# Patient Record
Sex: MALE | Race: BLACK OR AFRICAN AMERICAN | NOT HISPANIC OR LATINO | Employment: FULL TIME | URBAN - METROPOLITAN AREA
[De-identification: names, ages, dates, MRNs, and addresses within clinical notes are randomized per-mention and may not be internally consistent; named-entity substitution may affect disease eponyms.]

---

## 2017-05-31 ENCOUNTER — OFFICE VISIT (OUTPATIENT)
Dept: FAMILY MEDICINE | Facility: CLINIC | Age: 47
End: 2017-05-31
Payer: COMMERCIAL

## 2017-05-31 VITALS
BODY MASS INDEX: 32.57 KG/M2 | TEMPERATURE: 97 F | DIASTOLIC BLOOD PRESSURE: 100 MMHG | HEIGHT: 75 IN | OXYGEN SATURATION: 97 % | WEIGHT: 261.94 LBS | HEART RATE: 90 BPM | SYSTOLIC BLOOD PRESSURE: 140 MMHG | RESPIRATION RATE: 16 BRPM

## 2017-05-31 DIAGNOSIS — I10 ESSENTIAL HYPERTENSION: Primary | ICD-10-CM

## 2017-05-31 PROCEDURE — 99214 OFFICE O/P EST MOD 30 MIN: CPT | Mod: S$GLB,,, | Performed by: FAMILY MEDICINE

## 2017-05-31 PROCEDURE — 99999 PR PBB SHADOW E&M-EST. PATIENT-LVL III: CPT | Mod: PBBFAC,,, | Performed by: FAMILY MEDICINE

## 2017-05-31 RX ORDER — AMLODIPINE BESYLATE 5 MG/1
5 TABLET ORAL DAILY
Qty: 30 TABLET | Refills: 5 | Status: SHIPPED | OUTPATIENT
Start: 2017-05-31 | End: 2017-07-31

## 2017-05-31 RX ORDER — NAPROXEN 500 MG/1
500 TABLET ORAL
COMMUNITY
Start: 2016-09-27 | End: 2017-05-31

## 2017-05-31 RX ORDER — AMLODIPINE BESYLATE 5 MG/1
5 TABLET ORAL
COMMUNITY
Start: 2017-03-02 | End: 2017-05-31 | Stop reason: ALTCHOICE

## 2017-05-31 NOTE — PROGRESS NOTES
Subjective:       Patient ID: Bria Saldana is a 46 y.o. male.    Chief Complaint: Follow-up      HPI   Mr. Saldana presents to clinic today for follow up on his blood pressure.   He denies any recent chest pain, cough, shortness of breath, abdominal pain, diarrhea , nausea and vomiting.   He states he does not smoke. He drinks beer on occasion.   He gets headache once a in a blue moon.   He states he checks his blood pressure and it has been elevated to the 140-160 range.       Review of Systems   Constitutional: Negative for fever.   Respiratory: Negative for cough and shortness of breath.    Cardiovascular: Negative for chest pain.   Gastrointestinal: Negative for abdominal pain, diarrhea, nausea and vomiting.   Genitourinary: Negative for dysuria and hematuria.   Neurological: Positive for headaches.       Medication List with Changes/Refills   Current Medications    AMLODIPINE (NORVASC) 5 MG TABLET    Take 5 mg by mouth.    NAPROXEN (NAPROSYN) 500 MG TABLET    Take 500 mg by mouth.       There is no problem list on file for this patient.        Objective:     Physical Exam   Constitutional: He is oriented to person, place, and time. He appears well-developed and well-nourished. No distress.   HENT:   Head: Normocephalic and atraumatic.   Right Ear: External ear normal.   Left Ear: External ear normal.   Eyes: EOM are normal. Right eye exhibits no discharge. Left eye exhibits no discharge.   Cardiovascular: Normal rate and regular rhythm.    Pulmonary/Chest: Effort normal and breath sounds normal. No respiratory distress. He has no wheezes.   Musculoskeletal: He exhibits no edema.   Neurological: He is alert and oriented to person, place, and time.   Skin: Skin is warm and dry. He is not diaphoretic. No erythema.   Psychiatric: He has a normal mood and affect.   Vitals reviewed.    Vitals:    05/31/17 1017   BP: (!) 140/100   Pulse: 90   Resp: 16   Temp: 96.8 °F (36 °C)       Assessment/  PLAN      Essential hypertension  -     amlodipine (NORVASC) 5 MG tablet; Take 1 tablet (5 mg total) by mouth once daily.  Dispense: 30 tablet; Refill: 5  - counseled on diet and exercise       Plan as above   rtc in about 1 month for follow up on th       Frandy Pritchard MD  [facility]

## 2017-05-31 NOTE — PATIENT INSTRUCTIONS

## 2017-06-01 PROBLEM — I10 ESSENTIAL HYPERTENSION: Status: ACTIVE | Noted: 2017-06-01

## 2017-07-31 ENCOUNTER — OFFICE VISIT (OUTPATIENT)
Dept: FAMILY MEDICINE | Facility: CLINIC | Age: 47
End: 2017-07-31
Payer: COMMERCIAL

## 2017-07-31 ENCOUNTER — LAB VISIT (OUTPATIENT)
Dept: LAB | Facility: HOSPITAL | Age: 47
End: 2017-07-31
Payer: COMMERCIAL

## 2017-07-31 VITALS
HEART RATE: 104 BPM | TEMPERATURE: 98 F | WEIGHT: 263 LBS | OXYGEN SATURATION: 98 % | SYSTOLIC BLOOD PRESSURE: 150 MMHG | DIASTOLIC BLOOD PRESSURE: 96 MMHG | HEIGHT: 75 IN | RESPIRATION RATE: 18 BRPM | BODY MASS INDEX: 32.7 KG/M2

## 2017-07-31 DIAGNOSIS — I10 ESSENTIAL HYPERTENSION: Primary | ICD-10-CM

## 2017-07-31 DIAGNOSIS — R74.8 ELEVATED LIVER ENZYMES: ICD-10-CM

## 2017-07-31 LAB
ALBUMIN SERPL BCP-MCNC: 3.8 G/DL
ALP SERPL-CCNC: 93 U/L
ALT SERPL W/O P-5'-P-CCNC: 59 U/L
ANION GAP SERPL CALC-SCNC: 8 MMOL/L
AST SERPL-CCNC: 29 U/L
BILIRUB SERPL-MCNC: 1 MG/DL
BUN SERPL-MCNC: 15 MG/DL
CALCIUM SERPL-MCNC: 9.1 MG/DL
CHLORIDE SERPL-SCNC: 106 MMOL/L
CO2 SERPL-SCNC: 26 MMOL/L
CREAT SERPL-MCNC: 1.5 MG/DL
EST. GFR  (AFRICAN AMERICAN): >60 ML/MIN/1.73 M^2
EST. GFR  (NON AFRICAN AMERICAN): 55.4 ML/MIN/1.73 M^2
GLUCOSE SERPL-MCNC: 120 MG/DL
POTASSIUM SERPL-SCNC: 4.9 MMOL/L
PROT SERPL-MCNC: 7.5 G/DL
SODIUM SERPL-SCNC: 140 MMOL/L

## 2017-07-31 PROCEDURE — 99214 OFFICE O/P EST MOD 30 MIN: CPT | Mod: S$GLB,,, | Performed by: FAMILY MEDICINE

## 2017-07-31 PROCEDURE — 99999 PR PBB SHADOW E&M-EST. PATIENT-LVL III: CPT | Mod: PBBFAC,,, | Performed by: FAMILY MEDICINE

## 2017-07-31 PROCEDURE — 36415 COLL VENOUS BLD VENIPUNCTURE: CPT | Mod: PO

## 2017-07-31 PROCEDURE — 80053 COMPREHEN METABOLIC PANEL: CPT

## 2017-07-31 RX ORDER — LISINOPRIL AND HYDROCHLOROTHIAZIDE 12.5; 2 MG/1; MG/1
1 TABLET ORAL DAILY
Qty: 30 TABLET | Refills: 3 | Status: SHIPPED | OUTPATIENT
Start: 2017-07-31 | End: 2017-10-23 | Stop reason: ALTCHOICE

## 2017-07-31 NOTE — PROGRESS NOTES
Subjective:       Patient ID: Bria Saldana is a 46 y.o. male.    Chief Complaint: Follow-up      HPI   Mr. Saldana presents to clinic today for follow up.   He states he has not had his blood pressure medicine in 2 months.   He states he has been out of town.   He states he has been getting headache at the back of his head.   He states the headache are 4/10 in severity.   He does not take anything for it.   He states they are intermittent in nature.   He denies any changes to his vision.   He denies any nausea.   He states the headache are not related to food.   He states whenever his wife checks his blood pressure, it is high, even if he is on medicine.     Review of Systems   Constitutional: Negative for fever.   Eyes: Negative for visual disturbance.   Respiratory: Negative for cough and shortness of breath.    Cardiovascular: Negative for chest pain.   Gastrointestinal: Negative for abdominal pain, nausea and vomiting.   Genitourinary: Negative for dysuria and hematuria.   Neurological: Positive for headaches. Negative for dizziness.       Medication List with Changes/Refills   New Medications    LISINOPRIL-HYDROCHLOROTHIAZIDE (PRINZIDE,ZESTORETIC) 20-12.5 MG PER TABLET    Take 1 tablet by mouth once daily.   Discontinued Medications    AMLODIPINE (NORVASC) 5 MG TABLET    Take 1 tablet (5 mg total) by mouth once daily.       Patient Active Problem List   Diagnosis    Essential hypertension         Objective:     Physical Exam   Constitutional: He is oriented to person, place, and time. He appears well-developed and well-nourished. No distress.   HENT:   Head: Normocephalic and atraumatic.   Right Ear: External ear normal.   Left Ear: External ear normal.   Eyes: EOM are normal. Right eye exhibits no discharge. Left eye exhibits no discharge.   Cardiovascular: Normal rate and regular rhythm.    Pulmonary/Chest: Effort normal and breath sounds normal. No respiratory distress. He has no wheezes.    Musculoskeletal: He exhibits no edema.   Neurological: He is alert and oriented to person, place, and time.   Skin: Skin is warm and dry. He is not diaphoretic. No erythema.   Psychiatric: He has a normal mood and affect.   Vitals reviewed.    Vitals:    07/31/17 0844   BP: (!) 150/96   Pulse: 104   Resp: 18   Temp: 98 °F (36.7 °C)       Assessment/  PLAN     Essential hypertension  -     lisinopril-hydrochlorothiazide (PRINZIDE,ZESTORETIC) 20-12.5 mg per tablet; Take 1 tablet by mouth once daily.  Dispense: 30 tablet; Refill: 3  - d/c amlodipine   - counseled on diet and exercise     Elevated liver enzymes  -     Comprehensive metabolic panel; Future; Expected date: 07/31/2017    Plan as above   rtc 1 month for follow up     Frandy Pritchard MD  [facility]

## 2017-07-31 NOTE — PATIENT INSTRUCTIONS

## 2017-10-23 ENCOUNTER — OFFICE VISIT (OUTPATIENT)
Dept: FAMILY MEDICINE | Facility: CLINIC | Age: 47
End: 2017-10-23
Payer: COMMERCIAL

## 2017-10-23 DIAGNOSIS — I10 HYPERTENSION, UNSPECIFIED TYPE: Primary | ICD-10-CM

## 2017-10-23 DIAGNOSIS — K21.9 GASTROESOPHAGEAL REFLUX DISEASE, ESOPHAGITIS PRESENCE NOT SPECIFIED: ICD-10-CM

## 2017-10-23 DIAGNOSIS — G47.30 SLEEP APNEA, UNSPECIFIED TYPE: ICD-10-CM

## 2017-10-23 PROCEDURE — 99999 PR PBB SHADOW E&M-EST. PATIENT-LVL IV: CPT | Mod: PBBFAC,,, | Performed by: FAMILY MEDICINE

## 2017-10-23 PROCEDURE — 99214 OFFICE O/P EST MOD 30 MIN: CPT | Mod: S$GLB,,, | Performed by: FAMILY MEDICINE

## 2017-10-23 RX ORDER — OMEPRAZOLE 20 MG/1
20 CAPSULE, DELAYED RELEASE ORAL DAILY
Qty: 30 CAPSULE | Refills: 3 | Status: SHIPPED | OUTPATIENT
Start: 2017-10-23 | End: 2018-06-08

## 2017-10-23 RX ORDER — LISINOPRIL 40 MG/1
40 TABLET ORAL DAILY
Qty: 90 TABLET | Refills: 1 | Status: SHIPPED | OUTPATIENT
Start: 2017-10-23 | End: 2018-05-08 | Stop reason: SDUPTHER

## 2017-10-23 NOTE — PROGRESS NOTES
Subjective:       Patient ID: Bria Saldana is a 46 y.o. male.    Chief Complaint: Follow-up (BP)      HPI  Mr. Saldana presents to clinic today for follow up on his blood pressure.   He states he only has taken his blood pressure medicine 2 or 3 times.   He states he feels that it causes him to have erectile dysfunction and thus stopped taking it.   He denies any complaints other than some nausea on occasion.   He denies any headache, chest pain, fever, shortness of breath.   He has not really been monitoring his diet.   He states he gets a lot of exercise at work.     He also feels he coughs at night.   He states he usually eat and lay down.   He reports that he has dyspepsia in the morning.       His wife is concerned that he may have sleep apnea.   She states he snore and stops breathing at times.     Review of Systems   Constitutional: Negative for fever.   Respiratory: Negative for cough and shortness of breath.    Gastrointestinal: Positive for nausea. Negative for abdominal pain and vomiting.   Neurological: Negative for dizziness and headaches.       Medication List with Changes/Refills   New Medications    LISINOPRIL (PRINIVIL,ZESTRIL) 40 MG TABLET    Take 1 tablet (40 mg total) by mouth once daily.    OMEPRAZOLE (PRILOSEC) 20 MG CAPSULE    Take 1 capsule (20 mg total) by mouth once daily.   Discontinued Medications    LISINOPRIL-HYDROCHLOROTHIAZIDE (PRINZIDE,ZESTORETIC) 20-12.5 MG PER TABLET    Take 1 tablet by mouth once daily.       Patient Active Problem List   Diagnosis    Essential hypertension         Objective:     Physical Exam   Constitutional: He is oriented to person, place, and time. He appears well-developed and well-nourished. No distress.   HENT:   Head: Normocephalic and atraumatic.   Right Ear: External ear normal.   Left Ear: External ear normal.   Eyes: EOM are normal. Right eye exhibits no discharge. Left eye exhibits no discharge.   Cardiovascular: Normal rate and regular  rhythm.    Pulmonary/Chest: Effort normal and breath sounds normal. No respiratory distress. He has no wheezes.   Musculoskeletal: He exhibits no edema.   Neurological: He is alert and oriented to person, place, and time.   Skin: Skin is warm and dry. He is not diaphoretic. No erythema.   Psychiatric: He has a normal mood and affect.   Vitals reviewed.    Vitals:    10/23/17 0946   BP: (!) 160/100   Pulse: 86   Resp: 16   Temp: 97.2 °F (36.2 °C)       Assessment/  PLAN     Hypertension, unspecified type  -     lisinopril (PRINIVIL,ZESTRIL) 40 MG tablet; Take 1 tablet (40 mg total) by mouth once daily.  Dispense: 90 tablet; Refill: 1  - d/c lisinopril - hctz   - will do only lisinopril and increase dose to 40   -discussed diet and exercise     Gastroesophageal reflux disease, esophagitis presence not specified  -     omeprazole (PRILOSEC) 20 MG capsule; Take 1 capsule (20 mg total) by mouth once daily.  Dispense: 30 capsule; Refill: 3    Sleep apnea, unspecified type  -     Ambulatory consult to Sleep Disorders      Plan as above   rtc 1 month       Frandy Pritchard MD  [facility]

## 2017-10-23 NOTE — PATIENT INSTRUCTIONS

## 2017-10-29 VITALS
RESPIRATION RATE: 16 BRPM | BODY MASS INDEX: 32.75 KG/M2 | SYSTOLIC BLOOD PRESSURE: 160 MMHG | TEMPERATURE: 97 F | DIASTOLIC BLOOD PRESSURE: 100 MMHG | HEIGHT: 75 IN | OXYGEN SATURATION: 98 % | WEIGHT: 263.44 LBS | HEART RATE: 86 BPM

## 2017-11-24 ENCOUNTER — OFFICE VISIT (OUTPATIENT)
Dept: FAMILY MEDICINE | Facility: CLINIC | Age: 47
End: 2017-11-24
Payer: COMMERCIAL

## 2017-11-24 VITALS
HEIGHT: 75 IN | BODY MASS INDEX: 32.54 KG/M2 | SYSTOLIC BLOOD PRESSURE: 150 MMHG | OXYGEN SATURATION: 97 % | RESPIRATION RATE: 18 BRPM | DIASTOLIC BLOOD PRESSURE: 96 MMHG | HEART RATE: 96 BPM | TEMPERATURE: 98 F | WEIGHT: 261.69 LBS

## 2017-11-24 DIAGNOSIS — M75.52 BURSITIS OF LEFT SHOULDER: ICD-10-CM

## 2017-11-24 DIAGNOSIS — I10 ESSENTIAL HYPERTENSION: Primary | ICD-10-CM

## 2017-11-24 PROCEDURE — 99214 OFFICE O/P EST MOD 30 MIN: CPT | Mod: S$GLB,,, | Performed by: FAMILY MEDICINE

## 2017-11-24 PROCEDURE — 99999 PR PBB SHADOW E&M-EST. PATIENT-LVL III: CPT | Mod: PBBFAC,,, | Performed by: FAMILY MEDICINE

## 2017-11-24 RX ORDER — MELOXICAM 15 MG/1
15 TABLET ORAL DAILY
Qty: 30 TABLET | Refills: 0 | Status: SHIPPED | OUTPATIENT
Start: 2017-11-24 | End: 2018-06-08

## 2017-11-24 RX ORDER — AMLODIPINE BESYLATE 10 MG/1
10 TABLET ORAL DAILY
Qty: 30 TABLET | Refills: 3 | Status: SHIPPED | OUTPATIENT
Start: 2017-11-24 | End: 2018-05-08 | Stop reason: SDUPTHER

## 2017-11-24 NOTE — PATIENT INSTRUCTIONS
Bursitis  You have bursitis. This is an inflammation of the bursa. These are small, fluid-filled sacs that surround the larger joints of the body. The bursa help the muscles and tendons move smoothly over the joints.  Bursitis often happens in the shoulder. But it can also affect the elbows, hips, pelvis, knees, toes, and heels. Bursitis can be caused by injury, overuse of the joint, or infection of the bursa. Symptoms include pain and tenderness over a joint. Symptoms get worse with movement.  Bursitis is treated with an anti-inflammatory medicine and by resting the joint. More severe cases require injection of medicine directly into the bursa.    Home care  · Rest the painful joint and protect it from movement. This will allow the inflammation to heal faster.  · Apply an ice pack over the injured area for no more than 15 to 20 minutes. Do this every 3 to 6 hours for the first 24 to 48 hours. Keep using ice packs 3 to 4 times a day until the pain and swelling improves.   · To make an ice pack, put ice cubes in a sealed plastic zip-lock bag. Wrap the bag in a clean, thin towel or cloth. Never put ice or an ice pack directly on the skin. As the ice melts, be careful to avoid getting any wrap or splint wet.  · You may take over-the-counter pain medicine to treat pain and inflammation, unless another medicine was prescribed. Anti-inflammatory pain medicines may be more effective. Talk with your provider beforeusing these medicines if you have chronic liver or kidney disease, or ever had a stomach ulcer or GI (gastrointestinal) bleeding.  · As your symptoms improve, slowly begin to move the joint. Do not overuse the joint. This may cause the symptoms to flare up again.  When to seek medical advice  Call your healthcare provider right away if any of these occur:  · Redness over the painful area  · Increasing pain or swelling at the joint  · Fever of 100.4°F (38°C) or above lasting for 24 to 48 hours  Date Last  Reviewed: 11/21/2015  © 8728-7747 [employer]. [address] [place] [address], [place] [address]. All rights reserved. This information is not intended as a substitute for professional medical care. Always follow your healthcare professional's instructions.

## 2017-11-24 NOTE — PROGRESS NOTES
Subjective:       Patient ID: Bria Saldana is a 46 y.o. male.    Chief Complaint: Follow-up (bp)      HPI    Mr. Saldana presents to clinic today for follow up.   He states he has been compliant with his medication.   He denies any side effects.   He states he has been having some shoulder pain.   The pain is on the left side.   He states the pain is worse when he lays on that side.     Review of Systems   Constitutional: Negative for fever.   Respiratory: Negative for cough and shortness of breath.    Cardiovascular: Negative for chest pain.   Gastrointestinal: Negative for abdominal pain, blood in stool, diarrhea and vomiting.   Genitourinary: Negative for difficulty urinating, dysuria and hematuria.   Musculoskeletal: Positive for arthralgias.   Skin: Negative for rash.   Neurological: Negative for dizziness and headaches.       Medication List with Changes/Refills   Current Medications    LISINOPRIL (PRINIVIL,ZESTRIL) 40 MG TABLET    Take 1 tablet (40 mg total) by mouth once daily.    OMEPRAZOLE (PRILOSEC) 20 MG CAPSULE    Take 1 capsule (20 mg total) by mouth once daily.       Patient Active Problem List   Diagnosis    Essential hypertension         Objective:     Physical Exam   Constitutional: He is oriented to person, place, and time. He appears well-developed and well-nourished. No distress.   HENT:   Head: Normocephalic and atraumatic.   Right Ear: External ear normal.   Left Ear: External ear normal.   Eyes: EOM are normal. Right eye exhibits no discharge. Left eye exhibits no discharge.   Cardiovascular: Normal rate and regular rhythm.    Pulmonary/Chest: Effort normal and breath sounds normal. No respiratory distress. He has no wheezes.   Musculoskeletal: He exhibits no edema.   Shoulder rom wnl   Strength is wnl      Neurological: He is alert and oriented to person, place, and time.   Skin: Skin is warm and dry. He is not diaphoretic. No erythema.   Psychiatric: He has a normal mood and  affect.   Vitals reviewed.    Vitals:    11/24/17 0927   BP: (!) 150/96   Pulse: 96   Resp: 18   Temp: 97.5 °F (36.4 °C)       Assessment/  PLAN     Essential hypertension  -     amLODIPine (NORVASC) 10 MG tablet; Take 1 tablet (10 mg total) by mouth once daily.  Dispense: 30 tablet; Refill: 3  - continue lisinopril    Bursitis of left shoulder  -     meloxicam (MOBIC) 15 MG tablet; Take 1 tablet (15 mg total) by mouth once daily.  Dispense: 30 tablet; Refill: 0    Plan as above   rtc around 1 month       Frandy Pritchard MD  [facility]

## 2017-12-05 ENCOUNTER — TELEPHONE (OUTPATIENT)
Dept: PULMONOLOGY | Facility: CLINIC | Age: 47
End: 2017-12-05

## 2017-12-05 DIAGNOSIS — G47.30 SLEEP DISORDER BREATHING: Primary | ICD-10-CM

## 2017-12-19 ENCOUNTER — HOSPITAL ENCOUNTER (OUTPATIENT)
Dept: RADIOLOGY | Facility: HOSPITAL | Age: 47
Discharge: HOME | End: 2017-12-19
Attending: INTERNAL MEDICINE
Payer: COMMERCIAL

## 2017-12-19 DIAGNOSIS — G47.30 SLEEP DISORDER BREATHING: ICD-10-CM

## 2017-12-19 PROCEDURE — 71020 XR CHEST PA AND LATERAL: CPT | Mod: 26,,, | Performed by: RADIOLOGY

## 2017-12-19 PROCEDURE — 71020 XR CHEST PA AND LATERAL: CPT | Mod: TC,PO

## 2018-05-08 ENCOUNTER — OFFICE VISIT (OUTPATIENT)
Dept: FAMILY MEDICINE | Facility: CLINIC | Age: 48
End: 2018-05-08
Payer: COMMERCIAL

## 2018-05-08 VITALS
OXYGEN SATURATION: 98 % | SYSTOLIC BLOOD PRESSURE: 150 MMHG | RESPIRATION RATE: 18 BRPM | BODY MASS INDEX: 32.16 KG/M2 | DIASTOLIC BLOOD PRESSURE: 100 MMHG | HEART RATE: 94 BPM | HEIGHT: 75 IN | WEIGHT: 258.63 LBS | TEMPERATURE: 98 F

## 2018-05-08 DIAGNOSIS — R19.7 DIARRHEA, UNSPECIFIED TYPE: ICD-10-CM

## 2018-05-08 DIAGNOSIS — G47.30 SLEEP APNEA, UNSPECIFIED TYPE: ICD-10-CM

## 2018-05-08 DIAGNOSIS — I10 ESSENTIAL HYPERTENSION: Primary | ICD-10-CM

## 2018-05-08 PROCEDURE — 3008F BODY MASS INDEX DOCD: CPT | Mod: CPTII,S$GLB,, | Performed by: FAMILY MEDICINE

## 2018-05-08 PROCEDURE — 3077F SYST BP >= 140 MM HG: CPT | Mod: CPTII,S$GLB,, | Performed by: FAMILY MEDICINE

## 2018-05-08 PROCEDURE — 99214 OFFICE O/P EST MOD 30 MIN: CPT | Mod: S$GLB,,, | Performed by: FAMILY MEDICINE

## 2018-05-08 PROCEDURE — 3080F DIAST BP >= 90 MM HG: CPT | Mod: CPTII,S$GLB,, | Performed by: FAMILY MEDICINE

## 2018-05-08 PROCEDURE — 99999 PR PBB SHADOW E&M-EST. PATIENT-LVL IV: CPT | Mod: PBBFAC,,, | Performed by: FAMILY MEDICINE

## 2018-05-08 RX ORDER — LISINOPRIL 40 MG/1
40 TABLET ORAL DAILY
Qty: 90 TABLET | Refills: 0 | Status: SHIPPED | OUTPATIENT
Start: 2018-05-08 | End: 2019-07-08

## 2018-05-08 RX ORDER — AMLODIPINE BESYLATE 10 MG/1
10 TABLET ORAL DAILY
Qty: 90 TABLET | Refills: 0 | Status: ON HOLD | OUTPATIENT
Start: 2018-05-08 | End: 2019-09-25 | Stop reason: SDUPTHER

## 2018-05-08 NOTE — PROGRESS NOTES
Subjective:       Patient ID: Bria Saldana is a 47 y.o. male.    Chief Complaint: Follow-up      HPI  Mr. Saldana presents to clinic today for follow up on his blood pressure.   He states he is doing better with everything else.   He reports a mild cough and some diarrhea.   The diarrhea is intermittent and not daily.   He reports on some days he has to go multiple times and some days he does not.   He denies any blood in his stool.   He denies any fever.   He has not been on antibiotic recently.   He states it could be some eggs he was eating as he noticed it was recalled.     His wife is also concerned he may have sleep apnea.   He does admit to snoring.   He does wake up coughing from his sleep.   He does feel tired during the days.       Review of Systems   Constitutional: Negative for fever.   Respiratory: Positive for cough. Negative for shortness of breath.    Cardiovascular: Negative for chest pain.   Gastrointestinal: Positive for diarrhea. Negative for abdominal pain, blood in stool and vomiting.   Genitourinary: Negative for difficulty urinating and hematuria.   Skin: Negative for rash.   Neurological: Negative for dizziness and headaches.       Medication List with Changes/Refills   Current Medications    MELOXICAM (MOBIC) 15 MG TABLET    Take 1 tablet (15 mg total) by mouth once daily.    OMEPRAZOLE (PRILOSEC) 20 MG CAPSULE    Take 1 capsule (20 mg total) by mouth once daily.   Changed and/or Refilled Medications    Modified Medication Previous Medication    AMLODIPINE (NORVASC) 10 MG TABLET amLODIPine (NORVASC) 10 MG tablet       Take 1 tablet (10 mg total) by mouth once daily.    Take 1 tablet (10 mg total) by mouth once daily.    LISINOPRIL (PRINIVIL,ZESTRIL) 40 MG TABLET lisinopril (PRINIVIL,ZESTRIL) 40 MG tablet       Take 1 tablet (40 mg total) by mouth once daily.    Take 1 tablet (40 mg total) by mouth once daily.       Patient Active Problem List   Diagnosis    Essential  hypertension         Objective:     Physical Exam   Constitutional: He is oriented to person, place, and time. He appears well-developed and well-nourished. No distress.   HENT:   Head: Normocephalic and atraumatic.   Right Ear: External ear normal.   Left Ear: External ear normal.   Eyes: EOM are normal. Right eye exhibits no discharge. Left eye exhibits no discharge.   Cardiovascular: Normal rate and regular rhythm.    Pulmonary/Chest: Effort normal and breath sounds normal. No respiratory distress. He has no wheezes.   Musculoskeletal: He exhibits no edema.   Neurological: He is alert and oriented to person, place, and time.   Skin: Skin is warm and dry. He is not diaphoretic. No erythema.   Psychiatric: He has a normal mood and affect.   Vitals reviewed.    Vitals:    05/08/18 1007   BP: (!) 150/100   Pulse: 94   Resp: 18   Temp: 98 °F (36.7 °C)       Assessment/  PLAN     Essential hypertension  -     lisinopril (PRINIVIL,ZESTRIL) 40 MG tablet; Take 1 tablet (40 mg total) by mouth once daily.  Dispense: 90 tablet; Refill: 0  -     amLODIPine (NORVASC) 10 MG tablet; Take 1 tablet (10 mg total) by mouth once daily.  Dispense: 90 tablet; Refill: 0  -     Comprehensive metabolic panel; Future; Expected date: 08/06/2018  - patient was taking prilosec thinking it was his blood pressure medicine, advised pt of which ones are his bp meds   - will not change dosing at this time and have pt follow up     Sleep apnea, unspecified type  -     Ambulatory referral to Pulmonology    Diarrhea, unspecified type  - likely related to food   - advised pt to monitor what he is eating , eat less fast food and healthier food     Plan as above   rtc 1 month for follow up on above        Frandy Pritchard MD  [facility]

## 2018-06-08 ENCOUNTER — OFFICE VISIT (OUTPATIENT)
Dept: FAMILY MEDICINE | Facility: CLINIC | Age: 48
End: 2018-06-08
Payer: COMMERCIAL

## 2018-06-08 ENCOUNTER — LAB VISIT (OUTPATIENT)
Dept: LAB | Facility: HOSPITAL | Age: 48
End: 2018-06-08
Payer: COMMERCIAL

## 2018-06-08 VITALS
DIASTOLIC BLOOD PRESSURE: 86 MMHG | HEART RATE: 99 BPM | SYSTOLIC BLOOD PRESSURE: 126 MMHG | WEIGHT: 261.69 LBS | OXYGEN SATURATION: 97 % | HEIGHT: 75 IN | RESPIRATION RATE: 18 BRPM | TEMPERATURE: 98 F | BODY MASS INDEX: 32.54 KG/M2

## 2018-06-08 DIAGNOSIS — Z00.00 ANNUAL PHYSICAL EXAM: ICD-10-CM

## 2018-06-08 DIAGNOSIS — E66.9 OBESITY (BMI 30-39.9): ICD-10-CM

## 2018-06-08 DIAGNOSIS — I10 ESSENTIAL HYPERTENSION: Primary | ICD-10-CM

## 2018-06-08 LAB
25(OH)D3+25(OH)D2 SERPL-MCNC: 11 NG/ML
ALBUMIN SERPL BCP-MCNC: 3.8 G/DL
ALP SERPL-CCNC: 80 U/L
ALT SERPL W/O P-5'-P-CCNC: 31 U/L
ANION GAP SERPL CALC-SCNC: 9 MMOL/L
AST SERPL-CCNC: 22 U/L
BASOPHILS # BLD AUTO: 0.04 K/UL
BASOPHILS NFR BLD: 0.7 %
BILIRUB SERPL-MCNC: 1 MG/DL
BUN SERPL-MCNC: 17 MG/DL
CALCIUM SERPL-MCNC: 9.5 MG/DL
CHLORIDE SERPL-SCNC: 108 MMOL/L
CHOLEST SERPL-MCNC: 173 MG/DL
CHOLEST/HDLC SERPL: 5.4 {RATIO}
CO2 SERPL-SCNC: 24 MMOL/L
CREAT SERPL-MCNC: 1.5 MG/DL
DIFFERENTIAL METHOD: ABNORMAL
EOSINOPHIL # BLD AUTO: 0.2 K/UL
EOSINOPHIL NFR BLD: 3.9 %
ERYTHROCYTE [DISTWIDTH] IN BLOOD BY AUTOMATED COUNT: 12.8 %
EST. GFR  (AFRICAN AMERICAN): >60 ML/MIN/1.73 M^2
EST. GFR  (NON AFRICAN AMERICAN): 55 ML/MIN/1.73 M^2
GLUCOSE SERPL-MCNC: 97 MG/DL
HCT VFR BLD AUTO: 44.3 %
HDLC SERPL-MCNC: 32 MG/DL
HDLC SERPL: 18.5 %
HGB BLD-MCNC: 13.5 G/DL
IMM GRANULOCYTES # BLD AUTO: 0.03 K/UL
IMM GRANULOCYTES NFR BLD AUTO: 0.6 %
LDLC SERPL CALC-MCNC: 114 MG/DL
LYMPHOCYTES # BLD AUTO: 1.9 K/UL
LYMPHOCYTES NFR BLD: 34.9 %
MCH RBC QN AUTO: 27.7 PG
MCHC RBC AUTO-ENTMCNC: 30.5 G/DL
MCV RBC AUTO: 91 FL
MONOCYTES # BLD AUTO: 0.3 K/UL
MONOCYTES NFR BLD: 6.2 %
NEUTROPHILS # BLD AUTO: 2.9 K/UL
NEUTROPHILS NFR BLD: 53.7 %
NONHDLC SERPL-MCNC: 141 MG/DL
NRBC BLD-RTO: 0 /100 WBC
PLATELET # BLD AUTO: 205 K/UL
PMV BLD AUTO: 10.8 FL
POTASSIUM SERPL-SCNC: 4.8 MMOL/L
PROT SERPL-MCNC: 7.3 G/DL
RBC # BLD AUTO: 4.88 M/UL
SODIUM SERPL-SCNC: 141 MMOL/L
TRIGL SERPL-MCNC: 135 MG/DL
TSH SERPL DL<=0.005 MIU/L-ACNC: 1.05 UIU/ML
WBC # BLD AUTO: 5.45 K/UL

## 2018-06-08 PROCEDURE — 80053 COMPREHEN METABOLIC PANEL: CPT

## 2018-06-08 PROCEDURE — 36415 COLL VENOUS BLD VENIPUNCTURE: CPT | Mod: PO

## 2018-06-08 PROCEDURE — 84443 ASSAY THYROID STIM HORMONE: CPT

## 2018-06-08 PROCEDURE — 3074F SYST BP LT 130 MM HG: CPT | Mod: CPTII,S$GLB,, | Performed by: FAMILY MEDICINE

## 2018-06-08 PROCEDURE — 99213 OFFICE O/P EST LOW 20 MIN: CPT | Mod: S$GLB,,, | Performed by: FAMILY MEDICINE

## 2018-06-08 PROCEDURE — 85025 COMPLETE CBC W/AUTO DIFF WBC: CPT

## 2018-06-08 PROCEDURE — 82306 VITAMIN D 25 HYDROXY: CPT

## 2018-06-08 PROCEDURE — 3079F DIAST BP 80-89 MM HG: CPT | Mod: CPTII,S$GLB,, | Performed by: FAMILY MEDICINE

## 2018-06-08 PROCEDURE — 80061 LIPID PANEL: CPT

## 2018-06-08 PROCEDURE — 99999 PR PBB SHADOW E&M-EST. PATIENT-LVL IV: CPT | Mod: PBBFAC,,, | Performed by: FAMILY MEDICINE

## 2018-06-08 NOTE — PROGRESS NOTES
Subjective:       Patient ID: Bria Saldana is a 47 y.o. male.    Chief Complaint: Follow-up (1 mo)      HPI  Mr. Saldana presents to clinic today for follow up.  He states he does not smoke.  He drinks socially.  He does not exercise.  He states his diet is okay.  He states he is compliant with his medication.   He is . He occassionally has some diarrhea which he thinks is related to eating out.   He eats out often.  He denies any recent antibiotic use or travel history.     Review of Systems   Constitutional: Negative for fever.   Respiratory: Negative for cough and shortness of breath.    Cardiovascular: Negative for chest pain.   Gastrointestinal: Positive for diarrhea. Negative for abdominal pain, blood in stool and vomiting.   Genitourinary: Negative for dysuria and hematuria.   Neurological: Negative for dizziness and headaches.       Medication List with Changes/Refills   Current Medications    AMLODIPINE (NORVASC) 10 MG TABLET    Take 1 tablet (10 mg total) by mouth once daily.    LISINOPRIL (PRINIVIL,ZESTRIL) 40 MG TABLET    Take 1 tablet (40 mg total) by mouth once daily.   Discontinued Medications    MELOXICAM (MOBIC) 15 MG TABLET    Take 1 tablet (15 mg total) by mouth once daily.    OMEPRAZOLE (PRILOSEC) 20 MG CAPSULE    Take 1 capsule (20 mg total) by mouth once daily.       Patient Active Problem List   Diagnosis    Essential hypertension         Objective:     Physical Exam   Constitutional: He is oriented to person, place, and time. He appears well-developed and well-nourished. No distress.   HENT:   Head: Normocephalic and atraumatic.   Eyes: EOM are normal. Right eye exhibits no discharge. Left eye exhibits no discharge.   Cardiovascular: Normal rate and regular rhythm.    Pulmonary/Chest: Effort normal and breath sounds normal. No respiratory distress. He has no wheezes.   Musculoskeletal: He exhibits no edema.   Neurological: He is alert and oriented to person, place, and  time.   Skin: Skin is warm and dry. He is not diaphoretic. No erythema.   Psychiatric: He has a normal mood and affect.   Vitals reviewed.    Vitals:    06/08/18 1012   BP: 126/86   Pulse: 99   Resp: 18   Temp: 97.5 °F (36.4 °C)       Assessment/  PLAN     Essential hypertension  - continue current bp med  - bp is much better    Obesity (BMI 30-39.9)  - counseled on diet and exercise    Annual physical exam  -     Lipid panel; Future; Expected date: 06/22/2018  -     Comprehensive metabolic panel; Future; Expected date: 06/22/2018  -     TSH; Future; Expected date: 06/22/2018  -     Vitamin D; Future; Expected date: 06/22/2018  -     CBC auto differential; Future; Expected date: 06/22/2018        Frandy Pritchard MD  [facility]

## 2018-06-08 NOTE — PATIENT INSTRUCTIONS

## 2018-06-09 DIAGNOSIS — E55.9 VITAMIN D DEFICIENCY: Primary | ICD-10-CM

## 2018-06-09 RX ORDER — ERGOCALCIFEROL 1.25 MG/1
50000 CAPSULE ORAL
Qty: 8 CAPSULE | Refills: 3 | Status: SHIPPED | OUTPATIENT
Start: 2018-06-09 | End: 2019-07-08

## 2018-07-02 ENCOUNTER — TELEPHONE (OUTPATIENT)
Dept: PULMONOLOGY | Facility: CLINIC | Age: 48
End: 2018-07-02

## 2018-07-02 ENCOUNTER — OFFICE VISIT (OUTPATIENT)
Dept: PULMONOLOGY | Facility: CLINIC | Age: 48
End: 2018-07-02
Payer: COMMERCIAL

## 2018-07-02 VITALS
BODY MASS INDEX: 31.87 KG/M2 | DIASTOLIC BLOOD PRESSURE: 96 MMHG | OXYGEN SATURATION: 96 % | HEART RATE: 92 BPM | SYSTOLIC BLOOD PRESSURE: 150 MMHG | WEIGHT: 261.69 LBS | HEIGHT: 76 IN | RESPIRATION RATE: 20 BRPM

## 2018-07-02 DIAGNOSIS — G47.33 OSA (OBSTRUCTIVE SLEEP APNEA): Primary | ICD-10-CM

## 2018-07-02 DIAGNOSIS — J06.9 UPPER RESPIRATORY TRACT INFECTION, UNSPECIFIED TYPE: ICD-10-CM

## 2018-07-02 PROCEDURE — 99204 OFFICE O/P NEW MOD 45 MIN: CPT | Mod: S$GLB,,, | Performed by: NURSE PRACTITIONER

## 2018-07-02 PROCEDURE — 99999 PR PBB SHADOW E&M-EST. PATIENT-LVL III: CPT | Mod: PBBFAC,,, | Performed by: NURSE PRACTITIONER

## 2018-07-02 RX ORDER — METHYLPREDNISOLONE 4 MG/1
TABLET ORAL
Qty: 21 TABLET | Refills: 0 | Status: SHIPPED | OUTPATIENT
Start: 2018-07-02 | End: 2019-07-08

## 2018-07-02 NOTE — PROGRESS NOTES
"Subjective:      Patient ID: Bria Saldana is a 47 y.o. male.    Chief Complaint: Sleep Apnea    Patient presents to the office today for evaluation of sleep apnea.  Patient with snoring and witnessed apneas. Patient not having problems falling asleep, but wakes up frequently throughout the night.  Patient does not wake up feeling refreshed in the morning.  Patient with daytime hypersomnolence.  [place] Sleepiness Scale score 12. Comorbidities include HTN  Bedtime: 11:30PM  Wake time: 4:30 AM for work or 9:00 when off work    Complaints of cold like symptoms.  Postnasal drip, cough, chest congestion    STOP - BANG Questionnaire:     1. Snoring : Do you snore loudly ?    Yes    2. Tired : Do you often feel tired, fatigued, or sleepy during daytime? Yes    3. Observed: Has anyone observed you stop breathing during your sleep?   Yes     4. Blood pressure : Do you have or are you being treated for high blood pressure?   Yes    5. BMI :BMI more than 35 kg/m2?   No    6. Age : Age over 50 yr old?   No    7. Neck circumference: Neck circumference greater than 40 cm?   No    8. Gender: Gender male?   Yes    High risk of NATALYA: Yes 5 - 8  Intermediate risk of NATALYA: Yes 3 - 4  Low risk of NATALYA: Yes 0 - 2      References:   STOP Questionnaire   A Tool to Screen Patients for Obstructive Sleep Apnea: LLOYD Kumar.C.P.C., XENA Medeiros.B.B.S., Jaylin Hammond M.D.,Maranda Abernathy, Ph.D., Gayla Forman M.B.B.S.,_ XENA Mcgee.Sc.,_ Maddy Blackmon M.D., Checo Landeros F.R.C.P.C.; Anesthesiology 2008; 108:812-21 Copyright © 2008, the American Society of Anesthesiologists, Inc. [employer]            BP (!) 150/96 (BP Location: Right arm, Patient Position: Sitting)   Pulse 92   Resp 20   Ht 6' 3.6" (1.92 m)   Wt 118.7 kg (261 lb 11 oz)   SpO2 96%   BMI 32.19 kg/m²   Body mass index is 32.19 kg/m².    Review of Systems   HENT: Positive for postnasal drip and rhinorrhea. "    Respiratory: Positive for snoring, cough and wheezing.    Psychiatric/Behavioral: Positive for sleep disturbance.   All other systems reviewed and are negative.    Objective:      Physical Exam   Constitutional: He is oriented to person, place, and time. He appears well-developed and well-nourished.   HENT:   Head: Normocephalic and atraumatic.   Mouth/Throat: Oropharynx is clear and moist.   Eyes: EOM are normal. Pupils are equal, round, and reactive to light.   Neck: Normal range of motion. Neck supple.   Cardiovascular: Normal rate and regular rhythm.  Exam reveals no gallop and no friction rub.    No murmur heard.  Pulmonary/Chest: Effort normal. He has wheezes.   Abdominal: Soft. Bowel sounds are normal. He exhibits no distension. There is no tenderness.   Musculoskeletal: Normal range of motion.   Neurological: He is alert and oriented to person, place, and time.   Skin: Skin is warm and dry.   Psychiatric: He has a normal mood and affect.         Assessment:       1. NATALYA (obstructive sleep apnea)    2. Upper respiratory tract infection, unspecified type        Outpatient Encounter Prescriptions as of 7/2/2018   Medication Sig Dispense Refill    amLODIPine (NORVASC) 10 MG tablet Take 1 tablet (10 mg total) by mouth once daily. 90 tablet 0    ergocalciferol (ERGOCALCIFEROL) 50,000 unit Cap Take 1 capsule (50,000 Units total) by mouth every 7 days. 8 capsule 3    lisinopril (PRINIVIL,ZESTRIL) 40 MG tablet Take 1 tablet (40 mg total) by mouth once daily. 90 tablet 0    methylPREDNISolone (MEDROL DOSEPACK) 4 mg tablet use as directed 21 tablet 0     No facility-administered encounter medications on file as of 7/2/2018.      Orders Placed This Encounter   Procedures    Home Sleep Studies     Standing Status:   Future     Standing Expiration Date:   7/2/2019     Scheduling Instructions:      3 night protocol     Plan:      Home sleep test for evaluation of sleep apnea. Return to clinic when study is  available for review.  Medrol dose pack for URI and bronchospasms

## 2018-07-02 NOTE — PATIENT INSTRUCTIONS
What Are Snoring and Obstructive Sleep Apnea?  If youve ever had a stuffed-up nose, you know the feeling of trying to breathe through a very narrow passageway. This is what happens in your throat when you snore. While you sleep, structures in your throat partially block your air passage, making the passage narrow and hard to breathe through. If the entire passage becomes blocked and you cant breathe at all, you have sleep apnea.      Snoring Obstructive sleep apnea   Snoring  If your throat structures are too large or the muscles relax too much during sleep, the air passage may be partially blocked. As air from the nose or mouth passes around this blockage, the throat structures vibrate, causing the familiar sound of snoring. At times, this sound can be so loud that snorers wake up others, or even themselves, during the night. Snoring gets worse as more and more of the air passage is blocked.  Obstructive sleep apnea  If the structures completely block the throat, air cant flow to the lungs at all. This is called apnea (meaning no breathing). Since the lungs arent getting fresh air, the brain tells the body to wake up just enough to tighten the muscles and unblock the air passage. With a loud gasp, breathing begins again. This process may be repeated over and over again throughout the night, making your sleep fragmented with a lighter stage of sleep. Even though you do not remember waking up many times during the night to a lighter sleep, you feel tired the next day. The lack of sleep and fresh air can also strain your lungs, heart, and other organs, leading to problems such as high blood pressure, heart attack, or stroke.  Problems in the nose and jaw  Problems in the structure of the nose may obstruct breathing. A crooked (deviated) septum or swollen turbinates can make snoring worse or lead to apnea. Also, a receding jaw may make the tongue sit too far back, so its more likely to block the airway when  youre asleep.        Date Last Reviewed: 7/18/2015  © 7758-9217 [employer]. [address] [place] [address], [place] [address]. All rights reserved. This information is not intended as a substitute for professional medical care. Always follow your healthcare professional's instructions.

## 2018-07-23 ENCOUNTER — PROCEDURE VISIT (OUTPATIENT)
Dept: SLEEP MEDICINE | Facility: CLINIC | Age: 48
End: 2018-07-23
Payer: COMMERCIAL

## 2018-07-23 DIAGNOSIS — G47.33 OSA (OBSTRUCTIVE SLEEP APNEA): ICD-10-CM

## 2018-07-23 PROCEDURE — 95806 SLEEP STUDY UNATT&RESP EFFT: CPT | Mod: S$GLB,,, | Performed by: INTERNAL MEDICINE

## 2018-07-23 PROCEDURE — 99499 UNLISTED E&M SERVICE: CPT | Mod: S$GLB,,, | Performed by: INTERNAL MEDICINE

## 2018-07-23 NOTE — Clinical Note
Assessment and Recommendations Three night protocol was utilized. Data from the 1st night was used for the report. Oxygen saturation samanta was 83%. Heart rate variability was present. Snoring was recorded above 50 decibels 98% of the time. Apnea-hypopnea index: 10.4 events per hour. Total events 54. Mild obstructive sleep apnea-hypopnea syndrome present. Treatment recommendations: Based on the American academy [employer] practice parameter of CPAP would be the guideline recommendation of choice. Other therapies may include ENT procedures were appropriate, significant weight loss. Inspire hypoglossal nerve stimulator and nasal PROVENT or mandibular advancement device may also be considered. Close follow up to ensure resolution of symptoms.

## 2018-07-23 NOTE — PROCEDURES
Home Sleep Studies  Date/Time: 7/23/2018 9:05 AM  Performed by: HELLEN DOVE  Authorized by: LEJEUNE, ELIZABETH       Assessment and Recommendations  Three night protocol was utilized. Data from the 1st night was used for the report.  Oxygen saturation samanta was 83%. Heart rate variability was present. Snoring was recorded above 50  decibels 98% of the time.  Apnea-hypopnea index: 10.4 events per hour. Total events 54.  Mild obstructive sleep apnea-hypopnea syndrome present.  Treatment recommendations:  Based on the American academy Sleep Medicine practice parameter of CPAP would be the guideline  recommendation of choice.  Other therapies may include ENT procedures were appropriate, significant weight loss. Inspire hypoglossal nerve  stimulator and nasal PROVENT or mandibular advancement device may also be considered.  Close follow up to ensure resolution of symptoms.

## 2018-07-25 ENCOUNTER — TELEPHONE (OUTPATIENT)
Dept: PULMONOLOGY | Facility: CLINIC | Age: 48
End: 2018-07-25

## 2019-04-02 NOTE — TELEPHONE ENCOUNTER
----- Message from Tunde Delgado sent at 7/2/2018 11:00 AM CDT -----  Review Chart,HSAT   Pt is seen and examined  pt is  lying in bed   no bleeding  slightly more interactive today      PAST MEDICAL & SURGICAL HISTORY:  Chronic kidney disease (CKD), stage IV (severe)  Benign prostatic hypertrophy  S/P cataract surgery, left: 3 yrs ago      ROS:  Negative except for:    MEDICATIONS  (STANDING):  dextrose 5% 1000 milliLiter(s) (75 mL/Hr) IV Continuous <Continuous>  doxycycline IVPB      doxycycline IVPB 100 milliGRAM(s) IV Intermittent every 12 hours  ergocalciferol 18154 Unit(s) Oral every week  lactulose Syrup 10 Gram(s) Enteral Tube two times a day  lidocaine   Patch 1 Patch Transdermal daily  meropenem  IVPB 500 milliGRAM(s) IV Intermittent every 12 hours  metoprolol tartrate 12.5 milliGRAM(s) Oral two times a day  rifaximin 550 milliGRAM(s) Oral two times a day  ursodiol Capsule 300 milliGRAM(s) Oral two times a day    MEDICATIONS  (PRN):      Allergies    No Known Allergies    Intolerances        Vital Signs Last 24 Hrs  T(C): 36.3 (02 Apr 2019 06:31), Max: 36.5 (01 Apr 2019 21:48)  T(F): 97.4 (02 Apr 2019 06:31), Max: 97.7 (01 Apr 2019 21:48)  HR: 86 (02 Apr 2019 06:31) (61 - 86)  BP: 102/56 (02 Apr 2019 06:31) (89/53 - 102/56)  BP(mean): --  RR: 15 (02 Apr 2019 06:31) (10 - 15)  SpO2: 100% (02 Apr 2019 06:31) (97% - 100%)    PHYSICAL EXAM        LABS:                          7.6    10.10 )-----------( 87       ( 01 Apr 2019 05:30 )             21.4     Serial CBC's  04-01 @ 05:30  Hct-21.4 / Hgb-7.6 / Plat-87 / RBC-2.71 / WBC-10.10          Serial CBC's  03-31 @ 10:41  Hct-24.2 / Hgb-8.8 / Plat-74 / RBC-3.17 / WBC-11.60            04-01    141  |  105  |  54<H>  ----------------------------<  93  3.5   |  21<L>  |  3.33<H>    Ca    8.5      01 Apr 2019 05:30  Phos  3.5     03-31  Mg     2.2     04-01    TPro  4.8<L>  /  Alb  1.9<L>  /  TBili  19.2<H>  /  DBili  x   /  AST  157<H>  /  ALT  99<H>  /  AlkPhos  258<H>  04-01      PT/INR - ( 01 Apr 2019 05:30 )   PT: 25.5 SEC;   INR: 2.18          PTT - ( 01 Apr 2019 05:30 )  PTT:67.9 SEC    WBC Count: 10.10 K/uL (04-01 @ 05:30)  Hemoglobin: 7.6 g/dL (04-01 @ 05:30)            RADIOLOGY & ADDITIONAL STUDIES:

## 2019-07-08 ENCOUNTER — HOSPITAL ENCOUNTER (OUTPATIENT)
Facility: HOSPITAL | Age: 49
Discharge: HOME | End: 2019-07-09
Attending: EMERGENCY MEDICINE | Admitting: EMERGENCY MEDICINE
Payer: COMMERCIAL

## 2019-07-08 DIAGNOSIS — R06.02 SOB (SHORTNESS OF BREATH): ICD-10-CM

## 2019-07-08 DIAGNOSIS — N18.9 CHRONIC KIDNEY DISEASE, UNSPECIFIED CKD STAGE: ICD-10-CM

## 2019-07-08 DIAGNOSIS — I10 ESSENTIAL HYPERTENSION: ICD-10-CM

## 2019-07-08 DIAGNOSIS — R07.9 CHEST PAIN, UNSPECIFIED TYPE: ICD-10-CM

## 2019-07-08 DIAGNOSIS — R79.89 ELEVATED TROPONIN: ICD-10-CM

## 2019-07-08 DIAGNOSIS — I50.9 CHF (CONGESTIVE HEART FAILURE): Primary | ICD-10-CM

## 2019-07-08 DIAGNOSIS — I50.21 ACUTE SYSTOLIC CONGESTIVE HEART FAILURE: ICD-10-CM

## 2019-07-08 PROBLEM — N18.30 CKD (CHRONIC KIDNEY DISEASE) STAGE 3, GFR 30-59 ML/MIN: Status: ACTIVE | Noted: 2019-07-08

## 2019-07-08 LAB
ALBUMIN SERPL BCP-MCNC: 3.6 G/DL (ref 3.5–5.2)
ALP SERPL-CCNC: 73 U/L (ref 55–135)
ALT SERPL W/O P-5'-P-CCNC: 57 U/L (ref 10–44)
ANION GAP SERPL CALC-SCNC: 12 MMOL/L (ref 8–16)
AST SERPL-CCNC: 34 U/L (ref 10–40)
BASOPHILS # BLD AUTO: 0.03 K/UL (ref 0–0.2)
BASOPHILS NFR BLD: 0.4 % (ref 0–1.9)
BILIRUB SERPL-MCNC: 1.7 MG/DL (ref 0.1–1)
BILIRUB UR QL STRIP: NEGATIVE
BNP SERPL-MCNC: 1362 PG/ML (ref 0–99)
BUN SERPL-MCNC: 21 MG/DL (ref 6–20)
CALCIUM SERPL-MCNC: 8.8 MG/DL (ref 8.7–10.5)
CHLORIDE SERPL-SCNC: 110 MMOL/L (ref 95–110)
CHOLEST SERPL-MCNC: 155 MG/DL (ref 120–199)
CHOLEST/HDLC SERPL: 4.8 {RATIO} (ref 2–5)
CK SERPL-CCNC: 194 U/L (ref 20–200)
CLARITY UR: CLEAR
CO2 SERPL-SCNC: 20 MMOL/L (ref 23–29)
COLOR UR: YELLOW
CREAT SERPL-MCNC: 1.8 MG/DL (ref 0.5–1.4)
DIASTOLIC DYSFUNCTION: YES
DIFFERENTIAL METHOD: ABNORMAL
EOSINOPHIL # BLD AUTO: 0.2 K/UL (ref 0–0.5)
EOSINOPHIL NFR BLD: 2.1 % (ref 0–8)
ERYTHROCYTE [DISTWIDTH] IN BLOOD BY AUTOMATED COUNT: 13.8 % (ref 11.5–14.5)
EST. GFR  (AFRICAN AMERICAN): 51 ML/MIN/1.73 M^2
EST. GFR  (NON AFRICAN AMERICAN): 44 ML/MIN/1.73 M^2
ESTIMATED PA SYSTOLIC PRESSURE: 66.91
GLOBAL PERICARDIAL EFFUSION: ABNORMAL
GLUCOSE SERPL-MCNC: 89 MG/DL (ref 70–110)
GLUCOSE UR QL STRIP: NEGATIVE
HCT VFR BLD AUTO: 41.9 % (ref 40–54)
HDLC SERPL-MCNC: 32 MG/DL (ref 40–75)
HDLC SERPL: 20.6 % (ref 20–50)
HGB BLD-MCNC: 13.4 G/DL (ref 14–18)
HGB UR QL STRIP: NEGATIVE
KETONES UR QL STRIP: NEGATIVE
LDLC SERPL CALC-MCNC: 96 MG/DL (ref 63–159)
LEUKOCYTE ESTERASE UR QL STRIP: NEGATIVE
LYMPHOCYTES # BLD AUTO: 1.9 K/UL (ref 1–4.8)
LYMPHOCYTES NFR BLD: 24.5 % (ref 18–48)
MAGNESIUM SERPL-MCNC: 2.1 MG/DL (ref 1.6–2.6)
MCH RBC QN AUTO: 27.7 PG (ref 27–31)
MCHC RBC AUTO-ENTMCNC: 32 G/DL (ref 32–36)
MCV RBC AUTO: 87 FL (ref 82–98)
MITRAL VALVE REGURGITATION: ABNORMAL
MONOCYTES # BLD AUTO: 0.5 K/UL (ref 0.3–1)
MONOCYTES NFR BLD: 6.5 % (ref 4–15)
NEUTROPHILS # BLD AUTO: 5.2 K/UL (ref 1.8–7.7)
NEUTROPHILS NFR BLD: 66.5 % (ref 38–73)
NITRITE UR QL STRIP: NEGATIVE
NONHDLC SERPL-MCNC: 123 MG/DL
PH UR STRIP: 6 [PH] (ref 5–8)
PLATELET # BLD AUTO: 208 K/UL (ref 150–350)
PMV BLD AUTO: 11 FL (ref 9.2–12.9)
POTASSIUM SERPL-SCNC: 4.2 MMOL/L (ref 3.5–5.1)
PROT SERPL-MCNC: 6.5 G/DL (ref 6–8.4)
PROT UR QL STRIP: NEGATIVE
RBC # BLD AUTO: 4.83 M/UL (ref 4.6–6.2)
RETIRED EF AND QEF - SEE NOTES: 30 (ref 55–65)
SODIUM SERPL-SCNC: 142 MMOL/L (ref 136–145)
SP GR UR STRIP: <=1.005 (ref 1–1.03)
TRICUSPID VALVE REGURGITATION: ABNORMAL
TRIGL SERPL-MCNC: 135 MG/DL (ref 30–150)
TROPONIN I SERPL DL<=0.01 NG/ML-MCNC: 0.04 NG/ML (ref 0–0.03)
TROPONIN I SERPL DL<=0.01 NG/ML-MCNC: 0.04 NG/ML (ref 0–0.03)
TROPONIN I SERPL DL<=0.01 NG/ML-MCNC: 0.05 NG/ML (ref 0–0.03)
URN SPEC COLLECT METH UR: ABNORMAL
UROBILINOGEN UR STRIP-ACNC: NEGATIVE EU/DL
WBC # BLD AUTO: 7.75 K/UL (ref 3.9–12.7)

## 2019-07-08 PROCEDURE — 85025 COMPLETE CBC W/AUTO DIFF WBC: CPT

## 2019-07-08 PROCEDURE — 63600175 PHARM REV CODE 636 W HCPCS: Performed by: NURSE PRACTITIONER

## 2019-07-08 PROCEDURE — 93306 2D ECHO WITH COLOR FLOW DOPPLER: ICD-10-PCS | Mod: 26,,, | Performed by: INTERNAL MEDICINE

## 2019-07-08 PROCEDURE — 99285 EMERGENCY DEPT VISIT HI MDM: CPT | Mod: 25

## 2019-07-08 PROCEDURE — 93010 EKG 12-LEAD: ICD-10-PCS | Mod: ,,, | Performed by: INTERNAL MEDICINE

## 2019-07-08 PROCEDURE — 84484 ASSAY OF TROPONIN QUANT: CPT | Mod: 91

## 2019-07-08 PROCEDURE — 96372 THER/PROPH/DIAG INJ SC/IM: CPT | Mod: 59

## 2019-07-08 PROCEDURE — 93010 ELECTROCARDIOGRAM REPORT: CPT | Mod: ,,, | Performed by: INTERNAL MEDICINE

## 2019-07-08 PROCEDURE — 80061 LIPID PANEL: CPT

## 2019-07-08 PROCEDURE — 83880 ASSAY OF NATRIURETIC PEPTIDE: CPT

## 2019-07-08 PROCEDURE — 82550 ASSAY OF CK (CPK): CPT

## 2019-07-08 PROCEDURE — 84484 ASSAY OF TROPONIN QUANT: CPT

## 2019-07-08 PROCEDURE — G0378 HOSPITAL OBSERVATION PER HR: HCPCS

## 2019-07-08 PROCEDURE — 86703 HIV-1/HIV-2 1 RESULT ANTBDY: CPT

## 2019-07-08 PROCEDURE — 83735 ASSAY OF MAGNESIUM: CPT

## 2019-07-08 PROCEDURE — 96374 THER/PROPH/DIAG INJ IV PUSH: CPT

## 2019-07-08 PROCEDURE — 93005 ELECTROCARDIOGRAM TRACING: CPT

## 2019-07-08 PROCEDURE — 25000003 PHARM REV CODE 250: Performed by: EMERGENCY MEDICINE

## 2019-07-08 PROCEDURE — 81003 URINALYSIS AUTO W/O SCOPE: CPT

## 2019-07-08 PROCEDURE — 25000003 PHARM REV CODE 250: Performed by: NURSE PRACTITIONER

## 2019-07-08 PROCEDURE — 96376 TX/PRO/DX INJ SAME DRUG ADON: CPT | Mod: 59

## 2019-07-08 PROCEDURE — 93306 TTE W/DOPPLER COMPLETE: CPT | Mod: 26,,, | Performed by: INTERNAL MEDICINE

## 2019-07-08 PROCEDURE — 93306 TTE W/DOPPLER COMPLETE: CPT

## 2019-07-08 PROCEDURE — 99244 PR OFFICE CONSULTATION,LEVEL IV: ICD-10-PCS | Mod: ,,, | Performed by: INTERNAL MEDICINE

## 2019-07-08 PROCEDURE — 99244 OFF/OP CNSLTJ NEW/EST MOD 40: CPT | Mod: ,,, | Performed by: INTERNAL MEDICINE

## 2019-07-08 PROCEDURE — 80053 COMPREHEN METABOLIC PANEL: CPT

## 2019-07-08 PROCEDURE — 36415 COLL VENOUS BLD VENIPUNCTURE: CPT

## 2019-07-08 RX ORDER — AMLODIPINE BESYLATE 10 MG/1
10 TABLET ORAL DAILY
Status: DISCONTINUED | OUTPATIENT
Start: 2019-07-08 | End: 2019-07-10 | Stop reason: HOSPADM

## 2019-07-08 RX ORDER — ALBUTEROL SULFATE 90 UG/1
2 AEROSOL, METERED RESPIRATORY (INHALATION) EVERY 6 HOURS PRN
COMMUNITY
Start: 2019-06-03 | End: 2019-12-13

## 2019-07-08 RX ORDER — SODIUM CHLORIDE 0.9 % (FLUSH) 0.9 %
10 SYRINGE (ML) INJECTION
Status: DISCONTINUED | OUTPATIENT
Start: 2019-07-08 | End: 2019-07-10 | Stop reason: HOSPADM

## 2019-07-08 RX ORDER — ONDANSETRON 2 MG/ML
4 INJECTION INTRAMUSCULAR; INTRAVENOUS EVERY 8 HOURS PRN
Status: DISCONTINUED | OUTPATIENT
Start: 2019-07-08 | End: 2019-07-10 | Stop reason: HOSPADM

## 2019-07-08 RX ORDER — ONDANSETRON 4 MG/1
4 TABLET, ORALLY DISINTEGRATING ORAL EVERY 8 HOURS PRN
Status: DISCONTINUED | OUTPATIENT
Start: 2019-07-08 | End: 2019-07-10 | Stop reason: HOSPADM

## 2019-07-08 RX ORDER — ALBUTEROL SULFATE 90 UG/1
2 AEROSOL, METERED RESPIRATORY (INHALATION) EVERY 6 HOURS PRN
Refills: 0 | COMMUNITY
Start: 2019-06-07 | End: 2019-09-23

## 2019-07-08 RX ORDER — CARVEDILOL 12.5 MG/1
12.5 TABLET ORAL 2 TIMES DAILY
Refills: 5 | COMMUNITY
Start: 2019-06-26 | End: 2019-07-16 | Stop reason: SDUPTHER

## 2019-07-08 RX ORDER — FUROSEMIDE 10 MG/ML
40 INJECTION INTRAMUSCULAR; INTRAVENOUS 2 TIMES DAILY
Status: DISCONTINUED | OUTPATIENT
Start: 2019-07-08 | End: 2019-07-08

## 2019-07-08 RX ORDER — FUROSEMIDE 10 MG/ML
40 INJECTION INTRAMUSCULAR; INTRAVENOUS 2 TIMES DAILY
Status: DISCONTINUED | OUTPATIENT
Start: 2019-07-08 | End: 2019-07-10 | Stop reason: HOSPADM

## 2019-07-08 RX ORDER — ENOXAPARIN SODIUM 100 MG/ML
40 INJECTION SUBCUTANEOUS EVERY 24 HOURS
Status: DISCONTINUED | OUTPATIENT
Start: 2019-07-08 | End: 2019-07-10 | Stop reason: HOSPADM

## 2019-07-08 RX ORDER — CARVEDILOL 12.5 MG/1
12.5 TABLET ORAL 2 TIMES DAILY
Status: DISCONTINUED | OUTPATIENT
Start: 2019-07-08 | End: 2019-07-10 | Stop reason: HOSPADM

## 2019-07-08 RX ADMIN — CARVEDILOL 12.5 MG: 12.5 TABLET, FILM COATED ORAL at 11:07

## 2019-07-08 RX ADMIN — NITROGLYCERIN 1 INCH: 20 OINTMENT TOPICAL at 09:07

## 2019-07-08 RX ADMIN — FUROSEMIDE 40 MG: 10 INJECTION, SOLUTION INTRAMUSCULAR; INTRAVENOUS at 11:07

## 2019-07-08 RX ADMIN — AMLODIPINE BESYLATE 10 MG: 10 TABLET ORAL at 11:07

## 2019-07-08 RX ADMIN — FUROSEMIDE 40 MG: 10 INJECTION, SOLUTION INTRAMUSCULAR; INTRAVENOUS at 06:07

## 2019-07-08 RX ADMIN — CARVEDILOL 12.5 MG: 12.5 TABLET, FILM COATED ORAL at 08:07

## 2019-07-08 RX ADMIN — ENOXAPARIN SODIUM 40 MG: 100 INJECTION SUBCUTANEOUS at 04:07
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
## 2019-07-08 NOTE — ASSESSMENT & PLAN NOTE
CHFrEF acute EF 35%, BIV dilation  Elevated troponin. Demand ischemia from CHFrEF exacerbation and uncontrolled HTN >> NSTEMI  H/o alcohol dependent  Snore  H/o viral infection recently    -continue Lasix and coreg. Add ACEI/ARB once Cr stable  -continue amlodipine for BP control  -new CHFrEF, elevated troponin and WMA on echo, need r/o ischemic etiology. His Cr 1.9 now and will arraneg MPI first  -Daily weight  -Fluid restriction 1.5 liters a day. Na< 2 gm
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
## 2019-07-08 NOTE — ASSESSMENT & PLAN NOTE
- BP elevated  - Continue home Metoprolol and Norvasc  - Monitor and adjust meds based on BP trends

## 2019-07-08 NOTE — ASSESSMENT & PLAN NOTE
- Cr 1.8 (baseline 1.5-1.9)  - Recently stopped Lisinopril  - Caution with nephrotoxic meds  - Daily BMP  - Monitor

## 2019-07-08 NOTE — HPI
49 yo male, consult for CHF and elevated troponin  PMH HTN, obesity and alcohol dependent. Quit smoking long time a ago. In intermediate for 21 years and released ar at age of 43.  C/o BROUSSARD for a yr and worsening recently. Positive orthopnea and PND.   Denied chest pain, palpitation and dizziness..  In ER, /114 mmhg. BNP 1400, Troponin 0.037, EKG NSR and LVH. CXR showed   Echo showed BIV dilation, EF 30%, basilar HK. Moderate MR and TR  Cr1.9 chronic  On NTG paste

## 2019-07-08 NOTE — ASSESSMENT & PLAN NOTE
"- Place in OBS  - BNP elevated to 1362  - ECHO pending  - Lasix 40 mg IVP BID  - Cardiology consult pending  - Resume home BB  - Reports "I was taken off of Lisinopril and don't take it anymore"  - Cardiac, Low Na diet  - 1.5 L FR  - Daily weights  - Strict I and O  - Tele monitoring    "
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
## 2019-07-08 NOTE — HPI
"Bria Sandro Saldana is a 48 y.o. AAM with PMHx of HTN who presented to the Emergency Department today with c/o  SOB which onset gradually over the past "several weeks".  Symptoms are constant and moderate in severity.  No mitigating or exacerbating factors reported.  Associated sxs include cough, orthopnea, and fatigue.  Patient denies any fever, chills, CP, N/V/D, abd pain, back pain, neck pain, HA, dizziness, and all other sxs at this time.  No prior Tx reported.  No further complaints or concerns at this time.  ER workup showed:  Elevated BP otherwise stable VS.  CBC unremarkable.  CMP showed Cr 1.8 (baseline 1.5-1.9), otherwise unremarkable.  BNP elevated to 1362.  Initial troponin 0.037.  12 lead EKG showed NSR.  CXR negative.  ECHO pending.  Hospital Medicine called for admission.  Patient will be placed in observation.  Cardiology consult pending.  He is a Full Code.  His SDM is his wife Vielka who can be reacheda t [phone].     "

## 2019-07-09 VITALS
BODY MASS INDEX: 31.19 KG/M2 | RESPIRATION RATE: 18 BRPM | TEMPERATURE: 97 F | DIASTOLIC BLOOD PRESSURE: 70 MMHG | HEIGHT: 75 IN | HEART RATE: 81 BPM | SYSTOLIC BLOOD PRESSURE: 118 MMHG | OXYGEN SATURATION: 96 % | WEIGHT: 250.88 LBS

## 2019-07-09 PROBLEM — R79.89 ELEVATED TROPONIN: Status: RESOLVED | Noted: 2019-07-08 | Resolved: 2019-07-09

## 2019-07-09 PROBLEM — I50.9 CHF (CONGESTIVE HEART FAILURE): Status: ACTIVE | Noted: 2019-07-09

## 2019-07-09 PROBLEM — I50.21 ACUTE SYSTOLIC CONGESTIVE HEART FAILURE: Status: RESOLVED | Noted: 2019-07-08 | Resolved: 2019-07-09

## 2019-07-09 LAB
ANION GAP SERPL CALC-SCNC: 12 MMOL/L (ref 8–16)
BUN SERPL-MCNC: 25 MG/DL (ref 6–20)
CALCIUM SERPL-MCNC: 8.4 MG/DL (ref 8.7–10.5)
CHLORIDE SERPL-SCNC: 102 MMOL/L (ref 95–110)
CO2 SERPL-SCNC: 27 MMOL/L (ref 23–29)
CREAT SERPL-MCNC: 2 MG/DL (ref 0.5–1.4)
DIASTOLIC DYSFUNCTION: NO
EST. GFR  (AFRICAN AMERICAN): 45 ML/MIN/1.73 M^2
EST. GFR  (NON AFRICAN AMERICAN): 39 ML/MIN/1.73 M^2
GLUCOSE SERPL-MCNC: 98 MG/DL (ref 70–110)
HIV 1+2 AB+HIV1 P24 AG SERPL QL IA: NEGATIVE
POTASSIUM SERPL-SCNC: 3.9 MMOL/L (ref 3.5–5.1)
SODIUM SERPL-SCNC: 141 MMOL/L (ref 136–145)

## 2019-07-09 PROCEDURE — 93016 NM MULTI PHARM STRESS CARDIAC COMPONENT: ICD-10-PCS | Mod: ,,, | Performed by: INTERNAL MEDICINE

## 2019-07-09 PROCEDURE — 78452 HT MUSCLE IMAGE SPECT MULT: CPT | Mod: 26,,, | Performed by: INTERNAL MEDICINE

## 2019-07-09 PROCEDURE — 93016 CV STRESS TEST SUPVJ ONLY: CPT | Mod: ,,, | Performed by: INTERNAL MEDICINE

## 2019-07-09 PROCEDURE — 93017 CV STRESS TEST TRACING ONLY: CPT

## 2019-07-09 PROCEDURE — 99214 OFFICE O/P EST MOD 30 MIN: CPT | Mod: 25,,, | Performed by: INTERNAL MEDICINE

## 2019-07-09 PROCEDURE — 80048 BASIC METABOLIC PNL TOTAL CA: CPT

## 2019-07-09 PROCEDURE — 25000003 PHARM REV CODE 250: Performed by: NURSE PRACTITIONER

## 2019-07-09 PROCEDURE — 78452 NM MULTI PHARM STRESS CARDIAC COMPONENT: ICD-10-PCS | Mod: 26,,, | Performed by: INTERNAL MEDICINE

## 2019-07-09 PROCEDURE — 36415 COLL VENOUS BLD VENIPUNCTURE: CPT

## 2019-07-09 PROCEDURE — 63600175 PHARM REV CODE 636 W HCPCS: Performed by: NURSE PRACTITIONER

## 2019-07-09 PROCEDURE — 97802 MEDICAL NUTRITION INDIV IN: CPT

## 2019-07-09 PROCEDURE — 99214 PR OFFICE/OUTPT VISIT, EST, LEVL IV, 30-39 MIN: ICD-10-PCS | Mod: 25,,, | Performed by: INTERNAL MEDICINE

## 2019-07-09 PROCEDURE — 96376 TX/PRO/DX INJ SAME DRUG ADON: CPT

## 2019-07-09 PROCEDURE — G0378 HOSPITAL OBSERVATION PER HR: HCPCS

## 2019-07-09 PROCEDURE — 93018 CV STRESS TEST I&R ONLY: CPT | Mod: ,,, | Performed by: INTERNAL MEDICINE

## 2019-07-09 PROCEDURE — 93018 NM MULTI PHARM STRESS CARDIAC COMPONENT: ICD-10-PCS | Mod: ,,, | Performed by: INTERNAL MEDICINE

## 2019-07-09 RX ORDER — REGADENOSON 0.08 MG/ML
0.4 INJECTION, SOLUTION INTRAVENOUS ONCE
Status: COMPLETED | OUTPATIENT
Start: 2019-07-09 | End: 2019-07-09

## 2019-07-09 RX ORDER — ISOSORBIDE DINITRATE AND HYDRALAZINE HYDROCHLORIDE 37.5; 2 MG/1; MG/1
1 TABLET ORAL 2 TIMES DAILY
Qty: 60 TABLET | Refills: 0 | Status: SHIPPED | OUTPATIENT
Start: 2019-07-09 | End: 2019-07-11

## 2019-07-09 RX ORDER — ISOSORBIDE DINITRATE AND HYDRALAZINE HYDROCHLORIDE 37.5; 2 MG/1; MG/1
1 TABLET ORAL 2 TIMES DAILY
Status: DISCONTINUED | OUTPATIENT
Start: 2019-07-09 | End: 2019-07-10 | Stop reason: HOSPADM

## 2019-07-09 RX ADMIN — FUROSEMIDE 40 MG: 10 INJECTION, SOLUTION INTRAMUSCULAR; INTRAVENOUS at 06:07

## 2019-07-09 RX ADMIN — AMLODIPINE BESYLATE 10 MG: 10 TABLET ORAL at 09:07

## 2019-07-09 RX ADMIN — HYDRALAZINE HYDROCHLORIDE AND ISOSORBIDE DINITRATE 1 TABLET: 37.5; 2 TABLET, FILM COATED ORAL at 01:07

## 2019-07-09 RX ADMIN — CARVEDILOL 12.5 MG: 12.5 TABLET, FILM COATED ORAL at 09:07

## 2019-07-09 RX ADMIN — REGADENOSON 0.4 MG: 0.08 INJECTION, SOLUTION INTRAVENOUS at 01:07

## 2019-07-09 RX ADMIN — FUROSEMIDE 40 MG: 10 INJECTION, SOLUTION INTRAMUSCULAR; INTRAVENOUS at 09:07

## 2019-07-09 NOTE — CONSULTS
Food & Nutrition  Education    Diet Education: Heart Failure Nutrition Therapy  Time Spent: 30 minutes  Learners: Pt      Nutrition Education provided with handouts: Heart Failure Nutrition Therapy      Comments: RD Consulted for Heart failure education. Educated pt on above diet w/ handout from the nutrition care manual.   All questions and concerns answered. Dietitian's contact information provided. Pt w/ good appetite PTA and no recent wt loss. NFPE performed: pt well nourished      Please Re-consult as needed        Thanks!  Nilam MANCUSO

## 2019-07-09 NOTE — ASSESSMENT & PLAN NOTE
CHFrEF acute EF 35%, BIV dilation  Elevated troponin. Demand ischemia from CHFrEF exacerbation and uncontrolled HTN >> NSTEMI  H/o alcohol dependent  Snore  H/o viral infection recently    -continue Lasix and coreg. Add ACEI/ARB once Cr stable  -continue amlodipine for BP control  -new CHFrEF, elevated troponin and WMA on echo, need r/o ischemic etiology. His Cr 1.9 now and will arraneg MPI first  -Daily weight  -Fluid restriction 1.5 liters a day. Na< 2 gm    7/9  -Echo revealed EF 30-35%, Grade III DD, +WMA's, RVE with reduced RV systolic fn, PHTN  -Plans for MPI stress test today for further evaluation  -Continue Coreg, IV lasix, Norvasc  -Avoid ACEi/ARB for now given renal function  -Will benefit from ACEi/ARB once renal function stabilized  -Dash diet, 2 gm sodium restriction  -1.5L Fluid restriction  -Daily weights  -Strict intake and output  -Further recs to follow Stress test.

## 2019-07-09 NOTE — NURSING
Went over discharge instructions with patient.   Stressed importance of making and keeping all follow ups as well as making prescribed medication changes.   Patient verbalized understanding and has no questions in regards to discharge.  IV removed, catheter intact.  Telemetry box removed and returned to monitor tech.  Patient instructed to call nurse station once dressed and ready to be discharged via wheelchair to personal transportation, wife present at bedside.  Primary nurse notified of pt's discharge status.

## 2019-07-09 NOTE — SUBJECTIVE & OBJECTIVE
Interval History: no acute issues noted o/n. No chest pain or SOB today on exam. Feels much better today after IV diuresis. Plans for MPI stress test today.     Review of Systems   Constitution: Positive for malaise/fatigue.   HENT: Negative for hearing loss and hoarse voice.    Eyes: Negative for blurred vision and visual disturbance.   Cardiovascular: Positive for dyspnea on exertion and orthopnea. Negative for chest pain, claudication, irregular heartbeat, leg swelling, near-syncope, palpitations, paroxysmal nocturnal dyspnea and syncope.   Respiratory: Positive for shortness of breath. Negative for cough, hemoptysis, sleep disturbances due to breathing, snoring and wheezing.    Endocrine: Negative for cold intolerance and heat intolerance.   Hematologic/Lymphatic: Does not bruise/bleed easily.   Skin: Negative for color change, dry skin and nail changes.   Musculoskeletal: Positive for arthritis. Negative for back pain, joint pain and myalgias.   Gastrointestinal: Negative for bloating, abdominal pain, constipation, nausea and vomiting.   Genitourinary: Negative for dysuria, flank pain, hematuria and hesitancy.   Neurological: Negative for headaches, light-headedness, loss of balance, numbness, paresthesias and weakness.   Psychiatric/Behavioral: Negative for altered mental status.   Allergic/Immunologic: Negative for environmental allergies.     Objective:     Vital Signs (Most Recent):  Temp: 97.9 °F (36.6 °C) (07/09/19 0750)  Pulse: 72 (07/09/19 0923)  Resp: 20 (07/09/19 0750)  BP: (!) 163/107 (07/09/19 0750)  SpO2: 97 % (07/09/19 0750) Vital Signs (24h Range):  Temp:  [97.4 °F (36.3 °C)-98.3 °F (36.8 °C)] 97.9 °F (36.6 °C)  Pulse:  [68-84] 72  Resp:  [18-22] 20  SpO2:  [94 %-97 %] 97 %  BP: (115-171)/() 163/107     Weight: 113.8 kg (250 lb 14.1 oz)  Body mass index is 31.36 kg/m².     SpO2: 97 %  O2 Device (Oxygen Therapy): room air      Intake/Output Summary (Last 24 hours) at 7/9/2019 1120  Last data  filed at 7/9/2019 0500  Gross per 24 hour   Intake 200 ml   Output 5025 ml   Net -4825 ml       Lines/Drains/Airways     Peripheral Intravenous Line                 Peripheral IV - Single Lumen 07/08/19 0750 18 G Right Antecubital 1 day                Physical Exam   Constitutional: He is oriented to person, place, and time. He appears well-developed and well-nourished. No distress.   HENT:   Head: Normocephalic and atraumatic.   Neck: Normal range of motion and full passive range of motion without pain. Neck supple. No JVD present.   Cardiovascular: Normal rate, regular rhythm, S1 normal, S2 normal and intact distal pulses.  Occasional extrasystoles are present. PMI is not displaced. Exam reveals no distant heart sounds.   No murmur heard.  Pulses:       Radial pulses are 2+ on the right side, and 2+ on the left side.        Dorsalis pedis pulses are 2+ on the right side, and 2+ on the left side.   BP remains elevated today on exam  NO chest pain today on exam.    Pulmonary/Chest: Effort normal. No accessory muscle usage. No respiratory distress. He has decreased breath sounds in the right lower field and the left lower field. He has no wheezes. He has no rales.   Shortness of breath improved today.    Abdominal: Soft. Bowel sounds are normal. He exhibits no distension. There is no tenderness.   +obese abdomen   Musculoskeletal: Normal range of motion. He exhibits no edema.        Right ankle: He exhibits no swelling.        Left ankle: He exhibits no swelling.   Neurological: He is alert and oriented to person, place, and time.   Skin: Skin is warm and dry. He is not diaphoretic. No cyanosis. Nails show no clubbing.   Psychiatric: He has a normal mood and affect. His speech is normal and behavior is normal. Judgment and thought content normal. Cognition and memory are normal.   Nursing note and vitals reviewed.      Significant Labs:   BMP:   Recent Labs   Lab 07/08/19  0807 07/08/19  1109 07/09/19  0511   GLU 89   --  98     --  141   K 4.2  --  3.9     --  102   CO2 20*  --  27   BUN 21*  --  25*   CREATININE 1.8*  --  2.0*   CALCIUM 8.8  --  8.4*   MG  --  2.1  --    , CBC   Recent Labs   Lab 07/08/19  0807   WBC 7.75   HGB 13.4*   HCT 41.9      , Troponin   Recent Labs   Lab 07/08/19  0807 07/08/19  1406 07/08/19  1952   TROPONINI 0.037* 0.045* 0.051*    and All pertinent lab results from the last 24 hours have been reviewed.    Significant Imaging: Echocardiogram:   2D echo with color flow doppler:   Results for orders placed or performed during the hospital encounter of 07/08/19   2D echo with color flow doppler   Result Value Ref Range    QEF 30 (A) 55 - 65    Mitral Valve Regurgitation MODERATE (A)     Diastolic Dysfunction Yes (A)     Est. PA Systolic Pressure 66.91 (A)     Pericardial Effusion SMALL (A)     Tricuspid Valve Regurgitation MODERATE (A)     Narrative    Date of Procedure: 07/08/2019        TEST DESCRIPTION   Technical Quality: This is a technically adequate study.     Aorta: The aortic root is normal in size, measuring 2.9 cm at sinotubular junction and 2.9 cm at Sinuses of Valsalva. The proximal ascending aorta is normal in size, measuring 2.9 cm across.     Left Atrium: The left atrial volume index is moderately enlarged, measuring 45.53 cc/m2.     Left Ventricle: The left ventricle is moderately enlarged, with an end-diastolic diameter of 6.1 cm, and an end-systolic diameter of 5.2 cm. LV wall thickness is normal, with the septum measuring 2.2 cm and the posterior wall measuring 1.4 cm across.   Relative wall thickness was increased at 0.46, and the LV mass index was increased at 283.2 g/m2 consistent with concentric left ventricular hypertrophy. The following segments were hypokinetic: basal inferior wall.  The following segments were mildly hypokinetic: mid anteroseptum, basal anteroseptum, basal anterolateral wall, mid inferior wall.  The following segments were moderately  hypokinetic: mid inferolateral wall, basal inferolateral wall.  Left ventricular systolic function appears moderately depressed. Visually estimated ejection fraction is 30-35%. The LV Doppler derived stroke volume equals 53.0 ccs.     Diastolic indices: E wave velocity 1.3 m/s, E/A ratio 2.2,  msec., E/e' ratio(avg) 14. There is diastolic dysfunction secondary to restrictive abnormality.     Right Atrium: The right atrium is normal in size, measuring 5.2 cm in length and 4.0 cm in width in the apical view.     Right Ventricle: The right ventricle is mildly to moderately enlarged measuring 4.1 cm at the base in the apical right ventricle-focused view. Global right ventricular systolic function appears mildly depressed. Tricuspid annular plane systolic excursion   (TAPSE) is 1.7 cm. The estimated PA systolic pressure is greater than 67 mmHg.     Aortic Valve:  The aortic valve is normal in structure. The aortic valve is tri-leaflet in structure. The mean gradient obtained across the aortic valve is 4 mmHg.     Mitral Valve:  The mitral valve is mildly sclerotic. The pressure half time is 36 msec. The calculated mitral valve area is 6.11 cm2. There is moderate mitral regurgitation.     Tricuspid Valve:  The tricuspid valve is normal in structure. There is moderate tricuspid regurgitation.     Pulmonary Valve:  The pulmonic valve is not well seen. There is mild pulmonic regurgitation.     Pericardium: There is evidence of a small pericardial effusion.     IVC: The IVC is not visualized.     Intracavitary: There is no evidence of intracavity mass, thrombi, or vegetation.         CONCLUSIONS     1 - Moderate left atrial enlargement.     2 - Moderate left ventricular enlargement.     3 - Concentric hypertrophy.     4 - Wall motion abnormalities.     5 - Moderately depressed left ventricular systolic function (EF 30-35%).     6 - Restrictive LV filling pattern, indicating markedly elevated LAP (grade 3 diastolic  dysfunction).     7 - Right ventricular enlargement with mildly depressed systolic function.     8 - Pulmonary hypertension. The estimated PA systolic pressure is greater than 67 mmHg.     9 - Moderate mitral regurgitation.     10 - Moderate tricuspid regurgitation.     11 - Small pericardial effusion.             This document has been electronically    SIGNED BY: Kiel Phillips MD On: 07/08/2019 11:41    and X-Ray: CXR: X-Ray Chest 1 View (CXR): No results found for this visit on 07/08/19.

## 2019-07-09 NOTE — PROGRESS NOTES
[facility] -   Cardiology  Progress Note    Patient Name: Bria Saldana  MRN: [id number]  Admission Date: 7/8/2019  Hospital Length of Stay: 0 days  Code Status: Full Code   Attending Physician: Sivan Goldsmith MD   Primary Care Physician: Hortencia Blair MD  Expected Discharge Date: 7/9/2019  Principal Problem:Acute systolic congestive heart failure    Subjective:   HPI    49 yo male, consult for CHF and elevated troponin  PMH HTN, obesity and alcohol dependent. Quit smoking long time a ago. In senior living for 21 years and released ar at age of 43.  C/o BROUSSARD for a yr and worsening recently. Positive orthopnea and PND.   Denied chest pain, palpitation and dizziness..  In ER, /114 mmhg. BNP 1400, Troponin 0.037, EKG NSR and LVH. CXR showed   Echo showed BIV dilation, EF 30%, basilar HK. Moderate MR and TR  Cr1.9 chronic  On NTG paste        Hospital Course:   7/9/19--Patient seen and examined in room, lying in bed. Feels much better today after IV diuresis. Has diuresed very well, negative 4.8L for this hospital stay. No chest pain or SOB today. Labs reviewed, K+ 3.9, Cr 2.0. Plans for MPI stress test today.     Interval History: no acute issues noted o/n. No chest pain or SOB today on exam. Feels much better today after IV diuresis. Plans for MPI stress test today.     Review of Systems   Constitution: Positive for malaise/fatigue.   HENT: Negative for hearing loss and hoarse voice.    Eyes: Negative for blurred vision and visual disturbance.   Cardiovascular: Positive for dyspnea on exertion and orthopnea. Negative for chest pain, claudication, irregular heartbeat, leg swelling, near-syncope, palpitations, paroxysmal nocturnal dyspnea and syncope.   Respiratory: Positive for shortness of breath. Negative for cough, hemoptysis, sleep disturbances due to breathing, snoring and wheezing.    Endocrine: Negative for cold intolerance and heat intolerance.   Hematologic/Lymphatic: Does not  bruise/bleed easily.   Skin: Negative for color change, dry skin and nail changes.   Musculoskeletal: Positive for arthritis. Negative for back pain, joint pain and myalgias.   Gastrointestinal: Negative for bloating, abdominal pain, constipation, nausea and vomiting.   Genitourinary: Negative for dysuria, flank pain, hematuria and hesitancy.   Neurological: Negative for headaches, light-headedness, loss of balance, numbness, paresthesias and weakness.   Psychiatric/Behavioral: Negative for altered mental status.   Allergic/Immunologic: Negative for environmental allergies.     Objective:     Vital Signs (Most Recent):  Temp: 97.9 °F (36.6 °C) (07/09/19 0750)  Pulse: 72 (07/09/19 0923)  Resp: 20 (07/09/19 0750)  BP: (!) 163/107 (07/09/19 0750)  SpO2: 97 % (07/09/19 0750) Vital Signs (24h Range):  Temp:  [97.4 °F (36.3 °C)-98.3 °F (36.8 °C)] 97.9 °F (36.6 °C)  Pulse:  [68-84] 72  Resp:  [18-22] 20  SpO2:  [94 %-97 %] 97 %  BP: (115-171)/() 163/107     Weight: 113.8 kg (250 lb 14.1 oz)  Body mass index is 31.36 kg/m².     SpO2: 97 %  O2 Device (Oxygen Therapy): room air      Intake/Output Summary (Last 24 hours) at 7/9/2019 1120  Last data filed at 7/9/2019 0500  Gross per 24 hour   Intake 200 ml   Output 5025 ml   Net -4825 ml       Lines/Drains/Airways     Peripheral Intravenous Line                 Peripheral IV - Single Lumen 07/08/19 0750 18 G Right Antecubital 1 day                Physical Exam   Constitutional: He is oriented to person, place, and time. He appears well-developed and well-nourished. No distress.   HENT:   Head: Normocephalic and atraumatic.   Neck: Normal range of motion and full passive range of motion without pain. Neck supple. No JVD present.   Cardiovascular: Normal rate, regular rhythm, S1 normal, S2 normal and intact distal pulses.  Occasional extrasystoles are present. PMI is not displaced. Exam reveals no distant heart sounds.   No murmur heard.  Pulses:       Radial pulses are 2+  on the right side, and 2+ on the left side.        Dorsalis pedis pulses are 2+ on the right side, and 2+ on the left side.   BP remains elevated today on exam  NO chest pain today on exam.    Pulmonary/Chest: Effort normal. No accessory muscle usage. No respiratory distress. He has decreased breath sounds in the right lower field and the left lower field. He has no wheezes. He has no rales.   Shortness of breath improved today.    Abdominal: Soft. Bowel sounds are normal. He exhibits no distension. There is no tenderness.   +obese abdomen   Musculoskeletal: Normal range of motion. He exhibits no edema.        Right ankle: He exhibits no swelling.        Left ankle: He exhibits no swelling.   Neurological: He is alert and oriented to person, place, and time.   Skin: Skin is warm and dry. He is not diaphoretic. No cyanosis. Nails show no clubbing.   Psychiatric: He has a normal mood and affect. His speech is normal and behavior is normal. Judgment and thought content normal. Cognition and memory are normal.   Nursing note and vitals reviewed.      Significant Labs:   BMP:   Recent Labs   Lab 07/08/19  0807 07/08/19  1109 07/09/19  0511   GLU 89  --  98     --  141   K 4.2  --  3.9     --  102   CO2 20*  --  27   BUN 21*  --  25*   CREATININE 1.8*  --  2.0*   CALCIUM 8.8  --  8.4*   MG  --  2.1  --    , CBC   Recent Labs   Lab 07/08/19  0807   WBC 7.75   HGB 13.4*   HCT 41.9      , Troponin   Recent Labs   Lab 07/08/19  0807 07/08/19  1406 07/08/19  1952   TROPONINI 0.037* 0.045* 0.051*    and All pertinent lab results from the last 24 hours have been reviewed.    Significant Imaging: Echocardiogram:   2D echo with color flow doppler:   Results for orders placed or performed during the hospital encounter of 07/08/19   2D echo with color flow doppler   Result Value Ref Range    QEF 30 (A) 55 - 65    Mitral Valve Regurgitation MODERATE (A)     Diastolic Dysfunction Yes (A)     Est. PA Systolic  Pressure 66.91 (A)     Pericardial Effusion SMALL (A)     Tricuspid Valve Regurgitation MODERATE (A)     Narrative    Date of Procedure: 07/08/2019        TEST DESCRIPTION   Technical Quality: This is a technically adequate study.     Aorta: The aortic root is normal in size, measuring 2.9 cm at sinotubular junction and 2.9 cm at Sinuses of Valsalva. The proximal ascending aorta is normal in size, measuring 2.9 cm across.     Left Atrium: The left atrial volume index is moderately enlarged, measuring 45.53 cc/m2.     Left Ventricle: The left ventricle is moderately enlarged, with an end-diastolic diameter of 6.1 cm, and an end-systolic diameter of 5.2 cm. LV wall thickness is normal, with the septum measuring 2.2 cm and the posterior wall measuring 1.4 cm across.   Relative wall thickness was increased at 0.46, and the LV mass index was increased at 283.2 g/m2 consistent with concentric left ventricular hypertrophy. The following segments were hypokinetic: basal inferior wall.  The following segments were mildly hypokinetic: mid anteroseptum, basal anteroseptum, basal anterolateral wall, mid inferior wall.  The following segments were moderately hypokinetic: mid inferolateral wall, basal inferolateral wall.  Left ventricular systolic function appears moderately depressed. Visually estimated ejection fraction is 30-35%. The LV Doppler derived stroke volume equals 53.0 ccs.     Diastolic indices: E wave velocity 1.3 m/s, E/A ratio 2.2,  msec., E/e' ratio(avg) 14. There is diastolic dysfunction secondary to restrictive abnormality.     Right Atrium: The right atrium is normal in size, measuring 5.2 cm in length and 4.0 cm in width in the apical view.     Right Ventricle: The right ventricle is mildly to moderately enlarged measuring 4.1 cm at the base in the apical right ventricle-focused view. Global right ventricular systolic function appears mildly depressed. Tricuspid annular plane systolic excursion    (TAPSE) is 1.7 cm. The estimated PA systolic pressure is greater than 67 mmHg.     Aortic Valve:  The aortic valve is normal in structure. The aortic valve is tri-leaflet in structure. The mean gradient obtained across the aortic valve is 4 mmHg.     Mitral Valve:  The mitral valve is mildly sclerotic. The pressure half time is 36 msec. The calculated mitral valve area is 6.11 cm2. There is moderate mitral regurgitation.     Tricuspid Valve:  The tricuspid valve is normal in structure. There is moderate tricuspid regurgitation.     Pulmonary Valve:  The pulmonic valve is not well seen. There is mild pulmonic regurgitation.     Pericardium: There is evidence of a small pericardial effusion.     IVC: The IVC is not visualized.     Intracavitary: There is no evidence of intracavity mass, thrombi, or vegetation.         CONCLUSIONS     1 - Moderate left atrial enlargement.     2 - Moderate left ventricular enlargement.     3 - Concentric hypertrophy.     4 - Wall motion abnormalities.     5 - Moderately depressed left ventricular systolic function (EF 30-35%).     6 - Restrictive LV filling pattern, indicating markedly elevated LAP (grade 3 diastolic dysfunction).     7 - Right ventricular enlargement with mildly depressed systolic function.     8 - Pulmonary hypertension. The estimated PA systolic pressure is greater than 67 mmHg.     9 - Moderate mitral regurgitation.     10 - Moderate tricuspid regurgitation.     11 - Small pericardial effusion.             This document has been electronically    SIGNED BY: Kiel Phillips MD On: 07/08/2019 11:41    and X-Ray: CXR: X-Ray Chest 1 View (CXR): No results found for this visit on 07/08/19.    Assessment and Plan:       * Acute systolic congestive heart failure  CHFrEF acute EF 35%, BIV dilation  Elevated troponin. Demand ischemia from CHFrEF exacerbation and uncontrolled HTN >> NSTEMI  H/o alcohol dependent  Snore  H/o viral infection recently    -continue Lasix and coreg.  Add ACEI/ARB once Cr stable  -continue amlodipine for BP control  -new CHFrEF, elevated troponin and WMA on echo, need r/o ischemic etiology. His Cr 1.9 now and will arraneg MPI first  -Daily weight  -Fluid restriction 1.5 liters a day. Na< 2 gm    7/9  -Echo revealed EF 30-35%, Grade III DD, +WMA's, RVE with reduced RV systolic fn, PHTN  -Plans for MPI stress test today for further evaluation  -Continue Coreg, IV lasix, Norvasc  -Avoid ACEi/ARB for now given renal function  -Will benefit from ACEi/ARB once renal function stabilized  -Dash diet, 2 gm sodium restriction  -1.5L Fluid restriction  -Daily weights  -Strict intake and output  -Further recs to follow Stress test.       CKD (chronic kidney disease) stage 3, GFR 30-59 ml/min  Repeat Cr daily    Elevated troponin  MPI stress test today pending  Further recs to follow.       NATALYA (obstructive sleep apnea)  Recommend OP sleep study after discharge    Essential hypertension  On medical therapy  -Remains uncontrolled, will adjust meds today.   Continue BB and Norvasc  Avoid ACei/ARB due to renal function        VTE Risk Mitigation (From admission, onward)        Ordered     enoxaparin injection 40 mg  Daily      07/08/19 1115     Place sequential compression device  Until discontinued      07/08/19 1010     IP VTE HIGH RISK PATIENT  Once      07/08/19 1010          FRANCHESCA Dover  Cardiology  [facility] [place]

## 2019-07-09 NOTE — HOSPITAL COURSE
7/9/19--Patient seen and examined in room, lying in bed. Feels much better today after IV diuresis. Has diuresed very well, negative 4.8L for this hospital stay. No chest pain or SOB today. Labs reviewed, K+ 3.9, Cr 2.0. Plans for MPI stress test today.

## 2019-07-10 ENCOUNTER — TELEPHONE (OUTPATIENT)
Dept: HEPATOLOGY | Facility: HOSPITAL | Age: 49
End: 2019-07-10

## 2019-07-10 RX ORDER — FUROSEMIDE 20 MG/1
20 TABLET ORAL DAILY
Qty: 30 TABLET | Refills: 0 | Status: ON HOLD | OUTPATIENT
Start: 2019-07-10 | End: 2019-09-25 | Stop reason: SDUPTHER

## 2019-07-10 NOTE — HOSPITAL COURSE
Mr Saldana is a 48 year old male who presented to [facility] with complaints of increase shortness of breath over the pass several weeks. Associated with orthopnea and fatigue. Troponin mildly elevated and BNP 1362. Cardiology consulted. 2 D Echo moderately depressed left ventricular systolic function (EF 30-35%), restrictive LV filling pattern, indicating markedly elevated LAP (grade 3 diastolic dysfunction). He was started on Lasix IV and diuresis well. Avoid ACEI/ARB due to decrease renal function. Blood pressure continued to elevated on Coreg and amlodipine, started on Bidil by Cardiology. Pt underwent NM Stress Test to rule out ischemia. Results showed perfusion scan is free of evidence for myocardial ischemia or injury. Reviewed case with tanna Mosqueda to discharge for cardiology standpoint, discharge with Lasix 20 mg daily. He will have close follow up in Cardiology Clinic. Pt educated on the importance of maintaining low sodium diet and limiting fluid intake. Pt was seen, examined and deemed suitable for discharge.

## 2019-07-10 NOTE — DISCHARGE SUMMARY
"[facility]  Hospital Medicine  Discharge Summary      Patient Name: Bria Saldana  MRN: [id number]  Admission Date: 7/8/2019  Hospital Length of Stay: 0 days  Discharge Date and Time: 7/9/2019  7:30 PM  Attending Physician: Sivan Goldsmith MD  Discharging Provider: Lars Terrell NP  Primary Care Provider: Hortencia Blair MD      HPI:   Bria Saldana is a 48 y.o.  AAM with PMHx of HTN who presented to the Emergency Department today with c/o  SOB which onset gradually over the past "several weeks".  Symptoms are constant and moderate in severity.  No mitigating or exacerbating factors reported.  Associated sxs include cough, orthopnea, and fatigue.  Patient denies any fever, chills, CP, N/V/D, abd pain, back pain, neck pain, HA, dizziness, and all other sxs at this time.  No prior Tx reported.  No further complaints or concerns at this time.  ER workup showed:  Elevated BP otherwise stable VS.  CBC unremarkable.  CMP showed Cr 1.8 (baseline 1.5-1.9), otherwise unremarkable.  BNP elevated to 1362.  Initial troponin 0.037.  12 lead EKG showed NSR.  CXR negative.  ECHO pending.  Hospital Medicine called for admission.  Patient will be placed in observation.  Cardiology consult pending.  He is a Full Code.  His SDM is his wife Vielka who can be reacheda t [phone].       * No surgery found *      Hospital Course:   Mr Saldana is a 48 year old male who presented to [facility] Emergency Room with complaints of increase shortness of breath over the pass several weeks. Associated with orthopnea and fatigue. Troponin mildly elevated and BNP 1362. Cardiology consulted. 2 D Echo moderately depressed left ventricular systolic function (EF 30-35%), restrictive LV filling pattern, indicating markedly elevated LAP (grade 3 diastolic dysfunction). He was started on Lasix IV and diuresis well. Avoid ACEI/ARB due to decrease renal function. Blood pressure continued to elevated on Coreg " and amlodipine, started on Bidil by Cardiology. Pt underwent NM Stress Test to rule out ischemia. Results showed perfusion scan is free of evidence for myocardial ischemia or injury. Reviewed case with Dr Phillips ok to discharge for cardiology standpoint, discharge with Lasix 20 mg daily. Pt notified to Lasix sent to pharmacy. He will have close follow up in Cardiology Clinic. Pt educated on the importance of maintaining low sodium diet and limiting fluid intake. Pt was seen, examined and deemed suitable for discharge.      Consults:   Consults (From admission, onward)        Status Ordering Provider     Inpatient consult to Cardiology  Once     Provider:  Kiel Phillips MD    Completed NEDA ROBLES     Inpatient consult to Registered Dietitian/Nutritionist  Once     Provider:  (Not yet assigned)    Completed NEDA ROBLES          No new Assessment & Plan notes have been filed under this hospital service since the last note was generated.  Service: Hospital Medicine    Final Active Diagnoses:    Diagnosis Date Noted POA    CHF (congestive heart failure) [I50.9] 07/09/2019 Yes    CKD (chronic kidney disease) stage 3, GFR 30-59 ml/min [N18.3] 07/08/2019 Yes    NATALYA (obstructive sleep apnea) [G47.33] 07/23/2018 Yes    Essential hypertension [I10] 06/01/2017 Yes      Problems Resolved During this Admission:    Diagnosis Date Noted Date Resolved POA    PRINCIPAL PROBLEM:  Acute systolic congestive heart failure [I50.21] 07/08/2019 07/09/2019 Yes    Elevated troponin [R74.8] 07/08/2019 07/09/2019 Unknown       Discharged Condition: stable    Disposition: Home or Self Care    Follow Up:  Follow-up Information     Schedule an appointment as soon as possible for a visit with SELENE Allen.    Specialty:  Cardiology  Why:  hospital follow up   Contact information:  [address]  [place] [address]  [phone]                 Patient Instructions:      Ambulatory referral to Congestive  Heart Failure Clinic   Referral Priority: Urgent Referral Type: Consultation   Referral Reason: Specialty Services Required   Requested Specialty: Cardiology   Number of Visits Requested: 1       Significant Diagnostic Studies: Labs:   CMP   Recent Labs   Lab 07/09/19  0511      K 3.9      CO2 27   GLU 98   BUN 25*   CREATININE 2.0*   CALCIUM 8.4*   ANIONGAP 12   ESTGFRAFRICA 45*   EGFRNONAA 39*    and CBC No results for input(s): WBC, HGB, HCT, PLT in the last 48 hours.    Pending Diagnostic Studies:     None         Medications:  Reconciled Home Medications:      Medication List      START taking these medications    isosorbide-hydrALAZINE 20-37.5 mg 20-37.5 mg Tab  Commonly known as:  BIDIL  Take 1 tablet by mouth 2 (two) times daily.    furosemide 20 mg tab  Commonly known as: Lasix   Take 1 table by mouth once daily       CONTINUE taking these medications    amLODIPine 10 MG tablet  Commonly known as:  NORVASC  Take 1 tablet (10 mg total) by mouth once daily.     carvedilol 12.5 MG tablet  Commonly known as:  COREG  Take 12.5 mg by mouth 2 (two) times daily.     * PROVENTIL HFA 90 mcg/actuation inhaler  Generic drug:  albuterol  Inhale 2 puffs into the lungs every 6 (six) hours as needed.     * VENTOLIN HFA 90 mcg/actuation inhaler  Generic drug:  albuterol  Inhale 2 puffs into the lungs every 6 (six) hours as needed. INHALE 2 PUFFS INTO LUNGS EVERY 6 HOURS AS NEEDED FOR WHEEZING         * This list has 2 medication(s) that are the same as other medications prescribed for you. Read the directions carefully, and ask your doctor or other care provider to review them with you.            STOP taking these medications    ergocalciferol 50,000 unit Cap  Commonly known as:  ERGOCALCIFEROL     lisinopril 40 MG tablet  Commonly known as:  PRINIVIL,ZESTRIL     methylPREDNISolone 4 mg tablet  Commonly known as:  MEDROL DOSEPACK            Indwelling Lines/Drains at time of discharge:   Lines/Drains/Airways           None          Time spent on the discharge of patient: 40 minutes  Patient was seen and examined on the date of discharge and determined to be suitable for discharge.         Lars Terrell NP  Department of Hospital Medicine  [facility]

## 2019-07-11 ENCOUNTER — TELEPHONE (OUTPATIENT)
Dept: CARDIOLOGY | Facility: CLINIC | Age: 49
End: 2019-07-11

## 2019-07-11 RX ORDER — ISOSORBIDE DINITRATE 20 MG/1
20 TABLET ORAL 2 TIMES DAILY
Qty: 60 TABLET | Refills: 6 | Status: ON HOLD | OUTPATIENT
Start: 2019-07-11 | End: 2019-09-25 | Stop reason: HOSPADM

## 2019-07-11 RX ORDER — HYDRALAZINE HYDROCHLORIDE 25 MG/1
25 TABLET, FILM COATED ORAL EVERY 12 HOURS
Qty: 60 TABLET | Refills: 6 | Status: SHIPPED | OUTPATIENT
Start: 2019-07-11 | End: 2019-07-16

## 2019-07-11 NOTE — TELEPHONE ENCOUNTER
----- Message from SELENE Jacques sent at 7/11/2019  3:59 PM CDT -----  Call received from pharmacy that patient not able to afford Bidil . Please have him stop Bidil and switch to Hydralazine 25mg BID And Isordil 20mg BID. Script sent to [facility] pharmacy and pharmacy will check into cost. Should be much cheaper

## 2019-07-11 NOTE — PROGRESS NOTES
Call received from pharmacy that patient not able to afford Bidil . Please have him stop Bidil and switch to Hydralazine 25mg BID And Isordil 20mg BID. [facility] and pharmacy will check into cost. Should be much cheaper

## 2019-07-16 ENCOUNTER — OFFICE VISIT (OUTPATIENT)
Dept: CARDIOLOGY | Facility: CLINIC | Age: 49
End: 2019-07-16
Payer: COMMERCIAL

## 2019-07-16 ENCOUNTER — TELEPHONE (OUTPATIENT)
Dept: NEPHROLOGY | Facility: CLINIC | Age: 49
End: 2019-07-16

## 2019-07-16 VITALS
OXYGEN SATURATION: 96 % | BODY MASS INDEX: 32.54 KG/M2 | SYSTOLIC BLOOD PRESSURE: 160 MMHG | HEART RATE: 91 BPM | DIASTOLIC BLOOD PRESSURE: 110 MMHG | WEIGHT: 261.69 LBS | HEIGHT: 75 IN

## 2019-07-16 DIAGNOSIS — N18.30 CKD (CHRONIC KIDNEY DISEASE) STAGE 3, GFR 30-59 ML/MIN: ICD-10-CM

## 2019-07-16 DIAGNOSIS — G47.33 OSA (OBSTRUCTIVE SLEEP APNEA): ICD-10-CM

## 2019-07-16 DIAGNOSIS — I50.9 CONGESTIVE HEART FAILURE, UNSPECIFIED HF CHRONICITY, UNSPECIFIED HEART FAILURE TYPE: Primary | ICD-10-CM

## 2019-07-16 DIAGNOSIS — I10 ESSENTIAL HYPERTENSION: ICD-10-CM

## 2019-07-16 PROCEDURE — 99999 PR PBB SHADOW E&M-EST. PATIENT-LVL III: CPT | Mod: PBBFAC,,, | Performed by: NURSE PRACTITIONER

## 2019-07-16 PROCEDURE — 99214 PR OFFICE/OUTPT VISIT, EST, LEVL IV, 30-39 MIN: ICD-10-PCS | Mod: S$GLB,,, | Performed by: NURSE PRACTITIONER

## 2019-07-16 PROCEDURE — 3008F BODY MASS INDEX DOCD: CPT | Mod: CPTII,S$GLB,, | Performed by: NURSE PRACTITIONER

## 2019-07-16 PROCEDURE — 3080F DIAST BP >= 90 MM HG: CPT | Mod: CPTII,S$GLB,, | Performed by: NURSE PRACTITIONER

## 2019-07-16 PROCEDURE — 3008F PR BODY MASS INDEX (BMI) DOCUMENTED: ICD-10-PCS | Mod: CPTII,S$GLB,, | Performed by: NURSE PRACTITIONER

## 2019-07-16 PROCEDURE — 3077F PR MOST RECENT SYSTOLIC BLOOD PRESSURE >= 140 MM HG: ICD-10-PCS | Mod: CPTII,S$GLB,, | Performed by: NURSE PRACTITIONER

## 2019-07-16 PROCEDURE — 99214 OFFICE O/P EST MOD 30 MIN: CPT | Mod: S$GLB,,, | Performed by: NURSE PRACTITIONER

## 2019-07-16 PROCEDURE — 3077F SYST BP >= 140 MM HG: CPT | Mod: CPTII,S$GLB,, | Performed by: NURSE PRACTITIONER

## 2019-07-16 PROCEDURE — 3080F PR MOST RECENT DIASTOLIC BLOOD PRESSURE >= 90 MM HG: ICD-10-PCS | Mod: CPTII,S$GLB,, | Performed by: NURSE PRACTITIONER

## 2019-07-16 PROCEDURE — 99999 PR PBB SHADOW E&M-EST. PATIENT-LVL III: ICD-10-PCS | Mod: PBBFAC,,, | Performed by: NURSE PRACTITIONER

## 2019-07-16 RX ORDER — CARVEDILOL 12.5 MG/1
12.5 TABLET ORAL 2 TIMES DAILY
Qty: 60 TABLET | Refills: 6 | Status: SHIPPED | OUTPATIENT
Start: 2019-07-16 | End: 2019-08-02 | Stop reason: DRUGHIGH

## 2019-07-16 RX ORDER — HYDRALAZINE HYDROCHLORIDE 25 MG/1
25 TABLET, FILM COATED ORAL EVERY 12 HOURS
Qty: 60 TABLET | Refills: 6
Start: 2019-07-16 | End: 2019-08-02 | Stop reason: DRUGHIGH
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
## 2019-07-16 NOTE — LETTER
July 16, 2019      [place]  [address] [place] [address]           [facility] - Cardiology  [address] [place] [address]  Phone: [phone]  Fax: [phone]          Patient: Bria Saldana   MR Number: [id number]   YOB: 1970   Date of Visit: 7/16/2019       Dear Lars Terrell:    Thank you for referring Bria Saldana to me for evaluation. Attached you will find relevant portions of my assessment and plan of care.    If you have questions, please do not hesitate to call me. I look forward to following Bria Saldana along with you.    Sincerely,    Darya Bertrand, [facility]    Enclosure  CC:  No Recipients    If you would like to receive this communication electronically, please contact [email or web address] or [phone] to request more information on [employer] access.    For providers and/or their staff who would like to refer a patient to [facility], please contact us through our one-stop-shop provider referral line, Emily Haro, at [phone].    If you feel you have received this communication in error or would no longer like to receive these types of communications, please e-mail [email or web address]

## 2019-07-16 NOTE — TELEPHONE ENCOUNTER
----- Message from Magi Hightower RN sent at 7/16/2019 12:25 PM CDT -----  Hello,    Is there any way Mr. Saldana can be seen before 08/15/2019, if so please reach out to patient, due stage 3 CKD.

## 2019-07-18 NOTE — TELEPHONE ENCOUNTER
Called to inform patient we will place him on the waiting list and will call patient if any cancellations. Left voice message.

## 2019-07-23 ENCOUNTER — TELEPHONE (OUTPATIENT)
Dept: CARDIOLOGY | Facility: CLINIC | Age: 49
End: 2019-07-23

## 2019-07-23 NOTE — TELEPHONE ENCOUNTER
Called and left a v/m to rtc to reschedule his appt. Cm    ----- Message from Katherine Knowles sent at 7/23/2019  9:47 AM CDT -----  Contact: pt  States he needs to reschedule his nurse visit. He has to work on that day. Please call pt at [phone]. Thank you

## 2019-08-02 ENCOUNTER — CLINICAL SUPPORT (OUTPATIENT)
Dept: CARDIOLOGY | Facility: CLINIC | Age: 49
End: 2019-08-02
Payer: COMMERCIAL

## 2019-08-02 ENCOUNTER — OFFICE VISIT (OUTPATIENT)
Dept: PULMONOLOGY | Facility: CLINIC | Age: 49
End: 2019-08-02
Payer: COMMERCIAL

## 2019-08-02 VITALS
HEART RATE: 92 BPM | SYSTOLIC BLOOD PRESSURE: 140 MMHG | RESPIRATION RATE: 18 BRPM | DIASTOLIC BLOOD PRESSURE: 90 MMHG | OXYGEN SATURATION: 96 % | WEIGHT: 262.13 LBS | BODY MASS INDEX: 32.59 KG/M2 | HEIGHT: 75 IN

## 2019-08-02 VITALS
WEIGHT: 262.38 LBS | DIASTOLIC BLOOD PRESSURE: 110 MMHG | SYSTOLIC BLOOD PRESSURE: 150 MMHG | HEART RATE: 76 BPM | HEIGHT: 75 IN | BODY MASS INDEX: 32.62 KG/M2

## 2019-08-02 DIAGNOSIS — I10 ESSENTIAL HYPERTENSION: ICD-10-CM

## 2019-08-02 DIAGNOSIS — I50.9 CONGESTIVE HEART FAILURE, UNSPECIFIED HF CHRONICITY, UNSPECIFIED HEART FAILURE TYPE: ICD-10-CM

## 2019-08-02 DIAGNOSIS — G47.33 OSA (OBSTRUCTIVE SLEEP APNEA): Primary | ICD-10-CM

## 2019-08-02 PROCEDURE — 3080F DIAST BP >= 90 MM HG: CPT | Mod: CPTII,S$GLB,, | Performed by: NURSE PRACTITIONER

## 2019-08-02 PROCEDURE — 99999 PR PBB SHADOW E&M-EST. PATIENT-LVL III: ICD-10-PCS | Mod: PBBFAC,,,

## 2019-08-02 PROCEDURE — 3080F PR MOST RECENT DIASTOLIC BLOOD PRESSURE >= 90 MM HG: ICD-10-PCS | Mod: CPTII,S$GLB,, | Performed by: NURSE PRACTITIONER

## 2019-08-02 PROCEDURE — 3077F PR MOST RECENT SYSTOLIC BLOOD PRESSURE >= 140 MM HG: ICD-10-PCS | Mod: CPTII,S$GLB,, | Performed by: NURSE PRACTITIONER

## 2019-08-02 PROCEDURE — 3008F BODY MASS INDEX DOCD: CPT | Mod: CPTII,S$GLB,, | Performed by: NURSE PRACTITIONER

## 2019-08-02 PROCEDURE — 99999 PR PBB SHADOW E&M-EST. PATIENT-LVL III: CPT | Mod: PBBFAC,,,

## 2019-08-02 PROCEDURE — 99214 PR OFFICE/OUTPT VISIT, EST, LEVL IV, 30-39 MIN: ICD-10-PCS | Mod: S$GLB,,, | Performed by: NURSE PRACTITIONER

## 2019-08-02 PROCEDURE — 99999 PR PBB SHADOW E&M-EST. PATIENT-LVL IV: ICD-10-PCS | Mod: PBBFAC,,, | Performed by: NURSE PRACTITIONER

## 2019-08-02 PROCEDURE — 3008F PR BODY MASS INDEX (BMI) DOCUMENTED: ICD-10-PCS | Mod: CPTII,S$GLB,, | Performed by: NURSE PRACTITIONER

## 2019-08-02 PROCEDURE — 3077F SYST BP >= 140 MM HG: CPT | Mod: CPTII,S$GLB,, | Performed by: NURSE PRACTITIONER

## 2019-08-02 PROCEDURE — 99999 PR PBB SHADOW E&M-EST. PATIENT-LVL IV: CPT | Mod: PBBFAC,,, | Performed by: NURSE PRACTITIONER

## 2019-08-02 PROCEDURE — 99214 OFFICE O/P EST MOD 30 MIN: CPT | Mod: S$GLB,,, | Performed by: NURSE PRACTITIONER

## 2019-08-02 RX ORDER — HYDRALAZINE HYDROCHLORIDE 100 MG/1
100 TABLET, FILM COATED ORAL EVERY 8 HOURS
Status: ON HOLD | COMMUNITY
End: 2019-09-25 | Stop reason: SDUPTHER

## 2019-08-02 RX ORDER — CARVEDILOL 25 MG/1
25 TABLET ORAL 2 TIMES DAILY WITH MEALS
Status: ON HOLD | COMMUNITY
End: 2019-09-25 | Stop reason: SDUPTHER

## 2019-08-02 NOTE — PATIENT INSTRUCTIONS
Increase hydralazine to 100mg 3 times a day  Keep blood pressure log  Return for nurse visit in 2 weeks and bring bp log with you.

## 2019-08-02 NOTE — PATIENT INSTRUCTIONS
What Are Snoring and Obstructive Sleep Apnea?  If youve ever had a stuffed-up nose, you know the feeling of trying to breathe through a very narrow passageway. This is what happens in your throat when you snore. While you sleep, structures in your throat partially block your air passage, making the passage narrow and hard to breathe through. If the entire passage becomes blocked and you cant breathe at all, you have sleep apnea.      Snoring Obstructive sleep apnea   Snoring  If your throat structures are too large or the muscles relax too much during sleep, the air passage may be partially blocked. As air from the nose or mouth passes around this blockage, the throat structures vibrate, causing the familiar sound of snoring. At times, this sound can be so loud that snorers wake up others, or even themselves, during the night. Snoring gets worse as more and more of the air passage is blocked.  Obstructive sleep apnea  If the structures completely block the throat, air cant flow to the lungs at all. This is called apnea (meaning no breathing). Since the lungs arent getting fresh air, the brain tells the body to wake up just enough to tighten the muscles and unblock the air passage. With a loud gasp, breathing begins again. This process may be repeated over and over again throughout the night, making your sleep fragmented with a lighter stage of sleep. Even though you do not remember waking up many times during the night to a lighter sleep, you feel tired the next day. The lack of sleep and fresh air can also strain your lungs, heart, and other organs, leading to problems such as high blood pressure, heart attack, or stroke.  Problems in the nose and jaw  Problems in the structure of the nose may obstruct breathing. A crooked (deviated) septum or swollen turbinates can make snoring worse or lead to apnea. Also, a receding jaw may make the tongue sit too far back, so its more likely to block the airway when  youre asleep.        Date Last Reviewed: 7/18/2015  © 7337-3910 [employer]. [address] [place] [address]. All rights reserved. This information is not intended as a substitute for professional medical care. Always follow your healthcare professional's instructions.        Continuous Positive Air Pressure (CPAP)     A mask over the nose gently directs air into the throat to keep the airway open.   Continuous positive air pressure (CPAP) uses gentle air pressure to hold the airway open. CPAP is often the most effective treatment for sleep apnea and severe snoring. It works very well for many people. But keep in mind that it can take several adjustments before the setup is right for you.  How CPAP works  The CPAP machine  is a small portable pump beside the bed. The pump sends air through a hose, which is held over your nose and mouth by a mask. Mild air pressure is gently pushed through your airway. The air pressure nudges sagging tissues aside. This widens the airway so you can breathe better. CPAP may be combined with other kinds of therapy for sleep apnea.  Types of air pressure treatments  There are different types of CPAP. Your doctor or CPAP technician will help you decide which type is best for you:  · Basic CPAP keeps the pressure constant all night long.  · A bilevel device (BiPAP) provides more pressure when you breathe in and less when you breathe out. A BiPAP machine also may be set to provide automatic breaths to maintain breathing if you stop breathing while sleeping.  · An autoCPAP device automatically adjusts pressure throughout the night and in response to changes such as body position, sleep stage, and snoring.  Date Last Reviewed: 8/10/2015  © 5801-9781 [employer]. [address] [place], [place] [id number]. All rights reserved. This information is not intended as a substitute for professional medical care. Always follow your healthcare professional's  instructions.
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
## 2019-08-02 NOTE — PROGRESS NOTES
Bria Jo Jared Saldana is here today for nurse blood pressure check and possible heart failure medication titration.     He denies any worsening sob. No swelling. Wt is stable at 262lbs.   bp today is high, 150/110 P-76. Pt did not bring bp log with him, but says it has been around 177/116 on his home monitor  MAR reviewed with pt. He states he is taking meds as prescribed and has not missed any doses.      Orders  1. Increase hydralazine to 100mg 3 times daily  2. Monitor bp and keep log  3. Return back in 2 weeks for another nurse visit/bp check  4. Will plan to increase isordil to 20mg TID if bp tolerates   VORMICHAEL//Lázaro [facility]//NAN Mohr LPN    Med changes discussed in detail with pt. He states understanding well.   F/u nurse visit was scheduled.

## 2019-09-23 ENCOUNTER — HOSPITAL ENCOUNTER (INPATIENT)
Facility: HOSPITAL | Age: 49
LOS: 1 days | Discharge: HOME | DRG: 291 | End: 2019-09-25
Attending: FAMILY MEDICINE | Admitting: INTERNAL MEDICINE
Payer: COMMERCIAL

## 2019-09-23 DIAGNOSIS — I10 ESSENTIAL HYPERTENSION: ICD-10-CM

## 2019-09-23 DIAGNOSIS — I50.9 ACUTE ON CHRONIC CONGESTIVE HEART FAILURE, UNSPECIFIED HEART FAILURE TYPE: Primary | ICD-10-CM

## 2019-09-23 DIAGNOSIS — R06.02 SOB (SHORTNESS OF BREATH): ICD-10-CM

## 2019-09-23 DIAGNOSIS — R10.9 ABDOMINAL PAIN: ICD-10-CM

## 2019-09-23 PROBLEM — I50.1 ACUTE PULMONARY EDEMA WITH CONGESTIVE HEART FAILURE: Status: ACTIVE | Noted: 2019-09-23

## 2019-09-23 PROBLEM — I16.0 HYPERTENSIVE URGENCY: Status: ACTIVE | Noted: 2019-09-23

## 2019-09-23 PROBLEM — I50.43 ACUTE ON CHRONIC COMBINED SYSTOLIC AND DIASTOLIC CONGESTIVE HEART FAILURE: Status: ACTIVE | Noted: 2019-09-23

## 2019-09-23 LAB
ALBUMIN SERPL BCP-MCNC: 3.8 G/DL (ref 3.5–5.2)
ALP SERPL-CCNC: 66 U/L (ref 55–135)
ALT SERPL W/O P-5'-P-CCNC: 33 U/L (ref 10–44)
ANION GAP SERPL CALC-SCNC: 11 MMOL/L (ref 8–16)
APTT BLDCRRT: 27.5 SEC (ref 21–32)
AST SERPL-CCNC: 30 U/L (ref 10–40)
BASOPHILS # BLD AUTO: 0.04 K/UL (ref 0–0.2)
BASOPHILS NFR BLD: 0.6 % (ref 0–1.9)
BILIRUB SERPL-MCNC: 1.4 MG/DL (ref 0.1–1)
BILIRUB UR QL STRIP: NEGATIVE
BNP SERPL-MCNC: 635 PG/ML (ref 0–99)
BUN SERPL-MCNC: 26 MG/DL (ref 6–20)
CALCIUM SERPL-MCNC: 9.4 MG/DL (ref 8.7–10.5)
CHLORIDE SERPL-SCNC: 108 MMOL/L (ref 95–110)
CLARITY UR: CLEAR
CO2 SERPL-SCNC: 22 MMOL/L (ref 23–29)
COLOR UR: YELLOW
CREAT SERPL-MCNC: 1.9 MG/DL (ref 0.5–1.4)
DIFFERENTIAL METHOD: NORMAL
EOSINOPHIL # BLD AUTO: 0.2 K/UL (ref 0–0.5)
EOSINOPHIL NFR BLD: 2.8 % (ref 0–8)
ERYTHROCYTE [DISTWIDTH] IN BLOOD BY AUTOMATED COUNT: 13.9 % (ref 11.5–14.5)
EST. GFR  (AFRICAN AMERICAN): 47 ML/MIN/1.73 M^2
EST. GFR  (NON AFRICAN AMERICAN): 41 ML/MIN/1.73 M^2
GLUCOSE SERPL-MCNC: 76 MG/DL (ref 70–110)
GLUCOSE UR QL STRIP: NEGATIVE
HCT VFR BLD AUTO: 43.1 % (ref 40–54)
HGB BLD-MCNC: 14.1 G/DL (ref 14–18)
HGB UR QL STRIP: NEGATIVE
INR PPP: 1 (ref 0.8–1.2)
KETONES UR QL STRIP: NEGATIVE
LEUKOCYTE ESTERASE UR QL STRIP: NEGATIVE
LIPASE SERPL-CCNC: 46 U/L (ref 4–60)
LYMPHOCYTES # BLD AUTO: 2.1 K/UL (ref 1–4.8)
LYMPHOCYTES NFR BLD: 32.3 % (ref 18–48)
MCH RBC QN AUTO: 28.1 PG (ref 27–31)
MCHC RBC AUTO-ENTMCNC: 32.7 G/DL (ref 32–36)
MCV RBC AUTO: 86 FL (ref 82–98)
MONOCYTES # BLD AUTO: 0.4 K/UL (ref 0.3–1)
MONOCYTES NFR BLD: 5.5 % (ref 4–15)
NEUTROPHILS # BLD AUTO: 3.8 K/UL (ref 1.8–7.7)
NEUTROPHILS NFR BLD: 59 % (ref 38–73)
NITRITE UR QL STRIP: NEGATIVE
PH UR STRIP: 6 [PH] (ref 5–8)
PLATELET # BLD AUTO: 223 K/UL (ref 150–350)
PMV BLD AUTO: 10.5 FL (ref 9.2–12.9)
POTASSIUM SERPL-SCNC: 4.5 MMOL/L (ref 3.5–5.1)
PROT SERPL-MCNC: 6.8 G/DL (ref 6–8.4)
PROT UR QL STRIP: NEGATIVE
PROTHROMBIN TIME: 10.9 SEC (ref 9–12.5)
RBC # BLD AUTO: 5.02 M/UL (ref 4.6–6.2)
SODIUM SERPL-SCNC: 141 MMOL/L (ref 136–145)
SP GR UR STRIP: 1.01 (ref 1–1.03)
TROPONIN I SERPL DL<=0.01 NG/ML-MCNC: 0.06 NG/ML (ref 0–0.03)
URN SPEC COLLECT METH UR: NORMAL
UROBILINOGEN UR STRIP-ACNC: NEGATIVE EU/DL
WBC # BLD AUTO: 6.41 K/UL (ref 3.9–12.7)

## 2019-09-23 PROCEDURE — 25000003 PHARM REV CODE 250: Performed by: INTERNAL MEDICINE

## 2019-09-23 PROCEDURE — 93010 EKG 12-LEAD: ICD-10-PCS | Mod: ,,, | Performed by: INTERNAL MEDICINE

## 2019-09-23 PROCEDURE — 25000242 PHARM REV CODE 250 ALT 637 W/ HCPCS: Performed by: FAMILY MEDICINE

## 2019-09-23 PROCEDURE — 96374 THER/PROPH/DIAG INJ IV PUSH: CPT

## 2019-09-23 PROCEDURE — 63600175 PHARM REV CODE 636 W HCPCS: Performed by: FAMILY MEDICINE

## 2019-09-23 PROCEDURE — 81003 URINALYSIS AUTO W/O SCOPE: CPT

## 2019-09-23 PROCEDURE — 85025 COMPLETE CBC W/AUTO DIFF WBC: CPT

## 2019-09-23 PROCEDURE — 96375 TX/PRO/DX INJ NEW DRUG ADDON: CPT

## 2019-09-23 PROCEDURE — 80053 COMPREHEN METABOLIC PANEL: CPT

## 2019-09-23 PROCEDURE — 93005 ELECTROCARDIOGRAM TRACING: CPT

## 2019-09-23 PROCEDURE — 94640 AIRWAY INHALATION TREATMENT: CPT

## 2019-09-23 PROCEDURE — 83690 ASSAY OF LIPASE: CPT

## 2019-09-23 PROCEDURE — 85610 PROTHROMBIN TIME: CPT

## 2019-09-23 PROCEDURE — 84484 ASSAY OF TROPONIN QUANT: CPT

## 2019-09-23 PROCEDURE — 99291 CRITICAL CARE FIRST HOUR: CPT | Mod: 25

## 2019-09-23 PROCEDURE — G0378 HOSPITAL OBSERVATION PER HR: HCPCS

## 2019-09-23 PROCEDURE — 93010 ELECTROCARDIOGRAM REPORT: CPT | Mod: ,,, | Performed by: INTERNAL MEDICINE

## 2019-09-23 PROCEDURE — 85730 THROMBOPLASTIN TIME PARTIAL: CPT

## 2019-09-23 PROCEDURE — 83880 ASSAY OF NATRIURETIC PEPTIDE: CPT

## 2019-09-23 RX ORDER — IPRATROPIUM BROMIDE AND ALBUTEROL SULFATE 2.5; .5 MG/3ML; MG/3ML
3 SOLUTION RESPIRATORY (INHALATION)
Status: COMPLETED | OUTPATIENT
Start: 2019-09-23 | End: 2019-09-23

## 2019-09-23 RX ORDER — ONDANSETRON 2 MG/ML
4 INJECTION INTRAMUSCULAR; INTRAVENOUS EVERY 8 HOURS PRN
Status: DISCONTINUED | OUTPATIENT
Start: 2019-09-23 | End: 2019-09-25 | Stop reason: HOSPADM

## 2019-09-23 RX ORDER — FUROSEMIDE 10 MG/ML
80 INJECTION INTRAMUSCULAR; INTRAVENOUS
Status: COMPLETED | OUTPATIENT
Start: 2019-09-23 | End: 2019-09-23

## 2019-09-23 RX ORDER — MORPHINE SULFATE 4 MG/ML
4 INJECTION, SOLUTION INTRAMUSCULAR; INTRAVENOUS
Status: DISCONTINUED | OUTPATIENT
Start: 2019-09-23 | End: 2019-09-23

## 2019-09-23 RX ORDER — AMLODIPINE BESYLATE 10 MG/1
10 TABLET ORAL DAILY
Status: DISCONTINUED | OUTPATIENT
Start: 2019-09-24 | End: 2019-09-25 | Stop reason: HOSPADM

## 2019-09-23 RX ORDER — CARVEDILOL 12.5 MG/1
25 TABLET ORAL 2 TIMES DAILY WITH MEALS
Status: DISCONTINUED | OUTPATIENT
Start: 2019-09-24 | End: 2019-09-25 | Stop reason: HOSPADM

## 2019-09-23 RX ORDER — MAG HYDROX/ALUMINUM HYD/SIMETH 200-200-20
30 SUSPENSION, ORAL (FINAL DOSE FORM) ORAL EVERY 6 HOURS PRN
Status: DISCONTINUED | OUTPATIENT
Start: 2019-09-23 | End: 2019-09-25 | Stop reason: HOSPADM

## 2019-09-23 RX ORDER — FUROSEMIDE 10 MG/ML
40 INJECTION INTRAMUSCULAR; INTRAVENOUS
Status: DISCONTINUED | OUTPATIENT
Start: 2019-09-23 | End: 2019-09-23

## 2019-09-23 RX ORDER — CLONIDINE HYDROCHLORIDE 0.1 MG/1
0.1 TABLET ORAL EVERY 4 HOURS PRN
Status: DISCONTINUED | OUTPATIENT
Start: 2019-09-23 | End: 2019-09-25 | Stop reason: HOSPADM

## 2019-09-23 RX ORDER — HYDRALAZINE HYDROCHLORIDE 20 MG/ML
10 INJECTION INTRAMUSCULAR; INTRAVENOUS EVERY 6 HOURS PRN
Status: DISCONTINUED | OUTPATIENT
Start: 2019-09-23 | End: 2019-09-25 | Stop reason: HOSPADM

## 2019-09-23 RX ORDER — ONDANSETRON 2 MG/ML
4 INJECTION INTRAMUSCULAR; INTRAVENOUS
Status: DISCONTINUED | OUTPATIENT
Start: 2019-09-23 | End: 2019-09-23

## 2019-09-23 RX ORDER — HEPARIN SODIUM 5000 [USP'U]/ML
5000 INJECTION, SOLUTION INTRAVENOUS; SUBCUTANEOUS EVERY 8 HOURS
Status: DISCONTINUED | OUTPATIENT
Start: 2019-09-23 | End: 2019-09-25 | Stop reason: HOSPADM

## 2019-09-23 RX ORDER — GUAIFENESIN 100 MG/5ML
200 SOLUTION ORAL EVERY 4 HOURS PRN
Status: DISCONTINUED | OUTPATIENT
Start: 2019-09-23 | End: 2019-09-25 | Stop reason: HOSPADM

## 2019-09-23 RX ORDER — ISOSORBIDE DINITRATE 20 MG/1
20 TABLET ORAL 2 TIMES DAILY
Status: DISCONTINUED | OUTPATIENT
Start: 2019-09-23 | End: 2019-09-25

## 2019-09-23 RX ORDER — IPRATROPIUM BROMIDE AND ALBUTEROL SULFATE 2.5; .5 MG/3ML; MG/3ML
3 SOLUTION RESPIRATORY (INHALATION) EVERY 4 HOURS PRN
Status: DISCONTINUED | OUTPATIENT
Start: 2019-09-23 | End: 2019-09-25 | Stop reason: HOSPADM

## 2019-09-23 RX ORDER — FUROSEMIDE 10 MG/ML
40 INJECTION INTRAMUSCULAR; INTRAVENOUS 2 TIMES DAILY
Status: COMPLETED | OUTPATIENT
Start: 2019-09-24 | End: 2019-09-24

## 2019-09-23 RX ORDER — HYDRALAZINE HYDROCHLORIDE 50 MG/1
100 TABLET, FILM COATED ORAL EVERY 8 HOURS
Status: DISCONTINUED | OUTPATIENT
Start: 2019-09-23 | End: 2019-09-25 | Stop reason: HOSPADM

## 2019-09-23 RX ORDER — DIPHENHYDRAMINE HCL 25 MG
25 CAPSULE ORAL EVERY 6 HOURS PRN
Status: DISCONTINUED | OUTPATIENT
Start: 2019-09-23 | End: 2019-09-25 | Stop reason: HOSPADM

## 2019-09-23 RX ORDER — HYDRALAZINE HYDROCHLORIDE 20 MG/ML
10 INJECTION INTRAMUSCULAR; INTRAVENOUS
Status: DISCONTINUED | OUTPATIENT
Start: 2019-09-23 | End: 2019-09-23

## 2019-09-23 RX ORDER — ACETAMINOPHEN 325 MG/1
650 TABLET ORAL EVERY 6 HOURS PRN
Status: DISCONTINUED | OUTPATIENT
Start: 2019-09-23 | End: 2019-09-25 | Stop reason: HOSPADM

## 2019-09-23 RX ORDER — FUROSEMIDE 10 MG/ML
20 INJECTION INTRAMUSCULAR; INTRAVENOUS
Status: DISCONTINUED | OUTPATIENT
Start: 2019-09-23 | End: 2019-09-23

## 2019-09-23 RX ORDER — HYDRALAZINE HYDROCHLORIDE 20 MG/ML
10 INJECTION INTRAMUSCULAR; INTRAVENOUS
Status: COMPLETED | OUTPATIENT
Start: 2019-09-23 | End: 2019-09-23

## 2019-09-23 RX ADMIN — FUROSEMIDE 80 MG: 10 INJECTION, SOLUTION INTRAMUSCULAR; INTRAVENOUS at 06:09

## 2019-09-23 RX ADMIN — HYDRALAZINE HYDROCHLORIDE 10 MG: 20 INJECTION, SOLUTION INTRAMUSCULAR; INTRAVENOUS at 08:09

## 2019-09-23 RX ADMIN — ISOSORBIDE DINITRATE 20 MG: 20 TABLET ORAL at 09:09

## 2019-09-23 RX ADMIN — HYDRALAZINE HYDROCHLORIDE 100 MG: 50 TABLET ORAL at 10:09

## 2019-09-23 RX ADMIN — IPRATROPIUM BROMIDE AND ALBUTEROL SULFATE 3 ML: .5; 2.5 SOLUTION RESPIRATORY (INHALATION) at 06:09
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
## 2019-09-24 LAB
ANION GAP SERPL CALC-SCNC: 12 MMOL/L (ref 8–16)
BASOPHILS # BLD AUTO: 0.04 K/UL (ref 0–0.2)
BASOPHILS NFR BLD: 0.6 % (ref 0–1.9)
BUN SERPL-MCNC: 27 MG/DL (ref 6–20)
CALCIUM SERPL-MCNC: 9.6 MG/DL (ref 8.7–10.5)
CHLORIDE SERPL-SCNC: 106 MMOL/L (ref 95–110)
CO2 SERPL-SCNC: 25 MMOL/L (ref 23–29)
CREAT SERPL-MCNC: 2 MG/DL (ref 0.5–1.4)
DIFFERENTIAL METHOD: NORMAL
EOSINOPHIL # BLD AUTO: 0.2 K/UL (ref 0–0.5)
EOSINOPHIL NFR BLD: 2.5 % (ref 0–8)
ERYTHROCYTE [DISTWIDTH] IN BLOOD BY AUTOMATED COUNT: 13.8 % (ref 11.5–14.5)
EST. GFR  (AFRICAN AMERICAN): 45 ML/MIN/1.73 M^2
EST. GFR  (NON AFRICAN AMERICAN): 39 ML/MIN/1.73 M^2
GLUCOSE SERPL-MCNC: 93 MG/DL (ref 70–110)
HCT VFR BLD AUTO: 44.9 % (ref 40–54)
HGB BLD-MCNC: 14.9 G/DL (ref 14–18)
LYMPHOCYTES # BLD AUTO: 1.6 K/UL (ref 1–4.8)
LYMPHOCYTES NFR BLD: 24.4 % (ref 18–48)
MAGNESIUM SERPL-MCNC: 2.1 MG/DL (ref 1.6–2.6)
MCH RBC QN AUTO: 28.4 PG (ref 27–31)
MCHC RBC AUTO-ENTMCNC: 33.2 G/DL (ref 32–36)
MCV RBC AUTO: 86 FL (ref 82–98)
MONOCYTES # BLD AUTO: 0.4 K/UL (ref 0.3–1)
MONOCYTES NFR BLD: 6 % (ref 4–15)
NEUTROPHILS # BLD AUTO: 4.4 K/UL (ref 1.8–7.7)
NEUTROPHILS NFR BLD: 66.8 % (ref 38–73)
PHOSPHATE SERPL-MCNC: 3.9 MG/DL (ref 2.7–4.5)
PLATELET # BLD AUTO: 206 K/UL (ref 150–350)
PMV BLD AUTO: 10.1 FL (ref 9.2–12.9)
POTASSIUM SERPL-SCNC: 4.1 MMOL/L (ref 3.5–5.1)
RBC # BLD AUTO: 5.24 M/UL (ref 4.6–6.2)
SODIUM SERPL-SCNC: 143 MMOL/L (ref 136–145)
WBC # BLD AUTO: 6.68 K/UL (ref 3.9–12.7)

## 2019-09-24 PROCEDURE — 27000190 HC CPAP FULL FACE MASK W/VALVE

## 2019-09-24 PROCEDURE — 63600175 PHARM REV CODE 636 W HCPCS: Performed by: INTERNAL MEDICINE

## 2019-09-24 PROCEDURE — 99222 1ST HOSP IP/OBS MODERATE 55: CPT | Mod: ,,, | Performed by: INTERNAL MEDICINE

## 2019-09-24 PROCEDURE — 25000003 PHARM REV CODE 250: Performed by: INTERNAL MEDICINE

## 2019-09-24 PROCEDURE — 25000242 PHARM REV CODE 250 ALT 637 W/ HCPCS: Performed by: INTERNAL MEDICINE

## 2019-09-24 PROCEDURE — 99900035 HC TECH TIME PER 15 MIN (STAT)

## 2019-09-24 PROCEDURE — 99291 CRITICAL CARE FIRST HOUR: CPT | Mod: ,,, | Performed by: INTERNAL MEDICINE

## 2019-09-24 PROCEDURE — 80048 BASIC METABOLIC PNL TOTAL CA: CPT

## 2019-09-24 PROCEDURE — 99291 PR CRITICAL CARE, E/M 30-74 MINUTES: ICD-10-PCS | Mod: ,,, | Performed by: INTERNAL MEDICINE

## 2019-09-24 PROCEDURE — 20000000 HC ICU ROOM

## 2019-09-24 PROCEDURE — 96376 TX/PRO/DX INJ SAME DRUG ADON: CPT

## 2019-09-24 PROCEDURE — 94660 CPAP INITIATION&MGMT: CPT

## 2019-09-24 PROCEDURE — 99222 PR INITIAL HOSPITAL CARE,LEVL II: ICD-10-PCS | Mod: ,,, | Performed by: INTERNAL MEDICINE

## 2019-09-24 PROCEDURE — 96372 THER/PROPH/DIAG INJ SC/IM: CPT | Mod: 59

## 2019-09-24 PROCEDURE — 83735 ASSAY OF MAGNESIUM: CPT

## 2019-09-24 PROCEDURE — 84100 ASSAY OF PHOSPHORUS: CPT

## 2019-09-24 PROCEDURE — 94640 AIRWAY INHALATION TREATMENT: CPT

## 2019-09-24 PROCEDURE — 85025 COMPLETE CBC W/AUTO DIFF WBC: CPT

## 2019-09-24 RX ORDER — SODIUM CHLORIDE 0.9 % (FLUSH) 0.9 %
10 SYRINGE (ML) INJECTION
Status: DISCONTINUED | OUTPATIENT
Start: 2019-09-24 | End: 2019-09-25 | Stop reason: HOSPADM

## 2019-09-24 RX ORDER — IPRATROPIUM BROMIDE AND ALBUTEROL SULFATE 2.5; .5 MG/3ML; MG/3ML
3 SOLUTION RESPIRATORY (INHALATION)
Status: DISCONTINUED | OUTPATIENT
Start: 2019-09-24 | End: 2019-09-25 | Stop reason: HOSPADM

## 2019-09-24 RX ORDER — FAMOTIDINE 20 MG/1
20 TABLET, FILM COATED ORAL 2 TIMES DAILY
Status: DISCONTINUED | OUTPATIENT
Start: 2019-09-24 | End: 2019-09-25 | Stop reason: HOSPADM

## 2019-09-24 RX ADMIN — CARVEDILOL 25 MG: 12.5 TABLET, FILM COATED ORAL at 05:09

## 2019-09-24 RX ADMIN — ISOSORBIDE DINITRATE 20 MG: 20 TABLET ORAL at 10:09

## 2019-09-24 RX ADMIN — FUROSEMIDE 40 MG: 10 INJECTION, SOLUTION INTRAMUSCULAR; INTRAVENOUS at 10:09

## 2019-09-24 RX ADMIN — FUROSEMIDE 40 MG: 10 INJECTION, SOLUTION INTRAMUSCULAR; INTRAVENOUS at 06:09

## 2019-09-24 RX ADMIN — ISOSORBIDE DINITRATE 20 MG: 20 TABLET ORAL at 09:09

## 2019-09-24 RX ADMIN — IPRATROPIUM BROMIDE AND ALBUTEROL SULFATE 3 ML: .5; 3 SOLUTION RESPIRATORY (INHALATION) at 07:09

## 2019-09-24 RX ADMIN — HYDRALAZINE HYDROCHLORIDE 100 MG: 50 TABLET ORAL at 02:09

## 2019-09-24 RX ADMIN — FAMOTIDINE 20 MG: 20 TABLET, FILM COATED ORAL at 09:09

## 2019-09-24 RX ADMIN — AMLODIPINE BESYLATE 10 MG: 10 TABLET ORAL at 10:09

## 2019-09-24 RX ADMIN — HYDRALAZINE HYDROCHLORIDE 10 MG: 20 INJECTION INTRAMUSCULAR; INTRAVENOUS at 05:09

## 2019-09-24 RX ADMIN — CARVEDILOL 25 MG: 12.5 TABLET, FILM COATED ORAL at 08:09

## 2019-09-24 RX ADMIN — HEPARIN SODIUM 5000 UNITS: 5000 INJECTION, SOLUTION INTRAVENOUS; SUBCUTANEOUS at 06:09

## 2019-09-24 RX ADMIN — HEPARIN SODIUM 5000 UNITS: 5000 INJECTION, SOLUTION INTRAVENOUS; SUBCUTANEOUS at 09:09

## 2019-09-24 RX ADMIN — HEPARIN SODIUM 5000 UNITS: 5000 INJECTION, SOLUTION INTRAVENOUS; SUBCUTANEOUS at 03:09

## 2019-09-24 RX ADMIN — HYDRALAZINE HYDROCHLORIDE 100 MG: 50 TABLET ORAL at 06:09

## 2019-09-24 RX ADMIN — HYDRALAZINE HYDROCHLORIDE 100 MG: 50 TABLET ORAL at 09:09

## 2019-09-24 NOTE — ASSESSMENT & PLAN NOTE
Chest x-ray revealed mild vascular congestion.  Continue IV diuretics as mentioned above.  Continue supplemental oxygen to maintain saturations greater than 92%.

## 2019-09-24 NOTE — ED NOTES
Pt given blanket, pillow and bed repositioned for comfort. Lights dimmed per patient request. Pt given update on status and plan of care. No complaints or requests at this time.     Pt on cardiac monitor. Call light within reach, bed low and in locked position. Side rails up x 2
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
## 2019-09-24 NOTE — SUBJECTIVE & OBJECTIVE
Interval History:  Reports much improvement in his SOB and BROUSSARD. Other symptoms denied.     Review of Systems   Constitutional: Positive for fatigue. Negative for appetite change and fever.   HENT: Negative.  Negative for congestion, nosebleeds and sore throat.    Eyes: Negative.  Negative for visual disturbance.   Respiratory: Positive for shortness of breath. Negative for cough and wheezing.    Cardiovascular: Positive for leg swelling. Negative for chest pain and palpitations.   Gastrointestinal: Negative.  Negative for abdominal pain, constipation, diarrhea, nausea and vomiting.   Endocrine: Negative.  Negative for polyuria.   Genitourinary: Negative.  Negative for dysuria, flank pain, frequency and urgency.   Musculoskeletal: Negative.  Negative for arthralgias, back pain and joint swelling.   Skin: Negative.  Negative for color change, pallor and rash.   Allergic/Immunologic: Negative.  Negative for immunocompromised state.   Neurological: Negative.  Negative for dizziness, syncope, weakness, light-headedness, numbness and headaches.   Hematological: Negative.    Psychiatric/Behavioral: Negative.  Negative for confusion and hallucinations. The patient is not nervous/anxious.    All other systems reviewed and are negative.    Objective:     Vital Signs (Most Recent):  Temp: 98.7 °F (37.1 °C) (09/24/19 1615)  Pulse: 78 (09/24/19 1615)  Resp: (!) 26 (09/24/19 1615)  BP: 126/71 (09/24/19 1615)  SpO2: (!) 93 % (09/24/19 1615) Vital Signs (24h Range):  Temp:  [97.8 °F (36.6 °C)-99.1 °F (37.3 °C)] 98.7 °F (37.1 °C)  Pulse:  [69-95] 78  Resp:  [15-27] 26  SpO2:  [92 %-98 %] 93 %  BP: ()/() 126/71     Weight: 119.9 kg (264 lb 5.3 oz)  Body mass index is 33.04 kg/m².    Intake/Output Summary (Last 24 hours) at 9/24/2019 1832  Last data filed at 9/24/2019 1347  Gross per 24 hour   Intake --   Output 7200 ml   Net -7200 ml      Physical Exam   Constitutional: He is oriented to person, place, and time. He  appears well-developed and well-nourished. No distress.   HENT:   Head: Normocephalic and atraumatic.   Eyes: Conjunctivae are normal. No scleral icterus.   Neck: Normal range of motion. Neck supple. No thyromegaly present.   Cardiovascular: Normal rate, regular rhythm and normal heart sounds.   No murmur heard.  Pulmonary/Chest: Effort normal. No respiratory distress. He has decreased breath sounds in the right lower field and the left lower field. He has no wheezes. He has no rales. He exhibits no tenderness.   Abdominal: Soft. Bowel sounds are normal. There is no tenderness.   Musculoskeletal: Normal range of motion. He exhibits edema (1+ bilateral pretibial pitting edema). He exhibits no tenderness.   Lymphadenopathy:     He has no cervical adenopathy.   Neurological: He is alert and oriented to person, place, and time. No cranial nerve deficit. He exhibits normal muscle tone. Coordination normal.   Skin: Skin is warm and dry. He is not diaphoretic. No erythema.   Psychiatric: He has a normal mood and affect. His behavior is normal. Thought content normal.   Nursing note and vitals reviewed.      Significant Labs:   CBC:   Recent Labs   Lab 09/23/19  1755 09/24/19  0454   WBC 6.41 6.68   HGB 14.1 14.9   HCT 43.1 44.9    206     CMP:   Recent Labs   Lab 09/23/19  1755 09/24/19  0454    143   K 4.5 4.1    106   CO2 22* 25   GLU 76 93   BUN 26* 27*   CREATININE 1.9* 2.0*   CALCIUM 9.4 9.6   PROT 6.8  --    ALBUMIN 3.8  --    BILITOT 1.4*  --    ALKPHOS 66  --    AST 30  --    ALT 33  --    ANIONGAP 11 12   EGFRNONAA 41* 39*     All pertinent labs within the past 24 hours have been reviewed.    Significant Imaging: I have reviewed all pertinent imaging results/findings within the past 24 hours.

## 2019-09-24 NOTE — ASSESSMENT & PLAN NOTE
Blood pressure elevated at 179/131.  Reports compliance with medications at home including hydralazine, Imdur, amlodipine, Coreg, Lasix.  Not on ACEI/ARB due to CKD stage 3.  Received hydralazine 10 mg IV x1 in the ED, will continue the same.  Monitor BP closely and make adjustments as needed.

## 2019-09-24 NOTE — PROGRESS NOTES
Pharmacist Renal Dose Adjustment Note    Bria Saldana is a 48 y.o. male being treated with the medication famotidine (Pepcid) for stress ulcer prophylaxis.     Patient Data:    Vital Signs (Most Recent):  Temp: 97.8 °F (36.6 °C) (09/24/19 0400)  Pulse: 95 (09/24/19 0802)  Resp: 20 (09/24/19 0802)  BP: (!) 142/82 (09/24/19 0802)  SpO2: 96 % (09/24/19 0802)   Vital Signs (72h Range):  Temp:  [97.3 °F (36.3 °C)-97.8 °F (36.6 °C)]   Pulse:  [78-96]   Resp:  [15-27]   BP: (134-188)/()   SpO2:  [92 %-98 %]      Recent Labs   Lab 09/23/19  1755 09/24/19  0454   CREATININE 1.9* 2.0*     Serum creatinine: 2 mg/dL (H) 09/24/19 0454  Estimated creatinine clearance: 63.7 mL/min (A)    Per protocol for CrCl > 50 ml/min, dose will be increased from 20 mg PO once daily to 20 mg PO twice daily.     Pharmacist's Name: Katherine E Mcardle  Pharmacist's Extension: [phone]

## 2019-09-24 NOTE — ED NOTES
Pt laying in bed, sleeping. RR even and unlabored, cardiac monitor in place. Pt given water as requested, states he is very thirsty

## 2019-09-24 NOTE — ED NOTES
Patient rounds-     Pt laying in bed, sleeping. RR even and unlabored, cardiac monitor in place. No requests or complaints at this time.

## 2019-09-24 NOTE — PLAN OF CARE
Pt VSS.  Pt educated again on the importance of wearing his CPAP mask and taking his b/p meds as ordered.  Pt verbalizes understanding.  Pt pain free and no complaints this shift.  Pt has responded to the lasix very well as expected.

## 2019-09-24 NOTE — HPI
"Mr. Saldana's current conditions include combined systolic and diastolic HF with LVEF 30-35%, HTN, obesity. Quit smoking long time ago. 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 Echo showed LAE, EF 30-35%, RV enlargement with mild dysfunction, moderate MR, TR and small pericardial effusion. Underwent stress test that was negative for ischemia. Cause of cardiomyopathy likely form uncontrolled HTN and ETOH abuse.   Was unable to afford Bidil. Switched to Isordil and Hydralazine. Seen in Cardiology clinic by myself and BP elevated in July 2019. Coreg resumed and he was given instructions to follow up in 2 weeks. Appt canceled and never rescheduled. States that he stopped taking his HTN meds about 1 month ago because he felt like they weren't working. He also admits that he hasn't been wearing his CPAP mask because he has to " just get use to it".     He presented to the ED last night with complaints of worsening SOB, BLE edema and fatigue. Initial /100's.  BNP elevated at 635, troponin 0.062 (slightly above his baseline).  Chest x-ray reveals bilateral vascular congestion, received Lasix 80 mg IV x1 in the ED. Cardiology consulted decompensated HF.   "

## 2019-09-24 NOTE — ASSESSMENT & PLAN NOTE
Patient stopped HTN meds approximately 1 month ago   Home meds have been resumed including amlodipine, Hydralazine, Coreg and Isordil.  No ACE/ARB due to renal failure
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
## 2019-09-24 NOTE — ASSESSMENT & PLAN NOTE
Blood pressure elevated at 179/131.  Reports compliance with medications at home including hydralazine, Imdur, amlodipine, Coreg, Lasix.  Not on ACEI/ARB due to CKD stage 3.  Received hydralazine 10 mg IV x1 in the ED, will continue the same.  Monitor BP closely and make adjustments as needed - B/P improving
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
## 2019-09-24 NOTE — ED NOTES
Pt given update on status and plan of care. Pt given juice, crackers, and sandwich, reports being hungry and wife unable to bring patient food at the moment bc she is with the kids.     Pt on cardiac monitor.
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
## 2019-09-24 NOTE — ASSESSMENT & PLAN NOTE
LVEF 35% with DD on echo in July 2019   with vascular congestion on EKG  Patient likely decompensated due to uncontrolled HTN as a result of him not taking HTN meds for about a month.   He also has NATALYA and is noncompliant with CPAP use  Agree with IV Lasix  Continue Isordil, amlodipine, Hydralazine and Coreg  No ACE/ARB due to renal function   Patient clear to transfer to Tele for continued diuresis and BP control   Will need to follow up in clinic as scheduled next week with ONEIL Sesay

## 2019-09-24 NOTE — PROGRESS NOTES
[facility] Hospital Medicine  Progress Note    Patient Name: Bria Saldana  MRN: [id number]  Patient Class: IP- Inpatient   Admission Date: 9/23/2019  Length of Stay: 0 days  Attending Physician: Jacob Andrews MD  Primary Care Provider: Hortencia Blair MD        Subjective:     Principal Problem:Acute on chronic combined systolic and diastolic congestive heart failure        HPI:  Mr. Saldana is a 48-year-old  male with PMH significant for combined CHF (EF 35%, with grade 3 diastolic dysfunction on echo done in July 2019), CKD stage 3, HTN, compliant with his medications, including Lasix 20 mg p.o. daily, presented to the ED complaining of 24 hr of worsening shortness of breath, bilateral lower extremity swelling, worsening fatigue.  He was at work today, got progressively worse short of breath, and presented to the ED in the afternoon.  Denies fever, chills, cough.  Denies chest pain. In the ED BNP elevated at 635, troponin 0.062 (slightly above his baseline).  Chest x-ray reveals bilateral vascular congestion, received Lasix 80 mg IV x1 in the ED.  Patient not on oxygen, saturations mid 90s on room air.  Discussed going home on increased oral Lasix, but patient uncomfortable going home from the ED.    Admitting diagnosis acute on chronic combined heart failure.    Overview/Hospital Course:  Pt with hx of combined heart failure, CKD III and HTN presented with SOB and BROUSSARD. According to Cardiology, pt has been noncompliant over the last month with medication. He was placed on Observation to provide lasix diuresis for decompensated heart failure and hypertensive urgency. His medicines of amlodipine, hydralazine, Coreg and Imdur were resumed (ACE/ARB were held due to renal function). Over 1st 24 hours, pt diuresed at least 7 liters of fluid. Cardiology saw the patient and recommended further IV lasix diuresis and blood pressure control. Also, pt with sleep apnea with  CPAP at night.     Interval History:  Reports much improvement in his SOB and BROUSSARD. Other symptoms denied.     Review of Systems   Constitutional: Positive for fatigue. Negative for appetite change and fever.   HENT: Negative.  Negative for congestion, nosebleeds and sore throat.    Eyes: Negative.  Negative for visual disturbance.   Respiratory: Positive for shortness of breath. Negative for cough and wheezing.    Cardiovascular: Positive for leg swelling. Negative for chest pain and palpitations.   Gastrointestinal: Negative.  Negative for abdominal pain, constipation, diarrhea, nausea and vomiting.   Endocrine: Negative.  Negative for polyuria.   Genitourinary: Negative.  Negative for dysuria, flank pain, frequency and urgency.   Musculoskeletal: Negative.  Negative for arthralgias, back pain and joint swelling.   Skin: Negative.  Negative for color change, pallor and rash.   Allergic/Immunologic: Negative.  Negative for immunocompromised state.   Neurological: Negative.  Negative for dizziness, syncope, weakness, light-headedness, numbness and headaches.   Hematological: Negative.    Psychiatric/Behavioral: Negative.  Negative for confusion and hallucinations. The patient is not nervous/anxious.    All other systems reviewed and are negative.    Objective:     Vital Signs (Most Recent):  Temp: 98.7 °F (37.1 °C) (09/24/19 1615)  Pulse: 78 (09/24/19 1615)  Resp: (!) 26 (09/24/19 1615)  BP: 126/71 (09/24/19 1615)  SpO2: (!) 93 % (09/24/19 1615) Vital Signs (24h Range):  Temp:  [97.8 °F (36.6 °C)-99.1 °F (37.3 °C)] 98.7 °F (37.1 °C)  Pulse:  [69-95] 78  Resp:  [15-27] 26  SpO2:  [92 %-98 %] 93 %  BP: ()/() 126/71     Weight: 119.9 kg (264 lb 5.3 oz)  Body mass index is 33.04 kg/m².    Intake/Output Summary (Last 24 hours) at 9/24/2019 1832  Last data filed at 9/24/2019 1347  Gross per 24 hour   Intake --   Output 7200 ml   Net -7200 ml      Physical Exam   Constitutional: He is oriented to person, place,  and time. He appears well-developed and well-nourished. No distress.   HENT:   Head: Normocephalic and atraumatic.   Eyes: Conjunctivae are normal. No scleral icterus.   Neck: Normal range of motion. Neck supple. No thyromegaly present.   Cardiovascular: Normal rate, regular rhythm and normal heart sounds.   No murmur heard.  Pulmonary/Chest: Effort normal. No respiratory distress. He has decreased breath sounds in the right lower field and the left lower field. He has no wheezes. He has no rales. He exhibits no tenderness.   Abdominal: Soft. Bowel sounds are normal. There is no tenderness.   Musculoskeletal: Normal range of motion. He exhibits edema (1+ bilateral pretibial pitting edema). He exhibits no tenderness.   Lymphadenopathy:     He has no cervical adenopathy.   Neurological: He is alert and oriented to person, place, and time. No cranial nerve deficit. He exhibits normal muscle tone. Coordination normal.   Skin: Skin is warm and dry. He is not diaphoretic. No erythema.   Psychiatric: He has a normal mood and affect. His behavior is normal. Thought content normal.   Nursing note and vitals reviewed.      Significant Labs:   CBC:   Recent Labs   Lab 09/23/19  1755 09/24/19  0454   WBC 6.41 6.68   HGB 14.1 14.9   HCT 43.1 44.9    206     CMP:   Recent Labs   Lab 09/23/19  1755 09/24/19  0454    143   K 4.5 4.1    106   CO2 22* 25   GLU 76 93   BUN 26* 27*   CREATININE 1.9* 2.0*   CALCIUM 9.4 9.6   PROT 6.8  --    ALBUMIN 3.8  --    BILITOT 1.4*  --    ALKPHOS 66  --    AST 30  --    ALT 33  --    ANIONGAP 11 12   EGFRNONAA 41* 39*     All pertinent labs within the past 24 hours have been reviewed.    Significant Imaging: I have reviewed all pertinent imaging results/findings within the past 24 hours.      Assessment/Plan:      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 and diastolic congestive heart failure  Echo reviewed July 2019, EF 30-35%, with grade 3 diastolic dysfunction.  Reports  compliance with Lasix 20 mg p.o. daily at home.  Denies dietary indiscretion.  Received Lasix 80 mg IV x1 in the ED.  Continue Lasix 40 mg IV b.i.d. x2 doses.  Monitor urine output and respiratory status.      Acute pulmonary edema with congestive heart failure  Chest x-ray revealed mild vascular congestion.  Continue IV diuretics as mentioned above.  Continue supplemental oxygen to maintain saturations greater than 92%.      Hypertensive urgency  Blood pressure elevated at 179/131.  Reports compliance with medications at home including hydralazine, Imdur, amlodipine, Coreg, Lasix.  Not on ACEI/ARB due to CKD stage 3.  Received hydralazine 10 mg IV x1 in the ED, will continue the same.  Monitor BP closely and make adjustments as needed - B/P improving      CKD (chronic kidney disease) stage 3, GFR 30-59 ml/min  Creatinine 1.9, at baseline.  Stable presently        NATALYA (obstructive sleep apnea)  CPAP every HS        VTE Risk Mitigation (From admission, onward)         Ordered     IP VTE HIGH RISK PATIENT  Once      09/24/19 0429     Place sequential compression device  Until discontinued      09/24/19 0429     heparin (porcine) injection 5,000 Units  Every 8 hours      09/23/19 2047                    Lauren Maravilla NP  Department of Hospital Medicine   [facility]

## 2019-09-24 NOTE — HPI
48 y.o. male patient with a PMHx of acute Congestive Heart failure,HF with LVEF 30-35%, grade 3 diastolic dysfunction  , CKD, Obstructive Sleep Apnea  and essential HTN who presented to the Emergency Department on 9/23/2019 for evaluation of shortness of breath, fatigue  and hypertensive urgency x 1 day. Missed his blood pressure medications on the morning of 9/23. Denies chest pain. No chest tightness. No cough or sputum production, no fever or chills. 12 pack year hx of smoking in the distant past
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
## 2019-09-24 NOTE — HPI
Mr. Saldana is a 48-year-old  male with PMH significant for combined CHF (EF 35%, with grade 3 diastolic dysfunction on echo done in July 2019), CKD stage 3, HTN, compliant with his medications, including Lasix 20 mg p.o. daily, presented to the ED complaining of 24 hr of worsening shortness of breath, bilateral lower extremity swelling, worsening fatigue.  He was at work today, got progressively worse short of breath, and presented to the ED in the afternoon.  Denies fever, chills, cough.  Denies chest pain. In the ED BNP elevated at 635, troponin 0.062 (slightly above his baseline).  Chest x-ray reveals bilateral vascular congestion, received Lasix 80 mg IV x1 in the ED.  Patient not on oxygen, saturations mid 90s on room air.  Discussed going home on increased oral Lasix, but patient uncomfortable going home from the ED.    Admitting diagnosis acute on chronic combined heart failure.

## 2019-09-24 NOTE — ASSESSMENT & PLAN NOTE
Echo reviewed July 2019, EF 30-35%, with grade 3 diastolic dysfunction.  Reports compliance with Lasix 20 mg p.o. daily at home.  Denies dietary indiscretion.  Received Lasix 80 mg IV x1 in the ED.  Continue Lasix 40 mg IV b.i.d. x2 doses.  Monitor urine output and respiratory status.
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
## 2019-09-24 NOTE — HOSPITAL COURSE
Pt with hx of combined heart failure, CKD III and HTN presented with SOB and BROUSSARD. According to Cardiology, pt has been noncompliant over the last month with medication. He was placed on Observation to provide lasix diuresis for decompensated heart failure and hypertensive urgency. His medicines of amlodipine, hydralazine, Coreg and Imdur were resumed (ACE/ARB were held due to renal function). Over 1st 24 hours, pt diuresed at least 7 liters of fluid. Cardiology saw the patient and recommended further IV lasix diuresis and blood pressure control. Also, pt with sleep apnea with CPAP at night. Nephrology saw the patient due to CKD who felt that it was likely due to longstanding history of hypertension. The next day, pt was cleared for discharge by Cardiology. Pt had diuresed approx 8 liters of fluid. He was resumed on his medications as stated above including lasix 40 mg po daily. Pt was seen and examined and determined to be safe and stable for discharge. He was advised to follow up with Cardiology and Nephrology.

## 2019-09-25 ENCOUNTER — TELEPHONE (OUTPATIENT)
Dept: NEPHROLOGY | Facility: CLINIC | Age: 49
End: 2019-09-25

## 2019-09-25 VITALS
WEIGHT: 264.31 LBS | TEMPERATURE: 98 F | HEIGHT: 75 IN | DIASTOLIC BLOOD PRESSURE: 97 MMHG | BODY MASS INDEX: 32.86 KG/M2 | HEART RATE: 85 BPM | OXYGEN SATURATION: 99 % | RESPIRATION RATE: 16 BRPM | SYSTOLIC BLOOD PRESSURE: 134 MMHG

## 2019-09-25 PROBLEM — I50.43 ACUTE ON CHRONIC COMBINED SYSTOLIC AND DIASTOLIC CONGESTIVE HEART FAILURE: Status: RESOLVED | Noted: 2019-09-23 | Resolved: 2019-09-25

## 2019-09-25 PROBLEM — I50.1 ACUTE PULMONARY EDEMA WITH CONGESTIVE HEART FAILURE: Status: RESOLVED | Noted: 2019-09-23 | Resolved: 2019-09-25

## 2019-09-25 PROBLEM — I16.0 HYPERTENSIVE URGENCY: Status: RESOLVED | Noted: 2019-09-23 | Resolved: 2019-09-25

## 2019-09-25 PROCEDURE — 99232 SBSQ HOSP IP/OBS MODERATE 35: CPT | Mod: ,,, | Performed by: INTERNAL MEDICINE

## 2019-09-25 PROCEDURE — 25000003 PHARM REV CODE 250: Performed by: INTERNAL MEDICINE

## 2019-09-25 PROCEDURE — 63600175 PHARM REV CODE 636 W HCPCS: Performed by: INTERNAL MEDICINE

## 2019-09-25 PROCEDURE — 99223 PR INITIAL HOSPITAL CARE,LEVL III: ICD-10-PCS | Mod: ,,, | Performed by: INTERNAL MEDICINE

## 2019-09-25 PROCEDURE — 94660 CPAP INITIATION&MGMT: CPT

## 2019-09-25 PROCEDURE — 99223 1ST HOSP IP/OBS HIGH 75: CPT | Mod: ,,, | Performed by: INTERNAL MEDICINE

## 2019-09-25 PROCEDURE — 94640 AIRWAY INHALATION TREATMENT: CPT

## 2019-09-25 PROCEDURE — 99232 PR SUBSEQUENT HOSPITAL CARE,LEVL II: ICD-10-PCS | Mod: ,,, | Performed by: INTERNAL MEDICINE

## 2019-09-25 PROCEDURE — 25000242 PHARM REV CODE 250 ALT 637 W/ HCPCS: Performed by: INTERNAL MEDICINE

## 2019-09-25 PROCEDURE — 99900035 HC TECH TIME PER 15 MIN (STAT)

## 2019-09-25 RX ORDER — FUROSEMIDE 20 MG/1
40 TABLET ORAL 2 TIMES DAILY
Qty: 120 TABLET | Refills: 3 | Status: SHIPPED | OUTPATIENT
Start: 2019-09-25 | End: 2019-12-13

## 2019-09-25 RX ORDER — HYDRALAZINE HYDROCHLORIDE 100 MG/1
100 TABLET, FILM COATED ORAL EVERY 8 HOURS
Qty: 90 TABLET | Refills: 3 | Status: SHIPPED | OUTPATIENT
Start: 2019-09-25 | End: 2019-12-13 | Stop reason: SDUPTHER

## 2019-09-25 RX ORDER — ISOSORBIDE DINITRATE 20 MG/1
20 TABLET ORAL 3 TIMES DAILY
Qty: 90 TABLET | Refills: 3 | Status: SHIPPED | OUTPATIENT
Start: 2019-09-25 | End: 2019-12-13 | Stop reason: SDUPTHER

## 2019-09-25 RX ORDER — ISOSORBIDE DINITRATE 20 MG/1
20 TABLET ORAL 3 TIMES DAILY
Status: DISCONTINUED | OUTPATIENT
Start: 2019-09-25 | End: 2019-09-25 | Stop reason: HOSPADM

## 2019-09-25 RX ORDER — CARVEDILOL 25 MG/1
25 TABLET ORAL 2 TIMES DAILY WITH MEALS
Qty: 60 TABLET | Refills: 3 | Status: SHIPPED | OUTPATIENT
Start: 2019-09-25 | End: 2019-12-13 | Stop reason: SDUPTHER

## 2019-09-25 RX ORDER — AMLODIPINE BESYLATE 10 MG/1
10 TABLET ORAL DAILY
Qty: 90 TABLET | Refills: 3 | Status: SHIPPED | OUTPATIENT
Start: 2019-09-25 | End: 2019-12-13 | Stop reason: SDUPTHER

## 2019-09-25 RX ADMIN — ISOSORBIDE DINITRATE 20 MG: 20 TABLET ORAL at 08:09

## 2019-09-25 RX ADMIN — FAMOTIDINE 20 MG: 20 TABLET, FILM COATED ORAL at 08:09

## 2019-09-25 RX ADMIN — AMLODIPINE BESYLATE 10 MG: 10 TABLET ORAL at 08:09

## 2019-09-25 RX ADMIN — CARVEDILOL 25 MG: 12.5 TABLET, FILM COATED ORAL at 08:09

## 2019-09-25 RX ADMIN — IPRATROPIUM BROMIDE AND ALBUTEROL SULFATE 3 ML: .5; 3 SOLUTION RESPIRATORY (INHALATION) at 08:09

## 2019-09-25 RX ADMIN — HEPARIN SODIUM 5000 UNITS: 5000 INJECTION, SOLUTION INTRAVENOUS; SUBCUTANEOUS at 05:09

## 2019-09-25 RX ADMIN — HYDRALAZINE HYDROCHLORIDE 100 MG: 50 TABLET ORAL at 05:09

## 2019-09-25 NOTE — ASSESSMENT & PLAN NOTE
1. CKD stage 3 :  Likely due to longstanding history of hypertension, baseline serum creatinine about 1.5-2.  Advised patient regarding importance of blood pressure control, discussed low-salt diet, discussed weight loss, discussed in our advised patient to avoid NSAID use in future,    2.  Acute decompensated heart failure - likely due to nonadherence with his diet, I do not see any diuretics on home medications, he will be going home on Lasix 40 mg twice a day, cardiology notes reviewed, will discuss about need for left heart catheterization eventually to assess for coronary artery disease ,     3. HTN :  Blood pressure is improved, resume home medications, discussed low-salt diet,    4. Stable lytes and Hb

## 2019-09-25 NOTE — ASSESSMENT & PLAN NOTE
Renal function stable at this time compared to baseline  He will need to see Nephrology as an OP as discussed.
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
## 2019-09-25 NOTE — PLAN OF CARE
CM met with patient and spouse at bedside to assess for discharge needs. Pt states that he lives at home with his spouse and is independent with needs. He states that he has CPAP and was not using it regularly. He does not have home health. His DC plan will be dependent on hospital progress. CM provided a transitional care folder, information on advanced directives, information on pharmacy bedside delivery, and discharge planning begins on admission with contact information for any needs/questions.    D/C Plan: Home  PCP: Dr. Blair  Preferred Pharmacy: [facility]  Discharge transportation: Family  [facility]: Send link  Pharmacy Bedside Delivery: Yes       09/25/19 1030   Discharge Assessment   Assessment Type Discharge Planning Assessment   Confirmed/corrected address and phone number on facesheet? Yes   Assessment information obtained from? Patient;Caregiver   Expected Length of Stay (days)   (TBD)   Communicated expected length of stay with patient/caregiver yes   Prior to hospitilization cognitive status: Alert/Oriented   Prior to hospitalization functional status: Independent;Assistive Equipment   Current cognitive status: Alert/Oriented   Current Functional Status: Independent;Assistive Equipment   Facility Arrived From: Home   Lives With spouse   Able to Return to Prior Arrangements yes   Is patient able to care for self after discharge? Yes   Who are your caregiver(s) and their phone number(s)? Vielka Saldana, Spouse- [phone]   Patient's perception of discharge disposition home or selfcare   Readmission Within the Last 30 Days no previous admission in last 30 days   Patient currently being followed by outpatient case management? No   Patient currently receives any other outside agency services? No   Equipment Currently Used at Home CPAP   Do you have any problems affording any of your prescribed medications? No   Is the patient taking medications as prescribed? yes   Does the patient have  transportation home? Yes   Transportation Anticipated family or friend will provide   Dialysis Name and Scheduled days NA   Does the patient receive services at the Coumadin Clinic? No   Discharge Plan A Home with family   DME Needed Upon Discharge  none   Patient/Family in Agreement with Plan yes

## 2019-09-25 NOTE — HPI
Bria Saldana is a 48 y.o. AA man  with h/o  CHF, CKD, and essential HTN , was admitted to the hospital yesterday for progressive shortness of breath.  Patient was diagnosed with acute decompensated heart failure.  Likely due to noncardiac rinse with his diet and fluids.  Patient was diuresed with IV Lasix, diuresed about 8 L overnight, clinically significantly improved today, he has chronic kidney disease likely due to long history of hypertension, creatinine pretty much at his baseline of 2 mg/dL.  We were consulted due to his chronic kidney disease.

## 2019-09-25 NOTE — TELEPHONE ENCOUNTER
----- Message from Kristopher Mccartney MD sent at 9/25/2019 11:48 AM CDT -----  Can you nicole a f/u with me in clinic in 2 weeks    Dr LOPES

## 2019-09-25 NOTE — DISCHARGE SUMMARY
[facility] Hospital Medicine  Discharge Summary      Patient Name: Bria Saldana  MRN: [id number]  Admission Date: 9/23/2019  Hospital Length of Stay: 1 days  Discharge Date and Time:  09/25/2019 2:47 PM  Attending Physician: Sivan Goldsmith MD   Discharging Provider: Lauren Maravilla NP  Primary Care Provider: Hortencia Blair MD      HPI:   Mr. Saldana is a 48-year-old  male with PMH significant for combined CHF (EF 35%, with grade 3 diastolic dysfunction on echo done in July 2019), CKD stage 3, HTN, compliant with his medications, including Lasix 20 mg p.o. daily, presented to the ED complaining of 24 hr of worsening shortness of breath, bilateral lower extremity swelling, worsening fatigue.  He was at work today, got progressively worse short of breath, and presented to the ED in the afternoon.  Denies fever, chills, cough.  Denies chest pain. In the ED BNP elevated at 635, troponin 0.062 (slightly above his baseline).  Chest x-ray reveals bilateral vascular congestion, received Lasix 80 mg IV x1 in the ED.  Patient not on oxygen, saturations mid 90s on room air.  Discussed going home on increased oral Lasix, but patient uncomfortable going home from the ED.    Admitting diagnosis acute on chronic combined heart failure.    * No surgery found *      Hospital Course:   Pt with hx of combined heart failure, CKD III and HTN presented with SOB and BROUSSARD. According to Cardiology, pt has been noncompliant over the last month with medication. He was placed on Observation to provide lasix diuresis for decompensated heart failure and hypertensive urgency. His medicines of amlodipine, hydralazine, Coreg and Imdur were resumed (ACE/ARB were held due to renal function). Over 1st 24 hours, pt diuresed at least 7 liters of fluid. Cardiology saw the patient and recommended further IV lasix diuresis and blood pressure control. Also, pt with sleep apnea with CPAP at night.  Nephrology saw the patient due to CKD who felt that it was likely due to longstanding history of hypertension. The next day, pt was cleared for discharge by Cardiology. Pt had diuresed approx 8 liters of fluid. He was resumed on his medications as stated above including lasix 40 mg po daily. Pt was seen and examined and determined to be safe and stable for discharge. He was advised to follow up with Cardiology and Nephrology.      Consults:   Consults (From admission, onward)        Status Ordering Provider     Inpatient consult to Cardiology  Once     Provider:  Slava Mcarthur MD    Completed [facility]     Inpatient consult to Nephrology  Once     Provider:  (Not yet assigned)    Acknowledged [facility]          No new Assessment & Plan notes have been filed under this hospital service since the last note was generated.  Service: Hospital Medicine    Final Active Diagnoses:    Diagnosis Date Noted POA    PRINCIPAL PROBLEM:  Acute on chronic combined systolic (congestive) and diastolic (congestive) heart failure [I50.43] 09/23/2019 Yes    CKD (chronic kidney disease) stage 3, GFR 30-59 ml/min [N18.3] 07/08/2019 Yes    NATALYA (obstructive sleep apnea) [G47.33] 07/23/2018 Yes      Problems Resolved During this Admission:    Diagnosis Date Noted Date Resolved POA    Hypertensive urgency [I16.0] 09/23/2019 09/25/2019 Yes    Acute pulmonary edema with congestive heart failure [I50.1] 09/23/2019 09/25/2019 Yes       Discharged Condition: stable    Disposition: Home or Self Care    Follow Up:  Follow-up Information     Kristopher Mccartney MD In 2 weeks.    Specialty:  Nephrology  Why:  Hospital Follow Up - Chronic Kidney Disease  Contact information:  [address]  [place] [address]  [phone]             SELENE Allen.    Specialty:  Cardiology  Why:  Please keep scheduled appointment regarding Congestive Heart Failure and Hypertension  Contact information:  [address]  [facility] [address]  [phone]                 Patient Instructions:      Notify your health care provider if you experience any of the following:  temperature >100.4     Notify your health care provider if you experience any of the following:  difficulty breathing or increased cough     Activity as tolerated       Significant Diagnostic Studies: Labs:   CMP   Recent Labs   Lab 09/23/19 1755 09/24/19  0454    143   K 4.5 4.1    106   CO2 22* 25   GLU 76 93   BUN 26* 27*   CREATININE 1.9* 2.0*   CALCIUM 9.4 9.6   PROT 6.8  --    ALBUMIN 3.8  --    BILITOT 1.4*  --    ALKPHOS 66  --    AST 30  --    ALT 33  --    ANIONGAP 11 12   ESTGFRAFRICA 47* 45*   EGFRNONAA 41* 39*    and CBC   Recent Labs   Lab 09/23/19 1755 09/24/19  0454   WBC 6.41 6.68   HGB 14.1 14.9   HCT 43.1 44.9    206       Pending Diagnostic Studies:     None         Medications:  Reconciled Home Medications:      Medication List      CHANGE how you take these medications    furosemide 20 MG tablet  Commonly known as:  LASIX  Take 2 tablets (40 mg total) by mouth 2 (two) times daily.  What changed:    · how much to take  · when to take this     isosorbide dinitrate 20 MG tablet  Commonly known as:  ISORDIL  Take 1 tablet (20 mg total) by mouth 3 (three) times daily.  What changed:  when to take this        CONTINUE taking these medications    amLODIPine 10 MG tablet  Commonly known as:  NORVASC  Take 1 tablet (10 mg total) by mouth once daily.     carvedilol 25 MG tablet  Commonly known as:  COREG  Take 1 tablet (25 mg total) by mouth 2 (two) times daily with meals.     hydrALAZINE 100 MG tablet  Commonly known as:  APRESOLINE  Take 1 tablet (100 mg total) by mouth every 8 (eight) hours.     PROVENTIL HFA 90 mcg/actuation inhaler  Generic drug:  albuterol  Inhale 2 puffs into the lungs every 6 (six) hours as needed.            Indwelling Lines/Drains at time of discharge:   Lines/Drains/Airways     None                  Time spent on the discharge of patient: >30 minutes  Patient was seen and examined on the date of discharge and determined to be suitable for discharge.        Lauren Maravilla NP  Department of Hospital Medicine  [facility]

## 2019-09-25 NOTE — PLAN OF CARE
Patient currently resting quietly in bed. VS currently stable. Patient NSR on monitor at this time. Patient currently receiving oxygen via CPAP and tolerating well. Patient turned in bed independenty to prevent skin breakdown. No sign of wound development or skin breakdown noted. Patient educated on fall prevention and encouraged to use call light for all needs. Patient bed alarm set. Call light within reach. Patient free of falls. Patient has no complaints of pain or shortness of breath at this time. Plan of care updated with patient. There are no further questions after updated on plan of care at this time. Will continue to monitor.

## 2019-09-25 NOTE — HOSPITAL COURSE
9/25/19- Patient has diuresed approx 8 liters since admit. BP looks good this morning. SOB resolved . Renal function stable

## 2019-09-25 NOTE — PROGRESS NOTES
"[facility] -   Cardiology  Progress Note    Patient Name: Bria Saldana  MRN: [id number]  Admission Date: 9/23/2019  Hospital Length of Stay: 1 days  Code Status: Full Code   Attending Physician: Sivan Goldsmith MD   Primary Care Physician: Hortencia Blair MD  Expected Discharge Date: 9/25/2019  Principal Problem:Acute on chronic combined systolic (congestive) and diastolic (congestive) heart failure    Subjective:   Brief HPI:  Mr. Saldana's current conditions include combined systolic and diastolic HF with LVEF 30-35%, HTN, obesity. Quit smoking long time ago. Was in half-way for 21 years and released at age 43. Echo showed LAE, EF 30-35%, RV enlargement with mild dysfunction, moderate MR, TR and small pericardial effusion. Underwent stress test that was negative for ischemia. Cause of cardiomyopathy likely form uncontrolled HTN and ETOH abuse.   Was unable to afford Bidil. Switched to Isordil and Hydralazine. Seen in Cardiology clinic by myself and BP elevated in July 2019. Coreg resumed and he was given instructions to follow up in 2 weeks. Appt canceled and never rescheduled. States that he stopped taking his HTN meds about 1 month ago because he felt like they weren't working. He also admits that he hasn't been wearing his CPAP mask because he has to " just get use to it".     He presented to the ED last night with complaints of worsening SOB, BLE edema and fatigue. Initial /100's.  BNP elevated at 635, troponin 0.062 (slightly above his baseline).  Chest x-ray reveals bilateral vascular congestion, received Lasix 80 mg IV x1 in the ED. Cardiology consulted decompensated HF.     Hospital Course:   9/25/19- Patient has diuresed approx 8 liters since admit. BP looks good this morning. SOB resolved . Renal function stable         Review of Systems   Constitution: Negative for diaphoresis, malaise/fatigue, weight gain and weight loss.   HENT: Negative for congestion and nosebleeds. "    Cardiovascular: Negative for chest pain, claudication, cyanosis, dyspnea on exertion, irregular heartbeat, leg swelling, near-syncope, orthopnea, palpitations, paroxysmal nocturnal dyspnea and syncope.   Respiratory: Negative for cough, hemoptysis, shortness of breath, sleep disturbances due to breathing, snoring, sputum production and wheezing.    Hematologic/Lymphatic: Negative for bleeding problem. Does not bruise/bleed easily.   Skin: Negative for rash.   Musculoskeletal: Negative for arthritis, back pain, falls, joint pain, muscle cramps and muscle weakness.   Gastrointestinal: Negative for abdominal pain, constipation, diarrhea, heartburn, hematemesis, hematochezia, melena, nausea and vomiting.   Genitourinary: Negative for dysuria, hematuria and nocturia.   Neurological: Negative for excessive daytime sleepiness, dizziness, headaches, light-headedness, loss of balance, numbness, vertigo and weakness.     Objective:     Vital Signs (Most Recent):  Temp: 97.7 °F (36.5 °C) (09/25/19 0730)  Pulse: 85 (09/25/19 0919)  Resp: 16 (09/25/19 0804)  BP: (!) 134/97 (09/25/19 0500)  SpO2: 99 % (09/25/19 0804) Vital Signs (24h Range):  Temp:  [97.7 °F (36.5 °C)-99.1 °F (37.3 °C)] 97.7 °F (36.5 °C)  Pulse:  [65-85] 85  Resp:  [16-26] 16  SpO2:  [92 %-99 %] 99 %  BP: ()/() 134/97     Weight: 119.9 kg (264 lb 5.3 oz)  Body mass index is 33.04 kg/m².     SpO2: 99 %  O2 Device (Oxygen Therapy): room air      Intake/Output Summary (Last 24 hours) at 9/25/2019 1125  Last data filed at 9/25/2019 1000  Gross per 24 hour   Intake 849 ml   Output 2950 ml   Net -2101 ml       Lines/Drains/Airways     Peripheral Intravenous Line                 Peripheral IV - Single Lumen 09/23/19 1755 20 G Right Antecubital 1 day                Physical Exam   Constitutional: He is oriented to person, place, and time. He appears well-developed and well-nourished.   Neck: Neck supple. No JVD present.   Cardiovascular: Normal rate,  regular rhythm, normal heart sounds and normal pulses. Exam reveals no friction rub.   No murmur heard.  Pulmonary/Chest: Effort normal and breath sounds normal. No respiratory distress. He has no wheezes. He has no rales.   Abdominal: Soft. Bowel sounds are normal. He exhibits no distension.   Musculoskeletal: He exhibits no edema or tenderness.   Neurological: He is alert and oriented to person, place, and time.   Skin: Skin is warm and dry. No rash noted.   Psychiatric: He has a normal mood and affect. His behavior is normal.   Nursing note and vitals reviewed.      Significant Labs:   All pertinent lab results from the last 24 hours have been reviewed. and   Recent Lab Results     None          Significant Imaging: Echocardiogram:   2D echo with color flow doppler:   Results for orders placed or performed during the hospital encounter of 07/08/19   2D echo with color flow doppler   Result Value Ref Range    QEF 30 (A) 55 - 65    Mitral Valve Regurgitation MODERATE (A)     Diastolic Dysfunction Yes (A)     Est. PA Systolic Pressure 66.91 (A)     Pericardial Effusion SMALL (A)     Tricuspid Valve Regurgitation MODERATE (A)     Narrative    Date of Procedure: 07/08/2019        TEST DESCRIPTION   Technical Quality: This is a technically adequate study.     Aorta: The aortic root is normal in size, measuring 2.9 cm at sinotubular junction and 2.9 cm at Sinuses of Valsalva. The proximal ascending aorta is normal in size, measuring 2.9 cm across.     Left Atrium: The left atrial volume index is moderately enlarged, measuring 45.53 cc/m2.     Left Ventricle: The left ventricle is moderately enlarged, with an end-diastolic diameter of 6.1 cm, and an end-systolic diameter of 5.2 cm. LV wall thickness is normal, with the septum measuring 2.2 cm and the posterior wall measuring 1.4 cm across.   Relative wall thickness was increased at 0.46, and the LV mass index was increased at 283.2 g/m2 consistent with concentric left  ventricular hypertrophy. The following segments were hypokinetic: basal inferior wall.  The following segments were mildly hypokinetic: mid anteroseptum, basal anteroseptum, basal anterolateral wall, mid inferior wall.  The following segments were moderately hypokinetic: mid inferolateral wall, basal inferolateral wall.  Left ventricular systolic function appears moderately depressed. Visually estimated ejection fraction is 30-35%. The LV Doppler derived stroke volume equals 53.0 ccs.     Diastolic indices: E wave velocity 1.3 m/s, E/A ratio 2.2,  msec., E/e' ratio(avg) 14. There is diastolic dysfunction secondary to restrictive abnormality.     Right Atrium: The right atrium is normal in size, measuring 5.2 cm in length and 4.0 cm in width in the apical view.     Right Ventricle: The right ventricle is mildly to moderately enlarged measuring 4.1 cm at the base in the apical right ventricle-focused view. Global right ventricular systolic function appears mildly depressed. Tricuspid annular plane systolic excursion   (TAPSE) is 1.7 cm. The estimated PA systolic pressure is greater than 67 mmHg.     Aortic Valve:  The aortic valve is normal in structure. The aortic valve is tri-leaflet in structure. The mean gradient obtained across the aortic valve is 4 mmHg.     Mitral Valve:  The mitral valve is mildly sclerotic. The pressure half time is 36 msec. The calculated mitral valve area is 6.11 cm2. There is moderate mitral regurgitation.     Tricuspid Valve:  The tricuspid valve is normal in structure. There is moderate tricuspid regurgitation.     Pulmonary Valve:  The pulmonic valve is not well seen. There is mild pulmonic regurgitation.     Pericardium: There is evidence of a small pericardial effusion.     IVC: The IVC is not visualized.     Intracavitary: There is no evidence of intracavity mass, thrombi, or vegetation.         CONCLUSIONS     1 - Moderate left atrial enlargement.     2 - Moderate left  ventricular enlargement.     3 - Concentric hypertrophy.     4 - Wall motion abnormalities.     5 - Moderately depressed left ventricular systolic function (EF 30-35%).     6 - Restrictive LV filling pattern, indicating markedly elevated LAP (grade 3 diastolic dysfunction).     7 - Right ventricular enlargement with mildly depressed systolic function.     8 - Pulmonary hypertension. The estimated PA systolic pressure is greater than 67 mmHg.     9 - Moderate mitral regurgitation.     10 - Moderate tricuspid regurgitation.     11 - Small pericardial effusion.             This document has been electronically    SIGNED BY: Kiel Phillips MD On: 07/08/2019 11:41     Assessment and Plan:       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 (congestive) and diastolic (congestive) heart failure  LVEF 35% with DD on echo in July 2019   with vascular congestion on EKG  Patient likely decompensated due to uncontrolled HTN as a result of him not taking HTN meds for about a month.   He also has NATALYA and is noncompliant with CPAP use  Agree with IV Lasix  Continue Isordil, amlodipine, Hydralazine and Coreg  No ACE/ARB due to renal function   Patient clear to transfer to [facility] for continued diuresis and BP control   Will need to follow up in clinic as scheduled next week with ONEIL Sesay       9/25/19  -Patient has been examined and is clear for discharge from Cardiology standpoint.   He will need to DC with current admit meds   Lasix 40mg BID   Heart healthy diet  Limit fluid intake 50-60 oz   Daily weights and to notify clinic if weight increases by more than 3 lbs in 1 day or 5 lbs in 1 week.   Exercise routine as tolerated  Keep scheduled appt next week with ONEIL Sesay      CKD (chronic kidney disease) stage 3, GFR 30-59 ml/min  Renal function stable at this time compared to baseline  He will need to see Nephrology as an OP as discussed.     NATALYA (obstructive sleep apnea)  Nightly CPAP compliance strongly encouraged         VTE Risk  Mitigation (From admission, onward)         Ordered     IP VTE HIGH RISK PATIENT  Once      09/24/19 0429     Place sequential compression device  Until discontinued      09/24/19 0429     heparin (porcine) injection 5,000 Units  Every 8 hours      09/23/19 2047            Chart reviewed. Patient examined by Dr. Mcarthur and agrees with plan that has been outlined.       Darya Bertrand, XIOMARAP  Cardiology  [facility] -

## 2019-09-25 NOTE — ASSESSMENT & PLAN NOTE
LVEF 35% with DD on echo in July 2019   with vascular congestion on EKG  Patient likely decompensated due to uncontrolled HTN as a result of him not taking HTN meds for about a month.   He also has NATALYA and is noncompliant with CPAP use  Agree with IV Lasix  Continue Isordil, amlodipine, Hydralazine and Coreg  No ACE/ARB due to renal function   Patient clear to transfer to [facility] for continued diuresis and BP control   Will need to follow up in clinic as scheduled next week with ONEIL Sesay       9/25/19  -Patient has been examined and is clear for discharge from Cardiology standpoint.   He will need to DC with current admit meds   Lasix 40mg BID   Heart healthy diet  Limit fluid intake 50-60 oz   Daily weights and to notify clinic if weight increases by more than 3 lbs in 1 day or 5 lbs in 1 week.   Exercise routine as tolerated  Keep scheduled appt next week with ONEIL Sesay
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
## 2019-09-25 NOTE — SUBJECTIVE & OBJECTIVE
Review of Systems   Constitution: Negative for diaphoresis, malaise/fatigue, weight gain and weight loss.   HENT: Negative for congestion and nosebleeds.    Cardiovascular: Negative for chest pain, claudication, cyanosis, dyspnea on exertion, irregular heartbeat, leg swelling, near-syncope, orthopnea, palpitations, paroxysmal nocturnal dyspnea and syncope.   Respiratory: Negative for cough, hemoptysis, shortness of breath, sleep disturbances due to breathing, snoring, sputum production and wheezing.    Hematologic/Lymphatic: Negative for bleeding problem. Does not bruise/bleed easily.   Skin: Negative for rash.   Musculoskeletal: Negative for arthritis, back pain, falls, joint pain, muscle cramps and muscle weakness.   Gastrointestinal: Negative for abdominal pain, constipation, diarrhea, heartburn, hematemesis, hematochezia, melena, nausea and vomiting.   Genitourinary: Negative for dysuria, hematuria and nocturia.   Neurological: Negative for excessive daytime sleepiness, dizziness, headaches, light-headedness, loss of balance, numbness, vertigo and weakness.     Objective:     Vital Signs (Most Recent):  Temp: 97.7 °F (36.5 °C) (09/25/19 0730)  Pulse: 85 (09/25/19 0919)  Resp: 16 (09/25/19 0804)  BP: (!) 134/97 (09/25/19 0500)  SpO2: 99 % (09/25/19 0804) Vital Signs (24h Range):  Temp:  [97.7 °F (36.5 °C)-99.1 °F (37.3 °C)] 97.7 °F (36.5 °C)  Pulse:  [65-85] 85  Resp:  [16-26] 16  SpO2:  [92 %-99 %] 99 %  BP: ()/() 134/97     Weight: 119.9 kg (264 lb 5.3 oz)  Body mass index is 33.04 kg/m².     SpO2: 99 %  O2 Device (Oxygen Therapy): room air      Intake/Output Summary (Last 24 hours) at 9/25/2019 1125  Last data filed at 9/25/2019 1000  Gross per 24 hour   Intake 849 ml   Output 2950 ml   Net -2101 ml       Lines/Drains/Airways     Peripheral Intravenous Line                 Peripheral IV - Single Lumen 09/23/19 1755 20 G Right Antecubital 1 day                Physical Exam   Constitutional: He  is oriented to person, place, and time. He appears well-developed and well-nourished.   Neck: Neck supple. No JVD present.   Cardiovascular: Normal rate, regular rhythm, normal heart sounds and normal pulses. Exam reveals no friction rub.   No murmur heard.  Pulmonary/Chest: Effort normal and breath sounds normal. No respiratory distress. He has no wheezes. He has no rales.   Abdominal: Soft. Bowel sounds are normal. He exhibits no distension.   Musculoskeletal: He exhibits no edema or tenderness.   Neurological: He is alert and oriented to person, place, and time.   Skin: Skin is warm and dry. No rash noted.   Psychiatric: He has a normal mood and affect. His behavior is normal.   Nursing note and vitals reviewed.      Significant Labs:   All pertinent lab results from the last 24 hours have been reviewed. and   Recent Lab Results     None          Significant Imaging: Echocardiogram:   2D echo with color flow doppler:   Results for orders placed or performed during the hospital encounter of 07/08/19   2D echo with color flow doppler   Result Value Ref Range    QEF 30 (A) 55 - 65    Mitral Valve Regurgitation MODERATE (A)     Diastolic Dysfunction Yes (A)     Est. PA Systolic Pressure 66.91 (A)     Pericardial Effusion SMALL (A)     Tricuspid Valve Regurgitation MODERATE (A)     Narrative    Date of Procedure: 07/08/2019        TEST DESCRIPTION   Technical Quality: This is a technically adequate study.     Aorta: The aortic root is normal in size, measuring 2.9 cm at sinotubular junction and 2.9 cm at Sinuses of Valsalva. The proximal ascending aorta is normal in size, measuring 2.9 cm across.     Left Atrium: The left atrial volume index is moderately enlarged, measuring 45.53 cc/m2.     Left Ventricle: The left ventricle is moderately enlarged, with an end-diastolic diameter of 6.1 cm, and an end-systolic diameter of 5.2 cm. LV wall thickness is normal, with the septum measuring 2.2 cm and the posterior wall  measuring 1.4 cm across.   Relative wall thickness was increased at 0.46, and the LV mass index was increased at 283.2 g/m2 consistent with concentric left ventricular hypertrophy. The following segments were hypokinetic: basal inferior wall.  The following segments were mildly hypokinetic: mid anteroseptum, basal anteroseptum, basal anterolateral wall, mid inferior wall.  The following segments were moderately hypokinetic: mid inferolateral wall, basal inferolateral wall.  Left ventricular systolic function appears moderately depressed. Visually estimated ejection fraction is 30-35%. The LV Doppler derived stroke volume equals 53.0 ccs.     Diastolic indices: E wave velocity 1.3 m/s, E/A ratio 2.2,  msec., E/e' ratio(avg) 14. There is diastolic dysfunction secondary to restrictive abnormality.     Right Atrium: The right atrium is normal in size, measuring 5.2 cm in length and 4.0 cm in width in the apical view.     Right Ventricle: The right ventricle is mildly to moderately enlarged measuring 4.1 cm at the base in the apical right ventricle-focused view. Global right ventricular systolic function appears mildly depressed. Tricuspid annular plane systolic excursion   (TAPSE) is 1.7 cm. The estimated PA systolic pressure is greater than 67 mmHg.     Aortic Valve:  The aortic valve is normal in structure. The aortic valve is tri-leaflet in structure. The mean gradient obtained across the aortic valve is 4 mmHg.     Mitral Valve:  The mitral valve is mildly sclerotic. The pressure half time is 36 msec. The calculated mitral valve area is 6.11 cm2. There is moderate mitral regurgitation.     Tricuspid Valve:  The tricuspid valve is normal in structure. There is moderate tricuspid regurgitation.     Pulmonary Valve:  The pulmonic valve is not well seen. There is mild pulmonic regurgitation.     Pericardium: There is evidence of a small pericardial effusion.     IVC: The IVC is not visualized.     Intracavitary:  There is no evidence of intracavity mass, thrombi, or vegetation.         CONCLUSIONS     1 - Moderate left atrial enlargement.     2 - Moderate left ventricular enlargement.     3 - Concentric hypertrophy.     4 - Wall motion abnormalities.     5 - Moderately depressed left ventricular systolic function (EF 30-35%).     6 - Restrictive LV filling pattern, indicating markedly elevated LAP (grade 3 diastolic dysfunction).     7 - Right ventricular enlargement with mildly depressed systolic function.     8 - Pulmonary hypertension. The estimated PA systolic pressure is greater than 67 mmHg.     9 - Moderate mitral regurgitation.     10 - Moderate tricuspid regurgitation.     11 - Small pericardial effusion.             This document has been electronically    SIGNED BY: Kiel Phillips MD On: 07/08/2019 11:41

## 2019-09-26 NOTE — PLAN OF CARE
09/26/19 0754   Final Note   Assessment Type Final Discharge Note   Anticipated Discharge Disposition Home   Right Care Referral Info   Post Acute Recommendation No Care

## 2019-10-03 NOTE — ASSESSMENT & PLAN NOTE
Renal function stable at this time compared to baseline  He will need to see Nephrology as an OP as discussed.    no...

## 2019-12-13 ENCOUNTER — OFFICE VISIT (OUTPATIENT)
Dept: CARDIOLOGY | Facility: CLINIC | Age: 49
End: 2019-12-13
Payer: COMMERCIAL

## 2019-12-13 VITALS
SYSTOLIC BLOOD PRESSURE: 175 MMHG | HEIGHT: 75 IN | WEIGHT: 281.75 LBS | DIASTOLIC BLOOD PRESSURE: 100 MMHG | BODY MASS INDEX: 35.03 KG/M2 | HEART RATE: 84 BPM

## 2019-12-13 DIAGNOSIS — I50.42 CHRONIC COMBINED SYSTOLIC AND DIASTOLIC CONGESTIVE HEART FAILURE: Primary | ICD-10-CM

## 2019-12-13 DIAGNOSIS — G47.33 OSA (OBSTRUCTIVE SLEEP APNEA): ICD-10-CM

## 2019-12-13 DIAGNOSIS — I10 ESSENTIAL HYPERTENSION: ICD-10-CM

## 2019-12-13 PROCEDURE — 3008F BODY MASS INDEX DOCD: CPT | Mod: CPTII,S$GLB,, | Performed by: NURSE PRACTITIONER

## 2019-12-13 PROCEDURE — 99999 PR PBB SHADOW E&M-EST. PATIENT-LVL III: CPT | Mod: PBBFAC,,, | Performed by: NURSE PRACTITIONER

## 2019-12-13 PROCEDURE — 3080F PR MOST RECENT DIASTOLIC BLOOD PRESSURE >= 90 MM HG: ICD-10-PCS | Mod: CPTII,S$GLB,, | Performed by: NURSE PRACTITIONER

## 2019-12-13 PROCEDURE — 99214 OFFICE O/P EST MOD 30 MIN: CPT | Mod: S$GLB,,, | Performed by: NURSE PRACTITIONER

## 2019-12-13 PROCEDURE — 99214 PR OFFICE/OUTPT VISIT, EST, LEVL IV, 30-39 MIN: ICD-10-PCS | Mod: S$GLB,,, | Performed by: NURSE PRACTITIONER

## 2019-12-13 PROCEDURE — 3077F SYST BP >= 140 MM HG: CPT | Mod: CPTII,S$GLB,, | Performed by: NURSE PRACTITIONER

## 2019-12-13 PROCEDURE — 99999 PR PBB SHADOW E&M-EST. PATIENT-LVL III: ICD-10-PCS | Mod: PBBFAC,,, | Performed by: NURSE PRACTITIONER

## 2019-12-13 PROCEDURE — 3008F PR BODY MASS INDEX (BMI) DOCUMENTED: ICD-10-PCS | Mod: CPTII,S$GLB,, | Performed by: NURSE PRACTITIONER

## 2019-12-13 PROCEDURE — 3077F PR MOST RECENT SYSTOLIC BLOOD PRESSURE >= 140 MM HG: ICD-10-PCS | Mod: CPTII,S$GLB,, | Performed by: NURSE PRACTITIONER

## 2019-12-13 PROCEDURE — 3080F DIAST BP >= 90 MM HG: CPT | Mod: CPTII,S$GLB,, | Performed by: NURSE PRACTITIONER

## 2019-12-13 RX ORDER — AMLODIPINE BESYLATE 10 MG/1
10 TABLET ORAL DAILY
Qty: 30 TABLET | Refills: 6 | Status: SHIPPED | OUTPATIENT
Start: 2019-12-13 | End: 2022-02-28

## 2019-12-13 RX ORDER — ISOSORBIDE DINITRATE 20 MG/1
20 TABLET ORAL 3 TIMES DAILY
Qty: 90 TABLET | Refills: 6 | Status: SHIPPED | OUTPATIENT
Start: 2019-12-13 | End: 2022-02-28 | Stop reason: SDUPTHER

## 2019-12-13 RX ORDER — HYDRALAZINE HYDROCHLORIDE 100 MG/1
100 TABLET, FILM COATED ORAL EVERY 8 HOURS
Qty: 90 TABLET | Refills: 6 | Status: SHIPPED | OUTPATIENT
Start: 2019-12-13 | End: 2020-12-18

## 2019-12-13 RX ORDER — CARVEDILOL 25 MG/1
25 TABLET ORAL 2 TIMES DAILY WITH MEALS
Qty: 60 TABLET | Refills: 6 | Status: SHIPPED | OUTPATIENT
Start: 2019-12-13 | End: 2019-12-27 | Stop reason: ALTCHOICE

## 2019-12-13 RX ORDER — FUROSEMIDE 40 MG/1
60 TABLET ORAL 2 TIMES DAILY
Qty: 90 TABLET | Refills: 6 | Status: ON HOLD | OUTPATIENT
Start: 2019-12-13 | End: 2021-06-25
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
## 2019-12-27 ENCOUNTER — CLINICAL SUPPORT (OUTPATIENT)
Dept: CARDIOLOGY | Facility: CLINIC | Age: 49
End: 2019-12-27
Payer: COMMERCIAL

## 2019-12-27 VITALS
HEART RATE: 81 BPM | WEIGHT: 284.63 LBS | DIASTOLIC BLOOD PRESSURE: 100 MMHG | BODY MASS INDEX: 35.57 KG/M2 | OXYGEN SATURATION: 96 % | SYSTOLIC BLOOD PRESSURE: 140 MMHG

## 2019-12-27 PROCEDURE — 99999 PR PBB SHADOW E&M-EST. PATIENT-LVL III: CPT | Mod: PBBFAC,,,

## 2019-12-27 PROCEDURE — 99999 PR PBB SHADOW E&M-EST. PATIENT-LVL III: ICD-10-PCS | Mod: PBBFAC,,,

## 2019-12-27 RX ORDER — METOPROLOL SUCCINATE 50 MG/1
50 TABLET, EXTENDED RELEASE ORAL DAILY
Qty: 30 TABLET | Refills: 11 | Status: SHIPPED | OUTPATIENT
Start: 2019-12-27 | End: 2020-12-18 | Stop reason: SDUPTHER

## 2019-12-27 RX ORDER — CLONIDINE HYDROCHLORIDE 0.1 MG/1
0.1 TABLET ORAL
Status: DISCONTINUED | OUTPATIENT
Start: 2019-12-27 | End: 2021-06-25 | Stop reason: HOSPADM

## 2019-12-27 NOTE — PROGRESS NOTES
Patient presented for BP check after resuming HTN meds   /102  Given clonidine 0.1mg x1 in clinic now  Continue current dose Hydralazine, Isordil and amlodipine  Stop Coreg and start Toprol 50mg daily  Keep BP log and RTC in 2 weeks for nurse visit and log review     Repeat BP at 1130    140/98

## 2020-10-01 ENCOUNTER — LAB VISIT (OUTPATIENT)
Dept: LAB | Facility: HOSPITAL | Age: 50
End: 2020-10-01
Attending: INTERNAL MEDICINE
Payer: COMMERCIAL

## 2020-10-01 ENCOUNTER — OFFICE VISIT (OUTPATIENT)
Dept: NEPHROLOGY | Facility: CLINIC | Age: 50
End: 2020-10-01
Payer: COMMERCIAL

## 2020-10-01 VITALS
RESPIRATION RATE: 20 BRPM | HEIGHT: 75 IN | SYSTOLIC BLOOD PRESSURE: 142 MMHG | BODY MASS INDEX: 35.09 KG/M2 | DIASTOLIC BLOOD PRESSURE: 108 MMHG | HEART RATE: 88 BPM | WEIGHT: 282.19 LBS

## 2020-10-01 DIAGNOSIS — N18.30 STAGE 3 CHRONIC KIDNEY DISEASE, UNSPECIFIED WHETHER STAGE 3A OR 3B CKD: ICD-10-CM

## 2020-10-01 DIAGNOSIS — N18.30 STAGE 3 CHRONIC KIDNEY DISEASE, UNSPECIFIED WHETHER STAGE 3A OR 3B CKD: Primary | ICD-10-CM

## 2020-10-01 LAB
ALBUMIN SERPL BCP-MCNC: 4 G/DL (ref 3.5–5.2)
ANION GAP SERPL CALC-SCNC: 7 MMOL/L (ref 8–16)
BASOPHILS # BLD AUTO: 0.04 K/UL (ref 0–0.2)
BASOPHILS NFR BLD: 0.7 % (ref 0–1.9)
BUN SERPL-MCNC: 14 MG/DL (ref 6–20)
CALCIUM SERPL-MCNC: 9.5 MG/DL (ref 8.7–10.5)
CHLORIDE SERPL-SCNC: 105 MMOL/L (ref 95–110)
CO2 SERPL-SCNC: 28 MMOL/L (ref 23–29)
CREAT SERPL-MCNC: 1.6 MG/DL (ref 0.5–1.4)
DIFFERENTIAL METHOD: ABNORMAL
EOSINOPHIL # BLD AUTO: 0.2 K/UL (ref 0–0.5)
EOSINOPHIL NFR BLD: 4 % (ref 0–8)
ERYTHROCYTE [DISTWIDTH] IN BLOOD BY AUTOMATED COUNT: 12.8 % (ref 11.5–14.5)
EST. GFR  (AFRICAN AMERICAN): 58 ML/MIN/1.73 M^2
EST. GFR  (NON AFRICAN AMERICAN): 50 ML/MIN/1.73 M^2
GLUCOSE SERPL-MCNC: 158 MG/DL (ref 70–110)
HCT VFR BLD AUTO: 41 % (ref 40–54)
HGB BLD-MCNC: 13.2 G/DL (ref 14–18)
IMM GRANULOCYTES # BLD AUTO: 0.05 K/UL (ref 0–0.04)
IMM GRANULOCYTES NFR BLD AUTO: 0.9 % (ref 0–0.5)
LYMPHOCYTES # BLD AUTO: 1.7 K/UL (ref 1–4.8)
LYMPHOCYTES NFR BLD: 29.9 % (ref 18–48)
MCH RBC QN AUTO: 28.4 PG (ref 27–31)
MCHC RBC AUTO-ENTMCNC: 32.2 G/DL (ref 32–36)
MCV RBC AUTO: 88 FL (ref 82–98)
MONOCYTES # BLD AUTO: 0.3 K/UL (ref 0.3–1)
MONOCYTES NFR BLD: 5.7 % (ref 4–15)
NEUTROPHILS # BLD AUTO: 3.4 K/UL (ref 1.8–7.7)
NEUTROPHILS NFR BLD: 58.8 % (ref 38–73)
NRBC BLD-RTO: 0 /100 WBC
PHOSPHATE SERPL-MCNC: 2.8 MG/DL (ref 2.7–4.5)
PLATELET # BLD AUTO: 216 K/UL (ref 150–350)
PMV BLD AUTO: 10 FL (ref 9.2–12.9)
POTASSIUM SERPL-SCNC: 5 MMOL/L (ref 3.5–5.1)
RBC # BLD AUTO: 4.65 M/UL (ref 4.6–6.2)
SODIUM SERPL-SCNC: 140 MMOL/L (ref 136–145)
WBC # BLD AUTO: 5.75 K/UL (ref 3.9–12.7)

## 2020-10-01 PROCEDURE — 99215 PR OFFICE/OUTPT VISIT, EST, LEVL V, 40-54 MIN: ICD-10-PCS | Mod: S$GLB,,, | Performed by: INTERNAL MEDICINE

## 2020-10-01 PROCEDURE — 80069 RENAL FUNCTION PANEL: CPT

## 2020-10-01 PROCEDURE — 3080F PR MOST RECENT DIASTOLIC BLOOD PRESSURE >= 90 MM HG: ICD-10-PCS | Mod: CPTII,S$GLB,, | Performed by: INTERNAL MEDICINE

## 2020-10-01 PROCEDURE — 3008F BODY MASS INDEX DOCD: CPT | Mod: CPTII,S$GLB,, | Performed by: INTERNAL MEDICINE

## 2020-10-01 PROCEDURE — 3077F PR MOST RECENT SYSTOLIC BLOOD PRESSURE >= 140 MM HG: ICD-10-PCS | Mod: CPTII,S$GLB,, | Performed by: INTERNAL MEDICINE

## 2020-10-01 PROCEDURE — 99215 OFFICE O/P EST HI 40 MIN: CPT | Mod: S$GLB,,, | Performed by: INTERNAL MEDICINE

## 2020-10-01 PROCEDURE — 99999 PR PBB SHADOW E&M-EST. PATIENT-LVL IV: CPT | Mod: PBBFAC,,, | Performed by: INTERNAL MEDICINE

## 2020-10-01 PROCEDURE — 3008F PR BODY MASS INDEX (BMI) DOCUMENTED: ICD-10-PCS | Mod: CPTII,S$GLB,, | Performed by: INTERNAL MEDICINE

## 2020-10-01 PROCEDURE — 3077F SYST BP >= 140 MM HG: CPT | Mod: CPTII,S$GLB,, | Performed by: INTERNAL MEDICINE

## 2020-10-01 PROCEDURE — 3080F DIAST BP >= 90 MM HG: CPT | Mod: CPTII,S$GLB,, | Performed by: INTERNAL MEDICINE

## 2020-10-01 PROCEDURE — 85025 COMPLETE CBC W/AUTO DIFF WBC: CPT

## 2020-10-01 PROCEDURE — 36415 COLL VENOUS BLD VENIPUNCTURE: CPT

## 2020-10-01 PROCEDURE — 99999 PR PBB SHADOW E&M-EST. PATIENT-LVL IV: ICD-10-PCS | Mod: PBBFAC,,, | Performed by: INTERNAL MEDICINE
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
## 2020-10-02 ENCOUNTER — TELEPHONE (OUTPATIENT)
Dept: CARDIOLOGY | Facility: CLINIC | Age: 50
End: 2020-10-02

## 2020-10-02 NOTE — TELEPHONE ENCOUNTER
I spoke with pt. Offered him appt with [facility]/Dr Roberson  Pt states he preferrs to stay with Lázaro Bertrand NP-C  appt scheduled for follow up with lázaro.

## 2020-10-02 NOTE — TELEPHONE ENCOUNTER
I called to schedule appt with Heart Failure Disease [facility].   No answer. Left message for him to call back.

## 2020-10-23 DIAGNOSIS — I50.42 CHRONIC COMBINED SYSTOLIC AND DIASTOLIC CONGESTIVE HEART FAILURE: Primary | ICD-10-CM

## 2020-12-18 RX ORDER — HYDRALAZINE HYDROCHLORIDE 100 MG/1
TABLET, FILM COATED ORAL
Qty: 90 TABLET | Refills: 0 | Status: ON HOLD | OUTPATIENT
Start: 2020-12-18 | End: 2022-10-12 | Stop reason: HOSPADM

## 2020-12-18 RX ORDER — CARVEDILOL 25 MG/1
TABLET ORAL
Qty: 60 TABLET | Refills: 0 | OUTPATIENT
Start: 2020-12-18

## 2020-12-18 RX ORDER — METOPROLOL SUCCINATE 50 MG/1
50 TABLET, EXTENDED RELEASE ORAL DAILY
Qty: 30 TABLET | Refills: 11 | Status: ON HOLD | OUTPATIENT
Start: 2020-12-18 | End: 2021-06-25 | Stop reason: HOSPADM

## 2020-12-18 NOTE — TELEPHONE ENCOUNTER
Coreg stopped last year. He should be taking Metoprolol 50mg daily . Please stress to him the importance of him bringing his meds with him to his next appt.

## 2020-12-18 NOTE — TELEPHONE ENCOUNTER
Spoke with patient.  Not sure what he's taking.  Called for refill with Rx numbers on bottle.  At work, and unable to confirm meds.

## 2020-12-18 NOTE — TELEPHONE ENCOUNTER
Spoke with patient to advise him that Coreg stopped last year. He should be taking Metoprolol 50mg daily .  Stressed the the importance of him bringing his meds with him to his next appt. Patient able to repeat instructions given.  Denies questions/concerns.

## 2020-12-21 DIAGNOSIS — I50.42 CHRONIC COMBINED SYSTOLIC AND DIASTOLIC CONGESTIVE HEART FAILURE: Primary | ICD-10-CM

## 2020-12-21 DIAGNOSIS — I10 ESSENTIAL HYPERTENSION: ICD-10-CM

## 2021-04-28 ENCOUNTER — LAB VISIT (OUTPATIENT)
Dept: LAB | Facility: HOSPITAL | Age: 51
End: 2021-04-28
Attending: INTERNAL MEDICINE
Payer: COMMERCIAL

## 2021-04-28 DIAGNOSIS — N18.30 STAGE 3 CHRONIC KIDNEY DISEASE, UNSPECIFIED WHETHER STAGE 3A OR 3B CKD: ICD-10-CM

## 2021-04-28 LAB
ALBUMIN SERPL BCP-MCNC: 3.6 G/DL (ref 3.5–5.2)
ANION GAP SERPL CALC-SCNC: 7 MMOL/L (ref 8–16)
BUN SERPL-MCNC: 18 MG/DL (ref 6–20)
CALCIUM SERPL-MCNC: 8.8 MG/DL (ref 8.7–10.5)
CHLORIDE SERPL-SCNC: 109 MMOL/L (ref 95–110)
CO2 SERPL-SCNC: 24 MMOL/L (ref 23–29)
CREAT SERPL-MCNC: 2 MG/DL (ref 0.5–1.4)
EST. GFR  (AFRICAN AMERICAN): 44 ML/MIN/1.73 M^2
EST. GFR  (NON AFRICAN AMERICAN): 38.1 ML/MIN/1.73 M^2
GLUCOSE SERPL-MCNC: 102 MG/DL (ref 70–110)
PHOSPHATE SERPL-MCNC: 2.8 MG/DL (ref 2.7–4.5)
POTASSIUM SERPL-SCNC: 4.3 MMOL/L (ref 3.5–5.1)
SODIUM SERPL-SCNC: 140 MMOL/L (ref 136–145)

## 2021-04-28 PROCEDURE — 80069 RENAL FUNCTION PANEL: CPT | Performed by: INTERNAL MEDICINE

## 2021-04-28 PROCEDURE — 36415 COLL VENOUS BLD VENIPUNCTURE: CPT | Performed by: INTERNAL MEDICINE

## 2021-05-05 ENCOUNTER — OFFICE VISIT (OUTPATIENT)
Dept: NEPHROLOGY | Facility: CLINIC | Age: 51
End: 2021-05-05
Payer: COMMERCIAL

## 2021-05-05 VITALS
RESPIRATION RATE: 20 BRPM | DIASTOLIC BLOOD PRESSURE: 122 MMHG | HEART RATE: 98 BPM | WEIGHT: 270.75 LBS | SYSTOLIC BLOOD PRESSURE: 164 MMHG | HEIGHT: 75 IN | BODY MASS INDEX: 33.66 KG/M2

## 2021-05-05 DIAGNOSIS — I10 ESSENTIAL HYPERTENSION: Primary | ICD-10-CM

## 2021-05-05 PROCEDURE — 3080F PR MOST RECENT DIASTOLIC BLOOD PRESSURE >= 90 MM HG: ICD-10-PCS | Mod: CPTII,S$GLB,, | Performed by: INTERNAL MEDICINE

## 2021-05-05 PROCEDURE — 3077F PR MOST RECENT SYSTOLIC BLOOD PRESSURE >= 140 MM HG: ICD-10-PCS | Mod: CPTII,S$GLB,, | Performed by: INTERNAL MEDICINE

## 2021-05-05 PROCEDURE — 99214 PR OFFICE/OUTPT VISIT, EST, LEVL IV, 30-39 MIN: ICD-10-PCS | Mod: S$GLB,,, | Performed by: INTERNAL MEDICINE

## 2021-05-05 PROCEDURE — 3008F BODY MASS INDEX DOCD: CPT | Mod: CPTII,S$GLB,, | Performed by: INTERNAL MEDICINE

## 2021-05-05 PROCEDURE — 1126F PR PAIN SEVERITY QUANTIFIED, NO PAIN PRESENT: ICD-10-PCS | Mod: S$GLB,,, | Performed by: INTERNAL MEDICINE

## 2021-05-05 PROCEDURE — 1126F AMNT PAIN NOTED NONE PRSNT: CPT | Mod: S$GLB,,, | Performed by: INTERNAL MEDICINE

## 2021-05-05 PROCEDURE — 3080F DIAST BP >= 90 MM HG: CPT | Mod: CPTII,S$GLB,, | Performed by: INTERNAL MEDICINE

## 2021-05-05 PROCEDURE — 99999 PR PBB SHADOW E&M-EST. PATIENT-LVL III: ICD-10-PCS | Mod: PBBFAC,,, | Performed by: INTERNAL MEDICINE

## 2021-05-05 PROCEDURE — 99214 OFFICE O/P EST MOD 30 MIN: CPT | Mod: S$GLB,,, | Performed by: INTERNAL MEDICINE

## 2021-05-05 PROCEDURE — 99999 PR PBB SHADOW E&M-EST. PATIENT-LVL III: CPT | Mod: PBBFAC,,, | Performed by: INTERNAL MEDICINE

## 2021-05-05 PROCEDURE — 3008F PR BODY MASS INDEX (BMI) DOCUMENTED: ICD-10-PCS | Mod: CPTII,S$GLB,, | Performed by: INTERNAL MEDICINE

## 2021-05-05 PROCEDURE — 3077F SYST BP >= 140 MM HG: CPT | Mod: CPTII,S$GLB,, | Performed by: INTERNAL MEDICINE

## 2021-05-05 RX ORDER — LISINOPRIL 20 MG/1
20 TABLET ORAL DAILY
Qty: 30 TABLET | Refills: 11 | Status: SHIPPED | OUTPATIENT
Start: 2021-05-05 | End: 2021-05-05 | Stop reason: ALTCHOICE

## 2021-05-05 RX ORDER — LOSARTAN POTASSIUM 100 MG/1
100 TABLET ORAL DAILY
Status: ON HOLD | COMMUNITY
End: 2021-06-25 | Stop reason: HOSPADM

## 2021-06-24 ENCOUNTER — HOSPITAL ENCOUNTER (OUTPATIENT)
Facility: HOSPITAL | Age: 51
Discharge: HOME | End: 2021-06-25
Attending: EMERGENCY MEDICINE | Admitting: INTERNAL MEDICINE
Payer: COMMERCIAL

## 2021-06-24 DIAGNOSIS — I50.9 ACUTE CHF: ICD-10-CM

## 2021-06-24 DIAGNOSIS — I10 HYPERTENSION, UNSPECIFIED TYPE: ICD-10-CM

## 2021-06-24 DIAGNOSIS — I50.9 ACUTE ON CHRONIC CONGESTIVE HEART FAILURE, UNSPECIFIED HEART FAILURE TYPE: Primary | ICD-10-CM

## 2021-06-24 DIAGNOSIS — I50.43 ACUTE ON CHRONIC COMBINED SYSTOLIC (CONGESTIVE) AND DIASTOLIC (CONGESTIVE) HEART FAILURE: ICD-10-CM

## 2021-06-24 DIAGNOSIS — R07.9 CHEST PAIN: ICD-10-CM

## 2021-06-24 LAB
ALBUMIN SERPL BCP-MCNC: 3.3 G/DL (ref 3.5–5.2)
ALP SERPL-CCNC: 83 U/L (ref 55–135)
ALT SERPL W/O P-5'-P-CCNC: 29 U/L (ref 10–44)
ANION GAP SERPL CALC-SCNC: 11 MMOL/L (ref 8–16)
AST SERPL-CCNC: 23 U/L (ref 10–40)
BASOPHILS # BLD AUTO: 0.06 K/UL (ref 0–0.2)
BASOPHILS NFR BLD: 0.9 % (ref 0–1.9)
BILIRUB SERPL-MCNC: 1.6 MG/DL (ref 0.1–1)
BNP SERPL-MCNC: 1519 PG/ML (ref 0–99)
BUN SERPL-MCNC: 25 MG/DL (ref 6–20)
CALCIUM SERPL-MCNC: 8.6 MG/DL (ref 8.7–10.5)
CHLORIDE SERPL-SCNC: 110 MMOL/L (ref 95–110)
CO2 SERPL-SCNC: 21 MMOL/L (ref 23–29)
CREAT SERPL-MCNC: 1.9 MG/DL (ref 0.5–1.4)
CTP QC/QA: YES
DIFFERENTIAL METHOD: ABNORMAL
EOSINOPHIL # BLD AUTO: 0.1 K/UL (ref 0–0.5)
EOSINOPHIL NFR BLD: 2.1 % (ref 0–8)
ERYTHROCYTE [DISTWIDTH] IN BLOOD BY AUTOMATED COUNT: 14.3 % (ref 11.5–14.5)
EST. GFR  (AFRICAN AMERICAN): 47 ML/MIN/1.73 M^2
EST. GFR  (NON AFRICAN AMERICAN): 40 ML/MIN/1.73 M^2
GLUCOSE SERPL-MCNC: 100 MG/DL (ref 70–110)
HCT VFR BLD AUTO: 45.7 % (ref 40–54)
HCV AB SERPL QL IA: NEGATIVE
HEP C VIRUS HOLD SPECIMEN: NORMAL
HGB BLD-MCNC: 13.9 G/DL (ref 14–18)
HIV 1+2 AB+HIV1 P24 AG SERPL QL IA: NEGATIVE
IMM GRANULOCYTES # BLD AUTO: 0.04 K/UL (ref 0–0.04)
IMM GRANULOCYTES NFR BLD AUTO: 0.6 % (ref 0–0.5)
LYMPHOCYTES # BLD AUTO: 1.8 K/UL (ref 1–4.8)
LYMPHOCYTES NFR BLD: 27.5 % (ref 18–48)
MAGNESIUM SERPL-MCNC: 2.1 MG/DL (ref 1.6–2.6)
MCH RBC QN AUTO: 26.3 PG (ref 27–31)
MCHC RBC AUTO-ENTMCNC: 30.4 G/DL (ref 32–36)
MCV RBC AUTO: 86 FL (ref 82–98)
MONOCYTES # BLD AUTO: 0.5 K/UL (ref 0.3–1)
MONOCYTES NFR BLD: 7.2 % (ref 4–15)
NEUTROPHILS # BLD AUTO: 4.1 K/UL (ref 1.8–7.7)
NEUTROPHILS NFR BLD: 61.7 % (ref 38–73)
NRBC BLD-RTO: 0 /100 WBC
PLATELET # BLD AUTO: 262 K/UL (ref 150–450)
PMV BLD AUTO: 10.2 FL (ref 9.2–12.9)
POTASSIUM SERPL-SCNC: 4.2 MMOL/L (ref 3.5–5.1)
PROT SERPL-MCNC: 6.5 G/DL (ref 6–8.4)
RBC # BLD AUTO: 5.29 M/UL (ref 4.6–6.2)
SARS-COV-2 RDRP RESP QL NAA+PROBE: NEGATIVE
SODIUM SERPL-SCNC: 142 MMOL/L (ref 136–145)
TROPONIN I SERPL DL<=0.01 NG/ML-MCNC: 0.09 NG/ML (ref 0–0.03)
TROPONIN I SERPL DL<=0.01 NG/ML-MCNC: 0.1 NG/ML (ref 0–0.03)
WBC # BLD AUTO: 6.65 K/UL (ref 3.9–12.7)

## 2021-06-24 PROCEDURE — 36415 COLL VENOUS BLD VENIPUNCTURE: CPT | Performed by: NURSE PRACTITIONER

## 2021-06-24 PROCEDURE — G0378 HOSPITAL OBSERVATION PER HR: HCPCS

## 2021-06-24 PROCEDURE — 83036 HEMOGLOBIN GLYCOSYLATED A1C: CPT | Performed by: NURSE PRACTITIONER

## 2021-06-24 PROCEDURE — 63600175 PHARM REV CODE 636 W HCPCS: Performed by: EMERGENCY MEDICINE

## 2021-06-24 PROCEDURE — U0002 COVID-19 LAB TEST NON-CDC: HCPCS | Performed by: EMERGENCY MEDICINE

## 2021-06-24 PROCEDURE — 25000003 PHARM REV CODE 250: Performed by: EMERGENCY MEDICINE

## 2021-06-24 PROCEDURE — 96375 TX/PRO/DX INJ NEW DRUG ADDON: CPT

## 2021-06-24 PROCEDURE — 93005 ELECTROCARDIOGRAM TRACING: CPT

## 2021-06-24 PROCEDURE — 96376 TX/PRO/DX INJ SAME DRUG ADON: CPT

## 2021-06-24 PROCEDURE — 25000003 PHARM REV CODE 250: Performed by: NURSE PRACTITIONER

## 2021-06-24 PROCEDURE — 96372 THER/PROPH/DIAG INJ SC/IM: CPT | Mod: 59

## 2021-06-24 PROCEDURE — 83880 ASSAY OF NATRIURETIC PEPTIDE: CPT | Performed by: EMERGENCY MEDICINE

## 2021-06-24 PROCEDURE — 85025 COMPLETE CBC W/AUTO DIFF WBC: CPT | Performed by: EMERGENCY MEDICINE

## 2021-06-24 PROCEDURE — S0171 BUMETANIDE 0.5 MG: HCPCS | Performed by: NURSE PRACTITIONER

## 2021-06-24 PROCEDURE — 93010 ELECTROCARDIOGRAM REPORT: CPT | Mod: ,,, | Performed by: INTERNAL MEDICINE

## 2021-06-24 PROCEDURE — 94660 CPAP INITIATION&MGMT: CPT

## 2021-06-24 PROCEDURE — 86703 HIV-1/HIV-2 1 RESULT ANTBDY: CPT | Performed by: EMERGENCY MEDICINE

## 2021-06-24 PROCEDURE — 86803 HEPATITIS C AB TEST: CPT | Performed by: EMERGENCY MEDICINE

## 2021-06-24 PROCEDURE — 63600175 PHARM REV CODE 636 W HCPCS: Performed by: NURSE PRACTITIONER

## 2021-06-24 PROCEDURE — 99291 CRITICAL CARE FIRST HOUR: CPT | Mod: 25

## 2021-06-24 PROCEDURE — 83735 ASSAY OF MAGNESIUM: CPT | Performed by: NURSE PRACTITIONER

## 2021-06-24 PROCEDURE — 96374 THER/PROPH/DIAG INJ IV PUSH: CPT

## 2021-06-24 PROCEDURE — A4216 STERILE WATER/SALINE, 10 ML: HCPCS | Performed by: NURSE PRACTITIONER

## 2021-06-24 PROCEDURE — 84484 ASSAY OF TROPONIN QUANT: CPT | Performed by: EMERGENCY MEDICINE

## 2021-06-24 PROCEDURE — 84484 ASSAY OF TROPONIN QUANT: CPT | Mod: 91 | Performed by: NURSE PRACTITIONER

## 2021-06-24 PROCEDURE — 80053 COMPREHEN METABOLIC PANEL: CPT | Performed by: EMERGENCY MEDICINE

## 2021-06-24 PROCEDURE — 93010 EKG 12-LEAD: ICD-10-PCS | Mod: ,,, | Performed by: INTERNAL MEDICINE

## 2021-06-24 RX ORDER — ONDANSETRON 2 MG/ML
4 INJECTION INTRAMUSCULAR; INTRAVENOUS EVERY 8 HOURS PRN
Status: DISCONTINUED | OUTPATIENT
Start: 2021-06-24 | End: 2021-06-25 | Stop reason: HOSPADM

## 2021-06-24 RX ORDER — METOPROLOL SUCCINATE 50 MG/1
50 TABLET, EXTENDED RELEASE ORAL DAILY
Status: DISCONTINUED | OUTPATIENT
Start: 2021-06-24 | End: 2021-06-24

## 2021-06-24 RX ORDER — LANOLIN ALCOHOL/MO/W.PET/CERES
800 CREAM (GRAM) TOPICAL
Status: DISCONTINUED | OUTPATIENT
Start: 2021-06-24 | End: 2021-06-25 | Stop reason: HOSPADM

## 2021-06-24 RX ORDER — SODIUM,POTASSIUM PHOSPHATES 280-250MG
2 POWDER IN PACKET (EA) ORAL
Status: DISCONTINUED | OUTPATIENT
Start: 2021-06-24 | End: 2021-06-24

## 2021-06-24 RX ORDER — FUROSEMIDE 10 MG/ML
40 INJECTION INTRAMUSCULAR; INTRAVENOUS
Status: DISCONTINUED | OUTPATIENT
Start: 2021-06-24 | End: 2021-06-24

## 2021-06-24 RX ORDER — HYDRALAZINE HYDROCHLORIDE 50 MG/1
100 TABLET, FILM COATED ORAL EVERY 8 HOURS
Status: DISCONTINUED | OUTPATIENT
Start: 2021-06-24 | End: 2021-06-25 | Stop reason: HOSPADM

## 2021-06-24 RX ORDER — SODIUM CHLORIDE 0.9 % (FLUSH) 0.9 %
10 SYRINGE (ML) INJECTION EVERY 8 HOURS
Status: DISCONTINUED | OUTPATIENT
Start: 2021-06-24 | End: 2021-06-25 | Stop reason: HOSPADM

## 2021-06-24 RX ORDER — ACETAMINOPHEN 325 MG/1
650 TABLET ORAL EVERY 4 HOURS PRN
Status: DISCONTINUED | OUTPATIENT
Start: 2021-06-24 | End: 2021-06-25 | Stop reason: HOSPADM

## 2021-06-24 RX ORDER — CYCLOBENZAPRINE HCL 10 MG
10 TABLET ORAL 3 TIMES DAILY PRN
Status: DISCONTINUED | OUTPATIENT
Start: 2021-06-24 | End: 2021-06-25 | Stop reason: HOSPADM

## 2021-06-24 RX ORDER — TALC
6 POWDER (GRAM) TOPICAL NIGHTLY PRN
Status: DISCONTINUED | OUTPATIENT
Start: 2021-06-24 | End: 2021-06-25 | Stop reason: HOSPADM

## 2021-06-24 RX ORDER — AMLODIPINE BESYLATE 10 MG/1
10 TABLET ORAL DAILY
Status: DISCONTINUED | OUTPATIENT
Start: 2021-06-24 | End: 2021-06-25 | Stop reason: HOSPADM

## 2021-06-24 RX ORDER — CARVEDILOL 6.25 MG/1
6.25 TABLET ORAL 2 TIMES DAILY
Status: DISCONTINUED | OUTPATIENT
Start: 2021-06-24 | End: 2021-06-25 | Stop reason: HOSPADM

## 2021-06-24 RX ORDER — HYDRALAZINE HYDROCHLORIDE 20 MG/ML
20 INJECTION INTRAMUSCULAR; INTRAVENOUS
Status: COMPLETED | OUTPATIENT
Start: 2021-06-24 | End: 2021-06-24

## 2021-06-24 RX ORDER — ENOXAPARIN SODIUM 100 MG/ML
40 INJECTION SUBCUTANEOUS EVERY 24 HOURS
Status: DISCONTINUED | OUTPATIENT
Start: 2021-06-24 | End: 2021-06-25 | Stop reason: HOSPADM

## 2021-06-24 RX ORDER — LOSARTAN POTASSIUM 50 MG/1
100 TABLET ORAL DAILY
Status: DISCONTINUED | OUTPATIENT
Start: 2021-06-24 | End: 2021-06-24

## 2021-06-24 RX ORDER — POLYETHYLENE GLYCOL 3350 17 G/17G
17 POWDER, FOR SOLUTION ORAL DAILY PRN
Status: DISCONTINUED | OUTPATIENT
Start: 2021-06-24 | End: 2021-06-25 | Stop reason: HOSPADM

## 2021-06-24 RX ORDER — ISOSORBIDE DINITRATE 20 MG/1
20 TABLET ORAL 3 TIMES DAILY
Status: DISCONTINUED | OUTPATIENT
Start: 2021-06-24 | End: 2021-06-25 | Stop reason: HOSPADM

## 2021-06-24 RX ORDER — BUMETANIDE 0.25 MG/ML
2 INJECTION INTRAMUSCULAR; INTRAVENOUS EVERY 12 HOURS
Status: DISCONTINUED | OUTPATIENT
Start: 2021-06-24 | End: 2021-06-25 | Stop reason: HOSPADM

## 2021-06-24 RX ORDER — SODIUM CHLORIDE 0.9 % (FLUSH) 0.9 %
10 SYRINGE (ML) INJECTION
Status: DISCONTINUED | OUTPATIENT
Start: 2021-06-24 | End: 2021-06-25 | Stop reason: HOSPADM

## 2021-06-24 RX ORDER — FUROSEMIDE 10 MG/ML
60 INJECTION INTRAMUSCULAR; INTRAVENOUS
Status: COMPLETED | OUTPATIENT
Start: 2021-06-24 | End: 2021-06-24

## 2021-06-24 RX ADMIN — METOPROLOL SUCCINATE 50 MG: 50 TABLET, EXTENDED RELEASE ORAL at 04:06

## 2021-06-24 RX ADMIN — CARVEDILOL 6.25 MG: 6.25 TABLET, FILM COATED ORAL at 07:06

## 2021-06-24 RX ADMIN — MAGNESIUM HYDROXIDE/ALUMINUM HYDROXICE/SIMETHICONE 50 ML: 120; 1200; 1200 SUSPENSION ORAL at 02:06

## 2021-06-24 RX ADMIN — CYCLOBENZAPRINE HYDROCHLORIDE 10 MG: 10 TABLET, FILM COATED ORAL at 10:06

## 2021-06-24 RX ADMIN — ISOSORBIDE DINITRATE 20 MG: 20 TABLET ORAL at 07:06

## 2021-06-24 RX ADMIN — AMLODIPINE BESYLATE 10 MG: 10 TABLET ORAL at 04:06

## 2021-06-24 RX ADMIN — SACUBITRIL AND VALSARTAN 1 TABLET: 49; 51 TABLET, FILM COATED ORAL at 07:06

## 2021-06-24 RX ADMIN — NITROGLYCERIN 1 INCH: 20 OINTMENT TOPICAL at 11:06

## 2021-06-24 RX ADMIN — HYDRALAZINE HYDROCHLORIDE 100 MG: 50 TABLET, FILM COATED ORAL at 09:06

## 2021-06-24 RX ADMIN — ENOXAPARIN SODIUM 40 MG: 40 INJECTION SUBCUTANEOUS at 06:06

## 2021-06-24 RX ADMIN — ACETAMINOPHEN 650 MG: 325 TABLET ORAL at 07:06

## 2021-06-24 RX ADMIN — LOSARTAN POTASSIUM 100 MG: 50 TABLET, FILM COATED ORAL at 04:06

## 2021-06-24 RX ADMIN — HYDRALAZINE HYDROCHLORIDE 20 MG: 20 INJECTION, SOLUTION INTRAMUSCULAR; INTRAVENOUS at 09:06

## 2021-06-24 RX ADMIN — FUROSEMIDE 60 MG: 10 INJECTION, SOLUTION INTRAMUSCULAR; INTRAVENOUS at 11:06

## 2021-06-24 RX ADMIN — Medication 10 ML: at 10:06

## 2021-06-24 RX ADMIN — BUMETANIDE 2 MG: 0.25 INJECTION INTRAMUSCULAR; INTRAVENOUS at 07:06

## 2021-06-25 VITALS
HEIGHT: 75 IN | BODY MASS INDEX: 31.71 KG/M2 | HEART RATE: 96 BPM | TEMPERATURE: 98 F | RESPIRATION RATE: 18 BRPM | SYSTOLIC BLOOD PRESSURE: 115 MMHG | OXYGEN SATURATION: 91 % | DIASTOLIC BLOOD PRESSURE: 57 MMHG | WEIGHT: 255 LBS

## 2021-06-25 LAB
ALBUMIN SERPL BCP-MCNC: 3.2 G/DL (ref 3.5–5.2)
ALP SERPL-CCNC: 92 U/L (ref 55–135)
ALT SERPL W/O P-5'-P-CCNC: 24 U/L (ref 10–44)
ANION GAP SERPL CALC-SCNC: 11 MMOL/L (ref 8–16)
AORTIC ROOT ANNULUS: 3.79 CM
ASCENDING AORTA: 3.17 CM
AST SERPL-CCNC: 20 U/L (ref 10–40)
AV INDEX (PROSTH): 0.66
AV MEAN GRADIENT: 8 MMHG
AV PEAK GRADIENT: 11 MMHG
AV VALVE AREA: 2.06 CM2
AV VELOCITY RATIO: 0.55
BASOPHILS # BLD AUTO: 0.05 K/UL (ref 0–0.2)
BASOPHILS NFR BLD: 0.7 % (ref 0–1.9)
BILIRUB SERPL-MCNC: 2.1 MG/DL (ref 0.1–1)
BSA FOR ECHO PROCEDURE: 2.47 M2
BUN SERPL-MCNC: 24 MG/DL (ref 6–20)
CALCIUM SERPL-MCNC: 8.5 MG/DL (ref 8.7–10.5)
CHLORIDE SERPL-SCNC: 105 MMOL/L (ref 95–110)
CO2 SERPL-SCNC: 23 MMOL/L (ref 23–29)
CREAT SERPL-MCNC: 1.9 MG/DL (ref 0.5–1.4)
CV ECHO LV RWT: 0.71 CM
DIFFERENTIAL METHOD: ABNORMAL
DOP CALC AO PEAK VEL: 1.66 M/S
DOP CALC AO VTI: 24 CM
DOP CALC LVOT AREA: 3.1 CM2
DOP CALC LVOT DIAMETER: 2 CM
DOP CALC LVOT PEAK VEL: 0.92 M/S
DOP CALC LVOT STROKE VOLUME: 49.39 CM3
DOP CALC RVOT PEAK VEL: 0.67 M/S
DOP CALC RVOT VTI: 11.98 CM
DOP CALCLVOT PEAK VEL VTI: 15.73 CM
E WAVE DECELERATION TIME: 237.81 MSEC
E/A RATIO: 0.89
E/E' RATIO: 9.8 M/S
ECHO LV POSTERIOR WALL: 1.89 CM (ref 0.6–1.1)
EJECTION FRACTION: 35 %
EOSINOPHIL # BLD AUTO: 0.2 K/UL (ref 0–0.5)
EOSINOPHIL NFR BLD: 2.5 % (ref 0–8)
ERYTHROCYTE [DISTWIDTH] IN BLOOD BY AUTOMATED COUNT: 14.5 % (ref 11.5–14.5)
EST. GFR  (AFRICAN AMERICAN): 47 ML/MIN/1.73 M^2
EST. GFR  (NON AFRICAN AMERICAN): 40 ML/MIN/1.73 M^2
ESTIMATED AVG GLUCOSE: 103 MG/DL (ref 68–131)
FRACTIONAL SHORTENING: 9 % (ref 28–44)
GLUCOSE SERPL-MCNC: 146 MG/DL (ref 70–110)
HBA1C MFR BLD: 5.2 % (ref 4–5.6)
HCT VFR BLD AUTO: 52.7 % (ref 40–54)
HGB BLD-MCNC: 16 G/DL (ref 14–18)
IMM GRANULOCYTES # BLD AUTO: 0.02 K/UL (ref 0–0.04)
IMM GRANULOCYTES NFR BLD AUTO: 0.3 % (ref 0–0.5)
INTERVENTRICULAR SEPTUM: 1.82 CM (ref 0.6–1.1)
IVRT: 100.35 MSEC
LA MAJOR: 4.78 CM
LA MINOR: 4.72 CM
LA WIDTH: 4.04 CM
LEFT ATRIUM SIZE: 4.68 CM
LEFT ATRIUM VOLUME INDEX: 31.4 ML/M2
LEFT ATRIUM VOLUME: 76.33 CM3
LEFT INTERNAL DIMENSION IN SYSTOLE: 4.86 CM (ref 2.1–4)
LEFT VENTRICLE DIASTOLIC VOLUME INDEX: 57.07 ML/M2
LEFT VENTRICLE DIASTOLIC VOLUME: 138.68 ML
LEFT VENTRICLE MASS INDEX: 203 G/M2
LEFT VENTRICLE SYSTOLIC VOLUME INDEX: 45.7 ML/M2
LEFT VENTRICLE SYSTOLIC VOLUME: 110.93 ML
LEFT VENTRICULAR INTERNAL DIMENSION IN DIASTOLE: 5.36 CM (ref 3.5–6)
LEFT VENTRICULAR MASS: 493.27 G
LV LATERAL E/E' RATIO: 9.8 M/S
LV SEPTAL E/E' RATIO: 9.8 M/S
LYMPHOCYTES # BLD AUTO: 1.3 K/UL (ref 1–4.8)
LYMPHOCYTES NFR BLD: 18.7 % (ref 18–48)
MAGNESIUM SERPL-MCNC: 2.1 MG/DL (ref 1.6–2.6)
MCH RBC QN AUTO: 26.2 PG (ref 27–31)
MCHC RBC AUTO-ENTMCNC: 30.4 G/DL (ref 32–36)
MCV RBC AUTO: 86 FL (ref 82–98)
MONOCYTES # BLD AUTO: 0.6 K/UL (ref 0.3–1)
MONOCYTES NFR BLD: 8.6 % (ref 4–15)
MV PEAK A VEL: 0.55 M/S
MV PEAK E VEL: 0.49 M/S
MV STENOSIS PRESSURE HALF TIME: 68.96 MS
MV VALVE AREA P 1/2 METHOD: 3.19 CM2
NEUTROPHILS # BLD AUTO: 4.7 K/UL (ref 1.8–7.7)
NEUTROPHILS NFR BLD: 69.2 % (ref 38–73)
NRBC BLD-RTO: 0 /100 WBC
PHOSPHATE SERPL-MCNC: 3.3 MG/DL (ref 2.7–4.5)
PISA TR MAX VEL: 2.96 M/S
PLATELET # BLD AUTO: 264 K/UL (ref 150–450)
PMV BLD AUTO: 10.4 FL (ref 9.2–12.9)
POTASSIUM SERPL-SCNC: 3.5 MMOL/L (ref 3.5–5.1)
PROT SERPL-MCNC: 6.7 G/DL (ref 6–8.4)
PULM VEIN S/D RATIO: 1.47
PV MEAN GRADIENT: 1 MMHG
PV PEAK D VEL: 0.32 M/S
PV PEAK GRADIENT: 2 MMHG
PV PEAK S VEL: 0.47 M/S
RA MAJOR: 4.82 CM
RA WIDTH: 3.75 CM
RBC # BLD AUTO: 6.11 M/UL (ref 4.6–6.2)
SINUS: 3.48 CM
SODIUM SERPL-SCNC: 139 MMOL/L (ref 136–145)
STJ: 2.78 CM
TDI LATERAL: 0.05 M/S
TDI SEPTAL: 0.05 M/S
TDI: 0.05 M/S
TR MAX PG: 35 MMHG
TRICUSPID ANNULAR PLANE SYSTOLIC EXCURSION: 1.48 CM
TROPONIN I SERPL DL<=0.01 NG/ML-MCNC: 0.1 NG/ML (ref 0–0.03)
WBC # BLD AUTO: 6.75 K/UL (ref 3.9–12.7)

## 2021-06-25 PROCEDURE — 99900035 HC TECH TIME PER 15 MIN (STAT)

## 2021-06-25 PROCEDURE — 94660 CPAP INITIATION&MGMT: CPT

## 2021-06-25 PROCEDURE — 96376 TX/PRO/DX INJ SAME DRUG ADON: CPT | Mod: 59

## 2021-06-25 PROCEDURE — 25000003 PHARM REV CODE 250: Performed by: NURSE PRACTITIONER

## 2021-06-25 PROCEDURE — 99219 PR INITIAL OBSERVATION CARE,LEVL II: ICD-10-PCS | Mod: 25,,, | Performed by: INTERNAL MEDICINE

## 2021-06-25 PROCEDURE — S0171 BUMETANIDE 0.5 MG: HCPCS | Performed by: NURSE PRACTITIONER

## 2021-06-25 PROCEDURE — 94761 N-INVAS EAR/PLS OXIMETRY MLT: CPT

## 2021-06-25 PROCEDURE — 85025 COMPLETE CBC W/AUTO DIFF WBC: CPT | Performed by: NURSE PRACTITIONER

## 2021-06-25 PROCEDURE — 80053 COMPREHEN METABOLIC PANEL: CPT | Performed by: NURSE PRACTITIONER

## 2021-06-25 PROCEDURE — 36415 COLL VENOUS BLD VENIPUNCTURE: CPT | Performed by: NURSE PRACTITIONER

## 2021-06-25 PROCEDURE — G0378 HOSPITAL OBSERVATION PER HR: HCPCS

## 2021-06-25 PROCEDURE — 99219 PR INITIAL OBSERVATION CARE,LEVL II: CPT | Mod: 25,,, | Performed by: INTERNAL MEDICINE

## 2021-06-25 PROCEDURE — A4216 STERILE WATER/SALINE, 10 ML: HCPCS | Performed by: NURSE PRACTITIONER

## 2021-06-25 PROCEDURE — 27000190 HC CPAP FULL FACE MASK W/VALVE

## 2021-06-25 PROCEDURE — 83735 ASSAY OF MAGNESIUM: CPT | Performed by: NURSE PRACTITIONER

## 2021-06-25 PROCEDURE — 84100 ASSAY OF PHOSPHORUS: CPT | Performed by: NURSE PRACTITIONER

## 2021-06-25 RX ORDER — FUROSEMIDE 40 MG/1
60 TABLET ORAL 2 TIMES DAILY
Qty: 90 TABLET | Refills: 6 | Status: SHIPPED | OUTPATIENT
Start: 2021-06-25 | End: 2022-05-30 | Stop reason: SDUPTHER

## 2021-06-25 RX ORDER — CARVEDILOL 6.25 MG/1
6.25 TABLET ORAL 2 TIMES DAILY
Qty: 60 TABLET | Refills: 11 | Status: SHIPPED | OUTPATIENT
Start: 2021-06-25 | End: 2022-02-28 | Stop reason: SDUPTHER

## 2021-06-25 RX ORDER — METOLAZONE 5 MG/1
5 TABLET ORAL DAILY PRN
Qty: 30 TABLET | Refills: 11 | Status: ON HOLD | OUTPATIENT
Start: 2021-06-25 | End: 2022-10-12 | Stop reason: HOSPADM

## 2021-06-25 RX ADMIN — CYCLOBENZAPRINE HYDROCHLORIDE 10 MG: 10 TABLET, FILM COATED ORAL at 11:06

## 2021-06-25 RX ADMIN — HYDRALAZINE HYDROCHLORIDE 100 MG: 50 TABLET, FILM COATED ORAL at 02:06

## 2021-06-25 RX ADMIN — ISOSORBIDE DINITRATE 20 MG: 20 TABLET ORAL at 10:06

## 2021-06-25 RX ADMIN — Medication 10 ML: at 05:06

## 2021-06-25 RX ADMIN — Medication 10 ML: at 02:06

## 2021-06-25 RX ADMIN — HYDRALAZINE HYDROCHLORIDE 100 MG: 50 TABLET, FILM COATED ORAL at 05:06

## 2021-06-25 RX ADMIN — AMLODIPINE BESYLATE 10 MG: 10 TABLET ORAL at 10:06

## 2021-06-25 RX ADMIN — SACUBITRIL AND VALSARTAN 1 TABLET: 49; 51 TABLET, FILM COATED ORAL at 10:06

## 2021-06-25 RX ADMIN — CARVEDILOL 6.25 MG: 6.25 TABLET, FILM COATED ORAL at 10:06

## 2021-06-25 RX ADMIN — BUMETANIDE 2 MG: 0.25 INJECTION INTRAMUSCULAR; INTRAVENOUS at 10:06

## 2021-06-25 RX ADMIN — ISOSORBIDE DINITRATE 20 MG: 20 TABLET ORAL at 02:06

## 2021-06-28 ENCOUNTER — TELEPHONE (OUTPATIENT)
Dept: TRANSPLANT | Facility: CLINIC | Age: 51
End: 2021-06-28

## 2021-06-28 ENCOUNTER — TELEPHONE (OUTPATIENT)
Dept: CARDIOLOGY | Facility: CLINIC | Age: 51
End: 2021-06-28

## 2021-08-26 DIAGNOSIS — N18.30 STAGE 3 CHRONIC KIDNEY DISEASE, UNSPECIFIED WHETHER STAGE 3A OR 3B CKD: Primary | ICD-10-CM

## 2022-02-15 DIAGNOSIS — I50.42 CHRONIC COMBINED SYSTOLIC AND DIASTOLIC CONGESTIVE HEART FAILURE: Primary | ICD-10-CM

## 2022-02-15 DIAGNOSIS — I50.43 ACUTE ON CHRONIC COMBINED SYSTOLIC (CONGESTIVE) AND DIASTOLIC (CONGESTIVE) HEART FAILURE: ICD-10-CM

## 2022-02-15 DIAGNOSIS — I10 ESSENTIAL HYPERTENSION: ICD-10-CM

## 2022-02-28 ENCOUNTER — HOSPITAL ENCOUNTER (OUTPATIENT)
Dept: CARDIOLOGY | Facility: HOSPITAL | Age: 52
Discharge: HOME | End: 2022-02-28
Attending: STUDENT IN AN ORGANIZED HEALTH CARE EDUCATION/TRAINING PROGRAM
Payer: COMMERCIAL

## 2022-02-28 ENCOUNTER — OFFICE VISIT (OUTPATIENT)
Dept: CARDIOLOGY | Facility: CLINIC | Age: 52
End: 2022-02-28
Payer: COMMERCIAL

## 2022-02-28 VITALS
OXYGEN SATURATION: 97 % | HEIGHT: 75 IN | WEIGHT: 283.75 LBS | DIASTOLIC BLOOD PRESSURE: 100 MMHG | SYSTOLIC BLOOD PRESSURE: 170 MMHG | HEART RATE: 83 BPM | BODY MASS INDEX: 35.28 KG/M2

## 2022-02-28 DIAGNOSIS — E78.2 MIXED HYPERLIPIDEMIA: ICD-10-CM

## 2022-02-28 DIAGNOSIS — E66.01 SEVERE OBESITY (BMI 35.0-35.9 WITH COMORBIDITY): ICD-10-CM

## 2022-02-28 DIAGNOSIS — I50.42 CHRONIC COMBINED SYSTOLIC AND DIASTOLIC CONGESTIVE HEART FAILURE: ICD-10-CM

## 2022-02-28 DIAGNOSIS — I12.9 HYPERTENSION ASSOCIATED WITH CHRONIC KIDNEY DISEASE DUE TO TYPE 2 DIABETES MELLITUS: ICD-10-CM

## 2022-02-28 DIAGNOSIS — I50.43 ACUTE ON CHRONIC COMBINED SYSTOLIC (CONGESTIVE) AND DIASTOLIC (CONGESTIVE) HEART FAILURE: ICD-10-CM

## 2022-02-28 DIAGNOSIS — E11.22 HYPERTENSION ASSOCIATED WITH CHRONIC KIDNEY DISEASE DUE TO TYPE 2 DIABETES MELLITUS: ICD-10-CM

## 2022-02-28 DIAGNOSIS — I10 ESSENTIAL HYPERTENSION: ICD-10-CM

## 2022-02-28 DIAGNOSIS — G47.33 OSA (OBSTRUCTIVE SLEEP APNEA): ICD-10-CM

## 2022-02-28 DIAGNOSIS — N18.30 STAGE 3 CHRONIC KIDNEY DISEASE, UNSPECIFIED WHETHER STAGE 3A OR 3B CKD: ICD-10-CM

## 2022-02-28 DIAGNOSIS — I50.42 CHRONIC COMBINED SYSTOLIC AND DIASTOLIC CONGESTIVE HEART FAILURE: Primary | ICD-10-CM

## 2022-02-28 PROCEDURE — 4010F ACE/ARB THERAPY RXD/TAKEN: CPT | Mod: CPTII,S$GLB,, | Performed by: STUDENT IN AN ORGANIZED HEALTH CARE EDUCATION/TRAINING PROGRAM

## 2022-02-28 PROCEDURE — 3080F PR MOST RECENT DIASTOLIC BLOOD PRESSURE >= 90 MM HG: ICD-10-PCS | Mod: CPTII,S$GLB,, | Performed by: STUDENT IN AN ORGANIZED HEALTH CARE EDUCATION/TRAINING PROGRAM

## 2022-02-28 PROCEDURE — 93010 EKG 12-LEAD: ICD-10-PCS | Mod: ,,, | Performed by: INTERNAL MEDICINE

## 2022-02-28 PROCEDURE — 3077F PR MOST RECENT SYSTOLIC BLOOD PRESSURE >= 140 MM HG: ICD-10-PCS | Mod: CPTII,S$GLB,, | Performed by: STUDENT IN AN ORGANIZED HEALTH CARE EDUCATION/TRAINING PROGRAM

## 2022-02-28 PROCEDURE — 1159F MED LIST DOCD IN RCRD: CPT | Mod: CPTII,S$GLB,, | Performed by: STUDENT IN AN ORGANIZED HEALTH CARE EDUCATION/TRAINING PROGRAM

## 2022-02-28 PROCEDURE — 3008F PR BODY MASS INDEX (BMI) DOCUMENTED: ICD-10-PCS | Mod: CPTII,S$GLB,, | Performed by: STUDENT IN AN ORGANIZED HEALTH CARE EDUCATION/TRAINING PROGRAM

## 2022-02-28 PROCEDURE — 99214 PR OFFICE/OUTPT VISIT, EST, LEVL IV, 30-39 MIN: ICD-10-PCS | Mod: S$GLB,,, | Performed by: STUDENT IN AN ORGANIZED HEALTH CARE EDUCATION/TRAINING PROGRAM

## 2022-02-28 PROCEDURE — 93005 ELECTROCARDIOGRAM TRACING: CPT

## 2022-02-28 PROCEDURE — 3077F SYST BP >= 140 MM HG: CPT | Mod: CPTII,S$GLB,, | Performed by: STUDENT IN AN ORGANIZED HEALTH CARE EDUCATION/TRAINING PROGRAM

## 2022-02-28 PROCEDURE — 1159F PR MEDICATION LIST DOCUMENTED IN MEDICAL RECORD: ICD-10-PCS | Mod: CPTII,S$GLB,, | Performed by: STUDENT IN AN ORGANIZED HEALTH CARE EDUCATION/TRAINING PROGRAM

## 2022-02-28 PROCEDURE — 93010 ELECTROCARDIOGRAM REPORT: CPT | Mod: ,,, | Performed by: INTERNAL MEDICINE

## 2022-02-28 PROCEDURE — 3008F BODY MASS INDEX DOCD: CPT | Mod: CPTII,S$GLB,, | Performed by: STUDENT IN AN ORGANIZED HEALTH CARE EDUCATION/TRAINING PROGRAM

## 2022-02-28 PROCEDURE — 99999 PR PBB SHADOW E&M-EST. PATIENT-LVL IV: ICD-10-PCS | Mod: PBBFAC,,, | Performed by: STUDENT IN AN ORGANIZED HEALTH CARE EDUCATION/TRAINING PROGRAM

## 2022-02-28 PROCEDURE — 4010F PR ACE/ARB THEARPY RXD/TAKEN: ICD-10-PCS | Mod: CPTII,S$GLB,, | Performed by: STUDENT IN AN ORGANIZED HEALTH CARE EDUCATION/TRAINING PROGRAM

## 2022-02-28 PROCEDURE — 3080F DIAST BP >= 90 MM HG: CPT | Mod: CPTII,S$GLB,, | Performed by: STUDENT IN AN ORGANIZED HEALTH CARE EDUCATION/TRAINING PROGRAM

## 2022-02-28 PROCEDURE — 99214 OFFICE O/P EST MOD 30 MIN: CPT | Mod: S$GLB,,, | Performed by: STUDENT IN AN ORGANIZED HEALTH CARE EDUCATION/TRAINING PROGRAM

## 2022-02-28 PROCEDURE — 99999 PR PBB SHADOW E&M-EST. PATIENT-LVL IV: CPT | Mod: PBBFAC,,, | Performed by: STUDENT IN AN ORGANIZED HEALTH CARE EDUCATION/TRAINING PROGRAM

## 2022-02-28 RX ORDER — CARVEDILOL 6.25 MG/1
6.25 TABLET ORAL 2 TIMES DAILY
Qty: 60 TABLET | Refills: 11 | Status: ON HOLD | OUTPATIENT
Start: 2022-02-28 | End: 2022-10-12 | Stop reason: HOSPADM

## 2022-02-28 RX ORDER — ISOSORBIDE DINITRATE 20 MG/1
20 TABLET ORAL 3 TIMES DAILY
Qty: 90 TABLET | Refills: 11 | Status: SHIPPED | OUTPATIENT
Start: 2022-02-28 | End: 2022-03-10 | Stop reason: DRUGHIGH
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
## 2022-03-10 ENCOUNTER — OFFICE VISIT (OUTPATIENT)
Dept: PRIMARY CARE CLINIC | Facility: CLINIC | Age: 52
End: 2022-03-10
Payer: COMMERCIAL

## 2022-03-10 VITALS
BODY MASS INDEX: 32.62 KG/M2 | SYSTOLIC BLOOD PRESSURE: 161 MMHG | WEIGHT: 262.38 LBS | HEART RATE: 96 BPM | DIASTOLIC BLOOD PRESSURE: 105 MMHG | OXYGEN SATURATION: 97 % | HEIGHT: 75 IN | TEMPERATURE: 99 F

## 2022-03-10 DIAGNOSIS — I10 ESSENTIAL HYPERTENSION: Primary | ICD-10-CM

## 2022-03-10 DIAGNOSIS — N18.1 CHRONIC KIDNEY DISEASE (CKD) STAGE G1/A3, GLOMERULAR FILTRATION RATE (GFR) EQUAL TO OR GREATER THAN 90 ML/MIN/1.73 SQUARE METER AND ALBUMINURIA CREATININE RATIO GREATER THAN 300 MG/G: ICD-10-CM

## 2022-03-10 DIAGNOSIS — Z88.0 PENICILLIN ALLERGY: ICD-10-CM

## 2022-03-10 DIAGNOSIS — J02.8 ACUTE PHARYNGITIS DUE TO OTHER SPECIFIED ORGANISMS: ICD-10-CM

## 2022-03-10 DIAGNOSIS — E78.2 MIXED HYPERLIPIDEMIA: ICD-10-CM

## 2022-03-10 DIAGNOSIS — L04.9 LYMPHADENITIS, ACUTE: ICD-10-CM

## 2022-03-10 DIAGNOSIS — R05.9 COUGH: ICD-10-CM

## 2022-03-10 DIAGNOSIS — I50.42 CHRONIC COMBINED SYSTOLIC AND DIASTOLIC CHF, NYHA CLASS 2: ICD-10-CM

## 2022-03-10 PROCEDURE — 3008F BODY MASS INDEX DOCD: CPT | Mod: CPTII,S$GLB,, | Performed by: FAMILY MEDICINE

## 2022-03-10 PROCEDURE — 1159F MED LIST DOCD IN RCRD: CPT | Mod: CPTII,S$GLB,, | Performed by: FAMILY MEDICINE

## 2022-03-10 PROCEDURE — 3080F DIAST BP >= 90 MM HG: CPT | Mod: CPTII,S$GLB,, | Performed by: FAMILY MEDICINE

## 2022-03-10 PROCEDURE — 99204 PR OFFICE/OUTPT VISIT, NEW, LEVL IV, 45-59 MIN: ICD-10-PCS | Mod: S$GLB,,, | Performed by: FAMILY MEDICINE

## 2022-03-10 PROCEDURE — 4010F PR ACE/ARB THEARPY RXD/TAKEN: ICD-10-PCS | Mod: CPTII,S$GLB,, | Performed by: FAMILY MEDICINE

## 2022-03-10 PROCEDURE — 3077F SYST BP >= 140 MM HG: CPT | Mod: CPTII,S$GLB,, | Performed by: FAMILY MEDICINE

## 2022-03-10 PROCEDURE — 99999 PR PBB SHADOW E&M-EST. PATIENT-LVL III: CPT | Mod: PBBFAC,,, | Performed by: FAMILY MEDICINE

## 2022-03-10 PROCEDURE — 99204 OFFICE O/P NEW MOD 45 MIN: CPT | Mod: S$GLB,,, | Performed by: FAMILY MEDICINE

## 2022-03-10 PROCEDURE — 3008F PR BODY MASS INDEX (BMI) DOCUMENTED: ICD-10-PCS | Mod: CPTII,S$GLB,, | Performed by: FAMILY MEDICINE

## 2022-03-10 PROCEDURE — 99999 PR PBB SHADOW E&M-EST. PATIENT-LVL III: ICD-10-PCS | Mod: PBBFAC,,, | Performed by: FAMILY MEDICINE

## 2022-03-10 PROCEDURE — 3080F PR MOST RECENT DIASTOLIC BLOOD PRESSURE >= 90 MM HG: ICD-10-PCS | Mod: CPTII,S$GLB,, | Performed by: FAMILY MEDICINE

## 2022-03-10 PROCEDURE — 3077F PR MOST RECENT SYSTOLIC BLOOD PRESSURE >= 140 MM HG: ICD-10-PCS | Mod: CPTII,S$GLB,, | Performed by: FAMILY MEDICINE

## 2022-03-10 PROCEDURE — 1159F PR MEDICATION LIST DOCUMENTED IN MEDICAL RECORD: ICD-10-PCS | Mod: CPTII,S$GLB,, | Performed by: FAMILY MEDICINE

## 2022-03-10 PROCEDURE — 4010F ACE/ARB THERAPY RXD/TAKEN: CPT | Mod: CPTII,S$GLB,, | Performed by: FAMILY MEDICINE

## 2022-03-10 RX ORDER — CLINDAMYCIN HYDROCHLORIDE 300 MG/1
300 CAPSULE ORAL EVERY 6 HOURS
Qty: 28 EACH | Refills: 0 | Status: SHIPPED | OUTPATIENT
Start: 2022-03-10 | End: 2022-03-17

## 2022-03-10 RX ORDER — BENZONATATE 100 MG/1
100 CAPSULE ORAL 3 TIMES DAILY PRN
Qty: 30 CAPSULE | Refills: 0 | Status: SHIPPED | OUTPATIENT
Start: 2022-03-10 | End: 2022-03-20

## 2022-03-10 RX ORDER — ATORVASTATIN CALCIUM 10 MG/1
10 TABLET, FILM COATED ORAL DAILY
Qty: 90 TABLET | Refills: 1 | Status: ON HOLD | OUTPATIENT
Start: 2022-03-10 | End: 2022-10-12

## 2022-03-10 NOTE — Clinical Note
Please book a blood pressure check  with Nurse to document this patient's blood pressure because the last one that was listed on the chart was high. Thank you.

## 2022-03-10 NOTE — PROGRESS NOTES
Subjective:       Patient ID: Bria Saldana is a 51 y.o. male.    Chief Complaint: Sore Throat (Swollen/throat sore), cough, HLD, HTN, CKD 3, CHF      HPI   History of Present Illness:   Bria Saldana 51 y.o. male presents today with     Sore throat x 2 weeks. Painful swallowing.  Also with a cough, dry.  Positive for LAD., positive dental caries.    PMH-chf, ckd 3 and HTN which is elevated today. Per pt, it is always elevated and he has not taken his afternoon dose of hydralazine.  CHF is compensated.  NATALYA per record, reported that his machine was recovered by the vendor since he was not using it.  HLD  The 10-year ASCVD risk score (Henry AVALOS Jr., et al., 2013) is: 14%    Values used to calculate the score:      Age: 51 years      Sex: Male      Is Non- : Yes      Diabetic: No      Tobacco smoker: No      Systolic Blood Pressure: 161 mmHg      Is BP treated: Yes      HDL Cholesterol: 32 mg/dL      Total Cholesterol: 155 mg/dL    Past Medical History:   Diagnosis Date    Acute CHF 7/8/2019    Acute on chronic combined systolic (congestive) and diastolic (congestive) heart failure 9/23/2019    Allergy     CHF (congestive heart failure) 7/9/2019    CKD (chronic kidney disease) stage 3, GFR 30-59 ml/min 7/8/2019    Essential hypertension 6/1/2017    Hypertension associated with chronic kidney disease due to type 2 diabetes mellitus 2/28/2022    Mixed hyperlipidemia 3/10/2022    NATALYA (obstructive sleep apnea) 7/23/2018     Family History   Problem Relation Age of Onset    Cancer Mother     Diabetes Mother     Diabetes Sister     Alzheimer's disease Father      Social History     Socioeconomic History    Marital status:    Occupational History    Occupation: certified    Tobacco Use    Smoking status: Former Smoker     Packs/day: 1.00     Years: 12.00     Pack years: 12.00    Smokeless tobacco: Never Used   Substance and Sexual Activity     Alcohol use: Yes     Alcohol/week: 1.0 standard drink     Types: 1 Cans of beer per week     Comment: 1 beer every now and then     Drug use: No    Sexual activity: Yes     Partners: Female     Comment: wife      Outpatient Encounter Medications as of 3/10/2022   Medication Sig Dispense Refill    carvediloL (COREG) 6.25 MG tablet Take 1 tablet (6.25 mg total) by mouth 2 (two) times daily. 60 tablet 11    furosemide (LASIX) 40 MG tablet Take 1.5 tablets (60 mg total) by mouth 2 (two) times daily. 90 tablet 6    hydrALAZINE (APRESOLINE) 100 MG tablet TAKE 1 TABLET BY MOUTH EVERY 8 HOURS 90 tablet 0    metOLazone (ZAROXOLYN) 5 MG tablet Take 1 tablet (5 mg total) by mouth daily as needed (swelling). 30 tablet 11    sacubitriL-valsartan (ENTRESTO) 49-51 mg per tablet Take 1 tablet by mouth 2 (two) times daily. 60 tablet 11    [DISCONTINUED] isosorbide dinitrate (ISORDIL) 20 MG tablet Take 1 tablet (20 mg total) by mouth 3 (three) times daily. 90 tablet 11    atorvastatin (LIPITOR) 10 MG tablet Take 1 tablet (10 mg total) by mouth once daily. 90 tablet 1    benzonatate (TESSALON) 100 MG capsule Take 1 capsule (100 mg total) by mouth 3 (three) times daily as needed for Cough. 30 capsule 0    clindamycin (CLEOCIN) 300 MG capsule Take 1 capsule (300 mg total) by mouth every 6 (six) hours. for 7 days 28 each 0    isosorbide dinitrate (DILATRATE-SR) 40 mg CR capsule Take 1 capsule (40 mg total) by mouth 2 (two) times daily. 60 capsule 2     No facility-administered encounter medications on file as of 3/10/2022.       Review of Systems   Constitutional: Negative for appetite change and fever.   HENT: Negative for congestion, facial swelling and voice change.    Eyes: Negative for discharge and itching.   Respiratory: Negative for cough, chest tightness and wheezing.    Cardiovascular: Negative.  Negative for chest pain and leg swelling.   Gastrointestinal: Negative for abdominal pain, nausea and vomiting.  "  Endocrine: Negative for cold intolerance and heat intolerance.   Genitourinary: Negative for dysuria and flank pain.   Musculoskeletal: Negative for myalgias and neck stiffness.   Skin: Negative for pallor and rash.   Neurological: Negative for facial asymmetry and weakness.   Psychiatric/Behavioral: Negative for agitation and confusion.       Objective:      BP (!) 161/105 (BP Location: Left arm, Patient Position: Sitting, BP Method: Large (Automatic))   Pulse 96   Temp 98.6 °F (37 °C) (Temporal)   Ht 6' 3" (1.905 m)   Wt 119 kg (262 lb 6.4 oz)   SpO2 97%   BMI 32.80 kg/m²   Physical Exam  HENT:      Mouth/Throat:      Dentition: Abnormal dentition (broken tooth).      Pharynx: No pharyngeal swelling, oropharyngeal exudate, posterior oropharyngeal erythema or uvula swelling.      Tonsils: No tonsillar exudate or tonsillar abscesses.     Lymphadenopathy:      Cervical: Cervical adenopathy present.      Left cervical: Deep cervical adenopathy present.         CONSTITUTIONAL: No apparent distress. Appears comfortable.   CARDIOVASCULAR: No perioral cyanosis  PULMONARY: Breathing unlabored. No retractions Chest expansion grossly normal.  PSYCHIATRIC: Alert and conversant and grossly oriented. Mood is grossly neutral. Affect appropriate. Judgment and insight grossly intact.  NEUROLOGIC: No focal sensory deficits reported.   Results for orders placed or performed during the hospital encounter of 06/24/21   HIV 1/2 Ag/Ab (4th Gen)   Result Value Ref Range    HIV 1/2 Ag/Ab Negative Negative   Hepatitis C Antibody   Result Value Ref Range    Hepatitis C Ab Negative Negative   HCV Virus Hold Specimen   Result Value Ref Range    HEP C Virus Hold Specimen Hold for HCV sendout    CBC auto differential   Result Value Ref Range    WBC 6.65 3.90 - 12.70 K/uL    RBC 5.29 4.60 - 6.20 M/uL    Hemoglobin 13.9 (L) 14.0 - 18.0 g/dL    Hematocrit 45.7 40.0 - 54.0 %    MCV 86 82 - 98 fL    MCH 26.3 (L) 27.0 - 31.0 pg    MCHC 30.4 " (L) 32.0 - 36.0 g/dL    RDW 14.3 11.5 - 14.5 %    Platelets 262 150 - 450 K/uL    MPV 10.2 9.2 - 12.9 fL    Immature Granulocytes 0.6 (H) 0.0 - 0.5 %    Gran # (ANC) 4.1 1.8 - 7.7 K/uL    Immature Grans (Abs) 0.04 0.00 - 0.04 K/uL    Lymph # 1.8 1.0 - 4.8 K/uL    Mono # 0.5 0.3 - 1.0 K/uL    Eos # 0.1 0.0 - 0.5 K/uL    Baso # 0.06 0.00 - 0.20 K/uL    nRBC 0 0 /100 WBC    Gran % 61.7 38.0 - 73.0 %    Lymph % 27.5 18.0 - 48.0 %    Mono % 7.2 4.0 - 15.0 %    Eosinophil % 2.1 0.0 - 8.0 %    Basophil % 0.9 0.0 - 1.9 %    Differential Method Automated    Comprehensive metabolic panel   Result Value Ref Range    Sodium 142 136 - 145 mmol/L    Potassium 4.2 3.5 - 5.1 mmol/L    Chloride 110 95 - 110 mmol/L    CO2 21 (L) 23 - 29 mmol/L    Glucose 100 70 - 110 mg/dL    BUN 25 (H) 6 - 20 mg/dL    Creatinine 1.9 (H) 0.5 - 1.4 mg/dL    Calcium 8.6 (L) 8.7 - 10.5 mg/dL    Total Protein 6.5 6.0 - 8.4 g/dL    Albumin 3.3 (L) 3.5 - 5.2 g/dL    Total Bilirubin 1.6 (H) 0.1 - 1.0 mg/dL    Alkaline Phosphatase 83 55 - 135 U/L    AST 23 10 - 40 U/L    ALT 29 10 - 44 U/L    Anion Gap 11 8 - 16 mmol/L    eGFR if African American 47 (A) >60 mL/min/1.73 m^2    eGFR if non African American 40 (A) >60 mL/min/1.73 m^2   Troponin I #1   Result Value Ref Range    Troponin I 0.096 (H) 0.000 - 0.026 ng/mL   BNP   Result Value Ref Range    BNP 1,519 (H) 0 - 99 pg/mL   Hemoglobin A1c   Result Value Ref Range    Hemoglobin A1C 5.2 4.0 - 5.6 %    Estimated Avg Glucose 103 68 - 131 mg/dL   Magnesium   Result Value Ref Range    Magnesium 2.1 1.6 - 2.6 mg/dL   Troponin I   Result Value Ref Range    Troponin I 0.088 (H) 0.000 - 0.026 ng/mL   Troponin I   Result Value Ref Range    Troponin I 0.099 (H) 0.000 - 0.026 ng/mL   Comprehensive Metabolic Panel (CMP)   Result Value Ref Range    Sodium 139 136 - 145 mmol/L    Potassium 3.5 3.5 - 5.1 mmol/L    Chloride 105 95 - 110 mmol/L    CO2 23 23 - 29 mmol/L    Glucose 146 (H) 70 - 110 mg/dL    BUN 24 (H) 6 - 20  mg/dL    Creatinine 1.9 (H) 0.5 - 1.4 mg/dL    Calcium 8.5 (L) 8.7 - 10.5 mg/dL    Total Protein 6.7 6.0 - 8.4 g/dL    Albumin 3.2 (L) 3.5 - 5.2 g/dL    Total Bilirubin 2.1 (H) 0.1 - 1.0 mg/dL    Alkaline Phosphatase 92 55 - 135 U/L    AST 20 10 - 40 U/L    ALT 24 10 - 44 U/L    Anion Gap 11 8 - 16 mmol/L    eGFR if African American 47 (A) >60 mL/min/1.73 m^2    eGFR if non African American 40 (A) >60 mL/min/1.73 m^2   Magnesium   Result Value Ref Range    Magnesium 2.1 1.6 - 2.6 mg/dL   Phosphorus   Result Value Ref Range    Phosphorus 3.3 2.7 - 4.5 mg/dL   CBC with Automated Differential   Result Value Ref Range    WBC 6.75 3.90 - 12.70 K/uL    RBC 6.11 4.60 - 6.20 M/uL    Hemoglobin 16.0 14.0 - 18.0 g/dL    Hematocrit 52.7 40.0 - 54.0 %    MCV 86 82 - 98 fL    MCH 26.2 (L) 27.0 - 31.0 pg    MCHC 30.4 (L) 32.0 - 36.0 g/dL    RDW 14.5 11.5 - 14.5 %    Platelets 264 150 - 450 K/uL    MPV 10.4 9.2 - 12.9 fL    Immature Granulocytes 0.3 0.0 - 0.5 %    Gran # (ANC) 4.7 1.8 - 7.7 K/uL    Immature Grans (Abs) 0.02 0.00 - 0.04 K/uL    Lymph # 1.3 1.0 - 4.8 K/uL    Mono # 0.6 0.3 - 1.0 K/uL    Eos # 0.2 0.0 - 0.5 K/uL    Baso # 0.05 0.00 - 0.20 K/uL    nRBC 0 0 /100 WBC    Gran % 69.2 38.0 - 73.0 %    Lymph % 18.7 18.0 - 48.0 %    Mono % 8.6 4.0 - 15.0 %    Eosinophil % 2.5 0.0 - 8.0 %    Basophil % 0.7 0.0 - 1.9 %    Differential Method Automated    POCT COVID-19 Rapid Screening   Result Value Ref Range    POC Rapid COVID Negative Negative     Acceptable Yes    Echo   Result Value Ref Range    BSA 2.47 m2    TDI SEPTAL 0.05 m/s    LV LATERAL E/E' RATIO 9.80 m/s    LV SEPTAL E/E' RATIO 9.80 m/s    LA WIDTH 4.04 cm    TDI LATERAL 0.05 m/s    LVIDd 5.36 3.5 - 6.0 cm    IVS 1.82 (A) 0.6 - 1.1 cm    Posterior Wall 1.89 (A) 0.6 - 1.1 cm    Ao root annulus 3.79 cm    LVIDs 4.86 (A) 2.1 - 4.0 cm    FS 9 28 - 44 %    LA volume 76.33 cm3    Sinus 3.48 cm    STJ 2.78 cm    Ascending aorta 3.17 cm    LV mass 493.27 g     LA size 4.68 cm    TAPSE 1.48 cm    Left Ventricle Relative Wall Thickness 0.71 cm    AV mean gradient 8 mmHg    AV valve area 2.06 cm2    AV Velocity Ratio 0.55     AV index (prosthetic) 0.66     MV valve area p 1/2 method 3.19 cm2    PV peak gradient 2 mmHg    E/A ratio 0.89     Mean e' 0.05 m/s    E wave deceleration time 237.81 msec    IVRT 100.35 msec    Pulm vein S/D ratio 1.47     LVOT diameter 2.00 cm    LVOT area 3.1 cm2    LVOT peak vineet 0.92 m/s    LVOT peak VTI 15.73 cm    Ao peak vineet 1.66 m/s    Ao VTI 24.00 cm    RVOT peak vineet 0.67 m/s    RVOT peak VTI 11.98 cm    LVOT stroke volume 49.39 cm3    AV peak gradient 11 mmHg    PV mean gradient 1 mmHg    E/E' ratio 9.80 m/s    MV Peak E Vineet 0.49 m/s    TR Max Vineet 2.96 m/s    MV stenosis pressure 1/2 time 68.96 ms    MV Peak A Vineet 0.55 m/s    PV Peak S Vineet 0.47 m/s    PV Peak D Vineet 0.32 m/s    LV Systolic Volume 110.93 mL    LV Systolic Volume Index 45.7 mL/m2    LV Diastolic Volume 138.68 mL    LV Diastolic Volume Index 57.07 mL/m2    LA Volume Index 31.4 mL/m2    LV Mass Index 203 g/m2    RA Major Axis 4.82 cm    Left Atrium Minor Axis 4.72 cm    Left Atrium Major Axis 4.78 cm    Triscuspid Valve Regurgitation Peak Gradient 35 mmHg    RA Width 3.75 cm    EF 35 %     Assessment:       1. Essential hypertension    2. Acute pharyngitis due to other specified organisms    3. Chronic kidney disease (CKD) stage G1/A3, glomerular filtration rate (GFR) equal to or greater than 90 mL/min/1.73 square meter and albuminuria creatinine ratio greater than 300 mg/g    4. Chronic combined systolic and diastolic CHF, NYHA class 2    5. Lymphadenitis, acute    6. Mixed hyperlipidemia    7. Cough    8. Penicillin allergy        Plan:   Essential hypertension  -     isosorbide dinitrate (DILATRATE-SR) 40 mg CR capsule; Take 1 capsule (40 mg total) by mouth 2 (two) times daily.  Dispense: 60 capsule; Refill: 2    Acute pharyngitis due to other specified organisms  -     POCT  rapid strep A    Chronic kidney disease (CKD) stage G1/A3, glomerular filtration rate (GFR) equal to or greater than 90 mL/min/1.73 square meter and albuminuria creatinine ratio greater than 300 mg/g    Chronic combined systolic and diastolic CHF, NYHA class 2    Lymphadenitis, acute  -     clindamycin (CLEOCIN) 300 MG capsule; Take 1 capsule (300 mg total) by mouth every 6 (six) hours. for 7 days  Dispense: 28 each; Refill: 0    Mixed hyperlipidemia  -     atorvastatin (LIPITOR) 10 MG tablet; Take 1 tablet (10 mg total) by mouth once daily.  Dispense: 90 tablet; Refill: 1    Cough  -     benzonatate (TESSALON) 100 MG capsule; Take 1 capsule (100 mg total) by mouth 3 (three) times daily as needed for Cough.  Dispense: 30 capsule; Refill: 0    Penicillin allergy      I have reviewed all of the patient's clinical history available in care everywhere and Epic and have utilized this in my evaluation and management recommendations today.   Cervical LAD likely due to dental problems, will initiate abx and RTC if no improvement for a CT.   Will need the Cpap machine-follow on this on his next visit.  Increased dose of his isosorbide-RTC for BP check   Compensated CHF  Treatment options and alternatives were discussed with the patient. Patient was given ample time to ask questions. All questions were answered. Voices understanding and acceptance of this advice. Will call back if any further questions or concerns.    Giselle Llamas MD

## 2022-03-11 ENCOUNTER — TELEPHONE (OUTPATIENT)
Dept: PRIMARY CARE CLINIC | Facility: CLINIC | Age: 52
End: 2022-03-11
Payer: COMMERCIAL

## 2022-03-11 DIAGNOSIS — I10 ESSENTIAL HYPERTENSION: Primary | ICD-10-CM

## 2022-03-11 RX ORDER — ISOSORBIDE DINITRATE 20 MG/1
20 TABLET ORAL 3 TIMES DAILY
Qty: 90 TABLET | Refills: 2 | Status: SHIPPED | OUTPATIENT
Start: 2022-03-11 | End: 2023-06-26

## 2022-03-11 RX ORDER — TERAZOSIN 5 MG/1
5 CAPSULE ORAL NIGHTLY
Qty: 30 CAPSULE | Refills: 2 | Status: SHIPPED | OUTPATIENT
Start: 2022-03-11 | End: 2022-10-19

## 2022-03-11 NOTE — TELEPHONE ENCOUNTER
called pharm and forwarded answer to dr Llamas      ----- Message from Giselle Llamas MD sent at 3/10/2022  5:52 PM CST -----  Regarding: RE: a change of mg  Contact: Daaj/Markuslynn [phone]  Do you know which dose is covered, if not I could leave the previous and add something else. Thank you.  ----- Message -----  From: Karly Flores MA  Sent: 3/10/2022   3:20 PM CST  To: Giselle Llamas MD  Subject: a change of mg                                   Please advise.  ----- Message -----  From: Uma Brooks  Sent: 3/10/2022   3:07 PM CST  To: Muriel Desai Staff    Pharmacy is calling to clarify an RX.  RX name:  isosorbide dinitrate (DILATRATE-SR) 40 mg CR capsule  What do they need to clarify:  insurance will not cover 40 mg. Can we dispense in different MG (something that is covered)  Comments:     Please call and advise.    Thank You

## 2022-03-14 ENCOUNTER — OFFICE VISIT (OUTPATIENT)
Dept: CARDIOLOGY | Facility: CLINIC | Age: 52
End: 2022-03-14
Payer: COMMERCIAL

## 2022-03-14 ENCOUNTER — HOSPITAL ENCOUNTER (OUTPATIENT)
Dept: RADIOLOGY | Facility: HOSPITAL | Age: 52
Discharge: HOME | End: 2022-03-14
Attending: STUDENT IN AN ORGANIZED HEALTH CARE EDUCATION/TRAINING PROGRAM
Payer: COMMERCIAL

## 2022-03-14 VITALS
BODY MASS INDEX: 32.65 KG/M2 | OXYGEN SATURATION: 100 % | WEIGHT: 262.56 LBS | SYSTOLIC BLOOD PRESSURE: 130 MMHG | DIASTOLIC BLOOD PRESSURE: 80 MMHG | HEIGHT: 75 IN | HEART RATE: 88 BPM

## 2022-03-14 DIAGNOSIS — I12.9 HYPERTENSION ASSOCIATED WITH CHRONIC KIDNEY DISEASE DUE TO TYPE 2 DIABETES MELLITUS: ICD-10-CM

## 2022-03-14 DIAGNOSIS — I10 ESSENTIAL HYPERTENSION: ICD-10-CM

## 2022-03-14 DIAGNOSIS — N18.30 STAGE 3 CHRONIC KIDNEY DISEASE, UNSPECIFIED WHETHER STAGE 3A OR 3B CKD: ICD-10-CM

## 2022-03-14 DIAGNOSIS — R05.9 COUGH: Primary | ICD-10-CM

## 2022-03-14 DIAGNOSIS — G47.33 OSA (OBSTRUCTIVE SLEEP APNEA): ICD-10-CM

## 2022-03-14 DIAGNOSIS — R05.9 COUGH: ICD-10-CM

## 2022-03-14 DIAGNOSIS — E11.22 HYPERTENSION ASSOCIATED WITH CHRONIC KIDNEY DISEASE DUE TO TYPE 2 DIABETES MELLITUS: ICD-10-CM

## 2022-03-14 DIAGNOSIS — E66.9 OBESITY (BMI 30.0-34.9): ICD-10-CM

## 2022-03-14 DIAGNOSIS — I50.42 CHRONIC COMBINED SYSTOLIC AND DIASTOLIC CONGESTIVE HEART FAILURE: ICD-10-CM

## 2022-03-14 PROCEDURE — 3008F BODY MASS INDEX DOCD: CPT | Mod: CPTII,S$GLB,, | Performed by: STUDENT IN AN ORGANIZED HEALTH CARE EDUCATION/TRAINING PROGRAM

## 2022-03-14 PROCEDURE — 99999 PR PBB SHADOW E&M-EST. PATIENT-LVL IV: CPT | Mod: PBBFAC,,, | Performed by: STUDENT IN AN ORGANIZED HEALTH CARE EDUCATION/TRAINING PROGRAM

## 2022-03-14 PROCEDURE — 99214 OFFICE O/P EST MOD 30 MIN: CPT | Mod: S$GLB,,, | Performed by: STUDENT IN AN ORGANIZED HEALTH CARE EDUCATION/TRAINING PROGRAM

## 2022-03-14 PROCEDURE — 71046 X-RAY EXAM CHEST 2 VIEWS: CPT | Mod: TC

## 2022-03-14 PROCEDURE — 71046 XR CHEST PA AND LATERAL: ICD-10-PCS | Mod: 26,,, | Performed by: RADIOLOGY

## 2022-03-14 PROCEDURE — 1159F PR MEDICATION LIST DOCUMENTED IN MEDICAL RECORD: ICD-10-PCS | Mod: CPTII,S$GLB,, | Performed by: STUDENT IN AN ORGANIZED HEALTH CARE EDUCATION/TRAINING PROGRAM

## 2022-03-14 PROCEDURE — 3075F PR MOST RECENT SYSTOLIC BLOOD PRESS GE 130-139MM HG: ICD-10-PCS | Mod: CPTII,S$GLB,, | Performed by: STUDENT IN AN ORGANIZED HEALTH CARE EDUCATION/TRAINING PROGRAM

## 2022-03-14 PROCEDURE — 4010F ACE/ARB THERAPY RXD/TAKEN: CPT | Mod: CPTII,S$GLB,, | Performed by: STUDENT IN AN ORGANIZED HEALTH CARE EDUCATION/TRAINING PROGRAM

## 2022-03-14 PROCEDURE — 71046 X-RAY EXAM CHEST 2 VIEWS: CPT | Mod: 26,,, | Performed by: RADIOLOGY

## 2022-03-14 PROCEDURE — 4010F PR ACE/ARB THEARPY RXD/TAKEN: ICD-10-PCS | Mod: CPTII,S$GLB,, | Performed by: STUDENT IN AN ORGANIZED HEALTH CARE EDUCATION/TRAINING PROGRAM

## 2022-03-14 PROCEDURE — 99214 PR OFFICE/OUTPT VISIT, EST, LEVL IV, 30-39 MIN: ICD-10-PCS | Mod: S$GLB,,, | Performed by: STUDENT IN AN ORGANIZED HEALTH CARE EDUCATION/TRAINING PROGRAM

## 2022-03-14 PROCEDURE — 1159F MED LIST DOCD IN RCRD: CPT | Mod: CPTII,S$GLB,, | Performed by: STUDENT IN AN ORGANIZED HEALTH CARE EDUCATION/TRAINING PROGRAM

## 2022-03-14 PROCEDURE — 3008F PR BODY MASS INDEX (BMI) DOCUMENTED: ICD-10-PCS | Mod: CPTII,S$GLB,, | Performed by: STUDENT IN AN ORGANIZED HEALTH CARE EDUCATION/TRAINING PROGRAM

## 2022-03-14 PROCEDURE — 3075F SYST BP GE 130 - 139MM HG: CPT | Mod: CPTII,S$GLB,, | Performed by: STUDENT IN AN ORGANIZED HEALTH CARE EDUCATION/TRAINING PROGRAM

## 2022-03-14 PROCEDURE — 3079F PR MOST RECENT DIASTOLIC BLOOD PRESSURE 80-89 MM HG: ICD-10-PCS | Mod: CPTII,S$GLB,, | Performed by: STUDENT IN AN ORGANIZED HEALTH CARE EDUCATION/TRAINING PROGRAM

## 2022-03-14 PROCEDURE — 3079F DIAST BP 80-89 MM HG: CPT | Mod: CPTII,S$GLB,, | Performed by: STUDENT IN AN ORGANIZED HEALTH CARE EDUCATION/TRAINING PROGRAM

## 2022-03-14 PROCEDURE — 99999 PR PBB SHADOW E&M-EST. PATIENT-LVL IV: ICD-10-PCS | Mod: PBBFAC,,, | Performed by: STUDENT IN AN ORGANIZED HEALTH CARE EDUCATION/TRAINING PROGRAM
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
## 2022-03-21 ENCOUNTER — CLINICAL SUPPORT (OUTPATIENT)
Dept: PRIMARY CARE CLINIC | Facility: CLINIC | Age: 52
End: 2022-03-21
Payer: COMMERCIAL

## 2022-03-21 DIAGNOSIS — I10 ESSENTIAL HYPERTENSION: Primary | ICD-10-CM

## 2022-03-23 ENCOUNTER — TELEPHONE (OUTPATIENT)
Dept: CARDIOLOGY | Facility: CLINIC | Age: 52
End: 2022-03-23
Payer: COMMERCIAL

## 2022-03-23 NOTE — TELEPHONE ENCOUNTER
He was supposed to stop taking entresto. Can you confirm that he stopped taking this instead of his cough medicine?     Thanks   Pt stated he stopped Entresto. I informed him he could keep taking the cough medicine and if the cough persisited to reach out to the doctor that prescribed the cough medicine. Pt verbalized understanding      Saw you last week - had a cough - told him to stop taking the cough  medicine - he stopped taking medicine - pt still has cough - pt states cough is the same -slightly productive - clear - he was supposed to call you back and let you know    Please advise      ----- Message from Kylah Trujillo sent at 3/23/2022  4:20 PM CDT -----  Pt is calling in regards to his cough. Please call [phone].

## 2022-05-30 ENCOUNTER — OFFICE VISIT (OUTPATIENT)
Dept: PRIMARY CARE CLINIC | Facility: CLINIC | Age: 52
End: 2022-05-30
Payer: COMMERCIAL

## 2022-05-30 ENCOUNTER — LAB VISIT (OUTPATIENT)
Dept: LAB | Facility: HOSPITAL | Age: 52
End: 2022-05-30
Attending: NURSE PRACTITIONER
Payer: COMMERCIAL

## 2022-05-30 ENCOUNTER — HOSPITAL ENCOUNTER (OUTPATIENT)
Dept: RADIOLOGY | Facility: HOSPITAL | Age: 52
Discharge: HOME | End: 2022-05-30
Attending: NURSE PRACTITIONER
Payer: COMMERCIAL

## 2022-05-30 VITALS
SYSTOLIC BLOOD PRESSURE: 138 MMHG | BODY MASS INDEX: 33.27 KG/M2 | DIASTOLIC BLOOD PRESSURE: 76 MMHG | HEART RATE: 83 BPM | HEIGHT: 75 IN | OXYGEN SATURATION: 96 % | TEMPERATURE: 98 F | WEIGHT: 267.63 LBS

## 2022-05-30 DIAGNOSIS — R06.02 SHORTNESS OF BREATH: ICD-10-CM

## 2022-05-30 DIAGNOSIS — I50.42 CHRONIC COMBINED SYSTOLIC AND DIASTOLIC CONGESTIVE HEART FAILURE: ICD-10-CM

## 2022-05-30 DIAGNOSIS — I50.42 CHRONIC COMBINED SYSTOLIC AND DIASTOLIC CONGESTIVE HEART FAILURE: Primary | ICD-10-CM

## 2022-05-30 DIAGNOSIS — I10 ESSENTIAL HYPERTENSION: ICD-10-CM

## 2022-05-30 DIAGNOSIS — R19.7 DIARRHEA, UNSPECIFIED TYPE: ICD-10-CM

## 2022-05-30 DIAGNOSIS — N18.30 STAGE 3 CHRONIC KIDNEY DISEASE, UNSPECIFIED WHETHER STAGE 3A OR 3B CKD: ICD-10-CM

## 2022-05-30 DIAGNOSIS — E78.2 MIXED HYPERLIPIDEMIA: ICD-10-CM

## 2022-05-30 LAB — BNP SERPL-MCNC: 970 PG/ML (ref 0–99)

## 2022-05-30 PROCEDURE — 99213 PR OFFICE/OUTPT VISIT, EST, LEVL III, 20-29 MIN: ICD-10-PCS | Mod: S$GLB,,, | Performed by: NURSE PRACTITIONER

## 2022-05-30 PROCEDURE — 71046 XR CHEST PA AND LATERAL: ICD-10-PCS | Mod: 26,,, | Performed by: RADIOLOGY

## 2022-05-30 PROCEDURE — 4010F ACE/ARB THERAPY RXD/TAKEN: CPT | Mod: CPTII,S$GLB,, | Performed by: NURSE PRACTITIONER

## 2022-05-30 PROCEDURE — 99999 PR PBB SHADOW E&M-EST. PATIENT-LVL IV: ICD-10-PCS | Mod: PBBFAC,,, | Performed by: NURSE PRACTITIONER

## 2022-05-30 PROCEDURE — 83880 ASSAY OF NATRIURETIC PEPTIDE: CPT | Performed by: NURSE PRACTITIONER

## 2022-05-30 PROCEDURE — 99999 PR PBB SHADOW E&M-EST. PATIENT-LVL IV: CPT | Mod: PBBFAC,,, | Performed by: NURSE PRACTITIONER

## 2022-05-30 PROCEDURE — 36415 COLL VENOUS BLD VENIPUNCTURE: CPT | Mod: PN | Performed by: NURSE PRACTITIONER

## 2022-05-30 PROCEDURE — 71046 X-RAY EXAM CHEST 2 VIEWS: CPT | Mod: TC,PN

## 2022-05-30 PROCEDURE — 99213 OFFICE O/P EST LOW 20 MIN: CPT | Mod: S$GLB,,, | Performed by: NURSE PRACTITIONER

## 2022-05-30 PROCEDURE — 4010F PR ACE/ARB THEARPY RXD/TAKEN: ICD-10-PCS | Mod: CPTII,S$GLB,, | Performed by: NURSE PRACTITIONER

## 2022-05-30 PROCEDURE — 3075F PR MOST RECENT SYSTOLIC BLOOD PRESS GE 130-139MM HG: ICD-10-PCS | Mod: CPTII,S$GLB,, | Performed by: NURSE PRACTITIONER

## 2022-05-30 PROCEDURE — 3078F DIAST BP <80 MM HG: CPT | Mod: CPTII,S$GLB,, | Performed by: NURSE PRACTITIONER

## 2022-05-30 PROCEDURE — 3078F PR MOST RECENT DIASTOLIC BLOOD PRESSURE < 80 MM HG: ICD-10-PCS | Mod: CPTII,S$GLB,, | Performed by: NURSE PRACTITIONER

## 2022-05-30 PROCEDURE — 71046 X-RAY EXAM CHEST 2 VIEWS: CPT | Mod: 26,,, | Performed by: RADIOLOGY

## 2022-05-30 PROCEDURE — 3008F BODY MASS INDEX DOCD: CPT | Mod: CPTII,S$GLB,, | Performed by: NURSE PRACTITIONER

## 2022-05-30 PROCEDURE — 3075F SYST BP GE 130 - 139MM HG: CPT | Mod: CPTII,S$GLB,, | Performed by: NURSE PRACTITIONER

## 2022-05-30 PROCEDURE — 1159F MED LIST DOCD IN RCRD: CPT | Mod: CPTII,S$GLB,, | Performed by: NURSE PRACTITIONER

## 2022-05-30 PROCEDURE — 3008F PR BODY MASS INDEX (BMI) DOCUMENTED: ICD-10-PCS | Mod: CPTII,S$GLB,, | Performed by: NURSE PRACTITIONER

## 2022-05-30 PROCEDURE — 1159F PR MEDICATION LIST DOCUMENTED IN MEDICAL RECORD: ICD-10-PCS | Mod: CPTII,S$GLB,, | Performed by: NURSE PRACTITIONER

## 2022-05-30 RX ORDER — FUROSEMIDE 40 MG/1
60 TABLET ORAL 2 TIMES DAILY
Qty: 90 TABLET | Refills: 3 | Status: ON HOLD | OUTPATIENT
Start: 2022-05-30 | End: 2022-10-12

## 2022-05-30 RX ORDER — POTASSIUM CHLORIDE 20 MEQ/1
20 TABLET, EXTENDED RELEASE ORAL 2 TIMES DAILY
Qty: 60 TABLET | Refills: 3 | Status: SHIPPED | OUTPATIENT
Start: 2022-05-30 | End: 2022-06-29

## 2022-05-30 NOTE — PROGRESS NOTES
Subjective:       Patient ID: Bria Saldana is a 51 y.o. male.    Chief Complaint: Diarrhea (Had case of diarrhea and shortness of breath on 5/28/22)      History of Present Illness:   Bria Saldana 51 y.o. male presents today with reports of shortness of breath that becomes worse when he lays down. Patient reports that he is out of his lasix. Will send refill to pharmacy and have patient to schedule follow up with his cardiologist. Additionally, patient reports diarrhea since Saturday that has slightly improved. Discussed importance of hydration and to return to clinic in 1 week if no improvement.  Agree and understands plan.     Past Medical History:   Diagnosis Date    Acute CHF 7/8/2019    Acute on chronic combined systolic (congestive) and diastolic (congestive) heart failure 9/23/2019    Allergy     CHF (congestive heart failure) 7/9/2019    CKD (chronic kidney disease) stage 3, GFR 30-59 ml/min 7/8/2019    Essential hypertension 6/1/2017    Hypertension associated with chronic kidney disease due to type 2 diabetes mellitus 2/28/2022    Mixed hyperlipidemia 3/10/2022    NATALYA (obstructive sleep apnea) 7/23/2018     Family History   Problem Relation Age of Onset    Cancer Mother     Diabetes Mother     Diabetes Sister     Alzheimer's disease Father      Social History     Socioeconomic History    Marital status:    Occupational History    Occupation: certified    Tobacco Use    Smoking status: Former Smoker     Packs/day: 1.00     Years: 12.00     Pack years: 12.00    Smokeless tobacco: Never Used   Substance and Sexual Activity    Alcohol use: Yes     Alcohol/week: 1.0 standard drink     Types: 1 Cans of beer per week     Comment: 1 beer every now and then     Drug use: No    Sexual activity: Yes     Partners: Female     Comment: wife      Outpatient Encounter Medications as of 5/30/2022   Medication Sig Dispense Refill    atorvastatin (LIPITOR) 10  MG tablet Take 1 tablet (10 mg total) by mouth once daily. 90 tablet 1    carvediloL (COREG) 6.25 MG tablet Take 1 tablet (6.25 mg total) by mouth 2 (two) times daily. 60 tablet 11    hydrALAZINE (APRESOLINE) 100 MG tablet TAKE 1 TABLET BY MOUTH EVERY 8 HOURS 90 tablet 0    metOLazone (ZAROXOLYN) 5 MG tablet Take 1 tablet (5 mg total) by mouth daily as needed (swelling). 30 tablet 11    [DISCONTINUED] furosemide (LASIX) 40 MG tablet Take 1.5 tablets (60 mg total) by mouth 2 (two) times daily. 90 tablet 6    furosemide (LASIX) 40 MG tablet Take 1.5 tablets (60 mg total) by mouth 2 (two) times daily. 90 tablet 3    isosorbide dinitrate (ISORDIL) 20 MG tablet Take 1 tablet (20 mg total) by mouth 3 (three) times daily. 90 tablet 2    potassium chloride SA (K-DUR,KLOR-CON) 20 MEQ tablet Take 1 tablet (20 mEq total) by mouth 2 (two) times daily. 60 tablet 3    sacubitriL-valsartan (ENTRESTO) 49-51 mg per tablet Take 1 tablet by mouth 2 (two) times daily. (Patient not taking: Reported on 5/30/2022) 60 tablet 11    terazosin (HYTRIN) 5 MG capsule Take 1 capsule (5 mg total) by mouth every evening. 30 capsule 2     No facility-administered encounter medications on file as of 5/30/2022.       Review of Systems   Constitutional: Negative for activity change, appetite change, fatigue and fever.   HENT: Negative for congestion, ear discharge, ear pain, mouth sores, nosebleeds, sore throat and tinnitus.    Eyes: Negative for discharge, redness and itching.   Respiratory: Negative for apnea, cough and shortness of breath.    Cardiovascular: Negative for chest pain and leg swelling.   Gastrointestinal: Negative for abdominal distention, abdominal pain, blood in stool and constipation.   Endocrine: Negative for polydipsia, polyphagia and polyuria.   Genitourinary: Negative for difficulty urinating, flank pain, frequency and hematuria.   Musculoskeletal: Negative for back pain, gait problem, neck pain and neck stiffness.  "  Skin: Negative for rash and wound.   Allergic/Immunologic: Negative for environmental allergies, food allergies and immunocompromised state.   Neurological: Negative for dizziness, seizures, syncope, numbness and headaches.   Hematological: Negative for adenopathy. Does not bruise/bleed easily.   Psychiatric/Behavioral: Negative for agitation, confusion, hallucinations, self-injury and suicidal ideas.       Objective:      /76 (BP Location: Right arm, Patient Position: Sitting, BP Method: Large (Automatic))   Pulse 83   Temp 98.4 °F (36.9 °C) (Temporal)   Ht 6' 3" (1.905 m)   Wt 121.4 kg (267 lb 9.6 oz)   SpO2 96%   BMI 33.45 kg/m²   Physical Exam  Constitutional:       Appearance: He is well-developed.   HENT:      Head: Normocephalic.      Nose: Nose normal.   Eyes:      Pupils: Pupils are equal, round, and reactive to light.   Cardiovascular:      Rate and Rhythm: Normal rate.      Heart sounds: Normal heart sounds.   Pulmonary:      Effort: Pulmonary effort is normal.      Breath sounds: Normal breath sounds.   Abdominal:      General: Bowel sounds are normal.      Palpations: Abdomen is soft.   Musculoskeletal:         General: Normal range of motion.      Cervical back: Normal range of motion.   Skin:     General: Skin is warm and dry.   Neurological:      Mental Status: He is alert and oriented to person, place, and time.   Psychiatric:         Behavior: Behavior normal.         Results for orders placed or performed during the hospital encounter of 06/24/21   HIV 1/2 Ag/Ab (4th Gen)   Result Value Ref Range    HIV 1/2 Ag/Ab Negative Negative   Hepatitis C Antibody   Result Value Ref Range    Hepatitis C Ab Negative Negative   HCV Virus Hold Specimen   Result Value Ref Range    HEP C Virus Hold Specimen Hold for HCV sendout    CBC auto differential   Result Value Ref Range    WBC 6.65 3.90 - 12.70 K/uL    RBC 5.29 4.60 - 6.20 M/uL    Hemoglobin 13.9 (L) 14.0 - 18.0 g/dL    Hematocrit 45.7 40.0 - " 54.0 %    MCV 86 82 - 98 fL    MCH 26.3 (L) 27.0 - 31.0 pg    MCHC 30.4 (L) 32.0 - 36.0 g/dL    RDW 14.3 11.5 - 14.5 %    Platelets 262 150 - 450 K/uL    MPV 10.2 9.2 - 12.9 fL    Immature Granulocytes 0.6 (H) 0.0 - 0.5 %    Gran # (ANC) 4.1 1.8 - 7.7 K/uL    Immature Grans (Abs) 0.04 0.00 - 0.04 K/uL    Lymph # 1.8 1.0 - 4.8 K/uL    Mono # 0.5 0.3 - 1.0 K/uL    Eos # 0.1 0.0 - 0.5 K/uL    Baso # 0.06 0.00 - 0.20 K/uL    nRBC 0 0 /100 WBC    Gran % 61.7 38.0 - 73.0 %    Lymph % 27.5 18.0 - 48.0 %    Mono % 7.2 4.0 - 15.0 %    Eosinophil % 2.1 0.0 - 8.0 %    Basophil % 0.9 0.0 - 1.9 %    Differential Method Automated    Comprehensive metabolic panel   Result Value Ref Range    Sodium 142 136 - 145 mmol/L    Potassium 4.2 3.5 - 5.1 mmol/L    Chloride 110 95 - 110 mmol/L    CO2 21 (L) 23 - 29 mmol/L    Glucose 100 70 - 110 mg/dL    BUN 25 (H) 6 - 20 mg/dL    Creatinine 1.9 (H) 0.5 - 1.4 mg/dL    Calcium 8.6 (L) 8.7 - 10.5 mg/dL    Total Protein 6.5 6.0 - 8.4 g/dL    Albumin 3.3 (L) 3.5 - 5.2 g/dL    Total Bilirubin 1.6 (H) 0.1 - 1.0 mg/dL    Alkaline Phosphatase 83 55 - 135 U/L    AST 23 10 - 40 U/L    ALT 29 10 - 44 U/L    Anion Gap 11 8 - 16 mmol/L    eGFR if African American 47 (A) >60 mL/min/1.73 m^2    eGFR if non African American 40 (A) >60 mL/min/1.73 m^2   Troponin I #1   Result Value Ref Range    Troponin I 0.096 (H) 0.000 - 0.026 ng/mL   BNP   Result Value Ref Range    BNP 1,519 (H) 0 - 99 pg/mL   Hemoglobin A1c   Result Value Ref Range    Hemoglobin A1C 5.2 4.0 - 5.6 %    Estimated Avg Glucose 103 68 - 131 mg/dL   Magnesium   Result Value Ref Range    Magnesium 2.1 1.6 - 2.6 mg/dL   Troponin I   Result Value Ref Range    Troponin I 0.088 (H) 0.000 - 0.026 ng/mL   Troponin I   Result Value Ref Range    Troponin I 0.099 (H) 0.000 - 0.026 ng/mL   Comprehensive Metabolic Panel (CMP)   Result Value Ref Range    Sodium 139 136 - 145 mmol/L    Potassium 3.5 3.5 - 5.1 mmol/L    Chloride 105 95 - 110 mmol/L    CO2 23  23 - 29 mmol/L    Glucose 146 (H) 70 - 110 mg/dL    BUN 24 (H) 6 - 20 mg/dL    Creatinine 1.9 (H) 0.5 - 1.4 mg/dL    Calcium 8.5 (L) 8.7 - 10.5 mg/dL    Total Protein 6.7 6.0 - 8.4 g/dL    Albumin 3.2 (L) 3.5 - 5.2 g/dL    Total Bilirubin 2.1 (H) 0.1 - 1.0 mg/dL    Alkaline Phosphatase 92 55 - 135 U/L    AST 20 10 - 40 U/L    ALT 24 10 - 44 U/L    Anion Gap 11 8 - 16 mmol/L    eGFR if African American 47 (A) >60 mL/min/1.73 m^2    eGFR if non African American 40 (A) >60 mL/min/1.73 m^2   Magnesium   Result Value Ref Range    Magnesium 2.1 1.6 - 2.6 mg/dL   Phosphorus   Result Value Ref Range    Phosphorus 3.3 2.7 - 4.5 mg/dL   CBC with Automated Differential   Result Value Ref Range    WBC 6.75 3.90 - 12.70 K/uL    RBC 6.11 4.60 - 6.20 M/uL    Hemoglobin 16.0 14.0 - 18.0 g/dL    Hematocrit 52.7 40.0 - 54.0 %    MCV 86 82 - 98 fL    MCH 26.2 (L) 27.0 - 31.0 pg    MCHC 30.4 (L) 32.0 - 36.0 g/dL    RDW 14.5 11.5 - 14.5 %    Platelets 264 150 - 450 K/uL    MPV 10.4 9.2 - 12.9 fL    Immature Granulocytes 0.3 0.0 - 0.5 %    Gran # (ANC) 4.7 1.8 - 7.7 K/uL    Immature Grans (Abs) 0.02 0.00 - 0.04 K/uL    Lymph # 1.3 1.0 - 4.8 K/uL    Mono # 0.6 0.3 - 1.0 K/uL    Eos # 0.2 0.0 - 0.5 K/uL    Baso # 0.05 0.00 - 0.20 K/uL    nRBC 0 0 /100 WBC    Gran % 69.2 38.0 - 73.0 %    Lymph % 18.7 18.0 - 48.0 %    Mono % 8.6 4.0 - 15.0 %    Eosinophil % 2.5 0.0 - 8.0 %    Basophil % 0.7 0.0 - 1.9 %    Differential Method Automated    POCT COVID-19 Rapid Screening   Result Value Ref Range    POC Rapid COVID Negative Negative     Acceptable Yes    Echo   Result Value Ref Range    BSA 2.47 m2    TDI SEPTAL 0.05 m/s    LV LATERAL E/E' RATIO 9.80 m/s    LV SEPTAL E/E' RATIO 9.80 m/s    LA WIDTH 4.04 cm    TDI LATERAL 0.05 m/s    LVIDd 5.36 3.5 - 6.0 cm    IVS 1.82 (A) 0.6 - 1.1 cm    Posterior Wall 1.89 (A) 0.6 - 1.1 cm    Ao root annulus 3.79 cm    LVIDs 4.86 (A) 2.1 - 4.0 cm    FS 9 28 - 44 %    LA volume 76.33 cm3    Sinus  3.48 cm    STJ 2.78 cm    Ascending aorta 3.17 cm    LV mass 493.27 g    LA size 4.68 cm    TAPSE 1.48 cm    Left Ventricle Relative Wall Thickness 0.71 cm    AV mean gradient 8 mmHg    AV valve area 2.06 cm2    AV Velocity Ratio 0.55     AV index (prosthetic) 0.66     MV valve area p 1/2 method 3.19 cm2    PV peak gradient 2 mmHg    E/A ratio 0.89     Mean e' 0.05 m/s    E wave deceleration time 237.81 msec    IVRT 100.35 msec    Pulm vein S/D ratio 1.47     LVOT diameter 2.00 cm    LVOT area 3.1 cm2    LVOT peak vineet 0.92 m/s    LVOT peak VTI 15.73 cm    Ao peak vineet 1.66 m/s    Ao VTI 24.00 cm    RVOT peak vineet 0.67 m/s    RVOT peak VTI 11.98 cm    LVOT stroke volume 49.39 cm3    AV peak gradient 11 mmHg    PV mean gradient 1 mmHg    E/E' ratio 9.80 m/s    MV Peak E Vineet 0.49 m/s    TR Max Vineet 2.96 m/s    MV stenosis pressure 1/2 time 68.96 ms    MV Peak A Vineet 0.55 m/s    PV Peak S Vineet 0.47 m/s    PV Peak D Vineet 0.32 m/s    LV Systolic Volume 110.93 mL    LV Systolic Volume Index 45.7 mL/m2    LV Diastolic Volume 138.68 mL    LV Diastolic Volume Index 57.07 mL/m2    LA Volume Index 31.4 mL/m2    LV Mass Index 203 g/m2    RA Major Axis 4.82 cm    Left Atrium Minor Axis 4.72 cm    Left Atrium Major Axis 4.78 cm    Triscuspid Valve Regurgitation Peak Gradient 35 mmHg    RA Width 3.75 cm    EF 35 %     Assessment:       1. Chronic combined systolic and diastolic congestive heart failure    2. Stage 3 chronic kidney disease, unspecified whether stage 3a or 3b CKD    3. Essential hypertension    4. Mixed hyperlipidemia    5. Shortness of breath    6. Diarrhea, unspecified type        Plan:   Bria was seen today for diarrhea.    Diagnoses and all orders for this visit:    Chronic combined systolic and diastolic congestive heart failure  -     BNP; Future  -     furosemide (LASIX) 40 MG tablet; Take 1.5 tablets (60 mg total) by mouth 2 (two) times daily.  -     potassium chloride SA (K-DUR,KLOR-CON) 20 MEQ tablet; Take 1  tablet (20 mEq total) by mouth 2 (two) times daily.    Stage 3 chronic kidney disease, unspecified whether stage 3a or 3b CKD    Essential hypertension    Mixed hyperlipidemia    Shortness of breath  -     X-Ray Chest PA And Lateral; Future    Diarrhea, unspecified type             [facility]   [address]  [place] [address]  Office [phone]  Fax [phone]

## 2022-08-12 RX ORDER — METOLAZONE 5 MG/1
5 TABLET ORAL DAILY PRN
Qty: 30 TABLET | Refills: 11 | OUTPATIENT
Start: 2022-08-12 | End: 2023-08-12

## 2022-10-11 ENCOUNTER — HOSPITAL ENCOUNTER (OUTPATIENT)
Facility: HOSPITAL | Age: 52
Discharge: HOME | End: 2022-10-12
Attending: EMERGENCY MEDICINE | Admitting: EMERGENCY MEDICINE
Payer: COMMERCIAL

## 2022-10-11 ENCOUNTER — TELEPHONE (OUTPATIENT)
Dept: CARDIOLOGY | Facility: CLINIC | Age: 52
End: 2022-10-11
Payer: COMMERCIAL

## 2022-10-11 DIAGNOSIS — R05.9 COUGH: ICD-10-CM

## 2022-10-11 DIAGNOSIS — I10 HYPERTENSION: ICD-10-CM

## 2022-10-11 DIAGNOSIS — I50.42 CHRONIC COMBINED SYSTOLIC AND DIASTOLIC CONGESTIVE HEART FAILURE: ICD-10-CM

## 2022-10-11 DIAGNOSIS — I16.9 HYPERTENSIVE CRISIS: Primary | ICD-10-CM

## 2022-10-11 DIAGNOSIS — I50.9 ACUTE ON CHRONIC CONGESTIVE HEART FAILURE, UNSPECIFIED HEART FAILURE TYPE: ICD-10-CM

## 2022-10-11 DIAGNOSIS — I10 UNCONTROLLED HYPERTENSION: ICD-10-CM

## 2022-10-11 DIAGNOSIS — E66.01 MORBID OBESITY: ICD-10-CM

## 2022-10-11 DIAGNOSIS — I50.9 CHF EXACERBATION: ICD-10-CM

## 2022-10-11 DIAGNOSIS — E78.2 MIXED HYPERLIPIDEMIA: ICD-10-CM

## 2022-10-11 LAB
ALBUMIN SERPL BCP-MCNC: 3.6 G/DL (ref 3.5–5.2)
ALP SERPL-CCNC: 82 U/L (ref 55–135)
ALT SERPL W/O P-5'-P-CCNC: 18 U/L (ref 10–44)
AMPHET+METHAMPHET UR QL: NEGATIVE
ANION GAP SERPL CALC-SCNC: 11 MMOL/L (ref 8–16)
APTT BLDCRRT: 26.4 SEC (ref 21–32)
AST SERPL-CCNC: 18 U/L (ref 10–40)
BACTERIA #/AREA URNS HPF: NORMAL /HPF
BARBITURATES UR QL SCN>200 NG/ML: NEGATIVE
BASOPHILS # BLD AUTO: 0.05 K/UL (ref 0–0.2)
BASOPHILS NFR BLD: 0.7 % (ref 0–1.9)
BENZODIAZ UR QL SCN>200 NG/ML: NEGATIVE
BILIRUB SERPL-MCNC: 1.8 MG/DL (ref 0.1–1)
BILIRUB UR QL STRIP: NEGATIVE
BNP SERPL-MCNC: 1030 PG/ML (ref 0–99)
BUN SERPL-MCNC: 18 MG/DL (ref 6–20)
BZE UR QL SCN: NEGATIVE
CALCIUM SERPL-MCNC: 8.9 MG/DL (ref 8.7–10.5)
CANNABINOIDS UR QL SCN: NEGATIVE
CHLORIDE SERPL-SCNC: 103 MMOL/L (ref 95–110)
CLARITY UR: CLEAR
CO2 SERPL-SCNC: 20 MMOL/L (ref 23–29)
COLOR UR: YELLOW
CREAT SERPL-MCNC: 1.7 MG/DL (ref 0.5–1.4)
CREAT UR-MCNC: 109.1 MG/DL (ref 23–375)
DIFFERENTIAL METHOD: ABNORMAL
EOSINOPHIL # BLD AUTO: 0.1 K/UL (ref 0–0.5)
EOSINOPHIL NFR BLD: 1.9 % (ref 0–8)
ERYTHROCYTE [DISTWIDTH] IN BLOOD BY AUTOMATED COUNT: 14.6 % (ref 11.5–14.5)
EST. GFR  (NO RACE VARIABLE): 49 ML/MIN/1.73 M^2
GLUCOSE SERPL-MCNC: 105 MG/DL (ref 70–110)
GLUCOSE UR QL STRIP: NEGATIVE
HCT VFR BLD AUTO: 43.1 % (ref 40–54)
HGB BLD-MCNC: 13.7 G/DL (ref 14–18)
HGB UR QL STRIP: NEGATIVE
HYALINE CASTS #/AREA URNS LPF: 0 /LPF
IMM GRANULOCYTES # BLD AUTO: 0.01 K/UL (ref 0–0.04)
IMM GRANULOCYTES NFR BLD AUTO: 0.1 % (ref 0–0.5)
INR PPP: 1.1 (ref 0.8–1.2)
KETONES UR QL STRIP: NEGATIVE
LEUKOCYTE ESTERASE UR QL STRIP: NEGATIVE
LYMPHOCYTES # BLD AUTO: 2.3 K/UL (ref 1–4.8)
LYMPHOCYTES NFR BLD: 33.5 % (ref 18–48)
MAGNESIUM SERPL-MCNC: 1.8 MG/DL (ref 1.6–2.6)
MCH RBC QN AUTO: 27.2 PG (ref 27–31)
MCHC RBC AUTO-ENTMCNC: 31.8 G/DL (ref 32–36)
MCV RBC AUTO: 86 FL (ref 82–98)
METHADONE UR QL SCN>300 NG/ML: NEGATIVE
MICROSCOPIC COMMENT: NORMAL
MONOCYTES # BLD AUTO: 0.5 K/UL (ref 0.3–1)
MONOCYTES NFR BLD: 7.1 % (ref 4–15)
NEUTROPHILS # BLD AUTO: 3.8 K/UL (ref 1.8–7.7)
NEUTROPHILS NFR BLD: 56.7 % (ref 38–73)
NITRITE UR QL STRIP: NEGATIVE
NRBC BLD-RTO: 0 /100 WBC
OPIATES UR QL SCN: NEGATIVE
PCP UR QL SCN>25 NG/ML: NEGATIVE
PH UR STRIP: 6 [PH] (ref 5–8)
PLATELET # BLD AUTO: 234 K/UL (ref 150–450)
PMV BLD AUTO: 10.3 FL (ref 9.2–12.9)
POCT GLUCOSE: 91 MG/DL (ref 70–110)
POTASSIUM SERPL-SCNC: 3.6 MMOL/L (ref 3.5–5.1)
PROT SERPL-MCNC: 6.6 G/DL (ref 6–8.4)
PROT UR QL STRIP: ABNORMAL
PROTHROMBIN TIME: 11.3 SEC (ref 9–12.5)
RBC # BLD AUTO: 5.04 M/UL (ref 4.6–6.2)
RBC #/AREA URNS HPF: 0 /HPF (ref 0–4)
SARS-COV-2 RDRP RESP QL NAA+PROBE: NEGATIVE
SODIUM SERPL-SCNC: 134 MMOL/L (ref 136–145)
SP GR UR STRIP: 1.01 (ref 1–1.03)
TOXICOLOGY INFORMATION: NORMAL
TROPONIN I SERPL DL<=0.01 NG/ML-MCNC: 0.07 NG/ML (ref 0–0.03)
TROPONIN I SERPL DL<=0.01 NG/ML-MCNC: 0.1 NG/ML (ref 0–0.03)
TSH SERPL DL<=0.005 MIU/L-ACNC: 1.2 UIU/ML (ref 0.4–4)
URN SPEC COLLECT METH UR: ABNORMAL
UROBILINOGEN UR STRIP-ACNC: NEGATIVE EU/DL
WBC # BLD AUTO: 6.75 K/UL (ref 3.9–12.7)
WBC #/AREA URNS HPF: 0 /HPF (ref 0–5)

## 2022-10-11 PROCEDURE — 84484 ASSAY OF TROPONIN QUANT: CPT | Performed by: PHYSICIAN ASSISTANT

## 2022-10-11 PROCEDURE — 83036 HEMOGLOBIN GLYCOSYLATED A1C: CPT | Performed by: NURSE PRACTITIONER

## 2022-10-11 PROCEDURE — 93005 ELECTROCARDIOGRAM TRACING: CPT

## 2022-10-11 PROCEDURE — 81000 URINALYSIS NONAUTO W/SCOPE: CPT | Mod: 59 | Performed by: EMERGENCY MEDICINE

## 2022-10-11 PROCEDURE — 96374 THER/PROPH/DIAG INJ IV PUSH: CPT

## 2022-10-11 PROCEDURE — 83735 ASSAY OF MAGNESIUM: CPT | Performed by: EMERGENCY MEDICINE

## 2022-10-11 PROCEDURE — 85610 PROTHROMBIN TIME: CPT | Performed by: EMERGENCY MEDICINE

## 2022-10-11 PROCEDURE — 93010 ELECTROCARDIOGRAM REPORT: CPT | Mod: ,,, | Performed by: INTERNAL MEDICINE

## 2022-10-11 PROCEDURE — G0378 HOSPITAL OBSERVATION PER HR: HCPCS

## 2022-10-11 PROCEDURE — 99291 CRITICAL CARE FIRST HOUR: CPT | Mod: 25

## 2022-10-11 PROCEDURE — 80307 DRUG TEST PRSMV CHEM ANLYZR: CPT | Performed by: EMERGENCY MEDICINE

## 2022-10-11 PROCEDURE — U0002 COVID-19 LAB TEST NON-CDC: HCPCS | Performed by: EMERGENCY MEDICINE

## 2022-10-11 PROCEDURE — 25000003 PHARM REV CODE 250: Performed by: EMERGENCY MEDICINE

## 2022-10-11 PROCEDURE — 25000003 PHARM REV CODE 250: Performed by: NURSE PRACTITIONER

## 2022-10-11 PROCEDURE — 83880 ASSAY OF NATRIURETIC PEPTIDE: CPT | Performed by: PHYSICIAN ASSISTANT

## 2022-10-11 PROCEDURE — 93010 EKG 12-LEAD: ICD-10-PCS | Mod: ,,, | Performed by: INTERNAL MEDICINE

## 2022-10-11 PROCEDURE — 96375 TX/PRO/DX INJ NEW DRUG ADDON: CPT

## 2022-10-11 PROCEDURE — 84484 ASSAY OF TROPONIN QUANT: CPT | Mod: 91 | Performed by: NURSE PRACTITIONER

## 2022-10-11 PROCEDURE — 85025 COMPLETE CBC W/AUTO DIFF WBC: CPT | Performed by: PHYSICIAN ASSISTANT

## 2022-10-11 PROCEDURE — 63600175 PHARM REV CODE 636 W HCPCS: Performed by: EMERGENCY MEDICINE

## 2022-10-11 PROCEDURE — 80053 COMPREHEN METABOLIC PANEL: CPT | Performed by: PHYSICIAN ASSISTANT

## 2022-10-11 PROCEDURE — 84443 ASSAY THYROID STIM HORMONE: CPT | Performed by: EMERGENCY MEDICINE

## 2022-10-11 PROCEDURE — 85730 THROMBOPLASTIN TIME PARTIAL: CPT | Performed by: EMERGENCY MEDICINE

## 2022-10-11 PROCEDURE — 99900035 HC TECH TIME PER 15 MIN (STAT)

## 2022-10-11 PROCEDURE — 94761 N-INVAS EAR/PLS OXIMETRY MLT: CPT

## 2022-10-11 RX ORDER — ATORVASTATIN CALCIUM 10 MG/1
10 TABLET, FILM COATED ORAL NIGHTLY
Status: DISCONTINUED | OUTPATIENT
Start: 2022-10-11 | End: 2022-10-12 | Stop reason: HOSPADM

## 2022-10-11 RX ORDER — POLYETHYLENE GLYCOL 3350 17 G/17G
17 POWDER, FOR SOLUTION ORAL DAILY
Status: DISCONTINUED | OUTPATIENT
Start: 2022-10-12 | End: 2022-10-12 | Stop reason: HOSPADM

## 2022-10-11 RX ORDER — HYDRALAZINE HYDROCHLORIDE 20 MG/ML
10 INJECTION INTRAMUSCULAR; INTRAVENOUS EVERY 8 HOURS PRN
Status: DISCONTINUED | OUTPATIENT
Start: 2022-10-11 | End: 2022-10-12 | Stop reason: HOSPADM

## 2022-10-11 RX ORDER — METOPROLOL TARTRATE 50 MG/1
50 TABLET ORAL
Status: COMPLETED | OUTPATIENT
Start: 2022-10-11 | End: 2022-10-11

## 2022-10-11 RX ORDER — IBUPROFEN 200 MG
16 TABLET ORAL
Status: DISCONTINUED | OUTPATIENT
Start: 2022-10-11 | End: 2022-10-12 | Stop reason: HOSPADM

## 2022-10-11 RX ORDER — INSULIN ASPART 100 [IU]/ML
1-10 INJECTION, SOLUTION INTRAVENOUS; SUBCUTANEOUS
Status: DISCONTINUED | OUTPATIENT
Start: 2022-10-11 | End: 2022-10-11

## 2022-10-11 RX ORDER — CARVEDILOL 6.25 MG/1
6.25 TABLET ORAL 2 TIMES DAILY
Status: DISCONTINUED | OUTPATIENT
Start: 2022-10-11 | End: 2022-10-12

## 2022-10-11 RX ORDER — HYDRALAZINE HYDROCHLORIDE 50 MG/1
50 TABLET, FILM COATED ORAL EVERY 8 HOURS
Status: DISCONTINUED | OUTPATIENT
Start: 2022-10-11 | End: 2022-10-12 | Stop reason: HOSPADM

## 2022-10-11 RX ORDER — GLUCAGON 1 MG
1 KIT INJECTION
Status: DISCONTINUED | OUTPATIENT
Start: 2022-10-11 | End: 2022-10-12 | Stop reason: HOSPADM

## 2022-10-11 RX ORDER — SODIUM CHLORIDE 0.9 % (FLUSH) 0.9 %
10 SYRINGE (ML) INJECTION
Status: DISCONTINUED | OUTPATIENT
Start: 2022-10-11 | End: 2022-10-12 | Stop reason: HOSPADM

## 2022-10-11 RX ORDER — FUROSEMIDE 10 MG/ML
40 INJECTION INTRAMUSCULAR; INTRAVENOUS
Status: DISCONTINUED | OUTPATIENT
Start: 2022-10-12 | End: 2022-10-12 | Stop reason: HOSPADM

## 2022-10-11 RX ORDER — IBUPROFEN 200 MG
24 TABLET ORAL
Status: DISCONTINUED | OUTPATIENT
Start: 2022-10-11 | End: 2022-10-12 | Stop reason: HOSPADM

## 2022-10-11 RX ORDER — METOPROLOL TARTRATE 1 MG/ML
5 INJECTION, SOLUTION INTRAVENOUS
Status: COMPLETED | OUTPATIENT
Start: 2022-10-11 | End: 2022-10-11

## 2022-10-11 RX ORDER — FUROSEMIDE 10 MG/ML
60 INJECTION INTRAMUSCULAR; INTRAVENOUS
Status: COMPLETED | OUTPATIENT
Start: 2022-10-11 | End: 2022-10-11

## 2022-10-11 RX ORDER — HYDRALAZINE HYDROCHLORIDE 20 MG/ML
20 INJECTION INTRAMUSCULAR; INTRAVENOUS
Status: COMPLETED | OUTPATIENT
Start: 2022-10-11 | End: 2022-10-11

## 2022-10-11 RX ORDER — INSULIN ASPART 100 [IU]/ML
0-5 INJECTION, SOLUTION INTRAVENOUS; SUBCUTANEOUS
Status: DISCONTINUED | OUTPATIENT
Start: 2022-10-11 | End: 2022-10-12 | Stop reason: HOSPADM

## 2022-10-11 RX ORDER — ONDANSETRON 4 MG/1
4 TABLET, ORALLY DISINTEGRATING ORAL EVERY 8 HOURS PRN
Status: DISCONTINUED | OUTPATIENT
Start: 2022-10-11 | End: 2022-10-12 | Stop reason: HOSPADM

## 2022-10-11 RX ADMIN — METOROPROLOL TARTRATE 5 MG: 5 INJECTION, SOLUTION INTRAVENOUS at 12:10

## 2022-10-11 RX ADMIN — ATORVASTATIN CALCIUM 10 MG: 10 TABLET, FILM COATED ORAL at 09:10

## 2022-10-11 RX ADMIN — CARVEDILOL 6.25 MG: 6.25 TABLET, FILM COATED ORAL at 09:10

## 2022-10-11 RX ADMIN — NITROGLYCERIN 1 INCH: 20 OINTMENT TOPICAL at 12:10

## 2022-10-11 RX ADMIN — FUROSEMIDE 60 MG: 10 INJECTION, SOLUTION INTRAMUSCULAR; INTRAVENOUS at 02:10

## 2022-10-11 RX ADMIN — HYDRALAZINE HYDROCHLORIDE 20 MG: 20 INJECTION INTRAMUSCULAR; INTRAVENOUS at 12:10

## 2022-10-11 RX ADMIN — METOPROLOL TARTRATE 50 MG: 50 TABLET, FILM COATED ORAL at 12:10

## 2022-10-11 RX ADMIN — HYDRALAZINE HYDROCHLORIDE 50 MG: 50 TABLET, FILM COATED ORAL at 09:10
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
## 2022-10-11 NOTE — ASSESSMENT & PLAN NOTE
Patient's FSGs are controlled on current medication regimen.  Last A1c reviewed-   Lab Results   Component Value Date    HGBA1C 5.2 06/24/2021     Most recent fingerstick glucose reviewed- No results for input(s): POCTGLUCOSE in the last 24 hours.  Current correctional scale  Low  Maintain anti-hyperglycemic dose as follows-   Antihyperglycemics (From admission, onward)    Start     Stop Route Frequency Ordered    10/11/22 1753  insulin aspart U-100 pen 0-5 Units         -- SubQ Before meals & nightly PRN 10/11/22 1654        Hold Oral hypoglycemics while patient is in the hospital.  -home medications resumed   -

## 2022-10-11 NOTE — TELEPHONE ENCOUNTER
Left message for pt to contact the office about wanting a same day appt. Appt made to this afternoon pb    ----- Message from Anamaria Jacobs sent at 10/11/2022  9:14 AM CDT -----  .Type:  Same Day Appointment Request    Caller is requesting a same day appointment.  Caller declined first available appointment listed below.    Name of Caller: Bria Saldana   When is the first available appointment? 12/14/2022  Symptoms:shortness of breath  Best Call Back Number:.[phone]   Additional Information:

## 2022-10-11 NOTE — ASSESSMENT & PLAN NOTE
-Troponin 0.098  -will follow serial results   -Echo ordered   -will consider need for Cardiology CN

## 2022-10-11 NOTE — FIRST PROVIDER EVALUATION
Medical screening examination initiated.  I have conducted a focused provider triage encounter, findings are as follows:    Brief history of present illness:  Reports to ED for elevated blood pressure readings and feeling fluid in chest. Hx of chf with hospitalization before. Feeling some shortness of breath and coughing. Denies significant swelling of extremities.      Vitals:    10/11/22 1144 10/11/22 1150   BP:  (!) 190/110   BP Location:  Right arm   Patient Position:  Sitting   Pulse: 97    Resp: (!) 22    Temp:  97.7 °F (36.5 °C)   TempSrc:  Oral   SpO2: 98%    Weight: 127.3 kg (280 lb 12.1 oz)        Pertinent physical exam:  RRR. Lungs diminished to bases. Resp rate mildly elevated but no obvious resp distress. AAOx3.     Brief workup plan:  cardiac workup including cxr and bnp.     Preliminary workup initiated; this workup will be continued and followed by the physician or advanced practice provider that is assigned to the patient when roomed.

## 2022-10-11 NOTE — HPI
Bria Saldana is a 51 y.o. male patient with a PMHx of HTN, CKD, CHF who presents to the Emergency Department for worsening SOB, onset 3 weeks PTA. Pt was scheduled to follow with Dr. Boo (Cardiology) today, but was unable to get transport to his appointment. Symptoms are constant and moderate in severity. Sxs are worse with exertion or when laying flat. Associated sxs include diarrhea and abdominal fullness. Patient denies any fever, chills, n/v, CP, weakness, numbness, dizziness, headache, and all other sxs at this time. Pt is not compliant with his home medication regimen. No further complaints or concerns at this time. Pt denies smoking and reports occasional use of ETOH. PT is a full code and Vielka Saldana (wife) at [phone] is the surrogate decision maker. Pt is followed outpatient by Dr. Boo (Cardiology). ER work up showed H/H 13.7/43.1, Na 134, CO2 20, Cr 1.7, BNP 1030, and Troponin 0.098.  Chest xray negative for acute process. Hospital Medicine contacted for admission with patient placed in Observation for further evaluation.

## 2022-10-11 NOTE — ASSESSMENT & PLAN NOTE
Patient is identified as having Diastolic (HFpEF) heart failure that is Acute on chronic. CHF is currently uncontrolled due to Weight gain of 10 pounds. Latest ECHO performed and demonstrates- Results for orders placed during the hospital encounter of 06/24/21    Echo    Interpretation Summary  · Concentric hypertrophy and moderately decreased systolic function.  · Mild left atrial enlargement.  · There is abnormal septal wall motion.  · Small pericardial effusion.  · The estimated ejection fraction is 35%.  · Grade I left ventricular diastolic dysfunction.  . Continue Beta Blocker and Furosemide and monitor clinical status closely. Monitor on telemetry. Patient is on CHF pathway.  Monitor strict Is&Os and daily weights.  Place on fluid restriction of 1.5 L. Continue to stress to patient importance of self efficacy and  on diet for CHF. Last BNP reviewed- and noted below   Recent Labs   Lab 10/11/22  1225   BNP 1,030*   .
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
## 2022-10-12 VITALS
DIASTOLIC BLOOD PRESSURE: 96 MMHG | SYSTOLIC BLOOD PRESSURE: 167 MMHG | HEIGHT: 75 IN | OXYGEN SATURATION: 96 % | BODY MASS INDEX: 34.82 KG/M2 | WEIGHT: 280 LBS | RESPIRATION RATE: 18 BRPM | HEART RATE: 78 BPM | TEMPERATURE: 98 F

## 2022-10-12 PROBLEM — R79.89 ELEVATED TROPONIN: Status: RESOLVED | Noted: 2019-07-08 | Resolved: 2022-10-12

## 2022-10-12 LAB
ANION GAP SERPL CALC-SCNC: 12 MMOL/L (ref 8–16)
AORTIC ROOT ANNULUS: 3.11 CM
ASCENDING AORTA: 3.11 CM
AV INDEX (PROSTH): 0.61
AV MEAN GRADIENT: 5 MMHG
AV PEAK GRADIENT: 11 MMHG
AV VALVE AREA: 1.9 CM2
AV VELOCITY RATIO: 0.48
BSA FOR ECHO PROCEDURE: 2.59 M2
BUN SERPL-MCNC: 24 MG/DL (ref 6–20)
CALCIUM SERPL-MCNC: 9.5 MG/DL (ref 8.7–10.5)
CHLORIDE SERPL-SCNC: 104 MMOL/L (ref 95–110)
CO2 SERPL-SCNC: 26 MMOL/L (ref 23–29)
CREAT SERPL-MCNC: 1.9 MG/DL (ref 0.5–1.4)
CV ECHO LV RWT: 0.86 CM
DOP CALC AO PEAK VEL: 1.65 M/S
DOP CALC AO VTI: 27.5 CM
DOP CALC LVOT AREA: 3.1 CM2
DOP CALC LVOT DIAMETER: 1.99 CM
DOP CALC LVOT PEAK VEL: 0.8 M/S
DOP CALC LVOT STROKE VOLUME: 52.23 CM3
DOP CALC RVOT PEAK VEL: 0.48 M/S
DOP CALC RVOT VTI: 8.7 CM
DOP CALCLVOT PEAK VEL VTI: 16.8 CM
E WAVE DECELERATION TIME: 191.26 MSEC
E/A RATIO: 1.83
E/E' RATIO: 24.2 M/S
ECHO LV POSTERIOR WALL: 2.2 CM (ref 0.6–1.1)
EJECTION FRACTION: 50 %
EST. GFR  (NO RACE VARIABLE): 42 ML/MIN/1.73 M^2
ESTIMATED AVG GLUCOSE: 114 MG/DL (ref 68–131)
ESTIMATED AVG GLUCOSE: 114 MG/DL (ref 68–131)
FRACTIONAL SHORTENING: 9 % (ref 28–44)
GLUCOSE SERPL-MCNC: 105 MG/DL (ref 70–110)
HBA1C MFR BLD: 5.6 % (ref 4–5.6)
HBA1C MFR BLD: 5.6 % (ref 4–5.6)
INTERVENTRICULAR SEPTUM: 3.19 CM (ref 0.6–1.1)
IVC DIAMETER: 2.69 CM
IVRT: 74.22 MSEC
LA MAJOR: 6.63 CM
LA MINOR: 6.7 CM
LA WIDTH: 5.7 CM
LEFT ATRIUM SIZE: 5.41 CM
LEFT ATRIUM VOLUME INDEX: 69 ML/M2
LEFT ATRIUM VOLUME: 174.69 CM3
LEFT INTERNAL DIMENSION IN SYSTOLE: 4.66 CM (ref 2.1–4)
LEFT VENTRICLE DIASTOLIC VOLUME INDEX: 49.94 ML/M2
LEFT VENTRICLE DIASTOLIC VOLUME: 126.35 ML
LEFT VENTRICLE MASS INDEX: 340 G/M2
LEFT VENTRICLE SYSTOLIC VOLUME INDEX: 39.7 ML/M2
LEFT VENTRICLE SYSTOLIC VOLUME: 100.5 ML
LEFT VENTRICULAR INTERNAL DIMENSION IN DIASTOLE: 5.14 CM (ref 3.5–6)
LEFT VENTRICULAR MASS: 859.04 G
LV LATERAL E/E' RATIO: 20.17 M/S
LV SEPTAL E/E' RATIO: 30.25 M/S
LVOT MG: 1.44 MMHG
LVOT MV: 0.57 CM/S
MV PEAK A VEL: 0.66 M/S
MV PEAK E VEL: 1.21 M/S
PISA MRMAX VEL: 6.2 M/S
PISA TR MAX VEL: 3.64 M/S
POCT GLUCOSE: 117 MG/DL (ref 70–110)
POCT GLUCOSE: 92 MG/DL (ref 70–110)
POTASSIUM SERPL-SCNC: 3.9 MMOL/L (ref 3.5–5.1)
PV MEAN GRADIENT: 0.4 MMHG
RA MAJOR: 6.06 CM
RA WIDTH: 3.8 CM
SODIUM SERPL-SCNC: 142 MMOL/L (ref 136–145)
STJ: 2.43 CM
TDI LATERAL: 0.06 M/S
TDI SEPTAL: 0.04 M/S
TDI: 0.05 M/S
TR MAX PG: 53 MMHG
TRICUSPID ANNULAR PLANE SYSTOLIC EXCURSION: 2.1 CM

## 2022-10-12 PROCEDURE — 96376 TX/PRO/DX INJ SAME DRUG ADON: CPT

## 2022-10-12 PROCEDURE — 25000003 PHARM REV CODE 250: Performed by: NURSE PRACTITIONER

## 2022-10-12 PROCEDURE — 94761 N-INVAS EAR/PLS OXIMETRY MLT: CPT

## 2022-10-12 PROCEDURE — G0378 HOSPITAL OBSERVATION PER HR: HCPCS

## 2022-10-12 PROCEDURE — 63600175 PHARM REV CODE 636 W HCPCS: Performed by: NURSE PRACTITIONER

## 2022-10-12 PROCEDURE — 36415 COLL VENOUS BLD VENIPUNCTURE: CPT | Performed by: NURSE PRACTITIONER

## 2022-10-12 PROCEDURE — 80048 BASIC METABOLIC PNL TOTAL CA: CPT | Performed by: NURSE PRACTITIONER

## 2022-10-12 RX ORDER — ATORVASTATIN CALCIUM 40 MG/1
40 TABLET, FILM COATED ORAL NIGHTLY
Qty: 30 TABLET | Refills: 0 | Status: SHIPPED | OUTPATIENT
Start: 2022-10-12 | End: 2023-03-07 | Stop reason: SDUPTHER

## 2022-10-12 RX ORDER — CARVEDILOL 12.5 MG/1
12.5 TABLET ORAL 2 TIMES DAILY
Qty: 60 TABLET | Refills: 11 | Status: SHIPPED | OUTPATIENT
Start: 2022-10-12 | End: 2022-10-19

## 2022-10-12 RX ORDER — CARVEDILOL 6.25 MG/1
6.25 TABLET ORAL ONCE
Status: COMPLETED | OUTPATIENT
Start: 2022-10-12 | End: 2022-10-12

## 2022-10-12 RX ORDER — FUROSEMIDE 40 MG/1
40 TABLET ORAL 2 TIMES DAILY
Qty: 60 TABLET | Refills: 0 | Status: SHIPPED | OUTPATIENT
Start: 2022-10-12 | End: 2023-01-26 | Stop reason: SDUPTHER

## 2022-10-12 RX ORDER — CARVEDILOL 12.5 MG/1
12.5 TABLET ORAL 2 TIMES DAILY
Status: DISCONTINUED | OUTPATIENT
Start: 2022-10-12 | End: 2022-10-12 | Stop reason: HOSPADM

## 2022-10-12 RX ORDER — HYDRALAZINE HYDROCHLORIDE 50 MG/1
50 TABLET, FILM COATED ORAL EVERY 8 HOURS
Qty: 90 TABLET | Refills: 0 | Status: SHIPPED | OUTPATIENT
Start: 2022-10-12 | End: 2023-12-09 | Stop reason: SDUPTHER

## 2022-10-12 RX ADMIN — CARVEDILOL 6.25 MG: 6.25 TABLET, FILM COATED ORAL at 08:10

## 2022-10-12 RX ADMIN — CARVEDILOL 6.25 MG: 6.25 TABLET, FILM COATED ORAL at 10:10

## 2022-10-12 RX ADMIN — HYDRALAZINE HYDROCHLORIDE 50 MG: 50 TABLET, FILM COATED ORAL at 06:10

## 2022-10-12 RX ADMIN — FUROSEMIDE 40 MG: 10 INJECTION, SOLUTION INTRAMUSCULAR; INTRAVENOUS at 06:10

## 2022-10-12 RX ADMIN — HYDRALAZINE HYDROCHLORIDE 10 MG: 20 INJECTION, SOLUTION INTRAMUSCULAR; INTRAVENOUS at 08:10

## 2022-10-12 NOTE — NURSING
Pt given d/c instructions verbally and in writing. Pt v/u. IV cath removed, intact. Tele monitor returned and returned to monitor room. Pt awaiting ride.

## 2022-10-12 NOTE — ASSESSMENT & PLAN NOTE
Patient's FSGs are controlled on current medication regimen.  Last A1c reviewed-   Lab Results   Component Value Date    HGBA1C 5.6 10/11/2022    HGBA1C 5.6 10/11/2022     Most recent fingerstick glucose reviewed-   Recent Labs   Lab 10/11/22  2014 10/12/22  0550 10/12/22  1137   POCTGLUCOSE 91 117* 92     Current correctional scale  Low  Maintain anti-hyperglycemic dose as follows-   Antihyperglycemics (From admission, onward)    Start     Stop Route Frequency Ordered    10/11/22 1753  insulin aspart U-100 pen 0-5 Units         -- SubQ Before meals & nightly PRN 10/11/22 1654        Hold Oral hypoglycemics while patient is in the hospital.  -home medications resumed   -

## 2022-10-12 NOTE — CONSULTS
Food & Nutrition Education     Diet Education: Fluid and salt restriction    Time Spent: 15 minutes    Learners: Pt    Nutrition Education provided with handouts:  Low-Sodium Nutrition Therapy and Fluid-Restricted Diet (nutritioncaremanual.org)    Comments:  RD educated patient on low sodium diet and fluid restriction related to hospital diagnosis. Discussed the importance of limiting sodium to 2,000 mg per day and reading food labels to avoid further complications of CHF. Discussed using salt free seasonings and other herbs and spices in meals to enhance flavor without additional sodium. Discussed 1500 ml fluid restriction per MD and dietary sources of fluid. RD recommended using a cup with measurements for fluids and to try to consume small sips spread throughout the day rather than a lot at one time.    NFPE not performed, pt appears well nourished.    All questions and concerns answered.    Provided handout with dietitian's contact information.    *Please re-consult as needed.    Thanks!    Kurtis Hernandez Dietitian

## 2022-10-12 NOTE — PLAN OF CARE
[facility] - Tele[facility] (Hospital)  Discharge Final Note    Primary Care Provider: Hortencia Blair MD    Expected Discharge Date: 10/12/2022    Final Discharge Note (most recent)       Final Note - 10/12/22 1318          Final Note    Assessment Type Final Discharge Note     Anticipated Discharge Disposition Home or Self Care        Post-Acute Status    Discharge Delays None known at this time                   Pt to discharge home with no CM needs.     Important Message from Medicare             Contact Info       Hortencia Blair MD   Specialty: Internal Medicine    [facility]  [address] [facility] [address]   Phone: [phone]       Next Steps: Follow up in 3 day(s)    [facility]   Specialty: Cardiology    [id number] [place] [address]   Phone: [phone]       Next Steps: Follow up    Instructions: As Scheduled

## 2022-10-12 NOTE — ASSESSMENT & PLAN NOTE
-Troponin 0.098  -will follow serial results   -Echo ordered   -will consider need for Cardiology CN   10/12/2022 trended down, likely demand ischemia  F/u with cards OP

## 2022-10-12 NOTE — PLAN OF CARE
A247/A247 SOLEDAD Fraser Sandro Saldana is a 51 y.o.male admitted on 10/11/2022 for Acute on chronic combined systolic (congestive) and diastolic (congestive) heart failure   Code Status: Full Code MRN: [id number]   Review of patient's allergies indicates:   Allergen Reactions    Penicillins Hives and Anaphylaxis     Past Medical History:   Diagnosis Date    Acute CHF 7/8/2019    Acute on chronic combined systolic (congestive) and diastolic (congestive) heart failure 9/23/2019    Allergy     CHF (congestive heart failure) 7/9/2019    CKD (chronic kidney disease) stage 3, GFR 30-59 ml/min 7/8/2019    Essential hypertension 6/1/2017    Hypertension associated with chronic kidney disease due to type 2 diabetes mellitus 2/28/2022    Mixed hyperlipidemia 3/10/2022    NATALYA (obstructive sleep apnea) 7/23/2018      PRN meds    dextrose 10%, 12.5 g, PRN  dextrose 10%, 12.5 g, PRN  dextrose 10%, 25 g, PRN  dextrose 10%, 25 g, PRN  glucagon (human recombinant), 1 mg, PRN  glucose, 16 g, PRN  glucose, 24 g, PRN  hydrALAZINE, 10 mg, Q8H PRN  insulin aspart U-100, 0-5 Units, QID (AC + HS) PRN  ondansetron, 4 mg, Q8H PRN  sodium chloride 0.9%, 10 mL, PRN        Problem: Adult Inpatient Plan of Care  Goal: Plan of Care Review  Outcome: Met  Goal: Patient-Specific Goal (Individualized)  Outcome: Met  Goal: Absence of Hospital-Acquired Illness or Injury  Outcome: Met  Goal: Optimal Comfort and Wellbeing  Outcome: Met  Goal: Readiness for Transition of Care  Outcome: Met     Problem: Diabetes Comorbidity  Goal: Blood Glucose Level Within Targeted Range  Outcome: Met              O2 Device (Oxygen Therapy): room air  Lead Monitored: Lead II Rhythm: normal sinus rhythm    Cardiac/Telemetry Box Number: [id number]    Last Bowel Movement: 10/11/22  Diet Low Sodium, 2gm [facility]; Fluid - 1500mL  Diet Cardiac     Kenneth Score: 23  Fall Risk Score: 9  Accucheck []   Freq?      Lines/Drains/Airways       None

## 2022-10-12 NOTE — PLAN OF CARE
Pt remains fall free this shift.  Pt AAOx4, verbal, clear speech.  Skin warm and dry. No new skin issues.  Telemonitoring in progress, SR  Room air  Urinal  Independent with transfers.  CBG AC/ HS with PRN SS insulin.  Bed low, side rails up x 2, wheels locked, call light in reach.  Patient instructed to call for assistance.  Hourly rounding completed.  24 hour chart check completed  POC updated and reviewed with pt. Will continue POC.

## 2022-10-12 NOTE — DISCHARGE SUMMARY
[facility] ([facility])  [facility]  Discharge Summary      Patient Name: Bria Saldana  MRN: [id number]  Patient Class: OP- Observation  Admission Date: 10/11/2022  Hospital Length of Stay: 0 days  Discharge Date and Time: 10/12/2022  2:29 PM  Attending Physician: No att. providers found   Discharging Provider: Lin Allen NP  Primary Care Provider: Hortencia Blair MD      HPI:   Bria Saldana is a 51 y.o. male patient with a PMHx of HTN, CKD, CHF who presents to the Emergency Department for worsening SOB, onset 3 weeks PTA. Pt was scheduled to follow with Dr. Boo (Cardiology) today, but was unable to get transport to his appointment. Symptoms are constant and moderate in severity. Sxs are worse with exertion or when laying flat. Associated sxs include diarrhea and abdominal fullness. Patient denies any fever, chills, n/v, CP, weakness, numbness, dizziness, headache, and all other sxs at this time. Pt is not compliant with his home medication regimen. No further complaints or concerns at this time. Pt denies smoking and reports occasional use of ETOH. PT is a full code and Vielka Saldana (wife) at [phone] is the surrogate decision maker. Pt is followed outpatient by Dr. Boo (Cardiology). ER work up showed H/H 13.7/43.1, Na 134, CO2 20, Cr 1.7, BNP 1030, and Troponin 0.098.  Chest xray negative for acute process. Hospital Medicine contacted for admission with patient placed in Observation for further evaluation.       * No surgery found *      Hospital Course:   Bria Saldana was placed into observation for further workup and treatment of acute on chronic CHF secondary to medication noncompliance. Patient had not been taking home medications as prescribed, specifically, he was not taking lasix twice daily and was not taking metolazone. Also, was taken off of entresto due to coughing several months ago, he was due to see Dr. Boo yesterday  however was unable to make it to his appointment and symptoms got worse which prompted his arrival to the Emergency Department. Patient was noted to have a BNP 1030 and elevated troponin which trended downward and likely demand ischemia. Patient was given BID lasix and diuresed (chart not accurate regarding output, patient endorsed filling several urinals since admission) well. No SOB, lungs clear, and minimal peripheral edema noted. ECHO with diastolic failure and EF 50%. Patient is stable and appropriate for discharge home. Patient d/c home with high dose statin, increased coreg, imdur, hydralazine, lasix 40 BID. Recommend re discussing entresto at f/u. He is to follow up with PCP and cardiology OP. Referral placed for CHF clinic follow up.        Goals of Care Treatment Preferences:  Code Status: Full Code      Consults:   Consults (From admission, onward)        Status Ordering Provider     Inpatient consult to Heart Failure Nurse  Once        Provider:  (Not yet assigned)    Acknowledged LASHAWN VALDIVIA     Inpatient consult to Social Work/Case Management  Once        Provider:  (Not yet assigned)    Completed LASHAWN VALDIVIA     Inpatient consult to Registered Dietitian/Nutritionist  Once        Provider:  (Not yet assigned)    Completed LASHAWN VALDIVIA.          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 (congestive) and diastolic (congestive) heart failure  Patient is identified as having Diastolic (HFpEF) heart failure that is Acute on chronic. CHF is currently uncontrolled due to Weight gain of 10 pounds. Latest ECHO performed and demonstrates- Results for orders placed during the hospital encounter of 06/24/21    Echo    Interpretation Summary  · Concentric hypertrophy and moderately decreased systolic function.  · Mild left atrial enlargement.  · There is abnormal septal wall motion.  · Small pericardial effusion.  · The estimated ejection fraction is 35%.  · Grade I left ventricular diastolic dysfunction.  . Continue  Beta Blocker and Furosemide and monitor clinical status closely. Monitor on telemetry. Patient is on CHF pathway.  Monitor strict Is&Os and daily weights.  Place on fluid restriction of 1.5 L. Continue to stress to patient importance of self efficacy and  on diet for CHF. Last BNP reviewed- and noted below   Recent Labs   Lab 10/11/22  1225   BNP 1,030*     10/12/2022  Diuresis overnight with improvement in SOB, clear lung sounds, minimal peripheral edema        Mixed hyperlipidemia  D/c on high dose Statin     Hypertension associated with chronic kidney disease due to type 2 diabetes mellitus  Patient's FSGs are controlled on current medication regimen.  Last A1c reviewed-   Lab Results   Component Value Date    HGBA1C 5.6 10/11/2022    HGBA1C 5.6 10/11/2022     Most recent fingerstick glucose reviewed-   Recent Labs   Lab 10/11/22  2014 10/12/22  0550 10/12/22  1137   POCTGLUCOSE 91 117* 92     Current correctional scale  Low  Maintain anti-hyperglycemic dose as follows-   Antihyperglycemics (From admission, onward)    Start     Stop Route Frequency Ordered    10/11/22 1753  insulin aspart U-100 pen 0-5 Units         -- SubQ Before meals & nightly PRN 10/11/22 1654        Hold Oral hypoglycemics while patient is in the hospital.  -home medications resumed   -    CKD (chronic kidney disease) stage 3, GFR 30-59 ml/min  Stable  F/u with PCP OP      NATALYA (obstructive sleep apnea)  -CPAP at hs       Elevated troponin-resolved as of 10/12/2022  -Troponin 0.098  -will follow serial results   -Echo ordered   -will consider need for Cardiology CN   10/12/2022 trended down, likely demand ischemia  F/u with cards OP      Final Active Diagnoses:    Diagnosis Date Noted POA    PRINCIPAL PROBLEM:  Acute on chronic combined systolic (congestive) and diastolic (congestive) heart failure [I50.43] 09/23/2019 Yes    Mixed hyperlipidemia [E78.2] 03/10/2022 Yes    Hypertension associated with chronic kidney disease due to  type 2 diabetes mellitus [E11.22, I12.9] 02/28/2022 Yes    CKD (chronic kidney disease) stage 3, GFR 30-59 ml/min [N18.30] 07/08/2019 Yes    NATALYA (obstructive sleep apnea) [G47.33] 07/23/2018 Yes      Problems Resolved During this Admission:    Diagnosis Date Noted Date Resolved POA    Elevated troponin [R77.8] 07/08/2019 10/12/2022 Yes       Discharged Condition: good    Disposition: Home or Self Care    Follow Up:   Follow-up Information     Hortencia Bliar MD Follow up in 3 day(s).    Specialty: Internal Medicine  Contact information:  [address] [facility] [id number] [phone]             [facility] Follow up.    Specialty: Cardiology  Why: As Scheduled  Contact information:  [address] [facility] [address] [phone]  Additional information:  Please take [address] to the Medical Office Building. Check in on the 4th floor.                     Patient Instructions:      Ambulatory referral/consult to Congestive Heart Failure Clinic   Standing Status: Future   Referral Priority: Routine Referral Type: Consultation   Referral Reason: Specialty Services Required   Requested Specialty: Cardiology   Number of Visits Requested: 1     Ambulatory referral/consult to Internal Medicine   Standing Status: Future   Referral Priority: Urgent Referral Type: Consultation   Referral Reason: Specialty Services Required   Requested Specialty: Internal Medicine   Number of Visits Requested: 1     Diet Cardiac     Call MD for:  temperature >100.4     Call MD for:  persistent nausea and vomiting or diarrhea     Call MD for:  severe uncontrolled pain     Call MD for:  redness, tenderness, or signs of infection (pain, swelling, redness, odor or green/yellow discharge around incision site)     Call MD for:  difficulty breathing or increased cough     Call MD for:  severe persistent headache     Call MD for:  worsening rash     Call MD for:  persistent dizziness, light-headedness,  or visual disturbances     Call MD for:  increased confusion or weakness       Significant Diagnostic Studies: Labs:   CMP   Recent Labs   Lab 10/11/22  1225 10/12/22  0518   * 142   K 3.6 3.9    104   CO2 20* 26    105   BUN 18 24*   CREATININE 1.7* 1.9*   CALCIUM 8.9 9.5   PROT 6.6  --    ALBUMIN 3.6  --    BILITOT 1.8*  --    ALKPHOS 82  --    AST 18  --    ALT 18  --    ANIONGAP 11 12   , CBC   Recent Labs   Lab 10/11/22  1225   WBC 6.75   HGB 13.7*   HCT 43.1      , Lipid Panel   Lab Results   Component Value Date    CHOL 155 07/08/2019    HDL 32 (L) 07/08/2019    LDLCALC 96.0 07/08/2019    TRIG 135 07/08/2019    CHOLHDL 20.6 07/08/2019   , Troponin   Recent Labs   Lab 10/11/22  1225 10/11/22  1812   TROPONINI 0.098* 0.075*    and All labs within the past 24 hours have been reviewed    Latest ECHO performed and demonstrates- Results for orders placed during the hospital encounter of 10/11/22    Echo    Interpretation Summary  · Concentric hypertrophy and low normal systolic function.  · Moderate left atrial enlargement.  · The estimated ejection fraction is 50%.  · Grade II left ventricular diastolic dysfunction.  · There is abnormal septal wall motion consistent with left bundle branch block.  · Normal right ventricular size with normal right ventricular systolic function.  · Moderate mitral regurgitation.  · Mild tricuspid regurgitation.  · There is pulmonary hypertension.    Pending Diagnostic Studies:     None         Medications:  Reconciled Home Medications:      Medication List      CHANGE how you take these medications    atorvastatin 40 MG tablet  Commonly known as: LIPITOR  Take 1 tablet (40 mg total) by mouth every evening.  What changed:   · medication strength  · how much to take  · when to take this     carvediloL 12.5 MG tablet  Commonly known as: COREG  Take 1 tablet (12.5 mg total) by mouth 2 (two) times daily.  What changed:   · medication strength  · how much to  take     furosemide 40 MG tablet  Commonly known as: LASIX  Take 1 tablet (40 mg total) by mouth 2 (two) times daily.  What changed: how much to take     hydrALAZINE 50 MG tablet  Commonly known as: APRESOLINE  Take 1 tablet (50 mg total) by mouth every 8 (eight) hours.  What changed:   · medication strength  · how much to take        CONTINUE taking these medications    isosorbide dinitrate 20 MG tablet  Commonly known as: ISORDIL  Take 1 tablet (20 mg total) by mouth 3 (three) times daily.     terazosin 5 MG capsule  Commonly known as: HYTRIN  Take 1 capsule (5 mg total) by mouth every evening.        STOP taking these medications    metOLazone 5 MG tablet  Commonly known as: ZAROXOLYN            Indwelling Lines/Drains at time of discharge:   Lines/Drains/Airways     None                 Time spent on the discharge of patient: >35 minutes         Lin Allen NP  Department of Hospital Medicine  [facility] - Telemetry ([facility])

## 2022-10-12 NOTE — HOSPITAL COURSE
Bria Saldana was placed into observation for further workup and treatment of acute on chronic CHF secondary to medication noncompliance. Patient had not been taking home medications as prescribed, specifically, he was not taking lasix twice daily and was not taking metolazone. Also, was taken off of entresto due to coughing several months ago, he was due to see Dr. Boo yesterday however was unable to make it to his appointment and symptoms got worse which prompted his arrival to the Emergency Department. Patient was noted to have a BNP 1030 and elevated troponin which trended downward and likely demand ischemia. Patient was given BID lasix and diuresed (chart not accurate regarding output, patient endorsed filling several urinals since admission) well. No SOB, lungs clear, and minimal peripheral edema noted. ECHO with diastolic failure and EF 50%. Patient is stable and appropriate for discharge home. Patient d/c home with high dose statin, increased coreg, imdur, hydralazine, lasix 40 BID. Recommend re discussing entresto at f/u. He is to follow up with PCP and cardiology OP. Referral placed for CHF clinic follow up.

## 2022-10-12 NOTE — ASSESSMENT & PLAN NOTE
Patient is identified as having Diastolic (HFpEF) heart failure that is Acute on chronic. CHF is currently uncontrolled due to Weight gain of 10 pounds. Latest ECHO performed and demonstrates- Results for orders placed during the hospital encounter of 06/24/21    Echo    Interpretation Summary  · Concentric hypertrophy and moderately decreased systolic function.  · Mild left atrial enlargement.  · There is abnormal septal wall motion.  · Small pericardial effusion.  · The estimated ejection fraction is 35%.  · Grade I left ventricular diastolic dysfunction.  . Continue Beta Blocker and Furosemide and monitor clinical status closely. Monitor on telemetry. Patient is on CHF pathway.  Monitor strict Is&Os and daily weights.  Place on fluid restriction of 1.5 L. Continue to stress to patient importance of self efficacy and  on diet for CHF. Last BNP reviewed- and noted below   Recent Labs   Lab 10/11/22  1225   BNP 1,030*     10/12/2022  Diuresis overnight with improvement in SOB, clear lung sounds, minimal peripheral edema

## 2022-10-12 NOTE — PLAN OF CARE
[facility] - Tele[facility] (Hospital)  Initial Discharge Assessment     Swer met with patient at the bedside to complete discharge assessment. Patient reports living at home with spouse independently. Pt reports no DME use. Patient states that he still works and drives. Pt's cousin will provide discharge transportation.     PCP updated:  Dr Blair with [facility].     No CM needs for discharge.     Primary Care Provider: Hortencia Blair MD    Admission Diagnosis: Morbid obesity [E66.01]  Cough [R05.9]  Hypertensive crisis [I16.9]  Hypertension [I10]  Uncontrolled hypertension [I10]  Acute on chronic congestive heart failure, unspecified heart failure type [I50.9]    Admission Date: 10/11/2022  Expected Discharge Date: 10/12/2022    Discharge Barriers Identified: None    Payor: BLUE CROSS BLUE SHIELD / Plan: [place] PPO / Product Type: PPO /     Extended Emergency Contact Information  Primary Emergency Contact: Vielka Saldana  Address: [address] [place], [place] [address]            [address]           [place] [address] [facility]  Home Phone: [phone]  Mobile Phone: [phone]  Relation: Spouse    Discharge Plan A: Home         [facility] [place], [place] - [address]  [address] [facility] [address]  Phone: [phone] Fax: [phone]      Initial Assessment (most recent)       Adult Discharge Assessment - 10/12/22 1313          Discharge Assessment    Assessment Type Discharge Planning Assessment     Confirmed/corrected address, phone number and insurance Yes     Confirmed Demographics Correct on Facesheet     Source of Information patient     Communicated CARMELO with patient/caregiver Yes     Reason For Admission Acute on chronic combined systolic and diastolic heart failure     Lives With spouse     Facility Arrived From: Home     Do you expect to return to your current living situation? Yes     Do you have help at home or someone to help you manage your care at home? Yes      Who are your caregiver(s) and their phone number(s)? Pt's spouse, Vielka     Prior to hospitilization cognitive status: Alert/Oriented     Current cognitive status: Alert/Oriented     Walking or Climbing Stairs Difficulty none     Dressing/Bathing Difficulty none     Equipment Currently Used at Home none     Readmission within 30 days? No     Patient currently being followed by outpatient case management? No     Do you currently have service(s) that help you manage your care at home? No     Do you take prescription medications? Yes     Do you have prescription coverage? Yes     Do you have any problems affording any of your prescribed medications? No     Is the patient taking medications as prescribed? yes     Who is going to help you get home at discharge? Pt's cousin     How do you get to doctors appointments? car, drives self     Are you on dialysis? No     Discharge Plan A Home     DME Needed Upon Discharge  none     Discharge Plan discussed with: Patient     Discharge Barriers Identified None

## 2022-10-14 ENCOUNTER — TELEPHONE (OUTPATIENT)
Dept: CARDIOLOGY | Facility: CLINIC | Age: 52
End: 2022-10-14
Payer: COMMERCIAL

## 2022-10-14 NOTE — TELEPHONE ENCOUNTER
"[facility]) hospital discharge 48-72 hour phone follow up completed.     Most Recent Hospital Discharge Date: 10/12/22  Last admission Diagnosis/chief complaint:Acute on chronic combined systolic (congestive) and diastolic (congestive) heart failure    TCC nurse Navigator spoke with pt    Current Patient reported weight: pt states he has a scale, but has not weighed himself since d/c from hospital   Importance of daily weights discussed with pt in detail. He will keep log and bring with him to his next appt.     Current Patient reported blood pressure and heart rate: pt states he has a bp cuff. He has not taken his bp since hospital d/c. He will keep a log and bring with him to f/u appt.     Pt reports the following:  []  Shortness of Breath with Activity  []  Shortness of Breath at rest   []  Fatigue  []  Edema   [] Chest pain or tightness  [] Weight Increase since discharge  [x] None of the above    Medications:   Discharge medication reviewed with pt.  Pt reports having medication list available and has all medications at home for use per list.     Education:    Reviewed key points as listed below.     Recommend 2 -3 gram sodium restriction and 1500 cc-2000 cc fluid restriction.  Encourage physical activity with graded exercise program.  Requested patient to weigh themselves daily, and to notify us if their weight increases by more than 3 lbs in 1 day or 5 lbs in 3 days.   Reminded patient to use "Daily weight and symptom tracker" to record and guide patient on when and how to call [facility]. PT may also use symptom tracker if no scale available  Pt reports being in the green (color) Zone. If in yellow/red, reminded that they should be calling [facility] today or now.     Watch for these Signs and Symptoms: If any of these occur, contact [facility] immediately:   Increase in shortness of breath with movement   Increase in swelling in your legs and ankles   Weight gain of more than 3 pounds in " a day or 5 pounds in 3 days.   Difficulty breathing when you are lying down   Worsening fatigue or tiredness   Stomach bloating, a full feeling or a loss of appetite   Increased coughing--especially when you are lying down      Pt was able to verbalize back to nurse in their own words correct diet/fluid restrictions, necessity for exercise, warning signs and symptoms, when and how to contact their TCC team.      Pt educated on follow-up plan while in [facility] program to include:   Week 1 -  F/u appt with Provider and HF nurse (Date) 10/19 with Sienna GREEN    Week 2-5 - In person/ Virtual/ phone call check ins    Week 5-7 - Pt will discharge from [facility] and transition to longterm care provider (Cardiology/PCP/ Advanced Heart Failure).      Patient active on myChart? no      Pt given the following contact information for ease of communication: [phone] (Mon-Fri, 8a-5p) & for urgent issues on the weekend call [facility] on-call [phone] to page the Cardiology MD on call.  Pt also encouraged utilize [facility] messaging as well.      Will follow up with pt at first clinic visit and HF nurse navigator available for pt questions, issues or concerns.

## 2022-10-19 ENCOUNTER — TELEPHONE (OUTPATIENT)
Dept: SLEEP MEDICINE | Facility: HOSPITAL | Age: 52
End: 2022-10-19
Payer: COMMERCIAL

## 2022-10-19 ENCOUNTER — TELEPHONE (OUTPATIENT)
Dept: CARDIOLOGY | Facility: CLINIC | Age: 52
End: 2022-10-19

## 2022-10-19 ENCOUNTER — OFFICE VISIT (OUTPATIENT)
Dept: CARDIOLOGY | Facility: CLINIC | Age: 52
End: 2022-10-19
Payer: COMMERCIAL

## 2022-10-19 VITALS
OXYGEN SATURATION: 97 % | DIASTOLIC BLOOD PRESSURE: 80 MMHG | SYSTOLIC BLOOD PRESSURE: 150 MMHG | BODY MASS INDEX: 35.38 KG/M2 | WEIGHT: 283.06 LBS | HEART RATE: 96 BPM

## 2022-10-19 DIAGNOSIS — I50.9 ACUTE ON CHRONIC CONGESTIVE HEART FAILURE, UNSPECIFIED HEART FAILURE TYPE: ICD-10-CM

## 2022-10-19 DIAGNOSIS — G47.33 OSA (OBSTRUCTIVE SLEEP APNEA): Primary | ICD-10-CM

## 2022-10-19 DIAGNOSIS — I50.43 ACUTE ON CHRONIC COMBINED SYSTOLIC (CONGESTIVE) AND DIASTOLIC (CONGESTIVE) HEART FAILURE: Primary | ICD-10-CM

## 2022-10-19 DIAGNOSIS — I50.9 CHF EXACERBATION: ICD-10-CM

## 2022-10-19 PROCEDURE — 4010F ACE/ARB THERAPY RXD/TAKEN: CPT | Mod: CPTII,S$GLB,, | Performed by: PHYSICIAN ASSISTANT

## 2022-10-19 PROCEDURE — 3077F PR MOST RECENT SYSTOLIC BLOOD PRESSURE >= 140 MM HG: ICD-10-PCS | Mod: CPTII,S$GLB,, | Performed by: PHYSICIAN ASSISTANT

## 2022-10-19 PROCEDURE — 99204 OFFICE O/P NEW MOD 45 MIN: CPT | Mod: S$GLB,,, | Performed by: PHYSICIAN ASSISTANT

## 2022-10-19 PROCEDURE — 1159F MED LIST DOCD IN RCRD: CPT | Mod: CPTII,S$GLB,, | Performed by: PHYSICIAN ASSISTANT

## 2022-10-19 PROCEDURE — 3079F PR MOST RECENT DIASTOLIC BLOOD PRESSURE 80-89 MM HG: ICD-10-PCS | Mod: CPTII,S$GLB,, | Performed by: PHYSICIAN ASSISTANT

## 2022-10-19 PROCEDURE — 99204 PR OFFICE/OUTPT VISIT, NEW, LEVL IV, 45-59 MIN: ICD-10-PCS | Mod: S$GLB,,, | Performed by: PHYSICIAN ASSISTANT

## 2022-10-19 PROCEDURE — 3079F DIAST BP 80-89 MM HG: CPT | Mod: CPTII,S$GLB,, | Performed by: PHYSICIAN ASSISTANT

## 2022-10-19 PROCEDURE — 99999 PR PBB SHADOW E&M-EST. PATIENT-LVL III: CPT | Mod: PBBFAC,,, | Performed by: PHYSICIAN ASSISTANT

## 2022-10-19 PROCEDURE — 99999 PR PBB SHADOW E&M-EST. PATIENT-LVL III: ICD-10-PCS | Mod: PBBFAC,,, | Performed by: PHYSICIAN ASSISTANT

## 2022-10-19 PROCEDURE — 3044F PR MOST RECENT HEMOGLOBIN A1C LEVEL <7.0%: ICD-10-PCS | Mod: CPTII,S$GLB,, | Performed by: PHYSICIAN ASSISTANT

## 2022-10-19 PROCEDURE — 1159F PR MEDICATION LIST DOCUMENTED IN MEDICAL RECORD: ICD-10-PCS | Mod: CPTII,S$GLB,, | Performed by: PHYSICIAN ASSISTANT

## 2022-10-19 PROCEDURE — 1160F PR REVIEW ALL MEDS BY PRESCRIBER/CLIN PHARMACIST DOCUMENTED: ICD-10-PCS | Mod: CPTII,S$GLB,, | Performed by: PHYSICIAN ASSISTANT

## 2022-10-19 PROCEDURE — 3044F HG A1C LEVEL LT 7.0%: CPT | Mod: CPTII,S$GLB,, | Performed by: PHYSICIAN ASSISTANT

## 2022-10-19 PROCEDURE — 4010F PR ACE/ARB THEARPY RXD/TAKEN: ICD-10-PCS | Mod: CPTII,S$GLB,, | Performed by: PHYSICIAN ASSISTANT

## 2022-10-19 PROCEDURE — 1160F RVW MEDS BY RX/DR IN RCRD: CPT | Mod: CPTII,S$GLB,, | Performed by: PHYSICIAN ASSISTANT

## 2022-10-19 PROCEDURE — 3077F SYST BP >= 140 MM HG: CPT | Mod: CPTII,S$GLB,, | Performed by: PHYSICIAN ASSISTANT

## 2022-10-19 RX ORDER — CARVEDILOL 25 MG/1
25 TABLET ORAL 2 TIMES DAILY
Qty: 60 TABLET | Refills: 11 | Status: SHIPPED | OUTPATIENT
Start: 2022-10-19 | End: 2024-02-08 | Stop reason: SDUPTHER

## 2022-10-19 NOTE — PROGRESS NOTES
HF [facility] Provider Note (Initial Clinic) Consult Note    Date of original referral: 10/12/2022  Age: 51 y.o.  Gender: male  Ethnicity: Not  or /a   Number of admissions for CHF within the preceding year: 1   Duration of CHF: more than 1 year  Type of Congestive Heart Failure: Systolic   Etiology: HTN  Social history: none  Enrolled in Infusion suite: no    Diagnostic Labs:   EKG - 10/12/2022  CXR - 10/11/2022  ECHO - 10/12/2022  Stress test -   Stress echo -   Pharmacologic stress -   Cardiac catheterization -    Cardiac MRI -     Lab Results   Component Value Date     10/12/2022     (L) 10/11/2022    K 3.9 10/12/2022    K 3.6 10/11/2022     10/12/2022     10/11/2022    CO2 26 10/12/2022    CO2 20 (L) 10/11/2022     10/12/2022     10/11/2022    BUN 24 (H) 10/12/2022    BUN 18 10/11/2022    CREATININE 1.9 (H) 10/12/2022    CREATININE 1.7 (H) 10/11/2022    CALCIUM 9.5 10/12/2022    CALCIUM 8.9 10/11/2022    PROT 6.6 10/11/2022    PROT 6.7 06/25/2021    ALBUMIN 3.6 10/11/2022    ALBUMIN 3.2 (L) 06/25/2021    BILITOT 1.8 (H) 10/11/2022    BILITOT 2.1 (H) 06/25/2021    ALKPHOS 82 10/11/2022    ALKPHOS 92 06/25/2021    AST 18 10/11/2022    AST 20 06/25/2021    ALT 18 10/11/2022    ALT 24 06/25/2021    ANIONGAP 12 10/12/2022    ANIONGAP 11 10/11/2022    ESTGFRAFRICA 47 (A) 06/25/2021    ESTGFRAFRICA 47 (A) 06/24/2021    EGFRNONAA 40 (A) 06/25/2021    EGFRNONAA 40 (A) 06/24/2021       Lab Results   Component Value Date    WBC 6.75 10/11/2022    WBC 6.75 06/25/2021    RBC 5.04 10/11/2022    RBC 6.11 06/25/2021    HGB 13.7 (L) 10/11/2022    HGB 16.0 06/25/2021    HCT 43.1 10/11/2022    HCT 52.7 06/25/2021    MCV 86 10/11/2022    MCV 86 06/25/2021    MCH 27.2 10/11/2022    MCH 26.2 (L) 06/25/2021    MCHC 31.8 (L) 10/11/2022    MCHC 30.4 (L) 06/25/2021    RDW 14.6 (H) 10/11/2022    RDW 14.5 06/25/2021     10/11/2022     06/25/2021    MPV 10.3 10/11/2022    MPV 10.4  06/25/2021    IMMGR 0.1 10/11/2022    IMMGR 0.3 06/25/2021    IGABS 0.01 10/11/2022    IGABS 0.02 06/25/2021    LYMPH 2.3 10/11/2022    LYMPH 33.5 10/11/2022    MONO 0.5 10/11/2022    MONO 7.1 10/11/2022    EOS 0.1 10/11/2022    EOS 0.2 06/25/2021    BASO 0.05 10/11/2022    BASO 0.05 06/25/2021    NRBC 0 10/11/2022    NRBC 0 06/25/2021    GRAN 3.8 10/11/2022    GRAN 56.7 10/11/2022    EOSINOPHIL 1.9 10/11/2022    EOSINOPHIL 2.5 06/25/2021    BASOPHIL 0.7 10/11/2022    BASOPHIL 0.7 06/25/2021       Lab Results   Component Value Date    BNP 1,030 (H) 10/11/2022     (H) 05/30/2022    MG 1.8 10/11/2022    MG 2.1 06/25/2021    PHOS 3.3 06/25/2021    PHOS 2.8 04/28/2021    TROPONINI 0.075 (H) 10/11/2022    TROPONINI 0.098 (H) 10/11/2022    HGBA1C 5.6 10/11/2022    HGBA1C 5.6 10/11/2022    TSH 1.196 10/11/2022    TSH 1.046 06/08/2018       Lab Results   Component Value Date    CHOL 155 07/08/2019    TRIG 135 07/08/2019    HDL 32 (L) 07/08/2019    LDLCALC 96.0 07/08/2019    CHOLHDL 20.6 07/08/2019    TOTALCHOLEST 4.8 07/08/2019    NONHDLCHOL 123 07/08/2019    COLORU Yellow 10/11/2022    APPEARANCEUA Clear 10/11/2022    PHUR 6.0 10/11/2022    SPECGRAV 1.010 10/11/2022    PROTEINUA 1+ (A) 10/11/2022    GLUCUA Negative 10/11/2022    KETONESU Negative 10/11/2022    BILIRUBINUA Negative 10/11/2022    OCCULTUA Negative 10/11/2022    NITRITE Negative 10/11/2022    LEUKOCYTESUR Negative 10/11/2022       List all implanted cardiac devices:   None   Cardiomems: no    Current Outpatient Medications on File Prior to Visit   Medication Sig Dispense Refill    atorvastatin (LIPITOR) 40 MG tablet Take 1 tablet (40 mg total) by mouth every evening. 30 tablet 0    carvediloL (COREG) 12.5 MG tablet Take 1 tablet (12.5 mg total) by mouth 2 (two) times daily. 60 tablet 11    furosemide (LASIX) 40 MG tablet Take 1 tablet (40 mg total) by mouth 2 (two) times daily. 60 tablet 0    hydrALAZINE (APRESOLINE) 50 MG tablet Take 1 tablet (50 mg  total) by mouth every 8 (eight) hours. 90 tablet 0    isosorbide dinitrate (ISORDIL) 20 MG tablet Take 1 tablet (20 mg total) by mouth 3 (three) times daily. 90 tablet 2    terazosin (HYTRIN) 5 MG capsule Take 1 capsule (5 mg total) by mouth every evening. 30 capsule 2     No current facility-administered medications on file prior to visit.         HPI:  Patient can walk without SOB   Patient sleeps on 2 pillows   Patient wakes up SOB, has to get out of bed, associated cough, sputum none   Palpitations - none   Dizzy, light-headed, pre-syncope or syncope none   Since discharge frequency of performing weights, home weight and weight change-no weight log   Other information felt pertinent to HPI    Bria Saldana is a 51 y.o. male patient with a PMHx of HTN, CKD, CHF who presents to the Emergency Department for worsening SOB, onset 3 weeks PTA. Pt was scheduled to follow with Dr. Boo (Cardiology) today, but was unable to get transport to his appointment. Symptoms are constant and moderate in severity. Sxs are worse with exertion or when laying flat. Associated sxs include diarrhea and abdominal fullness. Patient denies any fever, chills, n/v, CP, weakness, numbness, dizziness, headache, and all other sxs at this time. Pt is not compliant with his home medication regimen. No further complaints or concerns at this time. Pt denies smoking and reports occasional use of ETOH. PT is a full code and Vielka Saldana (wife) at [phone] is the surrogate decision maker. Pt is followed outpatient by Dr. Boo (Cardiology). ER work up showed H/H 13.7/43.1, Na 134, CO2 20, Cr 1.7, BNP 1030, and Troponin 0.098.  Chest xray negative for acute process. Hospital Medicine contacted for admission with patient placed in Observation for further evaluation.        Bria Saldana was placed into observation for further workup and treatment of acute on chronic CHF secondary to medication noncompliance. Patient  had not been taking home medications as prescribed, specifically, he was not taking lasix twice daily and was not taking metolazone. Also, was taken off of entresto due to coughing several months ago, he was due to see Dr. Boo yesterday however was unable to make it to his appointment and symptoms got worse which prompted his arrival to the Emergency Department. Patient was noted to have a BNP 1030 and elevated troponin which trended downward and likely demand ischemia. Patient was given BID lasix and diuresed (chart not accurate regarding output, patient endorsed filling several urinals since admission) well. No SOB, lungs clear, and minimal peripheral edema noted. ECHO with diastolic failure and EF 50%. Patient is stable and appropriate for discharge home. Patient d/c home with high dose statin, increased coreg, imdur, hydralazine, lasix 40 BID. Recommend re discussing entresto at f/u. He is to follow up with PCP and cardiology OP. Referral placed for CHF clinic follow up.     Today follows up for first tCC visit. HE is no longer SOB, is back to work as . Taking all medications. No PND, orthopnea. On lasix 40 BID     PHYSICAL: There were no vitals filed for this visit.   Wt Readings from Last 3 Encounters:   10/12/22 127 kg (280 lb)   05/30/22 121.4 kg (267 lb 9.6 oz)   03/14/22 119.1 kg (262 lb 9.1 oz)       JVD: no   Heart rhythm: regular  Cardiac murmur: No    S3: no  S4: no  Lungs: clear  Liver span: 10 cm:   Hepatojugular reflux: no  Edema: no      ASSESSMENT: acute on chronic systolic heart failure     PLAN:      Patient Instructions:   Instruct the patient to notify this clinic if HH, a physician or an advanced care provider wants to change medication one of their HF medications   Activity and Diet restrictions:   Recommend 2-3 gram sodium restriction and 1500cc- 2000cc fluid restriction.  Encourage physical activity with graded exercise program.  Requested patient to weigh themselves  daily, and to notify us if their weight increases by more than 3 lbs in 1 day or 5 lbs in 3 days.    Assigned dry weight on home scale: 119 pounds   Medication changes (include current dose and changed dose)  Increase coreg to 25 BID  Refer for sleep study  RTC 2 weeks   Upcoming labs and date anticipated: 2 weeks

## 2022-11-16 ENCOUNTER — TELEPHONE (OUTPATIENT)
Dept: CARDIOLOGY | Facility: CLINIC | Age: 52
End: 2022-11-16
Payer: COMMERCIAL

## 2022-11-28 ENCOUNTER — TELEPHONE (OUTPATIENT)
Dept: CARDIOLOGY | Facility: CLINIC | Age: 52
End: 2022-11-28
Payer: COMMERCIAL

## 2022-11-28 NOTE — TELEPHONE ENCOUNTER
I called pt. No answer. LVM for him to return call      ----- Message from Elisa Mohr LPN sent at 11/16/2022  2:51 PM CST -----  R/s missed appt

## 2023-01-26 ENCOUNTER — OFFICE VISIT (OUTPATIENT)
Dept: CARDIOLOGY | Facility: CLINIC | Age: 53
End: 2023-01-26
Payer: COMMERCIAL

## 2023-01-26 VITALS
DIASTOLIC BLOOD PRESSURE: 84 MMHG | WEIGHT: 284.38 LBS | SYSTOLIC BLOOD PRESSURE: 170 MMHG | BODY MASS INDEX: 35.55 KG/M2 | HEART RATE: 80 BPM

## 2023-01-26 DIAGNOSIS — I10 ESSENTIAL HYPERTENSION: ICD-10-CM

## 2023-01-26 DIAGNOSIS — I50.42 CHRONIC COMBINED SYSTOLIC AND DIASTOLIC CONGESTIVE HEART FAILURE: ICD-10-CM

## 2023-01-26 DIAGNOSIS — I50.43 ACUTE ON CHRONIC COMBINED SYSTOLIC (CONGESTIVE) AND DIASTOLIC (CONGESTIVE) HEART FAILURE: Primary | ICD-10-CM

## 2023-01-26 DIAGNOSIS — I50.42 CHRONIC COMBINED SYSTOLIC AND DIASTOLIC CONGESTIVE HEART FAILURE: Primary | ICD-10-CM

## 2023-01-26 PROCEDURE — 1159F PR MEDICATION LIST DOCUMENTED IN MEDICAL RECORD: ICD-10-PCS | Mod: CPTII,S$GLB,, | Performed by: PHYSICIAN ASSISTANT

## 2023-01-26 PROCEDURE — 1159F MED LIST DOCD IN RCRD: CPT | Mod: CPTII,S$GLB,, | Performed by: PHYSICIAN ASSISTANT

## 2023-01-26 PROCEDURE — 4010F PR ACE/ARB THEARPY RXD/TAKEN: ICD-10-PCS | Mod: CPTII,S$GLB,, | Performed by: PHYSICIAN ASSISTANT

## 2023-01-26 PROCEDURE — 3079F PR MOST RECENT DIASTOLIC BLOOD PRESSURE 80-89 MM HG: ICD-10-PCS | Mod: CPTII,S$GLB,, | Performed by: PHYSICIAN ASSISTANT

## 2023-01-26 PROCEDURE — 3077F PR MOST RECENT SYSTOLIC BLOOD PRESSURE >= 140 MM HG: ICD-10-PCS | Mod: CPTII,S$GLB,, | Performed by: PHYSICIAN ASSISTANT

## 2023-01-26 PROCEDURE — 4010F ACE/ARB THERAPY RXD/TAKEN: CPT | Mod: CPTII,S$GLB,, | Performed by: PHYSICIAN ASSISTANT

## 2023-01-26 PROCEDURE — 99999 PR PBB SHADOW E&M-EST. PATIENT-LVL III: CPT | Mod: PBBFAC,,, | Performed by: PHYSICIAN ASSISTANT

## 2023-01-26 PROCEDURE — 1160F RVW MEDS BY RX/DR IN RCRD: CPT | Mod: CPTII,S$GLB,, | Performed by: PHYSICIAN ASSISTANT

## 2023-01-26 PROCEDURE — 3077F SYST BP >= 140 MM HG: CPT | Mod: CPTII,S$GLB,, | Performed by: PHYSICIAN ASSISTANT

## 2023-01-26 PROCEDURE — 1160F PR REVIEW ALL MEDS BY PRESCRIBER/CLIN PHARMACIST DOCUMENTED: ICD-10-PCS | Mod: CPTII,S$GLB,, | Performed by: PHYSICIAN ASSISTANT

## 2023-01-26 PROCEDURE — 99999 PR PBB SHADOW E&M-EST. PATIENT-LVL III: ICD-10-PCS | Mod: PBBFAC,,, | Performed by: PHYSICIAN ASSISTANT

## 2023-01-26 PROCEDURE — 3008F BODY MASS INDEX DOCD: CPT | Mod: CPTII,S$GLB,, | Performed by: PHYSICIAN ASSISTANT

## 2023-01-26 PROCEDURE — 3079F DIAST BP 80-89 MM HG: CPT | Mod: CPTII,S$GLB,, | Performed by: PHYSICIAN ASSISTANT

## 2023-01-26 PROCEDURE — 99213 PR OFFICE/OUTPT VISIT, EST, LEVL III, 20-29 MIN: ICD-10-PCS | Mod: S$GLB,,, | Performed by: PHYSICIAN ASSISTANT

## 2023-01-26 PROCEDURE — 99213 OFFICE O/P EST LOW 20 MIN: CPT | Mod: S$GLB,,, | Performed by: PHYSICIAN ASSISTANT

## 2023-01-26 PROCEDURE — 3008F PR BODY MASS INDEX (BMI) DOCUMENTED: ICD-10-PCS | Mod: CPTII,S$GLB,, | Performed by: PHYSICIAN ASSISTANT

## 2023-01-26 RX ORDER — LOSARTAN POTASSIUM 50 MG/1
50 TABLET ORAL
COMMUNITY
Start: 2023-01-18 | End: 2023-01-26

## 2023-01-26 RX ORDER — FUROSEMIDE 40 MG/1
40 TABLET ORAL 2 TIMES DAILY
Qty: 60 TABLET | Refills: 5 | Status: SHIPPED | OUTPATIENT
Start: 2023-01-26 | End: 2023-05-08

## 2023-01-26 RX ORDER — ALLOPURINOL 100 MG/1
100 TABLET ORAL DAILY
COMMUNITY
Start: 2023-01-18 | End: 2023-05-08

## 2023-01-26 RX ORDER — PREDNISONE 20 MG/1
20 TABLET ORAL
COMMUNITY
Start: 2023-01-18 | End: 2023-03-07

## 2023-01-26 RX ORDER — SACUBITRIL AND VALSARTAN 24; 26 MG/1; MG/1
1 TABLET, FILM COATED ORAL 2 TIMES DAILY
Qty: 60 TABLET | Refills: 1 | Status: SHIPPED | OUTPATIENT
Start: 2023-01-26 | End: 2023-05-08 | Stop reason: SDUPTHER

## 2023-01-26 NOTE — PROGRESS NOTES
HF TCC Provider Note (Follow-up) Consult Note      HPI:  Patient can walk around house to clinic no sOB   Patient sleeps on 2 pillows   Patient wakes up SOB, has to get out of bed, associated cough, sputum none   Palpitations - none   Dizzy, light-headed, pre-syncope or syncope none   Since discharge frequency of performing weights, home weight and weight change stable    Other information felt pertinent to HPI    Since last visit, pt changed jobs and had new benefits start so he missed f/u. Then had gout flare and was admitted to [facility] for high BP and gout. Since dc he has done well. On losartan and coreg as well as isordil/hydral. Also on lasix 40 daily. Not taking statin. No gout since on allopurinol.    No SOB, PND, orthopnea. Eats out a lot  Was never able to get sleep study but still with snoring, daytime somnolence.          PHYSICAL:   Vitals:    01/26/23 1405   BP: (!) 170/84   Pulse: 80      Wt Readings from Last 3 Encounters:   01/26/23 129 kg (284 lb 6.3 oz)   10/19/22 128.4 kg (283 lb 1.1 oz)   10/12/22 127 kg (280 lb)       JVD: no   Heart rhythm: regular  Cardiac murmur: No    S3: no  S4: no  Lungs: clear  Liver span: 10 cm:   Hepatojugular reflux: no  Edema: no      ASSESSMENT: acute on chronic systolic HF    PLAN:      Patient Instructions:   Instruct the patient to notify this clinic if HH, a physician or an advanced care provider wants to change medication one of their HF medications   Activity and Diet restrictions:   Recommend 2-3 gram sodium restriction and 1500cc- 2000cc fluid restriction.  Encourage physical activity with graded exercise program.  Requested patient to weigh themselves daily, and to notify us if their weight increases by more than 3 lbs in 1 day or 5 lbs in 3 days.    Assigned dry weight on home scale: 127 kg  Medication changes (include current dose and changed dose)  Refer for sleep study  Stop losartan  Start entresto 24-26 BID after washout  RTC 2 weeks with labs    Extensive HF education

## 2023-02-03 ENCOUNTER — OFFICE VISIT (OUTPATIENT)
Dept: PULMONOLOGY | Facility: CLINIC | Age: 53
End: 2023-02-03
Payer: COMMERCIAL

## 2023-02-03 VITALS
WEIGHT: 281.75 LBS | RESPIRATION RATE: 18 BRPM | HEIGHT: 75 IN | SYSTOLIC BLOOD PRESSURE: 132 MMHG | HEART RATE: 93 BPM | DIASTOLIC BLOOD PRESSURE: 72 MMHG | BODY MASS INDEX: 35.03 KG/M2 | OXYGEN SATURATION: 97 %

## 2023-02-03 DIAGNOSIS — E66.01 CLASS 2 SEVERE OBESITY DUE TO EXCESS CALORIES WITH SERIOUS COMORBIDITY AND BODY MASS INDEX (BMI) OF 35.0 TO 35.9 IN ADULT: ICD-10-CM

## 2023-02-03 DIAGNOSIS — E11.22 HYPERTENSION ASSOCIATED WITH CHRONIC KIDNEY DISEASE DUE TO TYPE 2 DIABETES MELLITUS: ICD-10-CM

## 2023-02-03 DIAGNOSIS — I10 ESSENTIAL HYPERTENSION: ICD-10-CM

## 2023-02-03 DIAGNOSIS — I27.20 PULMONARY HTN: ICD-10-CM

## 2023-02-03 DIAGNOSIS — I50.42 CHRONIC COMBINED SYSTOLIC AND DIASTOLIC CONGESTIVE HEART FAILURE: ICD-10-CM

## 2023-02-03 DIAGNOSIS — N18.30 STAGE 3 CHRONIC KIDNEY DISEASE, UNSPECIFIED WHETHER STAGE 3A OR 3B CKD: ICD-10-CM

## 2023-02-03 DIAGNOSIS — I12.9 HYPERTENSION ASSOCIATED WITH CHRONIC KIDNEY DISEASE DUE TO TYPE 2 DIABETES MELLITUS: ICD-10-CM

## 2023-02-03 DIAGNOSIS — R06.02 SOB (SHORTNESS OF BREATH): ICD-10-CM

## 2023-02-03 DIAGNOSIS — G47.33 OSA (OBSTRUCTIVE SLEEP APNEA): Primary | ICD-10-CM

## 2023-02-03 PROBLEM — E66.812 CLASS 2 SEVERE OBESITY DUE TO EXCESS CALORIES WITH SERIOUS COMORBIDITY AND BODY MASS INDEX (BMI) OF 35.0 TO 35.9 IN ADULT: Status: ACTIVE | Noted: 2023-02-03

## 2023-02-03 PROCEDURE — 1160F PR REVIEW ALL MEDS BY PRESCRIBER/CLIN PHARMACIST DOCUMENTED: ICD-10-PCS | Mod: CPTII,S$GLB,, | Performed by: PHYSICIAN ASSISTANT

## 2023-02-03 PROCEDURE — 3008F BODY MASS INDEX DOCD: CPT | Mod: CPTII,S$GLB,, | Performed by: PHYSICIAN ASSISTANT

## 2023-02-03 PROCEDURE — 3075F PR MOST RECENT SYSTOLIC BLOOD PRESS GE 130-139MM HG: ICD-10-PCS | Mod: CPTII,S$GLB,, | Performed by: PHYSICIAN ASSISTANT

## 2023-02-03 PROCEDURE — 3075F SYST BP GE 130 - 139MM HG: CPT | Mod: CPTII,S$GLB,, | Performed by: PHYSICIAN ASSISTANT

## 2023-02-03 PROCEDURE — 3078F DIAST BP <80 MM HG: CPT | Mod: CPTII,S$GLB,, | Performed by: PHYSICIAN ASSISTANT

## 2023-02-03 PROCEDURE — 3078F PR MOST RECENT DIASTOLIC BLOOD PRESSURE < 80 MM HG: ICD-10-PCS | Mod: CPTII,S$GLB,, | Performed by: PHYSICIAN ASSISTANT

## 2023-02-03 PROCEDURE — 99204 OFFICE O/P NEW MOD 45 MIN: CPT | Mod: S$GLB,,, | Performed by: PHYSICIAN ASSISTANT

## 2023-02-03 PROCEDURE — 99999 PR PBB SHADOW E&M-EST. PATIENT-LVL V: ICD-10-PCS | Mod: PBBFAC,,, | Performed by: PHYSICIAN ASSISTANT

## 2023-02-03 PROCEDURE — 1159F PR MEDICATION LIST DOCUMENTED IN MEDICAL RECORD: ICD-10-PCS | Mod: CPTII,S$GLB,, | Performed by: PHYSICIAN ASSISTANT

## 2023-02-03 PROCEDURE — 99999 PR PBB SHADOW E&M-EST. PATIENT-LVL V: CPT | Mod: PBBFAC,,, | Performed by: PHYSICIAN ASSISTANT

## 2023-02-03 PROCEDURE — 4010F PR ACE/ARB THEARPY RXD/TAKEN: ICD-10-PCS | Mod: CPTII,S$GLB,, | Performed by: PHYSICIAN ASSISTANT

## 2023-02-03 PROCEDURE — 4010F ACE/ARB THERAPY RXD/TAKEN: CPT | Mod: CPTII,S$GLB,, | Performed by: PHYSICIAN ASSISTANT

## 2023-02-03 PROCEDURE — 3008F PR BODY MASS INDEX (BMI) DOCUMENTED: ICD-10-PCS | Mod: CPTII,S$GLB,, | Performed by: PHYSICIAN ASSISTANT

## 2023-02-03 PROCEDURE — 1159F MED LIST DOCD IN RCRD: CPT | Mod: CPTII,S$GLB,, | Performed by: PHYSICIAN ASSISTANT

## 2023-02-03 PROCEDURE — 1160F RVW MEDS BY RX/DR IN RCRD: CPT | Mod: CPTII,S$GLB,, | Performed by: PHYSICIAN ASSISTANT

## 2023-02-03 PROCEDURE — 99204 PR OFFICE/OUTPT VISIT, NEW, LEVL IV, 45-59 MIN: ICD-10-PCS | Mod: S$GLB,,, | Performed by: PHYSICIAN ASSISTANT

## 2023-02-03 NOTE — ASSESSMENT & PLAN NOTE
F/u regularly with cardiology    Echo    Interpretation Summary  · Concentric hypertrophy and low normal systolic function.  · Moderate left atrial enlargement.  · The estimated ejection fraction is 50%.  · Grade II left ventricular diastolic dysfunction.  · There is abnormal septal wall motion consistent with left bundle branch block.  · Normal right ventricular size with normal right ventricular systolic function.  · Moderate mitral regurgitation.  · Mild tricuspid regurgitation.  · There is pulmonary hypertension.

## 2023-02-03 NOTE — PROGRESS NOTES
Subjective:       Patient ID: Bria Saldana is a 52 y.o. male.    Chief Complaint: Sleep Apnea      53yo male referred by Sienna Maguire PA for NATALYA  Home sleep study in 2018: mild NATALYA, Apnea-hypopnea index: 10.4 events per hour. Total events 54.  History of morbid obesity, diastolic CHF, HTN, pulmonary HTN, diabetes, chronic kidney disease   No trouble falling asleep but complains of tossing and turning all night  Snores, never feels rested  Complains of daytime fatigue    Also complains of SOB, intermittent, sometimes with talking or even sitting still - this has been for many years but gradually worsening  Does not use inhalers  No prior lung diagnosis but reports episode of bronchitis in 2016  Coughs frequently, dry; he says nothing brings it on, no exacerbating or relieving factors  He says he had this cough years ago but resolved after stopping medication, maybe ace inhibitor?  Cough returned after recently starting CHF medication, not currently on ace inhibitors  No hemoptysis, voice changes, dysphagia, or weight loss  Quit smoking in 2001      Has seasonal allergies  Allergic to grass    BP Readings from Last 3 Encounters:   02/03/23 132/72   01/26/23 (!) 170/84   10/19/22 (!) 150/80     Snoring / Sleep:     [place] Questionnaire (validated NATALYA screening questionnaire)    yes -- Snoring/apnea    yes -- Fatigue    Body mass index is 35.22 kg/m².  (>25 is overweight, >30 is obese)    Blood Pressure = Hypertension  (PreHTN 120-139/80-89, Stg1 140-159/90-99, Stg2 >160/>100)  [place] = 3 of three NATALYA categories are positive (high risk is 2-3 positive categories)     [place] Sleepiness Scale TOTAL =   12  (validated sleepiness questionnaire with a higher score indicating greater sleepiness; range 0-24)  [place] SLEEPINESS SCALE 2/2/2023   Sitting and reading 3   Watching TV 2   Sitting, inactive in a public place (e.g. a theatre or a meeting) 2   As a passenger in a car for an  hour without a break 3   Lying down to rest in the afternoon when circumstances permit 2   Sitting and talking to someone 0   Sitting quietly after a lunch without alcohol 0   In a car, while stopped for a few minutes in traffic 0   Total score 12         STOP-Bang Questionnaire (validated NATALYA screening questionnaire)  Negative unless checked off.  [x] Snoring    [x]  Tired/Fatigued/Sleepy  [x] Obstruction (apneas/choking)  [x] Pressure (HTN)  [x] BMI >35  [x] Age >50  [x] Neck >40 cm  [x] Gender male   STOP-Bang = 8 (low risk 0-2,high risk 3-8)    There is no immunization history for the selected administration types on file for this patient.   Tobacco Use: Medium Risk    Smoking Tobacco Use: Former    Smokeless Tobacco Use: Never    Passive Exposure: Not on file      Past Medical History:   Diagnosis Date    Acute CHF 7/8/2019    Acute on chronic combined systolic (congestive) and diastolic (congestive) heart failure 9/23/2019    Allergy     CHF (congestive heart failure) 7/9/2019    CKD (chronic kidney disease) stage 3, GFR 30-59 ml/min 7/8/2019    Essential hypertension 6/1/2017    Hypertension associated with chronic kidney disease due to type 2 diabetes mellitus 2/28/2022    Mixed hyperlipidemia 3/10/2022    NATALYA (obstructive sleep apnea) 7/23/2018      Current Outpatient Medications on File Prior to Visit   Medication Sig Dispense Refill    allopurinoL (ZYLOPRIM) 100 MG tablet Take 100 mg by mouth.      atorvastatin (LIPITOR) 40 MG tablet Take 1 tablet (40 mg total) by mouth every evening. 30 tablet 0    carvediloL (COREG) 25 MG tablet Take 1 tablet (25 mg total) by mouth 2 (two) times daily. 60 tablet 11    furosemide (LASIX) 40 MG tablet Take 1 tablet (40 mg total) by mouth 2 (two) times daily. 60 tablet 5    hydrALAZINE (APRESOLINE) 50 MG tablet Take 1 tablet (50 mg total) by mouth every 8 (eight) hours. 90 tablet 0    isosorbide dinitrate (ISORDIL) 20 MG tablet Take 1 tablet (20 mg total) by mouth 3  "(three) times daily. 90 tablet 2    sacubitriL-valsartan (ENTRESTO) 24-26 mg per tablet Take 1 tablet by mouth 2 (two) times daily. 60 tablet 1    predniSONE (DELTASONE) 20 MG tablet Take 20 mg by mouth.       No current facility-administered medications on file prior to visit.        Review of Systems   Constitutional:  Positive for fatigue. Negative for fever, weight loss, appetite change and weakness.   HENT:  Negative for postnasal drip, rhinorrhea, sinus pressure, trouble swallowing and congestion.    Respiratory:  Positive for apnea, snoring, shortness of breath, dyspnea on extertion and somnolence. Negative for cough, sputum production, choking, chest tightness and wheezing.    Cardiovascular:  Negative for chest pain and leg swelling.   Musculoskeletal:  Negative for arthralgias, gait problem and joint swelling.   Gastrointestinal:  Negative for nausea, vomiting and abdominal pain.   Neurological:  Negative for dizziness, weakness and headaches.   All other systems reviewed and are negative.    Objective:       Vitals:    02/03/23 1254   BP: 132/72   Pulse: 93   Resp: 18   SpO2: 97%   Weight: 127.8 kg (281 lb 12 oz)   Height: 6' 3" (1.905 m)       Physical Exam   Constitutional: He is oriented to person, place, and time. He appears well-developed and well-nourished. No distress. He is obese.   HENT:   Head: Normocephalic.   Nose: Nose normal.   Mouth/Throat: Oropharynx is clear and moist.   Cardiovascular: Normal rate and regular rhythm.   Pulmonary/Chest: Effort normal. No respiratory distress. He has no wheezes. He has no rhonchi. He has no rales.   Musculoskeletal:         General: No edema.      Cervical back: Normal range of motion and neck supple.   Lymphadenopathy: No supraclavicular adenopathy is present.     He has no cervical adenopathy.   Neurological: He is alert and oriented to person, place, and time. Gait normal.   Skin: Skin is warm and dry.   Psychiatric: He has a normal mood and affect. "   Vitals reviewed.  Personal Diagnostic Review    Echo  · Concentric hypertrophy and low normal systolic function.  · Moderate left atrial enlargement.  · The estimated ejection fraction is 50%.  · Grade II left ventricular diastolic dysfunction.  · There is abnormal septal wall motion consistent with left bundle branch   block.  · Normal right ventricular size with normal right ventricular systolic   function.  · Moderate mitral regurgitation.  · Mild tricuspid regurgitation.  · There is pulmonary hypertension.             Assessment/Plan:       Problem List Items Addressed This Visit          Pulmonary    Pulmonary HTN     Echo 10/2022  CHF, NATALYA  Sleep study, robi, and walk         SOB (shortness of breath)    Relevant Orders    Spirometry with/without bronchodilator    X-Ray Chest PA And Lateral    Stress test, pulmonary    IGE    ALPHA 1 ANTITRYPSIN PHENOTYPE    Alpha-1-Antitrypsin    CLAUDE Screen w/Reflex       Cardiac/Vascular    Essential hypertension     F/u regularly with cardiology           CHF (congestive heart failure)     F/u regularly with cardiology    Echo    Interpretation Summary  · Concentric hypertrophy and low normal systolic function.  · Moderate left atrial enlargement.  · The estimated ejection fraction is 50%.  · Grade II left ventricular diastolic dysfunction.  · There is abnormal septal wall motion consistent with left bundle branch block.  · Normal right ventricular size with normal right ventricular systolic function.  · Moderate mitral regurgitation.  · Mild tricuspid regurgitation.  · There is pulmonary hypertension.             Hypertension associated with chronic kidney disease due to type 2 diabetes mellitus       Renal/    CKD (chronic kidney disease) stage 3, GFR 30-59 ml/min     F/u regularly with PCP              Endocrine    Class 2 severe obesity due to excess calories with serious comorbidity and body mass index (BMI) of 35.0 to 35.9 in adult     Weight loss and exercise to  improve overall health.              Other    NATALYA (obstructive sleep apnea) - Primary     Mild NATALYA on home sleep study in 2018  CPAP titration study         Relevant Orders    BiPAP/CPAP Titration ((Must have dx of NATALYA from previous sleep study)       Follow up in about 4 weeks (around 3/3/2023) for labs today; robi, walk, cxr next.    Discussed diagnosis, its evaluation, treatment and usual course. All questions answered.    Patient verbalized understanding of plan and left in no acute distress    Thank you for the courtesy of participating in the care of this patient    Uma Kelley PA-C  [facility]

## 2023-02-08 ENCOUNTER — OFFICE VISIT (OUTPATIENT)
Dept: CARDIOLOGY | Facility: CLINIC | Age: 53
End: 2023-02-08
Payer: COMMERCIAL

## 2023-02-08 ENCOUNTER — LAB VISIT (OUTPATIENT)
Dept: LAB | Facility: HOSPITAL | Age: 53
End: 2023-02-08
Attending: PHYSICIAN ASSISTANT
Payer: COMMERCIAL

## 2023-02-08 VITALS
DIASTOLIC BLOOD PRESSURE: 88 MMHG | SYSTOLIC BLOOD PRESSURE: 142 MMHG | HEIGHT: 75 IN | BODY MASS INDEX: 34.89 KG/M2 | WEIGHT: 280.63 LBS | HEART RATE: 80 BPM

## 2023-02-08 DIAGNOSIS — I10 ESSENTIAL HYPERTENSION: Primary | ICD-10-CM

## 2023-02-08 DIAGNOSIS — I50.42 CHRONIC COMBINED SYSTOLIC AND DIASTOLIC CONGESTIVE HEART FAILURE: ICD-10-CM

## 2023-02-08 LAB
ANION GAP SERPL CALC-SCNC: 10 MMOL/L (ref 8–16)
BNP SERPL-MCNC: 450 PG/ML (ref 0–99)
BUN SERPL-MCNC: 15 MG/DL (ref 6–20)
CALCIUM SERPL-MCNC: 8.6 MG/DL (ref 8.7–10.5)
CHLORIDE SERPL-SCNC: 109 MMOL/L (ref 95–110)
CO2 SERPL-SCNC: 22 MMOL/L (ref 23–29)
CREAT SERPL-MCNC: 1.8 MG/DL (ref 0.5–1.4)
EST. GFR  (NO RACE VARIABLE): 45 ML/MIN/1.73 M^2
GLUCOSE SERPL-MCNC: 113 MG/DL (ref 70–110)
POTASSIUM SERPL-SCNC: 3.8 MMOL/L (ref 3.5–5.1)
SODIUM SERPL-SCNC: 141 MMOL/L (ref 136–145)

## 2023-02-08 PROCEDURE — 80048 BASIC METABOLIC PNL TOTAL CA: CPT | Performed by: PHYSICIAN ASSISTANT

## 2023-02-08 PROCEDURE — 3079F DIAST BP 80-89 MM HG: CPT | Mod: CPTII,S$GLB,, | Performed by: PHYSICIAN ASSISTANT

## 2023-02-08 PROCEDURE — 1159F MED LIST DOCD IN RCRD: CPT | Mod: CPTII,S$GLB,, | Performed by: PHYSICIAN ASSISTANT

## 2023-02-08 PROCEDURE — 1159F PR MEDICATION LIST DOCUMENTED IN MEDICAL RECORD: ICD-10-PCS | Mod: CPTII,S$GLB,, | Performed by: PHYSICIAN ASSISTANT

## 2023-02-08 PROCEDURE — 99999 PR PBB SHADOW E&M-EST. PATIENT-LVL III: ICD-10-PCS | Mod: PBBFAC,,, | Performed by: PHYSICIAN ASSISTANT

## 2023-02-08 PROCEDURE — 99213 PR OFFICE/OUTPT VISIT, EST, LEVL III, 20-29 MIN: ICD-10-PCS | Mod: S$GLB,,, | Performed by: PHYSICIAN ASSISTANT

## 2023-02-08 PROCEDURE — 4010F PR ACE/ARB THEARPY RXD/TAKEN: ICD-10-PCS | Mod: CPTII,S$GLB,, | Performed by: PHYSICIAN ASSISTANT

## 2023-02-08 PROCEDURE — 3077F PR MOST RECENT SYSTOLIC BLOOD PRESSURE >= 140 MM HG: ICD-10-PCS | Mod: CPTII,S$GLB,, | Performed by: PHYSICIAN ASSISTANT

## 2023-02-08 PROCEDURE — 4010F ACE/ARB THERAPY RXD/TAKEN: CPT | Mod: CPTII,S$GLB,, | Performed by: PHYSICIAN ASSISTANT

## 2023-02-08 PROCEDURE — 99213 OFFICE O/P EST LOW 20 MIN: CPT | Mod: S$GLB,,, | Performed by: PHYSICIAN ASSISTANT

## 2023-02-08 PROCEDURE — 3008F PR BODY MASS INDEX (BMI) DOCUMENTED: ICD-10-PCS | Mod: CPTII,S$GLB,, | Performed by: PHYSICIAN ASSISTANT

## 2023-02-08 PROCEDURE — 99999 PR PBB SHADOW E&M-EST. PATIENT-LVL III: CPT | Mod: PBBFAC,,, | Performed by: PHYSICIAN ASSISTANT

## 2023-02-08 PROCEDURE — 3079F PR MOST RECENT DIASTOLIC BLOOD PRESSURE 80-89 MM HG: ICD-10-PCS | Mod: CPTII,S$GLB,, | Performed by: PHYSICIAN ASSISTANT

## 2023-02-08 PROCEDURE — 3008F BODY MASS INDEX DOCD: CPT | Mod: CPTII,S$GLB,, | Performed by: PHYSICIAN ASSISTANT

## 2023-02-08 PROCEDURE — 1160F PR REVIEW ALL MEDS BY PRESCRIBER/CLIN PHARMACIST DOCUMENTED: ICD-10-PCS | Mod: CPTII,S$GLB,, | Performed by: PHYSICIAN ASSISTANT

## 2023-02-08 PROCEDURE — 3077F SYST BP >= 140 MM HG: CPT | Mod: CPTII,S$GLB,, | Performed by: PHYSICIAN ASSISTANT

## 2023-02-08 PROCEDURE — 83880 ASSAY OF NATRIURETIC PEPTIDE: CPT | Performed by: PHYSICIAN ASSISTANT

## 2023-02-08 PROCEDURE — 1160F RVW MEDS BY RX/DR IN RCRD: CPT | Mod: CPTII,S$GLB,, | Performed by: PHYSICIAN ASSISTANT

## 2023-02-08 RX ORDER — COLCHICINE 0.6 MG/1
0.6 TABLET ORAL DAILY
Qty: 10 TABLET | Refills: 1 | Status: SHIPPED | OUTPATIENT
Start: 2023-02-08 | End: 2023-03-07

## 2023-02-08 NOTE — PROGRESS NOTES
[facility] Provider Note (Follow-up) Consult Note      HPI:  Patient can walk without SOB   Patient sleeps on 2 pillows   Patient wakes up SOB, has to get out of bed, associated cough, sputumone   Palpitations - none   Dizzy, light-headed, pre-syncope or syncope none   Since discharge frequency of performing weights, home weight and weight change stable   Other information felt pertinent to HPI    Last visit started on low dose entresto BID.  Saw pulm awaiting sleep study for NATALYA. Still has gout in elbow, finished pred, on allopurinol.    No SOB, pnd orthopnea     PHYSICAL:   Vitals:    02/08/23 1437   BP: (!) 142/88   Pulse: 80      Wt Readings from Last 3 Encounters:   02/08/23 127.3 kg (280 lb 10.3 oz)   02/03/23 127.8 kg (281 lb 12 oz)   01/26/23 129 kg (284 lb 6.3 oz)       JVD: no   Heart rhythm: regular  Cardiac murmur: No    S3: no  S4: no  Lungs: clear  Liver span: 10 cm:   Hepatojugular reflux: no  Edema: no      ASSESSMENT:    PLAN:      Patient Instructions:   Instruct the patient to notify this clinic if HH, a physician or an advanced care provider wants to change medication one of their HF medications   Activity and Diet restrictions:   Recommend 2-3 gram sodium restriction and 1500cc- 2000cc fluid restriction.  Encourage physical activity with graded exercise program.  Requested patient to weigh themselves daily, and to notify us if their weight increases by more than 3 lbs in 1 day or 5 lbs in 3 days.    Assigned dry weight on home scale: 127  Medication changes (include current dose and changed dose)  Will give 3 doses of colchicine, refer to IM if no improvement  Continue lasix  Sleep study  RTC 3 months  Upcoming labs and date anticipated:

## 2023-03-04 ENCOUNTER — HOSPITAL ENCOUNTER (OUTPATIENT)
Dept: SLEEP MEDICINE | Facility: HOSPITAL | Age: 53
Discharge: HOME | End: 2023-03-04
Attending: PHYSICIAN ASSISTANT
Payer: COMMERCIAL

## 2023-03-04 DIAGNOSIS — G47.39 TREATMENT-EMERGENT CENTRAL SLEEP APNEA: ICD-10-CM

## 2023-03-04 DIAGNOSIS — G47.33 OSA (OBSTRUCTIVE SLEEP APNEA): Primary | ICD-10-CM

## 2023-03-04 PROCEDURE — 95811 POLYSOM 6/>YRS CPAP 4/> PARM: CPT

## 2023-03-04 NOTE — Clinical Note
DIAGNOSIS: NATALYA, Central sleep apnea; treatment emergent, obesity    RECOMMENDATIONS: 1. PAP habituation 2. Weight loss 3. Therapy with CPAP 12 cmwp

## 2023-03-06 ENCOUNTER — CLINICAL SUPPORT (OUTPATIENT)
Dept: PULMONOLOGY | Facility: CLINIC | Age: 53
End: 2023-03-06
Payer: COMMERCIAL

## 2023-03-06 DIAGNOSIS — R06.02 SOB (SHORTNESS OF BREATH): ICD-10-CM

## 2023-03-06 LAB
BRPFT: ABNORMAL
FEF 25 75 CHG: -3.7 %
FEF 25 75 LLN: 1.62
FEF 25 75 POST REF: 51.4 %
FEF 25 75 PRE REF: 53.4 %
FEF 25 75 REF: 3.45
FET100 CHG: -6.4 %
FEV1 CHG: -1.3 %
FEV1 FVC CHG: 1.5 %
FEV1 FVC LLN: 68
FEV1 FVC POST REF: 97.1 %
FEV1 FVC PRE REF: 95.7 %
FEV1 FVC REF: 79
FEV1 LLN: 2.92
FEV1 POST REF: 54.5 %
FEV1 PRE REF: 55.2 %
FEV1 REF: 3.89
FVC CHG: -2.7 %
FVC LLN: 3.79
FVC POST REF: 55.8 %
FVC PRE REF: 57.4 %
FVC REF: 4.97
PEF CHG: -2.6 %
PEF LLN: 7.05
PEF POST REF: 72.2 %
PEF PRE REF: 74.1 %
PEF REF: 10.12
POST FEF 25 75: 1.77 L/S (ref 1.62–5.29)
POST FET 100: 8.61 SEC
POST FEV1 FVC: 76.34 % (ref 68.09–89.16)
POST FEV1: 2.12 L (ref 2.92–4.86)
POST FVC: 2.78 L (ref 3.79–6.16)
POST PEF: 7.3 L/S (ref 7.05–13.18)
PRE FEF 25 75: 1.84 L/S (ref 1.62–5.29)
PRE FET 100: 9.2 SEC
PRE FEV1 FVC: 75.24 % (ref 68.09–89.16)
PRE FEV1: 2.15 L (ref 2.92–4.86)
PRE FVC: 2.85 L (ref 3.79–6.16)
PRE PEF: 7.49 L/S (ref 7.05–13.18)

## 2023-03-06 PROCEDURE — 94060 PR EVAL OF BRONCHOSPASM: ICD-10-PCS | Mod: S$GLB,,, | Performed by: INTERNAL MEDICINE

## 2023-03-06 PROCEDURE — 99999 PR PBB SHADOW E&M-EST. PATIENT-LVL I: ICD-10-PCS | Mod: PBBFAC,,,

## 2023-03-06 PROCEDURE — 99999 PR PBB SHADOW E&M-EST. PATIENT-LVL I: CPT | Mod: PBBFAC,,,

## 2023-03-06 PROCEDURE — 94060 EVALUATION OF WHEEZING: CPT | Mod: S$GLB,,, | Performed by: INTERNAL MEDICINE

## 2023-03-07 ENCOUNTER — HOSPITAL ENCOUNTER (OUTPATIENT)
Dept: RADIOLOGY | Facility: HOSPITAL | Age: 53
Discharge: HOME | End: 2023-03-07
Attending: FAMILY MEDICINE
Payer: COMMERCIAL

## 2023-03-07 ENCOUNTER — OFFICE VISIT (OUTPATIENT)
Dept: FAMILY MEDICINE | Facility: CLINIC | Age: 53
End: 2023-03-07
Payer: COMMERCIAL

## 2023-03-07 VITALS
HEIGHT: 75 IN | HEART RATE: 85 BPM | SYSTOLIC BLOOD PRESSURE: 136 MMHG | BODY MASS INDEX: 35 KG/M2 | OXYGEN SATURATION: 97 % | DIASTOLIC BLOOD PRESSURE: 80 MMHG | TEMPERATURE: 98 F | WEIGHT: 281.5 LBS

## 2023-03-07 DIAGNOSIS — I50.40 COMBINED SYSTOLIC AND DIASTOLIC CONGESTIVE HEART FAILURE, UNSPECIFIED HF CHRONICITY: ICD-10-CM

## 2023-03-07 DIAGNOSIS — R05.9 COUGH, UNSPECIFIED TYPE: ICD-10-CM

## 2023-03-07 DIAGNOSIS — E78.2 MIXED HYPERLIPIDEMIA: ICD-10-CM

## 2023-03-07 DIAGNOSIS — G47.33 OSA (OBSTRUCTIVE SLEEP APNEA): ICD-10-CM

## 2023-03-07 DIAGNOSIS — I10 ESSENTIAL HYPERTENSION: ICD-10-CM

## 2023-03-07 DIAGNOSIS — E66.01 SEVERE OBESITY (BMI 35.0-35.9 WITH COMORBIDITY): ICD-10-CM

## 2023-03-07 DIAGNOSIS — N18.30 STAGE 3 CHRONIC KIDNEY DISEASE, UNSPECIFIED WHETHER STAGE 3A OR 3B CKD: ICD-10-CM

## 2023-03-07 PROCEDURE — 1159F PR MEDICATION LIST DOCUMENTED IN MEDICAL RECORD: ICD-10-PCS | Mod: CPTII,S$GLB,, | Performed by: FAMILY MEDICINE

## 2023-03-07 PROCEDURE — 1160F PR REVIEW ALL MEDS BY PRESCRIBER/CLIN PHARMACIST DOCUMENTED: ICD-10-PCS | Mod: CPTII,S$GLB,, | Performed by: FAMILY MEDICINE

## 2023-03-07 PROCEDURE — 4010F ACE/ARB THERAPY RXD/TAKEN: CPT | Mod: CPTII,S$GLB,, | Performed by: FAMILY MEDICINE

## 2023-03-07 PROCEDURE — 1160F RVW MEDS BY RX/DR IN RCRD: CPT | Mod: CPTII,S$GLB,, | Performed by: FAMILY MEDICINE

## 2023-03-07 PROCEDURE — 71046 X-RAY EXAM CHEST 2 VIEWS: CPT | Mod: 26,,, | Performed by: RADIOLOGY

## 2023-03-07 PROCEDURE — 3079F DIAST BP 80-89 MM HG: CPT | Mod: CPTII,S$GLB,, | Performed by: FAMILY MEDICINE

## 2023-03-07 PROCEDURE — 3075F PR MOST RECENT SYSTOLIC BLOOD PRESS GE 130-139MM HG: ICD-10-PCS | Mod: CPTII,S$GLB,, | Performed by: FAMILY MEDICINE

## 2023-03-07 PROCEDURE — 99999 PR PBB SHADOW E&M-EST. PATIENT-LVL IV: CPT | Mod: PBBFAC,,, | Performed by: FAMILY MEDICINE

## 2023-03-07 PROCEDURE — 99214 PR OFFICE/OUTPT VISIT, EST, LEVL IV, 30-39 MIN: ICD-10-PCS | Mod: S$GLB,,, | Performed by: FAMILY MEDICINE

## 2023-03-07 PROCEDURE — 4010F PR ACE/ARB THEARPY RXD/TAKEN: ICD-10-PCS | Mod: CPTII,S$GLB,, | Performed by: FAMILY MEDICINE

## 2023-03-07 PROCEDURE — 3079F PR MOST RECENT DIASTOLIC BLOOD PRESSURE 80-89 MM HG: ICD-10-PCS | Mod: CPTII,S$GLB,, | Performed by: FAMILY MEDICINE

## 2023-03-07 PROCEDURE — 99214 OFFICE O/P EST MOD 30 MIN: CPT | Mod: S$GLB,,, | Performed by: FAMILY MEDICINE

## 2023-03-07 PROCEDURE — 3075F SYST BP GE 130 - 139MM HG: CPT | Mod: CPTII,S$GLB,, | Performed by: FAMILY MEDICINE

## 2023-03-07 PROCEDURE — 71046 X-RAY EXAM CHEST 2 VIEWS: CPT | Mod: TC,FY,PO

## 2023-03-07 PROCEDURE — 71046 XR CHEST PA AND LATERAL: ICD-10-PCS | Mod: 26,,, | Performed by: RADIOLOGY

## 2023-03-07 PROCEDURE — 1159F MED LIST DOCD IN RCRD: CPT | Mod: CPTII,S$GLB,, | Performed by: FAMILY MEDICINE

## 2023-03-07 PROCEDURE — 3008F PR BODY MASS INDEX (BMI) DOCUMENTED: ICD-10-PCS | Mod: CPTII,S$GLB,, | Performed by: FAMILY MEDICINE

## 2023-03-07 PROCEDURE — 3008F BODY MASS INDEX DOCD: CPT | Mod: CPTII,S$GLB,, | Performed by: FAMILY MEDICINE

## 2023-03-07 PROCEDURE — 99999 PR PBB SHADOW E&M-EST. PATIENT-LVL IV: ICD-10-PCS | Mod: PBBFAC,,, | Performed by: FAMILY MEDICINE

## 2023-03-07 RX ORDER — ATORVASTATIN CALCIUM 40 MG/1
40 TABLET, FILM COATED ORAL NIGHTLY
Qty: 90 TABLET | Refills: 1 | Status: SHIPPED | OUTPATIENT
Start: 2023-03-07 | End: 2023-10-02 | Stop reason: SDUPTHER

## 2023-03-07 NOTE — PROGRESS NOTES
Bria Jo Jared Saldana    Chief Complaint   Patient presents with    Establish Care       History of Present Illness:   Mr. Saldana comes in today as a new patient to establish care with me.  He states he has been previously followed by PCP Dr. Blair with [facility] with last visit on October 10, 2022.      He states he is not fasting but has taken medications today.     He states he does not perform home blood pressure checks.  He states he does not exercise and monitor his diet.  He states he has not been taking Lipitor as he states he has no prescription.    He saw PETER Shepherd with Cardiology on February 8, 2023 for surveillance of hypertension, CHF with follow-up scheduled for May 8, 2023.  He saw PETER Kelley with Pulmonary on February 3, 2023 for NATALYA (but not yet with CPAP) with follow-up scheduled for March 17, 2023.  He saw Dr. Simms in ER, nephrologist, on May 5, 2021 for CKD 3.    He states he has intermittent dry cough for couple of weeks.  He is a nonsmoker.  He reports no throat closure sensation.    Otherwise, he denies having fever, chills, fatigue, appetite changes; shortness of breath, wheezing; chest pain, palpitations, leg swelling abdominal pain, nausea, vomiting, diarrhea, constipation; unusual urinary symptoms; polydipsia, polyphagia, polyuria, hot or cold intolerance; back pain; numbness, headache; anxiety, depression, homicidal or suicidal thoughts.         Labs:       WBC                      6.75                10/11/2022                 HGB                      13.7 (L)            10/11/2022                 HCT                      43.1                10/11/2022                 PLT                      234                 10/11/2022                 CHOL                     155                 07/08/2019                 TRIG                     135                 07/08/2019                 HDL                      32 (L)              07/08/2019                 ALT                       18                  10/11/2022                 AST                      18                  10/11/2022                 NA                       141                 02/08/2023                 K                        3.8                 02/08/2023                 CL                       109                 02/08/2023                 CREATININE               1.8 (H)             02/08/2023                 BUN                      15                  02/08/2023                 CO2                      22 (L)              02/08/2023                 TSH                      1.196               10/11/2022                 PSA                      1.1                 11/16/2016                 INR                      1.1                 10/11/2022                 HGBA1C                   5.6                 10/11/2022                LDLCALC                  96.0                07/08/2019                Past Medical History:   Diagnosis Date    Acute CHF 7/8/2019    Acute on chronic combined systolic (congestive) and diastolic (congestive) heart failure 9/23/2019    Allergy     CHF (congestive heart failure) 7/9/2019    CKD (chronic kidney disease) stage 3, GFR 30-59 ml/min 7/8/2019    Essential hypertension 6/1/2017    Hypertension associated with chronic kidney disease due to type 2 diabetes mellitus 2/28/2022    Mixed hyperlipidemia 3/10/2022    NATALYA (obstructive sleep apnea) 7/23/2018        Current Outpatient Medications   Medication Sig    allopurinoL (ZYLOPRIM) 100 MG tablet Take 100 mg by mouth Daily.    carvediloL (COREG) 25 MG tablet Take 1 tablet (25 mg total) by mouth 2 (two) times daily.    furosemide (LASIX) 40 MG tablet Take 1 tablet (40 mg total) by mouth 2 (two) times daily.    hydrALAZINE (APRESOLINE) 50 MG tablet Take 1 tablet (50 mg total) by mouth every 8 (eight) hours.    isosorbide dinitrate (ISORDIL) 20 MG tablet Take 1 tablet (20 mg total) by mouth 3 (three) times daily.     sacubitriL-valsartan (ENTRESTO) 24-26 mg per tablet Take 1 tablet by mouth 2 (two) times daily.    atorvastatin (LIPITOR) 40 MG tablet Take 1 tablet (40 mg total) by mouth every evening. (Patient not taking: Reported on 3/7/2023)       Review of Systems   Constitutional:  Negative for activity change, appetite change, chills, fatigue and fever.   Respiratory:  Positive for cough. Negative for shortness of breath and wheezing.    Cardiovascular:  Negative for chest pain, palpitations and leg swelling.   Gastrointestinal:  Negative for abdominal pain, constipation, diarrhea, nausea and vomiting.   Endocrine: Negative for cold intolerance, heat intolerance, polydipsia, polyphagia and polyuria.   Genitourinary:  Negative for difficulty urinating.   Musculoskeletal:  Negative for back pain.   Neurological:  Negative for numbness and headaches.   Psychiatric/Behavioral:  Negative for dysphoric mood and suicidal ideas. The patient is not nervous/anxious.         Negative for homicidal ideas.     Objective:  Physical Exam  Vitals reviewed.   Constitutional:       General: He is not in acute distress.     Appearance: Normal appearance. He is obese. He is not ill-appearing, toxic-appearing or diaphoretic.      Comments: Pleasant.   Cardiovascular:      Rate and Rhythm: Normal rate and regular rhythm.      Pulses: Normal pulses.      Heart sounds: No murmur heard.  Pulmonary:      Effort: Pulmonary effort is normal. No respiratory distress.      Breath sounds: Wheezing present.      Comments: Faint wheeze anteriorly.  Abdominal:      General: Bowel sounds are normal. There is no distension.      Palpations: Abdomen is soft. There is no mass.      Tenderness: There is no abdominal tenderness. There is no guarding or rebound.   Musculoskeletal:         General: No swelling or tenderness. Normal range of motion.      Cervical back: Normal range of motion and neck supple. No tenderness.      Comments: He is ambulatory without  problems.   Lymphadenopathy:      Cervical: No cervical adenopathy.   Skin:     General: Skin is warm.   Neurological:      General: No focal deficit present.      Mental Status: He is alert and oriented to person, place, and time.   Psychiatric:         Mood and Affect: Mood normal.         Behavior: Behavior normal.         Thought Content: Thought content normal.         Judgment: Judgment normal.       ASSESSMENT:  1. Essential hypertension    2. Mixed hyperlipidemia    3. Combined systolic and diastolic congestive heart failure, unspecified HF chronicity    4. Stage 3 chronic kidney disease, unspecified whether stage 3a or 3b CKD    5. NATALYA (obstructive sleep apnea)    6. Severe obesity (BMI 35.0-35.9 with comorbidity)    7. Cough, unspecified type        PLAN:  Bria was seen today for establish care.    Diagnoses and all orders for this visit:    Essential hypertension    Mixed hyperlipidemia  -     atorvastatin (LIPITOR) 40 MG tablet; Take 1 tablet (40 mg total) by mouth every evening.    Combined systolic and diastolic congestive heart failure, unspecified HF chronicity    Stage 3 chronic kidney disease, unspecified whether stage 3a or 3b CKD    NATALYA (obstructive sleep apnea)    Severe obesity (BMI 35.0-35.9 with comorbidity)    Cough, unspecified type  -     X-Ray Chest PA And Lateral; Future      No labs today.  Continue current medications (including get back on Lipitor), follow low sodium, low cholesterol, low carb diet, daily walks.  Prescription refill as noted above.  Keep follow up with specialists.  Okay to take OTC Mucinex or Robitussin for cough.  Follow up in about 6 months (around 9/7/2023) for physical.    45 minutes of total time spent on the encounter, which includes face to face time and non-face to face time preparing to see the patient (eg, review of tests), Obtaining and/or reviewing separately obtained history, Documenting clinical information in the electronic or other health record,  Independently interpreting results (not separately reported) and communicating results to the patient/family/caregiver, or Care coordination (not separately reported).      This note is  generated with speech recognition software and is subject to transcription error and sound alike phrases that may be missed by proofreading.

## 2023-03-08 DIAGNOSIS — E11.9 TYPE 2 DIABETES MELLITUS WITHOUT COMPLICATION: ICD-10-CM

## 2023-03-15 PROCEDURE — 95811 PR POLYSOMNOGRAPHY W/CPAP: ICD-10-PCS | Mod: 26,,, | Performed by: INTERNAL MEDICINE

## 2023-03-15 PROCEDURE — 95811 POLYSOM 6/>YRS CPAP 4/> PARM: CPT | Mod: 26,,, | Performed by: INTERNAL MEDICINE

## 2023-03-16 ENCOUNTER — PATIENT MESSAGE (OUTPATIENT)
Dept: ADMINISTRATIVE | Facility: HOSPITAL | Age: 53
End: 2023-03-16
Payer: COMMERCIAL

## 2023-03-16 NOTE — PROCEDURES
"PATIENT: Bria Saldana  study Date: 3/4/2023  Referring Physician: Uma Kelley PA-C    Indications for Polysomnography: The patient is a 52 year old Male who is 6' 3" and weighs 281.0 lbs.  His BMI equals 35.3.  A full night PAP titration was performed. Home sleep study 07/20/2018: AHI was 10.4/hr with 54 events. Weight gain. [place] 9. Cannot sleep after 2-3 am. Bed time 9pm to 11pm, Sleep latency 20-30 minutes. Wake time 04:45 am. No restless legs. No signs of narcolepsy. Naps for 2 hours. STOPBANG score 8.  Medical condition: pulmonary hypertension, Chronic kidney disease, CHF, hypertension, diabetes.    Polysomnogram Data: The polysomnogram recorded the standard physiologic parameters including EEG, EOG, EMG, EKG, nasal and oral airflow.  Respiratory parameters of chest and abdominal movements were recorded with [place]Crystal motion transducers.  Oxygen saturation was recorded by pulse oximetry.      treatment: The patient was titrated at pressures ranging from 8* cm/H20 with supplemental oxygen at - up to 18/14/0** cm/H20 with supplemental oxygen at -.  The last pressure used in the study was 18/14/0** cm/H20 with supplemental oxygen at -.   (*=CPAP, **=BiLevel)        Sleep Architecture: The total recording time of the study was 412.4 minutes.  The total sleep time was 357.5 minutes.  The patient spent 10.5% of total sleep time in Stage N1, 65.5% in Stage N2, 7.1% in Stages N3, and 16.9% in REM.   Sleep latency was 3.3 minutes.  REM latency was 31.0 minutes.  Sleep Efficiency was 86.7%.  Sleep Maintenance Efficiency was 87.3%.  Total wake time was 55.5 minutes for a total wake percentage of 13.4%.    Respiratory Events: The polysomnogram revealed a presence of - obstructive, 94 central, and - mixed apneas resulting in an Apnea index of 15.8 events per hour.  There were 39 hypopneas (using 3% desaturation criteria) resulting in a Hypopnea index of 6.5 events per hour.  The combined Apnea/Hypopnea index " "(using 3% desaturation criteria for Hypopneas) was 22.3 events per hour.    Baseline oxygen saturation was 92.9%.  The lowest oxygen saturation was 81.0%.      LIMB ACTIVITY: There were - limb movements recorded.  Of this total, - were classified as PLMs.  Of the PLMs, - were associated with arousals.  The Limb Movement index was - per hour while the PLM index was - per hour.    CARDIAC SUMMARY:   The average pulse rate was 78.3 bpm.  The minimum pulse rate was 44.0 bpm while the maximum pulse rate was 98.0 bpm.            CONCLUSION:  CPAP and BIPAP was attempted   Residual AHI was high 22.3/hr  CPAP 12 cmwp was best tolerated pressure with AHI 1.4/hr and SpO2 samanta 91%  Mask & Size: ResMed Airfit F30 (M)  Treatment emergent central apnea seen during BIPAP  No limb movement          DIAGNOSIS: NATALYA, Central sleep apnea; treatment emergent, obesity        RECOMMENDATIONS:  PAP habituation  Weight loss  Therapy with CPAP 12 cmwp    Dear PETER Hinton-KRISTIAN  [address] [place] [address] [place] [id number]/Brenna Maravilla MD         The sleep study that you ordered is complete.  You have ordered sleep LAB services to perform the sleep study for Bria Saldana .      Please find Sleep Study result in  the "Media tab" of Chart Review menu.        You can look  for the report in the  Media by the document type "Sleep Study Documents". Alphabetizing  "Document type" column helps to find the SLEEP STUDY report  Faster.       As the ordering provider, you are responsible for reviewing the results and implementing a treatment plan with your patient.    If you need a Sleep Medicine provider to explain the sleep study findings and arrange treatment for the patient, please refer patient for consultation to our Sleep Clinic via [facility] with Ambulatory Consult Sleep.     To do that please place an order for an  "Ambulatory Consult Sleep" -  order , it will go to our clinic work queue for our staff  to contact " the patient for an appointment.      For any questions, please contact our sleep lab  staff at [phone] to talk to clinical staff          Jovanny Mcdonald MD

## 2023-03-17 ENCOUNTER — OFFICE VISIT (OUTPATIENT)
Dept: PULMONOLOGY | Facility: CLINIC | Age: 53
End: 2023-03-17
Payer: COMMERCIAL

## 2023-03-17 VITALS
WEIGHT: 283.75 LBS | HEIGHT: 75 IN | SYSTOLIC BLOOD PRESSURE: 142 MMHG | RESPIRATION RATE: 18 BRPM | HEART RATE: 77 BPM | DIASTOLIC BLOOD PRESSURE: 72 MMHG | BODY MASS INDEX: 35.28 KG/M2 | OXYGEN SATURATION: 97 %

## 2023-03-17 DIAGNOSIS — I27.20 PULMONARY HTN: ICD-10-CM

## 2023-03-17 DIAGNOSIS — I50.40 COMBINED SYSTOLIC AND DIASTOLIC CONGESTIVE HEART FAILURE, UNSPECIFIED HF CHRONICITY: ICD-10-CM

## 2023-03-17 DIAGNOSIS — I12.9 HYPERTENSION ASSOCIATED WITH CHRONIC KIDNEY DISEASE DUE TO TYPE 2 DIABETES MELLITUS: ICD-10-CM

## 2023-03-17 DIAGNOSIS — R06.02 SOB (SHORTNESS OF BREATH): ICD-10-CM

## 2023-03-17 DIAGNOSIS — E66.01 SEVERE OBESITY (BMI 35.0-35.9 WITH COMORBIDITY): ICD-10-CM

## 2023-03-17 DIAGNOSIS — G47.33 OSA (OBSTRUCTIVE SLEEP APNEA): Primary | ICD-10-CM

## 2023-03-17 DIAGNOSIS — N18.30 STAGE 3 CHRONIC KIDNEY DISEASE, UNSPECIFIED WHETHER STAGE 3A OR 3B CKD: ICD-10-CM

## 2023-03-17 DIAGNOSIS — E11.22 HYPERTENSION ASSOCIATED WITH CHRONIC KIDNEY DISEASE DUE TO TYPE 2 DIABETES MELLITUS: ICD-10-CM

## 2023-03-17 PROCEDURE — 99999 PR PBB SHADOW E&M-EST. PATIENT-LVL IV: CPT | Mod: PBBFAC,,, | Performed by: PHYSICIAN ASSISTANT

## 2023-03-17 PROCEDURE — 1159F PR MEDICATION LIST DOCUMENTED IN MEDICAL RECORD: ICD-10-PCS | Mod: CPTII,S$GLB,, | Performed by: PHYSICIAN ASSISTANT

## 2023-03-17 PROCEDURE — 4010F PR ACE/ARB THEARPY RXD/TAKEN: ICD-10-PCS | Mod: CPTII,S$GLB,, | Performed by: PHYSICIAN ASSISTANT

## 2023-03-17 PROCEDURE — 3078F DIAST BP <80 MM HG: CPT | Mod: CPTII,S$GLB,, | Performed by: PHYSICIAN ASSISTANT

## 2023-03-17 PROCEDURE — 1160F PR REVIEW ALL MEDS BY PRESCRIBER/CLIN PHARMACIST DOCUMENTED: ICD-10-PCS | Mod: CPTII,S$GLB,, | Performed by: PHYSICIAN ASSISTANT

## 2023-03-17 PROCEDURE — 3077F SYST BP >= 140 MM HG: CPT | Mod: CPTII,S$GLB,, | Performed by: PHYSICIAN ASSISTANT

## 2023-03-17 PROCEDURE — 99214 PR OFFICE/OUTPT VISIT, EST, LEVL IV, 30-39 MIN: ICD-10-PCS | Mod: S$GLB,,, | Performed by: PHYSICIAN ASSISTANT

## 2023-03-17 PROCEDURE — 99214 OFFICE O/P EST MOD 30 MIN: CPT | Mod: S$GLB,,, | Performed by: PHYSICIAN ASSISTANT

## 2023-03-17 PROCEDURE — 3078F PR MOST RECENT DIASTOLIC BLOOD PRESSURE < 80 MM HG: ICD-10-PCS | Mod: CPTII,S$GLB,, | Performed by: PHYSICIAN ASSISTANT

## 2023-03-17 PROCEDURE — 1159F MED LIST DOCD IN RCRD: CPT | Mod: CPTII,S$GLB,, | Performed by: PHYSICIAN ASSISTANT

## 2023-03-17 PROCEDURE — 3008F PR BODY MASS INDEX (BMI) DOCUMENTED: ICD-10-PCS | Mod: CPTII,S$GLB,, | Performed by: PHYSICIAN ASSISTANT

## 2023-03-17 PROCEDURE — 3008F BODY MASS INDEX DOCD: CPT | Mod: CPTII,S$GLB,, | Performed by: PHYSICIAN ASSISTANT

## 2023-03-17 PROCEDURE — 3077F PR MOST RECENT SYSTOLIC BLOOD PRESSURE >= 140 MM HG: ICD-10-PCS | Mod: CPTII,S$GLB,, | Performed by: PHYSICIAN ASSISTANT

## 2023-03-17 PROCEDURE — 4010F ACE/ARB THERAPY RXD/TAKEN: CPT | Mod: CPTII,S$GLB,, | Performed by: PHYSICIAN ASSISTANT

## 2023-03-17 PROCEDURE — 99999 PR PBB SHADOW E&M-EST. PATIENT-LVL IV: ICD-10-PCS | Mod: PBBFAC,,, | Performed by: PHYSICIAN ASSISTANT

## 2023-03-17 PROCEDURE — 1160F RVW MEDS BY RX/DR IN RCRD: CPT | Mod: CPTII,S$GLB,, | Performed by: PHYSICIAN ASSISTANT

## 2023-03-17 NOTE — ASSESSMENT & PLAN NOTE
Take medications as prescribed. Keep daily blood pressure log at least twice a day and follow up with PCP in 2 weeks

## 2023-03-17 NOTE — ASSESSMENT & PLAN NOTE
Start CPAP 12, FFM  Discussed therapeutic goals for CPAP: Ideal usage 100% of nights for 6-8 hours per night. Minimum usage is 70% of night for at least 4 hours per night.

## 2023-03-17 NOTE — ASSESSMENT & PLAN NOTE
Echo 10/2022  CHF, NATALYA  Start CPAP  [place] with moderate restriction, will get cPFT  Weight loss

## 2023-03-17 NOTE — PROGRESS NOTES
Subjective:       Patient ID: Bria Saldana is a 52 y.o. male.    Chief Complaint: Sleep Apnea      3/17/23  Here for NATALYA and SOB follow up  Titration study completed:  CONCLUSION:  1. CPAP and BIPAP was attempted   2. Residual AHI was high 22.3/hr  3. CPAP 12 cmwp was best tolerated pressure with AHI 1.4/hr and SpO2 samanta 91%  4. Mask & Size: ResMed Airfit F30 (M)  5. Treatment emergent central apnea seen during BIPAP  6. No limb movement    Started mucinex for cough, this has helped    2/3/23  53yo male referred by Sienna Maguire PA for NATALYA  Home sleep study in 2018: mild NATALYA, Apnea-hypopnea index: 10.4 events per hour. Total events 54.  History of morbid obesity, diastolic CHF, HTN, pulmonary HTN, diabetes, chronic kidney disease   No trouble falling asleep but complains of tossing and turning all night  Snores, never feels rested  Complains of daytime fatigue    Also complains of SOB, intermittent, sometimes with talking or even sitting still - this has been for many years but gradually worsening  Does not use inhalers  No prior lung diagnosis but reports episode of bronchitis in 2016  Coughs frequently, dry; he says nothing brings it on, no exacerbating or relieving factors  He says he had this cough years ago but resolved after stopping medication, maybe ace inhibitor?  Cough returned after recently starting CHF medication, not currently on ace inhibitors  No hemoptysis, voice changes, dysphagia, or weight loss  Quit smoking in 2001      Has seasonal allergies  Allergic to grass    BP Readings from Last 3 Encounters:   03/17/23 (!) 142/72   03/07/23 136/80   03/06/23 (!) 180/100     Snoring / Sleep:     [place] Questionnaire (validated NATALYA screening questionnaire)    yes -- Snoring/apnea    yes -- Fatigue    Body mass index is 35.46 kg/m².  (>25 is overweight, >30 is obese)    Blood Pressure = Hypertension  (PreHTN 120-139/80-89, Stg1 140-159/90-99, Stg2 >160/>100)  [place] =  3 of three NATALYA categories are positive (high risk is 2-3 positive categories)     [place] Sleepiness Scale TOTAL =   12  (validated sleepiness questionnaire with a higher score indicating greater sleepiness; range 0-24)  EPWORTH SLEEPINESS SCALE 2/2/2023   Sitting and reading 3   Watching TV 2   Sitting, inactive in a public place (e.g. a theatre or a meeting) 2   As a passenger in a car for an hour without a break 3   Lying down to rest in the afternoon when circumstances permit 2   Sitting and talking to someone 0   Sitting quietly after a lunch without alcohol 0   In a car, while stopped for a few minutes in traffic 0   Total score 12         STOP-Bang Questionnaire (validated NATALYA screening questionnaire)  Negative unless checked off.  [x] Snoring    [x]  Tired/Fatigued/Sleepy  [x] Obstruction (apneas/choking)  [x] Pressure (HTN)  [x] BMI >35  [x] Age >50  [x] Neck >40 cm  [x] Gender male   STOP-Bang = 8 (low risk 0-2,high risk 3-8)    There is no immunization history for the selected administration types on file for this patient.   Tobacco Use: Medium Risk    Smoking Tobacco Use: Former    Smokeless Tobacco Use: Never    Passive Exposure: Not on file      Past Medical History:   Diagnosis Date    Acute CHF 7/8/2019    Acute on chronic combined systolic (congestive) and diastolic (congestive) heart failure 9/23/2019    Allergy     CHF (congestive heart failure) 7/9/2019    CKD (chronic kidney disease) stage 3, GFR 30-59 ml/min 7/8/2019    Essential hypertension 6/1/2017    Hypertension associated with chronic kidney disease due to type 2 diabetes mellitus 2/28/2022    Mixed hyperlipidemia 3/10/2022    NATALYA (obstructive sleep apnea) 7/23/2018      Current Outpatient Medications on File Prior to Visit   Medication Sig Dispense Refill    allopurinoL (ZYLOPRIM) 100 MG tablet Take 100 mg by mouth Daily.      atorvastatin (LIPITOR) 40 MG tablet Take 1 tablet (40 mg total) by mouth every evening. 90 tablet 1     "carvediloL (COREG) 25 MG tablet Take 1 tablet (25 mg total) by mouth 2 (two) times daily. 60 tablet 11    hydrALAZINE (APRESOLINE) 50 MG tablet Take 1 tablet (50 mg total) by mouth every 8 (eight) hours. 90 tablet 0    isosorbide dinitrate (ISORDIL) 20 MG tablet Take 1 tablet (20 mg total) by mouth 3 (three) times daily. 90 tablet 2    sacubitriL-valsartan (ENTRESTO) 24-26 mg per tablet Take 1 tablet by mouth 2 (two) times daily. 60 tablet 1    furosemide (LASIX) 40 MG tablet Take 1 tablet (40 mg total) by mouth 2 (two) times daily. 60 tablet 5     No current facility-administered medications on file prior to visit.        Review of Systems   Constitutional:  Positive for fatigue. Negative for fever, weight loss, appetite change and weakness.   HENT:  Negative for postnasal drip, rhinorrhea, sinus pressure, trouble swallowing and congestion.    Respiratory:  Positive for apnea, snoring, dyspnea on extertion and somnolence. Negative for cough, sputum production, choking, chest tightness, shortness of breath and wheezing.    Cardiovascular:  Negative for chest pain and leg swelling.   Musculoskeletal:  Negative for arthralgias, gait problem and joint swelling.   Gastrointestinal:  Negative for nausea, vomiting and abdominal pain.   Neurological:  Negative for dizziness, weakness and headaches.   All other systems reviewed and are negative.    Objective:       Vitals:    03/17/23 1421   BP: (!) 142/72   Pulse: 77   Resp: 18   SpO2: 97%   Weight: 128.7 kg (283 lb 11.7 oz)   Height: 6' 3" (1.905 m)         Physical Exam   Constitutional: He is oriented to person, place, and time. He appears well-developed and well-nourished. No distress. He is obese.   HENT:   Head: Normocephalic.   Nose: Nose normal.   Mouth/Throat: Oropharynx is clear and moist.   Cardiovascular: Normal rate and regular rhythm.   Pulmonary/Chest: Effort normal. No respiratory distress. He has no wheezes. He has no rhonchi. He has no rales. "   Musculoskeletal:         General: No edema.      Cervical back: Normal range of motion and neck supple.   Lymphadenopathy: No supraclavicular adenopathy is present.     He has no cervical adenopathy.   Neurological: He is alert and oriented to person, place, and time. Gait normal.   Skin: Skin is warm and dry.   Psychiatric: He has a normal mood and affect.   Vitals reviewed.  Personal Diagnostic Review    X-Ray Chest PA And Lateral  Narrative: EXAM:  XR CHEST PA AND LATERAL    CLINICAL HISTORY: Cough, unspecified    TECHNIQUE:  Two-view chest x-ray    COMPARISON STUDIES: 10/11/2022    FINDINGS:  Heart size top normal.    The lungs are clear. No pleural effusion.    Stable dextroscoliosis.  Bullet again noted in the upper right posterior chest unchanged in positioning.  Impression: No acute findings.    Finalized on: 3/7/2023 4:16 PM By:  Julián Fox MD  [facility]# [id number]      2023-03-07 16:18:[id number]    [facility]    Spirometry:  Spirometry shows moderate restriction based on the reduction in FVC. Spirometry remains unimproved following bronchodilator.         Assessment/Plan:       Problem List Items Addressed This Visit          Pulmonary    Pulmonary HTN     Echo 10/2022  CHF, NATALYA  Start CPAP  [place] with moderate restriction, will get cPFT  Weight loss          SOB (shortness of breath)     Stable, patient reports this is intermittent with heavy exertion  PFT  Weight loss         Relevant Orders    Complete PFT with bronchodilator       Cardiac/Vascular    CHF (congestive heart failure)     F/u regularly with cardiology    Echo    Interpretation Summary  · Concentric hypertrophy and low normal systolic function.  · Moderate left atrial enlargement.  · The estimated ejection fraction is 50%.  · Grade II left ventricular diastolic dysfunction.  · There is abnormal septal wall motion consistent with left bundle branch block.  · Normal right ventricular size with normal right ventricular systolic function.  · Moderate mitral  regurgitation.  · Mild tricuspid regurgitation.  · There is pulmonary hypertension.               Hypertension associated with chronic kidney disease due to type 2 diabetes mellitus     Take medications as prescribed. Keep daily blood pressure log at least twice a day and follow up with PCP in 2 weeks              Renal/    CKD (chronic kidney disease) stage 3, GFR 30-59 ml/min     F/u regularly with PCP              Endocrine    Severe obesity (BMI 35.0-35.9 with comorbidity)     Weight loss and exercise to improve overall health.              Other    NATALYA (obstructive sleep apnea) - Primary     Start CPAP 12, FFM  Discussed therapeutic goals for CPAP: Ideal usage 100% of nights for 6-8 hours per night. Minimum usage is 70% of night for at least 4 hours per night.           Relevant Orders    CPAP/BIPAP SUPPLIES    CPAP FOR HOME USE     Follow up in about 3 months (around 6/17/2023) for pft, f/u with pulmonologist.    Discussed diagnosis, its evaluation, treatment and usual course. All questions answered.    Patient verbalized understanding of plan and left in no acute distress    Thank you for the courtesy of participating in the care of this patient    Elizabeth G Blough, PA-C [facility]

## 2023-03-24 ENCOUNTER — PATIENT OUTREACH (OUTPATIENT)
Dept: ADMINISTRATIVE | Facility: HOSPITAL | Age: 53
End: 2023-03-24
Payer: COMMERCIAL

## 2023-03-24 NOTE — PROGRESS NOTES
DM Eye exam: per chart review pt is overdue for DM eye exam, called and spoke to pt, he agreed to scheduling 05/03/23 with Dr Macho Duffy.

## 2023-04-19 ENCOUNTER — PATIENT MESSAGE (OUTPATIENT)
Dept: ADMINISTRATIVE | Facility: HOSPITAL | Age: 53
End: 2023-04-19
Payer: COMMERCIAL

## 2023-04-26 DIAGNOSIS — E11.9 TYPE 2 DIABETES MELLITUS WITHOUT COMPLICATION: ICD-10-CM

## 2023-05-08 ENCOUNTER — LAB VISIT (OUTPATIENT)
Dept: LAB | Facility: HOSPITAL | Age: 53
End: 2023-05-08
Attending: PHYSICIAN ASSISTANT
Payer: COMMERCIAL

## 2023-05-08 ENCOUNTER — OFFICE VISIT (OUTPATIENT)
Dept: CARDIOLOGY | Facility: CLINIC | Age: 53
End: 2023-05-08
Payer: COMMERCIAL

## 2023-05-08 ENCOUNTER — TELEPHONE (OUTPATIENT)
Dept: CARDIOLOGY | Facility: CLINIC | Age: 53
End: 2023-05-08

## 2023-05-08 VITALS
WEIGHT: 281.75 LBS | BODY MASS INDEX: 35.03 KG/M2 | DIASTOLIC BLOOD PRESSURE: 108 MMHG | HEIGHT: 75 IN | SYSTOLIC BLOOD PRESSURE: 160 MMHG | HEART RATE: 66 BPM

## 2023-05-08 DIAGNOSIS — I50.42 CHRONIC COMBINED SYSTOLIC AND DIASTOLIC CONGESTIVE HEART FAILURE: ICD-10-CM

## 2023-05-08 DIAGNOSIS — I50.43 ACUTE ON CHRONIC COMBINED SYSTOLIC (CONGESTIVE) AND DIASTOLIC (CONGESTIVE) HEART FAILURE: ICD-10-CM

## 2023-05-08 DIAGNOSIS — I50.43 ACUTE ON CHRONIC COMBINED SYSTOLIC (CONGESTIVE) AND DIASTOLIC (CONGESTIVE) HEART FAILURE: Primary | ICD-10-CM

## 2023-05-08 LAB
ANION GAP SERPL CALC-SCNC: 8 MMOL/L (ref 8–16)
BUN SERPL-MCNC: 21 MG/DL (ref 6–20)
CALCIUM SERPL-MCNC: 8.8 MG/DL (ref 8.7–10.5)
CHLORIDE SERPL-SCNC: 107 MMOL/L (ref 95–110)
CO2 SERPL-SCNC: 27 MMOL/L (ref 23–29)
CREAT SERPL-MCNC: 1.9 MG/DL (ref 0.5–1.4)
EST. GFR  (NO RACE VARIABLE): 42.2 ML/MIN/1.73 M^2
GLUCOSE SERPL-MCNC: 111 MG/DL (ref 70–110)
POTASSIUM SERPL-SCNC: 4.2 MMOL/L (ref 3.5–5.1)
SODIUM SERPL-SCNC: 142 MMOL/L (ref 136–145)

## 2023-05-08 PROCEDURE — 1159F PR MEDICATION LIST DOCUMENTED IN MEDICAL RECORD: ICD-10-PCS | Mod: CPTII,S$GLB,, | Performed by: PHYSICIAN ASSISTANT

## 2023-05-08 PROCEDURE — 3080F PR MOST RECENT DIASTOLIC BLOOD PRESSURE >= 90 MM HG: ICD-10-PCS | Mod: CPTII,S$GLB,, | Performed by: PHYSICIAN ASSISTANT

## 2023-05-08 PROCEDURE — 3080F DIAST BP >= 90 MM HG: CPT | Mod: CPTII,S$GLB,, | Performed by: PHYSICIAN ASSISTANT

## 2023-05-08 PROCEDURE — 3008F BODY MASS INDEX DOCD: CPT | Mod: CPTII,S$GLB,, | Performed by: PHYSICIAN ASSISTANT

## 2023-05-08 PROCEDURE — 36415 COLL VENOUS BLD VENIPUNCTURE: CPT | Performed by: PHYSICIAN ASSISTANT

## 2023-05-08 PROCEDURE — 99999 PR PBB SHADOW E&M-EST. PATIENT-LVL III: CPT | Mod: PBBFAC,,, | Performed by: PHYSICIAN ASSISTANT

## 2023-05-08 PROCEDURE — 4010F ACE/ARB THERAPY RXD/TAKEN: CPT | Mod: CPTII,S$GLB,, | Performed by: PHYSICIAN ASSISTANT

## 2023-05-08 PROCEDURE — 99213 PR OFFICE/OUTPT VISIT, EST, LEVL III, 20-29 MIN: ICD-10-PCS | Mod: S$GLB,,, | Performed by: PHYSICIAN ASSISTANT

## 2023-05-08 PROCEDURE — 1159F MED LIST DOCD IN RCRD: CPT | Mod: CPTII,S$GLB,, | Performed by: PHYSICIAN ASSISTANT

## 2023-05-08 PROCEDURE — 4010F PR ACE/ARB THEARPY RXD/TAKEN: ICD-10-PCS | Mod: CPTII,S$GLB,, | Performed by: PHYSICIAN ASSISTANT

## 2023-05-08 PROCEDURE — 3077F SYST BP >= 140 MM HG: CPT | Mod: CPTII,S$GLB,, | Performed by: PHYSICIAN ASSISTANT

## 2023-05-08 PROCEDURE — 80048 BASIC METABOLIC PNL TOTAL CA: CPT | Performed by: PHYSICIAN ASSISTANT

## 2023-05-08 PROCEDURE — 99999 PR PBB SHADOW E&M-EST. PATIENT-LVL III: ICD-10-PCS | Mod: PBBFAC,,, | Performed by: PHYSICIAN ASSISTANT

## 2023-05-08 PROCEDURE — 1160F PR REVIEW ALL MEDS BY PRESCRIBER/CLIN PHARMACIST DOCUMENTED: ICD-10-PCS | Mod: CPTII,S$GLB,, | Performed by: PHYSICIAN ASSISTANT

## 2023-05-08 PROCEDURE — 1160F RVW MEDS BY RX/DR IN RCRD: CPT | Mod: CPTII,S$GLB,, | Performed by: PHYSICIAN ASSISTANT

## 2023-05-08 PROCEDURE — 3077F PR MOST RECENT SYSTOLIC BLOOD PRESSURE >= 140 MM HG: ICD-10-PCS | Mod: CPTII,S$GLB,, | Performed by: PHYSICIAN ASSISTANT

## 2023-05-08 PROCEDURE — 99213 OFFICE O/P EST LOW 20 MIN: CPT | Mod: S$GLB,,, | Performed by: PHYSICIAN ASSISTANT

## 2023-05-08 PROCEDURE — 3008F PR BODY MASS INDEX (BMI) DOCUMENTED: ICD-10-PCS | Mod: CPTII,S$GLB,, | Performed by: PHYSICIAN ASSISTANT

## 2023-05-08 RX ORDER — ALLOPURINOL 100 MG/1
100 TABLET ORAL DAILY
Qty: 30 TABLET | Refills: 4 | Status: SHIPPED | OUTPATIENT
Start: 2023-05-08

## 2023-05-08 RX ORDER — ALLOPURINOL 100 MG/1
100 TABLET ORAL DAILY
Status: CANCELLED | OUTPATIENT
Start: 2023-05-08

## 2023-05-08 RX ORDER — SACUBITRIL AND VALSARTAN 24; 26 MG/1; MG/1
1 TABLET, FILM COATED ORAL 2 TIMES DAILY
Qty: 60 TABLET | Refills: 4 | Status: SHIPPED | OUTPATIENT
Start: 2023-05-08 | End: 2024-02-08 | Stop reason: SDUPTHER

## 2023-05-08 RX ORDER — COLCHICINE 0.6 MG/1
0.6 CAPSULE ORAL 2 TIMES DAILY
COMMUNITY
Start: 2023-04-24

## 2023-05-08 RX ORDER — FUROSEMIDE 40 MG/1
40 TABLET ORAL 2 TIMES DAILY
Qty: 60 TABLET | Refills: 3 | Status: SHIPPED | OUTPATIENT
Start: 2023-05-08 | End: 2023-06-26 | Stop reason: SDUPTHER

## 2023-05-08 RX ORDER — SPIRONOLACTONE 50 MG/1
50 TABLET, FILM COATED ORAL DAILY
Status: ON HOLD | COMMUNITY
Start: 2023-04-27 | End: 2023-06-16 | Stop reason: HOSPADM

## 2023-05-08 NOTE — PROGRESS NOTES
[facility] Provider Note (Follow-up) Consult Note      HPI:  Patient can walk to clinic no SOB   Patient sleeps on 2 pillows   Patient wakes up SOB, has to get out of bed, associated cough, sputum none   Palpitations - none   Dizzy, light-headed, pre-syncope or syncope none   Since discharge frequency of performing weights, home weight and weight change stable    Other information felt pertinent to HPI    Last visit in February was struggling with some gout. Was started on low dose entresto and continued on lasix. Has been out of entresto for over a month, also has not been taking lasix.    Went to urgent care a week ago for cough. Took extra lasix but did not urinate much so stopped taking. BP up but out of meds.     Saw PCP but did not get anything for gout, also has not had CPAP delivered, looks like it was ordered in March     PHYSICAL: There were no vitals filed for this visit.   Wt Readings from Last 3 Encounters:   03/17/23 128.7 kg (283 lb 11.7 oz)   03/07/23 127.7 kg (281 lb 8.4 oz)   03/06/23 128 kg (282 lb 3 oz)       JVD: no,    Heart rhythm: regular  Cardiac murmur: No    S3: no  S4: no  Lungs: clear  Liver span: 10 cm:   Hepatojugular reflux: no  Edema: no,       ASSESSMENT: chronic systolic HF    PLAN:      Patient Instructions:   Instruct the patient to notify this clinic if HH, a physician or an advanced care provider wants to change medication one of their HF medications   Activity and Diet restrictions:   Recommend 2-3 gram sodium restriction and 1500cc- 2000cc fluid restriction.  Encourage physical activity with graded exercise program.  Requested patient to weigh themselves daily, and to notify us if their weight increases by more than 3 lbs in 1 day or 5 lbs in 3 days.    Assigned dry weight on home scale: 127 kg  Medication changes (include current dose and changed dose)  Resume lasix 40 BID  Resume entresto 24-26 BID  Refill allopurinol daily  Needs colchicine for flare ups, send message to pulm  for CPAP  RTC 3 months, labs today

## 2023-05-08 NOTE — TELEPHONE ENCOUNTER
----- Message from Deanne Cortes MA sent at 5/8/2023  9:31 AM CDT -----  Good morning. Patients order was put in on 4/3/2023. He was contacted on 4/12/2023 and [facility] left a voicemail. He can contact [facility] at [phone].  ----- Message -----  From: Elisa Mohr LPN  Sent: 5/8/2023   9:04 AM CDT  To: [facility] Staff    Pt had appt with cardiology today. States he has never heard anything regarding his cpap provider ordered 3/17 office visit. Please advise pt.

## 2023-06-08 ENCOUNTER — TELEPHONE (OUTPATIENT)
Dept: CARDIOLOGY | Facility: CLINIC | Age: 53
End: 2023-06-08
Payer: COMMERCIAL

## 2023-06-12 ENCOUNTER — OFFICE VISIT (OUTPATIENT)
Dept: CARDIOLOGY | Facility: CLINIC | Age: 53
End: 2023-06-12
Payer: COMMERCIAL

## 2023-06-12 ENCOUNTER — HOSPITAL ENCOUNTER (INPATIENT)
Facility: HOSPITAL | Age: 53
LOS: 3 days | Discharge: HOME | DRG: 291 | End: 2023-06-16
Attending: EMERGENCY MEDICINE | Admitting: STUDENT IN AN ORGANIZED HEALTH CARE EDUCATION/TRAINING PROGRAM
Payer: COMMERCIAL

## 2023-06-12 VITALS
DIASTOLIC BLOOD PRESSURE: 116 MMHG | HEART RATE: 98 BPM | BODY MASS INDEX: 34.95 KG/M2 | WEIGHT: 281.06 LBS | SYSTOLIC BLOOD PRESSURE: 160 MMHG | HEIGHT: 75 IN

## 2023-06-12 DIAGNOSIS — I50.9 ACUTE EXACERBATION OF CHF (CONGESTIVE HEART FAILURE): ICD-10-CM

## 2023-06-12 DIAGNOSIS — R06.00 DYSPNEA, UNSPECIFIED TYPE: ICD-10-CM

## 2023-06-12 DIAGNOSIS — I50.43 ACUTE ON CHRONIC COMBINED SYSTOLIC (CONGESTIVE) AND DIASTOLIC (CONGESTIVE) HEART FAILURE: Primary | ICD-10-CM

## 2023-06-12 DIAGNOSIS — I50.43 ACUTE ON CHRONIC COMBINED SYSTOLIC AND DIASTOLIC CONGESTIVE HEART FAILURE: ICD-10-CM

## 2023-06-12 DIAGNOSIS — E78.2 MIXED HYPERLIPIDEMIA: ICD-10-CM

## 2023-06-12 DIAGNOSIS — R06.00 DYSPNEA: ICD-10-CM

## 2023-06-12 DIAGNOSIS — N18.30 STAGE 3 CHRONIC KIDNEY DISEASE, UNSPECIFIED WHETHER STAGE 3A OR 3B CKD: ICD-10-CM

## 2023-06-12 DIAGNOSIS — I10 ESSENTIAL HYPERTENSION: ICD-10-CM

## 2023-06-12 LAB
ALBUMIN SERPL BCP-MCNC: 3 G/DL (ref 3.5–5.2)
ALP SERPL-CCNC: 190 U/L (ref 55–135)
ALT SERPL W/O P-5'-P-CCNC: 25 U/L (ref 10–44)
ANION GAP SERPL CALC-SCNC: 14 MMOL/L (ref 8–16)
AST SERPL-CCNC: 25 U/L (ref 10–40)
BACTERIA #/AREA URNS HPF: NORMAL /HPF
BASOPHILS # BLD AUTO: 0.08 K/UL (ref 0–0.2)
BASOPHILS NFR BLD: 1.5 % (ref 0–1.9)
BILIRUB SERPL-MCNC: 2.8 MG/DL (ref 0.1–1)
BILIRUB UR QL STRIP: NEGATIVE
BNP SERPL-MCNC: 2912 PG/ML (ref 0–99)
BUN SERPL-MCNC: 28 MG/DL (ref 6–20)
CALCIUM SERPL-MCNC: 8.8 MG/DL (ref 8.7–10.5)
CHLORIDE SERPL-SCNC: 108 MMOL/L (ref 95–110)
CLARITY UR: CLEAR
CO2 SERPL-SCNC: 19 MMOL/L (ref 23–29)
COLOR UR: YELLOW
CREAT SERPL-MCNC: 2.3 MG/DL (ref 0.5–1.4)
DIFFERENTIAL METHOD: ABNORMAL
EOSINOPHIL # BLD AUTO: 0.3 K/UL (ref 0–0.5)
EOSINOPHIL NFR BLD: 4.9 % (ref 0–8)
ERYTHROCYTE [DISTWIDTH] IN BLOOD BY AUTOMATED COUNT: 15.9 % (ref 11.5–14.5)
EST. GFR  (NO RACE VARIABLE): 34 ML/MIN/1.73 M^2
GLUCOSE SERPL-MCNC: 82 MG/DL (ref 70–110)
GLUCOSE UR QL STRIP: NEGATIVE
HCT VFR BLD AUTO: 46.7 % (ref 40–54)
HGB BLD-MCNC: 14.4 G/DL (ref 14–18)
HGB UR QL STRIP: NEGATIVE
HYALINE CASTS #/AREA URNS LPF: 1 /LPF
IMM GRANULOCYTES # BLD AUTO: 0.03 K/UL (ref 0–0.04)
IMM GRANULOCYTES NFR BLD AUTO: 0.6 % (ref 0–0.5)
KETONES UR QL STRIP: NEGATIVE
LEUKOCYTE ESTERASE UR QL STRIP: NEGATIVE
LYMPHOCYTES # BLD AUTO: 2.1 K/UL (ref 1–4.8)
LYMPHOCYTES NFR BLD: 38.7 % (ref 18–48)
MAGNESIUM SERPL-MCNC: 1.7 MG/DL (ref 1.6–2.6)
MCH RBC QN AUTO: 25.4 PG (ref 27–31)
MCHC RBC AUTO-ENTMCNC: 30.8 G/DL (ref 32–36)
MCV RBC AUTO: 82 FL (ref 82–98)
MICROSCOPIC COMMENT: NORMAL
MONOCYTES # BLD AUTO: 0.4 K/UL (ref 0.3–1)
MONOCYTES NFR BLD: 7.7 % (ref 4–15)
NEUTROPHILS # BLD AUTO: 2.5 K/UL (ref 1.8–7.7)
NEUTROPHILS NFR BLD: 46.6 % (ref 38–73)
NITRITE UR QL STRIP: NEGATIVE
NRBC BLD-RTO: 0 /100 WBC
PH UR STRIP: 6 [PH] (ref 5–8)
PHOSPHATE SERPL-MCNC: 3.6 MG/DL (ref 2.7–4.5)
PLATELET # BLD AUTO: 202 K/UL (ref 150–450)
PMV BLD AUTO: 10.7 FL (ref 9.2–12.9)
POTASSIUM SERPL-SCNC: 3.5 MMOL/L (ref 3.5–5.1)
PROT SERPL-MCNC: 5.8 G/DL (ref 6–8.4)
PROT UR QL STRIP: ABNORMAL
RBC # BLD AUTO: 5.68 M/UL (ref 4.6–6.2)
RBC #/AREA URNS HPF: 0 /HPF (ref 0–4)
SODIUM SERPL-SCNC: 141 MMOL/L (ref 136–145)
SP GR UR STRIP: 1.01 (ref 1–1.03)
TROPONIN I SERPL DL<=0.01 NG/ML-MCNC: 0.1 NG/ML (ref 0–0.03)
TROPONIN I SERPL DL<=0.01 NG/ML-MCNC: 0.1 NG/ML (ref 0–0.03)
TROPONIN I SERPL DL<=0.01 NG/ML-MCNC: 0.11 NG/ML (ref 0–0.03)
URN SPEC COLLECT METH UR: ABNORMAL
UROBILINOGEN UR STRIP-ACNC: NEGATIVE EU/DL
WBC # BLD AUTO: 5.3 K/UL (ref 3.9–12.7)
WBC #/AREA URNS HPF: 0 /HPF (ref 0–5)

## 2023-06-12 PROCEDURE — 84484 ASSAY OF TROPONIN QUANT: CPT | Mod: 91 | Performed by: NURSE PRACTITIONER

## 2023-06-12 PROCEDURE — G0378 HOSPITAL OBSERVATION PER HR: HCPCS

## 2023-06-12 PROCEDURE — 96374 THER/PROPH/DIAG INJ IV PUSH: CPT

## 2023-06-12 PROCEDURE — 3077F SYST BP >= 140 MM HG: CPT | Mod: CPTII,S$GLB,, | Performed by: PHYSICIAN ASSISTANT

## 2023-06-12 PROCEDURE — 83880 ASSAY OF NATRIURETIC PEPTIDE: CPT | Performed by: NURSE PRACTITIONER

## 2023-06-12 PROCEDURE — 84484 ASSAY OF TROPONIN QUANT: CPT | Mod: 91 | Performed by: STUDENT IN AN ORGANIZED HEALTH CARE EDUCATION/TRAINING PROGRAM

## 2023-06-12 PROCEDURE — 85025 COMPLETE CBC W/AUTO DIFF WBC: CPT | Performed by: NURSE PRACTITIONER

## 2023-06-12 PROCEDURE — 93010 EKG 12-LEAD: ICD-10-PCS | Mod: ,,, | Performed by: INTERNAL MEDICINE

## 2023-06-12 PROCEDURE — 4010F PR ACE/ARB THEARPY RXD/TAKEN: ICD-10-PCS | Mod: CPTII,S$GLB,, | Performed by: PHYSICIAN ASSISTANT

## 2023-06-12 PROCEDURE — 1160F RVW MEDS BY RX/DR IN RCRD: CPT | Mod: CPTII,S$GLB,, | Performed by: PHYSICIAN ASSISTANT

## 2023-06-12 PROCEDURE — 3080F PR MOST RECENT DIASTOLIC BLOOD PRESSURE >= 90 MM HG: ICD-10-PCS | Mod: CPTII,S$GLB,, | Performed by: PHYSICIAN ASSISTANT

## 2023-06-12 PROCEDURE — 25000003 PHARM REV CODE 250: Performed by: STUDENT IN AN ORGANIZED HEALTH CARE EDUCATION/TRAINING PROGRAM

## 2023-06-12 PROCEDURE — 36415 COLL VENOUS BLD VENIPUNCTURE: CPT | Performed by: STUDENT IN AN ORGANIZED HEALTH CARE EDUCATION/TRAINING PROGRAM

## 2023-06-12 PROCEDURE — 3077F PR MOST RECENT SYSTOLIC BLOOD PRESSURE >= 140 MM HG: ICD-10-PCS | Mod: CPTII,S$GLB,, | Performed by: PHYSICIAN ASSISTANT

## 2023-06-12 PROCEDURE — 3008F PR BODY MASS INDEX (BMI) DOCUMENTED: ICD-10-PCS | Mod: CPTII,S$GLB,, | Performed by: PHYSICIAN ASSISTANT

## 2023-06-12 PROCEDURE — 1160F PR REVIEW ALL MEDS BY PRESCRIBER/CLIN PHARMACIST DOCUMENTED: ICD-10-PCS | Mod: CPTII,S$GLB,, | Performed by: PHYSICIAN ASSISTANT

## 2023-06-12 PROCEDURE — 99285 EMERGENCY DEPT VISIT HI MDM: CPT | Mod: 25

## 2023-06-12 PROCEDURE — 81000 URINALYSIS NONAUTO W/SCOPE: CPT | Performed by: NURSE PRACTITIONER

## 2023-06-12 PROCEDURE — 93005 ELECTROCARDIOGRAM TRACING: CPT

## 2023-06-12 PROCEDURE — 80053 COMPREHEN METABOLIC PANEL: CPT | Performed by: NURSE PRACTITIONER

## 2023-06-12 PROCEDURE — 99213 PR OFFICE/OUTPT VISIT, EST, LEVL III, 20-29 MIN: ICD-10-PCS | Mod: S$GLB,,, | Performed by: PHYSICIAN ASSISTANT

## 2023-06-12 PROCEDURE — 3008F BODY MASS INDEX DOCD: CPT | Mod: CPTII,S$GLB,, | Performed by: PHYSICIAN ASSISTANT

## 2023-06-12 PROCEDURE — 1159F PR MEDICATION LIST DOCUMENTED IN MEDICAL RECORD: ICD-10-PCS | Mod: CPTII,S$GLB,, | Performed by: PHYSICIAN ASSISTANT

## 2023-06-12 PROCEDURE — 84100 ASSAY OF PHOSPHORUS: CPT | Performed by: NURSE PRACTITIONER

## 2023-06-12 PROCEDURE — 1159F MED LIST DOCD IN RCRD: CPT | Mod: CPTII,S$GLB,, | Performed by: PHYSICIAN ASSISTANT

## 2023-06-12 PROCEDURE — 3080F DIAST BP >= 90 MM HG: CPT | Mod: CPTII,S$GLB,, | Performed by: PHYSICIAN ASSISTANT

## 2023-06-12 PROCEDURE — 96376 TX/PRO/DX INJ SAME DRUG ADON: CPT

## 2023-06-12 PROCEDURE — 99213 OFFICE O/P EST LOW 20 MIN: CPT | Mod: S$GLB,,, | Performed by: PHYSICIAN ASSISTANT

## 2023-06-12 PROCEDURE — 93010 ELECTROCARDIOGRAM REPORT: CPT | Mod: ,,, | Performed by: INTERNAL MEDICINE

## 2023-06-12 PROCEDURE — 99999 PR PBB SHADOW E&M-EST. PATIENT-LVL III: ICD-10-PCS | Mod: PBBFAC,,, | Performed by: PHYSICIAN ASSISTANT

## 2023-06-12 PROCEDURE — 4010F ACE/ARB THERAPY RXD/TAKEN: CPT | Mod: CPTII,S$GLB,, | Performed by: PHYSICIAN ASSISTANT

## 2023-06-12 PROCEDURE — 63600175 PHARM REV CODE 636 W HCPCS: Performed by: STUDENT IN AN ORGANIZED HEALTH CARE EDUCATION/TRAINING PROGRAM

## 2023-06-12 PROCEDURE — 63600175 PHARM REV CODE 636 W HCPCS: Performed by: EMERGENCY MEDICINE

## 2023-06-12 PROCEDURE — 83735 ASSAY OF MAGNESIUM: CPT | Performed by: NURSE PRACTITIONER

## 2023-06-12 PROCEDURE — 25000003 PHARM REV CODE 250: Performed by: EMERGENCY MEDICINE

## 2023-06-12 PROCEDURE — 99999 PR PBB SHADOW E&M-EST. PATIENT-LVL III: CPT | Mod: PBBFAC,,, | Performed by: PHYSICIAN ASSISTANT

## 2023-06-12 RX ORDER — POLYETHYLENE GLYCOL 3350 17 G/17G
17 POWDER, FOR SOLUTION ORAL DAILY
Status: DISCONTINUED | OUTPATIENT
Start: 2023-06-12 | End: 2023-06-14

## 2023-06-12 RX ORDER — HYDRALAZINE HYDROCHLORIDE 20 MG/ML
10 INJECTION INTRAMUSCULAR; INTRAVENOUS EVERY 8 HOURS PRN
Status: DISCONTINUED | OUTPATIENT
Start: 2023-06-12 | End: 2023-06-16 | Stop reason: HOSPADM

## 2023-06-12 RX ORDER — SPIRONOLACTONE 25 MG/1
50 TABLET ORAL DAILY
Status: DISCONTINUED | OUTPATIENT
Start: 2023-06-12 | End: 2023-06-12

## 2023-06-12 RX ORDER — ACETAMINOPHEN 325 MG/1
650 TABLET ORAL EVERY 4 HOURS PRN
Status: DISCONTINUED | OUTPATIENT
Start: 2023-06-12 | End: 2023-06-16 | Stop reason: HOSPADM

## 2023-06-12 RX ORDER — ISOSORBIDE DINITRATE 20 MG/1
20 TABLET ORAL 3 TIMES DAILY
Status: DISCONTINUED | OUTPATIENT
Start: 2023-06-12 | End: 2023-06-16 | Stop reason: HOSPADM

## 2023-06-12 RX ORDER — FUROSEMIDE 10 MG/ML
40 INJECTION INTRAMUSCULAR; INTRAVENOUS
Status: COMPLETED | OUTPATIENT
Start: 2023-06-12 | End: 2023-06-12

## 2023-06-12 RX ORDER — SODIUM CHLORIDE 0.9 % (FLUSH) 0.9 %
10 SYRINGE (ML) INJECTION
Status: DISCONTINUED | OUTPATIENT
Start: 2023-06-12 | End: 2023-06-16 | Stop reason: HOSPADM

## 2023-06-12 RX ORDER — ALLOPURINOL 100 MG/1
100 TABLET ORAL DAILY
Status: DISCONTINUED | OUTPATIENT
Start: 2023-06-12 | End: 2023-06-16 | Stop reason: HOSPADM

## 2023-06-12 RX ORDER — HYDRALAZINE HYDROCHLORIDE 50 MG/1
50 TABLET, FILM COATED ORAL EVERY 8 HOURS
Status: DISCONTINUED | OUTPATIENT
Start: 2023-06-12 | End: 2023-06-16 | Stop reason: HOSPADM

## 2023-06-12 RX ORDER — PANTOPRAZOLE SODIUM 40 MG/1
40 TABLET, DELAYED RELEASE ORAL
Status: DISCONTINUED | OUTPATIENT
Start: 2023-06-13 | End: 2023-06-16 | Stop reason: HOSPADM

## 2023-06-12 RX ORDER — CARVEDILOL 12.5 MG/1
25 TABLET ORAL 2 TIMES DAILY
Status: DISCONTINUED | OUTPATIENT
Start: 2023-06-12 | End: 2023-06-16 | Stop reason: HOSPADM

## 2023-06-12 RX ORDER — FUROSEMIDE 10 MG/ML
40 INJECTION INTRAMUSCULAR; INTRAVENOUS
Status: DISCONTINUED | OUTPATIENT
Start: 2023-06-12 | End: 2023-06-13

## 2023-06-12 RX ADMIN — POLYETHYLENE GLYCOL 3350 17 G: 17 POWDER, FOR SOLUTION ORAL at 02:06

## 2023-06-12 RX ADMIN — ALLOPURINOL 100 MG: 100 TABLET ORAL at 02:06

## 2023-06-12 RX ADMIN — ISOSORBIDE DINITRATE 20 MG: 20 TABLET ORAL at 02:06

## 2023-06-12 RX ADMIN — NITROGLYCERIN 1 INCH: 20 OINTMENT TOPICAL at 12:06

## 2023-06-12 RX ADMIN — CARVEDILOL 25 MG: 12.5 TABLET, FILM COATED ORAL at 08:06

## 2023-06-12 RX ADMIN — FUROSEMIDE 40 MG: 10 INJECTION, SOLUTION INTRAMUSCULAR; INTRAVENOUS at 12:06

## 2023-06-12 RX ADMIN — FUROSEMIDE 40 MG: 10 INJECTION, SOLUTION INTRAMUSCULAR; INTRAVENOUS at 08:06

## 2023-06-12 RX ADMIN — HYDRALAZINE HYDROCHLORIDE 50 MG: 25 TABLET, FILM COATED ORAL at 02:06

## 2023-06-12 RX ADMIN — ISOSORBIDE DINITRATE 20 MG: 20 TABLET ORAL at 08:06

## 2023-06-12 RX ADMIN — HYDRALAZINE HYDROCHLORIDE 50 MG: 25 TABLET, FILM COATED ORAL at 08:06

## 2023-06-12 NOTE — ASSESSMENT & PLAN NOTE
--Cr bumped from baseline, GFR moderately decreased from previous lab draws  --will hold entresto and aldactone therapy for now, continue lasix  --avoid nephrotoxic agents while hospitalized  --renally dose meds  --daily BMP to monitor renal fxn    --will order US Kidney for completion

## 2023-06-12 NOTE — HPI
Patient is a 52-year-old gentleman with a past medical history significant for congestive heart failure, hypertension, chronic kidney disease stage 3, obstructive sleep apnea, hyperlipidemia, and non-insulin dependent type 2 diabetes mellitus who presented to the ED directly from heart failure clinic with shortness of breath and fluid overload. Patient sent to the ER to be admitted for further diuresis by his cardiologist. The symptoms are constant, moderate, and associated with bilateral leg swelling. There are no reported factors that mitigate or exacerbate the symptoms. He denies any fever, nausea, vomiting, diarrhea, chest pain, dysuria, and diaphoresis. He confirms adherence to his prescribed furosemide. He has no additional complaints or concerns at this time.    Upon arrival in the Emergency Department, his vital signs revealed a temperature of 98.2, pulse fluctuating between 85 and 98, respiratory rate of 18, and oxygen saturation of 95-97% on room air. Notably, his blood pressure was persistently elevated, with systolic measurements ranging from 160 to 183 and diastolic measurements from 115 to 123. Initial laboratory evaluations showed abnormally elevated values, including a troponin I of 0.109, a BNP of 2912, a total bilirubin of 2.8, and an alkaline phosphatase of 190. CXR shows no focal pulmonary infiltrate, but given his presenting symptoms and lab results, it is highly likely that further evaluation was warranted. Hospital Medicine was called for admission.
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
## 2023-06-12 NOTE — FIRST PROVIDER EVALUATION
Medical screening examination initiated.  I have conducted a focused provider triage encounter, findings are as follows:    Brief history of present illness:  Patient complains of dyspnea, denies chest pain    Vitals:    06/12/23 1130   Pulse: 95   Resp: 20   Temp: 98.2 °F (36.8 °C)   TempSrc: Oral       Pertinent physical exam:  NAD    Brief workup plan:  Labs imaging    Preliminary workup initiated; this workup will be continued and followed by the physician or advanced practice provider that is assigned to the patient when roomed.
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
## 2023-06-12 NOTE — PROGRESS NOTES
[facility] Provider Note (Follow-up) Consult Note      HPI:  Patient can not ambulate without SOB   Patient sleeps on 2 pillows, having PND   Patient wakes up SOB, has to get out of bed, associated cough, sputum  some   Palpitations - none   Dizzy, light-headed, pre-syncope or syncope none   Since discharge frequency of performing weights, home weight and weight change increased    Other information felt pertinent to HPI    Has become more SOB in last few weeks, now with PND and orthopnea. States both legs swollen as well. Weight up. On lasix 40 mg BID but not urinating much    No labs done      PHYSICAL: There were no vitals filed for this visit.   Wt Readings from Last 3 Encounters:   05/08/23 127.8 kg (281 lb 12 oz)   03/17/23 128.7 kg (283 lb 11.7 oz)   03/07/23 127.7 kg (281 lb 8.4 oz)       JVD: no,    Heart rhythm: regular  Cardiac murmur: No    S3: no  S4: no  Lungs: clear  Liver span: 10 cm:   Hepatojugular reflux: no  Edema: no      ASSESSMENT: chronic systolic HF    PLAN:      Patient Instructions:   Instruct the patient to notify this clinic if HH, a physician or an advanced care provider wants to change medication one of their HF medications   Activity and Diet restrictions:   Recommend 2-3 gram sodium restriction and 1500cc- 2000cc fluid restriction.  Encourage physical activity with graded exercise program.  Requested patient to weigh themselves daily, and to notify us if their weight increases by more than 3 lbs in 1 day or 5 lbs in 3 days.    Assigned dry weight on home scale: 281 pounds  Medication changes (include current dose and changed dose)  Patient will head to the ER for ADHF, stat labs and IV lasix  Will RTC once discharged, will likely benefit from change to torsemide once IV diuresis achieves euvolemic
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
## 2023-06-12 NOTE — PHARMACY MED REC
"Admission Medication History     The home medication history was taken by Fidencio Liu.    You may go to "Admission" then "Reconcile Home Medications" tabs to review and/or act upon these items.     The home medication list has been updated by the Pharmacy department.   Please read ALL comments highlighted in yellow.   Please address this information as you see fit.    Feel free to contact us if you have any questions or require assistance.      Medications listed below were obtained from: Patient/family and Analytic software- [employer]  (Not in a hospital admission)        Fidencio Liu  [id number]      Current Outpatient Medications on File Prior to Encounter   Medication Sig Dispense Refill Last Dose    allopurinoL (ZYLOPRIM) 100 MG tablet Take 1 tablet (100 mg total) by mouth once daily. 30 tablet 4 6/11/2023    atorvastatin (LIPITOR) 40 MG tablet Take 1 tablet (40 mg total) by mouth every evening. 90 tablet 1 6/11/2023    carvediloL (COREG) 25 MG tablet Take 1 tablet (25 mg total) by mouth 2 (two) times daily. 60 tablet 11 6/11/2023    furosemide (LASIX) 40 MG tablet Take 1 tablet (40 mg total) by mouth 2 (two) times daily. 60 tablet 3 6/11/2023    hydrALAZINE (APRESOLINE) 50 MG tablet Take 1 tablet (50 mg total) by mouth every 8 (eight) hours. 90 tablet 0 6/11/2023    isosorbide dinitrate (ISORDIL) 20 MG tablet Take 1 tablet (20 mg total) by mouth 3 (three) times daily. 90 tablet 2 6/11/2023    sacubitriL-valsartan (ENTRESTO) 24-26 mg per tablet Take 1 tablet by mouth 2 (two) times daily. 60 tablet 4 6/11/2023    spironolactone (ALDACTONE) 50 MG tablet Take 50 mg by mouth once daily.   6/11/2023    colchicine (MITIGARE) 0.6 mg Cap Take 0.6 mg by mouth 2 (two) times a day.                            .        "

## 2023-06-12 NOTE — ASSESSMENT & PLAN NOTE
--Last ECHO from OCT 2022 confirm EF 50 %- repeat ordered on admission  --BNP elevated, compared to previous on file  --2+ bilateral pitting edema on exam, no obvious evidence of vascular congestion on CXR  --s/p 40 IV lasix dose in the ER  --will schedule IV Lasix 40 mg BID while hospitalized  --monitor electrolytes, UOP  --strict I&O and daily weights ordered

## 2023-06-12 NOTE — ASSESSMENT & PLAN NOTE
--Lasix transitioned to IV due to diuresis  --continue hydralazine  --holding entresto and aldactone to minimize toxic effect on kidneys  --PRN IV hydralazine 10mg Q6H for sBP > 175 mmHg  --Q4HVS

## 2023-06-13 LAB
ANION GAP SERPL CALC-SCNC: 14 MMOL/L (ref 8–16)
AORTIC ROOT ANNULUS: 3.1 CM
ASCENDING AORTA: 3.01 CM
AV INDEX (PROSTH): 0.66
AV MEAN GRADIENT: 13 MMHG
AV PEAK GRADIENT: 18 MMHG
AV VALVE AREA: 1.78 CM2
AV VELOCITY RATIO: 0.61
BASOPHILS # BLD AUTO: 0.05 K/UL (ref 0–0.2)
BASOPHILS NFR BLD: 1 % (ref 0–1.9)
BSA FOR ECHO PROCEDURE: 2.59 M2
BUN SERPL-MCNC: 26 MG/DL (ref 6–20)
CALCIUM SERPL-MCNC: 8.2 MG/DL (ref 8.7–10.5)
CHLORIDE SERPL-SCNC: 107 MMOL/L (ref 95–110)
CO2 SERPL-SCNC: 22 MMOL/L (ref 23–29)
CREAT SERPL-MCNC: 2.4 MG/DL (ref 0.5–1.4)
CV ECHO LV RWT: 0.62 CM
DIFFERENTIAL METHOD: ABNORMAL
DOP CALC AO PEAK VEL: 2.11 M/S
DOP CALC AO VTI: 35.4 CM
DOP CALC LVOT AREA: 2.7 CM2
DOP CALC LVOT DIAMETER: 1.85 CM
DOP CALC LVOT PEAK VEL: 1.29 M/S
DOP CALC LVOT STROKE VOLUME: 63.14 CM3
DOP CALC RVOT PEAK VEL: 0.88 M/S
DOP CALC RVOT VTI: 16.6 CM
DOP CALCLVOT PEAK VEL VTI: 23.5 CM
E WAVE DECELERATION TIME: 175.08 MSEC
E/A RATIO: 1.08
E/E' RATIO: 9 M/S
ECHO LV POSTERIOR WALL: 1.8 CM (ref 0.6–1.1)
EJECTION FRACTION: 45 %
EOSINOPHIL # BLD AUTO: 0.4 K/UL (ref 0–0.5)
EOSINOPHIL NFR BLD: 7.7 % (ref 0–8)
ERYTHROCYTE [DISTWIDTH] IN BLOOD BY AUTOMATED COUNT: 15.5 % (ref 11.5–14.5)
EST. GFR  (NO RACE VARIABLE): 32 ML/MIN/1.73 M^2
FRACTIONAL SHORTENING: 18 % (ref 28–44)
GLUCOSE SERPL-MCNC: 83 MG/DL (ref 70–110)
HCT VFR BLD AUTO: 42.2 % (ref 40–54)
HGB BLD-MCNC: 13.2 G/DL (ref 14–18)
IMM GRANULOCYTES # BLD AUTO: 0 K/UL (ref 0–0.04)
IMM GRANULOCYTES NFR BLD AUTO: 0 % (ref 0–0.5)
INTERVENTRICULAR SEPTUM: 1.99 CM (ref 0.6–1.1)
IVC DIAMETER: 1.93 CM
IVRT: 79.92 MSEC
LA MAJOR: 7.35 CM
LA MINOR: 7.03 CM
LA WIDTH: 5.1 CM
LEFT ATRIUM SIZE: 5.09 CM
LEFT ATRIUM VOLUME INDEX: 62.7 ML/M2
LEFT ATRIUM VOLUME: 158.57 CM3
LEFT INTERNAL DIMENSION IN SYSTOLE: 4.75 CM (ref 2.1–4)
LEFT VENTRICLE DIASTOLIC VOLUME INDEX: 65.79 ML/M2
LEFT VENTRICLE DIASTOLIC VOLUME: 166.46 ML
LEFT VENTRICLE MASS INDEX: 226 G/M2
LEFT VENTRICLE SYSTOLIC VOLUME INDEX: 41.5 ML/M2
LEFT VENTRICLE SYSTOLIC VOLUME: 105.08 ML
LEFT VENTRICULAR INTERNAL DIMENSION IN DIASTOLE: 5.8 CM (ref 3.5–6)
LEFT VENTRICULAR MASS: 572.07 G
LV LATERAL E/E' RATIO: 6.43 M/S
LV SEPTAL E/E' RATIO: 15 M/S
LVOT MG: 5.46 MMHG
LVOT MV: 1.16 CM/S
LYMPHOCYTES # BLD AUTO: 1.5 K/UL (ref 1–4.8)
LYMPHOCYTES NFR BLD: 31.8 % (ref 18–48)
MCH RBC QN AUTO: 25.4 PG (ref 27–31)
MCHC RBC AUTO-ENTMCNC: 31.3 G/DL (ref 32–36)
MCV RBC AUTO: 81 FL (ref 82–98)
MONOCYTES # BLD AUTO: 0.4 K/UL (ref 0.3–1)
MONOCYTES NFR BLD: 7.5 % (ref 4–15)
MV PEAK A VEL: 0.83 M/S
MV PEAK E VEL: 0.9 M/S
MV STENOSIS PRESSURE HALF TIME: 50.77 MS
MV VALVE AREA P 1/2 METHOD: 4.33 CM2
NEUTROPHILS # BLD AUTO: 2.5 K/UL (ref 1.8–7.7)
NEUTROPHILS NFR BLD: 52 % (ref 38–73)
NRBC BLD-RTO: 0 /100 WBC
PISA MRMAX VEL: 5.9 M/S
PISA TR MAX VEL: 3.27 M/S
PLATELET # BLD AUTO: 174 K/UL (ref 150–450)
PMV BLD AUTO: 10.7 FL (ref 9.2–12.9)
POTASSIUM SERPL-SCNC: 3.1 MMOL/L (ref 3.5–5.1)
PV MEAN GRADIENT: 1.58 MMHG
RA MAJOR: 5.9 CM
RA WIDTH: 4.4 CM
RBC # BLD AUTO: 5.19 M/UL (ref 4.6–6.2)
RIGHT VENTRICULAR END-DIASTOLIC DIMENSION: 3.74 CM
SODIUM SERPL-SCNC: 143 MMOL/L (ref 136–145)
STJ: 3.18 CM
TDI LATERAL: 0.14 M/S
TDI SEPTAL: 0.06 M/S
TDI: 0.1 M/S
TR MAX PG: 43 MMHG
TR MEAN GRADIENT: 34 MMHG
TRICUSPID ANNULAR PLANE SYSTOLIC EXCURSION: 2 CM
TROPONIN I SERPL DL<=0.01 NG/ML-MCNC: 0.08 NG/ML (ref 0–0.03)
TROPONIN I SERPL DL<=0.01 NG/ML-MCNC: 0.09 NG/ML (ref 0–0.03)
WBC # BLD AUTO: 4.78 K/UL (ref 3.9–12.7)

## 2023-06-13 PROCEDURE — 80048 BASIC METABOLIC PNL TOTAL CA: CPT | Performed by: STUDENT IN AN ORGANIZED HEALTH CARE EDUCATION/TRAINING PROGRAM

## 2023-06-13 PROCEDURE — 25000003 PHARM REV CODE 250: Performed by: STUDENT IN AN ORGANIZED HEALTH CARE EDUCATION/TRAINING PROGRAM

## 2023-06-13 PROCEDURE — 99223 PR INITIAL HOSPITAL CARE,LEVL III: ICD-10-PCS | Mod: ,,, | Performed by: INTERNAL MEDICINE

## 2023-06-13 PROCEDURE — 99223 1ST HOSP IP/OBS HIGH 75: CPT | Mod: ,,, | Performed by: INTERNAL MEDICINE

## 2023-06-13 PROCEDURE — 85025 COMPLETE CBC W/AUTO DIFF WBC: CPT | Performed by: STUDENT IN AN ORGANIZED HEALTH CARE EDUCATION/TRAINING PROGRAM

## 2023-06-13 PROCEDURE — 63600175 PHARM REV CODE 636 W HCPCS: Performed by: STUDENT IN AN ORGANIZED HEALTH CARE EDUCATION/TRAINING PROGRAM

## 2023-06-13 PROCEDURE — 21400001 HC TELEMETRY ROOM

## 2023-06-13 PROCEDURE — 11000001 HC ACUTE MED/SURG PRIVATE ROOM

## 2023-06-13 PROCEDURE — 84484 ASSAY OF TROPONIN QUANT: CPT | Mod: 91 | Performed by: STUDENT IN AN ORGANIZED HEALTH CARE EDUCATION/TRAINING PROGRAM

## 2023-06-13 PROCEDURE — 36415 COLL VENOUS BLD VENIPUNCTURE: CPT | Performed by: STUDENT IN AN ORGANIZED HEALTH CARE EDUCATION/TRAINING PROGRAM

## 2023-06-13 RX ORDER — FUROSEMIDE 10 MG/ML
40 INJECTION INTRAMUSCULAR; INTRAVENOUS DAILY
Status: DISCONTINUED | OUTPATIENT
Start: 2023-06-13 | End: 2023-06-14

## 2023-06-13 RX ORDER — POTASSIUM CHLORIDE 20 MEQ/1
40 TABLET, EXTENDED RELEASE ORAL ONCE
Status: COMPLETED | OUTPATIENT
Start: 2023-06-13 | End: 2023-06-13

## 2023-06-13 RX ORDER — POTASSIUM CHLORIDE 20 MEQ/1
20 TABLET, EXTENDED RELEASE ORAL ONCE
Status: COMPLETED | OUTPATIENT
Start: 2023-06-13 | End: 2023-06-13

## 2023-06-13 RX ADMIN — HYDRALAZINE HYDROCHLORIDE 50 MG: 25 TABLET, FILM COATED ORAL at 02:06

## 2023-06-13 RX ADMIN — ISOSORBIDE DINITRATE 20 MG: 20 TABLET ORAL at 10:06

## 2023-06-13 RX ADMIN — HYDRALAZINE HYDROCHLORIDE 50 MG: 25 TABLET, FILM COATED ORAL at 06:06

## 2023-06-13 RX ADMIN — CARVEDILOL 25 MG: 12.5 TABLET, FILM COATED ORAL at 09:06

## 2023-06-13 RX ADMIN — FUROSEMIDE 40 MG: 10 INJECTION, SOLUTION INTRAMUSCULAR; INTRAVENOUS at 09:06

## 2023-06-13 RX ADMIN — POTASSIUM CHLORIDE 20 MEQ: 1500 TABLET, EXTENDED RELEASE ORAL at 09:06

## 2023-06-13 RX ADMIN — CARVEDILOL 25 MG: 12.5 TABLET, FILM COATED ORAL at 10:06

## 2023-06-13 RX ADMIN — PANTOPRAZOLE SODIUM 40 MG: 40 TABLET, DELAYED RELEASE ORAL at 06:06

## 2023-06-13 RX ADMIN — HYDRALAZINE HYDROCHLORIDE 50 MG: 25 TABLET, FILM COATED ORAL at 10:06

## 2023-06-13 RX ADMIN — ALLOPURINOL 100 MG: 100 TABLET ORAL at 09:06

## 2023-06-13 RX ADMIN — ISOSORBIDE DINITRATE 20 MG: 20 TABLET ORAL at 09:06

## 2023-06-13 RX ADMIN — POTASSIUM CHLORIDE 40 MEQ: 1500 TABLET, EXTENDED RELEASE ORAL at 09:06

## 2023-06-13 RX ADMIN — ISOSORBIDE DINITRATE 20 MG: 20 TABLET ORAL at 02:06
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
## 2023-06-13 NOTE — ASSESSMENT & PLAN NOTE
-Presents with decompensated combined CHF, BNP > 2,000  -Improving, continue IV diuresis  -Continue BB, Isordil, Hydralazine  -Resume Entresto/Aldactone as tolerated/as creatinine permits  -Strict I's/O's
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
## 2023-06-13 NOTE — PLAN OF CARE
Discussed poc with pt, pt verbalized understanding    Purposeful rounding every 2hours      Cardiac monitoring in use, pt is NSR, tele monitor # [id number]    Fall precautions in place, remains injury free  Pt denies c/o Pain and nausea       Accurate I&Os  Bed locked at lowest position  Call light within reach    Chart check complete  Will cont with POC    Cards consulted today to help manage CHF exacerb

## 2023-06-13 NOTE — PLAN OF CARE
[facility]  Initial Discharge Assessment       Primary Care Provider: Brenna Maravilla MD    Admission Diagnosis: Dyspnea [R06.00]  Acute exacerbation of CHF (congestive heart failure) [I50.9]    Admission Date: 6/12/2023  Expected Discharge Date: PER ATTENDING         Payor: BLUE CROSS BLUE SHIELD / Plan: [place] PPO / Product Type: PPO /     Extended Emergency Contact Information  Primary Emergency Contact: Vielka Saldana  Address: [address] [place] [address] [id number] [place] [address]  Mobile Phone: [phone]  Relation: Spouse    Discharge Plan A: Home with family         [facility] [place] [facility] [address]  [address] [facility] [address]  Phone: [phone] Fax: [phone]      Initial Assessment (most recent)       Adult Discharge Assessment - 06/13/23 0950          Discharge Assessment    Assessment Type Discharge Planning Assessment     Confirmed/corrected address, phone number and insurance Yes     Confirmed Demographics Correct on Facesheet     Source of Information patient     When was your last doctors appointment? --   3/4 months    Communicated CARMELO with patient/caregiver Date not available/Unable to determine     Reason For Admission dyspnea     People in Home spouse     Do you expect to return to your current living situation? Yes     Do you have help at home or someone to help you manage your care at home? Yes     Prior to hospitilization cognitive status: Alert/Oriented     Current cognitive status: Alert/Oriented     Equipment Currently Used at Home none     Readmission within 30 days? No     Patient currently being followed by outpatient case management? No     Do you take prescription medications? Yes     Do you have prescription coverage? Yes     Coverage BCBS     Do you have any problems affording any of your prescribed medications? No     Is the patient taking medications as prescribed? yes     Who is going to help you get home at  discharge? SPOUSE     How do you get to doctors appointments? car, drives self     Do you take coumadin? No     Discharge Plan A Home with family        Housing Stability    In the last 12 months, was there a time when you were not able to pay the mortgage or rent on time? No     In the last 12 months, how many places have you lived? 1     In the last 12 months, was there a time when you did not have a steady place to sleep or slept in a shelter (including now)? No        Transportation Needs    In the past 12 months, has lack of transportation kept you from meetings, work, or from getting things needed for daily living? No        Food Insecurity    Within the past 12 months, you worried that your food would run out before you got the money to buy more. Never true     Within the past 12 months, the food you bought just didn't last and you didn't have money to get more. Never true

## 2023-06-13 NOTE — CONSULTS
Food & Nutrition Education     Diet Education: Cardiac Nutrition Therapy  Time Spent: 15 minutes  Learners: Patient     Nutrition Education provided with handouts:  Cardiac-TLC Nutrition Therapy, Low-Sodium Nutrition Therapy (nutritioncaremanual.org)     Comments:  Dietitian educated patient on low sodium, general healthful diet r/t recent hospital diagnosis. Recommended a well-balanced diet with a variety of fresh foods, fruits and vegetables (5 cups/day), whole grains (3 oz/day), and fat-free or low-fat dairy. Discussed reading food packages, food labels, and nutrition facts labels to identify nutrient content of foods.     Discussed the importance of limiting sodium to less than 2,000 mg per day. Recommended salt free seasonings and other herbs and spices in meals to enhance flavor without additional sodium.     Discussed 1200 ml fluid restriction per MD and dietary sources of fluid. Dietitian recommended using a cup with measurements for fluids and to try to consume small sips spread throughout the day rather than a lot at one time.    Discussed dietary sources of cholesterol, the importance of incorporating healthy fats into the diet, and avoiding saturated and trans fats for heart health. For a generally healthy diet, aim for total fat less than 25-35% of calories. Discussed alternative vegetable protein options for some of her meal throughout the week (Beans, peas, and lentils)     dietary sources of carbohydrates, carb counting and daily consistent carbohydrate intake. Discussed the importance of carbohydrates in the diet, but with diabetes, focusing on consistent carb intake throughout the day with emphasis on protein and fiber.    Discussed the importance of fiber (especially soluble fiber), dietary sources, and a goal intake of >20-30g/day.     The pt was engaged throughout the entire education and asked questions with concerns regarding his 1200 mL fl restrictions. Recommended the pt prioritize consuming  water as his most consumed fluid throughout the day to avoid dehydration. Dietitian will continue to monitor.    NFPE not performed, pt appears well nourished.  All questions and concerns answered.  Provided handout with dietitian's contact information.  *Please re-consult as needed.  Thank You!  Kurtis Hernandez, Registration Eligible, Provisional LDN

## 2023-06-13 NOTE — PLAN OF CARE
Plan of care reviewed with patient with verbal understanding. Chart and orders reviewed.  Pt remains free from falls this shift, Safety precautions in place.   Pt currently resting comfortably in bed.  Hourly rounding with bed in lowest position, side rails up, call light in reach.  Will continue to monitor until end of shift.       Problem: Diabetes Comorbidity  Goal: Blood Glucose Level Within Targeted Range  Outcome: Ongoing, Progressing     Problem: Fall Injury Risk  Goal: Absence of Fall and Fall-Related Injury  Outcome: Ongoing, Progressing     Problem: Adult Inpatient Plan of Care  Goal: Plan of Care Review  Flowsheets (Taken 6/13/2023 0210)  Plan of Care Reviewed With: patient  Goal: Patient-Specific Goal (Individualized)  Flowsheets (Taken 6/13/2023 0210)  Anxieties, Fears or Concerns: none  Individualized Care Needs: keep urinal empty

## 2023-06-13 NOTE — HPI
Mr. Saldana is a 52 year old male patient whose current medical conditions include combined CHF/NICM, CKD, NATALYA, hyperlipidemia, HTN, and DM type II who was sent to [facility] ED for admission from CHF clinic due to fluid overload. Patient complained of increased SOB associated with abdominal bloating, BLE edema, and decreased appetite over the past few days. He denied any associated jeffery chest pain, fever, chills, cough, palpitations, near syncope, or syncope. Initial workup in ED revealed elevated BP, troponin of 0.109, BNP of 2912, and tbili of 2.8 and patient was subsequently admitted for further evaluation and treatment. Cardiology consulted to assist with management. Patient seen and examined today, sitting up in bed. Feels ok, improving. Still with significant BLE edema. No chest pain. He reports compliance with his medications. Tries to be mindful of his salt intake. Chart reviewed. Troponin 0.109>0.095>0.099>0.079. MPI stress test 7/19 negative. Echo showed EF of 45%, DD, mild AS, mod

## 2023-06-14 LAB
ANION GAP SERPL CALC-SCNC: 11 MMOL/L (ref 8–16)
BASOPHILS # BLD AUTO: 0.03 K/UL (ref 0–0.2)
BASOPHILS NFR BLD: 0.9 % (ref 0–1.9)
BUN SERPL-MCNC: 26 MG/DL (ref 6–20)
CALCIUM SERPL-MCNC: 7.8 MG/DL (ref 8.7–10.5)
CHLORIDE SERPL-SCNC: 107 MMOL/L (ref 95–110)
CO2 SERPL-SCNC: 24 MMOL/L (ref 23–29)
CREAT SERPL-MCNC: 2.2 MG/DL (ref 0.5–1.4)
DIFFERENTIAL METHOD: ABNORMAL
EOSINOPHIL # BLD AUTO: 0.3 K/UL (ref 0–0.5)
EOSINOPHIL NFR BLD: 9.6 % (ref 0–8)
ERYTHROCYTE [DISTWIDTH] IN BLOOD BY AUTOMATED COUNT: 15.7 % (ref 11.5–14.5)
EST. GFR  (NO RACE VARIABLE): 35 ML/MIN/1.73 M^2
GLUCOSE SERPL-MCNC: 113 MG/DL (ref 70–110)
HCT VFR BLD AUTO: 41.9 % (ref 40–54)
HGB BLD-MCNC: 12.7 G/DL (ref 14–18)
IMM GRANULOCYTES # BLD AUTO: 0 K/UL (ref 0–0.04)
IMM GRANULOCYTES NFR BLD AUTO: 0 % (ref 0–0.5)
LYMPHOCYTES # BLD AUTO: 1 K/UL (ref 1–4.8)
LYMPHOCYTES NFR BLD: 31 % (ref 18–48)
MCH RBC QN AUTO: 25.2 PG (ref 27–31)
MCHC RBC AUTO-ENTMCNC: 30.3 G/DL (ref 32–36)
MCV RBC AUTO: 83 FL (ref 82–98)
MONOCYTES # BLD AUTO: 0.4 K/UL (ref 0.3–1)
MONOCYTES NFR BLD: 11.5 % (ref 4–15)
NEUTROPHILS # BLD AUTO: 1.5 K/UL (ref 1.8–7.7)
NEUTROPHILS NFR BLD: 47 % (ref 38–73)
NRBC BLD-RTO: 0 /100 WBC
PLATELET # BLD AUTO: 168 K/UL (ref 150–450)
PMV BLD AUTO: 10.4 FL (ref 9.2–12.9)
POTASSIUM SERPL-SCNC: 3.3 MMOL/L (ref 3.5–5.1)
RBC # BLD AUTO: 5.03 M/UL (ref 4.6–6.2)
SODIUM SERPL-SCNC: 142 MMOL/L (ref 136–145)
WBC # BLD AUTO: 3.23 K/UL (ref 3.9–12.7)

## 2023-06-14 PROCEDURE — 25000003 PHARM REV CODE 250: Performed by: STUDENT IN AN ORGANIZED HEALTH CARE EDUCATION/TRAINING PROGRAM

## 2023-06-14 PROCEDURE — 21400001 HC TELEMETRY ROOM

## 2023-06-14 PROCEDURE — 63600175 PHARM REV CODE 636 W HCPCS: Performed by: PHYSICIAN ASSISTANT

## 2023-06-14 PROCEDURE — 63600175 PHARM REV CODE 636 W HCPCS: Performed by: STUDENT IN AN ORGANIZED HEALTH CARE EDUCATION/TRAINING PROGRAM

## 2023-06-14 PROCEDURE — 99232 SBSQ HOSP IP/OBS MODERATE 35: CPT | Mod: ,,, | Performed by: INTERNAL MEDICINE

## 2023-06-14 PROCEDURE — 25000003 PHARM REV CODE 250: Performed by: PHYSICIAN ASSISTANT

## 2023-06-14 PROCEDURE — 36415 COLL VENOUS BLD VENIPUNCTURE: CPT | Performed by: STUDENT IN AN ORGANIZED HEALTH CARE EDUCATION/TRAINING PROGRAM

## 2023-06-14 PROCEDURE — 25000003 PHARM REV CODE 250: Performed by: HOSPITALIST

## 2023-06-14 PROCEDURE — 99232 PR SUBSEQUENT HOSPITAL CARE,LEVL II: ICD-10-PCS | Mod: ,,, | Performed by: INTERNAL MEDICINE

## 2023-06-14 PROCEDURE — 80048 BASIC METABOLIC PNL TOTAL CA: CPT | Performed by: STUDENT IN AN ORGANIZED HEALTH CARE EDUCATION/TRAINING PROGRAM

## 2023-06-14 PROCEDURE — 85025 COMPLETE CBC W/AUTO DIFF WBC: CPT | Performed by: STUDENT IN AN ORGANIZED HEALTH CARE EDUCATION/TRAINING PROGRAM

## 2023-06-14 RX ORDER — LIDOCAINE HYDROCHLORIDE 20 MG/ML
15 SOLUTION OROPHARYNGEAL ONCE
Status: COMPLETED | OUTPATIENT
Start: 2023-06-14 | End: 2023-06-14

## 2023-06-14 RX ORDER — MAG HYDROX/ALUMINUM HYD/SIMETH 200-200-20
30 SUSPENSION, ORAL (FINAL DOSE FORM) ORAL ONCE
Status: COMPLETED | OUTPATIENT
Start: 2023-06-14 | End: 2023-06-14

## 2023-06-14 RX ORDER — POTASSIUM CHLORIDE 20 MEQ/1
40 TABLET, EXTENDED RELEASE ORAL ONCE
Status: COMPLETED | OUTPATIENT
Start: 2023-06-14 | End: 2023-06-14

## 2023-06-14 RX ORDER — FUROSEMIDE 10 MG/ML
40 INJECTION INTRAMUSCULAR; INTRAVENOUS
Status: DISCONTINUED | OUTPATIENT
Start: 2023-06-14 | End: 2023-06-16 | Stop reason: HOSPADM

## 2023-06-14 RX ADMIN — HYDRALAZINE HYDROCHLORIDE 50 MG: 25 TABLET, FILM COATED ORAL at 08:06

## 2023-06-14 RX ADMIN — ISOSORBIDE DINITRATE 20 MG: 20 TABLET ORAL at 08:06

## 2023-06-14 RX ADMIN — HYDRALAZINE HYDROCHLORIDE 50 MG: 25 TABLET, FILM COATED ORAL at 03:06

## 2023-06-14 RX ADMIN — POTASSIUM CHLORIDE 40 MEQ: 1500 TABLET, EXTENDED RELEASE ORAL at 11:06

## 2023-06-14 RX ADMIN — SACUBITRIL AND VALSARTAN 1 TABLET: 24; 26 TABLET, FILM COATED ORAL at 08:06

## 2023-06-14 RX ADMIN — HYDRALAZINE HYDROCHLORIDE 50 MG: 25 TABLET, FILM COATED ORAL at 06:06

## 2023-06-14 RX ADMIN — FUROSEMIDE 40 MG: 10 INJECTION, SOLUTION INTRAMUSCULAR; INTRAVENOUS at 08:06

## 2023-06-14 RX ADMIN — ISOSORBIDE DINITRATE 20 MG: 20 TABLET ORAL at 03:06

## 2023-06-14 RX ADMIN — LIDOCAINE HYDROCHLORIDE 15 ML: 20 SOLUTION ORAL at 10:06

## 2023-06-14 RX ADMIN — PANTOPRAZOLE SODIUM 40 MG: 40 TABLET, DELAYED RELEASE ORAL at 06:06

## 2023-06-14 RX ADMIN — CARVEDILOL 25 MG: 12.5 TABLET, FILM COATED ORAL at 08:06

## 2023-06-14 RX ADMIN — ALLOPURINOL 100 MG: 100 TABLET ORAL at 08:06

## 2023-06-14 RX ADMIN — ALUMINUM HYDROXIDE, MAGNESIUM HYDROXIDE, AND DIMETHICONE 30 ML: 200; 20; 200 SUSPENSION ORAL at 10:06

## 2023-06-14 NOTE — ASSESSMENT & PLAN NOTE
--Cr bumped from baseline, GFR moderately decreased from previous lab draws  --will hold entresto and aldactone therapy for now, continue lasix  --avoid nephrotoxic agents while hospitalized  --renally dose meds  --daily BMP to monitor renal fxn    --will order US Kidney for completion      6/12/23  Slight bmp in kidney function  Kidney ultrasound stable

## 2023-06-14 NOTE — ASSESSMENT & PLAN NOTE
-Presents with decompensated combined CHF, BNP > 2,000  -Improving, continue IV diuresis  -Continue BB, Isordil, Hydralazine  -Resume Entresto/Aldactone as tolerated/as creatinine permits  -Strict I's/O's    6/14/23  -Needs continued IV diuresis  -Lasix increased to 40 mg IV BID  -Continue BB, Isordil, Entresto, Hydralazine  -Strict I's/O's
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
## 2023-06-14 NOTE — PLAN OF CARE
Plan of care reviewed with patient with verbal understanding. Chart and orders reviewed.  Pt remains free from falls this shift, Safety precautions in place.   Pt currently resting comfortably in bed. Hourly rounding with bed in lowest position, side rails up, call light in reach.  Will continue to monitor until end of shift.       Problem: Diabetes Comorbidity  Goal: Blood Glucose Level Within Targeted Range  Outcome: Ongoing, Progressing     Problem: Adult Inpatient Plan of Care  Goal: Plan of Care Review  Flowsheets (Taken 6/14/2023 0320)  Plan of Care Reviewed With: patient  Goal: Patient-Specific Goal (Individualized)  Flowsheets (Taken 6/14/2023 0320)  Anxieties, Fears or Concerns: none  Individualized Care Needs: urinal emptied in a timely manner
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
## 2023-06-14 NOTE — PROGRESS NOTES
[facility] Medicine  Progress Note    Patient Name: Bria Saldana  MRN: [id number]  Patient Class: IP- Inpatient   Admission Date: 6/12/2023  Length of Stay: 0 days  Attending Physician: Tammi Love, *  Primary Care Provider: Brenna Maravilla MD        Subjective:     Principal Problem:Acute on chronic combined systolic (congestive) and diastolic (congestive) heart failure        HPI:  Patient is a 52-year-old gentleman with a past medical history significant for congestive heart failure, hypertension, chronic kidney disease stage 3, obstructive sleep apnea, hyperlipidemia, and non-insulin dependent type 2 diabetes mellitus who presented to the ED directly from heart failure clinic with shortness of breath and fluid overload. Patient sent to the ER to be admitted for further diuresis by his cardiologist. The symptoms are constant, moderate, and associated with bilateral leg swelling. There are no reported factors that mitigate or exacerbate the symptoms. He denies any fever, nausea, vomiting, diarrhea, chest pain, dysuria, and diaphoresis. He confirms adherence to his prescribed furosemide. He has no additional complaints or concerns at this time.    Upon arrival in the Emergency Department, his vital signs revealed a temperature of 98.2, pulse fluctuating between 85 and 98, respiratory rate of 18, and oxygen saturation of 95-97% on room air. Notably, his blood pressure was persistently elevated, with systolic measurements ranging from 160 to 183 and diastolic measurements from 115 to 123. Initial laboratory evaluations showed abnormally elevated values, including a troponin I of 0.109, a BNP of 2912, a total bilirubin of 2.8, and an alkaline phosphatase of 190. CXR shows no focal pulmonary infiltrate, but given his presenting symptoms and lab results, it is highly likely that further evaluation was warranted. Hospital Medicine was called for admission.      Overview/Hospital  Course:  No notes on file    Interval History: diuresing. Slight bump in creatinine. Seen by cardiology. Gradually improving.    Review of Systems   Constitutional:  Negative for chills, fatigue and fever.   HENT:  Negative for congestion, sinus pressure, sinus pain and trouble swallowing.    Eyes:  Negative for photophobia, pain and visual disturbance.   Respiratory:  Positive for shortness of breath. Negative for cough and wheezing.    Cardiovascular:  Positive for leg swelling. Negative for chest pain and palpitations.   Gastrointestinal:  Negative for abdominal distention, constipation, diarrhea, nausea and vomiting.   Genitourinary:  Negative for difficulty urinating, flank pain and hematuria.   Musculoskeletal:  Negative for arthralgias, gait problem and joint swelling.   Skin:  Negative for rash and wound.   Neurological:  Negative for dizziness, seizures, light-headedness and headaches.   Psychiatric/Behavioral:  Negative for confusion and suicidal ideas.    All other systems reviewed and are negative.      Objective:     Vital Signs (Most Recent):  Temp: 98.5 °F (36.9 °C) (06/13/23 1553)  Pulse: 67 (06/13/23 1553)  Resp: 17 (06/13/23 1553)  BP: 125/71 (06/13/23 1553)  SpO2: (!) 94 % (06/13/23 1553) Vital Signs (24h Range):  Temp:  [97.6 °F (36.4 °C)-98.5 °F (36.9 °C)] 98.5 °F (36.9 °C)  Pulse:  [67-88] 67  Resp:  [16-18] 17  SpO2:  [93 %-98 %] 94 %  BP: (125-164)/() 125/71     Weight: 127 kg (280 lb)  Body mass index is 35 kg/m².    Intake/Output Summary (Last 24 hours) at 6/13/2023 1903  Last data filed at 6/13/2023 1812  Gross per 24 hour   Intake --   Output 3800 ml   Net -3800 ml         Physical Exam  Vitals reviewed.   Constitutional:       General: He is not in acute distress.     Appearance: Normal appearance. He is obese. He is not ill-appearing or toxic-appearing.   HENT:      Head: Normocephalic and atraumatic.      Nose: Nose normal. No congestion or rhinorrhea.   Eyes:      Extraocular  Movements: Extraocular movements intact.      Conjunctiva/sclera: Conjunctivae normal.      Pupils: Pupils are equal, round, and reactive to light.   Cardiovascular:      Rate and Rhythm: Normal rate and regular rhythm.      Pulses: Normal pulses.      Heart sounds: No murmur heard.  Pulmonary:      Effort: Pulmonary effort is normal. No respiratory distress.      Breath sounds: Normal breath sounds. No wheezing.   Abdominal:      General: Abdomen is flat. Bowel sounds are normal. There is no distension.      Palpations: Abdomen is soft.      Tenderness: There is no abdominal tenderness. There is no guarding or rebound.   Musculoskeletal:         General: No swelling, tenderness or deformity. Normal range of motion.      Cervical back: Normal range of motion and neck supple. No rigidity.      Right lower le+ Pitting Edema present.      Left lower le+ Pitting Edema present.   Skin:     General: Skin is warm and dry.      Capillary Refill: Capillary refill takes less than 2 seconds.      Coloration: Skin is not pale.   Neurological:      General: No focal deficit present.      Mental Status: He is alert and oriented to person, place, and time. Mental status is at baseline.      Motor: No weakness.      Gait: Gait normal.   Psychiatric:         Mood and Affect: Mood normal.         Behavior: Behavior normal.         Thought Content: Thought content normal.           Significant Labs: All pertinent labs within the past 24 hours have been reviewed.  CBC:   Recent Labs   Lab 23  1245 23  0308   WBC 5.30 4.78   HGB 14.4 13.2*   HCT 46.7 42.2    174     CMP:   Recent Labs   Lab 23  1245 23  0308    143   K 3.5 3.1*    107   CO2 19* 22*   GLU 82 83   BUN 28* 26*   CREATININE 2.3* 2.4*   CALCIUM 8.8 8.2*   PROT 5.8*  --    ALBUMIN 3.0*  --    BILITOT 2.8*  --    ALKPHOS 190*  --    AST 25  --    ALT 25  --    ANIONGAP 14 14       Significant Imaging: I have reviewed all  pertinent imaging results/findings within the past 24 hours.      Assessment/Plan:      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 (congestive) and diastolic (congestive) heart failure  --Last ECHO from OCT 2022 confirm EF 50 %- repeat ordered on admission  --BNP elevated, compared to previous on file  --2+ bilateral pitting edema on exam, no obvious evidence of vascular congestion on CXR  --s/p 40 IV lasix dose in the ER  --will schedule IV Lasix 40 mg BID while hospitalized  --monitor electrolytes, UOP  --strict I&O and daily weights ordered    6/13/23  Continue diuresing  Lasix changed to daily given kidney function  Replete potassium  Has diuresed 6L    Mixed hyperlipidemia  --continue statin therapy on admission        CKD (chronic kidney disease) stage 3, GFR 30-59 ml/min  --Cr bumped from baseline, GFR moderately decreased from previous lab draws  --will hold entresto and aldactone therapy for now, continue lasix  --avoid nephrotoxic agents while hospitalized  --renally dose meds  --daily BMP to monitor renal fxn    --will order US Kidney for completion      6/12/23  Slight bmp in kidney function  Kidney ultrasound stable    Essential hypertension  --Lasix transitioned to IV due to diuresis  --continue hydralazine  --holding entresto and aldactone to minimize toxic effect on kidneys  --PRN IV hydralazine 10mg Q6H for sBP > 175 mmHg  --[id number]        VTE Risk Mitigation (From admission, onward)         Ordered     IP VTE HIGH RISK PATIENT  Once         06/12/23 1406     Place sequential compression device  Until discontinued         06/12/23 1406                Discharge Planning   CARMELO:      Code Status: Full Code   Is the patient medically ready for discharge?:     Reason for patient still in hospital (select all that apply): Treatment  Discharge Plan A: Home with family          Juan Francisco Marshall NP  Department of Hospital Medicine   [facility] Med Surg

## 2023-06-14 NOTE — ASSESSMENT & PLAN NOTE
--Last ECHO from OCT 2022 confirm EF 50 %- repeat ordered on admission  --BNP elevated, compared to previous on file  --2+ bilateral pitting edema on exam, no obvious evidence of vascular congestion on CXR  --s/p 40 IV lasix dose in the ER  --will schedule IV Lasix 40 mg BID while hospitalized  --monitor electrolytes, UOP  --strict I&O and daily weights ordered    6/13/23  Continue diuresing  Lasix changed to daily given kidney function  Replete potassium  Has diuresed 6L    6/14  Still with bilateral lower extremity edema, although improving   Continue diuresis   Ordered for potassium replacement   Monitor kidney function   Cardio follow up

## 2023-06-14 NOTE — HOSPITAL COURSE
52-year-old gentleman with a past medical history significant for congestive heart failure, hypertension, chronic kidney disease stage 3, obstructive sleep apnea, hyperlipidemia, and non-insulin dependent type 2 diabetes mellitus who presented to the ED directly from heart failure clinic with shortness of breath and fluid overload.  Initial laboratory evaluations showed abnormally elevated values, including a troponin I of 0.109, a BNP of 2912, a total bilirubin of 2.8, and an alkaline phosphatase of 190. CXR shows no focal pulmonary infiltrate, but given his presenting symptoms and lab results, it is highly likely that further evaluation was warranted. Hospital Medicine was called for admission- for acute decompensated chf;   IV diuretics, cardiology follow up     6/16  Elevated lipase in setting epigastric pain, improved after Gicocktail. Patient asking for discharge.     Lower extremity edema improving after intravenous diuresis. After discussing with cardiology, ok to discharge home with home lasix, 25mg aldactone (reduced from 50mg) and no k supplement.     Hypokalemia and hypomagnesia on discharge. Given po k and mg supplement prior to discharge. Mag oxide provided as prescription along with aldactone 25mg. Prescription delivered to bedside prior to discharge.    Patient seen and evaluated by me. Patient was determined to be suitable for discharge. Patient deemed stable for discharge to home with nurse practitioner to visit home program.
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
## 2023-06-14 NOTE — PLAN OF CARE
Patient is stable. No signs or symptoms of acute distress. Continuous cardiac monitoring in place. I's and O's closely monitored from Lasix diuresis. Meds given as ordered. Bed in lowest position, call light in reach. Chart orders reviewed.

## 2023-06-14 NOTE — SUBJECTIVE & OBJECTIVE
Interval History: diuresing. Slight bump in creatinine. Seen by cardiology. Gradually improving.    Review of Systems   Constitutional:  Negative for chills, fatigue and fever.   HENT:  Negative for congestion, sinus pressure, sinus pain and trouble swallowing.    Eyes:  Negative for photophobia, pain and visual disturbance.   Respiratory:  Positive for shortness of breath. Negative for cough and wheezing.    Cardiovascular:  Positive for leg swelling. Negative for chest pain and palpitations.   Gastrointestinal:  Negative for abdominal distention, constipation, diarrhea, nausea and vomiting.   Genitourinary:  Negative for difficulty urinating, flank pain and hematuria.   Musculoskeletal:  Negative for arthralgias, gait problem and joint swelling.   Skin:  Negative for rash and wound.   Neurological:  Negative for dizziness, seizures, light-headedness and headaches.   Psychiatric/Behavioral:  Negative for confusion and suicidal ideas.    All other systems reviewed and are negative.      Objective:     Vital Signs (Most Recent):  Temp: 98.5 °F (36.9 °C) (06/13/23 1553)  Pulse: 67 (06/13/23 1553)  Resp: 17 (06/13/23 1553)  BP: 125/71 (06/13/23 1553)  SpO2: (!) 94 % (06/13/23 1553) Vital Signs (24h Range):  Temp:  [97.6 °F (36.4 °C)-98.5 °F (36.9 °C)] 98.5 °F (36.9 °C)  Pulse:  [67-88] 67  Resp:  [16-18] 17  SpO2:  [93 %-98 %] 94 %  BP: (125-164)/() 125/71     Weight: 127 kg (280 lb)  Body mass index is 35 kg/m².    Intake/Output Summary (Last 24 hours) at 6/13/2023 1903  Last data filed at 6/13/2023 1812  Gross per 24 hour   Intake --   Output 3800 ml   Net -3800 ml         Physical Exam  Vitals reviewed.   Constitutional:       General: He is not in acute distress.     Appearance: Normal appearance. He is obese. He is not ill-appearing or toxic-appearing.   HENT:      Head: Normocephalic and atraumatic.      Nose: Nose normal. No congestion or rhinorrhea.   Eyes:      Extraocular Movements: Extraocular  movements intact.      Conjunctiva/sclera: Conjunctivae normal.      Pupils: Pupils are equal, round, and reactive to light.   Cardiovascular:      Rate and Rhythm: Normal rate and regular rhythm.      Pulses: Normal pulses.      Heart sounds: No murmur heard.  Pulmonary:      Effort: Pulmonary effort is normal. No respiratory distress.      Breath sounds: Normal breath sounds. No wheezing.   Abdominal:      General: Abdomen is flat. Bowel sounds are normal. There is no distension.      Palpations: Abdomen is soft.      Tenderness: There is no abdominal tenderness. There is no guarding or rebound.   Musculoskeletal:         General: No swelling, tenderness or deformity. Normal range of motion.      Cervical back: Normal range of motion and neck supple. No rigidity.      Right lower le+ Pitting Edema present.      Left lower le+ Pitting Edema present.   Skin:     General: Skin is warm and dry.      Capillary Refill: Capillary refill takes less than 2 seconds.      Coloration: Skin is not pale.   Neurological:      General: No focal deficit present.      Mental Status: He is alert and oriented to person, place, and time. Mental status is at baseline.      Motor: No weakness.      Gait: Gait normal.   Psychiatric:         Mood and Affect: Mood normal.         Behavior: Behavior normal.         Thought Content: Thought content normal.           Significant Labs: All pertinent labs within the past 24 hours have been reviewed.  CBC:   Recent Labs   Lab 23  1245 23  0308   WBC 5.30 4.78   HGB 14.4 13.2*   HCT 46.7 42.2    174     CMP:   Recent Labs   Lab 23  1245 23  0308    143   K 3.5 3.1*    107   CO2 19* 22*   GLU 82 83   BUN 28* 26*   CREATININE 2.3* 2.4*   CALCIUM 8.8 8.2*   PROT 5.8*  --    ALBUMIN 3.0*  --    BILITOT 2.8*  --    ALKPHOS 190*  --    AST 25  --    ALT 25  --    ANIONGAP 14 14       Significant Imaging: I have reviewed all pertinent imaging  results/findings within the past 24 hours.
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
## 2023-06-14 NOTE — ASSESSMENT & PLAN NOTE
-Continue BB, hydralazine, Isordil  -Resume Entresto/aldactone as tolerated    6/14/23  -Continue BB, hydralazine, Isordil  -Entresto resumed  -Monitor BP

## 2023-06-14 NOTE — ASSESSMENT & PLAN NOTE
--Cr bumped from baseline, GFR moderately decreased from previous lab draws  --will hold entresto and aldactone therapy for now, continue lasix  --avoid nephrotoxic agents while hospitalized  --renally dose meds  --daily BMP to monitor renal fxn    --will order US Kidney for completion      6/12/23  Slight bmp in kidney function  Kidney ultrasound stable    6/14  Cr slightly trended down  Avoid nephrotoxins   Renal dose medications

## 2023-06-14 NOTE — ASSESSMENT & PLAN NOTE
--Last ECHO from OCT 2022 confirm EF 50 %- repeat ordered on admission  --BNP elevated, compared to previous on file  --2+ bilateral pitting edema on exam, no obvious evidence of vascular congestion on CXR  --s/p 40 IV lasix dose in the ER  --will schedule IV Lasix 40 mg BID while hospitalized  --monitor electrolytes, UOP  --strict I&O and daily weights ordered    6/13/23  Continue diuresing  Lasix changed to daily given kidney function  Replete potassium  Has diuresed 6L

## 2023-06-14 NOTE — HOSPITAL COURSE
6/14/23-Patient seen and examined today, sitting up in bed. Feeling better, good UOP overnight. Still with edema up to this thighs. Labs reviewed, creatinine 2.2., K low, being repleted.     6/15/23-Patient seen and examined today, resting in bed. Complains of epigastric abdominal pain, onset after eating. Similar episode yesterday evening, relieved with GI cocktail. Diuresing well. Lipase elevated. Abdominal U/S pending. Creatinine 1.9, K slightly low.    6/16/23-Patient seen and examined today, resting in bed. Feels back to baseline, -10L. States SOB has resolved. Minimal LE edema. No recurrent abdominal pain. Creatinine stable, K low, being repleted.

## 2023-06-15 PROBLEM — R10.9 ABDOMINAL PAIN: Status: ACTIVE | Noted: 2023-06-15

## 2023-06-15 LAB
ALBUMIN SERPL BCP-MCNC: 2.7 G/DL (ref 3.5–5.2)
ALP SERPL-CCNC: 129 U/L (ref 55–135)
ALT SERPL W/O P-5'-P-CCNC: 20 U/L (ref 10–44)
ANION GAP SERPL CALC-SCNC: 14 MMOL/L (ref 8–16)
AST SERPL-CCNC: 22 U/L (ref 10–40)
BASOPHILS # BLD AUTO: 0.05 K/UL (ref 0–0.2)
BASOPHILS NFR BLD: 1.4 % (ref 0–1.9)
BILIRUB DIRECT SERPL-MCNC: 1 MG/DL (ref 0.1–0.3)
BILIRUB SERPL-MCNC: 2.6 MG/DL (ref 0.1–1)
BUN SERPL-MCNC: 23 MG/DL (ref 6–20)
CALCIUM SERPL-MCNC: 8.2 MG/DL (ref 8.7–10.5)
CHLORIDE SERPL-SCNC: 104 MMOL/L (ref 95–110)
CO2 SERPL-SCNC: 24 MMOL/L (ref 23–29)
CREAT SERPL-MCNC: 1.9 MG/DL (ref 0.5–1.4)
DIFFERENTIAL METHOD: ABNORMAL
EOSINOPHIL # BLD AUTO: 0.4 K/UL (ref 0–0.5)
EOSINOPHIL NFR BLD: 11 % (ref 0–8)
ERYTHROCYTE [DISTWIDTH] IN BLOOD BY AUTOMATED COUNT: 15.7 % (ref 11.5–14.5)
EST. GFR  (NO RACE VARIABLE): 42 ML/MIN/1.73 M^2
GLUCOSE SERPL-MCNC: 87 MG/DL (ref 70–110)
HCT VFR BLD AUTO: 46.5 % (ref 40–54)
HGB BLD-MCNC: 14.2 G/DL (ref 14–18)
IMM GRANULOCYTES # BLD AUTO: 0.01 K/UL (ref 0–0.04)
IMM GRANULOCYTES NFR BLD AUTO: 0.3 % (ref 0–0.5)
LIPASE SERPL-CCNC: 130 U/L (ref 4–60)
LYMPHOCYTES # BLD AUTO: 1.5 K/UL (ref 1–4.8)
LYMPHOCYTES NFR BLD: 40.9 % (ref 18–48)
MCH RBC QN AUTO: 25.4 PG (ref 27–31)
MCHC RBC AUTO-ENTMCNC: 30.5 G/DL (ref 32–36)
MCV RBC AUTO: 83 FL (ref 82–98)
MONOCYTES # BLD AUTO: 0.5 K/UL (ref 0.3–1)
MONOCYTES NFR BLD: 12.4 % (ref 4–15)
NEUTROPHILS # BLD AUTO: 1.2 K/UL (ref 1.8–7.7)
NEUTROPHILS NFR BLD: 34 % (ref 38–73)
NRBC BLD-RTO: 0 /100 WBC
PLATELET # BLD AUTO: 195 K/UL (ref 150–450)
PMV BLD AUTO: 10.3 FL (ref 9.2–12.9)
POTASSIUM SERPL-SCNC: 3.4 MMOL/L (ref 3.5–5.1)
PROT SERPL-MCNC: 5.6 G/DL (ref 6–8.4)
RBC # BLD AUTO: 5.6 M/UL (ref 4.6–6.2)
SODIUM SERPL-SCNC: 142 MMOL/L (ref 136–145)
WBC # BLD AUTO: 3.64 K/UL (ref 3.9–12.7)

## 2023-06-15 PROCEDURE — 25000003 PHARM REV CODE 250: Performed by: STUDENT IN AN ORGANIZED HEALTH CARE EDUCATION/TRAINING PROGRAM

## 2023-06-15 PROCEDURE — 25000003 PHARM REV CODE 250: Performed by: PHYSICIAN ASSISTANT

## 2023-06-15 PROCEDURE — 85025 COMPLETE CBC W/AUTO DIFF WBC: CPT | Performed by: STUDENT IN AN ORGANIZED HEALTH CARE EDUCATION/TRAINING PROGRAM

## 2023-06-15 PROCEDURE — 36415 COLL VENOUS BLD VENIPUNCTURE: CPT | Performed by: STUDENT IN AN ORGANIZED HEALTH CARE EDUCATION/TRAINING PROGRAM

## 2023-06-15 PROCEDURE — 83690 ASSAY OF LIPASE: CPT | Performed by: STUDENT IN AN ORGANIZED HEALTH CARE EDUCATION/TRAINING PROGRAM

## 2023-06-15 PROCEDURE — 99232 PR SUBSEQUENT HOSPITAL CARE,LEVL II: ICD-10-PCS | Mod: ,,, | Performed by: PHYSICIAN ASSISTANT

## 2023-06-15 PROCEDURE — 80076 HEPATIC FUNCTION PANEL: CPT | Performed by: STUDENT IN AN ORGANIZED HEALTH CARE EDUCATION/TRAINING PROGRAM

## 2023-06-15 PROCEDURE — 80048 BASIC METABOLIC PNL TOTAL CA: CPT | Performed by: STUDENT IN AN ORGANIZED HEALTH CARE EDUCATION/TRAINING PROGRAM

## 2023-06-15 PROCEDURE — 21400001 HC TELEMETRY ROOM

## 2023-06-15 PROCEDURE — 99232 SBSQ HOSP IP/OBS MODERATE 35: CPT | Mod: ,,, | Performed by: PHYSICIAN ASSISTANT

## 2023-06-15 PROCEDURE — 63600175 PHARM REV CODE 636 W HCPCS: Performed by: PHYSICIAN ASSISTANT

## 2023-06-15 RX ORDER — ONDANSETRON 4 MG/1
4 TABLET, ORALLY DISINTEGRATING ORAL EVERY 8 HOURS PRN
Status: DISCONTINUED | OUTPATIENT
Start: 2023-06-15 | End: 2023-06-16 | Stop reason: HOSPADM

## 2023-06-15 RX ORDER — MAG HYDROX/ALUMINUM HYD/SIMETH 200-200-20
30 SUSPENSION, ORAL (FINAL DOSE FORM) ORAL ONCE
Status: COMPLETED | OUTPATIENT
Start: 2023-06-15 | End: 2023-06-15

## 2023-06-15 RX ORDER — LIDOCAINE HYDROCHLORIDE 20 MG/ML
15 SOLUTION OROPHARYNGEAL ONCE
Status: COMPLETED | OUTPATIENT
Start: 2023-06-15 | End: 2023-06-15

## 2023-06-15 RX ADMIN — SACUBITRIL AND VALSARTAN 1 TABLET: 24; 26 TABLET, FILM COATED ORAL at 09:06

## 2023-06-15 RX ADMIN — ALUMINUM HYDROXIDE, MAGNESIUM HYDROXIDE, AND DIMETHICONE 30 ML: 200; 20; 200 SUSPENSION ORAL at 10:06

## 2023-06-15 RX ADMIN — SACUBITRIL AND VALSARTAN 1 TABLET: 24; 26 TABLET, FILM COATED ORAL at 08:06

## 2023-06-15 RX ADMIN — ALLOPURINOL 100 MG: 100 TABLET ORAL at 08:06

## 2023-06-15 RX ADMIN — ISOSORBIDE DINITRATE 20 MG: 20 TABLET ORAL at 08:06

## 2023-06-15 RX ADMIN — CARVEDILOL 25 MG: 12.5 TABLET, FILM COATED ORAL at 08:06

## 2023-06-15 RX ADMIN — ISOSORBIDE DINITRATE 20 MG: 20 TABLET ORAL at 09:06

## 2023-06-15 RX ADMIN — HYDRALAZINE HYDROCHLORIDE 50 MG: 25 TABLET, FILM COATED ORAL at 03:06

## 2023-06-15 RX ADMIN — HYDRALAZINE HYDROCHLORIDE 50 MG: 25 TABLET, FILM COATED ORAL at 05:06

## 2023-06-15 RX ADMIN — FUROSEMIDE 40 MG: 10 INJECTION, SOLUTION INTRAMUSCULAR; INTRAVENOUS at 09:06

## 2023-06-15 RX ADMIN — LIDOCAINE HYDROCHLORIDE 15 ML: 20 SOLUTION ORAL at 10:06

## 2023-06-15 RX ADMIN — ISOSORBIDE DINITRATE 20 MG: 20 TABLET ORAL at 03:06

## 2023-06-15 RX ADMIN — PANTOPRAZOLE SODIUM 40 MG: 40 TABLET, DELAYED RELEASE ORAL at 05:06

## 2023-06-15 RX ADMIN — CARVEDILOL 25 MG: 12.5 TABLET, FILM COATED ORAL at 09:06

## 2023-06-15 RX ADMIN — HYDRALAZINE HYDROCHLORIDE 50 MG: 25 TABLET, FILM COATED ORAL at 09:06
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
## 2023-06-15 NOTE — ASSESSMENT & PLAN NOTE
--Last ECHO from OCT 2022 confirm EF 50 %- repeat ordered on admission  --BNP elevated, compared to previous on file  --2+ bilateral pitting edema on exam, no obvious evidence of vascular congestion on CXR  --s/p 40 IV lasix dose in the ER  --will schedule IV Lasix 40 mg BID while hospitalized  --monitor electrolytes, UOP  --strict I&O and daily weights ordered    Echo as of 6/12/23:     The left ventricle is mildly enlarged with moderate concentric hypertrophy and mildly decreased systolic function.   Severe left atrial enlargement.   The estimated ejection fraction is 45%.   Grade II left ventricular diastolic dysfunction.   There is left ventricular global hypokinesis.   Normal right ventricular size with normal right ventricular systolic function.   There is mild aortic valve stenosis.   Aortic valve area is 1.78 cm2; peak velocity is 2.11 m/s; mean gradient is 13 mmHg.   Mild-to-moderate mitral regurgitation.   Mild to moderate tricuspid regurgitation.   There is mild pulmonary hypertension.   Small posterior pericardial effusion.      6/13/23  Continue diuresing  Lasix changed to daily given kidney function  Replete potassium  Has diuresed 6L    6/14  Still with bilateral lower extremity edema, although improving   Continue diuresis   Ordered for potassium replacement   Monitor kidney function   Cardio follow up    6/15  Still with bilateral lower extremity swelling although gradually improving.    Continue IV diuretics, cardiology follow-up
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
## 2023-06-15 NOTE — ASSESSMENT & PLAN NOTE
-Presents with decompensated combined CHF, BNP > 2,000  -Improving, continue IV diuresis  -Continue BB, Isordil, Hydralazine  -Resume Entresto/Aldactone as tolerated/as creatinine permits  -Strict I's/O's    6/14/23  -Needs continued IV diuresis  -Lasix increased to 40 mg IV BID  -Continue BB, Isordil, Entresto, Hydralazine  -Strict I's/O's    6/15/23  -Continues to improve, still with pitting LE edema  -Continue IV diuresis  -Continue BB, Isordil, Entresto, hydralazine  -Strict I's/O's  -Replete K as per primary team

## 2023-06-15 NOTE — ASSESSMENT & PLAN NOTE
--Cr bumped from baseline, GFR moderately decreased from previous lab draws  --will hold entresto and aldactone therapy for now, continue lasix  --avoid nephrotoxic agents while hospitalized  --renally dose meds  --daily BMP to monitor renal fxn    --will order US Kidney for completion      6/12/23  Slight bmp in kidney function  Kidney ultrasound stable    6/14  Cr slightly trended down  Avoid nephrotoxins   Renal dose medications     6/15  Cr trending down   Monitor; avoid nephrotoxins

## 2023-06-15 NOTE — PLAN OF CARE
Plan of care reviewed with patient with verbal understanding. Chart and orders reviewed.  Pt remains free from falls this shift, Safety precautions in place.   Pt currently resting comfortably in bed. Hourly rounding with bed in lowest position, side rails up, call light in reach.  Will continue to monitor until end of shift.       Problem: Fall Injury Risk  Goal: Absence of Fall and Fall-Related Injury  Outcome: Ongoing, Progressing     Problem: Adult Inpatient Plan of Care  Goal: Plan of Care Review  Flowsheets (Taken 6/15/2023 0112)  Plan of Care Reviewed With: patient  Goal: Patient-Specific Goal (Individualized)  Flowsheets (Taken 6/15/2023 0112)  Anxieties, Fears or Concerns: none  Individualized Care Needs: Wants more snacks

## 2023-06-15 NOTE — PLAN OF CARE
Patient is stable. Meds given as ordered. Intermittent episode of epigastric pain only occurred once today. Continuous cardiac monitoring in place. No signs or symptoms of acute distress at this time. Bed in lowest position, call light in reach. Chart orders reviewed.
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
## 2023-06-16 VITALS
BODY MASS INDEX: 32.79 KG/M2 | DIASTOLIC BLOOD PRESSURE: 72 MMHG | SYSTOLIC BLOOD PRESSURE: 103 MMHG | HEIGHT: 75 IN | RESPIRATION RATE: 18 BRPM | TEMPERATURE: 97 F | HEART RATE: 70 BPM | OXYGEN SATURATION: 96 % | WEIGHT: 263.69 LBS

## 2023-06-16 PROBLEM — R10.9 ABDOMINAL PAIN: Status: RESOLVED | Noted: 2023-06-15 | Resolved: 2023-06-16

## 2023-06-16 LAB
ANION GAP SERPL CALC-SCNC: 13 MMOL/L (ref 8–16)
BASOPHILS # BLD AUTO: 0.05 K/UL (ref 0–0.2)
BASOPHILS NFR BLD: 1.1 % (ref 0–1.9)
BUN SERPL-MCNC: 22 MG/DL (ref 6–20)
CALCIUM SERPL-MCNC: 8.3 MG/DL (ref 8.7–10.5)
CHLORIDE SERPL-SCNC: 100 MMOL/L (ref 95–110)
CO2 SERPL-SCNC: 25 MMOL/L (ref 23–29)
CREAT SERPL-MCNC: 1.8 MG/DL (ref 0.5–1.4)
DIFFERENTIAL METHOD: ABNORMAL
EOSINOPHIL # BLD AUTO: 0.4 K/UL (ref 0–0.5)
EOSINOPHIL NFR BLD: 8.9 % (ref 0–8)
ERYTHROCYTE [DISTWIDTH] IN BLOOD BY AUTOMATED COUNT: 15.8 % (ref 11.5–14.5)
EST. GFR  (NO RACE VARIABLE): 45 ML/MIN/1.73 M^2
GLUCOSE SERPL-MCNC: 91 MG/DL (ref 70–110)
HCT VFR BLD AUTO: 48.7 % (ref 40–54)
HGB BLD-MCNC: 14.9 G/DL (ref 14–18)
IMM GRANULOCYTES # BLD AUTO: 0.01 K/UL (ref 0–0.04)
IMM GRANULOCYTES NFR BLD AUTO: 0.2 % (ref 0–0.5)
LYMPHOCYTES # BLD AUTO: 1.4 K/UL (ref 1–4.8)
LYMPHOCYTES NFR BLD: 29.6 % (ref 18–48)
MAGNESIUM SERPL-MCNC: 1.3 MG/DL (ref 1.6–2.6)
MCH RBC QN AUTO: 25.2 PG (ref 27–31)
MCHC RBC AUTO-ENTMCNC: 30.6 G/DL (ref 32–36)
MCV RBC AUTO: 82 FL (ref 82–98)
MONOCYTES # BLD AUTO: 0.5 K/UL (ref 0.3–1)
MONOCYTES NFR BLD: 10.4 % (ref 4–15)
NEUTROPHILS # BLD AUTO: 2.3 K/UL (ref 1.8–7.7)
NEUTROPHILS NFR BLD: 49.8 % (ref 38–73)
NRBC BLD-RTO: 0 /100 WBC
PLATELET # BLD AUTO: 196 K/UL (ref 150–450)
PMV BLD AUTO: 10 FL (ref 9.2–12.9)
POTASSIUM SERPL-SCNC: 3.2 MMOL/L (ref 3.5–5.1)
RBC # BLD AUTO: 5.91 M/UL (ref 4.6–6.2)
SODIUM SERPL-SCNC: 138 MMOL/L (ref 136–145)
WBC # BLD AUTO: 4.63 K/UL (ref 3.9–12.7)

## 2023-06-16 PROCEDURE — 25000003 PHARM REV CODE 250: Performed by: STUDENT IN AN ORGANIZED HEALTH CARE EDUCATION/TRAINING PROGRAM

## 2023-06-16 PROCEDURE — 80048 BASIC METABOLIC PNL TOTAL CA: CPT | Performed by: STUDENT IN AN ORGANIZED HEALTH CARE EDUCATION/TRAINING PROGRAM

## 2023-06-16 PROCEDURE — 63600175 PHARM REV CODE 636 W HCPCS: Performed by: PHYSICIAN ASSISTANT

## 2023-06-16 PROCEDURE — 25000003 PHARM REV CODE 250: Performed by: PHYSICIAN ASSISTANT

## 2023-06-16 PROCEDURE — 99232 SBSQ HOSP IP/OBS MODERATE 35: CPT | Mod: ,,, | Performed by: PHYSICIAN ASSISTANT

## 2023-06-16 PROCEDURE — 83735 ASSAY OF MAGNESIUM: CPT | Performed by: STUDENT IN AN ORGANIZED HEALTH CARE EDUCATION/TRAINING PROGRAM

## 2023-06-16 PROCEDURE — 99232 PR SUBSEQUENT HOSPITAL CARE,LEVL II: ICD-10-PCS | Mod: ,,, | Performed by: PHYSICIAN ASSISTANT

## 2023-06-16 PROCEDURE — 85025 COMPLETE CBC W/AUTO DIFF WBC: CPT | Performed by: STUDENT IN AN ORGANIZED HEALTH CARE EDUCATION/TRAINING PROGRAM

## 2023-06-16 PROCEDURE — 36415 COLL VENOUS BLD VENIPUNCTURE: CPT | Performed by: STUDENT IN AN ORGANIZED HEALTH CARE EDUCATION/TRAINING PROGRAM

## 2023-06-16 PROCEDURE — 25000003 PHARM REV CODE 250: Performed by: FAMILY MEDICINE

## 2023-06-16 RX ORDER — LANOLIN ALCOHOL/MO/W.PET/CERES
400 CREAM (GRAM) TOPICAL 2 TIMES DAILY
Qty: 6 TABLET | Refills: 0 | Status: SHIPPED | OUTPATIENT
Start: 2023-06-16 | End: 2023-06-19

## 2023-06-16 RX ORDER — LIDOCAINE HYDROCHLORIDE 20 MG/ML
15 SOLUTION OROPHARYNGEAL ONCE
Status: COMPLETED | OUTPATIENT
Start: 2023-06-16 | End: 2023-06-16

## 2023-06-16 RX ORDER — MAG HYDROX/ALUMINUM HYD/SIMETH 200-200-20
30 SUSPENSION, ORAL (FINAL DOSE FORM) ORAL ONCE
Status: COMPLETED | OUTPATIENT
Start: 2023-06-16 | End: 2023-06-16

## 2023-06-16 RX ORDER — LANOLIN ALCOHOL/MO/W.PET/CERES
400 CREAM (GRAM) TOPICAL 2 TIMES DAILY
Status: DISCONTINUED | OUTPATIENT
Start: 2023-06-16 | End: 2023-06-16 | Stop reason: HOSPADM

## 2023-06-16 RX ORDER — POTASSIUM CHLORIDE 20 MEQ/1
40 TABLET, EXTENDED RELEASE ORAL ONCE
Status: COMPLETED | OUTPATIENT
Start: 2023-06-16 | End: 2023-06-16

## 2023-06-16 RX ORDER — SPIRONOLACTONE 25 MG/1
25 TABLET ORAL DAILY
Qty: 30 TABLET | Refills: 0 | Status: SHIPPED | OUTPATIENT
Start: 2023-06-16 | End: 2023-10-02

## 2023-06-16 RX ADMIN — POTASSIUM CHLORIDE 40 MEQ: 1500 TABLET, EXTENDED RELEASE ORAL at 11:06

## 2023-06-16 RX ADMIN — PANTOPRAZOLE SODIUM 40 MG: 40 TABLET, DELAYED RELEASE ORAL at 05:06

## 2023-06-16 RX ADMIN — SACUBITRIL AND VALSARTAN 1 TABLET: 24; 26 TABLET, FILM COATED ORAL at 09:06

## 2023-06-16 RX ADMIN — HYDRALAZINE HYDROCHLORIDE 50 MG: 25 TABLET, FILM COATED ORAL at 05:06

## 2023-06-16 RX ADMIN — CARVEDILOL 25 MG: 12.5 TABLET, FILM COATED ORAL at 09:06

## 2023-06-16 RX ADMIN — ISOSORBIDE DINITRATE 20 MG: 20 TABLET ORAL at 09:06

## 2023-06-16 RX ADMIN — ALLOPURINOL 100 MG: 100 TABLET ORAL at 09:06

## 2023-06-16 RX ADMIN — Medication 400 MG: at 11:06

## 2023-06-16 RX ADMIN — ALUMINUM HYDROXIDE, MAGNESIUM HYDROXIDE, AND DIMETHICONE 30 ML: 200; 20; 200 SUSPENSION ORAL at 12:06

## 2023-06-16 RX ADMIN — LIDOCAINE HYDROCHLORIDE 15 ML: 20 SOLUTION ORAL at 12:06

## 2023-06-16 RX ADMIN — FUROSEMIDE 40 MG: 10 INJECTION, SOLUTION INTRAMUSCULAR; INTRAVENOUS at 09:06

## 2023-06-16 NOTE — ASSESSMENT & PLAN NOTE
-Presents with decompensated combined CHF, BNP > 2,000  -Improving, continue IV diuresis  -Continue BB, Isordil, Hydralazine  -Resume Entresto/Aldactone as tolerated/as creatinine permits  -Strict I's/O's    6/14/23  -Needs continued IV diuresis  -Lasix increased to 40 mg IV BID  -Continue BB, Isordil, Entresto, Hydralazine  -Strict I's/O's    6/15/23  -Continues to improve, still with pitting LE edema  -Continue IV diuresis  -Continue BB, Isordil, Entresto, hydralazine  -Strict I's/O's  -Replete K as per primary team    6/16/23  -Much improved, -10L  -Ok to d/c home on BB, Isordil, Entresto, hydralazine  -Resume Aldactone but at 25 mg daily  -Replete K and Mg prior to d/c  -Follow-up in CHF clinic

## 2023-06-16 NOTE — PLAN OF CARE
[facility] - Med Surg  Discharge Final Note    Primary Care Provider: Brenna Maravilla MD    Expected Discharge Date: 6/16/2023    Final Discharge Note (most recent)       Final Note - 06/16/23 0953          Final Note    Assessment Type Final Discharge Note (P)      Anticipated Discharge Disposition Home or Self Care (P)         Post-Acute Status    Discharge Delays None known at this time (P)                      Important Message from Medicare             Juanita Cedeno LMSW 6/16/2023 9:54 AM
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
## 2023-06-16 NOTE — ASSESSMENT & PLAN NOTE
-Continue BB, hydralazine, Isordil  -Resume Entresto/aldactone as tolerated    6/14/23  -Continue BB, hydralazine, Isordil  -Entresto resumed  -Monitor BP    6/15/23  -Continue BB, Isordil, Entresto  -Resume Aldactone but at lower dose as BP tenuous
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
## 2023-06-16 NOTE — NURSING
Pt discharged home  Piv removed  Tele monitor removed    Waiting on 2rx from next door    Avs reviewed f/u appt discussed

## 2023-06-16 NOTE — PLAN OF CARE
Discussed plan of care with pt. Pt verbalized understanding. No signs of acute distress. Bed alarm refused with bed at lowest position. Tele monitor 8602 in place. Call light within reach. Purposeful rounding Q2h.      Chart check complete.     Problem: Adult Inpatient Plan of Care  Goal: Plan of Care Review  Outcome: Ongoing, Progressing  Goal: Patient-Specific Goal (Individualized)  Outcome: Ongoing, Progressing  Goal: Absence of Hospital-Acquired Illness or Injury  Outcome: Ongoing, Progressing  Goal: Optimal Comfort and Wellbeing  Outcome: Ongoing, Progressing  Goal: Readiness for Transition of Care  Outcome: Ongoing, Progressing     Problem: Diabetes Comorbidity  Goal: Blood Glucose Level Within Targeted Range  Outcome: Ongoing, Progressing     Problem: Adjustment to Illness (Heart Failure)  Goal: Optimal Coping  Outcome: Ongoing, Progressing     Problem: Cardiac Output Decreased (Heart Failure)  Goal: Optimal Cardiac Output  Outcome: Ongoing, Progressing     Problem: Dysrhythmia (Heart Failure)  Goal: Stable Heart Rate and Rhythm  Outcome: Ongoing, Progressing     Problem: Fluid Imbalance (Heart Failure)  Goal: Fluid Balance  Outcome: Ongoing, Progressing     Problem: Functional Ability Impaired (Heart Failure)  Goal: Optimal Functional Ability  Outcome: Ongoing, Progressing     Problem: Oral Intake Inadequate (Heart Failure)  Goal: Optimal Nutrition Intake  Outcome: Ongoing, Progressing     Problem: Respiratory Compromise (Heart Failure)  Goal: Effective Oxygenation and Ventilation  Outcome: Ongoing, Progressing     Problem: Sleep Disordered Breathing (Heart Failure)  Goal: Effective Breathing Pattern During Sleep  Outcome: Ongoing, Progressing     Problem: Fall Injury Risk  Goal: Absence of Fall and Fall-Related Injury  Outcome: Ongoing, Progressing     Problem: Pain Acute  Goal: Acceptable Pain Control and Functional Ability  Outcome: Ongoing, Progressing

## 2023-06-19 ENCOUNTER — TELEPHONE (OUTPATIENT)
Dept: CARDIOLOGY | Facility: CLINIC | Age: 53
End: 2023-06-19
Payer: COMMERCIAL

## 2023-06-20 ENCOUNTER — OFFICE VISIT (OUTPATIENT)
Dept: CARDIOLOGY | Facility: CLINIC | Age: 53
End: 2023-06-20
Payer: COMMERCIAL

## 2023-06-20 VITALS
SYSTOLIC BLOOD PRESSURE: 136 MMHG | RESPIRATION RATE: 16 BRPM | DIASTOLIC BLOOD PRESSURE: 60 MMHG | WEIGHT: 252.19 LBS | OXYGEN SATURATION: 95 % | BODY MASS INDEX: 31.36 KG/M2 | HEIGHT: 75 IN | HEART RATE: 86 BPM

## 2023-06-20 DIAGNOSIS — I50.43 ACUTE ON CHRONIC COMBINED SYSTOLIC (CONGESTIVE) AND DIASTOLIC (CONGESTIVE) HEART FAILURE: Primary | ICD-10-CM

## 2023-06-20 DIAGNOSIS — I10 ESSENTIAL HYPERTENSION: ICD-10-CM

## 2023-06-20 PROCEDURE — 99999 PR PBB SHADOW E&M-EST. PATIENT-LVL IV: ICD-10-PCS | Mod: PBBFAC,,, | Performed by: PHYSICIAN ASSISTANT

## 2023-06-20 PROCEDURE — 1111F PR DISCHARGE MEDS RECONCILED W/ CURRENT OUTPATIENT MED LIST: ICD-10-PCS | Mod: CPTII,S$GLB,, | Performed by: PHYSICIAN ASSISTANT

## 2023-06-20 PROCEDURE — 1159F PR MEDICATION LIST DOCUMENTED IN MEDICAL RECORD: ICD-10-PCS | Mod: CPTII,S$GLB,, | Performed by: PHYSICIAN ASSISTANT

## 2023-06-20 PROCEDURE — 99213 PR OFFICE/OUTPT VISIT, EST, LEVL III, 20-29 MIN: ICD-10-PCS | Mod: S$GLB,,, | Performed by: PHYSICIAN ASSISTANT

## 2023-06-20 PROCEDURE — 1111F DSCHRG MED/CURRENT MED MERGE: CPT | Mod: CPTII,S$GLB,, | Performed by: PHYSICIAN ASSISTANT

## 2023-06-20 PROCEDURE — 4010F PR ACE/ARB THEARPY RXD/TAKEN: ICD-10-PCS | Mod: CPTII,S$GLB,, | Performed by: PHYSICIAN ASSISTANT

## 2023-06-20 PROCEDURE — 3008F BODY MASS INDEX DOCD: CPT | Mod: CPTII,S$GLB,, | Performed by: PHYSICIAN ASSISTANT

## 2023-06-20 PROCEDURE — 99999 PR PBB SHADOW E&M-EST. PATIENT-LVL IV: CPT | Mod: PBBFAC,,, | Performed by: PHYSICIAN ASSISTANT

## 2023-06-20 PROCEDURE — 99213 OFFICE O/P EST LOW 20 MIN: CPT | Mod: S$GLB,,, | Performed by: PHYSICIAN ASSISTANT

## 2023-06-20 PROCEDURE — 1159F MED LIST DOCD IN RCRD: CPT | Mod: CPTII,S$GLB,, | Performed by: PHYSICIAN ASSISTANT

## 2023-06-20 PROCEDURE — 4010F ACE/ARB THERAPY RXD/TAKEN: CPT | Mod: CPTII,S$GLB,, | Performed by: PHYSICIAN ASSISTANT

## 2023-06-20 PROCEDURE — 3008F PR BODY MASS INDEX (BMI) DOCUMENTED: ICD-10-PCS | Mod: CPTII,S$GLB,, | Performed by: PHYSICIAN ASSISTANT

## 2023-06-20 PROCEDURE — 3078F DIAST BP <80 MM HG: CPT | Mod: CPTII,S$GLB,, | Performed by: PHYSICIAN ASSISTANT

## 2023-06-20 PROCEDURE — 3075F PR MOST RECENT SYSTOLIC BLOOD PRESS GE 130-139MM HG: ICD-10-PCS | Mod: CPTII,S$GLB,, | Performed by: PHYSICIAN ASSISTANT

## 2023-06-20 PROCEDURE — 3075F SYST BP GE 130 - 139MM HG: CPT | Mod: CPTII,S$GLB,, | Performed by: PHYSICIAN ASSISTANT

## 2023-06-20 PROCEDURE — 3078F PR MOST RECENT DIASTOLIC BLOOD PRESSURE < 80 MM HG: ICD-10-PCS | Mod: CPTII,S$GLB,, | Performed by: PHYSICIAN ASSISTANT

## 2023-06-20 NOTE — PROGRESS NOTES
HF TCC Provider Note (Follow-up) Consult Note      HPI:  Patient can walk without SOB   Patient sleeps on 2 pillows   Patient wakes up SOB, has to get out of bed, associated cough, sputum none   Palpitations - none   Dizzy, light-headed, pre-syncope or syncope none   Since discharge frequency of performing weights, home weight and weight change 263 lbs (down)   Other information felt pertinent to HPI    At last visit had to be admitted for ADHF. Since has felt well, no sOB. Less abdominal distension, no LE edema. DC on lasix 40 BID, ARNi, BB, aldactone. Dc Cr was 1.8, baseleine    No PND, orthopnea. Back to work       PHYSICAL: There were no vitals filed for this visit.   Wt Readings from Last 3 Encounters:   06/14/23 119.6 kg (263 lb 10.7 oz)   06/12/23 127.5 kg (281 lb 1.4 oz)   05/08/23 127.8 kg (281 lb 12 oz)       JVD: no,    Heart rhythm: regular  Cardiac murmur: No    S3: no  S4: no  Lungs: clear  Liver span: 10 cm:   Hepatojugular reflux: no  Edema: no,       ASSESSMENT: scute on chronic systolic HF    PLAN:      Patient Instructions:   Instruct the patient to notify this clinic if HH, a physician or an advanced care provider wants to change medication one of their HF medications   Activity and Diet restrictions:   Recommend 2-3 gram sodium restriction and 1500cc- 2000cc fluid restriction.  Encourage physical activity with graded exercise program.  Requested patient to weigh themselves daily, and to notify us if their weight increases by more than 3 lbs in 1 day or 5 lbs in 3 days.    Assigned dry weight on home scale: 119 kg  Medication changes (include current dose and changed dose)  Continue lasix 40 mg BID  ARNI/BB/MRA  RTC 4 weeks with labs

## 2023-06-21 ENCOUNTER — PATIENT MESSAGE (OUTPATIENT)
Dept: ADMINISTRATIVE | Facility: CLINIC | Age: 53
End: 2023-06-21
Payer: COMMERCIAL

## 2023-06-21 ENCOUNTER — PATIENT OUTREACH (OUTPATIENT)
Dept: ADMINISTRATIVE | Facility: CLINIC | Age: 53
End: 2023-06-21
Payer: COMMERCIAL

## 2023-06-21 NOTE — PROGRESS NOTES
C3 nurse attempted to contact Bria Saldana  for a TCC post hospital discharge follow up call. The patient is unable to conduct the call @ this time. The patient requested a callback.    The patient does not have a scheduled HOSFU appointment within 5-7 days post hospital discharge date 6/16/23. Message sent to Physician staff to assist with HOSFU appointment scheduling.

## 2023-06-22 PROBLEM — R06.00 DYSPNEA: Status: ACTIVE | Noted: 2023-02-03

## 2023-06-26 DIAGNOSIS — I50.40 COMBINED SYSTOLIC AND DIASTOLIC CONGESTIVE HEART FAILURE, UNSPECIFIED HF CHRONICITY: Primary | ICD-10-CM

## 2023-06-26 DIAGNOSIS — I10 ESSENTIAL HYPERTENSION: ICD-10-CM

## 2023-06-26 RX ORDER — ISOSORBIDE DINITRATE 20 MG/1
TABLET ORAL
Qty: 270 TABLET | Refills: 1 | Status: SHIPPED | OUTPATIENT
Start: 2023-06-26

## 2023-06-26 RX ORDER — FUROSEMIDE 40 MG/1
40 TABLET ORAL 2 TIMES DAILY
Qty: 60 TABLET | Refills: 3 | Status: SHIPPED | OUTPATIENT
Start: 2023-06-26 | End: 2023-09-14 | Stop reason: SDUPTHER

## 2023-06-26 NOTE — PROGRESS NOTES
C3 nurse spoke with Bria Saldana  for a TCC post hospital discharge follow up call. The patient does not have a scheduled HOSFU appointment with Brenna Maravilla MD  within 5-7 days post hospital discharge date 6/16/23. C3 nurse was unable to schedule HOSFU appointment in [facility].    Message sent to PCP staff requesting they contact patient and schedule follow up appointment.

## 2023-07-20 ENCOUNTER — LAB VISIT (OUTPATIENT)
Dept: LAB | Facility: HOSPITAL | Age: 53
End: 2023-07-20
Attending: PHYSICIAN ASSISTANT
Payer: COMMERCIAL

## 2023-07-20 ENCOUNTER — OFFICE VISIT (OUTPATIENT)
Dept: CARDIOLOGY | Facility: CLINIC | Age: 53
End: 2023-07-20
Payer: COMMERCIAL

## 2023-07-20 VITALS
HEIGHT: 75 IN | DIASTOLIC BLOOD PRESSURE: 62 MMHG | BODY MASS INDEX: 32.38 KG/M2 | SYSTOLIC BLOOD PRESSURE: 126 MMHG | HEART RATE: 80 BPM | WEIGHT: 260.38 LBS

## 2023-07-20 DIAGNOSIS — I10 ESSENTIAL HYPERTENSION: ICD-10-CM

## 2023-07-20 DIAGNOSIS — I50.43 ACUTE ON CHRONIC COMBINED SYSTOLIC (CONGESTIVE) AND DIASTOLIC (CONGESTIVE) HEART FAILURE: ICD-10-CM

## 2023-07-20 DIAGNOSIS — I50.43 ACUTE ON CHRONIC COMBINED SYSTOLIC (CONGESTIVE) AND DIASTOLIC (CONGESTIVE) HEART FAILURE: Primary | ICD-10-CM

## 2023-07-20 LAB
ANION GAP SERPL CALC-SCNC: 10 MMOL/L (ref 8–16)
BUN SERPL-MCNC: 16 MG/DL (ref 6–20)
CALCIUM SERPL-MCNC: 8.1 MG/DL (ref 8.7–10.5)
CHLORIDE SERPL-SCNC: 105 MMOL/L (ref 95–110)
CO2 SERPL-SCNC: 26 MMOL/L (ref 23–29)
CREAT SERPL-MCNC: 1.8 MG/DL (ref 0.5–1.4)
EST. GFR  (NO RACE VARIABLE): 45 ML/MIN/1.73 M^2
GLUCOSE SERPL-MCNC: 60 MG/DL (ref 70–110)
POTASSIUM SERPL-SCNC: 3.6 MMOL/L (ref 3.5–5.1)
SODIUM SERPL-SCNC: 141 MMOL/L (ref 136–145)

## 2023-07-20 PROCEDURE — 80048 BASIC METABOLIC PNL TOTAL CA: CPT | Performed by: PHYSICIAN ASSISTANT

## 2023-07-20 PROCEDURE — 1160F RVW MEDS BY RX/DR IN RCRD: CPT | Mod: CPTII,S$GLB,, | Performed by: PHYSICIAN ASSISTANT

## 2023-07-20 PROCEDURE — 99999 PR PBB SHADOW E&M-EST. PATIENT-LVL III: ICD-10-PCS | Mod: PBBFAC,,, | Performed by: PHYSICIAN ASSISTANT

## 2023-07-20 PROCEDURE — 99999 PR PBB SHADOW E&M-EST. PATIENT-LVL III: CPT | Mod: PBBFAC,,, | Performed by: PHYSICIAN ASSISTANT

## 2023-07-20 PROCEDURE — 3008F BODY MASS INDEX DOCD: CPT | Mod: CPTII,S$GLB,, | Performed by: PHYSICIAN ASSISTANT

## 2023-07-20 PROCEDURE — 3008F PR BODY MASS INDEX (BMI) DOCUMENTED: ICD-10-PCS | Mod: CPTII,S$GLB,, | Performed by: PHYSICIAN ASSISTANT

## 2023-07-20 PROCEDURE — 1160F PR REVIEW ALL MEDS BY PRESCRIBER/CLIN PHARMACIST DOCUMENTED: ICD-10-PCS | Mod: CPTII,S$GLB,, | Performed by: PHYSICIAN ASSISTANT

## 2023-07-20 PROCEDURE — 3074F PR MOST RECENT SYSTOLIC BLOOD PRESSURE < 130 MM HG: ICD-10-PCS | Mod: CPTII,S$GLB,, | Performed by: PHYSICIAN ASSISTANT

## 2023-07-20 PROCEDURE — 4010F ACE/ARB THERAPY RXD/TAKEN: CPT | Mod: CPTII,S$GLB,, | Performed by: PHYSICIAN ASSISTANT

## 2023-07-20 PROCEDURE — 99214 OFFICE O/P EST MOD 30 MIN: CPT | Mod: S$GLB,,, | Performed by: PHYSICIAN ASSISTANT

## 2023-07-20 PROCEDURE — 99214 PR OFFICE/OUTPT VISIT, EST, LEVL IV, 30-39 MIN: ICD-10-PCS | Mod: S$GLB,,, | Performed by: PHYSICIAN ASSISTANT

## 2023-07-20 PROCEDURE — 3078F DIAST BP <80 MM HG: CPT | Mod: CPTII,S$GLB,, | Performed by: PHYSICIAN ASSISTANT

## 2023-07-20 PROCEDURE — 1159F PR MEDICATION LIST DOCUMENTED IN MEDICAL RECORD: ICD-10-PCS | Mod: CPTII,S$GLB,, | Performed by: PHYSICIAN ASSISTANT

## 2023-07-20 PROCEDURE — 3078F PR MOST RECENT DIASTOLIC BLOOD PRESSURE < 80 MM HG: ICD-10-PCS | Mod: CPTII,S$GLB,, | Performed by: PHYSICIAN ASSISTANT

## 2023-07-20 PROCEDURE — 1159F MED LIST DOCD IN RCRD: CPT | Mod: CPTII,S$GLB,, | Performed by: PHYSICIAN ASSISTANT

## 2023-07-20 PROCEDURE — 36415 COLL VENOUS BLD VENIPUNCTURE: CPT | Performed by: PHYSICIAN ASSISTANT

## 2023-07-20 PROCEDURE — 4010F PR ACE/ARB THEARPY RXD/TAKEN: ICD-10-PCS | Mod: CPTII,S$GLB,, | Performed by: PHYSICIAN ASSISTANT

## 2023-07-20 PROCEDURE — 3074F SYST BP LT 130 MM HG: CPT | Mod: CPTII,S$GLB,, | Performed by: PHYSICIAN ASSISTANT

## 2023-08-08 ENCOUNTER — TELEPHONE (OUTPATIENT)
Dept: CARDIOLOGY | Facility: CLINIC | Age: 53
End: 2023-08-08
Payer: COMMERCIAL

## 2023-08-08 NOTE — TELEPHONE ENCOUNTER
Called pt, no answer, left VM to return call regarding missed f/u clinic appt today and to return call to reschedule.

## 2023-08-10 ENCOUNTER — TELEPHONE (OUTPATIENT)
Dept: CARDIOLOGY | Facility: CLINIC | Age: 53
End: 2023-08-10
Payer: COMMERCIAL

## 2023-08-15 ENCOUNTER — TELEPHONE (OUTPATIENT)
Dept: CARDIOLOGY | Facility: CLINIC | Age: 53
End: 2023-08-15
Payer: COMMERCIAL

## 2023-08-24 ENCOUNTER — TELEPHONE (OUTPATIENT)
Dept: CARDIOLOGY | Facility: CLINIC | Age: 53
End: 2023-08-24
Payer: COMMERCIAL

## 2023-08-29 ENCOUNTER — HOSPITAL ENCOUNTER (EMERGENCY)
Facility: HOSPITAL | Age: 53
Discharge: HOME | End: 2023-08-29
Attending: EMERGENCY MEDICINE
Payer: COMMERCIAL

## 2023-08-29 ENCOUNTER — TELEPHONE (OUTPATIENT)
Dept: CARDIOLOGY | Facility: CLINIC | Age: 53
End: 2023-08-29
Payer: COMMERCIAL

## 2023-08-29 VITALS
BODY MASS INDEX: 31.18 KG/M2 | TEMPERATURE: 99 F | OXYGEN SATURATION: 95 % | HEART RATE: 87 BPM | RESPIRATION RATE: 16 BRPM | WEIGHT: 249.44 LBS | SYSTOLIC BLOOD PRESSURE: 153 MMHG | DIASTOLIC BLOOD PRESSURE: 90 MMHG

## 2023-08-29 DIAGNOSIS — I16.0 HYPERTENSIVE URGENCY: Primary | ICD-10-CM

## 2023-08-29 DIAGNOSIS — I50.9 ACUTE ON CHRONIC CONGESTIVE HEART FAILURE, UNSPECIFIED HEART FAILURE TYPE: ICD-10-CM

## 2023-08-29 DIAGNOSIS — R06.02 SHORTNESS OF BREATH: ICD-10-CM

## 2023-08-29 LAB
ALBUMIN SERPL BCP-MCNC: 3.4 G/DL (ref 3.5–5.2)
ALP SERPL-CCNC: 134 U/L (ref 55–135)
ALT SERPL W/O P-5'-P-CCNC: 16 U/L (ref 10–44)
ANION GAP SERPL CALC-SCNC: 14 MMOL/L (ref 8–16)
AST SERPL-CCNC: 34 U/L (ref 10–40)
BASOPHILS # BLD AUTO: 0.04 K/UL (ref 0–0.2)
BASOPHILS NFR BLD: 0.8 % (ref 0–1.9)
BILIRUB SERPL-MCNC: 4.1 MG/DL (ref 0.1–1)
BNP SERPL-MCNC: 2731 PG/ML (ref 0–99)
BUN SERPL-MCNC: 19 MG/DL (ref 6–20)
CALCIUM SERPL-MCNC: 9 MG/DL (ref 8.7–10.5)
CHLORIDE SERPL-SCNC: 106 MMOL/L (ref 95–110)
CO2 SERPL-SCNC: 19 MMOL/L (ref 23–29)
CREAT SERPL-MCNC: 2 MG/DL (ref 0.5–1.4)
DIFFERENTIAL METHOD: ABNORMAL
EOSINOPHIL # BLD AUTO: 0.1 K/UL (ref 0–0.5)
EOSINOPHIL NFR BLD: 1 % (ref 0–8)
ERYTHROCYTE [DISTWIDTH] IN BLOOD BY AUTOMATED COUNT: 17 % (ref 11.5–14.5)
EST. GFR  (NO RACE VARIABLE): 40 ML/MIN/1.73 M^2
GLUCOSE SERPL-MCNC: 79 MG/DL (ref 70–110)
HCT VFR BLD AUTO: 46.2 % (ref 40–54)
HGB BLD-MCNC: 14.1 G/DL (ref 14–18)
IMM GRANULOCYTES # BLD AUTO: 0.02 K/UL (ref 0–0.04)
IMM GRANULOCYTES NFR BLD AUTO: 0.4 % (ref 0–0.5)
LYMPHOCYTES # BLD AUTO: 1.1 K/UL (ref 1–4.8)
LYMPHOCYTES NFR BLD: 21.8 % (ref 18–48)
MCH RBC QN AUTO: 25.6 PG (ref 27–31)
MCHC RBC AUTO-ENTMCNC: 30.5 G/DL (ref 32–36)
MCV RBC AUTO: 84 FL (ref 82–98)
MONOCYTES # BLD AUTO: 0.8 K/UL (ref 0.3–1)
MONOCYTES NFR BLD: 15.7 % (ref 4–15)
NEUTROPHILS # BLD AUTO: 3.2 K/UL (ref 1.8–7.7)
NEUTROPHILS NFR BLD: 60.3 % (ref 38–73)
NRBC BLD-RTO: 0 /100 WBC
PLATELET # BLD AUTO: 166 K/UL (ref 150–450)
PMV BLD AUTO: 10.1 FL (ref 9.2–12.9)
POTASSIUM SERPL-SCNC: 3.9 MMOL/L (ref 3.5–5.1)
PROT SERPL-MCNC: 6.4 G/DL (ref 6–8.4)
RBC # BLD AUTO: 5.51 M/UL (ref 4.6–6.2)
SODIUM SERPL-SCNC: 139 MMOL/L (ref 136–145)
TROPONIN I SERPL DL<=0.01 NG/ML-MCNC: 0.04 NG/ML (ref 0–0.03)
TROPONIN I SERPL DL<=0.01 NG/ML-MCNC: 0.04 NG/ML (ref 0–0.03)
WBC # BLD AUTO: 5.22 K/UL (ref 3.9–12.7)

## 2023-08-29 PROCEDURE — 80053 COMPREHEN METABOLIC PANEL: CPT | Performed by: REGISTERED NURSE

## 2023-08-29 PROCEDURE — 83880 ASSAY OF NATRIURETIC PEPTIDE: CPT | Performed by: REGISTERED NURSE

## 2023-08-29 PROCEDURE — 93010 ELECTROCARDIOGRAM REPORT: CPT | Mod: ,,, | Performed by: STUDENT IN AN ORGANIZED HEALTH CARE EDUCATION/TRAINING PROGRAM

## 2023-08-29 PROCEDURE — 93010 EKG 12-LEAD: ICD-10-PCS | Mod: ,,, | Performed by: STUDENT IN AN ORGANIZED HEALTH CARE EDUCATION/TRAINING PROGRAM

## 2023-08-29 PROCEDURE — 99285 EMERGENCY DEPT VISIT HI MDM: CPT | Mod: 25

## 2023-08-29 PROCEDURE — 93005 ELECTROCARDIOGRAM TRACING: CPT

## 2023-08-29 PROCEDURE — 85025 COMPLETE CBC W/AUTO DIFF WBC: CPT | Performed by: REGISTERED NURSE

## 2023-08-29 PROCEDURE — 63600175 PHARM REV CODE 636 W HCPCS: Performed by: EMERGENCY MEDICINE

## 2023-08-29 PROCEDURE — 96375 TX/PRO/DX INJ NEW DRUG ADDON: CPT

## 2023-08-29 PROCEDURE — 84484 ASSAY OF TROPONIN QUANT: CPT | Performed by: REGISTERED NURSE

## 2023-08-29 PROCEDURE — 96376 TX/PRO/DX INJ SAME DRUG ADON: CPT

## 2023-08-29 PROCEDURE — 96374 THER/PROPH/DIAG INJ IV PUSH: CPT

## 2023-08-29 PROCEDURE — 84484 ASSAY OF TROPONIN QUANT: CPT | Mod: 91 | Performed by: EMERGENCY MEDICINE

## 2023-08-29 RX ORDER — FUROSEMIDE 10 MG/ML
40 INJECTION INTRAMUSCULAR; INTRAVENOUS
Status: COMPLETED | OUTPATIENT
Start: 2023-08-29 | End: 2023-08-29

## 2023-08-29 RX ORDER — HYDRALAZINE HYDROCHLORIDE 20 MG/ML
10 INJECTION INTRAMUSCULAR; INTRAVENOUS
Status: COMPLETED | OUTPATIENT
Start: 2023-08-29 | End: 2023-08-29

## 2023-08-29 RX ADMIN — FUROSEMIDE 40 MG: 10 INJECTION, SOLUTION INTRAMUSCULAR; INTRAVENOUS at 03:08

## 2023-08-29 RX ADMIN — HYDRALAZINE HYDROCHLORIDE 10 MG: 20 INJECTION, SOLUTION INTRAMUSCULAR; INTRAVENOUS at 07:08

## 2023-08-29 RX ADMIN — HYDRALAZINE HYDROCHLORIDE 10 MG: 20 INJECTION, SOLUTION INTRAMUSCULAR; INTRAVENOUS at 05:08

## 2023-08-29 RX ADMIN — FUROSEMIDE 40 MG: 10 INJECTION, SOLUTION INTRAMUSCULAR; INTRAVENOUS at 05:08

## 2023-08-29 RX ADMIN — FUROSEMIDE 40 MG: 10 INJECTION, SOLUTION INTRAMUSCULAR; INTRAVENOUS at 07:08
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
## 2023-08-29 NOTE — Clinical Note
"Leroid "Leroid" Les was seen and treated in our emergency department on 8/29/2023.  He may return to work on 08/31/2023.       If you have any questions or concerns, please don't hesitate to call.      Prashant TREJO    "

## 2023-08-29 NOTE — TELEPHONE ENCOUNTER
Sienna Maguire, Elisa Posadas, LPN  Caller: Unspecified (Today,  9:39 AM)  Recommend ER       The patient has been notified of this information and all questions answered.

## 2023-08-29 NOTE — FIRST PROVIDER EVALUATION
Medical screening examination initiated.  I have conducted a focused provider triage encounter, findings are as follows:    Brief history of present illness:  Weakness, diarrhea. Sent by cardiology for further eval    There were no vitals filed for this visit.    Pertinent physical exam:  No acute distress, alert and oriented     Brief workup plan:  Workup    Preliminary workup initiated; this workup will be continued and followed by the physician or advanced practice provider that is assigned to the patient when roomed.

## 2023-08-29 NOTE — TELEPHONE ENCOUNTER
Pt called. He is asking if he should come in or go to the hospital.   He is feeling more sob, weak, coughing.   Restless sleep  No wt log or bp to review.   I let him know I would notify the provider and call back with further recommendations.

## 2023-09-14 DIAGNOSIS — I50.40 COMBINED SYSTOLIC AND DIASTOLIC CONGESTIVE HEART FAILURE, UNSPECIFIED HF CHRONICITY: ICD-10-CM

## 2023-09-14 RX ORDER — FUROSEMIDE 40 MG/1
40 TABLET ORAL 2 TIMES DAILY
Qty: 60 TABLET | Refills: 3 | Status: SHIPPED | OUTPATIENT
Start: 2023-09-14 | End: 2023-10-02

## 2023-10-01 DIAGNOSIS — I50.43 ACUTE ON CHRONIC COMBINED SYSTOLIC (CONGESTIVE) AND DIASTOLIC (CONGESTIVE) HEART FAILURE: Primary | ICD-10-CM

## 2023-10-01 DIAGNOSIS — I50.40 COMBINED SYSTOLIC AND DIASTOLIC CONGESTIVE HEART FAILURE, UNSPECIFIED HF CHRONICITY: ICD-10-CM

## 2023-10-01 DIAGNOSIS — E78.2 MIXED HYPERLIPIDEMIA: ICD-10-CM

## 2023-10-01 NOTE — TELEPHONE ENCOUNTER
Care Due:                  Date            Visit Type   Department     Provider  --------------------------------------------------------------------------------                                NP -                              PRIMARY      [facility]  Last Visit: 03-      CARE ([facility])   MEDICINE       Brenna Maravilla  Next Visit: None Scheduled  None         None Found                                                            Last  Test          Frequency    Reason                     Performed    Due Date  --------------------------------------------------------------------------------    Lipid Panel.  12 months..  atorvastatin.............  Not Found    Overdue    [employer] Embedded Care Due Messages. Reference number: [id number].   10/01/2023 7:51:43 AM CDT

## 2023-10-02 ENCOUNTER — OFFICE VISIT (OUTPATIENT)
Dept: CARDIOLOGY | Facility: CLINIC | Age: 53
End: 2023-10-02
Payer: COMMERCIAL

## 2023-10-02 ENCOUNTER — LAB VISIT (OUTPATIENT)
Dept: LAB | Facility: HOSPITAL | Age: 53
End: 2023-10-02
Attending: PHYSICIAN ASSISTANT
Payer: COMMERCIAL

## 2023-10-02 VITALS
WEIGHT: 250 LBS | SYSTOLIC BLOOD PRESSURE: 150 MMHG | DIASTOLIC BLOOD PRESSURE: 110 MMHG | HEART RATE: 88 BPM | HEIGHT: 75 IN | BODY MASS INDEX: 31.08 KG/M2

## 2023-10-02 DIAGNOSIS — I50.9 ACUTE ON CHRONIC CONGESTIVE HEART FAILURE, UNSPECIFIED HEART FAILURE TYPE: ICD-10-CM

## 2023-10-02 DIAGNOSIS — I10 ESSENTIAL HYPERTENSION: ICD-10-CM

## 2023-10-02 DIAGNOSIS — E11.22 HYPERTENSION ASSOCIATED WITH CHRONIC KIDNEY DISEASE DUE TO TYPE 2 DIABETES MELLITUS: ICD-10-CM

## 2023-10-02 DIAGNOSIS — I12.9 HYPERTENSION ASSOCIATED WITH CHRONIC KIDNEY DISEASE DUE TO TYPE 2 DIABETES MELLITUS: ICD-10-CM

## 2023-10-02 DIAGNOSIS — I50.43 ACUTE ON CHRONIC COMBINED SYSTOLIC (CONGESTIVE) AND DIASTOLIC (CONGESTIVE) HEART FAILURE: Primary | ICD-10-CM

## 2023-10-02 DIAGNOSIS — I50.43 ACUTE ON CHRONIC COMBINED SYSTOLIC (CONGESTIVE) AND DIASTOLIC (CONGESTIVE) HEART FAILURE: ICD-10-CM

## 2023-10-02 DIAGNOSIS — E78.2 MIXED HYPERLIPIDEMIA: ICD-10-CM

## 2023-10-02 LAB
ALBUMIN SERPL BCP-MCNC: 3.6 G/DL (ref 3.5–5.2)
ALP SERPL-CCNC: 149 U/L (ref 55–135)
ALT SERPL W/O P-5'-P-CCNC: 23 U/L (ref 10–44)
ANION GAP SERPL CALC-SCNC: 8 MMOL/L (ref 8–16)
AST SERPL-CCNC: 22 U/L (ref 10–40)
BILIRUB SERPL-MCNC: 3.1 MG/DL (ref 0.1–1)
BNP SERPL-MCNC: 3313 PG/ML (ref 0–99)
BUN SERPL-MCNC: 22 MG/DL (ref 6–20)
CALCIUM SERPL-MCNC: 9.2 MG/DL (ref 8.7–10.5)
CHLORIDE SERPL-SCNC: 111 MMOL/L (ref 95–110)
CO2 SERPL-SCNC: 23 MMOL/L (ref 23–29)
CREAT SERPL-MCNC: 1.8 MG/DL (ref 0.5–1.4)
EST. GFR  (NO RACE VARIABLE): 45 ML/MIN/1.73 M^2
GLUCOSE SERPL-MCNC: 98 MG/DL (ref 70–110)
POTASSIUM SERPL-SCNC: 4.4 MMOL/L (ref 3.5–5.1)
PROT SERPL-MCNC: 6.6 G/DL (ref 6–8.4)
SODIUM SERPL-SCNC: 142 MMOL/L (ref 136–145)

## 2023-10-02 PROCEDURE — 3008F BODY MASS INDEX DOCD: CPT | Mod: CPTII,S$GLB,, | Performed by: PHYSICIAN ASSISTANT

## 2023-10-02 PROCEDURE — 99999 PR PBB SHADOW E&M-EST. PATIENT-LVL III: ICD-10-PCS | Mod: PBBFAC,,, | Performed by: PHYSICIAN ASSISTANT

## 2023-10-02 PROCEDURE — 99214 OFFICE O/P EST MOD 30 MIN: CPT | Mod: S$GLB,,, | Performed by: PHYSICIAN ASSISTANT

## 2023-10-02 PROCEDURE — 36415 COLL VENOUS BLD VENIPUNCTURE: CPT | Performed by: PHYSICIAN ASSISTANT

## 2023-10-02 PROCEDURE — 99999 PR PBB SHADOW E&M-EST. PATIENT-LVL III: CPT | Mod: PBBFAC,,, | Performed by: PHYSICIAN ASSISTANT

## 2023-10-02 PROCEDURE — 99214 PR OFFICE/OUTPT VISIT, EST, LEVL IV, 30-39 MIN: ICD-10-PCS | Mod: S$GLB,,, | Performed by: PHYSICIAN ASSISTANT

## 2023-10-02 PROCEDURE — 1160F RVW MEDS BY RX/DR IN RCRD: CPT | Mod: CPTII,S$GLB,, | Performed by: PHYSICIAN ASSISTANT

## 2023-10-02 PROCEDURE — 1160F PR REVIEW ALL MEDS BY PRESCRIBER/CLIN PHARMACIST DOCUMENTED: ICD-10-PCS | Mod: CPTII,S$GLB,, | Performed by: PHYSICIAN ASSISTANT

## 2023-10-02 PROCEDURE — 4010F ACE/ARB THERAPY RXD/TAKEN: CPT | Mod: CPTII,S$GLB,, | Performed by: PHYSICIAN ASSISTANT

## 2023-10-02 PROCEDURE — 3080F DIAST BP >= 90 MM HG: CPT | Mod: CPTII,S$GLB,, | Performed by: PHYSICIAN ASSISTANT

## 2023-10-02 PROCEDURE — 3077F SYST BP >= 140 MM HG: CPT | Mod: CPTII,S$GLB,, | Performed by: PHYSICIAN ASSISTANT

## 2023-10-02 PROCEDURE — 80053 COMPREHEN METABOLIC PANEL: CPT | Performed by: PHYSICIAN ASSISTANT

## 2023-10-02 PROCEDURE — 83880 ASSAY OF NATRIURETIC PEPTIDE: CPT | Performed by: PHYSICIAN ASSISTANT

## 2023-10-02 PROCEDURE — 1159F PR MEDICATION LIST DOCUMENTED IN MEDICAL RECORD: ICD-10-PCS | Mod: CPTII,S$GLB,, | Performed by: PHYSICIAN ASSISTANT

## 2023-10-02 PROCEDURE — 3080F PR MOST RECENT DIASTOLIC BLOOD PRESSURE >= 90 MM HG: ICD-10-PCS | Mod: CPTII,S$GLB,, | Performed by: PHYSICIAN ASSISTANT

## 2023-10-02 PROCEDURE — 3077F PR MOST RECENT SYSTOLIC BLOOD PRESSURE >= 140 MM HG: ICD-10-PCS | Mod: CPTII,S$GLB,, | Performed by: PHYSICIAN ASSISTANT

## 2023-10-02 PROCEDURE — 1159F MED LIST DOCD IN RCRD: CPT | Mod: CPTII,S$GLB,, | Performed by: PHYSICIAN ASSISTANT

## 2023-10-02 PROCEDURE — 4010F PR ACE/ARB THEARPY RXD/TAKEN: ICD-10-PCS | Mod: CPTII,S$GLB,, | Performed by: PHYSICIAN ASSISTANT

## 2023-10-02 PROCEDURE — 3008F PR BODY MASS INDEX (BMI) DOCUMENTED: ICD-10-PCS | Mod: CPTII,S$GLB,, | Performed by: PHYSICIAN ASSISTANT

## 2023-10-02 RX ORDER — SPIRONOLACTONE 25 MG/1
25 TABLET ORAL DAILY
Qty: 30 TABLET | Refills: 3 | Status: SHIPPED | OUTPATIENT
Start: 2023-10-02

## 2023-10-02 RX ORDER — ATORVASTATIN CALCIUM 40 MG/1
40 TABLET, FILM COATED ORAL NIGHTLY
Qty: 90 TABLET | Refills: 1 | Status: CANCELLED | OUTPATIENT
Start: 2023-10-02 | End: 2023-12-31

## 2023-10-02 RX ORDER — FUROSEMIDE 40 MG/1
40 TABLET ORAL 2 TIMES DAILY
Qty: 60 TABLET | Refills: 3 | OUTPATIENT
Start: 2023-10-02 | End: 2024-10-01

## 2023-10-02 RX ORDER — FUROSEMIDE 80 MG/1
80 TABLET ORAL 2 TIMES DAILY
Qty: 60 TABLET | Refills: 11
Start: 2023-10-02 | End: 2023-10-02 | Stop reason: SDUPTHER

## 2023-10-02 RX ORDER — METOLAZONE 2.5 MG/1
2.5 TABLET ORAL ONCE
Qty: 1 TABLET | Refills: 0 | Status: SHIPPED | OUTPATIENT
Start: 2023-10-02 | End: 2023-10-24

## 2023-10-02 RX ORDER — ATORVASTATIN CALCIUM 40 MG/1
40 TABLET, FILM COATED ORAL NIGHTLY
Qty: 90 TABLET | Refills: 3 | Status: SHIPPED | OUTPATIENT
Start: 2023-10-02 | End: 2024-09-26

## 2023-10-02 NOTE — PROGRESS NOTES
[facility] Provider Note (Follow-up) Consult Note      HPI:  Patient can walk around house, some SOB   Patient sleeps on 2 pillows   Patient wakes up SOB, has to get out of bed, associated cough, sputum none   Palpitations - none   Dizzy, light-headed, pre-syncope or syncope none   Since discharge frequency of performing weights, home weight and weight change stable    Other information felt pertinent to HPI    Here for add on visit for check up- was in Er 8/29, given one dose of lasix and dc home.     Did fine after that but now some SOB. Is out of jardiance and MRA for some time    On lasix 40 BID    Having diarrhea, abd fullness    No PND         PHYSICAL:   Vitals:    10/02/23 0925   BP: (!) 150/110   Pulse: 88      Wt Readings from Last 3 Encounters:   10/02/23 113.4 kg (250 lb)   08/29/23 113.2 kg (249 lb 7.2 oz)   07/20/23 118.1 kg (260 lb 5.8 oz)       JVD: yes,    Heart rhythm: regular  Cardiac murmur: No    S3: no  S4: no  Lungs: clear  Liver span: 16 cm:   Hepatojugular reflux: yes  Edema: no,       ASSESSMENT: chronic systolic HF    PLAN:      Patient Instructions:   Instruct the patient to notify this clinic if HH, a physician or an advanced care provider wants to change medication one of their HF medications   Activity and Diet restrictions:   Recommend 2-3 gram sodium restriction and 1500cc- 2000cc fluid restriction.  Encourage physical activity with graded exercise program.  Requested patient to weigh themselves daily, and to notify us if their weight increases by more than 3 lbs in 1 day or 5 lbs in 3 days.    Assigned dry weight on home scale: 113 kg  Medication changes (include current dose and changed dose)  Stat labs today  Increase lasix to 80 mg BID  Re fill jardiance and aldatone  Keep on ARNi/BB/isordil/hydral  If Cr/K ok will dose with metolazone  Call Wednesday, if no better will have to change to bumex  RTC 3 weeks

## 2023-10-04 RX ORDER — FUROSEMIDE 80 MG/1
80 TABLET ORAL 2 TIMES DAILY
Qty: 60 TABLET | Refills: 11 | Status: SHIPPED | OUTPATIENT
Start: 2023-10-04 | End: 2023-12-27 | Stop reason: SDUPTHER

## 2023-10-24 ENCOUNTER — OFFICE VISIT (OUTPATIENT)
Dept: CARDIOLOGY | Facility: CLINIC | Age: 53
End: 2023-10-24
Payer: COMMERCIAL

## 2023-10-24 VITALS
HEART RATE: 76 BPM | OXYGEN SATURATION: 94 % | WEIGHT: 254.44 LBS | DIASTOLIC BLOOD PRESSURE: 80 MMHG | SYSTOLIC BLOOD PRESSURE: 140 MMHG | BODY MASS INDEX: 31.8 KG/M2

## 2023-10-24 DIAGNOSIS — G47.33 OSA (OBSTRUCTIVE SLEEP APNEA): ICD-10-CM

## 2023-10-24 DIAGNOSIS — I10 ESSENTIAL HYPERTENSION: ICD-10-CM

## 2023-10-24 DIAGNOSIS — I50.43 ACUTE ON CHRONIC COMBINED SYSTOLIC (CONGESTIVE) AND DIASTOLIC (CONGESTIVE) HEART FAILURE: Primary | ICD-10-CM

## 2023-10-24 PROCEDURE — 1159F MED LIST DOCD IN RCRD: CPT | Mod: CPTII,S$GLB,, | Performed by: PHYSICIAN ASSISTANT

## 2023-10-24 PROCEDURE — 3008F BODY MASS INDEX DOCD: CPT | Mod: CPTII,S$GLB,, | Performed by: PHYSICIAN ASSISTANT

## 2023-10-24 PROCEDURE — 4010F ACE/ARB THERAPY RXD/TAKEN: CPT | Mod: CPTII,S$GLB,, | Performed by: PHYSICIAN ASSISTANT

## 2023-10-24 PROCEDURE — 99999 PR PBB SHADOW E&M-EST. PATIENT-LVL III: ICD-10-PCS | Mod: PBBFAC,,, | Performed by: PHYSICIAN ASSISTANT

## 2023-10-24 PROCEDURE — 4010F PR ACE/ARB THEARPY RXD/TAKEN: ICD-10-PCS | Mod: CPTII,S$GLB,, | Performed by: PHYSICIAN ASSISTANT

## 2023-10-24 PROCEDURE — 3008F PR BODY MASS INDEX (BMI) DOCUMENTED: ICD-10-PCS | Mod: CPTII,S$GLB,, | Performed by: PHYSICIAN ASSISTANT

## 2023-10-24 PROCEDURE — 3077F SYST BP >= 140 MM HG: CPT | Mod: CPTII,S$GLB,, | Performed by: PHYSICIAN ASSISTANT

## 2023-10-24 PROCEDURE — 99214 PR OFFICE/OUTPT VISIT, EST, LEVL IV, 30-39 MIN: ICD-10-PCS | Mod: S$GLB,,, | Performed by: PHYSICIAN ASSISTANT

## 2023-10-24 PROCEDURE — 1160F PR REVIEW ALL MEDS BY PRESCRIBER/CLIN PHARMACIST DOCUMENTED: ICD-10-PCS | Mod: CPTII,S$GLB,, | Performed by: PHYSICIAN ASSISTANT

## 2023-10-24 PROCEDURE — 1160F RVW MEDS BY RX/DR IN RCRD: CPT | Mod: CPTII,S$GLB,, | Performed by: PHYSICIAN ASSISTANT

## 2023-10-24 PROCEDURE — 3077F PR MOST RECENT SYSTOLIC BLOOD PRESSURE >= 140 MM HG: ICD-10-PCS | Mod: CPTII,S$GLB,, | Performed by: PHYSICIAN ASSISTANT

## 2023-10-24 PROCEDURE — 3079F DIAST BP 80-89 MM HG: CPT | Mod: CPTII,S$GLB,, | Performed by: PHYSICIAN ASSISTANT

## 2023-10-24 PROCEDURE — 1159F PR MEDICATION LIST DOCUMENTED IN MEDICAL RECORD: ICD-10-PCS | Mod: CPTII,S$GLB,, | Performed by: PHYSICIAN ASSISTANT

## 2023-10-24 PROCEDURE — 99214 OFFICE O/P EST MOD 30 MIN: CPT | Mod: S$GLB,,, | Performed by: PHYSICIAN ASSISTANT

## 2023-10-24 PROCEDURE — 3079F PR MOST RECENT DIASTOLIC BLOOD PRESSURE 80-89 MM HG: ICD-10-PCS | Mod: CPTII,S$GLB,, | Performed by: PHYSICIAN ASSISTANT

## 2023-10-24 PROCEDURE — 99999 PR PBB SHADOW E&M-EST. PATIENT-LVL III: CPT | Mod: PBBFAC,,, | Performed by: PHYSICIAN ASSISTANT

## 2023-10-24 RX ORDER — METOLAZONE 2.5 MG/1
2.5 TABLET ORAL ONCE
Qty: 10 TABLET | Refills: 0 | Status: SHIPPED | OUTPATIENT
Start: 2023-10-24 | End: 2023-11-03

## 2023-10-24 NOTE — PROGRESS NOTES
[facility] Provider Note (Follow-up) Consult Note      HPI:  Patient can walk at work, full time   Patient sleeps on 2 pillows   Patient wakes up SOB, has to get out of bed, associated cough, sputum none   Palpitations - none   Dizzy, light-headed, pre-syncope or syncope none   Since discharge frequency of performing weights, home weight and weight change increasing    Other information felt pertinent to HPI    Patient is a 52-year-old gentleman with a past medical history significant for congestive heart failure, hypertension, chronic kidney disease stage 3, obstructive sleep apnea, hyperlipidemia, and non-insulin dependent type 2 diabetes mellitus here for urgent visit for SOB, N/V    Feels fluid has returned, last seen one month a go and given metolazone one dose, did well with it. Had good response.    No real SOB, works full time    Some mild PND, weight up     PHYSICAL:   Vitals:    10/24/23 1417   BP: (!) 140/80   Pulse: 76      Wt Readings from Last 3 Encounters:   10/24/23 115.4 kg (254 lb 6.6 oz)   10/02/23 113.4 kg (250 lb)   08/29/23 113.2 kg (249 lb 7.2 oz)       JVD: yes  Heart rhythm: regular  Cardiac murmur: No    S3: no  S4: no  Lungs: clear  Liver span: 18 cm:   Hepatojugular reflux: yes  Edema: no,       ASSESSMENT: acute on chronic systolic HF    PLAN:      Patient Instructions:   Instruct the patient to notify this clinic if HH, a physician or an advanced care provider wants to change medication one of their HF medications   Activity and Diet restrictions:   Recommend 2-3 gram sodium restriction and 1500cc- 2000cc fluid restriction.  Encourage physical activity with graded exercise program.  Requested patient to weigh themselves daily, and to notify us if their weight increases by more than 3 lbs in 1 day or 5 lbs in 3 days.    Assigned dry weight on home scale: 115 kg  Medication changes (include current dose and changed dose)  Re dose with metolazone 2.5 mg tomorrow and one dose Saturday  Continue  lasix 80 mg BID  Call Monday for symptom check, may need monthly metolazone  Continue GDMT  On jardiance MRA  Upcoming labs and date anticipated: 6 weeks with labs

## 2023-11-14 ENCOUNTER — OFFICE VISIT (OUTPATIENT)
Dept: CARDIOLOGY | Facility: CLINIC | Age: 53
End: 2023-11-14
Payer: COMMERCIAL

## 2023-11-14 VITALS
SYSTOLIC BLOOD PRESSURE: 150 MMHG | DIASTOLIC BLOOD PRESSURE: 70 MMHG | BODY MASS INDEX: 31.69 KG/M2 | WEIGHT: 254.88 LBS | HEART RATE: 78 BPM | HEIGHT: 75 IN

## 2023-11-14 DIAGNOSIS — I50.43 ACUTE ON CHRONIC COMBINED SYSTOLIC (CONGESTIVE) AND DIASTOLIC (CONGESTIVE) HEART FAILURE: Primary | ICD-10-CM

## 2023-11-14 DIAGNOSIS — I10 ESSENTIAL HYPERTENSION: ICD-10-CM

## 2023-11-14 PROCEDURE — 99999 PR PBB SHADOW E&M-EST. PATIENT-LVL III: ICD-10-PCS | Mod: PBBFAC,,, | Performed by: PHYSICIAN ASSISTANT

## 2023-11-14 PROCEDURE — 3078F PR MOST RECENT DIASTOLIC BLOOD PRESSURE < 80 MM HG: ICD-10-PCS | Mod: CPTII,S$GLB,, | Performed by: PHYSICIAN ASSISTANT

## 2023-11-14 PROCEDURE — 99214 OFFICE O/P EST MOD 30 MIN: CPT | Mod: S$GLB,,, | Performed by: PHYSICIAN ASSISTANT

## 2023-11-14 PROCEDURE — 1160F PR REVIEW ALL MEDS BY PRESCRIBER/CLIN PHARMACIST DOCUMENTED: ICD-10-PCS | Mod: CPTII,S$GLB,, | Performed by: PHYSICIAN ASSISTANT

## 2023-11-14 PROCEDURE — 99214 PR OFFICE/OUTPT VISIT, EST, LEVL IV, 30-39 MIN: ICD-10-PCS | Mod: S$GLB,,, | Performed by: PHYSICIAN ASSISTANT

## 2023-11-14 PROCEDURE — 1159F PR MEDICATION LIST DOCUMENTED IN MEDICAL RECORD: ICD-10-PCS | Mod: CPTII,S$GLB,, | Performed by: PHYSICIAN ASSISTANT

## 2023-11-14 PROCEDURE — 4010F PR ACE/ARB THEARPY RXD/TAKEN: ICD-10-PCS | Mod: CPTII,S$GLB,, | Performed by: PHYSICIAN ASSISTANT

## 2023-11-14 PROCEDURE — 3008F PR BODY MASS INDEX (BMI) DOCUMENTED: ICD-10-PCS | Mod: CPTII,S$GLB,, | Performed by: PHYSICIAN ASSISTANT

## 2023-11-14 PROCEDURE — 99999 PR PBB SHADOW E&M-EST. PATIENT-LVL III: CPT | Mod: PBBFAC,,, | Performed by: PHYSICIAN ASSISTANT

## 2023-11-14 PROCEDURE — 1160F RVW MEDS BY RX/DR IN RCRD: CPT | Mod: CPTII,S$GLB,, | Performed by: PHYSICIAN ASSISTANT

## 2023-11-14 PROCEDURE — 3008F BODY MASS INDEX DOCD: CPT | Mod: CPTII,S$GLB,, | Performed by: PHYSICIAN ASSISTANT

## 2023-11-14 PROCEDURE — 4010F ACE/ARB THERAPY RXD/TAKEN: CPT | Mod: CPTII,S$GLB,, | Performed by: PHYSICIAN ASSISTANT

## 2023-11-14 PROCEDURE — 3078F DIAST BP <80 MM HG: CPT | Mod: CPTII,S$GLB,, | Performed by: PHYSICIAN ASSISTANT

## 2023-11-14 PROCEDURE — 1159F MED LIST DOCD IN RCRD: CPT | Mod: CPTII,S$GLB,, | Performed by: PHYSICIAN ASSISTANT

## 2023-11-14 PROCEDURE — 3077F PR MOST RECENT SYSTOLIC BLOOD PRESSURE >= 140 MM HG: ICD-10-PCS | Mod: CPTII,S$GLB,, | Performed by: PHYSICIAN ASSISTANT

## 2023-11-14 PROCEDURE — 3077F SYST BP >= 140 MM HG: CPT | Mod: CPTII,S$GLB,, | Performed by: PHYSICIAN ASSISTANT

## 2023-11-14 NOTE — PROGRESS NOTES
[facility] Provider Note (Follow-up) Consult Note      HPI:  Patient can walk without SOB   Patient sleeps on2 pillows   Patient wakes up SOB, has to get out of bed, associated cough, sputum  none   Palpitations - none   Dizzy, light-headed, pre-syncope or syncope none   Since discharge frequency of performing weights, home weight and weight change 115 kg   Other information felt pertinent to HPI    Joanieamy Saldana is a 52 y.o. male patient with a PMHx of CHF, CKD, HTN here for 3 months follow up    Last visit had to dose with metolazone, worked well    Now Less SOB, was having some stress at his job about time off for sick days etc so he has left that role    No PND, no N/V/diarrhea      PHYSICAL:   Vitals:    11/14/23 1025   BP: (!) 150/70   Pulse: 78      Wt Readings from Last 3 Encounters:   11/14/23 115.6 kg (254 lb 13.6 oz)   10/24/23 115.4 kg (254 lb 6.6 oz)   10/02/23 113.4 kg (250 lb)       JVD: no,    Heart rhythm: regular  Cardiac murmur: No    S3: no  S4: no  Lungs: clear  Liver span: 10 cm:   Hepatojugular reflux: no  Edema: no,       ASSESSMENT: chronic systolic HF    PLAN:      Patient Instructions:   Instruct the patient to notify this clinic if HH, a physician or an advanced care provider wants to change medication one of their HF medications   Activity and Diet restrictions:   Recommend 2-3 gram sodium restriction and 1500cc- 2000cc fluid restriction.  Encourage physical activity with graded exercise program.  Requested patient to weigh themselves daily, and to notify us if their weight increases by more than 3 lbs in 1 day or 5 lbs in 3 days.    Assigned dry weight on home scale: 113 kg  Medication changes (include current dose and changed dose)  Euvolemic on exam today  Will continue current GSMT  Has prn metolazone at home  Daily weights  RTC 3 months

## 2023-11-15 ENCOUNTER — PATIENT MESSAGE (OUTPATIENT)
Dept: ADMINISTRATIVE | Facility: HOSPITAL | Age: 53
End: 2023-11-15
Payer: COMMERCIAL

## 2023-12-09 DIAGNOSIS — I10 ESSENTIAL HYPERTENSION: Primary | ICD-10-CM

## 2023-12-11 RX ORDER — HYDRALAZINE HYDROCHLORIDE 50 MG/1
50 TABLET, FILM COATED ORAL EVERY 8 HOURS
Qty: 90 TABLET | Refills: 3 | Status: SHIPPED | OUTPATIENT
Start: 2023-12-11 | End: 2024-12-10

## 2023-12-27 DIAGNOSIS — I50.43 ACUTE ON CHRONIC COMBINED SYSTOLIC (CONGESTIVE) AND DIASTOLIC (CONGESTIVE) HEART FAILURE: ICD-10-CM

## 2023-12-27 RX ORDER — FUROSEMIDE 80 MG/1
80 TABLET ORAL 2 TIMES DAILY
Qty: 60 TABLET | Refills: 11 | Status: SHIPPED | OUTPATIENT
Start: 2023-12-27 | End: 2024-12-26

## 2024-01-12 ENCOUNTER — OFFICE VISIT (OUTPATIENT)
Dept: CARDIOLOGY | Facility: CLINIC | Age: 54
End: 2024-01-12

## 2024-01-12 VITALS
SYSTOLIC BLOOD PRESSURE: 140 MMHG | WEIGHT: 254.88 LBS | BODY MASS INDEX: 31.69 KG/M2 | HEIGHT: 75 IN | DIASTOLIC BLOOD PRESSURE: 94 MMHG

## 2024-01-12 DIAGNOSIS — I50.43 ACUTE ON CHRONIC COMBINED SYSTOLIC (CONGESTIVE) AND DIASTOLIC (CONGESTIVE) HEART FAILURE: Primary | ICD-10-CM

## 2024-01-12 DIAGNOSIS — E66.01 SEVERE OBESITY (BMI 35.0-35.9 WITH COMORBIDITY): ICD-10-CM

## 2024-01-12 PROCEDURE — 99214 OFFICE O/P EST MOD 30 MIN: CPT | Mod: S$PBB,,, | Performed by: PHYSICIAN ASSISTANT

## 2024-01-12 PROCEDURE — 99999 PR PBB SHADOW E&M-EST. PATIENT-LVL III: CPT | Mod: PBBFAC,,, | Performed by: PHYSICIAN ASSISTANT

## 2024-01-12 PROCEDURE — 99213 OFFICE O/P EST LOW 20 MIN: CPT | Mod: PBBFAC | Performed by: PHYSICIAN ASSISTANT

## 2024-01-12 NOTE — PROGRESS NOTES
[facility] Provider Note (Follow-up) Consult Note      HPI:  Patient can walk without any sOB   Patient sleeps on 2 pillows   Patient wakes up SOB, has to get out of bed, associated cough, sputum none   Palpitations - none   Dizzy, light-headed, pre-syncope or syncope none   Since discharge frequency of performing weights, home weight and weight change stable 254 lb   Other information felt pertinent to HPI    Bria Saldana is a 52 y.o. male patient with a PMHx of CHF, CKD, HTN here for 3 months follow up since last visit weight has stayed the same 254 pounds, looks like dry weight is 250    No SOB, forgets to take lasix every now and then but has not needed nay metolazone    No PND  No nausea  Bp improved         PHYSICAL:   Vitals:    01/12/24 0926   BP: (!) 140/94   Pulse: (P) 66      @[id number](3)@    JVD: no,    Heart rhythm: regular  Cardiac murmur: No    S3: no  S4: no  Lungs: clear  Liver span: 10 cm:   Hepatojugular reflux: no  Edema: no,       ASSESSMENT: chronic systolic HF    PLAN:      Patient Instructions:   Instruct the patient to notify this clinic if HH, a physician or an advanced care provider wants to change medication one of their HF medications   Activity and Diet restrictions:   Recommend 2-3 gram sodium restriction and 1500cc- 2000cc fluid restriction.  Encourage physical activity with graded exercise program.  Requested patient to weigh themselves daily, and to notify us if their weight increases by more than 3 lbs in 1 day or 5 lbs in 3 days.    Assigned dry weight on home scale: 250 lb  Medication changes (include current dose and changed dose)  Lasix 80 mg BID  Prn metolazone  CHF education done  Exercise regimen  RTC 4 month

## 2024-01-25 ENCOUNTER — PATIENT MESSAGE (OUTPATIENT)
Dept: ADMINISTRATIVE | Facility: HOSPITAL | Age: 54
End: 2024-01-25

## 2024-02-08 DIAGNOSIS — I50.43 ACUTE ON CHRONIC COMBINED SYSTOLIC (CONGESTIVE) AND DIASTOLIC (CONGESTIVE) HEART FAILURE: Primary | ICD-10-CM

## 2024-02-08 RX ORDER — CARVEDILOL 25 MG/1
25 TABLET ORAL 2 TIMES DAILY
Qty: 60 TABLET | Refills: 11 | Status: SHIPPED | OUTPATIENT
Start: 2024-02-08 | End: 2024-02-21 | Stop reason: SDUPTHER

## 2024-02-08 RX ORDER — SACUBITRIL AND VALSARTAN 24; 26 MG/1; MG/1
1 TABLET, FILM COATED ORAL 2 TIMES DAILY
Qty: 60 TABLET | Refills: 4 | Status: SHIPPED | OUTPATIENT
Start: 2024-02-08 | End: 2024-05-23 | Stop reason: SDUPTHER

## 2024-02-21 DIAGNOSIS — I50.43 ACUTE ON CHRONIC COMBINED SYSTOLIC (CONGESTIVE) AND DIASTOLIC (CONGESTIVE) HEART FAILURE: ICD-10-CM

## 2024-02-21 RX ORDER — CARVEDILOL 25 MG/1
25 TABLET ORAL 2 TIMES DAILY
Qty: 60 TABLET | Refills: 11 | Status: ON HOLD | OUTPATIENT
Start: 2024-02-21 | End: 2024-05-21 | Stop reason: HOSPADM

## 2024-03-26 ENCOUNTER — PATIENT OUTREACH (OUTPATIENT)
Dept: ADMINISTRATIVE | Facility: HOSPITAL | Age: 54
End: 2024-03-26

## 2024-04-19 ENCOUNTER — PATIENT OUTREACH (OUTPATIENT)
Dept: ADMINISTRATIVE | Facility: HOSPITAL | Age: 54
End: 2024-04-19

## 2024-04-19 NOTE — PROGRESS NOTES
VBHM Score: 6     Colon Cancer Screening  Urine Screening  Eye Exam  Hemoglobin A1c  Foot Exam  Uncontrolled BP                       Health Maintenance Topic(s) Outreach Outcomes & Actions Taken:    Lab(s) - Outreach Outcomes & Actions Taken  : No answer, LVM    Colorectal Cancer Screening - Outreach Outcomes & Actions Taken  : No answer, LVM    Blood Pressure - Outreach Outcomes & Actions Taken  : No answer, no updates         Additional Notes:  Unable to reach patient to discuss overdue HM. LVM                Care Management, Digital Medicine, and/or Education Referrals    OPCM Risk Score: 34.7         Next Steps - Referral Actions: No referrals placed        Additional Notes:

## 2024-05-08 DIAGNOSIS — E11.9 TYPE 2 DIABETES MELLITUS WITHOUT COMPLICATION: ICD-10-CM

## 2024-05-13 PROCEDURE — 99291 CRITICAL CARE FIRST HOUR: CPT

## 2024-05-14 ENCOUNTER — HOSPITAL ENCOUNTER (INPATIENT)
Facility: HOSPITAL | Age: 54
LOS: 7 days | Discharge: HOME | DRG: 291 | End: 2024-05-21
Attending: EMERGENCY MEDICINE | Admitting: INTERNAL MEDICINE
Payer: MEDICAID

## 2024-05-14 DIAGNOSIS — I27.20 PULMONARY HTN: ICD-10-CM

## 2024-05-14 DIAGNOSIS — I48.91 ATRIAL FIBRILLATION, UNSPECIFIED TYPE: ICD-10-CM

## 2024-05-14 DIAGNOSIS — I10 HYPERTENSION, UNSPECIFIED TYPE: ICD-10-CM

## 2024-05-14 DIAGNOSIS — I50.43 ACUTE ON CHRONIC COMBINED SYSTOLIC AND DIASTOLIC CONGESTIVE HEART FAILURE: Primary | ICD-10-CM

## 2024-05-14 DIAGNOSIS — J69.0 ASPIRATION PNEUMONIA OF RIGHT UPPER LOBE DUE TO GASTRIC SECRETIONS: ICD-10-CM

## 2024-05-14 DIAGNOSIS — I50.9 CHF (CONGESTIVE HEART FAILURE): ICD-10-CM

## 2024-05-14 DIAGNOSIS — I50.9 HEART FAILURE: ICD-10-CM

## 2024-05-14 DIAGNOSIS — I48.91 ATRIAL FIBRILLATION STATUS POST CARDIOVERSION: ICD-10-CM

## 2024-05-14 DIAGNOSIS — I48.92 ATRIAL FLUTTER: ICD-10-CM

## 2024-05-14 DIAGNOSIS — I48.91 AF (ATRIAL FIBRILLATION): ICD-10-CM

## 2024-05-14 DIAGNOSIS — R07.9 CHEST PAIN: ICD-10-CM

## 2024-05-14 DIAGNOSIS — I50.43 ACUTE ON CHRONIC COMBINED SYSTOLIC (CONGESTIVE) AND DIASTOLIC (CONGESTIVE) HEART FAILURE: ICD-10-CM

## 2024-05-14 DIAGNOSIS — I49.9 ABNORMAL HEART RHYTHM: ICD-10-CM

## 2024-05-14 DIAGNOSIS — I48.92 NEW ONSET ATRIAL FLUTTER: ICD-10-CM

## 2024-05-14 DIAGNOSIS — I48.92 ATRIAL FLUTTER, UNSPECIFIED TYPE: ICD-10-CM

## 2024-05-14 LAB
ALBUMIN SERPL BCP-MCNC: 3.4 G/DL (ref 3.5–5.2)
ALP SERPL-CCNC: 157 U/L (ref 55–135)
ALT SERPL W/O P-5'-P-CCNC: 26 U/L (ref 10–44)
ANION GAP SERPL CALC-SCNC: 15 MMOL/L (ref 8–16)
ASCENDING AORTA: 3.14 CM
AST SERPL-CCNC: 32 U/L (ref 10–40)
AV INDEX (PROSTH): 0.56
AV MEAN GRADIENT: 4 MMHG
AV PEAK GRADIENT: 7 MMHG
AV VALVE AREA BY VELOCITY RATIO: 1.95 CM²
AV VALVE AREA: 1.65 CM²
AV VELOCITY RATIO: 0.66
BASOPHILS # BLD AUTO: 0.06 K/UL (ref 0–0.2)
BASOPHILS NFR BLD: 1.1 % (ref 0–1.9)
BILIRUB SERPL-MCNC: 6.5 MG/DL (ref 0.1–1)
BNP SERPL-MCNC: 1817 PG/ML (ref 0–99)
BSA FOR ECHO PROCEDURE: 2.48 M2
BUN SERPL-MCNC: 27 MG/DL (ref 6–20)
CALCIUM SERPL-MCNC: 9.8 MG/DL (ref 8.7–10.5)
CHLORIDE SERPL-SCNC: 107 MMOL/L (ref 95–110)
CO2 SERPL-SCNC: 19 MMOL/L (ref 23–29)
CREAT SERPL-MCNC: 1.9 MG/DL (ref 0.5–1.4)
CV ECHO LV RWT: 0.59 CM
DIFFERENTIAL METHOD BLD: ABNORMAL
DOP CALC AO PEAK VEL: 1.33 M/S
DOP CALC AO VTI: 20.6 CM
DOP CALC LVOT AREA: 3 CM2
DOP CALC LVOT DIAMETER: 1.94 CM
DOP CALC LVOT PEAK VEL: 0.88 M/S
DOP CALC LVOT STROKE VOLUME: 33.98 CM3
DOP CALC RVOT PEAK VEL: 0.54 M/S
DOP CALC RVOT VTI: 7.4 CM
DOP CALCLVOT PEAK VEL VTI: 11.5 CM
ECHO LV POSTERIOR WALL: 1.7 CM (ref 0.6–1.1)
EJECTION FRACTION: 18 %
EOSINOPHIL # BLD AUTO: 0.2 K/UL (ref 0–0.5)
EOSINOPHIL NFR BLD: 4.3 % (ref 0–8)
ERYTHROCYTE [DISTWIDTH] IN BLOOD BY AUTOMATED COUNT: 17.9 % (ref 11.5–14.5)
EST. GFR  (NO RACE VARIABLE): 42 ML/MIN/1.73 M^2
ESTIMATED AVG GLUCOSE: 103 MG/DL (ref 68–131)
FRACTIONAL SHORTENING: 7 % (ref 28–44)
GLUCOSE SERPL-MCNC: 73 MG/DL (ref 70–110)
HBA1C MFR BLD: 5.2 % (ref 4–5.6)
HCT VFR BLD AUTO: 51 % (ref 40–54)
HCV AB SERPL QL IA: NEGATIVE
HEP C VIRUS HOLD SPECIMEN: NORMAL
HGB BLD-MCNC: 15.6 G/DL (ref 14–18)
HIV 1+2 AB+HIV1 P24 AG SERPL QL IA: NEGATIVE
IMM GRANULOCYTES # BLD AUTO: 0.02 K/UL (ref 0–0.04)
IMM GRANULOCYTES NFR BLD AUTO: 0.4 % (ref 0–0.5)
INTERVENTRICULAR SEPTUM: 1.62 CM (ref 0.6–1.1)
IVC DIAMETER: 2.09 CM
LA MAJOR: 7.97 CM
LA MINOR: 7.37 CM
LA WIDTH: 4.6 CM
LEFT ATRIUM SIZE: 5.75 CM
LEFT ATRIUM VOLUME INDEX: 70.6 ML/M2
LEFT ATRIUM VOLUME: 172.18 CM3
LEFT INTERNAL DIMENSION IN SYSTOLE: 5.36 CM (ref 2.1–4)
LEFT VENTRICLE DIASTOLIC VOLUME INDEX: 67.94 ML/M2
LEFT VENTRICLE DIASTOLIC VOLUME: 165.77 ML
LEFT VENTRICLE MASS INDEX: 192 G/M2
LEFT VENTRICLE SYSTOLIC VOLUME INDEX: 57 ML/M2
LEFT VENTRICLE SYSTOLIC VOLUME: 139 ML
LEFT VENTRICULAR INTERNAL DIMENSION IN DIASTOLE: 5.79 CM (ref 3.5–6)
LEFT VENTRICULAR MASS: 468.15 G
LVOT MG: 1.8 MMHG
LVOT MV: 0.63 CM/S
LYMPHOCYTES # BLD AUTO: 1.8 K/UL (ref 1–4.8)
LYMPHOCYTES NFR BLD: 34.2 % (ref 18–48)
MAGNESIUM SERPL-MCNC: 1.8 MG/DL (ref 1.6–2.6)
MCH RBC QN AUTO: 25.8 PG (ref 27–31)
MCHC RBC AUTO-ENTMCNC: 30.6 G/DL (ref 32–36)
MCV RBC AUTO: 84 FL (ref 82–98)
MONOCYTES # BLD AUTO: 0.5 K/UL (ref 0.3–1)
MONOCYTES NFR BLD: 9.3 % (ref 4–15)
NEUTROPHILS # BLD AUTO: 2.7 K/UL (ref 1.8–7.7)
NEUTROPHILS NFR BLD: 50.7 % (ref 38–73)
NRBC BLD-RTO: 0 /100 WBC
PISA MRMAX VEL: 5.54 M/S
PISA TR MAX VEL: 2.9 M/S
PLATELET # BLD AUTO: 217 K/UL (ref 150–450)
PMV BLD AUTO: 10.2 FL (ref 9.2–12.9)
POTASSIUM SERPL-SCNC: 3.9 MMOL/L (ref 3.5–5.1)
PROT SERPL-MCNC: 7 G/DL (ref 6–8.4)
PV MEAN GRADIENT: 1 MMHG
PV PEAK GRADIENT: 1
RA MAJOR: 7.25 CM
RA PRESSURE ESTIMATED: 8 MMHG
RA WIDTH: 6.2 CM
RBC # BLD AUTO: 6.05 M/UL (ref 4.6–6.2)
RV MID DIAMA: 4.64 CM
RV TB RVSP: 11 MMHG
SODIUM SERPL-SCNC: 141 MMOL/L (ref 136–145)
STJ: 3.3 CM
TDI LATERAL: 0.12 M/S
TDI SEPTAL: 0.05 M/S
TDI: 0.09 M/S
TR MAX PG: 34 MMHG
TRICUSPID ANNULAR PLANE SYSTOLIC EXCURSION: 1.4 CM
TROPONIN I SERPL DL<=0.01 NG/ML-MCNC: 0.08 NG/ML (ref 0–0.03)
TV REST PULMONARY ARTERY PRESSURE: 42 MMHG
WBC # BLD AUTO: 5.35 K/UL (ref 3.9–12.7)
Z-SCORE OF LEFT VENTRICULAR DIMENSION IN END DIASTOLE: -7.28
Z-SCORE OF LEFT VENTRICULAR DIMENSION IN END SYSTOLE: -2.28

## 2024-05-14 PROCEDURE — 83735 ASSAY OF MAGNESIUM: CPT | Performed by: INTERNAL MEDICINE

## 2024-05-14 PROCEDURE — 87389 HIV-1 AG W/HIV-1&-2 AB AG IA: CPT | Performed by: EMERGENCY MEDICINE

## 2024-05-14 PROCEDURE — 99900035 HC TECH TIME PER 15 MIN (STAT)

## 2024-05-14 PROCEDURE — 25000003 PHARM REV CODE 250: Performed by: INTERNAL MEDICINE

## 2024-05-14 PROCEDURE — 84484 ASSAY OF TROPONIN QUANT: CPT | Mod: 91 | Performed by: EMERGENCY MEDICINE

## 2024-05-14 PROCEDURE — 84484 ASSAY OF TROPONIN QUANT: CPT | Performed by: NURSE PRACTITIONER

## 2024-05-14 PROCEDURE — 83036 HEMOGLOBIN GLYCOSYLATED A1C: CPT | Performed by: INTERNAL MEDICINE

## 2024-05-14 PROCEDURE — 21400001 HC TELEMETRY ROOM

## 2024-05-14 PROCEDURE — 63600175 PHARM REV CODE 636 W HCPCS: Performed by: INTERNAL MEDICINE

## 2024-05-14 PROCEDURE — 86803 HEPATITIS C AB TEST: CPT | Performed by: EMERGENCY MEDICINE

## 2024-05-14 PROCEDURE — 96374 THER/PROPH/DIAG INJ IV PUSH: CPT

## 2024-05-14 PROCEDURE — 83880 ASSAY OF NATRIURETIC PEPTIDE: CPT | Performed by: NURSE PRACTITIONER

## 2024-05-14 PROCEDURE — 85025 COMPLETE CBC W/AUTO DIFF WBC: CPT | Performed by: NURSE PRACTITIONER

## 2024-05-14 PROCEDURE — 63600175 PHARM REV CODE 636 W HCPCS: Performed by: EMERGENCY MEDICINE

## 2024-05-14 PROCEDURE — 93005 ELECTROCARDIOGRAM TRACING: CPT

## 2024-05-14 PROCEDURE — 80053 COMPREHEN METABOLIC PANEL: CPT | Performed by: NURSE PRACTITIONER

## 2024-05-14 PROCEDURE — 93010 ELECTROCARDIOGRAM REPORT: CPT | Mod: ,,, | Performed by: INTERNAL MEDICINE

## 2024-05-14 PROCEDURE — 84484 ASSAY OF TROPONIN QUANT: CPT | Mod: 91 | Performed by: NURSE PRACTITIONER

## 2024-05-14 RX ORDER — ENOXAPARIN SODIUM 100 MG/ML
40 INJECTION SUBCUTANEOUS EVERY 24 HOURS
Status: DISCONTINUED | OUTPATIENT
Start: 2024-05-14 | End: 2024-05-15

## 2024-05-14 RX ORDER — METOLAZONE 5 MG/1
5 TABLET ORAL ONCE
Status: COMPLETED | OUTPATIENT
Start: 2024-05-14 | End: 2024-05-14

## 2024-05-14 RX ORDER — ALLOPURINOL 100 MG/1
100 TABLET ORAL DAILY
Status: DISCONTINUED | OUTPATIENT
Start: 2024-05-14 | End: 2024-05-17

## 2024-05-14 RX ORDER — CARVEDILOL 12.5 MG/1
25 TABLET ORAL 2 TIMES DAILY
Status: DISCONTINUED | OUTPATIENT
Start: 2024-05-14 | End: 2024-05-17

## 2024-05-14 RX ORDER — ISOSORBIDE DINITRATE 20 MG/1
20 TABLET ORAL 3 TIMES DAILY
Status: DISCONTINUED | OUTPATIENT
Start: 2024-05-14 | End: 2024-05-17

## 2024-05-14 RX ORDER — SODIUM CHLORIDE 0.9 % (FLUSH) 0.9 %
10 SYRINGE (ML) INJECTION
Status: DISCONTINUED | OUTPATIENT
Start: 2024-05-14 | End: 2024-05-21 | Stop reason: HOSPADM

## 2024-05-14 RX ORDER — ENOXAPARIN SODIUM 100 MG/ML
30 INJECTION SUBCUTANEOUS EVERY 24 HOURS
Status: DISCONTINUED | OUTPATIENT
Start: 2024-05-14 | End: 2024-05-14 | Stop reason: DRUGHIGH

## 2024-05-14 RX ORDER — FUROSEMIDE 10 MG/ML
100 INJECTION INTRAMUSCULAR; INTRAVENOUS 2 TIMES DAILY
Status: DISCONTINUED | OUTPATIENT
Start: 2024-05-14 | End: 2024-05-15

## 2024-05-14 RX ORDER — ATORVASTATIN CALCIUM 40 MG/1
40 TABLET, FILM COATED ORAL NIGHTLY
Status: DISCONTINUED | OUTPATIENT
Start: 2024-05-14 | End: 2024-05-17

## 2024-05-14 RX ORDER — FUROSEMIDE 10 MG/ML
80 INJECTION INTRAMUSCULAR; INTRAVENOUS
Status: COMPLETED | OUTPATIENT
Start: 2024-05-14 | End: 2024-05-14

## 2024-05-14 RX ORDER — HYDRALAZINE HYDROCHLORIDE 50 MG/1
50 TABLET, FILM COATED ORAL EVERY 8 HOURS
Status: DISCONTINUED | OUTPATIENT
Start: 2024-05-14 | End: 2024-05-17

## 2024-05-14 RX ADMIN — ISOSORBIDE DINITRATE 20 MG: 20 TABLET ORAL at 08:05

## 2024-05-14 RX ADMIN — ENOXAPARIN SODIUM 40 MG: 40 INJECTION SUBCUTANEOUS at 06:05

## 2024-05-14 RX ADMIN — CARVEDILOL 25 MG: 12.5 TABLET, FILM COATED ORAL at 08:05

## 2024-05-14 RX ADMIN — ISOSORBIDE DINITRATE 20 MG: 20 TABLET ORAL at 09:05

## 2024-05-14 RX ADMIN — HYDRALAZINE HYDROCHLORIDE 50 MG: 50 TABLET ORAL at 01:05

## 2024-05-14 RX ADMIN — CARVEDILOL 25 MG: 12.5 TABLET, FILM COATED ORAL at 09:05

## 2024-05-14 RX ADMIN — FUROSEMIDE 80 MG: 10 INJECTION, SOLUTION INTRAMUSCULAR; INTRAVENOUS at 06:05

## 2024-05-14 RX ADMIN — HYDRALAZINE HYDROCHLORIDE 50 MG: 50 TABLET ORAL at 09:05

## 2024-05-14 RX ADMIN — METOLAZONE 5 MG: 5 TABLET ORAL at 08:05

## 2024-05-14 RX ADMIN — SACUBITRIL AND VALSARTAN 1 TABLET: 24; 26 TABLET, FILM COATED ORAL at 09:05

## 2024-05-14 RX ADMIN — ALLOPURINOL 100 MG: 100 TABLET ORAL at 08:05

## 2024-05-14 RX ADMIN — ATORVASTATIN CALCIUM 40 MG: 40 TABLET, FILM COATED ORAL at 09:05

## 2024-05-14 RX ADMIN — ISOSORBIDE DINITRATE 20 MG: 20 TABLET ORAL at 03:05

## 2024-05-14 RX ADMIN — FUROSEMIDE 100 MG: 10 INJECTION, SOLUTION INTRAMUSCULAR; INTRAVENOUS at 06:05
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
## 2024-05-14 NOTE — PROGRESS NOTES
Pharmacist Renal Dose Adjustment Note    Bria Saldana is a 53 y.o. male being treated with the medication lovenox     Patient Data:    Vital Signs (Most Recent):  Temp: 97.5 °F (36.4 °C) (05/13/24 2244)  Pulse: 108 (05/14/24 0714)  Resp: 20 (05/14/24 0714)  BP: (!) 176/124 (05/14/24 0714)  SpO2: 98 % (05/14/24 0714) Vital Signs (72h Range):  Temp:  [97.5 °F (36.4 °C)]   Pulse:  [108-112]   Resp:  [18-22]   BP: (133-176)/()   SpO2:  [97 %-99 %]      Recent Labs   Lab 05/14/24  0556   CREATININE 1.9*     Serum creatinine: 1.9 mg/dL (H) 05/14/24 0556  Estimated creatinine clearance: 62.7 mL/min (A)    Medication:lovenox 30mg daily will be changed to lovenox 40mg daily for crcl > 30 ml/min    Pharmacist's Name: Lauren Prado  Pharmacist's Extension: [phone]
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
## 2024-05-14 NOTE — PLAN OF CARE
Discussed poc with pt, pt verbalized understanding    Purposeful rounding every 2hours    VS wnl  Cardiac monitoring in use, pt is NSR, tele monitor #[id number]  Fall precautions in place, remains injury free  Communication board updated    IVFs  Bed locked at lowest position  Call light within reach    Chart check complete  Will cont with POC
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
## 2024-05-14 NOTE — CONSULTS
Food & Nutrition Education     Diet Education: Fluid and salt restriction     Learners: Pt      Nutrition Education provided with handouts:   Low-Sodium Nutrition Therapy and Fluid-Restricted Nutrition Therapy  ([email or web address])     Attached to patients discharge papers:   -Heart Healthy Diet      Comments:   PMH: HTN, NATALYA, CKD 3  Hx: CHF, T2DM, HLD, Severe obesity     53 y.o. Male admitted for Acute on chronic combined systolic (congestive) and diastolic (congestive) heart failure. Pt currently on Low sodium 2 gm, 1500 mL fluid restriction diet. Visited pt at bedside, pt was using the restroom. Informed pt that nutrition education handouts were provided place on bedside table, along with a menu to help encourage pt preferred food choices.     RD provided patient nutrition education handout on low sodium diet and fluid restriction related to hospital diagnosis. Information includes the importance of limiting sodium to 2,000 mg per day and reading food labels to avoid further complications of CHF, using salt free seasonings and other herbs and spices in meals to enhance flavor without additional sodium, 1500 ml fluid restriction per MD and dietary sources of fluid. Recommends using a cup with measurements for fluids and to try to consume small sips spread throughout the day rather than a lot at one time.     RD informed pt the nutrition education with will discussed and any questions/concerns he may have will be answered at the follow up. Please encourage the pt to use the RD contact information on the handouts if he would like to discuss prior to the RD follow up.      Nutrition Related Social Determinants of Health: SDOH: Unable to assess at this time.         Provided handout with dietitian's contact information.      *Please re-consult as needed.      Thank you,     Tierra Campos, Registration Eligible, Provisional LDN

## 2024-05-14 NOTE — ASSESSMENT & PLAN NOTE
Patient is identified as having Combined Systolic and Diastolic heart failure that is Acute on chronic. CHF is currently uncontrolled due to Continued edema of extremities. Latest ECHO performed and demonstrates- Results for orders placed during the hospital encounter of 06/12/23    Echo    Interpretation Summary  · The left ventricle is mildly enlarged with moderate concentric hypertrophy and mildly decreased systolic function.  · Severe left atrial enlargement.  · The estimated ejection fraction is 45%.  · Grade II left ventricular diastolic dysfunction.  · There is left ventricular global hypokinesis.  · Normal right ventricular size with normal right ventricular systolic function.  · There is mild aortic valve stenosis.  · Aortic valve area is 1.78 cm2; peak velocity is 2.11 m/s; mean gradient is 13 mmHg.  · Mild-to-moderate mitral regurgitation.  · Mild to moderate tricuspid regurgitation.  · There is mild pulmonary hypertension.  · Small posterior pericardial effusion.  . Continue Beta Blocker, Furosemide, Nitrate/Vasodilator, and ARNI and monitor clinical status closely. Monitor on telemetry. Patient is on CHF pathway.  Monitor strict Is&Os and daily weights.  Place on fluid restriction of 1.5 L. Cardiology has not been consulted. Continue to stress to patient importance of self efficacy and  on diet for CHF. Last BNP reviewed- and noted below   Recent Labs   Lab 05/14/24  0556   BNP 1,817*     -IV lasix 100mg bid  -dose of metolazone  -repeat echo

## 2024-05-14 NOTE — HPI
The patient is a 54 yo male with past medical history of CHF, hypertension, CKD and NATALYA who presented to the ED with weakness, fluid build-up and elevated blood pressure. He reports the fluid built up and has his blood pressure running high. He is taking his medication as prescribed but his systolic blood pressure has been in the 180s. He has swelling in his legs and abdomen. He has had decreased appetite. He does not weigh himself. He reports the fluid is coming off with the IV lasix. He is hungry. Workup in the ED consistent with volume overload from heart failure. Hospital medicine consulted. Patient placed in observation.

## 2024-05-14 NOTE — PLAN OF CARE
[facility]  Initial Discharge Assessment       Primary Care Provider: Brenna Maravilla MD    Admission Diagnosis: Acute on chronic combined systolic and diastolic congestive heart failure [I50.43]  Heart failure [I50.9]  Chest pain [R07.9]  Hypertension, unspecified type [I10]    Admission Date: 5/14/2024  Expected Discharge Date: Per Attending     Transition of Care Barriers: Unable to afford medication, Unisured, Underinsured    Payor: MEDICAID / Plan: PENDING MEDICAID / Product Type: Government /     Extended Emergency Contact Information  Primary Emergency Contact: Vielka Saldana  Address: [id number] [place], [place] [address] [address] [facility]  Home Phone: [phone]  Mobile Phone: [phone]  Relation: Spouse    Discharge Plan A: Home with family         [facility]  [place] - [address] [facility]  [address] [facility] [address]  Phone: [phone] Fax: [phone]      Initial Assessment (most recent)       Adult Discharge Assessment - 05/14/24 1522          Discharge Assessment    Assessment Type Discharge Planning Assessment     Confirmed/corrected address, phone number and insurance Yes     Confirmed Demographics Correct on Facesheet     Source of Information patient     Communicated CARMELO with patient/caregiver Date not available/Unable to determine     Reason For Admission acute on chronic combined systolic and diastolic heart failure     People in Home sibling(s)     Do you expect to return to your current living situation? Yes     Do you have help at home or someone to help you manage your care at home? Yes     Who are your caregiver(s) and their phone number(s)? family     Prior to hospitilization cognitive status: Alert/Oriented     Current cognitive status: Alert/Oriented     Walking or Climbing Stairs Difficulty no     Dressing/Bathing Difficulty no     Home Layout Able to live on 1st floor     Equipment Currently Used at Home none      Readmission within 30 days? No     Patient currently being followed by outpatient case management? No     Do you currently have service(s) that help you manage your care at home? No     Do you take prescription medications? Yes     Do you have prescription coverage? No     Do you have any problems affording any of your prescribed medications? Yes     If yes, what medications? Pt is currently Medicaid pending     Who is going to help you get home at discharge? family     How do you get to doctors appointments? car, drives self     Are you on dialysis? No     Do you take coumadin? No     Discharge Plan A Home with family     DME Needed Upon Discharge  none     Discharge Plan discussed with: Patient     Transition of Care Barriers Unable to afford medication;Unisured;Underinsured                   Sw met with patient to complete assessment and to discuss d/c planning needs. Patient has no d/c needs at this time. Sw to follow up, as needed, for d/c planning purposes.     Plan for pt to d/c to his home that he owns with his sister at the following address: [address] [facility], [place] [address]

## 2024-05-14 NOTE — ASSESSMENT & PLAN NOTE
Chronic, . Latest blood pressure and vitals reviewed-     Temp:  [97.5 °F (36.4 °C)]   Pulse:  [108-112]   Resp:  [18-22]   BP: (133-176)/()   SpO2:  [97 %-99 %] .   Home meds for hypertension were reviewed and noted below.   Hypertension Medications               carvediloL (COREG) 25 MG tablet Take 1 tablet (25 mg total) by mouth 2 (two) times daily.    furosemide (LASIX) 80 MG tablet Take 1 tablet (80 mg total) by mouth 2 (two) times daily.    hydrALAZINE (APRESOLINE) 50 MG tablet Take 1 tablet (50 mg total) by mouth every 8 (eight) hours.    isosorbide dinitrate (ISORDIL) 20 MG tablet TAKE 1 TABLET BY MOUTH THREE TIMES DAILY    metOLazone (ZAROXOLYN) 2.5 MG tablet Take 1 tablet (2.5 mg total) by mouth once for 1 dose, then repeat if directed by doctor.    sacubitriL-valsartan (ENTRESTO) 24-26 mg per tablet Take 1 tablet by mouth 2 (two) times daily.    spironolactone (ALDACTONE) 25 MG tablet Take 1 tablet (25 mg total) by mouth once daily.            While in the hospital, will manage blood pressure as follows; Continue home antihypertensive regimen

## 2024-05-14 NOTE — PHARMACY MED REC
"Admission Medication History     The home medication history was taken by Fidencio Liu.    You may go to "Admission" then "Reconcile Home Medications" tabs to review and/or act upon these items.     The home medication list has been updated by the Pharmacy department.   Please read ALL comments highlighted in yellow.   Please address this information as you see fit.    Feel free to contact us if you have any questions or require assistance.      Medications listed below were obtained from: Patient/family and Analytic software- [employer]  (Not in a hospital admission)        Fidencio Liu  [id number]    Current Outpatient Medications on File Prior to Encounter   Medication Sig Dispense Refill Last Dose    carvediloL (COREG) 25 MG tablet Take 1 tablet (25 mg total) by mouth 2 (two) times daily. 60 tablet 11 5/13/2024    furosemide (LASIX) 80 MG tablet Take 1 tablet (80 mg total) by mouth 2 (two) times daily. 60 tablet 11 Past Week    hydrALAZINE (APRESOLINE) 50 MG tablet Take 1 tablet (50 mg total) by mouth every 8 (eight) hours. 90 tablet 3 5/13/2024    isosorbide dinitrate (ISORDIL) 20 MG tablet TAKE 1 TABLET BY MOUTH THREE TIMES DAILY 270 tablet 1 5/13/2024    allopurinoL (ZYLOPRIM) 100 MG tablet Take 1 tablet (100 mg total) by mouth once daily. (Patient not taking: Reported on 5/14/2024) 30 tablet 4 Not Taking    atorvastatin (LIPITOR) 40 MG tablet Take 1 tablet (40 mg total) by mouth every evening. (Patient not taking: Reported on 5/14/2024) 90 tablet 3 Not Taking    colchicine (MITIGARE) 0.6 mg Cap Take 0.6 mg by mouth 2 (two) times a day.       empagliflozin (JARDIANCE) 10 mg tablet Take 1 tablet (10 mg total) by mouth once daily. (Patient not taking: Reported on 5/14/2024) 30 tablet 3 Not Taking    metOLazone (ZAROXOLYN) 2.5 MG tablet Take 1 tablet (2.5 mg total) by mouth once for 1 dose, then repeat if directed by doctor. 10 tablet 0     sacubitriL-valsartan (ENTRESTO) 24-26 mg per tablet Take 1 " tablet by mouth 2 (two) times daily. 60 tablet 4     spironolactone (ALDACTONE) 25 MG tablet Take 1 tablet (25 mg total) by mouth once daily. 30 tablet 3                          .

## 2024-05-14 NOTE — FIRST PROVIDER EVALUATION
Medical screening examination initiated.  I have conducted a focused provider triage encounter, findings are as follows:    Brief history of present illness:    53-year-old male history of CHF presents ER complaining of bilateral lower extremity swelling, shortness of breath, nausea, vomiting, and diarrhea.  Denies chest pain.  Reports symptoms have been ongoing for 1 month but   Reports systolic hypertension of 180s today.    Vitals:    05/13/24 2242 05/13/24 2244   BP: (!) 133/96    Pulse: (!) 112    Resp: 18    Temp:  97.5 °F (36.4 °C)   TempSrc:  Oral   SpO2: 97%    Weight: 116.8 kg (257 lb 8 oz)        Pertinent physical exam:    Plus three pitting edema bilateral lower extremities    Brief workup plan:   cardiac workup    Preliminary workup initiated; this workup will be continued and followed by the physician or advanced practice provider that is assigned to the patient when roomed.

## 2024-05-14 NOTE — ASSESSMENT & PLAN NOTE
Creatine stable for now. BMP reviewed- noted Estimated Creatinine Clearance: 62.7 mL/min (A) (based on SCr of 1.9 mg/dL (H)). according to latest data. Based on current GFR, CKD stage is stage 3 - GFR 30-59.  Monitor UOP and serial BMP and adjust therapy as needed. Renally dose meds. Avoid nephrotoxic medications and procedures.

## 2024-05-14 NOTE — PROGRESS NOTES
RT spoke with patient regarding the CPAP QHS. Pt states that he does not use a CPAP at home. RT instructed pt to call if he would like to be placed on a CPAP unit for sleep. Pt verbalized understanding.

## 2024-05-15 PROBLEM — I48.92 NEW ONSET ATRIAL FLUTTER: Status: ACTIVE | Noted: 2024-05-15

## 2024-05-15 LAB
ANION GAP SERPL CALC-SCNC: 11 MMOL/L (ref 8–16)
APTT PPP: 28.1 SEC (ref 21–32)
APTT PPP: 49.9 SEC (ref 21–32)
APTT PPP: 49.9 SEC (ref 21–32)
BASOPHILS # BLD AUTO: 0.05 K/UL (ref 0–0.2)
BASOPHILS NFR BLD: 1.2 % (ref 0–1.9)
BUN SERPL-MCNC: 36 MG/DL (ref 6–20)
CALCIUM SERPL-MCNC: 9.2 MG/DL (ref 8.7–10.5)
CHLORIDE SERPL-SCNC: 99 MMOL/L (ref 95–110)
CO2 SERPL-SCNC: 31 MMOL/L (ref 23–29)
CREAT SERPL-MCNC: 2.5 MG/DL (ref 0.5–1.4)
DIFFERENTIAL METHOD BLD: ABNORMAL
EOSINOPHIL # BLD AUTO: 0.3 K/UL (ref 0–0.5)
EOSINOPHIL NFR BLD: 6.5 % (ref 0–8)
ERYTHROCYTE [DISTWIDTH] IN BLOOD BY AUTOMATED COUNT: 18 % (ref 11.5–14.5)
EST. GFR  (NO RACE VARIABLE): 30 ML/MIN/1.73 M^2
GLUCOSE SERPL-MCNC: 99 MG/DL (ref 70–110)
HCT VFR BLD AUTO: 49 % (ref 40–54)
HGB BLD-MCNC: 15.3 G/DL (ref 14–18)
IMM GRANULOCYTES # BLD AUTO: 0.01 K/UL (ref 0–0.04)
IMM GRANULOCYTES NFR BLD AUTO: 0.2 % (ref 0–0.5)
INR PPP: 1.2 (ref 0.8–1.2)
LACTATE SERPL-SCNC: 2.1 MMOL/L (ref 0.5–2.2)
LYMPHOCYTES # BLD AUTO: 1.1 K/UL (ref 1–4.8)
LYMPHOCYTES NFR BLD: 27.4 % (ref 18–48)
MCH RBC QN AUTO: 25.8 PG (ref 27–31)
MCHC RBC AUTO-ENTMCNC: 31.2 G/DL (ref 32–36)
MCV RBC AUTO: 83 FL (ref 82–98)
MONOCYTES # BLD AUTO: 0.4 K/UL (ref 0.3–1)
MONOCYTES NFR BLD: 9.9 % (ref 4–15)
NEUTROPHILS # BLD AUTO: 2.3 K/UL (ref 1.8–7.7)
NEUTROPHILS NFR BLD: 54.8 % (ref 38–73)
NRBC BLD-RTO: 0 /100 WBC
OHS QRS DURATION: 102 MS
OHS QTC CALCULATION: 538 MS
PLATELET # BLD AUTO: 205 K/UL (ref 150–450)
PMV BLD AUTO: 10.1 FL (ref 9.2–12.9)
POTASSIUM SERPL-SCNC: 3.3 MMOL/L (ref 3.5–5.1)
PROTHROMBIN TIME: 13.7 SEC (ref 9–12.5)
RBC # BLD AUTO: 5.94 M/UL (ref 4.6–6.2)
SODIUM SERPL-SCNC: 141 MMOL/L (ref 136–145)
WBC # BLD AUTO: 4.16 K/UL (ref 3.9–12.7)

## 2024-05-15 PROCEDURE — 83605 ASSAY OF LACTIC ACID: CPT | Performed by: PHYSICIAN ASSISTANT

## 2024-05-15 PROCEDURE — 36415 COLL VENOUS BLD VENIPUNCTURE: CPT | Performed by: INTERNAL MEDICINE

## 2024-05-15 PROCEDURE — 85730 THROMBOPLASTIN TIME PARTIAL: CPT | Performed by: PHYSICIAN ASSISTANT

## 2024-05-15 PROCEDURE — 99900035 HC TECH TIME PER 15 MIN (STAT)

## 2024-05-15 PROCEDURE — 63600175 PHARM REV CODE 636 W HCPCS: Performed by: PHYSICIAN ASSISTANT

## 2024-05-15 PROCEDURE — 63600175 PHARM REV CODE 636 W HCPCS: Performed by: INTERNAL MEDICINE

## 2024-05-15 PROCEDURE — 93005 ELECTROCARDIOGRAM TRACING: CPT

## 2024-05-15 PROCEDURE — 85025 COMPLETE CBC W/AUTO DIFF WBC: CPT | Performed by: PHYSICIAN ASSISTANT

## 2024-05-15 PROCEDURE — 80048 BASIC METABOLIC PNL TOTAL CA: CPT | Performed by: INTERNAL MEDICINE

## 2024-05-15 PROCEDURE — 25000003 PHARM REV CODE 250: Performed by: INTERNAL MEDICINE

## 2024-05-15 PROCEDURE — 85730 THROMBOPLASTIN TIME PARTIAL: CPT | Mod: 91 | Performed by: INTERNAL MEDICINE

## 2024-05-15 PROCEDURE — 85610 PROTHROMBIN TIME: CPT | Performed by: PHYSICIAN ASSISTANT

## 2024-05-15 PROCEDURE — 21400001 HC TELEMETRY ROOM

## 2024-05-15 PROCEDURE — 93010 ELECTROCARDIOGRAM REPORT: CPT | Mod: ,,, | Performed by: INTERNAL MEDICINE

## 2024-05-15 PROCEDURE — 25000242 PHARM REV CODE 250 ALT 637 W/ HCPCS: Performed by: INTERNAL MEDICINE

## 2024-05-15 PROCEDURE — 99223 1ST HOSP IP/OBS HIGH 75: CPT | Mod: ,,, | Performed by: INTERNAL MEDICINE

## 2024-05-15 PROCEDURE — 36415 COLL VENOUS BLD VENIPUNCTURE: CPT | Performed by: PHYSICIAN ASSISTANT

## 2024-05-15 RX ORDER — ACETAMINOPHEN 325 MG/1
650 TABLET ORAL EVERY 6 HOURS PRN
Status: DISCONTINUED | OUTPATIENT
Start: 2024-05-15 | End: 2024-05-17

## 2024-05-15 RX ORDER — NITROGLYCERIN 0.4 MG/1
0.4 TABLET SUBLINGUAL EVERY 5 MIN PRN
Status: DISCONTINUED | OUTPATIENT
Start: 2024-05-15 | End: 2024-05-21 | Stop reason: HOSPADM

## 2024-05-15 RX ORDER — HEPARIN SODIUM,PORCINE/D5W 25000/250
0-40 INTRAVENOUS SOLUTION INTRAVENOUS CONTINUOUS
Status: DISCONTINUED | OUTPATIENT
Start: 2024-05-15 | End: 2024-05-20

## 2024-05-15 RX ORDER — MORPHINE SULFATE 2 MG/ML
2 INJECTION, SOLUTION INTRAMUSCULAR; INTRAVENOUS ONCE
Status: DISCONTINUED | OUTPATIENT
Start: 2024-05-15 | End: 2024-05-17 | Stop reason: HOSPADM

## 2024-05-15 RX ADMIN — ALLOPURINOL 100 MG: 100 TABLET ORAL at 09:05

## 2024-05-15 RX ADMIN — HEPARIN SODIUM 12 UNITS/KG/HR: 10000 INJECTION, SOLUTION INTRAVENOUS at 03:05

## 2024-05-15 RX ADMIN — CARVEDILOL 25 MG: 12.5 TABLET, FILM COATED ORAL at 08:05

## 2024-05-15 RX ADMIN — CARVEDILOL 25 MG: 12.5 TABLET, FILM COATED ORAL at 09:05

## 2024-05-15 RX ADMIN — ATORVASTATIN CALCIUM 40 MG: 40 TABLET, FILM COATED ORAL at 08:05

## 2024-05-15 RX ADMIN — HYDRALAZINE HYDROCHLORIDE 50 MG: 50 TABLET ORAL at 05:05

## 2024-05-15 RX ADMIN — ISOSORBIDE DINITRATE 20 MG: 20 TABLET ORAL at 09:05

## 2024-05-15 RX ADMIN — NITROGLYCERIN 0.4 MG: 0.4 TABLET SUBLINGUAL at 01:05

## 2024-05-15 RX ADMIN — FUROSEMIDE 100 MG: 10 INJECTION, SOLUTION INTRAMUSCULAR; INTRAVENOUS at 09:05

## 2024-05-15 RX ADMIN — HYDRALAZINE HYDROCHLORIDE 50 MG: 50 TABLET ORAL at 03:05

## 2024-05-15 RX ADMIN — ISOSORBIDE DINITRATE 20 MG: 20 TABLET ORAL at 03:05

## 2024-05-15 NOTE — ASSESSMENT & PLAN NOTE
-Chronically elevated, flat, 0.082>0.081>0.076  -Continue OMT  -TTE with drop in EF likely related to new onset atrial flutter  -Prior MPI stress test in 2019 negative, can consider repeat ischemic workup IP vs OP once compensated/rhythm stable
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
## 2024-05-15 NOTE — SUBJECTIVE & OBJECTIVE
**Labs and chart reviewed, contrast ordered by Physician.   Interval History: f/u CHF,  repeat echo with significant reduction in EF, consulted cardiology. Patient with sharp chest pain, noted to be atrial flutter, plan MEGAN/DCCV in am if volume status improve    Review of Systems  Objective:     Vital Signs (Most Recent):  Temp: 97.5 °F (36.4 °C) (05/15/24 1144)  Pulse: (!) 114 (05/15/24 1319)  Resp: 18 (05/15/24 1144)  BP: 113/68 (05/15/24 1319)  SpO2: 97 % (05/15/24 1319) Vital Signs (24h Range):  Temp:  [97.5 °F (36.4 °C)-98.2 °F (36.8 °C)] 97.5 °F (36.4 °C)  Pulse:  [106-114] 114  Resp:  [18-20] 18  SpO2:  [92 %-97 %] 97 %  BP: ()/(55-98) 113/68     Weight: 116.6 kg (257 lb)  Body mass index is 32.12 kg/m².    Intake/Output Summary (Last 24 hours) at 5/15/2024 1546  Last data filed at 5/15/2024 1536  Gross per 24 hour   Intake --   Output 8125 ml   Net -8125 ml         Physical Exam  HENT:      Head: Normocephalic and atraumatic.   Cardiovascular:      Rate and Rhythm: Regular rhythm. Tachycardia present.      Heart sounds: No murmur heard.  Pulmonary:      Effort: Pulmonary effort is normal. No respiratory distress.      Breath sounds: Normal breath sounds. No wheezing.   Abdominal:      General: Bowel sounds are normal. There is no distension.      Palpations: Abdomen is soft.      Tenderness: There is no abdominal tenderness.   Musculoskeletal:         General: Swelling present.      Right lower leg: Edema present.      Left lower leg: Edema present.   Skin:     General: Skin is warm and dry.   Neurological:      Mental Status: He is alert and oriented to person, place, and time. Mental status is at baseline.             Significant Labs: All pertinent labs within the past 24 hours have been reviewed.  Recent Lab Results         05/15/24  1428   05/15/24  0922   05/15/24  0452        Anion Gap     11       PTT 28.1  Comment: Refer to local heparin nomogram for intensity/dose specific   therapeutic   range.             Baso # 0.05           Basophil % 1.2            BUN     36       Calcium     9.2       Chloride     99       CO2     31       Creatinine     2.5       Differential Method Automated           eGFR     30       Eos # 0.3           Eos % 6.5           Glucose     99       Gran # (ANC) 2.3           Gran % 54.8           Hematocrit 49.0           Hemoglobin 15.3           Immature Grans (Abs) 0.01  Comment: Mild elevation in immature granulocytes is non specific and   can be seen in a variety of conditions including stress response,   acute inflammation, trauma and pregnancy. Correlation with other   laboratory and clinical findings is essential.             Immature Granulocytes 0.2           INR 1.2  Comment: Coumadin Therapy:  2.0 - 3.0 for INR for all indicators except mechanical heart valves  and antiphospholipid syndromes which should use 2.5 - 3.5.             Lactic Acid Level   2.1  Comment: Falsely low lactic acid results can be found in samples   containing >=13.0 mg/dL total bilirubin and/or >=3.5 mg/dL   direct bilirubin.           Lymph # 1.1           Lymph % 27.4           MCH 25.8           MCHC 31.2           MCV 83           Mono # 0.4           Mono % 9.9           MPV 10.1           nRBC 0           Platelet Count 205           Potassium     3.3       PT 13.7           RBC 5.94           RDW 18.0           Sodium     141       WBC 4.16                   Significant Imaging: I have reviewed all pertinent imaging results/findings within the past 24 hours.

## 2024-05-15 NOTE — ASSESSMENT & PLAN NOTE
Patient is identified as having Combined Systolic and Diastolic heart failure that is Acute on chronic. CHF is currently uncontrolled due to Continued edema of extremities. Latest ECHO performed and demonstrates- Results for orders placed during the hospital encounter of 05/14/24    Echo    Interpretation Summary    Left Ventricle: The left ventricle is mildly dilated. Severely increased ventricular mass. Severely increased wall thickness. There is concentric hypertrophy. Severe global hypokinesis present. There is severely reduced systolic function with a visually estimated ejection fraction of 15 - 20%. Ejection fraction by visual approximation is 18%. Grade II diastolic dysfunction.    Right Ventricle: Moderate right ventricular enlargement. Wall thickness is normal. Systolic function is moderately reduced.    Left Atrium: Left atrium is severely dilated.    Right Atrium: Right atrium is dilated.    Aortic Valve: The aortic valve is a trileaflet valve. There is mild aortic valve sclerosis. There is mild stenosis. Aortic valve area by VTI is 1.65 cm². Aortic valve peak velocity is 1.33 m/s. Mean gradient is 4 mmHg. The dimensionless index is 0.56.    Mitral Valve: There is mild to moderate regurgitation.    Tricuspid Valve: There is moderate regurgitation.    Pulmonary Artery: There is mild pulmonary hypertension. The estimated pulmonary artery systolic pressure is 42 mmHg.    IVC/SVC: Intermediate venous pressure at 8 mmHg.    Pericardium: There is a moderate circumferential effusion. Pericardial effusion is echolucent. No indication of cardiac tamponade.  . Continue Beta Blocker, Furosemide, and Nitrate/Vasodilator and monitor clinical status closely. Monitor on telemetry. Patient is on CHF pathway.  Monitor strict Is&Os and daily weights.  Place on fluid restriction of 1.5 L. Cardiology has been consulted. Continue to stress to patient importance of self efficacy and  on diet for CHF. Last BNP reviewed-  and noted below   Recent Labs   Lab 05/14/24  0556   BNP 1,817*     -significant change in EF, consulted cardiology

## 2024-05-15 NOTE — ASSESSMENT & PLAN NOTE
-Presents with decompensated combined CHF, likely due to new onset atrial flutter  -IV diuretics held given bumped creatinine  -Continue Coreg, Isordil as BP permits  -Entresto held due to renal function  -Will evaluate in AM for possible MEGAN/DCCV

## 2024-05-15 NOTE — ASSESSMENT & PLAN NOTE
-Presents with new onset atrial flutter  -HR in mid to low 100's  -Continue Coreg  -Heparin gtt initiated for AC  -Will reassess in AM for MEGAN/DCCV  -OP EP referral

## 2024-05-15 NOTE — ASSESSMENT & PLAN NOTE
Creatine stable for now. BMP reviewed- noted Estimated Creatinine Clearance: 47.6 mL/min (A) (based on SCr of 2.5 mg/dL (H)). according to latest data. Based on current GFR, CKD stage is stage 3 - GFR 30-59.  Monitor UOP and serial BMP and adjust therapy as needed. Renally dose meds. Avoid nephrotoxic medications and procedures.
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
## 2024-05-15 NOTE — ASSESSMENT & PLAN NOTE
Chronic, . Latest blood pressure and vitals reviewed-     Temp:  [97.5 °F (36.4 °C)-98.2 °F (36.8 °C)]   Pulse:  [106-114]   Resp:  [18-20]   BP: ()/(55-98)   SpO2:  [92 %-97 %] .   Home meds for hypertension were reviewed and noted below.   Hypertension Medications               carvediloL (COREG) 25 MG tablet Take 1 tablet (25 mg total) by mouth 2 (two) times daily.    furosemide (LASIX) 80 MG tablet Take 1 tablet (80 mg total) by mouth 2 (two) times daily.    hydrALAZINE (APRESOLINE) 50 MG tablet Take 1 tablet (50 mg total) by mouth every 8 (eight) hours.    isosorbide dinitrate (ISORDIL) 20 MG tablet TAKE 1 TABLET BY MOUTH THREE TIMES DAILY    metOLazone (ZAROXOLYN) 2.5 MG tablet Take 1 tablet (2.5 mg total) by mouth once for 1 dose, then repeat if directed by doctor.    sacubitriL-valsartan (ENTRESTO) 24-26 mg per tablet Take 1 tablet by mouth 2 (two) times daily.    spironolactone (ALDACTONE) 25 MG tablet Take 1 tablet (25 mg total) by mouth once daily.            While in the hospital, will manage blood pressure as follows; Continue home antihypertensive regimen

## 2024-05-15 NOTE — PROGRESS NOTES
[facility] Medicine  Progress Note    Patient Name: Bria Saldana  MRN: [id number]  Patient Class: IP- Inpatient   Admission Date: 5/14/2024  Length of Stay: 1 days  Attending Physician: Veto Cutler MD  Primary Care Provider: Brenna Maravilla MD        Subjective:     Principal Problem:Acute on chronic combined systolic (congestive) and diastolic (congestive) heart failure        HPI:  The patient is a 52 yo male with past medical history of CHF, hypertension, CKD and NATALYA who presented to the ED with weakness, fluid build-up and elevated blood pressure. He reports the fluid built up and has his blood pressure running high. He is taking his medication as prescribed but his systolic blood pressure has been in the 180s. He has swelling in his legs and abdomen. He has had decreased appetite. He does not weigh himself. He reports the fluid is coming off with the IV lasix. He is hungry. Workup in the ED consistent with volume overload from heart failure. Hospital medicine consulted. Patient placed in observation.    Overview/Hospital Course:  No notes on file    Interval History: f/u CHF,  repeat echo with significant reduction in EF, consulted cardiology. Patient with sharp chest pain, noted to be atrial flutter, plan MEGAN/DCCV in am if volume status improve    Review of Systems  Objective:     Vital Signs (Most Recent):  Temp: 97.5 °F (36.4 °C) (05/15/24 1144)  Pulse: (!) 114 (05/15/24 1319)  Resp: 18 (05/15/24 1144)  BP: 113/68 (05/15/24 1319)  SpO2: 97 % (05/15/24 1319) Vital Signs (24h Range):  Temp:  [97.5 °F (36.4 °C)-98.2 °F (36.8 °C)] 97.5 °F (36.4 °C)  Pulse:  [106-114] 114  Resp:  [18-20] 18  SpO2:  [92 %-97 %] 97 %  BP: ()/(55-98) 113/68     Weight: 116.6 kg (257 lb)  Body mass index is 32.12 kg/m².    Intake/Output Summary (Last 24 hours) at 5/15/2024 1546  Last data filed at 5/15/2024 1536  Gross per 24 hour   Intake --   Output 8125 ml   Net -8125 ml          Physical Exam  HENT:      Head: Normocephalic and atraumatic.   Cardiovascular:      Rate and Rhythm: Regular rhythm. Tachycardia present.      Heart sounds: No murmur heard.  Pulmonary:      Effort: Pulmonary effort is normal. No respiratory distress.      Breath sounds: Normal breath sounds. No wheezing.   Abdominal:      General: Bowel sounds are normal. There is no distension.      Palpations: Abdomen is soft.      Tenderness: There is no abdominal tenderness.   Musculoskeletal:         General: Swelling present.      Right lower leg: Edema present.      Left lower leg: Edema present.   Skin:     General: Skin is warm and dry.   Neurological:      Mental Status: He is alert and oriented to person, place, and time. Mental status is at baseline.             Significant Labs: All pertinent labs within the past 24 hours have been reviewed.  Recent Lab Results         05/15/24  1428   05/15/24  0922   05/15/24  0452        Anion Gap     11       PTT 28.1  Comment: Refer to local heparin nomogram for intensity/dose specific   therapeutic   range.             Baso # 0.05           Basophil % 1.2           BUN     36       Calcium     9.2       Chloride     99       CO2     31       Creatinine     2.5       Differential Method Automated           eGFR     30       Eos # 0.3           Eos % 6.5           Glucose     99       Gran # (ANC) 2.3           Gran % 54.8           Hematocrit 49.0           Hemoglobin 15.3           Immature Grans (Abs) 0.01  Comment: Mild elevation in immature granulocytes is non specific and   can be seen in a variety of conditions including stress response,   acute inflammation, trauma and pregnancy. Correlation with other   laboratory and clinical findings is essential.             Immature Granulocytes 0.2           INR 1.2  Comment: Coumadin Therapy:  2.0 - 3.0 for INR for all indicators except mechanical heart valves  and antiphospholipid syndromes which should use 2.5 - 3.5.              Lactic Acid Level   2.1  Comment: Falsely low lactic acid results can be found in samples   containing >=13.0 mg/dL total bilirubin and/or >=3.5 mg/dL   direct bilirubin.           Lymph # 1.1           Lymph % 27.4           MCH 25.8           MCHC 31.2           MCV 83           Mono # 0.4           Mono % 9.9           MPV 10.1           nRBC 0           Platelet Count 205           Potassium     3.3       PT 13.7           RBC 5.94           RDW 18.0           Sodium     141       WBC 4.16                   Significant Imaging: I have reviewed all pertinent imaging results/findings within the past 24 hours.    Assessment/Plan:      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 (congestive) and diastolic (congestive) heart failure  Patient is identified as having Combined Systolic and Diastolic heart failure that is Acute on chronic. CHF is currently uncontrolled due to Continued edema of extremities. Latest ECHO performed and demonstrates- Results for orders placed during the hospital encounter of 05/14/24    Echo    Interpretation Summary    Left Ventricle: The left ventricle is mildly dilated. Severely increased ventricular mass. Severely increased wall thickness. There is concentric hypertrophy. Severe global hypokinesis present. There is severely reduced systolic function with a visually estimated ejection fraction of 15 - 20%. Ejection fraction by visual approximation is 18%. Grade II diastolic dysfunction.    Right Ventricle: Moderate right ventricular enlargement. Wall thickness is normal. Systolic function is moderately reduced.    Left Atrium: Left atrium is severely dilated.    Right Atrium: Right atrium is dilated.    Aortic Valve: The aortic valve is a trileaflet valve. There is mild aortic valve sclerosis. There is mild stenosis. Aortic valve area by VTI is 1.65 cm². Aortic valve peak velocity is 1.33 m/s. Mean gradient is 4 mmHg. The dimensionless index is 0.56.    Mitral Valve: There is mild to moderate  regurgitation.    Tricuspid Valve: There is moderate regurgitation.    Pulmonary Artery: There is mild pulmonary hypertension. The estimated pulmonary artery systolic pressure is 42 mmHg.    IVC/SVC: Intermediate venous pressure at 8 mmHg.    Pericardium: There is a moderate circumferential effusion. Pericardial effusion is echolucent. No indication of cardiac tamponade.  . Continue Beta Blocker, Furosemide, and Nitrate/Vasodilator and monitor clinical status closely. Monitor on telemetry. Patient is on CHF pathway.  Monitor strict Is&Os and daily weights.  Place on fluid restriction of 1.5 L. Cardiology has been consulted. Continue to stress to patient importance of self efficacy and  on diet for CHF. Last BNP reviewed- and noted below   Recent Labs   Lab 05/14/24  0556   BNP 1,817*     -significant change in EF, consulted cardiology      New onset atrial flutter  -new onset  -heparin gtt  -possible MEGAN/DCCV  -cardiology following      CKD (chronic kidney disease) stage 3, GFR 30-59 ml/min  Creatine stable for now. BMP reviewed- noted Estimated Creatinine Clearance: 47.6 mL/min (A) (based on SCr of 2.5 mg/dL (H)). according to latest data. Based on current GFR, CKD stage is stage 3 - GFR 30-59.  Monitor UOP and serial BMP and adjust therapy as needed. Renally dose meds. Avoid nephrotoxic medications and procedures.    Elevated troponin  -flat      NATALYA (obstructive sleep apnea)  -nightly CPAP      Essential hypertension  Chronic, . Latest blood pressure and vitals reviewed-     Temp:  [97.5 °F (36.4 °C)-98.2 °F (36.8 °C)]   Pulse:  [106-114]   Resp:  [18-20]   BP: ()/(55-98)   SpO2:  [92 %-97 %] .   Home meds for hypertension were reviewed and noted below.   Hypertension Medications               carvediloL (COREG) 25 MG tablet Take 1 tablet (25 mg total) by mouth 2 (two) times daily.    furosemide (LASIX) 80 MG tablet Take 1 tablet (80 mg total) by mouth 2 (two) times daily.    hydrALAZINE (APRESOLINE)  50 MG tablet Take 1 tablet (50 mg total) by mouth every 8 (eight) hours.    isosorbide dinitrate (ISORDIL) 20 MG tablet TAKE 1 TABLET BY MOUTH THREE TIMES DAILY    metOLazone (ZAROXOLYN) 2.5 MG tablet Take 1 tablet (2.5 mg total) by mouth once for 1 dose, then repeat if directed by doctor.    sacubitriL-valsartan (ENTRESTO) 24-26 mg per tablet Take 1 tablet by mouth 2 (two) times daily.    spironolactone (ALDACTONE) 25 MG tablet Take 1 tablet (25 mg total) by mouth once daily.            While in the hospital, will manage blood pressure as follows; Continue home antihypertensive regimen          VTE Risk Mitigation (From admission, onward)           Ordered     heparin 25,000 units in dextrose 5% (100 units/ml) IV bolus from bag LOW INTENSITY nomogram - [facility]  As needed (PRN)        Question:  Heparin Infusion Adjustment (DO NOT MODIFY ANSWER)  Answer:  \\[email or web address]\epic\Images\Pharmacy\HeparinInfusions\heparin LOW INTENSITY nomogram for [facility] [id number].pdf    05/15/24 1405     heparin 25,000 units in dextrose 5% (100 units/ml) IV bolus from bag LOW INTENSITY nomogram - [facility]  As needed (PRN)        Question:  Heparin Infusion Adjustment (DO NOT MODIFY ANSWER)  Answer:  \\[email or web address]\epic\Images\Pharmacy\HeparinInfusions\heparin LOW INTENSITY nomogram for [facility] [id number].pdf    05/15/24 1405     heparin 25,000 units in dextrose 5% 250 mL (100 units/mL) infusion LOW INTENSITY nomogram - [facility]  Continuous        Question:  Begin at (units/kg/hr)  Answer:  12    05/15/24 1405     IP VTE HIGH RISK PATIENT  Once         05/14/24 0757     Place sequential compression device  Until discontinued         05/14/24 0757                    Discharge Planning   CARMELO:      Code Status: Full Code   Is the patient medically ready for discharge?:     Reason for patient still in hospital (select all that apply): Patient trending condition, Treatment, and Consult recommendations  Discharge Plan A: Home with family                  Veto Cutler  MD  Department of Hospital Medicine   '[facility]

## 2024-05-15 NOTE — HPI
Mr. Saldana is a 53 year old male patient whose current medical conditions include CKD, NATALYA, HTN, and combined CHF who presented to [facility] ED yesterday due to fluid overload. Patient complained of increased SOB associated with leg swelling, abdominal bloating, decreased appetite, and orthopnea. He denied any associated fever, chills, nausea, vomiting, diaphoresis, chest pain, palpitations, near syncope, or syncope. Initial workup in ED revealed creatinine of 1.9, BNP of 1817, and troponin of 0.082. Patient was subsequently admitted for further evaluation and treatment. Cardiology consulted to assist with management. Patient seen and examined today, resting in bed. Feels ok. SOB and LE edema improving. Diuresed over 8 L since admission. Remains CP free. He reports compliance with his medications. Followed in CHF clinic. EKGs reviewed, consistent with aflutter with underlying LVH. Troponin elevated but flat, 0.082>0.081>0.076. Prior MPI stress test in 2019 negative.

## 2024-05-16 ENCOUNTER — ANESTHESIA EVENT (OUTPATIENT)
Dept: CARDIOLOGY | Facility: HOSPITAL | Age: 54
DRG: 291 | End: 2024-05-16
Payer: MEDICAID

## 2024-05-16 ENCOUNTER — ANESTHESIA (OUTPATIENT)
Dept: CARDIOLOGY | Facility: HOSPITAL | Age: 54
DRG: 291 | End: 2024-05-16
Payer: MEDICAID

## 2024-05-16 PROBLEM — J69.0 ASPIRATION PNEUMONIA OF RIGHT UPPER LOBE DUE TO GASTRIC SECRETIONS: Status: ACTIVE | Noted: 2024-05-16

## 2024-05-16 LAB
ALLENS TEST: ABNORMAL
ANION GAP SERPL CALC-SCNC: 14 MMOL/L (ref 8–16)
ANION GAP SERPL CALC-SCNC: 14 MMOL/L (ref 8–16)
APTT PPP: 41.3 SEC (ref 21–32)
APTT PPP: 53.3 SEC (ref 21–32)
ASCENDING AORTA: 3.3 CM
BACTERIA #/AREA URNS HPF: ABNORMAL /HPF
BASOPHILS # BLD AUTO: 0.05 K/UL (ref 0–0.2)
BASOPHILS NFR BLD: 1 % (ref 0–1.9)
BILIRUB UR QL STRIP: ABNORMAL
BSA FOR ECHO PROCEDURE: 2.48 M2
BUN SERPL-MCNC: 40 MG/DL (ref 6–20)
BUN SERPL-MCNC: 40 MG/DL (ref 6–20)
CALCIUM SERPL-MCNC: 8.9 MG/DL (ref 8.7–10.5)
CALCIUM SERPL-MCNC: 9.2 MG/DL (ref 8.7–10.5)
CHLORIDE SERPL-SCNC: 95 MMOL/L (ref 95–110)
CHLORIDE SERPL-SCNC: 99 MMOL/L (ref 95–110)
CLARITY UR: ABNORMAL
CO2 SERPL-SCNC: 24 MMOL/L (ref 23–29)
CO2 SERPL-SCNC: 30 MMOL/L (ref 23–29)
COLOR UR: YELLOW
CREAT SERPL-MCNC: 2.2 MG/DL (ref 0.5–1.4)
CREAT SERPL-MCNC: 2.2 MG/DL (ref 0.5–1.4)
DELSYS: ABNORMAL
DIFFERENTIAL METHOD BLD: ABNORMAL
EOSINOPHIL # BLD AUTO: 0.3 K/UL (ref 0–0.5)
EOSINOPHIL NFR BLD: 6.2 % (ref 0–8)
ERYTHROCYTE [DISTWIDTH] IN BLOOD BY AUTOMATED COUNT: 17.3 % (ref 11.5–14.5)
ERYTHROCYTE [DISTWIDTH] IN BLOOD BY AUTOMATED COUNT: 17.8 % (ref 11.5–14.5)
EST. GFR  (NO RACE VARIABLE): 35 ML/MIN/1.73 M^2
EST. GFR  (NO RACE VARIABLE): 35 ML/MIN/1.73 M^2
FIO2: 100
FLOW: 15
GLUCOSE SERPL-MCNC: 236 MG/DL (ref 70–110)
GLUCOSE SERPL-MCNC: 77 MG/DL (ref 70–110)
GLUCOSE UR QL STRIP: ABNORMAL
HCO3 UR-SCNC: 21 MMOL/L (ref 24–28)
HCT VFR BLD AUTO: 48.8 % (ref 40–54)
HCT VFR BLD AUTO: 49.4 % (ref 40–54)
HGB BLD-MCNC: 15.5 G/DL (ref 14–18)
HGB BLD-MCNC: 15.5 G/DL (ref 14–18)
HGB UR QL STRIP: ABNORMAL
HYALINE CASTS #/AREA URNS LPF: 4 /LPF
IMM GRANULOCYTES # BLD AUTO: 0.01 K/UL (ref 0–0.04)
IMM GRANULOCYTES NFR BLD AUTO: 0.2 % (ref 0–0.5)
KETONES UR QL STRIP: NEGATIVE
LEUKOCYTE ESTERASE UR QL STRIP: NEGATIVE
LYMPHOCYTES # BLD AUTO: 1.7 K/UL (ref 1–4.8)
LYMPHOCYTES NFR BLD: 32.7 % (ref 18–48)
MAGNESIUM SERPL-MCNC: 1.5 MG/DL (ref 1.6–2.6)
MCH RBC QN AUTO: 25.7 PG (ref 27–31)
MCH RBC QN AUTO: 25.8 PG (ref 27–31)
MCHC RBC AUTO-ENTMCNC: 31.4 G/DL (ref 32–36)
MCHC RBC AUTO-ENTMCNC: 31.8 G/DL (ref 32–36)
MCV RBC AUTO: 81 FL (ref 82–98)
MCV RBC AUTO: 82 FL (ref 82–98)
MICROSCOPIC COMMENT: ABNORMAL
MODE: ABNORMAL
MONOCYTES # BLD AUTO: 0.7 K/UL (ref 0.3–1)
MONOCYTES NFR BLD: 13.1 % (ref 4–15)
NEUTROPHILS # BLD AUTO: 2.4 K/UL (ref 1.8–7.7)
NEUTROPHILS NFR BLD: 46.8 % (ref 38–73)
NITRITE UR QL STRIP: NEGATIVE
NRBC BLD-RTO: 0 /100 WBC
PCO2 BLDA: 45.5 MMHG (ref 35–45)
PH SMN: 7.27 [PH] (ref 7.35–7.45)
PH UR STRIP: 8 [PH] (ref 5–8)
PLATELET # BLD AUTO: 206 K/UL (ref 150–450)
PLATELET # BLD AUTO: 221 K/UL (ref 150–450)
PMV BLD AUTO: 10.2 FL (ref 9.2–12.9)
PMV BLD AUTO: 10.8 FL (ref 9.2–12.9)
PO2 BLDA: 329 MMHG (ref 80–100)
POC BE: -6 MMOL/L
POC SATURATED O2: 100 % (ref 95–100)
POCT GLUCOSE: 128 MG/DL (ref 70–110)
POTASSIUM SERPL-SCNC: 3.3 MMOL/L (ref 3.5–5.1)
POTASSIUM SERPL-SCNC: 3.5 MMOL/L (ref 3.5–5.1)
PROT UR QL STRIP: ABNORMAL
RA PRESSURE ESTIMATED: 3 MMHG
RBC # BLD AUTO: 6.01 M/UL (ref 4.6–6.2)
RBC # BLD AUTO: 6.02 M/UL (ref 4.6–6.2)
RBC #/AREA URNS HPF: >100 /HPF (ref 0–4)
SAMPLE: ABNORMAL
SINUS: 3 CM
SITE: ABNORMAL
SODIUM SERPL-SCNC: 137 MMOL/L (ref 136–145)
SODIUM SERPL-SCNC: 139 MMOL/L (ref 136–145)
SP GR UR STRIP: 1.01 (ref 1–1.03)
STJ: 2.4 CM
URN SPEC COLLECT METH UR: ABNORMAL
UROBILINOGEN UR STRIP-ACNC: ABNORMAL EU/DL
WBC # BLD AUTO: 5.13 K/UL (ref 3.9–12.7)
WBC # BLD AUTO: 5.52 K/UL (ref 3.9–12.7)
WBC #/AREA URNS HPF: 9 /HPF (ref 0–5)

## 2024-05-16 PROCEDURE — 36415 COLL VENOUS BLD VENIPUNCTURE: CPT | Performed by: INTERNAL MEDICINE

## 2024-05-16 PROCEDURE — 25000003 PHARM REV CODE 250: Performed by: NURSE PRACTITIONER

## 2024-05-16 PROCEDURE — 63600175 PHARM REV CODE 636 W HCPCS: Performed by: NURSE PRACTITIONER

## 2024-05-16 PROCEDURE — 51702 INSERT TEMP BLADDER CATH: CPT

## 2024-05-16 PROCEDURE — 93010 ELECTROCARDIOGRAM REPORT: CPT | Mod: ,,, | Performed by: INTERNAL MEDICINE

## 2024-05-16 PROCEDURE — 99900026 HC AIRWAY MAINTENANCE (STAT)

## 2024-05-16 PROCEDURE — 83735 ASSAY OF MAGNESIUM: CPT | Performed by: INTERNAL MEDICINE

## 2024-05-16 PROCEDURE — 93005 ELECTROCARDIOGRAM TRACING: CPT

## 2024-05-16 PROCEDURE — 85025 COMPLETE CBC W/AUTO DIFF WBC: CPT | Performed by: PHYSICIAN ASSISTANT

## 2024-05-16 PROCEDURE — 25000003 PHARM REV CODE 250: Performed by: INTERNAL MEDICINE

## 2024-05-16 PROCEDURE — 25000003 PHARM REV CODE 250

## 2024-05-16 PROCEDURE — 63600175 PHARM REV CODE 636 W HCPCS: Mod: JZ,JG | Performed by: NURSE ANESTHETIST, CERTIFIED REGISTERED

## 2024-05-16 PROCEDURE — 94761 N-INVAS EAR/PLS OXIMETRY MLT: CPT | Mod: XB

## 2024-05-16 PROCEDURE — 85027 COMPLETE CBC AUTOMATED: CPT | Performed by: INTERNAL MEDICINE

## 2024-05-16 PROCEDURE — 81000 URINALYSIS NONAUTO W/SCOPE: CPT | Performed by: SPECIALIST

## 2024-05-16 PROCEDURE — 5A1945Z RESPIRATORY VENTILATION, 24-96 CONSECUTIVE HOURS: ICD-10-PCS | Performed by: INTERNAL MEDICINE

## 2024-05-16 PROCEDURE — 63600175 PHARM REV CODE 636 W HCPCS: Performed by: PHYSICIAN ASSISTANT

## 2024-05-16 PROCEDURE — 37799 UNLISTED PX VASCULAR SURGERY: CPT

## 2024-05-16 PROCEDURE — 85730 THROMBOPLASTIN TIME PARTIAL: CPT | Mod: 91 | Performed by: SPECIALIST

## 2024-05-16 PROCEDURE — 27100171 HC OXYGEN HIGH FLOW UP TO 24 HOURS

## 2024-05-16 PROCEDURE — 99900035 HC TECH TIME PER 15 MIN (STAT)

## 2024-05-16 PROCEDURE — 25000003 PHARM REV CODE 250: Performed by: NURSE ANESTHETIST, CERTIFIED REGISTERED

## 2024-05-16 PROCEDURE — 80048 BASIC METABOLIC PNL TOTAL CA: CPT | Mod: 91 | Performed by: INTERNAL MEDICINE

## 2024-05-16 PROCEDURE — 85730 THROMBOPLASTIN TIME PARTIAL: CPT | Performed by: INTERNAL MEDICINE

## 2024-05-16 PROCEDURE — 37000008 HC ANESTHESIA 1ST 15 MINUTES: Performed by: INTERNAL MEDICINE

## 2024-05-16 PROCEDURE — 31500 INSERT EMERGENCY AIRWAY: CPT

## 2024-05-16 PROCEDURE — 25000003 PHARM REV CODE 250: Performed by: PHYSICIAN ASSISTANT

## 2024-05-16 PROCEDURE — 80048 BASIC METABOLIC PNL TOTAL CA: CPT | Performed by: INTERNAL MEDICINE

## 2024-05-16 PROCEDURE — 82803 BLOOD GASES ANY COMBINATION: CPT

## 2024-05-16 PROCEDURE — 20000000 HC ICU ROOM

## 2024-05-16 PROCEDURE — 37000009 HC ANESTHESIA EA ADD 15 MINS: Performed by: INTERNAL MEDICINE

## 2024-05-16 PROCEDURE — 63600175 PHARM REV CODE 636 W HCPCS

## 2024-05-16 PROCEDURE — 0BH17EZ INSERTION OF ENDOTRACHEAL AIRWAY INTO TRACHEA, VIA NATURAL OR ARTIFICIAL OPENING: ICD-10-PCS | Performed by: INTERNAL MEDICINE

## 2024-05-16 PROCEDURE — 94003 VENT MGMT INPAT SUBQ DAY: CPT

## 2024-05-16 RX ORDER — SODIUM BICARBONATE 42 MG/ML
INJECTION, SOLUTION INTRAVENOUS
Status: DISCONTINUED | OUTPATIENT
Start: 2024-05-16 | End: 2024-05-16

## 2024-05-16 RX ORDER — MUPIROCIN 20 MG/G
OINTMENT TOPICAL 2 TIMES DAILY
Status: DISPENSED | OUTPATIENT
Start: 2024-05-16 | End: 2024-05-21

## 2024-05-16 RX ORDER — NOREPINEPHRINE BITARTRATE/D5W 4MG/250ML
0-3 PLASTIC BAG, INJECTION (ML) INTRAVENOUS CONTINUOUS
Status: DISCONTINUED | OUTPATIENT
Start: 2024-05-16 | End: 2024-05-18

## 2024-05-16 RX ORDER — SODIUM CHLORIDE 9 MG/ML
INJECTION, SOLUTION INTRAVENOUS ONCE
Status: DISCONTINUED | OUTPATIENT
Start: 2024-05-16 | End: 2024-05-16 | Stop reason: HOSPADM

## 2024-05-16 RX ORDER — POTASSIUM CHLORIDE 750 MG/1
30 TABLET, EXTENDED RELEASE ORAL ONCE
Status: COMPLETED | OUTPATIENT
Start: 2024-05-16 | End: 2024-05-16

## 2024-05-16 RX ORDER — VASOPRESSIN 20 [USP'U]/ML
INJECTION, SOLUTION INTRAMUSCULAR; SUBCUTANEOUS
Status: DISCONTINUED | OUTPATIENT
Start: 2024-05-16 | End: 2024-05-16

## 2024-05-16 RX ORDER — CHLORHEXIDINE GLUCONATE ORAL RINSE 1.2 MG/ML
15 SOLUTION DENTAL 2 TIMES DAILY
Status: DISCONTINUED | OUTPATIENT
Start: 2024-05-16 | End: 2024-05-16 | Stop reason: SDUPTHER

## 2024-05-16 RX ORDER — PROPOFOL 10 MG/ML
0-50 INJECTION, EMULSION INTRAVENOUS CONTINUOUS
Status: DISCONTINUED | OUTPATIENT
Start: 2024-05-16 | End: 2024-05-18

## 2024-05-16 RX ORDER — MIDAZOLAM HYDROCHLORIDE 1 MG/ML
INJECTION INTRAMUSCULAR; INTRAVENOUS
Status: DISCONTINUED | OUTPATIENT
Start: 2024-05-16 | End: 2024-05-16

## 2024-05-16 RX ORDER — SODIUM CHLORIDE 0.9 % (FLUSH) 0.9 %
10 SYRINGE (ML) INJECTION
Status: DISCONTINUED | OUTPATIENT
Start: 2024-05-16 | End: 2024-05-21 | Stop reason: HOSPADM

## 2024-05-16 RX ORDER — MAGNESIUM SULFATE HEPTAHYDRATE 40 MG/ML
2 INJECTION, SOLUTION INTRAVENOUS
Status: COMPLETED | OUTPATIENT
Start: 2024-05-16 | End: 2024-05-16

## 2024-05-16 RX ORDER — PHENYLEPHRINE HYDROCHLORIDE 10 MG/ML
INJECTION INTRAVENOUS
Status: DISCONTINUED | OUTPATIENT
Start: 2024-05-16 | End: 2024-05-16

## 2024-05-16 RX ORDER — EPINEPHRINE 0.1 MG/ML
INJECTION INTRAVENOUS
Status: DISCONTINUED | OUTPATIENT
Start: 2024-05-16 | End: 2024-05-16

## 2024-05-16 RX ORDER — ONDANSETRON 2 MG/ML
INJECTION INTRAMUSCULAR; INTRAVENOUS
Status: DISCONTINUED | OUTPATIENT
Start: 2024-05-16 | End: 2024-05-16

## 2024-05-16 RX ORDER — LIDOCAINE HYDROCHLORIDE 20 MG/ML
INJECTION, SOLUTION EPIDURAL; INFILTRATION; INTRACAUDAL; PERINEURAL
Status: DISCONTINUED | OUTPATIENT
Start: 2024-05-16 | End: 2024-05-16

## 2024-05-16 RX ORDER — SUCCINYLCHOLINE CHLORIDE 20 MG/ML
INJECTION INTRAMUSCULAR; INTRAVENOUS
Status: DISCONTINUED | OUTPATIENT
Start: 2024-05-16 | End: 2024-05-16

## 2024-05-16 RX ORDER — ETOMIDATE 2 MG/ML
INJECTION INTRAVENOUS
Status: DISCONTINUED | OUTPATIENT
Start: 2024-05-16 | End: 2024-05-16

## 2024-05-16 RX ORDER — VECURONIUM BROMIDE FOR INJECTION 1 MG/ML
INJECTION, POWDER, LYOPHILIZED, FOR SOLUTION INTRAVENOUS
Status: DISCONTINUED | OUTPATIENT
Start: 2024-05-16 | End: 2024-05-16

## 2024-05-16 RX ORDER — CHLORHEXIDINE GLUCONATE ORAL RINSE 1.2 MG/ML
15 SOLUTION DENTAL 2 TIMES DAILY
Status: DISCONTINUED | OUTPATIENT
Start: 2024-05-16 | End: 2024-05-18

## 2024-05-16 RX ORDER — NOREPINEPHRINE BITARTRATE 1 MG/ML
INJECTION, SOLUTION INTRAVENOUS
Status: DISCONTINUED | OUTPATIENT
Start: 2024-05-16 | End: 2024-05-16

## 2024-05-16 RX ADMIN — CHLORHEXIDINE GLUCONATE 0.12% ORAL RINSE 15 ML: 1.2 LIQUID ORAL at 09:05

## 2024-05-16 RX ADMIN — HEPARIN SODIUM 12 UNITS/KG/HR: 10000 INJECTION, SOLUTION INTRAVENOUS at 07:05

## 2024-05-16 RX ADMIN — CARVEDILOL 25 MG: 12.5 TABLET, FILM COATED ORAL at 08:05

## 2024-05-16 RX ADMIN — PROPOFOL 45 MCG/KG/MIN: 10 INJECTION, EMULSION INTRAVENOUS at 06:05

## 2024-05-16 RX ADMIN — ATORVASTATIN CALCIUM 40 MG: 40 TABLET, FILM COATED ORAL at 10:05

## 2024-05-16 RX ADMIN — AMIODARONE HYDROCHLORIDE 300 MG: 50 INJECTION, SOLUTION INTRAVENOUS at 09:05

## 2024-05-16 RX ADMIN — EPINEPHRINE 300 MCG: 0.1 INJECTION INTRACARDIAC; INTRAVENOUS at 10:05

## 2024-05-16 RX ADMIN — NOREPINEPHRINE BITARTRATE 0.08 MCG/KG/MIN: 4 INJECTION, SOLUTION INTRAVENOUS at 11:05

## 2024-05-16 RX ADMIN — MUPIROCIN: 20 OINTMENT TOPICAL at 09:05

## 2024-05-16 RX ADMIN — PHENYLEPHRINE HYDROCHLORIDE 300 MCG: 10 INJECTION INTRAVENOUS at 10:05

## 2024-05-16 RX ADMIN — ETOMIDATE 8 MG: 2 INJECTION INTRAVENOUS at 09:05

## 2024-05-16 RX ADMIN — PROPOFOL 45 MCG/KG/MIN: 10 INJECTION, EMULSION INTRAVENOUS at 09:05

## 2024-05-16 RX ADMIN — SUCCINYLCHOLINE CHLORIDE 200 MG: 20 INJECTION, SOLUTION INTRAMUSCULAR; INTRAVENOUS; PARENTERAL at 09:05

## 2024-05-16 RX ADMIN — MUPIROCIN: 20 OINTMENT TOPICAL at 12:05

## 2024-05-16 RX ADMIN — EPINEPHRINE 100 MCG: 0.1 INJECTION INTRACARDIAC; INTRAVENOUS at 10:05

## 2024-05-16 RX ADMIN — MIDAZOLAM HYDROCHLORIDE 2 MG: 1 INJECTION, SOLUTION INTRAMUSCULAR; INTRAVENOUS at 10:05

## 2024-05-16 RX ADMIN — PROPOFOL 40 MCG/KG/MIN: 10 INJECTION, EMULSION INTRAVENOUS at 02:05

## 2024-05-16 RX ADMIN — PROPOFOL 20 MCG/KG/MIN: 10 INJECTION, EMULSION INTRAVENOUS at 11:05

## 2024-05-16 RX ADMIN — NOREPINEPHRINE BITARTRATE 0.1 MCG/KG/MIN: 1 INJECTION, SOLUTION, CONCENTRATE INTRAVENOUS at 10:05

## 2024-05-16 RX ADMIN — NOREPINEPHRINE BITARTRATE 1 MG: 1 INJECTION, SOLUTION, CONCENTRATE INTRAVENOUS at 10:05

## 2024-05-16 RX ADMIN — CALCIUM CHLORIDE 0.5 G: 100 INJECTION, SOLUTION INTRAVENOUS at 10:05

## 2024-05-16 RX ADMIN — CALCIUM CHLORIDE 0.5 G: 100 INJECTION, SOLUTION INTRAVENOUS at 11:05

## 2024-05-16 RX ADMIN — POTASSIUM CHLORIDE 30 MEQ: 750 TABLET, EXTENDED RELEASE ORAL at 08:05

## 2024-05-16 RX ADMIN — EPINEPHRINE 0.05 MCG/KG/MIN: 1 INJECTION INTRAMUSCULAR; INTRAVENOUS; SUBCUTANEOUS at 10:05

## 2024-05-16 RX ADMIN — ALLOPURINOL 100 MG: 100 TABLET ORAL at 08:05

## 2024-05-16 RX ADMIN — ETOMIDATE 6 MG: 2 INJECTION INTRAVENOUS at 10:05

## 2024-05-16 RX ADMIN — NOREPINEPHRINE BITARTRATE 0.1 MCG/KG/MIN: 4 INJECTION, SOLUTION INTRAVENOUS at 02:05

## 2024-05-16 RX ADMIN — VECURONIUM BROMIDE 8 MG: 10 INJECTION, POWDER, LYOPHILIZED, FOR SOLUTION INTRAVENOUS at 10:05

## 2024-05-16 RX ADMIN — VASOPRESSIN 1 UNITS: 20 INJECTION INTRAVENOUS at 10:05

## 2024-05-16 RX ADMIN — HEPARIN SODIUM 12 UNITS/KG/HR: 10000 INJECTION, SOLUTION INTRAVENOUS at 02:05

## 2024-05-16 RX ADMIN — VECURONIUM BROMIDE 2 MG: 10 INJECTION, POWDER, LYOPHILIZED, FOR SOLUTION INTRAVENOUS at 10:05

## 2024-05-16 RX ADMIN — PHENYLEPHRINE HYDROCHLORIDE 500 MCG: 10 INJECTION INTRAVENOUS at 09:05

## 2024-05-16 RX ADMIN — MAGNESIUM SULFATE HEPTAHYDRATE 2 G: 40 INJECTION, SOLUTION INTRAVENOUS at 09:05

## 2024-05-16 RX ADMIN — PHENYLEPHRINE HYDROCHLORIDE 300 MCG: 10 INJECTION INTRAVENOUS at 09:05

## 2024-05-16 RX ADMIN — ETOMIDATE 2 MG: 2 INJECTION INTRAVENOUS at 09:05

## 2024-05-16 RX ADMIN — LIDOCAINE HYDROCHLORIDE 100 MG: 20 INJECTION, SOLUTION EPIDURAL; INFILTRATION; INTRACAUDAL; PERINEURAL at 09:05

## 2024-05-16 RX ADMIN — PROPOFOL 45 MCG/KG/MIN: 10 INJECTION, EMULSION INTRAVENOUS at 03:05

## 2024-05-16 RX ADMIN — SODIUM CHLORIDE, SODIUM LACTATE, POTASSIUM CHLORIDE, AND CALCIUM CHLORIDE: .6; .31; .03; .02 INJECTION, SOLUTION INTRAVENOUS at 09:05

## 2024-05-16 RX ADMIN — SODIUM BICARBONATE 50 MEQ: 42 INJECTION, SOLUTION INTRAVENOUS at 10:05

## 2024-05-16 RX ADMIN — MAGNESIUM SULFATE HEPTAHYDRATE 2 G: 40 INJECTION, SOLUTION INTRAVENOUS at 06:05

## 2024-05-16 RX ADMIN — CHLORHEXIDINE GLUCONATE 0.12% ORAL RINSE 15 ML: 1.2 LIQUID ORAL at 12:05

## 2024-05-16 NOTE — ASSESSMENT & PLAN NOTE
Patient is identified as having Combined Systolic and Diastolic heart failure that is Acute on chronic. CHF is currently uncontrolled due to Dyspnea not returned to baseline after   doses of IV diuretic. Latest ECHO performed and demonstrates- Results for orders placed during the hospital encounter of 05/14/24    Echo    Interpretation Summary    Left Ventricle: The left ventricle is mildly dilated. Severely increased ventricular mass. Severely increased wall thickness. There is concentric hypertrophy. Severe global hypokinesis present. There is severely reduced systolic function with a visually estimated ejection fraction of 15 - 20%. Ejection fraction by visual approximation is 18%. Grade II diastolic dysfunction.    Right Ventricle: Moderate right ventricular enlargement. Wall thickness is normal. Systolic function is moderately reduced.    Left Atrium: Left atrium is severely dilated.    Right Atrium: Right atrium is dilated.    Aortic Valve: The aortic valve is a trileaflet valve. There is mild aortic valve sclerosis. There is mild stenosis. Aortic valve area by VTI is 1.65 cm². Aortic valve peak velocity is 1.33 m/s. Mean gradient is 4 mmHg. The dimensionless index is 0.56.    Mitral Valve: There is mild to moderate regurgitation.    Tricuspid Valve: There is moderate regurgitation.    Pulmonary Artery: There is mild pulmonary hypertension. The estimated pulmonary artery systolic pressure is 42 mmHg.    IVC/SVC: Intermediate venous pressure at 8 mmHg.    Pericardium: There is a moderate circumferential effusion. Pericardial effusion is echolucent. No indication of cardiac tamponade.  . Continue Beta Blocker and monitor clinical status closely. Monitor on telemetry. Patient is off CHF pathway.  Monitor strict Is&Os and daily weights.  Place on fluid restriction of 1.5 L. Cardiology has been consulted. Continue to stress to patient importance of self efficacy and  on diet for CHF. Last BNP reviewed- and  noted below   Recent Labs   Lab 05/14/24  0529   BNP 1,817*     Several liters since admit.   Continue diuretics

## 2024-05-16 NOTE — RESPIRATORY THERAPY
Called to [facility] to help transport patient. Upon arrival Patient was intubated 8.0 24 at [facility]. Tube secured with tube securement device. Placed on Transport Ventilator and ABG drawn per Dr Phillips.   Pt transferred to ICU bed 6.

## 2024-05-16 NOTE — ANESTHESIA POSTPROCEDURE EVALUATION
Anesthesia Post Evaluation    Patient: Bria Saldana    Procedure(s) Performed: Procedure(s) (LRB):  ECHOCARDIOGRAM, TRANSESOPHAGEAL (N/A)  Cardioversion (N/A)    Final Anesthesia Type: MAC      Patient location during evaluation: ICU  Patient participation: No - Unable to Participate, Intubation  Level of consciousness: awake and alert, oriented and awake  Post-procedure vital signs: reviewed and stable  Pain management: adequate  Airway patency: patent    PONV status at discharge: No PONV  Anesthetic complications: no      Cardiovascular status: blood pressure returned to baseline  Respiratory status: unassisted and ETT  Hydration status: euvolemic  Follow-up not needed.              Vitals Value Taken Time   /67 05/16/24 1209   Temp 36.3 °C (97.4 °F) 05/16/24 1115   Pulse 102 05/16/24 1231   Resp 41 05/16/24 1231   SpO2 97 % 05/16/24 1231   Vitals shown include unfiled device data.      No case tracking events are documented in the log.      Pain/Dreeck Score: Dereck Score: 10 (5/16/2024  9:15 AM)

## 2024-05-16 NOTE — PLAN OF CARE
Pt remains intubated and sedated on cont propofol.  Heparin gtt also continued.  R IJ Central line and R brachial art line in use.  BP stable off IV pressor support.  Sanders cath and BSWR in use.
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
## 2024-05-16 NOTE — NURSING
Pt was taken down for heart cath procedure at approximately 9 AM, AAOx4. At approximately 11 AM, charge nurse reported that the pt had to be intubated during his procedure and that he would not be coming back to this floor.

## 2024-05-16 NOTE — HPI
Bria Saldana is a 53-year-old with chronic kidney disease, OSAS, hypertension, and CHF presented to the emergency department with CHF, shortness of breath, and pedal edema. He was taken down for MEGAN after adequate diuresis. During MEGAN he dropped his heart rate post sedation and was electively intubated for airway protection.     Interval history, f/u decompensated heart failure:  5/17: patient remain intubated on mechanical vent. FiO2 weaned this morning. Currently on Levophed, heparin, and propofol. With weaning of sedation this morning; became more tachycardic. Cardiology at bedside and requested sedation to be placed back on and plan for cardioversion at bedside prior to extubation.   5/18: successfully extubated yesterday; remains on Heparin and Amiodarone infusions. Case discussed with Dr. Phillips. Plan to transition Amiodarone to po and transfer from ICU. Patient denies acute complaints. No acute events overnight. Off vasopressors.
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
## 2024-05-16 NOTE — TRANSFER OF CARE
"Anesthesia Transfer of Care Note    Patient: Bria Saldana    Procedure(s) Performed: Procedure(s) (LRB):  ECHOCARDIOGRAM, TRANSESOPHAGEAL (N/A)  Cardioversion (N/A)    Patient location: ICU    Anesthesia Type: MAC    Transport from OR: Upon arrival to PACU/ICU, patient attached to ventilator and auscultated to confirm bilateral breath sounds and adequate TV. Transported from OR intubated on 100% O2  with adequate ventilation controlled by transport ventilator. Continuous ECG monitoring in transport. Continuous SpO2 monitoring in transport. Continuos invasive BP monitoring in transport    Post pain: adequate analgesia    Post assessment: no apparent anesthetic complications    Post vital signs: stable    Level of consciousness: sedated    Nausea/Vomiting: no nausea/vomiting    Complications: respiratory complications, cardiovascular complications    Transfer of care protocol was followedComments: Patient was intubated at bedside after MEGAN by Dariela Fallon CRNA with Dr. Reece at bedside. Myself and Delano Rodriguez CRNA came in to assist. Patient was also placed on pressor support. An a-line and central line were placed by Dr. Reece. Patient was transferred to ICU was stable for transport. Bedside report given to RN by Dariela Fallon CRNA. Patient VSS      Last vitals: Visit Vitals  /71 (BP Location: Left arm)   Pulse 93   Temp 36.6 °C (97.9 °F) (Oral)   Resp 18   Ht 6' 3" (1.905 m)   Wt 116.6 kg (257 lb)   SpO2 95%   BMI 32.12 kg/m²     "
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
## 2024-05-16 NOTE — ASSESSMENT & PLAN NOTE
Patient has a diagnosis of pneumonia. The cause of the pneumonia is suspected to be bacterial in etiology but organism is not known. The pneumonia is worsening due to need for intubation . The patient has the following signs/symptoms of pneumonia: shortness of breath. The patient does have a current oxygen requirement and the patient does not have a home oxygen requirement. I have reviewed the pertinent imaging. The following cultures have been collected: Sputum culture The culture results are listed below.     Current antimicrobial regimen consists of the antibiotics listed below. Will monitor patient closely and continue current treatment plan unchanged.    Antibiotics (From admission, onward)      Start     Stop Route Frequency Ordered    05/16/24 1245  mupirocin 2 % ointment         05/21/24 0859 Nasl 2 times daily 05/16/24 1135            Microbiology Results (last 7 days)       ** No results found for the last 168 hours. **          Will also consider bronch.
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
## 2024-05-16 NOTE — INTERVAL H&P NOTE
The patient has been examined and the H&P has been reviewed:    I concur with the findings and no changes have occurred since H&P was written.    Anesthesia/Surgery risks, benefits and alternative options discussed and understood by patient/family.          Active Hospital Problems    Diagnosis  POA    *Acute on chronic combined systolic (congestive) and diastolic (congestive) heart failure [I50.43]  Yes    New onset atrial flutter [I48.92]  Yes    Severe obesity (BMI 35.0-35.9 with comorbidity) [E66.01, Z68.35]  Not Applicable    CKD (chronic kidney disease) stage 3, GFR 30-59 ml/min [N18.30]  Yes    Elevated troponin [R79.89]  Yes    NATALYA (obstructive sleep apnea) [G47.33]  Yes    Essential hypertension [I10]  Yes      Resolved Hospital Problems   No resolved problems to display.

## 2024-05-16 NOTE — BRIEF OP NOTE
[facility] - Intensive Care ([facility])  General Surgery  Operative Note    SUMMARY     Date of Procedure: 5/16/2024     Procedure: Procedure(s) (LRB):  ECHOCARDIOGRAM, TRANSESOPHAGEAL (N/A)  Cardioversion (N/A)       Surgeons and Role:     * Kiel Phillips MD - Primary    Assisting Surgeon: None    Pre-Operative Diagnosis: Atrial fibrillation, unspecified type [I48.91]    Post-Operative Diagnosis: Post-Op Diagnosis Codes:     * Atrial fibrillation, unspecified type [I48.91]    Anesthesia: Monitor Anesthesia Care    Operative Findings (including complications, if any):   Procedure Description: The risks, benefits, and alternatives of the procedure expalined in detail with patient and family. The patient voices understanding and all questions have been addressed. The patient agrees to proceed as planned.   The patient was sedated by anesthesiology service. The probe was advanced via throat into esophagus smoothly. The patient tolerated the procedure well and there was no complications. The probed was withdrawal at the end of study. The patient was hemodynamically stable.   Estimated Blood Loss: none.   Findings / Operative Note:   No ALEXI thrombus visualized. EF 15%   After MEGAN had had aspiration of yellow bile liquid.   DCCV was cancelled  Pt was intubated for airway protection  ABG and SaO2 normal,  Given Amiodarone 300 mg IVP for AFL with RVR  Started pressor.  Transfer to ICU  Discussed with ex wife and sister via the phone.  The ICU service was notified.           Estimated Blood Loss (EBL): * No values recorded between 5/16/2024 12:00 AM and 5/16/2024 12:10 PM *           Implants: * No implants in log *    Specimens:   Specimen (24h ago, onward)      None                    Condition: Good    Disposition: PACU - hemodynamically stable.    Attestation: I was present and scrubbed for the entire procedure.

## 2024-05-17 PROBLEM — J96.01 ACUTE HYPOXEMIC RESPIRATORY FAILURE: Status: ACTIVE | Noted: 2024-05-17

## 2024-05-17 LAB
ALLENS TEST: ABNORMAL
ANION GAP SERPL CALC-SCNC: 17 MMOL/L (ref 8–16)
APTT PPP: 56 SEC (ref 21–32)
APTT PPP: 57.8 SEC (ref 21–32)
BASOPHILS # BLD AUTO: 0.04 K/UL (ref 0–0.2)
BASOPHILS NFR BLD: 0.4 % (ref 0–1.9)
BUN SERPL-MCNC: 34 MG/DL (ref 6–20)
CALCIUM SERPL-MCNC: 8.9 MG/DL (ref 8.7–10.5)
CHLORIDE SERPL-SCNC: 99 MMOL/L (ref 95–110)
CO2 SERPL-SCNC: 22 MMOL/L (ref 23–29)
CREAT SERPL-MCNC: 2.1 MG/DL (ref 0.5–1.4)
DELSYS: ABNORMAL
DIFFERENTIAL METHOD BLD: ABNORMAL
EOSINOPHIL # BLD AUTO: 0.1 K/UL (ref 0–0.5)
EOSINOPHIL NFR BLD: 0.8 % (ref 0–8)
ERYTHROCYTE [DISTWIDTH] IN BLOOD BY AUTOMATED COUNT: 18 % (ref 11.5–14.5)
ERYTHROCYTE [SEDIMENTATION RATE] IN BLOOD BY WESTERGREN METHOD: 22 MM/H
EST. GFR  (NO RACE VARIABLE): 37 ML/MIN/1.73 M^2
FIO2: 50
GLUCOSE SERPL-MCNC: 131 MG/DL (ref 70–110)
HCO3 UR-SCNC: 26.5 MMOL/L (ref 24–28)
HCT VFR BLD AUTO: 51.1 % (ref 40–54)
HGB BLD-MCNC: 15.9 G/DL (ref 14–18)
IMM GRANULOCYTES # BLD AUTO: 0.06 K/UL (ref 0–0.04)
IMM GRANULOCYTES NFR BLD AUTO: 0.5 % (ref 0–0.5)
LACTATE SERPL-SCNC: 1.5 MMOL/L (ref 0.5–2.2)
LYMPHOCYTES # BLD AUTO: 0.9 K/UL (ref 1–4.8)
LYMPHOCYTES NFR BLD: 7.9 % (ref 18–48)
MAGNESIUM SERPL-MCNC: 2.3 MG/DL (ref 1.6–2.6)
MCH RBC QN AUTO: 25.4 PG (ref 27–31)
MCHC RBC AUTO-ENTMCNC: 31.1 G/DL (ref 32–36)
MCV RBC AUTO: 82 FL (ref 82–98)
MODE: ABNORMAL
MONOCYTES # BLD AUTO: 0.6 K/UL (ref 0.3–1)
MONOCYTES NFR BLD: 5.9 % (ref 4–15)
NEUTROPHILS # BLD AUTO: 9.2 K/UL (ref 1.8–7.7)
NEUTROPHILS NFR BLD: 84.5 % (ref 38–73)
NRBC BLD-RTO: 0 /100 WBC
OHS QRS DURATION: 98 MS
OHS QTC CALCULATION: 472 MS
PCO2 BLDA: 35.3 MMHG (ref 35–45)
PEEP: 5
PH SMN: 7.48 [PH] (ref 7.35–7.45)
PHOSPHATE SERPL-MCNC: 3.4 MG/DL (ref 2.7–4.5)
PLATELET # BLD AUTO: 223 K/UL (ref 150–450)
PMV BLD AUTO: 10.4 FL (ref 9.2–12.9)
PO2 BLDA: 137 MMHG (ref 80–100)
POC BE: 3 MMOL/L
POC SATURATED O2: 99 % (ref 95–100)
POCT GLUCOSE: 91 MG/DL (ref 70–110)
POCT GLUCOSE: 91 MG/DL (ref 70–110)
POTASSIUM SERPL-SCNC: 3.7 MMOL/L (ref 3.5–5.1)
PROCALCITONIN SERPL IA-MCNC: 1.08 NG/ML
RBC # BLD AUTO: 6.26 M/UL (ref 4.6–6.2)
SAMPLE: ABNORMAL
SITE: ABNORMAL
SODIUM SERPL-SCNC: 138 MMOL/L (ref 136–145)
VT: 480
WBC # BLD AUTO: 10.92 K/UL (ref 3.9–12.7)

## 2024-05-17 PROCEDURE — 63600175 PHARM REV CODE 636 W HCPCS: Performed by: NURSE PRACTITIONER

## 2024-05-17 PROCEDURE — 25000003 PHARM REV CODE 250: Performed by: NURSE PRACTITIONER

## 2024-05-17 PROCEDURE — 25000003 PHARM REV CODE 250: Performed by: SPECIALIST

## 2024-05-17 PROCEDURE — 85025 COMPLETE CBC W/AUTO DIFF WBC: CPT | Performed by: PHYSICIAN ASSISTANT

## 2024-05-17 PROCEDURE — 85730 THROMBOPLASTIN TIME PARTIAL: CPT | Performed by: INTERNAL MEDICINE

## 2024-05-17 PROCEDURE — 27100171 HC OXYGEN HIGH FLOW UP TO 24 HOURS

## 2024-05-17 PROCEDURE — 99900035 HC TECH TIME PER 15 MIN (STAT)

## 2024-05-17 PROCEDURE — 93005 ELECTROCARDIOGRAM TRACING: CPT

## 2024-05-17 PROCEDURE — 87040 BLOOD CULTURE FOR BACTERIA: CPT | Mod: 59 | Performed by: NURSE PRACTITIONER

## 2024-05-17 PROCEDURE — 37799 UNLISTED PX VASCULAR SURGERY: CPT

## 2024-05-17 PROCEDURE — 82803 BLOOD GASES ANY COMBINATION: CPT

## 2024-05-17 PROCEDURE — 87070 CULTURE OTHR SPECIMN AEROBIC: CPT | Performed by: NURSE PRACTITIONER

## 2024-05-17 PROCEDURE — 84145 PROCALCITONIN (PCT): CPT | Performed by: NURSE PRACTITIONER

## 2024-05-17 PROCEDURE — 83735 ASSAY OF MAGNESIUM: CPT | Performed by: NURSE PRACTITIONER

## 2024-05-17 PROCEDURE — 85730 THROMBOPLASTIN TIME PARTIAL: CPT | Mod: 91 | Performed by: SPECIALIST

## 2024-05-17 PROCEDURE — 87205 SMEAR GRAM STAIN: CPT | Performed by: NURSE PRACTITIONER

## 2024-05-17 PROCEDURE — 99900026 HC AIRWAY MAINTENANCE (STAT)

## 2024-05-17 PROCEDURE — 84100 ASSAY OF PHOSPHORUS: CPT | Performed by: NURSE PRACTITIONER

## 2024-05-17 PROCEDURE — 36415 COLL VENOUS BLD VENIPUNCTURE: CPT | Performed by: NURSE PRACTITIONER

## 2024-05-17 PROCEDURE — 83605 ASSAY OF LACTIC ACID: CPT | Performed by: NURSE PRACTITIONER

## 2024-05-17 PROCEDURE — 93010 ELECTROCARDIOGRAM REPORT: CPT | Mod: ,,, | Performed by: INTERNAL MEDICINE

## 2024-05-17 PROCEDURE — 92960 CARDIOVERSION ELECTRIC EXT: CPT | Performed by: INTERNAL MEDICINE

## 2024-05-17 PROCEDURE — 94003 VENT MGMT INPAT SUBQ DAY: CPT

## 2024-05-17 PROCEDURE — 92960 CARDIOVERSION ELECTRIC EXT: CPT | Mod: ,,, | Performed by: INTERNAL MEDICINE

## 2024-05-17 PROCEDURE — 99233 SBSQ HOSP IP/OBS HIGH 50: CPT | Mod: 25,ICN,, | Performed by: INTERNAL MEDICINE

## 2024-05-17 PROCEDURE — 20000000 HC ICU ROOM

## 2024-05-17 PROCEDURE — 80048 BASIC METABOLIC PNL TOTAL CA: CPT | Performed by: INTERNAL MEDICINE

## 2024-05-17 PROCEDURE — 63600175 PHARM REV CODE 636 W HCPCS: Performed by: PHYSICIAN ASSISTANT

## 2024-05-17 PROCEDURE — 94761 N-INVAS EAR/PLS OXIMETRY MLT: CPT

## 2024-05-17 RX ORDER — ATORVASTATIN CALCIUM 40 MG/1
40 TABLET, FILM COATED ORAL NIGHTLY
Status: DISCONTINUED | OUTPATIENT
Start: 2024-05-17 | End: 2024-05-21 | Stop reason: HOSPADM

## 2024-05-17 RX ORDER — ALLOPURINOL 100 MG/1
100 TABLET ORAL DAILY
Status: DISCONTINUED | OUTPATIENT
Start: 2024-05-18 | End: 2024-05-21 | Stop reason: HOSPADM

## 2024-05-17 RX ORDER — FAMOTIDINE 40 MG/5ML
20 POWDER, FOR SUSPENSION ORAL 2 TIMES DAILY
Status: DISCONTINUED | OUTPATIENT
Start: 2024-05-17 | End: 2024-05-17

## 2024-05-17 RX ORDER — FAMOTIDINE 20 MG/1
20 TABLET, FILM COATED ORAL 2 TIMES DAILY
Status: DISCONTINUED | OUTPATIENT
Start: 2024-05-17 | End: 2024-05-21 | Stop reason: HOSPADM

## 2024-05-17 RX ORDER — CARVEDILOL 12.5 MG/1
25 TABLET ORAL 2 TIMES DAILY
Status: DISCONTINUED | OUTPATIENT
Start: 2024-05-17 | End: 2024-05-19

## 2024-05-17 RX ORDER — POLYETHYLENE GLYCOL 3350 17 G/17G
17 POWDER, FOR SOLUTION ORAL DAILY
Status: DISCONTINUED | OUTPATIENT
Start: 2024-05-17 | End: 2024-05-17

## 2024-05-17 RX ORDER — ALLOPURINOL 100 MG/1
100 TABLET ORAL DAILY
Status: DISCONTINUED | OUTPATIENT
Start: 2024-05-17 | End: 2024-05-17

## 2024-05-17 RX ORDER — GLUCAGON 1 MG
1 KIT INJECTION
Status: DISCONTINUED | OUTPATIENT
Start: 2024-05-17 | End: 2024-05-21 | Stop reason: HOSPADM

## 2024-05-17 RX ORDER — POLYETHYLENE GLYCOL 3350 17 G/17G
17 POWDER, FOR SOLUTION ORAL DAILY
Status: DISCONTINUED | OUTPATIENT
Start: 2024-05-18 | End: 2024-05-21 | Stop reason: HOSPADM

## 2024-05-17 RX ORDER — ATORVASTATIN CALCIUM 40 MG/1
40 TABLET, FILM COATED ORAL NIGHTLY
Status: DISCONTINUED | OUTPATIENT
Start: 2024-05-17 | End: 2024-05-17

## 2024-05-17 RX ORDER — ACETAMINOPHEN 650 MG/20.3ML
650 LIQUID ORAL EVERY 6 HOURS PRN
Status: DISCONTINUED | OUTPATIENT
Start: 2024-05-17 | End: 2024-05-21 | Stop reason: HOSPADM

## 2024-05-17 RX ORDER — SODIUM CHLORIDE 0.9 % (FLUSH) 0.9 %
10 SYRINGE (ML) INJECTION
Status: DISCONTINUED | OUTPATIENT
Start: 2024-05-17 | End: 2024-05-21 | Stop reason: HOSPADM

## 2024-05-17 RX ORDER — SODIUM CHLORIDE 9 MG/ML
INJECTION, SOLUTION INTRAVENOUS ONCE
Status: DISCONTINUED | OUTPATIENT
Start: 2024-05-17 | End: 2024-05-17 | Stop reason: HOSPADM

## 2024-05-17 RX ORDER — ISOSORBIDE DINITRATE 20 MG/1
20 TABLET ORAL 3 TIMES DAILY
Status: DISCONTINUED | OUTPATIENT
Start: 2024-05-17 | End: 2024-05-17

## 2024-05-17 RX ORDER — HYDRALAZINE HYDROCHLORIDE 50 MG/1
50 TABLET, FILM COATED ORAL EVERY 8 HOURS
Status: DISCONTINUED | OUTPATIENT
Start: 2024-05-17 | End: 2024-05-17

## 2024-05-17 RX ORDER — INSULIN ASPART 100 [IU]/ML
0-5 INJECTION, SOLUTION INTRAVENOUS; SUBCUTANEOUS EVERY 6 HOURS PRN
Status: DISCONTINUED | OUTPATIENT
Start: 2024-05-17 | End: 2024-05-21 | Stop reason: HOSPADM

## 2024-05-17 RX ORDER — CARVEDILOL 12.5 MG/1
25 TABLET ORAL 2 TIMES DAILY
Status: DISCONTINUED | OUTPATIENT
Start: 2024-05-17 | End: 2024-05-17

## 2024-05-17 RX ADMIN — MEROPENEM 2 G: 1 INJECTION INTRAVENOUS at 09:05

## 2024-05-17 RX ADMIN — PROPOFOL 40 MCG/KG/MIN: 10 INJECTION, EMULSION INTRAVENOUS at 08:05

## 2024-05-17 RX ADMIN — HEPARIN SODIUM 12 UNITS/KG/HR: 10000 INJECTION, SOLUTION INTRAVENOUS at 09:05

## 2024-05-17 RX ADMIN — NOREPINEPHRINE BITARTRATE 0.14 MCG/KG/MIN: 4 INJECTION, SOLUTION INTRAVENOUS at 05:05

## 2024-05-17 RX ADMIN — MEROPENEM 2 G: 1 INJECTION INTRAVENOUS at 06:05

## 2024-05-17 RX ADMIN — NOREPINEPHRINE BITARTRATE 0.16 MCG/KG/MIN: 4 INJECTION, SOLUTION INTRAVENOUS at 04:05

## 2024-05-17 RX ADMIN — ALLOPURINOL 100 MG: 100 TABLET ORAL at 08:05

## 2024-05-17 RX ADMIN — NOREPINEPHRINE BITARTRATE 0.12 MCG/KG/MIN: 4 INJECTION, SOLUTION INTRAVENOUS at 09:05

## 2024-05-17 RX ADMIN — NOREPINEPHRINE BITARTRATE 0.18 MCG/KG/MIN: 4 INJECTION, SOLUTION INTRAVENOUS at 12:05

## 2024-05-17 RX ADMIN — AMIODARONE HYDROCHLORIDE 0.5 MG/MIN: 1.8 INJECTION, SOLUTION INTRAVENOUS at 10:05

## 2024-05-17 RX ADMIN — PROPOFOL 45 MCG/KG/MIN: 10 INJECTION, EMULSION INTRAVENOUS at 12:05

## 2024-05-17 RX ADMIN — CHLORHEXIDINE GLUCONATE 0.12% ORAL RINSE 15 ML: 1.2 LIQUID ORAL at 09:05

## 2024-05-17 RX ADMIN — CARVEDILOL 25 MG: 12.5 TABLET, FILM COATED ORAL at 09:05

## 2024-05-17 RX ADMIN — ATORVASTATIN CALCIUM 40 MG: 40 TABLET, FILM COATED ORAL at 09:05

## 2024-05-17 RX ADMIN — PROPOFOL 40 MCG/KG/MIN: 10 INJECTION, EMULSION INTRAVENOUS at 04:05

## 2024-05-17 RX ADMIN — MUPIROCIN: 20 OINTMENT TOPICAL at 08:05

## 2024-05-17 RX ADMIN — MUPIROCIN: 20 OINTMENT TOPICAL at 09:05

## 2024-05-17 RX ADMIN — POLYETHYLENE GLYCOL 3350 17 G: 17 POWDER, FOR SOLUTION ORAL at 09:05

## 2024-05-17 RX ADMIN — CHLORHEXIDINE GLUCONATE 0.12% ORAL RINSE 15 ML: 1.2 LIQUID ORAL at 08:05

## 2024-05-17 RX ADMIN — FAMOTIDINE 20 MG: 20 TABLET ORAL at 09:05

## 2024-05-17 RX ADMIN — AMIODARONE HYDROCHLORIDE 0.5 MG/MIN: 1.8 INJECTION, SOLUTION INTRAVENOUS at 09:05

## 2024-05-17 RX ADMIN — FAMOTIDINE 20 MG: 40 POWDER, FOR SUSPENSION ORAL at 09:05

## 2024-05-17 RX ADMIN — MEROPENEM 2 G: 1 INJECTION INTRAVENOUS at 02:05

## 2024-05-17 NOTE — ASSESSMENT & PLAN NOTE
-Presents with new onset atrial flutter  -HR in mid to low 100's  -Continue Coreg  -Heparin gtt initiated for AC  -Will reassess in AM for MEGAN/DCCV  -OP EP referral     5/17/24  -Aspirated upon completion of MEGAN yesterday  -Remains intubated, pro-ada positive, on abx  -DCCV planned today prior to extubation  -Will start amiodarone drip post DCCV  -Continue heparin gtt

## 2024-05-17 NOTE — HOSPITAL COURSE
Mr. Saldana is a 53 year old male patient whose current medical conditions include CKD, NATALYA, HTN, and combined CHF who presented to [facility] ED yesterday due to fluid overload. Patient complained of increased SOB associated with leg swelling, abdominal bloating, decreased appetite, and orthopnea. He denied any associated fever, chills, nausea, vomiting, diaphoresis, chest pain, palpitations, near syncope, or syncope. Initial workup in ED revealed creatinine of 1.9, BNP of 1817, and troponin of 0.082. Patient was subsequently admitted for further evaluation and treatment. Cardiology consulted to assist with management. Patient seen and examined today, resting in bed. Feels ok. SOB and LE edema improving. Diuresed over 8 L since admission. Remains CP free. He reports compliance with his medications. Followed in CHF clinic. EKGs reviewed, consistent with aflutter with underlying LVH. Troponin elevated but flat, 0.082>0.081>0.076. Prior MPI stress test in 2019 negative.    5/17/24-Patient seen and examined today, resting in bed. Currently intubated. Febrile overnight, pro-ada positive, on abx. Still in aflutter. DCCV prior to extubation. Labs reviewed/stable. Pro-ada +.    05/18/24. S/p extubated. On SR. Cr 1.9 pt denied chest pain,.    05/19/24    Remains sinus, Cr 1.9, VSS. Fatigue.     5-20-24 Nuclear stress test (-) no reversible defects. Continue medical tx, continue coreg, entresto, lasix, add aspirin

## 2024-05-17 NOTE — ASSESSMENT & PLAN NOTE
Patient has a diagnosis of pneumonia. The cause of the pneumonia is suspected to be bacterial in etiology but organism is not known. The pneumonia is worsening due to need for intubation . The patient has the following signs/symptoms of pneumonia: shortness of breath. The patient does have a current oxygen requirement and the patient does not have a home oxygen requirement. I have reviewed the pertinent imaging. The following cultures have been collected: Sputum culture The culture results are listed below.     Patient currently intubated on mechanical ventilation  Following cardioversion; hold sedation and begin PSV trials as tolerated  Follow cultures; continue empiric antibiotics for now  Follow chest imaging / ABGs as needed  Follow fever and WBC trends

## 2024-05-17 NOTE — ASSESSMENT & PLAN NOTE
Patient is identified as having acute on chronic combined systolic and diastolic heart failure. CHF is currently uncontrolled as evidenced by dyspnea not returned to baseline after IV diuretics.     Latest ECHO, 5/14/2024, performed and demonstrates: The left ventricle is mildly dilated. Severely increased left ventricular mass with severely increased wall thickness, concentric hypertrophy, and severe global hypokinesis. There is severely reduced systolic function with an estimated EF of 15 - 20% and grade II diastolic dysfunction. Moderate right ventricular enlargement, normal wall thickness, and moderately reduced systolic function. Left atrium is severely dilated. Right atrium is dilated. There is mild stenosis. Mild to moderate MV regurgitation. Moderate TV regurgitation. Mild pulmonary hypertension with an ePAP of 42 mmHg. There is a moderate circumferential effusion. Pericardial effusion is echolucent. No indication of cardiac tamponade.    Plan:   Continue Coreg and Isordil as BP tolerates  Diuretics as able; held due to increased creatinine  Post MEGAN / DCCV  Monitor hemodynamics closely  Continue IV heparin  Continue diuretics   Cardiology following

## 2024-05-17 NOTE — ASSESSMENT & PLAN NOTE
-Presents with decompensated combined CHF, likely due to new onset atrial flutter  -IV diuretics held given bumped creatinine  -Continue Coreg, Isordil as BP permits  -Entresto held due to renal function  -Will evaluate in AM for possible MEGAN/DCCV    5/17/24  -Aspirated at completion of MEGAN  -Remains intubated in ICU  -Volume status stable, diurese prn  -Will optimize med regimen once extuabted

## 2024-05-17 NOTE — NURSING
Pt cardioverted with Dr. Phillips.   Verified consent completed.  Shocked with syncronized 150j.   Atrial flutter to NSR.   EKGs completed pre and post procedure.   No acute events.  Pt remains intubated and sedated on propofol.   VSS on levo.

## 2024-05-17 NOTE — INTERVAL H&P NOTE
The patient has been examined and the H&P has been reviewed:    I concur with the findings and no changes have occurred since H&P was written.    Anesthesia/Surgery risks, benefits and alternative options discussed and understood by patient/family.    DCCV only for a flutter      Active Hospital Problems    Diagnosis  POA    *Acute on chronic combined systolic (congestive) and diastolic (congestive) heart failure [I50.43]  Yes    Aspiration pneumonia of right upper lobe due to gastric secretions [J69.0]  Yes    New onset atrial flutter [I48.92]  Yes    Severe obesity (BMI 35.0-35.9 with comorbidity) [E66.01, Z68.35]  Not Applicable    CKD (chronic kidney disease) stage 3, GFR 30-59 ml/min [N18.30]  Yes    Elevated troponin [R79.89]  Yes    NATALYA (obstructive sleep apnea) [G47.33]  Yes    Essential hypertension [I10]  Yes      Resolved Hospital Problems   No resolved problems to display.

## 2024-05-17 NOTE — ASSESSMENT & PLAN NOTE
Plan for cardioversion this morning at bedside  Continue cardiac monitoring for arrhythmias / rate control  IV amiodarone initiated   Cardiology following

## 2024-05-17 NOTE — PLAN OF CARE
Plan of care reviewed with pt and pt's family at bedside.  Cardioverted this shift, remains in NSR on monitor.  Able to wean levo gtt down significantly.  Cont amio and heparin.  Stable on RA post extubation, remains slightly drowsy but easily arousable and appropriate, following commands.  Turned/repositioned frequently with assist x1 and pillow support.  R IJ CL, R brachial art line, and Sanders cath remain in use.

## 2024-05-17 NOTE — SUBJECTIVE & OBJECTIVE
Bria Saldana is a 53-year-old with chronic kidney disease, OSAS, hypertension, and CHF presented to the emergency department with CHF, shortness of breath, and pedal edema. He was taken down for MEGAN after adequate diuresis. During MEGAN he dropped his heart rate post sedation and was electively intubated for airway protection.     Interval history, f/u decompensated heart failure:  5/17: patient remain intubated on mechanical vent. FiO2 weaned this morning. Currently on Levophed, heparin, and propofol. With weaning of sedation this morning; became more tachycardic. Cardiology at bedside and requested sedation to be placed back on and plan for cardioversion at bedside prior to extubation.     Objective:     Vital Signs (Most Recent):  Temp: 99 °F (37.2 °C) (05/17/24 1015)  Pulse: 81 (05/17/24 1015)  Resp: (!) 22 (05/17/24 1015)  BP: (!) 91/50 (05/17/24 0930)  SpO2: 96 % (05/17/24 1015) Vital Signs (24h Range):  Temp:  [97.4 °F (36.3 °C)-101.3 °F (38.5 °C)] 99 °F (37.2 °C)  Pulse:  [] 81  Resp:  [20-43] 22  SpO2:  [84 %-100 %] 96 %  BP: ()/(40-96) 91/50  Arterial Line BP: ()/() 86/56   Weight: 116.6 kg (257 lb); Body mass index is 32.12 kg/m².    Intake/Output Summary (Last 24 hours) at 5/17/2024 1028  Last data filed at 5/17/2024 0942  Gross per 24 hour   Intake 2011.29 ml   Output 1377 ml   Net 634.29 ml      Physical Exam  Vitals and nursing note reviewed.   Constitutional:       Comments: sedated   HENT:      Head: Normocephalic.   Eyes:      Conjunctiva/sclera: Conjunctivae normal.   Cardiovascular:      Rate and Rhythm: Tachycardia present.   Pulmonary:      Effort: Pulmonary effort is normal.      Breath sounds: Normal breath sounds.      Comments: On mechanical ventilation  Abdominal:      Palpations: Abdomen is soft.   Musculoskeletal:      Cervical back: Normal range of motion and neck supple.      Right lower leg: Edema present.      Left lower leg: Edema present.   Skin:     General:  Skin is warm and dry.   Neurological:      Comments: Intubated; follows some commands with lightened sedation         Review of Systems: Negative except as indicated in HPI    Significant Labs:  CBC/Anemia Profile:  Recent Labs   Lab 05/16/24 0411 05/16/24 1200 05/17/24  0504   WBC 5.13 5.52 10.92   HGB 15.5 15.5 15.9   HCT 49.4 48.8 51.1    221 223   MCV 82 81* 82   RDW 17.8* 17.3* 18.0*   Chemistries:  Recent Labs   Lab 05/16/24 0411 05/16/24 1200 05/17/24  0504    137 138   K 3.3* 3.5 3.7   CL 95 99 99   CO2 30* 24 22*   BUN 40* 40* 34*   CREATININE 2.2* 2.2* 2.1*   CALCIUM 8.9 9.2 8.9   MG  --  1.5* 2.3   PHOS  --   --  3.4

## 2024-05-17 NOTE — PROGRESS NOTES
[facility]  Critical Care Medicine Progress Note    Patient Name: Bria Saldana MRN: [id number]  Admission Date: 5/14/2024   Hospital Length of Stay: 3 days  Code Status: Full Code    Attending Provider: Roro Horne MD    Subjective:   History of Present Illness:  Bria Saldana is a 53-year-old with chronic kidney disease, OSAS, hypertension, and CHF presented to the emergency department with CHF, shortness of breath, and pedal edema. He was taken down for MEGAN after adequate diuresis. During MEGAN he dropped his heart rate post sedation and was electively intubated for airway protection.     Interval history, f/u decompensated heart failure:  5/17: patient remain intubated on mechanical vent. FiO2 weaned this morning. Currently on Levophed, heparin, and propofol. With weaning of sedation this morning; became more tachycardic. Cardiology at bedside and requested sedation to be placed back on and plan for cardioversion at bedside prior to extubation.     Objective:     Vital Signs (Most Recent):  Temp: 99 °F (37.2 °C) (05/17/24 1015)  Pulse: 81 (05/17/24 1015)  Resp: (!) 22 (05/17/24 1015)  BP: (!) 91/50 (05/17/24 0930)  SpO2: 96 % (05/17/24 1015) Vital Signs (24h Range):  Temp:  [97.4 °F (36.3 °C)-101.3 °F (38.5 °C)] 99 °F (37.2 °C)  Pulse:  [] 81  Resp:  [20-43] 22  SpO2:  [84 %-100 %] 96 %  BP: ()/(40-96) 91/50  Arterial Line BP: ()/() 86/56   Weight: 116.6 kg (257 lb); Body mass index is 32.12 kg/m².    Intake/Output Summary (Last 24 hours) at 5/17/2024 1028  Last data filed at 5/17/2024 0942  Gross per 24 hour   Intake 2011.29 ml   Output 1377 ml   Net 634.29 ml      Physical Exam  Vitals and nursing note reviewed.   Constitutional:       Comments: sedated   HENT:      Head: Normocephalic.   Eyes:      Conjunctiva/sclera: Conjunctivae normal.   Cardiovascular:      Rate and Rhythm: Tachycardia present.   Pulmonary:      Effort:  Pulmonary effort is normal.      Breath sounds: Normal breath sounds.      Comments: On mechanical ventilation  Abdominal:      Palpations: Abdomen is soft.   Musculoskeletal:      Cervical back: Normal range of motion and neck supple.      Right lower leg: Edema present.      Left lower leg: Edema present.   Skin:     General: Skin is warm and dry.   Neurological:      Comments: Intubated; follows some commands with lightened sedation       Review of Systems: Negative except as indicated in HPI    Significant Labs:  CBC/Anemia Profile:  Recent Labs   Lab 05/16/24 0411 05/16/24 1200 05/17/24  0504   WBC 5.13 5.52 10.92   HGB 15.5 15.5 15.9   HCT 49.4 48.8 51.1    221 223   MCV 82 81* 82   RDW 17.8* 17.3* 18.0*   Chemistries:  Recent Labs   Lab 05/16/24 0411 05/16/24 1200 05/17/24  0504    137 138   K 3.3* 3.5 3.7   CL 95 99 99   CO2 30* 24 22*   BUN 40* 40* 34*   CREATININE 2.2* 2.2* 2.1*   CALCIUM 8.9 9.2 8.9   MG  --  1.5* 2.3   PHOS  --   --  3.4     ABG  Recent Labs   Lab 05/17/24  0337   PH 7.484*   PO2 137*   PCO2 35.3   HCO3 26.5   BE 3*     Assessment/Plan:   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 (congestive) and diastolic (congestive) heart failure  Patient is identified as having acute on chronic combined systolic and diastolic heart failure. CHF is currently uncontrolled as evidenced by dyspnea not returned to baseline after IV diuretics.     Latest ECHO, 5/14/2024, performed and demonstrates: The left ventricle is mildly dilated. Severely increased left ventricular mass with severely increased wall thickness, concentric hypertrophy, and severe global hypokinesis. There is severely reduced systolic function with an estimated EF of 15 - 20% and grade II diastolic dysfunction. Moderate right ventricular enlargement, normal wall thickness, and moderately reduced systolic function. Left atrium is severely dilated. Right atrium is dilated. There is mild stenosis. Mild to moderate MV  regurgitation. Moderate TV regurgitation. Mild pulmonary hypertension with an ePAP of 42 mmHg. There is a moderate circumferential effusion. Pericardial effusion is echolucent. No indication of cardiac tamponade.    Plan:   Continue Coreg and Isordil as BP tolerates  Diuretics as able; held due to increased creatinine  Post MEGAN / DCCV  Monitor hemodynamics closely  Continue IV heparin  Continue diuretics   Cardiology following    New onset atrial flutter  Plan for cardioversion this morning at bedside  Continue cardiac monitoring for arrhythmias / rate control  IV amiodarone initiated   Cardiology following    Acute hypoxemic respiratory failure  Patient with acute hypoxic respiratory failure who was not previously on home oxygen. Patient is currently receiving supplemental oxygen via mechanical ventilation. Signs/symptoms of respiratory failure included lethargy. Contributing diagnoses includes aspiration Labs and images were reviewed. Patient has recent ABG, which has been reviewed.     Will treat underlying causes and adjust management of respiratory failure as follows:  Patient on mechanical ventilation on Peep +5 / 0.40 this morning  Following cardioversion; hold sedation and begin PSV trials as tolerated  Follow cultures; continue Merrem for now  Follow chest imaging / ABGs as needed  Follow fever and WBC trends    Aspiration pneumonia of right upper lobe due to gastric secretions  Patient has a diagnosis of pneumonia. The cause of the pneumonia is suspected to be bacterial in etiology but organism is not known. The pneumonia is worsening due to need for intubation . The patient has the following signs/symptoms of pneumonia: shortness of breath. The patient does have a current oxygen requirement and the patient does not have a home oxygen requirement. I have reviewed the pertinent imaging. The following cultures have been collected: Sputum culture The culture results are listed below.     Patient currently  intubated on mechanical ventilation  Following cardioversion; hold sedation and begin PSV trials as tolerated  Follow cultures; continue empiric antibiotics for now  Follow chest imaging / ABGs as needed  Follow fever and WBC trends    Severe obesity (BMI 35.0-35.9 with comorbidity)  Body mass index is 32.12 kg/m². Morbid obesity complicates all aspects of disease management from diagnostic modalities to treatment. Weight loss encouraged and health benefits explained to patient.     Critical Care Time: 51 minutes  Critical secondary to Patient has a condition that poses threat to life and bodily function: hypoxic respiratory failure on mechanical ventilation      Critical care was time spent personally by me on the following activities: development of treatment plan with patient or surrogate and bedside caregivers, discussions with consultants, evaluation of patient's response to treatment, examination of patient, ordering and performing treatments and interventions, ordering and review of laboratory studies, ordering and review of radiographic studies, pulse oximetry, re-evaluation of patient's condition. This critical care time did not overlap with that of any other provider or involve time for any procedures.     Margarita Freedman NP  Pulmonary and Critical Care  [facility]

## 2024-05-17 NOTE — BRIEF OP NOTE
[facility] - Cath Lab ([facility])  General Surgery  Operative Note    SUMMARY     Date of Procedure: 5/17/2024     Procedure: Procedure(s) (LRB):  Cardioversion or Defibrillation (N/A)       Surgeons and Role:     * Kiel Phillips MD - Primary    Assisting Surgeon: None    Pre-Operative Diagnosis: Atrial flutter, unspecified type [I48.92]    Post-Operative Diagnosis: Post-Op Diagnosis Codes:     * Atrial flutter, unspecified type [I48.92]    Anesthesia: Monitor Anesthesia Care    Operative Findings (including complications, if any):     Procedure Description: The risks, benefits, and alternatives of the procedure expalined in detail with family. Voices understanding and all questions have been addressed. The family agrees to proceed as planned.   The patient was sedated bin ICU. The patient tolerated the procedure well and there was no complications.   Estimated Blood Loss: none.   Findings / Operative Note:   After sedation, DCCV x1 with 150 joules and A flutter was converted to SR at 80 bpm  Continue ICU supportive care  Add amiodarone 0.5 mg /hr gtt. Had bolus of 30 mg yesterday  Wean off pressor per ICU team    Discussed with ex wife post op.      Estimated Blood Loss (EBL): * No values recorded between 5/17/2024 12:00 AM and 5/17/2024 10:01 AM *           Implants: * No implants in log *    Specimens:   Specimen (24h ago, onward)      None                    Condition: Stable    Disposition: ICU - intubated and hemodynamically stable.    Attestation: I was present and scrubbed for the entire procedure.

## 2024-05-17 NOTE — ASSESSMENT & PLAN NOTE
Patient with acute hypoxic respiratory failure who was not previously on home oxygen. Patient is currently receiving supplemental oxygen via mechanical ventilation. Signs/symptoms of respiratory failure included lethargy. Contributing diagnoses includes aspiration Labs and images were reviewed. Patient has recent ABG, which has been reviewed.     Will treat underlying causes and adjust management of respiratory failure as follows:  Patient on mechanical ventilation on Peep +5 / 0.40 this morning  Following cardioversion; hold sedation and begin PSV trials as tolerated  Follow cultures; continue Merrem for now  Follow chest imaging / ABGs as needed  Follow fever and WBC trends

## 2024-05-17 NOTE — PLAN OF CARE
Pt currently intubated/sedated on prop gtt for RASS goal of -2 to -3. NSR on the monitor. Levo and Heparin gtts infusing. OG tube placed during shift. Sanders in place with UOP 500ml for shift. BSWR in use. T max 101.3. POC reviewed with pt and family at bedside.

## 2024-05-17 NOTE — PROGRESS NOTES
Pharmacist Renal Dose Adjustment Note    Bria Saldana is a 53 y.o. male being treated with the medication meropenem.    Patient Data:    Vital Signs (Most Recent):  Temp: (!) 101.3 °F (38.5 °C) (05/17/24 0400)  Pulse: (!) 45 (05/17/24 0400)  Resp: (!) 22 (05/17/24 0400)  BP: (!) 96/52 (05/17/24 0400)  SpO2: 95 % (05/17/24 0400) Vital Signs (72h Range):  Temp:  [97.4 °F (36.3 °C)-101.3 °F (38.5 °C)]   Pulse:  []   Resp:  [18-43]   BP: ()/()   SpO2:  [84 %-100 %]   Arterial Line BP: ()/()      Recent Labs   Lab 05/15/24  0452 05/16/24  0411 05/16/24  1200   CREATININE 2.5* 2.2* 2.2*     Serum creatinine: 2.2 mg/dL (H) 05/16/24 1200  Estimated creatinine clearance: 54.1 mL/min (A)    Meropenem 1 g IV every 8 hours will be changed to meropenem 2 g IV every 8 hours for the treatment of severe infection with CrCl > 50 ml/min.    Pharmacist's Name: Reggie Metz  Pharmacist's Extension: [phone]

## 2024-05-17 NOTE — PROGRESS NOTES
[facility] - Intensive Care ([facility])  Cardiology  Progress Note    Patient Name: Bria Saldana  MRN: [id number]  Admission Date: 5/14/2024  Hospital Length of Stay: 3 days  Code Status: Full Code   Attending Physician: Roro Horne MD   Primary Care Physician: Brenna Maravilla MD  Expected Discharge Date:   Principal Problem:Acute on chronic combined systolic (congestive) and diastolic (congestive) heart failure    Subjective:   HPI:  Mr. Saldana is a 53 year old male patient whose current medical conditions include CKD, NATALYA, HTN, and combined CHF who presented to [facility] ED yesterday due to fluid overload. Patient complained of increased SOB associated with leg swelling, abdominal bloating, decreased appetite, and orthopnea. He denied any associated fever, chills, nausea, vomiting, diaphoresis, chest pain, palpitations, near syncope, or syncope. Initial workup in ED revealed creatinine of 1.9, BNP of 1817, and troponin of 0.082. Patient was subsequently admitted for further evaluation and treatment. Cardiology consulted to assist with management. Patient seen and examined today, resting in bed. Feels ok. SOB and LE edema improving. Diuresed over 8 L since admission. Remains CP free. He reports compliance with his medications. Followed in CHF clinic. EKGs reviewed, consistent with aflutter with underlying LVH. Troponin elevated but flat, 0.082>0.081>0.076. Prior MPI stress test in 2019 negative.       Hospital Course:   5/17/24-Patient seen and examined today, resting in bed. Currently intubated. Febrile overnight, pro-ada positive, on abx. Still in aflutter. DCCV prior to extubation. Labs reviewed/stable. Pro-ada +.        Review of Systems   Unable to perform ROS: Intubated     Objective:     Vital Signs (Most Recent):  Temp: 99.7 °F (37.6 °C) (05/17/24 1130)  Pulse: 81 (05/17/24 1330)  Resp: (!) 27 (05/17/24 1330)  BP: 101/61 (05/17/24 1330)  SpO2: 99 % (05/17/24 1330) Vital Signs (24h  Range):  Temp:  [97.5 °F (36.4 °C)-101.3 °F (38.5 °C)] 99.7 °F (37.6 °C)  Pulse:  [] 81  Resp:  [15-34] 27  SpO2:  [83 %-100 %] 99 %  BP: ()/(47-96) 101/61  Arterial Line BP: ()/(50-99) 100/60     Weight: 116.6 kg (257 lb)  Body mass index is 32.12 kg/m².     SpO2: 99 %         Intake/Output Summary (Last 24 hours) at 5/17/2024 1353  Last data filed at 5/17/2024 1320  Gross per 24 hour   Intake 2251.1 ml   Output 1504 ml   Net 747.1 ml       Lines/Drains/Airways       Central Venous Catheter Line  Duration             Percutaneous Central Line - Double Lumen  05/16/24 1021 Internal Jugular Right 1 day              Drain  Duration                  Urethral Catheter 05/16/24 1115 Silicone 1 day              Arterial Line  Duration             Arterial Line 05/16/24 1019 Right Brachial 1 day              Peripheral Intravenous Line  Duration                  Peripheral IV - Single Lumen 05/14/24 0556 20 G Right Antecubital 3 days         Peripheral IV - Single Lumen 05/16/24 1005 18 G Left Hand 1 day                       Physical Exam  Vitals and nursing note reviewed.   Constitutional:       General: He is not in acute distress.     Appearance: He is well-developed. He is ill-appearing.      Comments: Intubated   HENT:      Head: Normocephalic and atraumatic.   Cardiovascular:      Rate and Rhythm: Normal rate. Rhythm regularly irregular.      Heart sounds: Normal heart sounds, S1 normal and S2 normal. No murmur heard.  Pulmonary:      Effort: Pulmonary effort is normal.      Comments: Coarse BS  Abdominal:      General: There is no distension.   Musculoskeletal:      Right lower leg: No edema.      Left lower leg: No edema.   Skin:     General: Skin is warm and dry.      Findings: No erythema.   Neurological:      Mental Status: He is alert.      Comments: Intubated            Significant Labs: CMP   Recent Labs   Lab 05/16/24  0411 05/16/24  1200 05/17/24  0504    137 138   K 3.3* 3.5 3.7  "  CL 95 99 99   CO2 30* 24 22*   GLU 77 236* 131*   BUN 40* 40* 34*   CREATININE 2.2* 2.2* 2.1*   CALCIUM 8.9 9.2 8.9   ANIONGAP 14 14 17*   , CBC   Recent Labs   Lab 05/16/24  0411 05/16/24  1200 05/17/24  0504   WBC 5.13 5.52 10.92   HGB 15.5 15.5 15.9   HCT 49.4 48.8 51.1    221 223   , Troponin No results for input(s): "TROPONINI" in the last 48 hours., and All pertinent lab results from the last 24 hours have been reviewed.    Significant Imaging: Echocardiogram: Transthoracic echo (TTE) complete (Cupid Only):   Results for orders placed or performed during the hospital encounter of 05/14/24   Echo   Result Value Ref Range    Left Atrium Major Axis 7.97 cm    Left Atrium Minor Axis 7.37 cm    RA Major Axis 7.25 cm    LV Diastolic Volume 165.77 mL    LV Systolic Volume 139.00 mL    TR Max Vineet 2.9 m/s    PV mean gradient 1 mmHg    Mr max vineet 5.54 m/s    RVOT peak VTI 7.4 cm    RVOT peak vineet 0.54 m/s    Ao VTI 20.60 cm    Ao peak vineet 1.33 m/s    LVOT peak VTI 11.50 cm    LVOT peak vineet 0.88 m/s    LVOT diameter 1.94 cm    AV mean gradient 4 mmHg    TAPSE 1.40 cm    LA size 5.75 cm    Ascending aorta 3.14 cm    STJ 3.30 cm    LVIDs 5.36 (A) 2.1 - 4.0 cm    Posterior Wall 1.70 (A) 0.6 - 1.1 cm    IVS 1.62 (A) 0.6 - 1.1 cm    LVIDd 5.79 3.5 - 6.0 cm    TDI LATERAL 0.12 m/s    Left Ventricular Outflow Tract Mean Gradient 1.80 mmHg    Left Ventricular Outflow Tract Mean Velocity 0.63 cm/s    RV mid diameter 4.64 cm    IVC diameter 2.09 cm    TDI SEPTAL 0.05 m/s    FS 7 (A) 28 - 44 %    LV mass 468.15 g    Left Ventricle Relative Wall Thickness 0.59 cm    AV valve area 1.65 cm²    AV Velocity Ratio 0.66     AV index (prosthetic) 0.56     Mean e' 0.09 m/s    LVOT area 3.0 cm2    LVOT stroke volume 33.98 cm3    AV peak gradient 7 mmHg    Triscuspid Valve Regurgitation Peak Gradient 34 mmHg    MAIKEL by Velocity Ratio 1.95 cm²    BSA 2.48 m2    LV Systolic Volume Index 57.0 mL/m2    LV Diastolic Volume Index 67.94 mL/m2 "    LV Mass Index 192 g/m2    ZLVIDS -2.28     ZLVIDD -7.28     LA Volume Index 70.6 mL/m2    LA volume 172.18 cm3    LA WIDTH 4.6 cm    RA Width 6.2 cm    PV peak gradient 1     TV resting pulmonary artery pressure 42 mmHg    RV TB RVSP 11 mmHg    Est. RA pres 8 mmHg    EF 18 %    Narrative      Left Ventricle: The left ventricle is mildly dilated. Severely   increased ventricular mass. Severely increased wall thickness. There is   concentric hypertrophy. Severe global hypokinesis present. There is   severely reduced systolic function with a visually estimated ejection   fraction of 15 - 20%. Ejection fraction by visual approximation is 18%.   Grade II diastolic dysfunction.    Right Ventricle: Moderate right ventricular enlargement. Wall thickness   is normal. Systolic function is moderately reduced.    Left Atrium: Left atrium is severely dilated.    Right Atrium: Right atrium is dilated.    Aortic Valve: The aortic valve is a trileaflet valve. There is mild   aortic valve sclerosis. There is mild stenosis. Aortic valve area by VTI   is 1.65 cm². Aortic valve peak velocity is 1.33 m/s. Mean gradient is 4   mmHg. The dimensionless index is 0.56.    Mitral Valve: There is mild to moderate regurgitation.    Tricuspid Valve: There is moderate regurgitation.    Pulmonary Artery: There is mild pulmonary hypertension. The estimated   pulmonary artery systolic pressure is 42 mmHg.    IVC/SVC: Intermediate venous pressure at 8 mmHg.    Pericardium: There is a moderate circumferential effusion. Pericardial   effusion is echolucent. No indication of cardiac tamponade.      and EKG: Reviewed  Assessment and Plan:   Patient who presents with decompensated combined CHF/new onset atrial flutter. Aspirated yesterday as MEGAN was being completed. Intubated, on abx. DCCV planned prior to extubation with initiation of amiodarone afterward.     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 (congestive) and diastolic (congestive) heart  failure  -Presents with decompensated combined CHF, likely due to new onset atrial flutter  -IV diuretics held given bumped creatinine  -Continue Coreg, Isordil as BP permits  -Entresto held due to renal function  -Will evaluate in AM for possible MEGAN/DCCV    5/17/24  -Aspirated at completion of MEGAN  -Remains intubated in ICU  -Volume status stable, diurese prn  -Will optimize med regimen once extuabted      Aspiration pneumonia of right upper lobe due to gastric secretions  -On abx, appreciate critical care    New onset atrial flutter  -Presents with new onset atrial flutter  -HR in mid to low 100's  -Continue Coreg  -Heparin gtt initiated for AC  -Will reassess in AM for MEGAN/DCCV  -OP EP referral     5/17/24  -Aspirated upon completion of MEGAN yesterday  -Remains intubated, pro-ada positive, on abx  -DCCV planned today prior to extubation  -Will start amiodarone drip post DCCV  -Continue heparin gtt    Severe obesity (BMI 35.0-35.9 with comorbidity)  -Weight loss    CKD (chronic kidney disease) stage 3, GFR 30-59 ml/min  -Creatinine bumped with IV diuresis, additional Lasix held    5/17/24  -Creatinine stable    Elevated troponin  -Chronically elevated, flat, 0.082>0.081>0.076  -Continue OMT  -TTE with drop in EF likely related to new onset atrial flutter  -Prior MPI stress test in 2019 negative, can consider repeat ischemic workup IP vs OP once compensated/rhythm stable    NATALYA (obstructive sleep apnea)  -Nightly CPAP    Essential hypertension  -Continue Coreg, Isordil  -Entresto held due to renal function    5/17/24  -BP soft, on low dose pressor  -Will start amiodarone drip after DCCV        VTE Risk Mitigation (From admission, onward)           Ordered     IP VTE HIGH RISK PATIENT  Once         05/16/24 1135     Place sequential compression device  Until discontinued         05/16/24 1135     heparin 25,000 units in dextrose 5% (100 units/ml) IV bolus from bag LOW INTENSITY nomogram - [facility]  As needed (PRN)         Question:  Heparin Infusion Adjustment (DO NOT MODIFY ANSWER)  Answer:  [email or web address]\Images\Pharmacy\HeparinInfusions\heparin LOW INTENSITY nomogram for [facility] [id number]    05/15/24 1405     heparin 25,000 units in dextrose 5% (100 units/ml) IV bolus from bag LOW INTENSITY nomogram - [facility]  As needed (PRN)        Question:  Heparin Infusion Adjustment (DO NOT MODIFY ANSWER)  Answer:  [email or web address]\Images\Pharmacy\HeparinInfusions\heparin LOW INTENSITY nomogram for [facility] [id number]    05/15/24 1405     heparin 25,000 units in dextrose 5% 250 mL (100 units/mL) infusion LOW INTENSITY nomogram - [facility]  Continuous        Question:  Begin at (units/kg/hr)  Answer:  12    05/15/24 1405                    Judith Kim PA-C  Cardiology  Formerly [facility] - Intensive Care ([facility])

## 2024-05-17 NOTE — PLAN OF CARE
05/17/24 0900   Rounds   Attendance Provider;Nurse ;Assigned nurse;Charge nurse;Pharmacist   Discharge Plan A Home with family   Transition of Care Barriers None     Approved for medicaid, assigned [employer]. Plan to cardiovert then extubate today.

## 2024-05-17 NOTE — ASSESSMENT & PLAN NOTE
-Continue Coreg, Isordil  -Entresto held due to renal function    5/17/24  -BP soft, on low dose pressor  -Will start amiodarone drip after DCCV

## 2024-05-17 NOTE — SUBJECTIVE & OBJECTIVE
Review of Systems   Unable to perform ROS: Intubated     Objective:     Vital Signs (Most Recent):  Temp: 99.7 °F (37.6 °C) (05/17/24 1130)  Pulse: 81 (05/17/24 1330)  Resp: (!) 27 (05/17/24 1330)  BP: 101/61 (05/17/24 1330)  SpO2: 99 % (05/17/24 1330) Vital Signs (24h Range):  Temp:  [97.5 °F (36.4 °C)-101.3 °F (38.5 °C)] 99.7 °F (37.6 °C)  Pulse:  [] 81  Resp:  [15-34] 27  SpO2:  [83 %-100 %] 99 %  BP: ()/(47-96) 101/61  Arterial Line BP: ()/(50-99) 100/60     Weight: 116.6 kg (257 lb)  Body mass index is 32.12 kg/m².     SpO2: 99 %         Intake/Output Summary (Last 24 hours) at 5/17/2024 1353  Last data filed at 5/17/2024 1320  Gross per 24 hour   Intake 2251.1 ml   Output 1504 ml   Net 747.1 ml       Lines/Drains/Airways       Central Venous Catheter Line  Duration             Percutaneous Central Line - Double Lumen  05/16/24 1021 Internal Jugular Right 1 day              Drain  Duration                  Urethral Catheter 05/16/24 1115 Silicone 1 day              Arterial Line  Duration             Arterial Line 05/16/24 1019 Right Brachial 1 day              Peripheral Intravenous Line  Duration                  Peripheral IV - Single Lumen 05/14/24 0556 20 G Right Antecubital 3 days         Peripheral IV - Single Lumen 05/16/24 1005 18 G Left Hand 1 day                       Physical Exam  Vitals and nursing note reviewed.   Constitutional:       General: He is not in acute distress.     Appearance: He is well-developed. He is ill-appearing.      Comments: Intubated   HENT:      Head: Normocephalic and atraumatic.   Cardiovascular:      Rate and Rhythm: Normal rate. Rhythm regularly irregular.      Heart sounds: Normal heart sounds, S1 normal and S2 normal. No murmur heard.  Pulmonary:      Effort: Pulmonary effort is normal.      Comments: Coarse BS  Abdominal:      General: There is no distension.   Musculoskeletal:      Right lower leg: No edema.      Left lower leg: No edema.  "  Skin:     General: Skin is warm and dry.      Findings: No erythema.   Neurological:      Mental Status: He is alert.      Comments: Intubated            Significant Labs: CMP   Recent Labs   Lab 05/16/24  0411 05/16/24  1200 05/17/24  0504    137 138   K 3.3* 3.5 3.7   CL 95 99 99   CO2 30* 24 22*   GLU 77 236* 131*   BUN 40* 40* 34*   CREATININE 2.2* 2.2* 2.1*   CALCIUM 8.9 9.2 8.9   ANIONGAP 14 14 17*   , CBC   Recent Labs   Lab 05/16/24  0411 05/16/24 1200 05/17/24  0504   WBC 5.13 5.52 10.92   HGB 15.5 15.5 15.9   HCT 49.4 48.8 51.1    221 223   , Troponin No results for input(s): "TROPONINI" in the last 48 hours., and All pertinent lab results from the last 24 hours have been reviewed.    Significant Imaging: Echocardiogram: Transthoracic echo (TTE) complete (Cupid Only):   Results for orders placed or performed during the hospital encounter of 05/14/24   Echo   Result Value Ref Range    Left Atrium Major Axis 7.97 cm    Left Atrium Minor Axis 7.37 cm    RA Major Axis 7.25 cm    LV Diastolic Volume 165.77 mL    LV Systolic Volume 139.00 mL    TR Max Vineet 2.9 m/s    PV mean gradient 1 mmHg    Mr max vineet 5.54 m/s    RVOT peak VTI 7.4 cm    RVOT peak vineet 0.54 m/s    Ao VTI 20.60 cm    Ao peak vineet 1.33 m/s    LVOT peak VTI 11.50 cm    LVOT peak vineet 0.88 m/s    LVOT diameter 1.94 cm    AV mean gradient 4 mmHg    TAPSE 1.40 cm    LA size 5.75 cm    Ascending aorta 3.14 cm    STJ 3.30 cm    LVIDs 5.36 (A) 2.1 - 4.0 cm    Posterior Wall 1.70 (A) 0.6 - 1.1 cm    IVS 1.62 (A) 0.6 - 1.1 cm    LVIDd 5.79 3.5 - 6.0 cm    TDI LATERAL 0.12 m/s    Left Ventricular Outflow Tract Mean Gradient 1.80 mmHg    Left Ventricular Outflow Tract Mean Velocity 0.63 cm/s    RV mid diameter 4.64 cm    IVC diameter 2.09 cm    TDI SEPTAL 0.05 m/s    FS 7 (A) 28 - 44 %    LV mass 468.15 g    Left Ventricle Relative Wall Thickness 0.59 cm    AV valve area 1.65 cm²    AV Velocity Ratio 0.66     AV index (prosthetic) 0.56     Mean " e' 0.09 m/s    LVOT area 3.0 cm2    LVOT stroke volume 33.98 cm3    AV peak gradient 7 mmHg    Triscuspid Valve Regurgitation Peak Gradient 34 mmHg    MAIKEL by Velocity Ratio 1.95 cm²    BSA 2.48 m2    LV Systolic Volume Index 57.0 mL/m2    LV Diastolic Volume Index 67.94 mL/m2    LV Mass Index 192 g/m2    ZLVIDS -2.28     ZLVIDD -7.28     LA Volume Index 70.6 mL/m2    LA volume 172.18 cm3    LA WIDTH 4.6 cm    RA Width 6.2 cm    PV peak gradient 1     TV resting pulmonary artery pressure 42 mmHg    RV TB RVSP 11 mmHg    Est. RA pres 8 mmHg    EF 18 %    Narrative      Left Ventricle: The left ventricle is mildly dilated. Severely   increased ventricular mass. Severely increased wall thickness. There is   concentric hypertrophy. Severe global hypokinesis present. There is   severely reduced systolic function with a visually estimated ejection   fraction of 15 - 20%. Ejection fraction by visual approximation is 18%.   Grade II diastolic dysfunction.    Right Ventricle: Moderate right ventricular enlargement. Wall thickness   is normal. Systolic function is moderately reduced.    Left Atrium: Left atrium is severely dilated.    Right Atrium: Right atrium is dilated.    Aortic Valve: The aortic valve is a trileaflet valve. There is mild   aortic valve sclerosis. There is mild stenosis. Aortic valve area by VTI   is 1.65 cm². Aortic valve peak velocity is 1.33 m/s. Mean gradient is 4   mmHg. The dimensionless index is 0.56.    Mitral Valve: There is mild to moderate regurgitation.    Tricuspid Valve: There is moderate regurgitation.    Pulmonary Artery: There is mild pulmonary hypertension. The estimated   pulmonary artery systolic pressure is 42 mmHg.    IVC/SVC: Intermediate venous pressure at 8 mmHg.    Pericardium: There is a moderate circumferential effusion. Pericardial   effusion is echolucent. No indication of cardiac tamponade.      and EKG: Reviewed

## 2024-05-18 LAB
ANION GAP SERPL CALC-SCNC: 12 MMOL/L (ref 8–16)
APTT PPP: 59.4 SEC (ref 21–32)
BASOPHILS # BLD AUTO: 0.03 K/UL (ref 0–0.2)
BASOPHILS NFR BLD: 0.3 % (ref 0–1.9)
BUN SERPL-MCNC: 32 MG/DL (ref 6–20)
CALCIUM SERPL-MCNC: 8.9 MG/DL (ref 8.7–10.5)
CHLORIDE SERPL-SCNC: 98 MMOL/L (ref 95–110)
CO2 SERPL-SCNC: 25 MMOL/L (ref 23–29)
CREAT SERPL-MCNC: 1.9 MG/DL (ref 0.5–1.4)
DIFFERENTIAL METHOD BLD: ABNORMAL
EOSINOPHIL # BLD AUTO: 0.2 K/UL (ref 0–0.5)
EOSINOPHIL NFR BLD: 2 % (ref 0–8)
ERYTHROCYTE [DISTWIDTH] IN BLOOD BY AUTOMATED COUNT: 16.9 % (ref 11.5–14.5)
EST. GFR  (NO RACE VARIABLE): 42 ML/MIN/1.73 M^2
GLUCOSE SERPL-MCNC: 106 MG/DL (ref 70–110)
HCT VFR BLD AUTO: 44.2 % (ref 40–54)
HGB BLD-MCNC: 13.7 G/DL (ref 14–18)
IMM GRANULOCYTES # BLD AUTO: 0.03 K/UL (ref 0–0.04)
IMM GRANULOCYTES NFR BLD AUTO: 0.3 % (ref 0–0.5)
LYMPHOCYTES # BLD AUTO: 1 K/UL (ref 1–4.8)
LYMPHOCYTES NFR BLD: 10.5 % (ref 18–48)
MCH RBC QN AUTO: 25.3 PG (ref 27–31)
MCHC RBC AUTO-ENTMCNC: 31 G/DL (ref 32–36)
MCV RBC AUTO: 82 FL (ref 82–98)
MONOCYTES # BLD AUTO: 0.8 K/UL (ref 0.3–1)
MONOCYTES NFR BLD: 8.5 % (ref 4–15)
NEUTROPHILS # BLD AUTO: 7.1 K/UL (ref 1.8–7.7)
NEUTROPHILS NFR BLD: 78.4 % (ref 38–73)
NRBC BLD-RTO: 0 /100 WBC
PLATELET # BLD AUTO: 152 K/UL (ref 150–450)
PMV BLD AUTO: 10.1 FL (ref 9.2–12.9)
POCT GLUCOSE: 107 MG/DL (ref 70–110)
POCT GLUCOSE: 129 MG/DL (ref 70–110)
POCT GLUCOSE: 134 MG/DL (ref 70–110)
POCT GLUCOSE: 88 MG/DL (ref 70–110)
POTASSIUM SERPL-SCNC: 3.4 MMOL/L (ref 3.5–5.1)
RBC # BLD AUTO: 5.41 M/UL (ref 4.6–6.2)
SODIUM SERPL-SCNC: 135 MMOL/L (ref 136–145)
TRIGL SERPL-MCNC: 66 MG/DL (ref 30–150)
WBC # BLD AUTO: 9.07 K/UL (ref 3.9–12.7)

## 2024-05-18 PROCEDURE — 25000003 PHARM REV CODE 250: Performed by: NURSE PRACTITIONER

## 2024-05-18 PROCEDURE — 99900035 HC TECH TIME PER 15 MIN (STAT)

## 2024-05-18 PROCEDURE — 25000003 PHARM REV CODE 250: Performed by: FAMILY MEDICINE

## 2024-05-18 PROCEDURE — 99233 SBSQ HOSP IP/OBS HIGH 50: CPT | Mod: ,,, | Performed by: INTERNAL MEDICINE

## 2024-05-18 PROCEDURE — 85025 COMPLETE CBC W/AUTO DIFF WBC: CPT | Performed by: PHYSICIAN ASSISTANT

## 2024-05-18 PROCEDURE — 63600175 PHARM REV CODE 636 W HCPCS: Performed by: NURSE PRACTITIONER

## 2024-05-18 PROCEDURE — 84478 ASSAY OF TRIGLYCERIDES: CPT | Performed by: SPECIALIST

## 2024-05-18 PROCEDURE — 80048 BASIC METABOLIC PNL TOTAL CA: CPT | Performed by: INTERNAL MEDICINE

## 2024-05-18 PROCEDURE — 85730 THROMBOPLASTIN TIME PARTIAL: CPT | Performed by: INTERNAL MEDICINE

## 2024-05-18 PROCEDURE — 94761 N-INVAS EAR/PLS OXIMETRY MLT: CPT

## 2024-05-18 PROCEDURE — 63600175 PHARM REV CODE 636 W HCPCS: Performed by: PHYSICIAN ASSISTANT

## 2024-05-18 PROCEDURE — 25000003 PHARM REV CODE 250: Performed by: SPECIALIST

## 2024-05-18 PROCEDURE — 21400001 HC TELEMETRY ROOM

## 2024-05-18 PROCEDURE — 27000221 HC OXYGEN, UP TO 24 HOURS

## 2024-05-18 RX ORDER — COLCHICINE 0.6 MG/1
0.6 TABLET, FILM COATED ORAL DAILY
Status: DISCONTINUED | OUTPATIENT
Start: 2024-05-19 | End: 2024-05-21 | Stop reason: HOSPADM

## 2024-05-18 RX ORDER — AMIODARONE HYDROCHLORIDE 200 MG/1
200 TABLET ORAL 2 TIMES DAILY
Status: DISCONTINUED | OUTPATIENT
Start: 2024-05-18 | End: 2024-05-18

## 2024-05-18 RX ORDER — AMIODARONE HYDROCHLORIDE 200 MG/1
200 TABLET ORAL 2 TIMES DAILY
Status: DISCONTINUED | OUTPATIENT
Start: 2024-05-18 | End: 2024-05-21 | Stop reason: HOSPADM

## 2024-05-18 RX ORDER — COLCHICINE 0.6 MG/1
1.2 TABLET, FILM COATED ORAL ONCE
Status: COMPLETED | OUTPATIENT
Start: 2024-05-18 | End: 2024-05-18

## 2024-05-18 RX ORDER — SODIUM CHLORIDE 9 MG/ML
INJECTION, SOLUTION INTRAVENOUS
Status: DISCONTINUED | OUTPATIENT
Start: 2024-05-18 | End: 2024-05-21 | Stop reason: HOSPADM

## 2024-05-18 RX ADMIN — CHLORHEXIDINE GLUCONATE 0.12% ORAL RINSE 15 ML: 1.2 LIQUID ORAL at 08:05

## 2024-05-18 RX ADMIN — SODIUM CHLORIDE: 9 INJECTION, SOLUTION INTRAVENOUS at 06:05

## 2024-05-18 RX ADMIN — POLYETHYLENE GLYCOL 3350 17 G: 17 POWDER, FOR SOLUTION ORAL at 08:05

## 2024-05-18 RX ADMIN — FAMOTIDINE 20 MG: 20 TABLET ORAL at 09:05

## 2024-05-18 RX ADMIN — POTASSIUM BICARBONATE 50 MEQ: 978 TABLET, EFFERVESCENT ORAL at 09:05

## 2024-05-18 RX ADMIN — COLCHICINE 1.2 MG: 0.6 TABLET ORAL at 05:05

## 2024-05-18 RX ADMIN — SODIUM CHLORIDE: 9 INJECTION, SOLUTION INTRAVENOUS at 11:05

## 2024-05-18 RX ADMIN — ALLOPURINOL 100 MG: 100 TABLET ORAL at 08:05

## 2024-05-18 RX ADMIN — AMIODARONE HYDROCHLORIDE 200 MG: 200 TABLET ORAL at 05:05

## 2024-05-18 RX ADMIN — ATORVASTATIN CALCIUM 40 MG: 40 TABLET, FILM COATED ORAL at 09:05

## 2024-05-18 RX ADMIN — CARVEDILOL 25 MG: 12.5 TABLET, FILM COATED ORAL at 08:05

## 2024-05-18 RX ADMIN — POTASSIUM BICARBONATE 50 MEQ: 978 TABLET, EFFERVESCENT ORAL at 08:05

## 2024-05-18 RX ADMIN — MUPIROCIN: 20 OINTMENT TOPICAL at 08:05

## 2024-05-18 RX ADMIN — MUPIROCIN: 20 OINTMENT TOPICAL at 09:05

## 2024-05-18 RX ADMIN — MEROPENEM 2 G: 1 INJECTION INTRAVENOUS at 02:05

## 2024-05-18 RX ADMIN — FAMOTIDINE 20 MG: 20 TABLET ORAL at 08:05

## 2024-05-18 RX ADMIN — CARVEDILOL 25 MG: 12.5 TABLET, FILM COATED ORAL at 09:05

## 2024-05-18 RX ADMIN — HEPARIN SODIUM 12 UNITS/KG/HR: 10000 INJECTION, SOLUTION INTRAVENOUS at 06:05

## 2024-05-18 RX ADMIN — AMIODARONE HYDROCHLORIDE 0.5 MG/MIN: 1.8 INJECTION, SOLUTION INTRAVENOUS at 08:05

## 2024-05-18 RX ADMIN — MEROPENEM 2 G: 1 INJECTION INTRAVENOUS at 06:05

## 2024-05-18 RX ADMIN — MEROPENEM 2 G: 1 INJECTION INTRAVENOUS at 11:05

## 2024-05-18 NOTE — PROGRESS NOTES
[facility]  Critical Care Progress Note    Patient Name: Bria Saldana MRN: [id number]  Admission Date: 5/14/2024   Hospital Length of Stay: 4 days  Code Status: Full Code    Attending Provider: Diana Garcia MD    Subjective:   History of present illness:  Bria Saldana is a 53-year-old with chronic kidney disease, OSAS, hypertension, and CHF presented to the emergency department with CHF, shortness of breath, and pedal edema. He was taken down for MEGAN after adequate diuresis. During MEGAN he dropped his heart rate post sedation and was electively intubated for airway protection.     Interval history, f/u decompensated heart failure:  5/17: patient remain intubated on mechanical vent. FiO2 weaned this morning. Currently on Levophed, heparin, and propofol. With weaning of sedation this morning; became more tachycardic. Cardiology at bedside and requested sedation to be placed back on and plan for cardioversion at bedside prior to extubation.   5/18: successfully extubated yesterday; remains on Heparin and Amiodarone infusions. Case discussed with Dr. Phillips. Plan to transition Amiodarone to po and transfer from ICU. Patient denies acute complaints. No acute events overnight. Off vasopressors.     Objective:     Vital Signs (Most Recent):  Temp: 97.9 °F (36.6 °C) (05/18/24 1539)  Pulse: 70 (05/18/24 1505)  Resp: 19 (05/18/24 1505)  BP: 116/82 (05/18/24 1535)  SpO2: (!) 91 % (05/18/24 1505) Vital Signs (24h Range):  Temp:  [97.3 °F (36.3 °C)-98.3 °F (36.8 °C)] 97.9 °F (36.6 °C)  Pulse:  [66-80] 70  Resp:  [16-35] 19  SpO2:  [89 %-97 %] 91 %  BP: ()/(51-82) 116/82  Arterial Line BP: ()/(53-68) 100/60   Weight: 102.7 kg (226 lb 6.6 oz);  Body mass index is 28.3 kg/m².      Intake/Output Summary (Last 24 hours) at 5/18/2024 1615  Last data filed at 5/18/2024 1500  Gross per 24 hour   Intake 1291.93 ml   Output 744 ml   Net 547.93 ml      Physical Exam  Vitals  and nursing note reviewed.   HENT:      Head: Normocephalic.   Eyes:      Conjunctiva/sclera: Conjunctivae normal.   Cardiovascular:      Rate and Rhythm: Normal rate.   Pulmonary:      Effort: Pulmonary effort is normal.   Abdominal:      Palpations: Abdomen is soft.   Musculoskeletal:         General: Normal range of motion.      Cervical back: Normal range of motion and neck supple.   Skin:     General: Skin is warm and dry.   Neurological:      Mental Status: He is alert and oriented to person, place, and time.   Psychiatric:         Mood and Affect: Mood normal.         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 CHF is currently uncontrolled as evidenced by dyspnea not returned to baseline after IV diuretics.     Latest ECHO, 5/14/2024, performed and demonstrates: The left ventricle is mildly dilated. Severely increased left ventricular mass with severely increased wall thickness, concentric hypertrophy, and severe global hypokinesis. There is severely reduced systolic function with an estimated EF of 15 - 20% and grade II diastolic dysfunction. Moderate right ventricular enlargement, normal wall thickness, and moderately reduced systolic function. Left atrium is severely dilated. Right atrium is dilated. There is mild  stenosis. Mild to moderate MV regurgitation. Moderate TV regurgitation. Mild pulmonary hypertension with an ePAP of 42 mmHg. There is a moderate circumferential effusion. Pericardial effusion is echolucent. No indication of cardiac tamponade.    Plan:   Continue Coreg and Isordil as BP tolerates  Diuretics as able; held due to increased creatinine  Post MEGAN / DCCV  Monitor hemodynamics closely  Continue IV heparin  Cardiology following    New onset atrial flutter  Post cardioversion on 5/17; currently in NSR  Continue cardiac monitoring for arrhythmias / rate control  IV amiodarone initiated; transition to po   Cardiology following    Acute hypoxemic respiratory failure  Patient with acute hypoxic respiratory failure who was not previously on home oxygen. Patient is currently receiving supplemental oxygen via mechanical ventilation. Signs/symptoms of respiratory failure included lethargy. Contributing diagnoses includes aspiration Labs and images were reviewed. Patient has recent ABG, which has been reviewed.     Will treat underlying causes and adjust management of respiratory failure as follows:  Required intubation on 5/16; extubated on 5/17  Pulmonary status on 2L/NC; denies acute complaints  Follow cultures; continue Merrem for now  Follow chest imaging / ABGs as needed  Follow fever and WBC trends    Aspiration pneumonia of right upper lobe due to gastric secretions  Patient has a diagnosis of pneumonia. The cause of the pneumonia is suspected to be bacterial in etiology but organism is not known. The pneumonia is worsening due to need for intubation . The patient has the following signs/symptoms of pneumonia: shortness of breath. The patient does have a current oxygen requirement and the patient does not have a home oxygen requirement. I have reviewed the pertinent imaging.     Intubated on 5/17; Extubated on 5/18 without complications  Follow cultures; continue empiric antibiotics for now  Follow chest  imaging / ABGs as needed  Follow fever and WBC trends    Essential hypertension  Previously held home antihypertensives due to hypotension requiring Vasopressors  Coreg as BP permits  Monitor hemodynamics and adjust medications as appropriate    CKD (chronic kidney disease) stage 3, GFR 30-59 ml/min  Creatine stable for now. BMP reviewed- noted Estimated Creatinine Clearance: 59.1 mL/min (A) (based on SCr of 1.9 mg/dL (H)). according to latest data. Based on current GFR, CKD stage is stage 3 - GFR 30-59.  Monitor UOP and serial BMP and adjust therapy as needed. Renally dose meds. Avoid nephrotoxic medications and procedures.    Severe obesity (BMI 35.0-35.9 with comorbidity)  Body mass index is 28.3 kg/m². Morbid obesity complicates all aspects of disease management from diagnostic modalities to treatment. Weight loss encouraged and health benefits explained to patient.     NATALYA (obstructive sleep apnea)  CPAP [facility]     Margarita Freedman NP  Pulmonary and Critical Care  [facility]

## 2024-05-18 NOTE — PLAN OF CARE
Pt AAOx4. NSR on monitor. BP stable. Afebrile. 1L O2 NC. X2 PIVs intact. Amiodarone & heparin (therapeutic) infusing. Sanders removed, pt voided without difficulty. LBM today. Cardiac diet, no appetite. Passed swallow study. Bed low, wheels locked, alarms audible. POC reviewed with pt & family.   Pt to be transferred to [facility] at this time via wheelchair. Family at bedside.

## 2024-05-18 NOTE — CARE UPDATE
RT attempted to initiate CPAP on pt for QHS; pt would not tolerate saying he could not sleep with the air in his face; pt educated on benefits of wearing cpap; cpap on standby and pt stable on 1L NC; will continue to monitor

## 2024-05-18 NOTE — NURSING TRANSFER
Nursing Transfer Note      5/18/2024   1726 PM    Nurse giving handoff:Lily  Nurse receiving handoff:Crow    Reason patient is being transferred: downgrade    Transfer To: tele 230    Transfer via wheelchair    Transfer with cardiac monitoring    Transported by Lily    Transfer Vital Signs:  See flowsheets 1726    Order for Tele Monitor? Yes    4eyes on Skin: yes    Medicines sent: yes    Any special needs or follow-up needed: no    Patient belongings transferred with patient: Yes    Chart send with patient: Yes  Patient reassessed at: 5/18/24; 1726  Upon arrival to floor: cardiac monitor applied, patient oriented to room, call bell in reach, and bed in lowest position

## 2024-05-18 NOTE — ASSESSMENT & PLAN NOTE
Patient is identified as having acute on chronic combined systolic and diastolic heart failure. CHF is currently uncontrolled as evidenced by dyspnea not returned to baseline after IV diuretics.     Latest ECHO, 5/14/2024, performed and demonstrates: The left ventricle is mildly dilated. Severely increased left ventricular mass with severely increased wall thickness, concentric hypertrophy, and severe global hypokinesis. There is severely reduced systolic function with an estimated EF of 15 - 20% and grade II diastolic dysfunction. Moderate right ventricular enlargement, normal wall thickness, and moderately reduced systolic function. Left atrium is severely dilated. Right atrium is dilated. There is mild stenosis. Mild to moderate MV regurgitation. Moderate TV regurgitation. Mild pulmonary hypertension with an ePAP of 42 mmHg. There is a moderate circumferential effusion. Pericardial effusion is echolucent. No indication of cardiac tamponade.    Plan:   Continue Coreg and Isordil as BP tolerates  Diuretics as able; held due to increased creatinine  Post MEGAN / DCCV  Monitor hemodynamics closely  Continue IV heparin  Cardiology following

## 2024-05-18 NOTE — ASSESSMENT & PLAN NOTE
Patient with acute hypoxic respiratory failure who was not previously on home oxygen. Patient is currently receiving supplemental oxygen via mechanical ventilation. Signs/symptoms of respiratory failure included lethargy. Contributing diagnoses includes aspiration Labs and images were reviewed. Patient has recent ABG, which has been reviewed.     Will treat underlying causes and adjust management of respiratory failure as follows:  Required intubation on 5/16; extubated on 5/17  Pulmonary status on 2L/NC; denies acute complaints  Follow cultures; continue Merrem for now  Follow chest imaging / ABGs as needed  Follow fever and WBC trends

## 2024-05-18 NOTE — ASSESSMENT & PLAN NOTE
Post cardioversion on 5/17; currently in NSR  Continue cardiac monitoring for arrhythmias / rate control  IV amiodarone initiated; transition to po   Cardiology following

## 2024-05-18 NOTE — ASSESSMENT & PLAN NOTE
Body mass index is 28.3 kg/m². Morbid obesity complicates all aspects of disease management from diagnostic modalities to treatment. Weight loss encouraged and health benefits explained to patient.

## 2024-05-18 NOTE — PLAN OF CARE
No acute events overnight. Weaned off Levophed per order, see flow sheet. Passed bedside swallow study; tolerated po meds. POC reviewed with patient. Safety and fall precautions maintained.

## 2024-05-18 NOTE — PROGRESS NOTES
[facility] - Telemetry ([facility])  Cardiology  Progress Note    Patient Name: Bria Saldana  MRN: [id number]  Admission Date: 5/14/2024  Hospital Length of Stay: 4 days  Code Status: Full Code   Attending Physician: Diana Garcia MD   Primary Care Physician: Brenna Maravilla MD  Expected Discharge Date:   Principal Problem:Acute on chronic combined systolic (congestive) and diastolic (congestive) heart failure    Subjective:     Hospital Course:   Mr. Saldana is a 53 year old male patient whose current medical conditions include CKD, NATALYA, HTN, and combined CHF who presented to [facility] ED yesterday due to fluid overload. Patient complained of increased SOB associated with leg swelling, abdominal bloating, decreased appetite, and orthopnea. He denied any associated fever, chills, nausea, vomiting, diaphoresis, chest pain, palpitations, near syncope, or syncope. Initial workup in ED revealed creatinine of 1.9, BNP of 1817, and troponin of 0.082. Patient was subsequently admitted for further evaluation and treatment. Cardiology consulted to assist with management. Patient seen and examined today, resting in bed. Feels ok. SOB and LE edema improving. Diuresed over 8 L since admission. Remains CP free. He reports compliance with his medications. Followed in CHF clinic. EKGs reviewed, consistent with aflutter with underlying LVH. Troponin elevated but flat, 0.082>0.081>0.076. Prior MPI stress test in 2019 negative.    5/17/24-Patient seen and examined today, resting in bed. Currently intubated. Febrile overnight, pro-ada positive, on abx. Still in aflutter. DCCV prior to extubation. Labs reviewed/stable. Pro-ada +.    05/18/24. S/p extubated. On SR. Cr 1.9 pt denied chest pain,.      ROS  Objective:     Vital Signs (Most Recent):  Temp: 97.4 °F (36.3 °C) (05/18/24 1726)  Pulse: 70 (05/18/24 1726)  Resp: 18 (05/18/24 1726)  BP: 119/76 (05/18/24 1726)  SpO2: 96 % (05/18/24 1726) Vital Signs (24h Range):  Temp:   [97.3 °F (36.3 °C)-98.3 °F (36.8 °C)] 97.4 °F (36.3 °C)  Pulse:  [66-77] 70  Resp:  [16-35] 18  SpO2:  [89 %-97 %] 96 %  BP: ()/(51-82) 119/76  Arterial Line BP: ()/(53-68) 100/60     Weight: 102.7 kg (226 lb 6.6 oz)  Body mass index is 28.3 kg/m².     SpO2: 96 %         Intake/Output Summary (Last 24 hours) at 5/18/2024 1730  Last data filed at 5/18/2024 1600  Gross per 24 hour   Intake 1358.61 ml   Output 850 ml   Net 508.61 ml       Lines/Drains/Airways       Peripheral Intravenous Line  Duration                  Peripheral IV - Single Lumen 05/16/24 1005 18 G Left Hand 2 days         Peripheral IV - Single Lumen 05/18/24 1413 20 G Right;Posterior Hand <1 day                       Physical Exam  Vitals and nursing note reviewed.   Constitutional:       General: He is not in acute distress.     Appearance: He is well-developed.   HENT:      Head: Normocephalic and atraumatic.   Cardiovascular:      Rate and Rhythm: Normal rate and regular rhythm.      Heart sounds: Normal heart sounds, S1 normal and S2 normal. No murmur heard.  Pulmonary:      Effort: Pulmonary effort is normal.      Breath sounds: Normal breath sounds.      Comments: Coarse BS  S/p extubated  Abdominal:      General: There is no distension.   Musculoskeletal:      Right lower leg: No edema.      Left lower leg: No edema.   Skin:     General: Skin is warm and dry.      Findings: No erythema.   Neurological:      Mental Status: He is alert.            Significant Labs: ABG:   Recent Labs   Lab 05/17/24  0337   PH 7.484*   PCO2 35.3   HCO3 26.5   POCSATURATED 99   BE 3*   , Blood Culture:   Recent Labs   Lab 05/17/24  0504   LABBLOO No Growth to date  No Growth to date  No Growth to date  No Growth to date   , BMP:   Recent Labs   Lab 05/17/24  0504 05/18/24  0409   * 106    135*   K 3.7 3.4*   CL 99 98   CO2 22* 25   BUN 34* 32*   CREATININE 2.1* 1.9*   CALCIUM 8.9 8.9   MG 2.3  --    , CMP   Recent Labs   Lab  "05/17/24  0504 05/18/24  0409    135*   K 3.7 3.4*   CL 99 98   CO2 22* 25   * 106   BUN 34* 32*   CREATININE 2.1* 1.9*   CALCIUM 8.9 8.9   ANIONGAP 17* 12   , CBC   Recent Labs   Lab 05/17/24  0504 05/18/24  0409   WBC 10.92 9.07   HGB 15.9 13.7*   HCT 51.1 44.2    152   , INR No results for input(s): "INR", "PROTIME" in the last 48 hours., Lipid Panel   Recent Labs   Lab 05/18/24  0409   TRIG 66   , and Troponin No results for input(s): "TROPONINI" in the last 48 hours.    Significant Imaging: EKG:    Assessment and Plan:       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 (congestive) and diastolic (congestive) heart failure  -Presents with decompensated combined CHF, likely due to new onset atrial flutter  -IV diuretics held given bumped creatinine  -Continue Coreg, Isordil as BP permits  -Entresto held due to renal function  -Will evaluate in AM for possible MEGAN/DCCV    5/17/24  -Aspirated at completion of MEGAN  -Remains intubated in ICU  -Volume status stable, diurese prn  -Will optimize med regimen once extubated    05/18/24  Repeat BNP in AM  Continue BB  Resume entresto in AM    Aspiration pneumonia of right upper lobe due to gastric secretions  -On abx, appreciate critical care    New onset atrial flutter  -Presents with new onset atrial flutter  -HR in mid to low 100's  -Continue Coreg  -Heparin gtt initiated for AC  -Will reassess in AM for MEGAN/DCCV  -OP EP referral     5/17/24  -Aspirated upon completion of MEGAN yesterday  -Remains intubated, pro-ada positive, on abx  -DCCV planned today prior to extubation  -Will start amiodarone drip post DCCV  -Continue heparin gtt    05/18/24  On SR   Switch amio gtt to amio 200 mg bid  Continue heparin gtt    Severe obesity (BMI 35.0-35.9 with comorbidity)  -Weight loss    CKD (chronic kidney disease) stage 3, GFR 30-59 ml/min  -Creatinine bumped with IV diuresis, additional Lasix held    5/17/24  -Creatinine stable    Elevated troponin  -Chronically " elevated, flat, 0.082>0.081>0.076  -Continue OMT  -TTE with drop in EF likely related to new onset atrial flutter  -Prior MPI stress test in 2019 negative, can consider repeat ischemic workup IP vs OP once compensated/rhythm stable    NATALYA (obstructive sleep apnea)  -Nightly CPAP    Essential hypertension  -Continue Coreg, Isordil  -Entresto held due to renal function    5/17/24  -BP soft, on low dose pressor  -Will start amiodarone drip after DCCV        VTE Risk Mitigation (From admission, onward)           Ordered     heparin 25,000 units in dextrose 5% (100 units/ml) IV bolus from bag LOW INTENSITY nomogram - [facility]  As needed (PRN)        Question:  Heparin Infusion Adjustment (DO NOT MODIFY ANSWER)  Answer:  [email or web address]\[email or web address]\Images\Pharmacy\HeparinInfusions\heparin LOW INTENSITY nomogram for [facility] [id number]    05/15/24 1405     heparin 25,000 units in dextrose 5% (100 units/ml) IV bolus from bag LOW INTENSITY nomogram - [facility]  As needed (PRN)        Question:  Heparin Infusion Adjustment (DO NOT MODIFY ANSWER)  Answer:  [email or web address]\epic\Images\Pharmacy\HeparinInfusions\heparin LOW INTENSITY nomogram for [facility] [id number].pdf    05/15/24 1405     heparin 25,000 units in dextrose 5% 250 mL (100 units/mL) infusion LOW INTENSITY nomogram - [facility]  Continuous        Question:  Begin at (units/kg/hr)  Answer:  12    05/15/24 1405                    Kiel Phillips MD  Cardiology  [facility] - Telemetry ([facility])

## 2024-05-18 NOTE — ASSESSMENT & PLAN NOTE
-Presents with new onset atrial flutter  -HR in mid to low 100's  -Continue Coreg  -Heparin gtt initiated for AC  -Will reassess in AM for MEGAN/DCCV  -OP EP referral     5/17/24  -Aspirated upon completion of MEGAN yesterday  -Remains intubated, pro-ada positive, on abx  -DCCV planned today prior to extubation  -Will start amiodarone drip post DCCV  -Continue heparin gtt    05/18/24  On SR   Switch amio gtt to amio 200 mg bid  Continue heparin gtt

## 2024-05-18 NOTE — ASSESSMENT & PLAN NOTE
Creatine stable for now. BMP reviewed- noted Estimated Creatinine Clearance: 59.1 mL/min (A) (based on SCr of 1.9 mg/dL (H)). according to latest data. Based on current GFR, CKD stage is stage 3 - GFR 30-59.  Monitor UOP and serial BMP and adjust therapy as needed. Renally dose meds. Avoid nephrotoxic medications and procedures.

## 2024-05-18 NOTE — PLAN OF CARE
A230/A230 SOLEDAD Fraser Sandro Saldana is a 53 y.o.male admitted on 5/14/2024 for Acute on chronic combined systolic (congestive) and diastolic (congestive) heart failure   Code Status: Full Code MRN: [id number]   Review of patient's allergies indicates:   Allergen Reactions    Penicillins Hives and Anaphylaxis     Past Medical History:   Diagnosis Date    Acute CHF 7/8/2019    Acute on chronic combined systolic (congestive) and diastolic (congestive) heart failure 9/23/2019    Allergy     CHF (congestive heart failure) 7/9/2019    CKD (chronic kidney disease) stage 3, GFR 30-59 ml/min 7/8/2019    Essential hypertension 6/1/2017    Hypertension associated with chronic kidney disease due to type 2 diabetes mellitus 2/28/2022    Mixed hyperlipidemia 3/10/2022    NATALYA (obstructive sleep apnea) 7/23/2018      PRN meds    sodium chloride 0.9%, , PRN  acetaminophen, 650 mg, Q6H PRN  dextrose 10%, 12.5 g, PRN  dextrose 10%, 25 g, PRN  glucagon (human recombinant), 1 mg, PRN  heparin (PORCINE), 60 Units/kg (Adjusted), PRN  heparin (PORCINE), 30 Units/kg (Adjusted), PRN  insulin aspart U-100, 0-5 Units, Q6H PRN  nitroGLYCERIN, 0.4 mg, Q5 Min PRN  pneumoc 20-shiva conj-dip cr(PF), 0.5 mL, vaccine x 1 dose  sodium chloride 0.9%, 10 mL, PRN  sodium chloride 0.9%, 10 mL, PRN  sodium chloride 0.9%, 10 mL, PRN      Chart check completed. Will continue plan of care.      Orientation: oriented x 4  Asya Coma Scale Score: 15     Lead Monitored: Lead II Rhythm: normal sinus rhythm    Cardiac/Telemetry Box Number: ICU 06  VTE Required Core Measure: Pharmacological prophylaxis initiated/maintained Last Bowel Movement: 05/14/24  Diet Cardiac Standard Tray  Voiding Characteristics: due to void (5861)  Kenneth Score: 17  Fall Risk Score: 11  Accucheck []   Freq?      Lines/Drains/Airways       Peripheral Intravenous Line  Duration                  Peripheral IV - Single Lumen 05/16/24 1005 18 G Left Hand 2 days         Peripheral IV -  Single Lumen 05/18/24 1413 20 G Right;Posterior Hand <1 day                       Problem: Fall Injury Risk  Goal: Absence of Fall and Fall-Related Injury  Outcome: Progressing     Problem: Skin Injury Risk Increased  Goal: Skin Health and Integrity  Outcome: Progressing     Problem: Dysrhythmia  Goal: Normalized Cardiac Rhythm  Outcome: Progressing

## 2024-05-18 NOTE — PROGRESS NOTES
Patient was transferred to the ICU status post MEGAN due to aspiration on 05/15/2024, upon improvement over the last 3 days patient is now stable for transfer back to the floor.  He has also had a cardioversion during his ICU stay and was initially started on amiodarone drip which was converted to p.o. on 05/18/2024.  Patient is also on a heparin drip which has been continued per Cardiology recommendations.  Patient denies any chest pain or shortness a breath but complains of left ankle to toe pain which is similar to his gout flare.  We will check uric acid level and start patient on his home gout medications.    Plan:  -follow up on Cardiology recommendations   -continue heparin drip   -new onset a flutter-status post cardioversion on 05/17 currently in normal sinus rhythm  -continue amiodarone p.o.  -acute hypoxemic respiratory failure for which patient is currently on 1 L nasal cannula.    Hospital medicine will reassume care as patient is being transferred out of ICU.

## 2024-05-18 NOTE — SUBJECTIVE & OBJECTIVE
ROS  Objective:     Vital Signs (Most Recent):  Temp: 97.4 °F (36.3 °C) (05/18/24 1726)  Pulse: 70 (05/18/24 1726)  Resp: 18 (05/18/24 1726)  BP: 119/76 (05/18/24 1726)  SpO2: 96 % (05/18/24 1726) Vital Signs (24h Range):  Temp:  [97.3 °F (36.3 °C)-98.3 °F (36.8 °C)] 97.4 °F (36.3 °C)  Pulse:  [66-77] 70  Resp:  [16-35] 18  SpO2:  [89 %-97 %] 96 %  BP: ()/(51-82) 119/76  Arterial Line BP: ()/(53-68) 100/60     Weight: 102.7 kg (226 lb 6.6 oz)  Body mass index is 28.3 kg/m².     SpO2: 96 %         Intake/Output Summary (Last 24 hours) at 5/18/2024 1730  Last data filed at 5/18/2024 1600  Gross per 24 hour   Intake 1358.61 ml   Output 850 ml   Net 508.61 ml       Lines/Drains/Airways       Peripheral Intravenous Line  Duration                  Peripheral IV - Single Lumen 05/16/24 1005 18 G Left Hand 2 days         Peripheral IV - Single Lumen 05/18/24 1413 20 G Right;Posterior Hand <1 day                       Physical Exam  Vitals and nursing note reviewed.   Constitutional:       General: He is not in acute distress.     Appearance: He is well-developed.   HENT:      Head: Normocephalic and atraumatic.   Cardiovascular:      Rate and Rhythm: Normal rate and regular rhythm.      Heart sounds: Normal heart sounds, S1 normal and S2 normal. No murmur heard.  Pulmonary:      Effort: Pulmonary effort is normal.      Breath sounds: Normal breath sounds.      Comments: Coarse BS  S/p extubated  Abdominal:      General: There is no distension.   Musculoskeletal:      Right lower leg: No edema.      Left lower leg: No edema.   Skin:     General: Skin is warm and dry.      Findings: No erythema.   Neurological:      Mental Status: He is alert.            Significant Labs: ABG:   Recent Labs   Lab 05/17/24  0337   PH 7.484*   PCO2 35.3   HCO3 26.5   POCSATURATED 99   BE 3*   , Blood Culture:   Recent Labs   Lab 05/17/24  0504   LABBLOO No Growth to date  No Growth to date  No Growth to date  No Growth to date  "  , BMP:   Recent Labs   Lab 05/17/24  0504 05/18/24  0409   * 106    135*   K 3.7 3.4*   CL 99 98   CO2 22* 25   BUN 34* 32*   CREATININE 2.1* 1.9*   CALCIUM 8.9 8.9   MG 2.3  --    , CMP   Recent Labs   Lab 05/17/24  0504 05/18/24  0409    135*   K 3.7 3.4*   CL 99 98   CO2 22* 25   * 106   BUN 34* 32*   CREATININE 2.1* 1.9*   CALCIUM 8.9 8.9   ANIONGAP 17* 12   , CBC   Recent Labs   Lab 05/17/24  0504 05/18/24  0409   WBC 10.92 9.07   HGB 15.9 13.7*   HCT 51.1 44.2    152   , INR No results for input(s): "INR", "PROTIME" in the last 48 hours., Lipid Panel   Recent Labs   Lab 05/18/24  0409   TRIG 66   , and Troponin No results for input(s): "TROPONINI" in the last 48 hours.    Significant Imaging: EKG:    "

## 2024-05-18 NOTE — SUBJECTIVE & OBJECTIVE
Bria Saldana is a 53-year-old with chronic kidney disease, OSAS, hypertension, and CHF presented to the emergency department with CHF, shortness of breath, and pedal edema. He was taken down for MEGAN after adequate diuresis. During MEGAN he dropped his heart rate post sedation and was electively intubated for airway protection.     Interval history, f/u decompensated heart failure:  5/17: patient remain intubated on mechanical vent. FiO2 weaned this morning. Currently on Levophed, heparin, and propofol. With weaning of sedation this morning; became more tachycardic. Cardiology at bedside and requested sedation to be placed back on and plan for cardioversion at bedside prior to extubation.   5/18: successfully extubated yesterday; remains on Heparin and Amiodarone infusions. Case discussed with Dr. Phillips. Plan to transition Amiodarone to po and transfer from ICU. Patient denies acute complaints. No acute events overnight. Off vasopressors.     Objective:     Vital Signs (Most Recent):  Temp: 97.9 °F (36.6 °C) (05/18/24 1539)  Pulse: 70 (05/18/24 1505)  Resp: 19 (05/18/24 1505)  BP: 116/82 (05/18/24 1535)  SpO2: (!) 91 % (05/18/24 1505) Vital Signs (24h Range):  Temp:  [97.3 °F (36.3 °C)-98.3 °F (36.8 °C)] 97.9 °F (36.6 °C)  Pulse:  [66-80] 70  Resp:  [16-35] 19  SpO2:  [89 %-97 %] 91 %  BP: ()/(51-82) 116/82  Arterial Line BP: ()/(53-68) 100/60   Weight: 102.7 kg (226 lb 6.6 oz);  Body mass index is 28.3 kg/m².      Intake/Output Summary (Last 24 hours) at 5/18/2024 1615  Last data filed at 5/18/2024 1500  Gross per 24 hour   Intake 1291.93 ml   Output 744 ml   Net 547.93 ml      Physical Exam  Vitals and nursing note reviewed.   HENT:      Head: Normocephalic.   Eyes:      Conjunctiva/sclera: Conjunctivae normal.   Cardiovascular:      Rate and Rhythm: Normal rate.   Pulmonary:      Effort: Pulmonary effort is normal.   Abdominal:      Palpations: Abdomen is soft.   Musculoskeletal:         General: Normal  range of motion.      Cervical back: Normal range of motion and neck supple.   Skin:     General: Skin is warm and dry.   Neurological:      Mental Status: He is alert and oriented to person, place, and time.   Psychiatric:         Mood and Affect: Mood normal.         Review of Systems: negative except as indicated in HPI    Significant Labs:  CBC/Anemia Profile:  Recent Labs   Lab 05/17/24  0504 05/18/24  0409   WBC 10.92 9.07   HGB 15.9 13.7*   HCT 51.1 44.2    152   MCV 82 82   RDW 18.0* 16.9*   Chemistries:  Recent Labs   Lab 05/17/24  0504 05/18/24  0409    135*   K 3.7 3.4*   CL 99 98   CO2 22* 25   BUN 34* 32*   CREATININE 2.1* 1.9*   CALCIUM 8.9 8.9   MG 2.3  --    PHOS 3.4  --

## 2024-05-18 NOTE — ASSESSMENT & PLAN NOTE
Previously held home antihypertensives due to hypotension requiring Vasopressors  Coreg as BP permits  Monitor hemodynamics and adjust medications as appropriate

## 2024-05-18 NOTE — ASSESSMENT & PLAN NOTE
-Presents with decompensated combined CHF, likely due to new onset atrial flutter  -IV diuretics held given bumped creatinine  -Continue Coreg, Isordil as BP permits  -Entresto held due to renal function  -Will evaluate in AM for possible MEGAN/DCCV    5/17/24  -Aspirated at completion of MEGAN  -Remains intubated in ICU  -Volume status stable, diurese prn  -Will optimize med regimen once extubated    05/18/24  Repeat BNP in AM  Continue BB  Resume entresto in AM

## 2024-05-18 NOTE — ASSESSMENT & PLAN NOTE
Patient has a diagnosis of pneumonia. The cause of the pneumonia is suspected to be bacterial in etiology but organism is not known. The pneumonia is worsening due to need for intubation . The patient has the following signs/symptoms of pneumonia: shortness of breath. The patient does have a current oxygen requirement and the patient does not have a home oxygen requirement. I have reviewed the pertinent imaging.     Intubated on 5/17; Extubated on 5/18 without complications  Follow cultures; continue empiric antibiotics for now  Follow chest imaging / ABGs as needed  Follow fever and WBC trends

## 2024-05-19 LAB
ANION GAP SERPL CALC-SCNC: 11 MMOL/L (ref 8–16)
APTT PPP: 51 SEC (ref 21–32)
BASOPHILS # BLD AUTO: 0.04 K/UL (ref 0–0.2)
BASOPHILS NFR BLD: 0.5 % (ref 0–1.9)
BNP SERPL-MCNC: 646 PG/ML (ref 0–99)
BUN SERPL-MCNC: 33 MG/DL (ref 6–20)
CALCIUM SERPL-MCNC: 9.1 MG/DL (ref 8.7–10.5)
CHLORIDE SERPL-SCNC: 98 MMOL/L (ref 95–110)
CO2 SERPL-SCNC: 27 MMOL/L (ref 23–29)
CREAT SERPL-MCNC: 1.9 MG/DL (ref 0.5–1.4)
DIFFERENTIAL METHOD BLD: ABNORMAL
EOSINOPHIL # BLD AUTO: 0.3 K/UL (ref 0–0.5)
EOSINOPHIL NFR BLD: 3.9 % (ref 0–8)
ERYTHROCYTE [DISTWIDTH] IN BLOOD BY AUTOMATED COUNT: 16.8 % (ref 11.5–14.5)
EST. GFR  (NO RACE VARIABLE): 42 ML/MIN/1.73 M^2
GLUCOSE SERPL-MCNC: 115 MG/DL (ref 70–110)
HCT VFR BLD AUTO: 44.1 % (ref 40–54)
HGB BLD-MCNC: 13.4 G/DL (ref 14–18)
IMM GRANULOCYTES # BLD AUTO: 0.03 K/UL (ref 0–0.04)
IMM GRANULOCYTES NFR BLD AUTO: 0.4 % (ref 0–0.5)
LYMPHOCYTES # BLD AUTO: 1.1 K/UL (ref 1–4.8)
LYMPHOCYTES NFR BLD: 14 % (ref 18–48)
MCH RBC QN AUTO: 25.6 PG (ref 27–31)
MCHC RBC AUTO-ENTMCNC: 30.4 G/DL (ref 32–36)
MCV RBC AUTO: 84 FL (ref 82–98)
MONOCYTES # BLD AUTO: 0.7 K/UL (ref 0.3–1)
MONOCYTES NFR BLD: 8.6 % (ref 4–15)
NEUTROPHILS # BLD AUTO: 5.6 K/UL (ref 1.8–7.7)
NEUTROPHILS NFR BLD: 72.6 % (ref 38–73)
NRBC BLD-RTO: 0 /100 WBC
OHS QRS DURATION: 102 MS
OHS QRS DURATION: 104 MS
OHS QTC CALCULATION: 497 MS
OHS QTC CALCULATION: 511 MS
PLATELET # BLD AUTO: 156 K/UL (ref 150–450)
PMV BLD AUTO: 10.9 FL (ref 9.2–12.9)
POCT GLUCOSE: 106 MG/DL (ref 70–110)
POCT GLUCOSE: 84 MG/DL (ref 70–110)
POCT GLUCOSE: 85 MG/DL (ref 70–110)
POCT GLUCOSE: 91 MG/DL (ref 70–110)
POTASSIUM SERPL-SCNC: 3.8 MMOL/L (ref 3.5–5.1)
RBC # BLD AUTO: 5.23 M/UL (ref 4.6–6.2)
SODIUM SERPL-SCNC: 136 MMOL/L (ref 136–145)
WBC # BLD AUTO: 7.67 K/UL (ref 3.9–12.7)

## 2024-05-19 PROCEDURE — 36415 COLL VENOUS BLD VENIPUNCTURE: CPT | Performed by: INTERNAL MEDICINE

## 2024-05-19 PROCEDURE — 25000003 PHARM REV CODE 250: Performed by: NURSE PRACTITIONER

## 2024-05-19 PROCEDURE — 25000003 PHARM REV CODE 250: Performed by: INTERNAL MEDICINE

## 2024-05-19 PROCEDURE — 63600175 PHARM REV CODE 636 W HCPCS: Performed by: PHYSICIAN ASSISTANT

## 2024-05-19 PROCEDURE — 99900035 HC TECH TIME PER 15 MIN (STAT)

## 2024-05-19 PROCEDURE — 85730 THROMBOPLASTIN TIME PARTIAL: CPT | Performed by: INTERNAL MEDICINE

## 2024-05-19 PROCEDURE — 83880 ASSAY OF NATRIURETIC PEPTIDE: CPT | Performed by: INTERNAL MEDICINE

## 2024-05-19 PROCEDURE — 99233 SBSQ HOSP IP/OBS HIGH 50: CPT | Mod: ,,, | Performed by: INTERNAL MEDICINE

## 2024-05-19 PROCEDURE — 25000003 PHARM REV CODE 250: Performed by: FAMILY MEDICINE

## 2024-05-19 PROCEDURE — 85025 COMPLETE CBC W/AUTO DIFF WBC: CPT | Performed by: PHYSICIAN ASSISTANT

## 2024-05-19 PROCEDURE — 63600175 PHARM REV CODE 636 W HCPCS: Performed by: NURSE PRACTITIONER

## 2024-05-19 PROCEDURE — 21400001 HC TELEMETRY ROOM

## 2024-05-19 PROCEDURE — 36415 COLL VENOUS BLD VENIPUNCTURE: CPT | Performed by: PHYSICIAN ASSISTANT

## 2024-05-19 PROCEDURE — 80048 BASIC METABOLIC PNL TOTAL CA: CPT | Performed by: INTERNAL MEDICINE

## 2024-05-19 RX ORDER — CARVEDILOL 6.25 MG/1
6.25 TABLET ORAL 2 TIMES DAILY
Status: DISCONTINUED | OUTPATIENT
Start: 2024-05-19 | End: 2024-05-21 | Stop reason: HOSPADM

## 2024-05-19 RX ADMIN — FAMOTIDINE 20 MG: 20 TABLET ORAL at 09:05

## 2024-05-19 RX ADMIN — ALLOPURINOL 100 MG: 100 TABLET ORAL at 09:05

## 2024-05-19 RX ADMIN — COLCHICINE 0.6 MG: 0.6 TABLET, FILM COATED ORAL at 09:05

## 2024-05-19 RX ADMIN — ATORVASTATIN CALCIUM 40 MG: 40 TABLET, FILM COATED ORAL at 08:05

## 2024-05-19 RX ADMIN — SACUBITRIL AND VALSARTAN 1 TABLET: 24; 26 TABLET, FILM COATED ORAL at 09:05

## 2024-05-19 RX ADMIN — MEROPENEM 2 G: 1 INJECTION INTRAVENOUS at 01:05

## 2024-05-19 RX ADMIN — AMIODARONE HYDROCHLORIDE 200 MG: 200 TABLET ORAL at 08:05

## 2024-05-19 RX ADMIN — AMIODARONE HYDROCHLORIDE 200 MG: 200 TABLET ORAL at 09:05

## 2024-05-19 RX ADMIN — MEROPENEM 2 G: 1 INJECTION INTRAVENOUS at 05:05

## 2024-05-19 RX ADMIN — MUPIROCIN: 20 OINTMENT TOPICAL at 08:05

## 2024-05-19 RX ADMIN — HEPARIN SODIUM 12 UNITS/KG/HR: 10000 INJECTION, SOLUTION INTRAVENOUS at 06:05

## 2024-05-19 RX ADMIN — FAMOTIDINE 20 MG: 20 TABLET ORAL at 08:05

## 2024-05-19 RX ADMIN — MUPIROCIN: 20 OINTMENT TOPICAL at 09:05

## 2024-05-19 RX ADMIN — SACUBITRIL AND VALSARTAN 1 TABLET: 24; 26 TABLET, FILM COATED ORAL at 08:05

## 2024-05-19 RX ADMIN — MEROPENEM 2 G: 1 INJECTION INTRAVENOUS at 11:05

## 2024-05-19 RX ADMIN — CARVEDILOL 6.25 MG: 6.25 TABLET, FILM COATED ORAL at 08:05

## 2024-05-19 RX ADMIN — CARVEDILOL 6.25 MG: 6.25 TABLET, FILM COATED ORAL at 09:05

## 2024-05-19 NOTE — ASSESSMENT & PLAN NOTE
-Presents with new onset atrial flutter  -HR in mid to low 100's  -Continue Coreg  -Heparin gtt initiated for AC  -Will reassess in AM for MEGAN/DCCV  -OP EP referral     5/17/24  -Aspirated upon completion of MEGAN yesterday  -Remains intubated, pro-ada positive, on abx  -DCCV planned today prior to extubation  -Will start amiodarone drip post DCCV  -Continue heparin gtt    05/18/24  On SR   Switch amio gtt to amio 200 mg bid  Continue heparin gtt    05/19/24    Sinus  Continue amio and heparin  NUKE in AM

## 2024-05-19 NOTE — ASSESSMENT & PLAN NOTE
Creatine stable for now. BMP reviewed- noted Estimated Creatinine Clearance: 59.1 mL/min (A) (based on SCr of 1.9 mg/dL (H)). according to latest data. Based on current GFR, CKD stage is stage 3 - GFR 30-59.  Monitor UOP and serial BMP and adjust therapy as needed. Renally dose meds. Avoid nephrotoxic medications and procedures.   Was The Patient On Physician Recommended Anticoagulation Therapy?: Please Select the Appropriate Response

## 2024-05-19 NOTE — ASSESSMENT & PLAN NOTE
Patient is identified as having Combined Systolic and Diastolic heart failure that is Acute on chronic. CHF is currently uncontrolled due to Continued edema of extremities. Latest ECHO performed and demonstrates- Results for orders placed during the hospital encounter of 05/14/24    Echo    Interpretation Summary    Left Ventricle: The left ventricle is mildly dilated. Severely increased ventricular mass. Severely increased wall thickness. There is concentric hypertrophy. Severe global hypokinesis present. There is severely reduced systolic function with a visually estimated ejection fraction of 15 - 20%. Ejection fraction by visual approximation is 18%. Grade II diastolic dysfunction.    Right Ventricle: Moderate right ventricular enlargement. Wall thickness is normal. Systolic function is moderately reduced.    Left Atrium: Left atrium is severely dilated.    Right Atrium: Right atrium is dilated.    Aortic Valve: The aortic valve is a trileaflet valve. There is mild aortic valve sclerosis. There is mild stenosis. Aortic valve area by VTI is 1.65 cm². Aortic valve peak velocity is 1.33 m/s. Mean gradient is 4 mmHg. The dimensionless index is 0.56.    Mitral Valve: There is mild to moderate regurgitation.    Tricuspid Valve: There is moderate regurgitation.    Pulmonary Artery: There is mild pulmonary hypertension. The estimated pulmonary artery systolic pressure is 42 mmHg.    IVC/SVC: Intermediate venous pressure at 8 mmHg.    Pericardium: There is a moderate circumferential effusion. Pericardial effusion is echolucent. No indication of cardiac tamponade.  . Continue Beta Blocker, Furosemide, and Nitrate/Vasodilator and monitor clinical status closely. Monitor on telemetry. Patient is on CHF pathway.  Monitor strict Is&Os and daily weights.  Place on fluid restriction of 1.5 L. Cardiology has been consulted. Continue to stress to patient importance of self efficacy and  on diet for CHF. Last BNP reviewed-  and noted below   Recent Labs   Lab 05/14/24  0556   BNP 1,817*       -significant change in EF, consulted cardiology  -patient is stable from a respiratory standpoint.  We will defer to Cardiology regarding need for Lasix  -continue Entresto and Coreg

## 2024-05-19 NOTE — PLAN OF CARE
A230/A230 SOLEDAD Fraser Sandro Saldana is a 53 y.o.male admitted on 5/14/2024 for Acute on chronic combined systolic (congestive) and diastolic (congestive) heart failure   Code Status: Full Code MRN: [id number]   Review of patient's allergies indicates:   Allergen Reactions    Penicillins Hives and Anaphylaxis     Past Medical History:   Diagnosis Date    Acute CHF 7/8/2019    Acute on chronic combined systolic (congestive) and diastolic (congestive) heart failure 9/23/2019    Allergy     CHF (congestive heart failure) 7/9/2019    CKD (chronic kidney disease) stage 3, GFR 30-59 ml/min 7/8/2019    Essential hypertension 6/1/2017    Hypertension associated with chronic kidney disease due to type 2 diabetes mellitus 2/28/2022    Mixed hyperlipidemia 3/10/2022    NATALYA (obstructive sleep apnea) 7/23/2018      PRN meds    sodium chloride 0.9%, , PRN  acetaminophen, 650 mg, Q6H PRN  dextrose 10%, 12.5 g, PRN  dextrose 10%, 25 g, PRN  glucagon (human recombinant), 1 mg, PRN  heparin (PORCINE), 60 Units/kg (Adjusted), PRN  heparin (PORCINE), 30 Units/kg (Adjusted), PRN  insulin aspart U-100, 0-5 Units, Q6H PRN  nitroGLYCERIN, 0.4 mg, Q5 Min PRN  pneumoc 20-shiva conj-dip cr(PF), 0.5 mL, vaccine x 1 dose  sodium chloride 0.9%, 10 mL, PRN  sodium chloride 0.9%, 10 mL, PRN  sodium chloride 0.9%, 10 mL, PRN      Chart check completed. Will continue plan of care.      Orientation: oriented x 4  Asya Coma Scale Score: 15     Lead Monitored: Lead II Rhythm: normal sinus rhythm    Cardiac/Telemetry Box Number: [id number]  VTE Required Core Measure: Pharmacological prophylaxis initiated/maintained Last Bowel Movement: 05/18/24  Diet Cardiac Standard Tray  Voiding Characteristics: voids spontaneously without difficulty  Kenneth Score: 20  Fall Risk Score: 9  Accucheck []   Freq?      Lines/Drains/Airways       Peripheral Intravenous Line  Duration                  Peripheral IV - Single Lumen 05/16/24 1005 18 G Left Hand 3 days          Peripheral IV - Single Lumen 05/18/24 1413 20 G Right;Posterior Hand 1 day                       Problem: Fall Injury Risk  Goal: Absence of Fall and Fall-Related Injury  Outcome: Progressing     Problem: Skin Injury Risk Increased  Goal: Skin Health and Integrity  Outcome: Progressing     Problem: Dysrhythmia  Goal: Normalized Cardiac Rhythm  Outcome: Progressing

## 2024-05-19 NOTE — PROGRESS NOTES
[facility] Medicine  Progress Note    Patient Name: Bria Saldana  MRN: [id number]  Patient Class: IP- Inpatient   Admission Date: 5/14/2024  Length of Stay: 5 days  Attending Physician: Diana Garcia MD  Primary Care Provider: Brenna Maravilla MD        Subjective:     Principal Problem:Acute on chronic combined systolic (congestive) and diastolic (congestive) heart failure        HPI:  The patient is a 54 yo male with past medical history of CHF, hypertension, CKD and NATALYA who presented to the ED with weakness, fluid build-up and elevated blood pressure. He reports the fluid built up and has his blood pressure running high. He is taking his medication as prescribed but his systolic blood pressure has been in the 180s. He has swelling in his legs and abdomen. He has had decreased appetite. He does not weigh himself. He reports the fluid is coming off with the IV lasix. He is hungry. Workup in the ED consistent with volume overload from heart failure. Hospital medicine consulted. Patient placed in observation.    Overview/Hospital Course:  Mr. Saldana was admitted on 05/14/2024 for CHF exacerbation he was started on IV Lasix and was planned for a MEGAN with cardioversion on 05/15/2024.  During his MEGAN patient aspirated and ended up being intubated and transferred to the ICU.  During his stay in the ICU he did have a cardioversion and was started on amiodarone drip post conversion.  He was transferred out of ICU on 05/18/2024 and had been converted from IV to p.o. amiodarone but was continued on a heparin drip.  Awaiting further recommendations from Cardiology.    Interval History:  Follow up for CHF exacerbation and new onset a flutter.  Patient was transferred out of ICU yesterday and doing well.  He is continued on heparin per recommendations from Cardiology.  Awaiting further recommendations.    Review of Systems  Objective:     Vital Signs (Most Recent):  Temp: 97.8 °F  (36.6 °C) (05/19/24 1144)  Pulse: 75 (05/19/24 1144)  Resp: 18 (05/19/24 1144)  BP: 106/63 (05/19/24 1144)  SpO2: (!) 92 % (05/19/24 1144) Vital Signs (24h Range):  Temp:  [97.4 °F (36.3 °C)-98.9 °F (37.2 °C)] 97.8 °F (36.6 °C)  Pulse:  [66-75] 75  Resp:  [18-24] 18  SpO2:  [89 %-98 %] 92 %  BP: ()/(53-82) 106/63     Weight: 102.7 kg (226 lb 6.6 oz)  Body mass index is 28.3 kg/m².    Intake/Output Summary (Last 24 hours) at 5/19/2024 1150  Last data filed at 5/19/2024 0825  Gross per 24 hour   Intake 448.5 ml   Output 526 ml   Net -77.5 ml         Physical Exam  Vitals and nursing note reviewed.   Constitutional:       Appearance: Normal appearance.   HENT:      Head: Normocephalic and atraumatic.      Nose: Nose normal.      Mouth/Throat:      Mouth: Mucous membranes are moist.   Eyes:      Extraocular Movements: Extraocular movements intact.      Conjunctiva/sclera: Conjunctivae normal.   Cardiovascular:      Rate and Rhythm: Normal rate and regular rhythm.      Pulses: Normal pulses.      Heart sounds: Normal heart sounds.   Pulmonary:      Effort: Pulmonary effort is normal.      Breath sounds: Normal breath sounds.   Abdominal:      General: Abdomen is flat. Bowel sounds are normal.      Palpations: Abdomen is soft.   Musculoskeletal:         General: Normal range of motion.      Cervical back: Normal range of motion and neck supple.   Skin:     General: Skin is warm.      Capillary Refill: Capillary refill takes less than 2 seconds.   Neurological:      Mental Status: He is alert and oriented to person, place, and time. Mental status is at baseline.   Psychiatric:         Mood and Affect: Mood normal.         Behavior: Behavior normal.             Significant Labs: All pertinent labs within the past 24 hours have been reviewed.  Recent Lab Results  (Last 5 results in the past 24 hours)        05/19/24  1138   05/19/24  0943   05/19/24  0942   05/18/24  1934   05/18/24  1814        Anion Gap     11            PTT     51.0  Comment: Refer to local heparin nomogram for intensity/dose specific   therapeutic   range.             Baso #   0.04             Basophil %   0.5             BUN     33           Calcium     9.1           Chloride     98           CO2     27           Creatinine     1.9           Differential Method   Automated             eGFR     42           Eos #   0.3             Eos %   3.9             Glucose     115           Gran # (ANC)   5.6             Gran %   72.6             Hematocrit   44.1             Hemoglobin   13.4             Immature Grans (Abs)   0.03  Comment: Mild elevation in immature granulocytes is non specific and   can be seen in a variety of conditions including stress response,   acute inflammation, trauma and pregnancy. Correlation with other   laboratory and clinical findings is essential.               Immature Granulocytes   0.4             Lymph #   1.1             Lymph %   14.0             MCH   25.6             MCHC   30.4             MCV   84             Mono #   0.7             Mono %   8.6             MPV   10.9             nRBC   0             Platelet Count   156             POCT Glucose 91       106   129       Potassium     3.8           RBC   5.23             RDW   16.8             Sodium     136           WBC   7.67                                    Significant Imaging:   X-Ray Chest AP Portable   Final Result      Please see above.         Electronically signed by: Gena Maher MD   Date:    05/17/2024   Time:    00:08      X-Ray Chest AP Portable   Final Result      1.  Partial volume loss within the right upper lobe.  Atelectasis in the left lung base.      2.  Endotracheal tube and right jugular central line in good positions described above.  Negative for pneumothorax.      3.  Stable findings as noted above.         Electronically signed by: Daniele Guerrero MD   Date:    05/16/2024   Time:    11:26      X-Ray Chest PA And Lateral   Final Result       Cardiomegaly and central vascular congestion.  Suspected small right-sided pleural effusion.         Electronically signed by: Tee Maher MD   Date:    05/13/2024   Time:    23:10           Assessment/Plan:      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 (congestive) and diastolic (congestive) heart failure  Patient is identified as having Combined Systolic and Diastolic heart failure that is Acute on chronic. CHF is currently uncontrolled due to Continued edema of extremities. Latest ECHO performed and demonstrates- Results for orders placed during the hospital encounter of 05/14/24    Echo    Interpretation Summary    Left Ventricle: The left ventricle is mildly dilated. Severely increased ventricular mass. Severely increased wall thickness. There is concentric hypertrophy. Severe global hypokinesis present. There is severely reduced systolic function with a visually estimated ejection fraction of 15 - 20%. Ejection fraction by visual approximation is 18%. Grade II diastolic dysfunction.    Right Ventricle: Moderate right ventricular enlargement. Wall thickness is normal. Systolic function is moderately reduced.    Left Atrium: Left atrium is severely dilated.    Right Atrium: Right atrium is dilated.    Aortic Valve: The aortic valve is a trileaflet valve. There is mild aortic valve sclerosis. There is mild stenosis. Aortic valve area by VTI is 1.65 cm². Aortic valve peak velocity is 1.33 m/s. Mean gradient is 4 mmHg. The dimensionless index is 0.56.    Mitral Valve: There is mild to moderate regurgitation.    Tricuspid Valve: There is moderate regurgitation.    Pulmonary Artery: There is mild pulmonary hypertension. The estimated pulmonary artery systolic pressure is 42 mmHg.    IVC/SVC: Intermediate venous pressure at 8 mmHg.    Pericardium: There is a moderate circumferential effusion. Pericardial effusion is echolucent. No indication of cardiac tamponade.  . Continue Beta Blocker, Furosemide, and  Nitrate/Vasodilator and monitor clinical status closely. Monitor on telemetry. Patient is on CHF pathway.  Monitor strict Is&Os and daily weights.  Place on fluid restriction of 1.5 L. Cardiology has been consulted. Continue to stress to patient importance of self efficacy and  on diet for CHF. Last BNP reviewed- and noted below   Recent Labs   Lab 05/14/24  0556   BNP 1,817*       -significant change in EF, consulted cardiology  -patient is stable from a respiratory standpoint.  We will defer to Cardiology regarding need for Lasix  -continue Entresto and Coreg    New onset atrial flutter  -new onset  -heparin gtt-continued  -patient underwent telma on 05/15/2024 after which she was transferred to the ICU due to aspiration resulting in intubation.  -patient underwent cardioversion on 05/17/2024 after which she was started on amiodarone drip that was transitioned to p.o. amiodarone on 05/18/2024  -cardiology following      CKD (chronic kidney disease) stage 3, GFR 30-59 ml/min  Creatine stable for now. BMP reviewed- noted Estimated Creatinine Clearance: 59.1 mL/min (A) (based on SCr of 1.9 mg/dL (H)). according to latest data. Based on current GFR, CKD stage is stage 3 - GFR 30-59.  Monitor UOP and serial BMP and adjust therapy as needed. Renally dose meds. Avoid nephrotoxic medications and procedures.    Elevated troponin  -flat      NATALYA (obstructive sleep apnea)  -nightly CPAP      Essential hypertension  Chronic, . Latest blood pressure and vitals reviewed-     Temp:  [97.4 °F (36.3 °C)-98.9 °F (37.2 °C)]   Pulse:  [66-75]   Resp:  [18-24]   BP: ()/(53-82)   SpO2:  [89 %-98 %] .   Home meds for hypertension were reviewed and noted below.   Hypertension Medications               carvediloL (COREG) 25 MG tablet Take 1 tablet (25 mg total) by mouth 2 (two) times daily.    furosemide (LASIX) 80 MG tablet Take 1 tablet (80 mg total) by mouth 2 (two) times daily.    hydrALAZINE (APRESOLINE) 50 MG tablet Take  1 tablet (50 mg total) by mouth every 8 (eight) hours.    isosorbide dinitrate (ISORDIL) 20 MG tablet TAKE 1 TABLET BY MOUTH THREE TIMES DAILY    metOLazone (ZAROXOLYN) 2.5 MG tablet Take 1 tablet (2.5 mg total) by mouth once for 1 dose, then repeat if directed by doctor.    sacubitriL-valsartan (ENTRESTO) 24-26 mg per tablet Take 1 tablet by mouth 2 (two) times daily.    spironolactone (ALDACTONE) 25 MG tablet Take 1 tablet (25 mg total) by mouth once daily.            While in the hospital, will manage blood pressure as follows; Continue home antihypertensive regimen          VTE Risk Mitigation (From admission, onward)           Ordered     heparin 25,000 units in dextrose 5% (100 units/ml) IV bolus from bag LOW INTENSITY nomogram - [facility]  As needed (PRN)        Question:  Heparin Infusion Adjustment (DO NOT MODIFY ANSWER)  Answer:  \\[email or web address]\epic\Images\Pharmacy\HeparinInfusions\heparin LOW INTENSITY nomogram for [facility] [id number]    05/15/24 1405     heparin 25,000 units in dextrose 5% (100 units/ml) IV bolus from bag LOW INTENSITY nomogram - [facility]  As needed (PRN)        Question:  Heparin Infusion Adjustment (DO NOT MODIFY ANSWER)  Answer:  [email or web address]\epic\Images\Pharmacy\HeparinInfusions\heparin LOW INTENSITY nomogram for [facility] [id number]    05/15/24 1405     heparin 25,000 units in dextrose 5% 250 mL (100 units/mL) infusion LOW INTENSITY nomogram - [facility]  Continuous        Question:  Begin at (units/kg/hr)  Answer:  12    05/15/24 1405                    Discharge Planning   CARMELO:      Code Status: Full Code   Is the patient medically ready for discharge?:     Reason for patient still in hospital (select all that apply): Patient trending condition, Laboratory test, Treatment, and Consult recommendations  Discharge Plan A: Home with family                  Diana Garcia MD  Department of Hospital Medicine   '[place] - Telemetry ([facility])

## 2024-05-19 NOTE — ASSESSMENT & PLAN NOTE
-new onset  -heparin gtt-continued  -patient underwent telma on 05/15/2024 after which she was transferred to the ICU due to aspiration resulting in intubation.  -patient underwent cardioversion on 05/17/2024 after which she was started on amiodarone drip that was transitioned to p.o. amiodarone on 05/18/2024  -cardiology following

## 2024-05-19 NOTE — SUBJECTIVE & OBJECTIVE
Interval History:  Follow up for CHF exacerbation and new onset a flutter.  Patient was transferred out of ICU yesterday and doing well.  He is continued on heparin per recommendations from Cardiology.  Awaiting further recommendations.    Review of Systems  Objective:     Vital Signs (Most Recent):  Temp: 97.8 °F (36.6 °C) (05/19/24 1144)  Pulse: 75 (05/19/24 1144)  Resp: 18 (05/19/24 1144)  BP: 106/63 (05/19/24 1144)  SpO2: (!) 92 % (05/19/24 1144) Vital Signs (24h Range):  Temp:  [97.4 °F (36.3 °C)-98.9 °F (37.2 °C)] 97.8 °F (36.6 °C)  Pulse:  [66-75] 75  Resp:  [18-24] 18  SpO2:  [89 %-98 %] 92 %  BP: ()/(53-82) 106/63     Weight: 102.7 kg (226 lb 6.6 oz)  Body mass index is 28.3 kg/m².    Intake/Output Summary (Last 24 hours) at 5/19/2024 1150  Last data filed at 5/19/2024 0825  Gross per 24 hour   Intake 448.5 ml   Output 526 ml   Net -77.5 ml         Physical Exam  Vitals and nursing note reviewed.   Constitutional:       Appearance: Normal appearance.   HENT:      Head: Normocephalic and atraumatic.      Nose: Nose normal.      Mouth/Throat:      Mouth: Mucous membranes are moist.   Eyes:      Extraocular Movements: Extraocular movements intact.      Conjunctiva/sclera: Conjunctivae normal.   Cardiovascular:      Rate and Rhythm: Normal rate and regular rhythm.      Pulses: Normal pulses.      Heart sounds: Normal heart sounds.   Pulmonary:      Effort: Pulmonary effort is normal.      Breath sounds: Normal breath sounds.   Abdominal:      General: Abdomen is flat. Bowel sounds are normal.      Palpations: Abdomen is soft.   Musculoskeletal:         General: Normal range of motion.      Cervical back: Normal range of motion and neck supple.   Skin:     General: Skin is warm.      Capillary Refill: Capillary refill takes less than 2 seconds.   Neurological:      Mental Status: He is alert and oriented to person, place, and time. Mental status is at baseline.   Psychiatric:         Mood and Affect: Mood  normal.         Behavior: Behavior normal.             Significant Labs: All pertinent labs within the past 24 hours have been reviewed.  Recent Lab Results  (Last 5 results in the past 24 hours)        05/19/24  1138   05/19/24  0943   05/19/24  0942   05/18/24  1934   05/18/24  1814        Anion Gap     11           PTT     51.0  Comment: Refer to local heparin nomogram for intensity/dose specific   therapeutic   range.             Baso #   0.04             Basophil %   0.5             BUN     33           Calcium     9.1           Chloride     98           CO2     27           Creatinine     1.9           Differential Method   Automated             eGFR     42           Eos #   0.3             Eos %   3.9             Glucose     115           Gran # (ANC)   5.6             Gran %   72.6             Hematocrit   44.1             Hemoglobin   13.4             Immature Grans (Abs)   0.03  Comment: Mild elevation in immature granulocytes is non specific and   can be seen in a variety of conditions including stress response,   acute inflammation, trauma and pregnancy. Correlation with other   laboratory and clinical findings is essential.               Immature Granulocytes   0.4             Lymph #   1.1             Lymph %   14.0             MCH   25.6             MCHC   30.4             MCV   84             Mono #   0.7             Mono %   8.6             MPV   10.9             nRBC   0             Platelet Count   156             POCT Glucose 91       106   129       Potassium     3.8           RBC   5.23             RDW   16.8             Sodium     136           WBC   7.67                                    Significant Imaging:   X-Ray Chest AP Portable   Final Result      Please see above.         Electronically signed by: Gena Maher MD   Date:    05/17/2024   Time:    00:08      X-Ray Chest AP Portable   Final Result      1.  Partial volume loss within the right upper lobe.  Atelectasis in the left lung  base.      2.  Endotracheal tube and right jugular central line in good positions described above.  Negative for pneumothorax.      3.  Stable findings as noted above.         Electronically signed by: Daniele Guerrero MD   Date:    05/16/2024   Time:    11:26      X-Ray Chest PA And Lateral   Final Result      Cardiomegaly and central vascular congestion.  Suspected small right-sided pleural effusion.         Electronically signed by: Tee Maher MD   Date:    05/13/2024   Time:    23:10

## 2024-05-19 NOTE — SUBJECTIVE & OBJECTIVE
"  ROS  Objective:     Vital Signs (Most Recent):  Temp: 97.8 °F (36.6 °C) (05/19/24 1144)  Pulse: 75 (05/19/24 1144)  Resp: 18 (05/19/24 1144)  BP: 106/63 (05/19/24 1144)  SpO2: (!) 92 % (05/19/24 1144) Vital Signs (24h Range):  Temp:  [97.4 °F (36.3 °C)-98.9 °F (37.2 °C)] 97.8 °F (36.6 °C)  Pulse:  [66-75] 75  Resp:  [18-20] 18  SpO2:  [92 %-98 %] 92 %  BP: ()/(53-76) 106/63     Weight: 102.7 kg (226 lb 6.6 oz)  Body mass index is 28.3 kg/m².     SpO2: (!) 92 %         Intake/Output Summary (Last 24 hours) at 5/19/2024 1555  Last data filed at 5/19/2024 0825  Gross per 24 hour   Intake 162.81 ml   Output 526 ml   Net -363.19 ml       Lines/Drains/Airways       Peripheral Intravenous Line  Duration                  Peripheral IV - Single Lumen 05/16/24 1005 18 G Left Hand 3 days         Peripheral IV - Single Lumen 05/18/24 1413 20 G Right;Posterior Hand 1 day                       Physical Exam  Vitals and nursing note reviewed.   Constitutional:       General: He is not in acute distress.     Appearance: He is well-developed.   HENT:      Head: Normocephalic and atraumatic.   Cardiovascular:      Rate and Rhythm: Normal rate and regular rhythm.      Heart sounds: Normal heart sounds, S1 normal and S2 normal. No murmur heard.  Pulmonary:      Effort: Pulmonary effort is normal.      Breath sounds: Normal breath sounds.      Comments: Coarse BS  S/p extubated  Abdominal:      General: There is no distension.   Musculoskeletal:      Right lower leg: No edema.      Left lower leg: No edema.   Skin:     General: Skin is warm and dry.      Findings: No erythema.   Neurological:      Mental Status: He is alert.            Significant Labs: ABG: No results for input(s): "PH", "PCO2", "HCO3", "POCSATURATED", "BE" in the last 48 hours., Blood Culture: No results for input(s): "LABBLOO" in the last 48 hours., BMP:   Recent Labs   Lab 05/18/24  0409 05/19/24  0942    115*   * 136   K 3.4* 3.8   CL 98 98 " "  CO2 25 27   BUN 32* 33*   CREATININE 1.9* 1.9*   CALCIUM 8.9 9.1   , CMP   Recent Labs   Lab 05/18/24  0409 05/19/24  0942   * 136   K 3.4* 3.8   CL 98 98   CO2 25 27    115*   BUN 32* 33*   CREATININE 1.9* 1.9*   CALCIUM 8.9 9.1   ANIONGAP 12 11   , CBC   Recent Labs   Lab 05/18/24  0409 05/19/24  0943   WBC 9.07 7.67   HGB 13.7* 13.4*   HCT 44.2 44.1    156   , INR No results for input(s): "INR", "PROTIME" in the last 48 hours., Lipid Panel   Recent Labs   Lab 05/18/24  0409   TRIG 66   , and Troponin No results for input(s): "TROPONINI" in the last 48 hours.    Significant Imaging: EKG:    "

## 2024-05-19 NOTE — PROGRESS NOTES
[facility] Tele[facility] ([facility])  Cardiology  Progress Note    Patient Name: Bria Saldana  MRN: [id number]  Admission Date: 5/14/2024  Hospital Length of Stay: 5 days  Code Status: Full Code   Attending Physician: Diana Garcia MD   Primary Care Physician: Brenna Maravilla MD  Expected Discharge Date:   Principal Problem:Acute on chronic combined systolic (congestive) and diastolic (congestive) heart failure    Subjective:     Hospital Course:   Mr. Saldana is a 53 year old male patient whose current medical conditions include CKD, NATALYA, HTN, and combined CHF who presented to [facility] ED yesterday due to fluid overload. Patient complained of increased SOB associated with leg swelling, abdominal bloating, decreased appetite, and orthopnea. He denied any associated fever, chills, nausea, vomiting, diaphoresis, chest pain, palpitations, near syncope, or syncope. Initial workup in ED revealed creatinine of 1.9, BNP of 1817, and troponin of 0.082. Patient was subsequently admitted for further evaluation and treatment. Cardiology consulted to assist with management. Patient seen and examined today, resting in bed. Feels ok. SOB and LE edema improving. Diuresed over 8 L since admission. Remains CP free. He reports compliance with his medications. Followed in CHF clinic. EKGs reviewed, consistent with aflutter with underlying LVH. Troponin elevated but flat, 0.082>0.081>0.076. Prior MPI stress test in 2019 negative.    5/17/24-Patient seen and examined today, resting in bed. Currently intubated. Febrile overnight, pro-ada positive, on abx. Still in aflutter. DCCV prior to extubation. Labs reviewed/stable. Pro-ada +.    05/18/24. S/p extubated. On SR. Cr 1.9 pt denied chest pain,.    05/19/24    Remains sinus, Cr 1.9, VSS. Fatigue.       ROS  Objective:     Vital Signs (Most Recent):  Temp: 97.8 °F (36.6 °C) (05/19/24 1144)  Pulse: 75 (05/19/24 1144)  Resp: 18 (05/19/24 1144)  BP: 106/63 (05/19/24 1144)  SpO2: (!)  "92 % (05/19/24 1144) Vital Signs (24h Range):  Temp:  [97.4 °F (36.3 °C)-98.9 °F (37.2 °C)] 97.8 °F (36.6 °C)  Pulse:  [66-75] 75  Resp:  [18-20] 18  SpO2:  [92 %-98 %] 92 %  BP: ()/(53-76) 106/63     Weight: 102.7 kg (226 lb 6.6 oz)  Body mass index is 28.3 kg/m².     SpO2: (!) 92 %         Intake/Output Summary (Last 24 hours) at 5/19/2024 1555  Last data filed at 5/19/2024 0825  Gross per 24 hour   Intake 162.81 ml   Output 526 ml   Net -363.19 ml       Lines/Drains/Airways       Peripheral Intravenous Line  Duration                  Peripheral IV - Single Lumen 05/16/24 1005 18 G Left Hand 3 days         Peripheral IV - Single Lumen 05/18/24 1413 20 G Right;Posterior Hand 1 day                       Physical Exam  Vitals and nursing note reviewed.   Constitutional:       General: He is not in acute distress.     Appearance: He is well-developed.   HENT:      Head: Normocephalic and atraumatic.   Cardiovascular:      Rate and Rhythm: Normal rate and regular rhythm.      Heart sounds: Normal heart sounds, S1 normal and S2 normal. No murmur heard.  Pulmonary:      Effort: Pulmonary effort is normal.      Breath sounds: Normal breath sounds.      Comments: Coarse BS  S/p extubated  Abdominal:      General: There is no distension.   Musculoskeletal:      Right lower leg: No edema.      Left lower leg: No edema.   Skin:     General: Skin is warm and dry.      Findings: No erythema.   Neurological:      Mental Status: He is alert.            Significant Labs: ABG: No results for input(s): "PH", "PCO2", "HCO3", "POCSATURATED", "BE" in the last 48 hours., Blood Culture: No results for input(s): "LABBLOO" in the last 48 hours., BMP:   Recent Labs   Lab 05/18/24  0409 05/19/24  0942    115*   * 136   K 3.4* 3.8   CL 98 98   CO2 25 27   BUN 32* 33*   CREATININE 1.9* 1.9*   CALCIUM 8.9 9.1   , CMP   Recent Labs   Lab 05/18/24  0409 05/19/24  0942   * 136   K 3.4* 3.8   CL 98 98   CO2 25 27    " "115*   BUN 32* 33*   CREATININE 1.9* 1.9*   CALCIUM 8.9 9.1   ANIONGAP 12 11   , CBC   Recent Labs   Lab 05/18/24  0409 05/19/24  0943   WBC 9.07 7.67   HGB 13.7* 13.4*   HCT 44.2 44.1    156   , INR No results for input(s): "INR", "PROTIME" in the last 48 hours., Lipid Panel   Recent Labs   Lab 05/18/24  0409   TRIG 66   , and Troponin No results for input(s): "TROPONINI" in the last 48 hours.    Significant Imaging: EKG:    Assessment and Plan:       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 (congestive) and diastolic (congestive) heart failure  -Presents with decompensated combined CHF, likely due to new onset atrial flutter  -IV diuretics held given bumped creatinine  -Continue Coreg, Isordil as BP permits  -Entresto held due to renal function  -Will evaluate in AM for possible MEGAN/DCCV    5/17/24  -Aspirated at completion of MEGAN  -Remains intubated in ICU  -Volume status stable, diurese prn  -Will optimize med regimen once extubated    05/18/24  Repeat BNP in AM  Continue BB  Resume entresto in AM    05/19/24    BNP pending  Decrease coreg to 6.25 mg bid  Add entresto 24/26 mg bid  PT OT  NUKE in AM and lifeVest if indicated      Aspiration pneumonia of right upper lobe due to gastric secretions  -On abx, appreciate critical care    New onset atrial flutter  -Presents with new onset atrial flutter  -HR in mid to low 100's  -Continue Coreg  -Heparin gtt initiated for AC  -Will reassess in AM for MEGAN/DCCV  -OP EP referral     5/17/24  -Aspirated upon completion of MEGAN yesterday  -Remains intubated, pro-ada positive, on abx  -DCCV planned today prior to extubation  -Will start amiodarone drip post DCCV  -Continue heparin gtt    05/18/24  On SR   Switch amio gtt to amio 200 mg bid  Continue heparin gtt    05/19/24    Sinus  Continue amio and heparin  NUKE in AM     Severe obesity (BMI 35.0-35.9 with comorbidity)  -Weight loss    CKD (chronic kidney disease) stage 3, GFR 30-59 ml/min  -Creatinine bumped with IV " diuresis, additional Lasix held    5/17/24  -Creatinine stable    Elevated troponin  -Chronically elevated, flat, 0.082>0.081>0.076  -Continue OMT  -TTE with drop in EF likely related to new onset atrial flutter  -Prior MPI stress test in 2019 negative, can consider repeat ischemic workup IP vs OP once compensated/rhythm stable    NATALYA (obstructive sleep apnea)  -Nightly CPAP    Essential hypertension  -Continue Coreg, Isordil  -Entresto held due to renal function    5/17/24  -BP soft, on low dose pressor  -Will start amiodarone drip after DCCV        VTE Risk Mitigation (From admission, onward)           Ordered     heparin 25,000 units in dextrose 5% (100 units/ml) IV bolus from bag LOW INTENSITY nomogram - [facility]  As needed (PRN)        Question:  Heparin Infusion Adjustment (DO NOT MODIFY ANSWER)  Answer:  [email or web address]\Images\Pharmacy\HeparinInfusions\heparin LOW INTENSITY nomogram for [facility] [id number]    05/15/24 1405     heparin 25,000 units in dextrose 5% (100 units/ml) IV bolus from bag LOW INTENSITY nomogram - [facility]  As needed (PRN)        Question:  Heparin Infusion Adjustment (DO NOT MODIFY ANSWER)  Answer:  [email or web address]\Images\Pharmacy\HeparinInfusions\heparin LOW INTENSITY nomogram for [facility] [id number]    05/15/24 1405     heparin 25,000 units in dextrose 5% 250 mL (100 units/mL) infusion LOW INTENSITY nomogram - [facility]  Continuous        Question:  Begin at (units/kg/hr)  Answer:  12    05/15/24 1405                    Kiel Phillips MD  Cardiology  [facility] - Telemetry ([facility])

## 2024-05-19 NOTE — ASSESSMENT & PLAN NOTE
-Presents with decompensated combined CHF, likely due to new onset atrial flutter  -IV diuretics held given bumped creatinine  -Continue Coreg, Isordil as BP permits  -Entresto held due to renal function  -Will evaluate in AM for possible MEGAN/DCCV    5/17/24  -Aspirated at completion of MEGAN  -Remains intubated in ICU  -Volume status stable, diurese prn  -Will optimize med regimen once extubated    05/18/24  Repeat BNP in AM  Continue BB  Resume entresto in AM    05/19/24    BNP pending  Decrease coreg to 6.25 mg bid  Add entresto 24/26 mg bid  PT OT  NUKE in AM and lifeVest if indicated

## 2024-05-19 NOTE — HOSPITAL COURSE
Mr. Saldana was admitted on 05/14/2024 for CHF exacerbation he was started on IV Lasix and was planned for a MEGAN with cardioversion on 05/15/2024.  During his MEGAN patient aspirated and ended up being intubated and transferred to the ICU.  During his stay in the ICU he did have a cardioversion and was started on amiodarone drip post conversion.  He was transferred out of ICU on 05/18/2024 and had been converted from IV to p.o. amiodarone but was continued on a heparin drip. Cardiology.  Recommended continuing medical therapy  Nuclear med stress negative  Patient with significant cardiomyopathy.  Medications adjusted to include Entresto.  He was cardioverted in room remained in sinus rhythm.  He is loaded with amiodarone and we will continue to taper to maintenance dose.  Patient was instructed on cardiac diet with 1500 cc fluid restriction.  He will follow up outpatient with congestive heart failure Clinic and Cardiology as well as electrophysiology.  Patient seen and examined on day of discharge and stable for discharge home.

## 2024-05-19 NOTE — ASSESSMENT & PLAN NOTE
Chronic, . Latest blood pressure and vitals reviewed-     Temp:  [97.4 °F (36.3 °C)-98.9 °F (37.2 °C)]   Pulse:  [66-75]   Resp:  [18-24]   BP: ()/(53-82)   SpO2:  [89 %-98 %] .   Home meds for hypertension were reviewed and noted below.   Hypertension Medications               carvediloL (COREG) 25 MG tablet Take 1 tablet (25 mg total) by mouth 2 (two) times daily.    furosemide (LASIX) 80 MG tablet Take 1 tablet (80 mg total) by mouth 2 (two) times daily.    hydrALAZINE (APRESOLINE) 50 MG tablet Take 1 tablet (50 mg total) by mouth every 8 (eight) hours.    isosorbide dinitrate (ISORDIL) 20 MG tablet TAKE 1 TABLET BY MOUTH THREE TIMES DAILY    metOLazone (ZAROXOLYN) 2.5 MG tablet Take 1 tablet (2.5 mg total) by mouth once for 1 dose, then repeat if directed by doctor.    sacubitriL-valsartan (ENTRESTO) 24-26 mg per tablet Take 1 tablet by mouth 2 (two) times daily.    spironolactone (ALDACTONE) 25 MG tablet Take 1 tablet (25 mg total) by mouth once daily.            While in the hospital, will manage blood pressure as follows; Continue home antihypertensive regimen

## 2024-05-20 LAB
ANION GAP SERPL CALC-SCNC: 12 MMOL/L (ref 8–16)
APTT PPP: 40 SEC (ref 21–32)
BACTERIA SPEC AEROBE CULT: NORMAL
BACTERIA SPEC AEROBE CULT: NORMAL
BASOPHILS # BLD AUTO: 0.05 K/UL (ref 0–0.2)
BASOPHILS NFR BLD: 0.8 % (ref 0–1.9)
BUN SERPL-MCNC: 30 MG/DL (ref 6–20)
CALCIUM SERPL-MCNC: 8.5 MG/DL (ref 8.7–10.5)
CHLORIDE SERPL-SCNC: 103 MMOL/L (ref 95–110)
CO2 SERPL-SCNC: 23 MMOL/L (ref 23–29)
CREAT SERPL-MCNC: 1.6 MG/DL (ref 0.5–1.4)
CV STRESS BASE HR: 60 BPM
DIFFERENTIAL METHOD BLD: ABNORMAL
EJECTION FRACTION- HIGH: 73 %
END DIASTOLIC INDEX-HIGH: 165 ML/M2
END DIASTOLIC INDEX-LOW: 101 ML/M2
END SYSTOLIC INDEX-HIGH: 64 ML/M2
END SYSTOLIC INDEX-LOW: 28 ML/M2
EOSINOPHIL # BLD AUTO: 0.4 K/UL (ref 0–0.5)
EOSINOPHIL NFR BLD: 6.7 % (ref 0–8)
ERYTHROCYTE [DISTWIDTH] IN BLOOD BY AUTOMATED COUNT: 16.6 % (ref 11.5–14.5)
EST. GFR  (NO RACE VARIABLE): 52 ML/MIN/1.73 M^2
GLUCOSE SERPL-MCNC: 118 MG/DL (ref 70–110)
GRAM STN SPEC: NORMAL
HCT VFR BLD AUTO: 47 % (ref 40–54)
HGB BLD-MCNC: 14.4 G/DL (ref 14–18)
IMM GRANULOCYTES # BLD AUTO: 0.04 K/UL (ref 0–0.04)
IMM GRANULOCYTES NFR BLD AUTO: 0.6 % (ref 0–0.5)
LYMPHOCYTES # BLD AUTO: 1.3 K/UL (ref 1–4.8)
LYMPHOCYTES NFR BLD: 20.7 % (ref 18–48)
MCH RBC QN AUTO: 25.9 PG (ref 27–31)
MCHC RBC AUTO-ENTMCNC: 30.6 G/DL (ref 32–36)
MCV RBC AUTO: 85 FL (ref 82–98)
MONOCYTES # BLD AUTO: 0.7 K/UL (ref 0.3–1)
MONOCYTES NFR BLD: 11 % (ref 4–15)
NEUTROPHILS # BLD AUTO: 3.8 K/UL (ref 1.8–7.7)
NEUTROPHILS NFR BLD: 60.2 % (ref 38–73)
NRBC BLD-RTO: 0 /100 WBC
NUC REST EJECTION FRACTION: 30
NUC STRESS EJECTION FRACTION: 31 %
OHS CV CPX 85 PERCENT MAX PREDICTED HEART RATE MALE: 143
OHS CV CPX MAX PREDICTED HEART RATE: 168
OHS CV CPX PATIENT IS FEMALE: 0
OHS CV CPX PATIENT IS MALE: 1
OHS CV CPX PEAK HEAR RATE: 80 BPM
OHS CV CPX PERCENT MAX PREDICTED HEART RATE ACHIEVED: 48
OHS QRS DURATION: 106 MS
OHS QTC CALCULATION: 515 MS
PLATELET # BLD AUTO: 178 K/UL (ref 150–450)
PMV BLD AUTO: 11 FL (ref 9.2–12.9)
POCT GLUCOSE: 113 MG/DL (ref 70–110)
POCT GLUCOSE: 130 MG/DL (ref 70–110)
POTASSIUM SERPL-SCNC: 3.6 MMOL/L (ref 3.5–5.1)
RBC # BLD AUTO: 5.56 M/UL (ref 4.6–6.2)
RETIRED EF AND QEF - SEE NOTES: 59 %
SODIUM SERPL-SCNC: 138 MMOL/L (ref 136–145)
WBC # BLD AUTO: 6.28 K/UL (ref 3.9–12.7)

## 2024-05-20 PROCEDURE — A9502 TC99M TETROFOSMIN: HCPCS | Performed by: INTERNAL MEDICINE

## 2024-05-20 PROCEDURE — 63600175 PHARM REV CODE 636 W HCPCS: Performed by: PHYSICIAN ASSISTANT

## 2024-05-20 PROCEDURE — 25000003 PHARM REV CODE 250: Performed by: INTERNAL MEDICINE

## 2024-05-20 PROCEDURE — 99900035 HC TECH TIME PER 15 MIN (STAT)

## 2024-05-20 PROCEDURE — 25000003 PHARM REV CODE 250: Performed by: FAMILY MEDICINE

## 2024-05-20 PROCEDURE — 80048 BASIC METABOLIC PNL TOTAL CA: CPT | Performed by: INTERNAL MEDICINE

## 2024-05-20 PROCEDURE — 85730 THROMBOPLASTIN TIME PARTIAL: CPT | Performed by: INTERNAL MEDICINE

## 2024-05-20 PROCEDURE — 63600175 PHARM REV CODE 636 W HCPCS: Performed by: NURSE PRACTITIONER

## 2024-05-20 PROCEDURE — 21400001 HC TELEMETRY ROOM

## 2024-05-20 PROCEDURE — 25000003 PHARM REV CODE 250: Performed by: NURSE PRACTITIONER

## 2024-05-20 PROCEDURE — 99232 SBSQ HOSP IP/OBS MODERATE 35: CPT | Mod: 25,,, | Performed by: INTERNAL MEDICINE

## 2024-05-20 PROCEDURE — 63600175 PHARM REV CODE 636 W HCPCS: Performed by: INTERNAL MEDICINE

## 2024-05-20 PROCEDURE — 85025 COMPLETE CBC W/AUTO DIFF WBC: CPT | Performed by: PHYSICIAN ASSISTANT

## 2024-05-20 PROCEDURE — 36415 COLL VENOUS BLD VENIPUNCTURE: CPT | Performed by: INTERNAL MEDICINE

## 2024-05-20 RX ORDER — REGADENOSON 0.08 MG/ML
0.4 INJECTION, SOLUTION INTRAVENOUS ONCE
Status: COMPLETED | OUTPATIENT
Start: 2024-05-20 | End: 2024-05-20

## 2024-05-20 RX ORDER — NAPROXEN SODIUM 220 MG/1
81 TABLET, FILM COATED ORAL DAILY
Status: DISCONTINUED | OUTPATIENT
Start: 2024-05-20 | End: 2024-05-21 | Stop reason: HOSPADM

## 2024-05-20 RX ADMIN — MEROPENEM 2 G: 1 INJECTION INTRAVENOUS at 05:05

## 2024-05-20 RX ADMIN — ALLOPURINOL 100 MG: 100 TABLET ORAL at 11:05

## 2024-05-20 RX ADMIN — TETROFOSMIN 33 MILLICURIE: 1.38 INJECTION, POWDER, LYOPHILIZED, FOR SOLUTION INTRAVENOUS at 11:05

## 2024-05-20 RX ADMIN — MEROPENEM 2 G: 1 INJECTION INTRAVENOUS at 11:05

## 2024-05-20 RX ADMIN — TETROFOSMIN 11 MILLICURIE: 1.38 INJECTION, POWDER, LYOPHILIZED, FOR SOLUTION INTRAVENOUS at 09:05

## 2024-05-20 RX ADMIN — MEROPENEM 2 G: 1 INJECTION INTRAVENOUS at 03:05

## 2024-05-20 RX ADMIN — FAMOTIDINE 20 MG: 20 TABLET ORAL at 08:05

## 2024-05-20 RX ADMIN — REGADENOSON 0.4 MG: 0.08 INJECTION, SOLUTION INTRAVENOUS at 11:05

## 2024-05-20 RX ADMIN — CARVEDILOL 6.25 MG: 6.25 TABLET, FILM COATED ORAL at 11:05

## 2024-05-20 RX ADMIN — ASPIRIN 81 MG CHEWABLE TABLET 81 MG: 81 TABLET CHEWABLE at 03:05

## 2024-05-20 RX ADMIN — AMIODARONE HYDROCHLORIDE 200 MG: 200 TABLET ORAL at 08:05

## 2024-05-20 RX ADMIN — ATORVASTATIN CALCIUM 40 MG: 40 TABLET, FILM COATED ORAL at 08:05

## 2024-05-20 RX ADMIN — SACUBITRIL AND VALSARTAN 1 TABLET: 24; 26 TABLET, FILM COATED ORAL at 11:05

## 2024-05-20 RX ADMIN — AMIODARONE HYDROCHLORIDE 200 MG: 200 TABLET ORAL at 11:05

## 2024-05-20 RX ADMIN — MUPIROCIN: 20 OINTMENT TOPICAL at 10:05

## 2024-05-20 RX ADMIN — COLCHICINE 0.6 MG: 0.6 TABLET, FILM COATED ORAL at 11:05

## 2024-05-20 RX ADMIN — SACUBITRIL AND VALSARTAN 1 TABLET: 24; 26 TABLET, FILM COATED ORAL at 08:05

## 2024-05-20 RX ADMIN — FAMOTIDINE 20 MG: 20 TABLET ORAL at 11:05

## 2024-05-20 RX ADMIN — CARVEDILOL 6.25 MG: 6.25 TABLET, FILM COATED ORAL at 08:05

## 2024-05-20 RX ADMIN — HEPARIN SODIUM 12 UNITS/KG/HR: 10000 INJECTION, SOLUTION INTRAVENOUS at 05:05

## 2024-05-20 NOTE — PT/OT/SLP PROGRESS
Physical Therapy      Patient Name:  Bria Saldana   MRN:  [id number]    PT orders received, EVAL initiated via chart review. Patient not seen today secondary to Testing/imaging (stress test). Will continue efforts.    Divina Root, PT, DPT  5/20/2024   0010

## 2024-05-20 NOTE — SUBJECTIVE & OBJECTIVE
Interval History:     Review of Systems   Constitutional: Negative. Negative for weight gain.   HENT: Negative.     Eyes: Negative.    Cardiovascular: Negative.  Negative for chest pain, leg swelling and palpitations.   Respiratory: Negative.  Negative for shortness of breath.    Endocrine: Negative.    Hematologic/Lymphatic: Negative.    Skin: Negative.    Musculoskeletal:  Negative for muscle weakness.   Gastrointestinal: Negative.    Genitourinary: Negative.    Neurological: Negative.  Negative for dizziness.   Psychiatric/Behavioral: Negative.     Allergic/Immunologic: Negative.    All other systems reviewed and are negative.    Objective:     Vital Signs (Most Recent):  Temp: 97.8 °F (36.6 °C) (05/20/24 0802)  Pulse: 68 (05/20/24 1135)  Resp: 15 (05/20/24 0802)  BP: (Abnormal) 153/105 (05/20/24 1135)  SpO2: 95 % (05/20/24 0802) Vital Signs (24h Range):  Temp:  [97.8 °F (36.6 °C)-98.3 °F (36.8 °C)] 97.8 °F (36.6 °C)  Pulse:  [68-82] 68  Resp:  [15-18] 15  SpO2:  [91 %-97 %] 95 %  BP: (106-153)/() 153/105     Weight: 102.5 kg (226 lb)  Body mass index is 28.25 kg/m².     SpO2: 95 %         Intake/Output Summary (Last 24 hours) at 5/20/2024 1443  Last data filed at 5/20/2024 1128  Gross per 24 hour   Intake 278.23 ml   Output 1 ml   Net 277.23 ml       Lines/Drains/Airways       Peripheral Intravenous Line       Name Duration         Peripheral IV - Single Lumen 05/16/24 1005 18 G Left Hand 4 days         Peripheral IV - Single Lumen 05/18/24 1413 20 G Right;Posterior Hand 2 days                       Physical Exam  Vitals and nursing note reviewed.   Constitutional:       Appearance: He is well-developed.   HENT:      Head: Normocephalic and atraumatic.   Eyes:      Conjunctiva/sclera: Conjunctivae normal.      Pupils: Pupils are equal, round, and reactive to light.   Cardiovascular:      Rate and Rhythm: Normal rate and regular rhythm.      Pulses: Intact distal pulses.      Heart sounds: Normal heart  sounds.   Pulmonary:      Effort: Pulmonary effort is normal.      Breath sounds: Normal breath sounds.   Abdominal:      General: Bowel sounds are normal.      Palpations: Abdomen is soft.   Musculoskeletal:      Cervical back: Normal range of motion and neck supple.   Skin:     General: Skin is warm and dry.   Neurological:      Mental Status: He is alert and oriented to person, place, and time.            Significant Labs: All pertinent lab results from the last 24 hours have been reviewed.    Significant Imaging: X-Ray: CXR: X-Ray Chest 1 View (CXR): No results found for this visit on 05/14/24.

## 2024-05-20 NOTE — PROGRESS NOTES
[facility] - Tele[facility] ([facility])  Cardiology  Progress Note    Patient Name: Bria Saldana  MRN: [id number]  Admission Date: 5/14/2024  Hospital Length of Stay: 6 days  Code Status: Full Code   Attending Physician: Kendal Oleary MD   Primary Care Physician: Brenna Maravilla MD  Expected Discharge Date:   Principal Problem:Acute on chronic combined systolic (congestive) and diastolic (congestive) heart failure    Subjective:     Hospital Course:   Mr. Saldana is a 53 year old male patient whose current medical conditions include CKD, NATALYA, HTN, and combined CHF who presented to [facility] ED yesterday due to fluid overload. Patient complained of increased SOB associated with leg swelling, abdominal bloating, decreased appetite, and orthopnea. He denied any associated fever, chills, nausea, vomiting, diaphoresis, chest pain, palpitations, near syncope, or syncope. Initial workup in ED revealed creatinine of 1.9, BNP of 1817, and troponin of 0.082. Patient was subsequently admitted for further evaluation and treatment. Cardiology consulted to assist with management. Patient seen and examined today, resting in bed. Feels ok. SOB and LE edema improving. Diuresed over 8 L since admission. Remains CP free. He reports compliance with his medications. Followed in CHF clinic. EKGs reviewed, consistent with aflutter with underlying LVH. Troponin elevated but flat, 0.082>0.081>0.076. Prior MPI stress test in 2019 negative.    5/17/24-Patient seen and examined today, resting in bed. Currently intubated. Febrile overnight, pro-ada positive, on abx. Still in aflutter. DCCV prior to extubation. Labs reviewed/stable. Pro-ada +.    05/18/24. S/p extubated. On SR. Cr 1.9 pt denied chest pain,.    05/19/24    Remains sinus, Cr 1.9, VSS. Fatigue.     5-20-24 Nuclear stress test (-) no reversible defects. Continue medical tx, continue coreg, entresto, lasix, add aspirin    Interval History:     Review of Systems    Constitutional: Negative. Negative for weight gain.   HENT: Negative.     Eyes: Negative.    Cardiovascular: Negative.  Negative for chest pain, leg swelling and palpitations.   Respiratory: Negative.  Negative for shortness of breath.    Endocrine: Negative.    Hematologic/Lymphatic: Negative.    Skin: Negative.    Musculoskeletal:  Negative for muscle weakness.   Gastrointestinal: Negative.    Genitourinary: Negative.    Neurological: Negative.  Negative for dizziness.   Psychiatric/Behavioral: Negative.     Allergic/Immunologic: Negative.    All other systems reviewed and are negative.    Objective:     Vital Signs (Most Recent):  Temp: 97.8 °F (36.6 °C) (05/20/24 0802)  Pulse: 68 (05/20/24 1135)  Resp: 15 (05/20/24 0802)  BP: (Abnormal) 153/105 (05/20/24 1135)  SpO2: 95 % (05/20/24 0802) Vital Signs (24h Range):  Temp:  [97.8 °F (36.6 °C)-98.3 °F (36.8 °C)] 97.8 °F (36.6 °C)  Pulse:  [68-82] 68  Resp:  [15-18] 15  SpO2:  [91 %-97 %] 95 %  BP: (106-153)/() 153/105     Weight: 102.5 kg (226 lb)  Body mass index is 28.25 kg/m².     SpO2: 95 %         Intake/Output Summary (Last 24 hours) at 5/20/2024 1443  Last data filed at 5/20/2024 1128  Gross per 24 hour   Intake 278.23 ml   Output 1 ml   Net 277.23 ml       Lines/Drains/Airways       Peripheral Intravenous Line       Name Duration         Peripheral IV - Single Lumen 05/16/24 1005 18 G Left Hand 4 days         Peripheral IV - Single Lumen 05/18/24 1413 20 G Right;Posterior Hand 2 days                       Physical Exam  Vitals and nursing note reviewed.   Constitutional:       Appearance: He is well-developed.   HENT:      Head: Normocephalic and atraumatic.   Eyes:      Conjunctiva/sclera: Conjunctivae normal.      Pupils: Pupils are equal, round, and reactive to light.   Cardiovascular:      Rate and Rhythm: Normal rate and regular rhythm.      Pulses: Intact distal pulses.      Heart sounds: Normal heart sounds.   Pulmonary:      Effort: Pulmonary  effort is normal.      Breath sounds: Normal breath sounds.   Abdominal:      General: Bowel sounds are normal.      Palpations: Abdomen is soft.   Musculoskeletal:      Cervical back: Normal range of motion and neck supple.   Skin:     General: Skin is warm and dry.   Neurological:      Mental Status: He is alert and oriented to person, place, and time.            Significant Labs: All pertinent lab results from the last 24 hours have been reviewed.    Significant Imaging: X-Ray: CXR: X-Ray Chest 1 View (CXR): No results found for this visit on 05/14/24.  Assessment and Plan:     Brief HPI:     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 (congestive) and diastolic (congestive) heart failure  -Presents with decompensated combined CHF, likely due to new onset atrial flutter  -IV diuretics held given bumped creatinine  -Continue Coreg, Isordil as BP permits  -Entresto held due to renal function  -Will evaluate in AM for possible MEGAN/DCCV    5/17/24  -Aspirated at completion of MEGAN  -Remains intubated in ICU  -Volume status stable, diurese prn  -Will optimize med regimen once extubated    05/18/24  Repeat BNP in AM  Continue BB  Resume entresto in AM    05/19/24    BNP pending  Decrease coreg to 6.25 mg bid  Add entresto 24/26 mg bid  PT OT  NUKE in AM and lifeVest if indicated      Aspiration pneumonia of right upper lobe due to gastric secretions  -On abx, appreciate critical care    New onset atrial flutter  -Presents with new onset atrial flutter  -HR in mid to low 100's  -Continue Coreg  -Heparin gtt initiated for AC  -Will reassess in AM for MEGAN/DCCV  -OP EP referral     5/17/24  -Aspirated upon completion of MEGAN yesterday  -Remains intubated, pro-ada positive, on abx  -DCCV planned today prior to extubation  -Will start amiodarone drip post DCCV  -Continue heparin gtt    05/18/24  On SR   Switch amio gtt to amio 200 mg bid  Continue heparin gtt    05/19/24    Sinus  Continue amio and heparin  NUKE in AM     Severe  obesity (BMI 35.0-35.9 with comorbidity)  -Weight loss    CKD (chronic kidney disease) stage 3, GFR 30-59 ml/min  -Creatinine bumped with IV diuresis, additional Lasix held    5/17/24  -Creatinine stable    Elevated troponin  -Chronically elevated, flat, 0.082>0.081>0.076  -Continue OMT  -TTE with drop in EF likely related to new onset atrial flutter  -Prior MPI stress test in 2019 negative, can consider repeat ischemic workup IP vs OP once compensated/rhythm stable    NATALYA (obstructive sleep apnea)  -Nightly CPAP    Essential hypertension  -Continue Coreg, Isordil  -Entresto held due to renal function    5/17/24  -BP soft, on low dose pressor  -Will start amiodarone drip after DCCV        VTE Risk Mitigation (From admission, onward)      None            Slava Mcarthur MD  Cardiology  [facility] - Telemetry ([facility])

## 2024-05-20 NOTE — ASSESSMENT & PLAN NOTE
Patient is identified as having Combined Systolic and Diastolic heart failure that is Acute on chronic. CHF is currently uncontrolled due to Continued edema of extremities. Latest ECHO performed and demonstrates- Results for orders placed during the hospital encounter of 05/14/24    Echo    Interpretation Summary    Left Ventricle: The left ventricle is mildly dilated. Severely increased ventricular mass. Severely increased wall thickness. There is concentric hypertrophy. Severe global hypokinesis present. There is severely reduced systolic function with a visually estimated ejection fraction of 15 - 20%. Ejection fraction by visual approximation is 18%. Grade II diastolic dysfunction.    Right Ventricle: Moderate right ventricular enlargement. Wall thickness is normal. Systolic function is moderately reduced.    Left Atrium: Left atrium is severely dilated.    Right Atrium: Right atrium is dilated.    Aortic Valve: The aortic valve is a trileaflet valve. There is mild aortic valve sclerosis. There is mild stenosis. Aortic valve area by VTI is 1.65 cm². Aortic valve peak velocity is 1.33 m/s. Mean gradient is 4 mmHg. The dimensionless index is 0.56.    Mitral Valve: There is mild to moderate regurgitation.    Tricuspid Valve: There is moderate regurgitation.    Pulmonary Artery: There is mild pulmonary hypertension. The estimated pulmonary artery systolic pressure is 42 mmHg.    IVC/SVC: Intermediate venous pressure at 8 mmHg.    Pericardium: There is a moderate circumferential effusion. Pericardial effusion is echolucent. No indication of cardiac tamponade.  . Continue Beta Blocker, Furosemide, and Nitrate/Vasodilator and monitor clinical status closely. Monitor on telemetry. Patient is on CHF pathway.  Monitor strict Is&Os and daily weights.  Place on fluid restriction of 1.5 L. Cardiology has been consulted. Continue to stress to patient importance of self efficacy and  on diet for CHF. Last BNP  reviewed- and noted below   Recent Labs   Lab 05/19/24  1605   *       -significant change in EF, consulted cardiology  -patient is stable from a respiratory standpoint.  We will defer to Cardiology regarding need for Lasix  -continue Entresto and Coreg

## 2024-05-20 NOTE — ASSESSMENT & PLAN NOTE
Body mass index is 28.25 kg/m². Morbid obesity complicates all aspects of disease management from diagnostic modalities to treatment. Weight loss encouraged and health benefits explained to patient.

## 2024-05-20 NOTE — PROGRESS NOTES
[facility] Medicine  Progress Note    Patient Name: Bria Saldana  MRN: [id number]  Patient Class: IP- Inpatient   Admission Date: 5/14/2024  Length of Stay: 6 days  Attending Physician: Kendal Oleary MD  Primary Care Provider: Brenna Maravilla MD        Subjective:     Principal Problem:Acute on chronic combined systolic (congestive) and diastolic (congestive) heart failure        HPI:  The patient is a 54 yo male with past medical history of CHF, hypertension, CKD and NATALYA who presented to the ED with weakness, fluid build-up and elevated blood pressure. He reports the fluid built up and has his blood pressure running high. He is taking his medication as prescribed but his systolic blood pressure has been in the 180s. He has swelling in his legs and abdomen. He has had decreased appetite. He does not weigh himself. He reports the fluid is coming off with the IV lasix. He is hungry. Workup in the ED consistent with volume overload from heart failure. Hospital medicine consulted. Patient placed in observation.    Overview/Hospital Course:  Mr. Saldana was admitted on 05/14/2024 for CHF exacerbation he was started on IV Lasix and was planned for a MEGAN with cardioversion on 05/15/2024.  During his MEGAN patient aspirated and ended up being intubated and transferred to the ICU.  During his stay in the ICU he did have a cardioversion and was started on amiodarone drip post conversion.  He was transferred out of ICU on 05/18/2024 and had been converted from IV to p.o. amiodarone but was continued on a heparin drip. Cardiology.  Recommended continuing medical therapy  Nuclear med stress negative    Interval History:  Patient seen and examined at bedside.  Reports he is feeling better.  Reports no shortness a breath or chest pain.    Review of Systems   Constitutional:  Positive for fatigue.   Respiratory:  Positive for cough. Negative for shortness of breath.     Cardiovascular:  Positive for chest pain and leg swelling.   Neurological:  Positive for weakness.   All other systems reviewed and are negative.    Objective:     Vital Signs (Most Recent):  Temp: 97.8 °F (36.6 °C) (05/20/24 0802)  Pulse: 74 (05/20/24 1500)  Resp: 15 (05/20/24 0802)  BP: (!) 153/105 (05/20/24 1135)  SpO2: 95 % (05/20/24 0802) Vital Signs (24h Range):  Temp:  [97.8 °F (36.6 °C)-98.3 °F (36.8 °C)] 97.8 °F (36.6 °C)  Pulse:  [68-82] 74  Resp:  [15-18] 15  SpO2:  [95 %-97 %] 95 %  BP: (121-153)/() 153/105     Weight: 102.5 kg (226 lb)  Body mass index is 28.25 kg/m².    Intake/Output Summary (Last 24 hours) at 5/20/2024 1835  Last data filed at 5/20/2024 1128  Gross per 24 hour   Intake --   Output 1 ml   Net -1 ml         Physical Exam  Vitals and nursing note reviewed.   Constitutional:       Appearance: Normal appearance.   HENT:      Head: Normocephalic and atraumatic.      Nose: Nose normal.      Mouth/Throat:      Mouth: Mucous membranes are moist.   Eyes:      Extraocular Movements: Extraocular movements intact.      Conjunctiva/sclera: Conjunctivae normal.   Cardiovascular:      Rate and Rhythm: Normal rate and regular rhythm.      Pulses: Normal pulses.      Heart sounds: Normal heart sounds.   Pulmonary:      Effort: Pulmonary effort is normal.      Breath sounds: Normal breath sounds.   Abdominal:      General: Abdomen is flat. Bowel sounds are normal.      Palpations: Abdomen is soft.   Musculoskeletal:         General: Normal range of motion.      Cervical back: Normal range of motion and neck supple.   Skin:     General: Skin is warm.      Capillary Refill: Capillary refill takes less than 2 seconds.   Neurological:      Mental Status: He is alert and oriented to person, place, and time. Mental status is at baseline.   Psychiatric:         Mood and Affect: Mood normal.         Behavior: Behavior normal.             Significant Labs: All pertinent labs within the past 24 hours have  been reviewed.  CBC:   Recent Labs   Lab 05/19/24  0943 05/20/24  0534   WBC 7.67 6.28   HGB 13.4* 14.4   HCT 44.1 47.0    178     CMP:   Recent Labs   Lab 05/19/24  0942 05/20/24  0534    138   K 3.8 3.6   CL 98 103   CO2 27 23   * 118*   BUN 33* 30*   CREATININE 1.9* 1.6*   CALCIUM 9.1 8.5*   ANIONGAP 11 12       Significant Imaging: I have reviewed all pertinent imaging results/findings within the past 24 hours.    Assessment/Plan:      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 (congestive) and diastolic (congestive) heart failure  Patient is identified as having Combined Systolic and Diastolic heart failure that is Acute on chronic. CHF is currently uncontrolled due to Continued edema of extremities. Latest ECHO performed and demonstrates- Results for orders placed during the hospital encounter of 05/14/24    Echo    Interpretation Summary    Left Ventricle: The left ventricle is mildly dilated. Severely increased ventricular mass. Severely increased wall thickness. There is concentric hypertrophy. Severe global hypokinesis present. There is severely reduced systolic function with a visually estimated ejection fraction of 15 - 20%. Ejection fraction by visual approximation is 18%. Grade II diastolic dysfunction.    Right Ventricle: Moderate right ventricular enlargement. Wall thickness is normal. Systolic function is moderately reduced.    Left Atrium: Left atrium is severely dilated.    Right Atrium: Right atrium is dilated.    Aortic Valve: The aortic valve is a trileaflet valve. There is mild aortic valve sclerosis. There is mild stenosis. Aortic valve area by VTI is 1.65 cm². Aortic valve peak velocity is 1.33 m/s. Mean gradient is 4 mmHg. The dimensionless index is 0.56.    Mitral Valve: There is mild to moderate regurgitation.    Tricuspid Valve: There is moderate regurgitation.    Pulmonary Artery: There is mild pulmonary hypertension. The estimated pulmonary artery systolic pressure is 42  mmHg.    IVC/SVC: Intermediate venous pressure at 8 mmHg.    Pericardium: There is a moderate circumferential effusion. Pericardial effusion is echolucent. No indication of cardiac tamponade.  . Continue Beta Blocker, Furosemide, and Nitrate/Vasodilator and monitor clinical status closely. Monitor on telemetry. Patient is on CHF pathway.  Monitor strict Is&Os and daily weights.  Place on fluid restriction of 1.5 L. Cardiology has been consulted. Continue to stress to patient importance of self efficacy and  on diet for CHF. Last BNP reviewed- and noted below   Recent Labs   Lab 05/19/24  1605   *       -significant change in EF, consulted cardiology  -patient is stable from a respiratory standpoint.  We will defer to Cardiology regarding need for Lasix  -continue Entresto and Coreg    Acute hypoxemic respiratory failure  Patient with Hypoxic Respiratory failure which is Acute on chronic.  he is not on home oxygen. Supplemental oxygen was provided and noted-      .   Signs/symptoms of respiratory failure include- tachypnea, increased work of breathing, and respiratory distress. Contributing diagnoses includes - Aspiration Labs and images were reviewed. Patient Has not had a recent ABG. Will treat underlying causes and adjust management of respiratory failure as follows- continue antibiotics and respiratory hygiene    Aspiration pneumonia of right upper lobe due to gastric secretions                  New onset atrial flutter  -new onset  -heparin gtt-continued  -patient underwent telma on 05/15/2024 after which she was transferred to the ICU due to aspiration resulting in intubation.  -patient underwent cardioversion on 05/17/2024 after which she was started on amiodarone drip that was transitioned to p.o. amiodarone on 05/18/2024  -cardiology following    Nuclear medicine stress test negative we will continue optimal medical management      Severe obesity (BMI 35.0-35.9 with comorbidity)  Body mass index  is 28.25 kg/m². Morbid obesity complicates all aspects of disease management from diagnostic modalities to treatment. Weight loss encouraged and health benefits explained to patient.         CKD (chronic kidney disease) stage 3, GFR 30-59 ml/min  Creatine stable for now. BMP reviewed- noted Estimated Creatinine Clearance: 70 mL/min (A) (based on SCr of 1.6 mg/dL (H)). according to latest data. Based on current GFR, CKD stage is stage 3 - GFR 30-59.  Monitor UOP and serial BMP and adjust therapy as needed. Renally dose meds. Avoid nephrotoxic medications and procedures.    Elevated troponin  -flat      NATALYA (obstructive sleep apnea)  -nightly CPAP      Essential hypertension  Chronic, . Latest blood pressure and vitals reviewed-     Temp:  [97.8 °F (36.6 °C)-98.3 °F (36.8 °C)]   Pulse:  [68-82]   Resp:  [15-18]   BP: (121-153)/()   SpO2:  [95 %-97 %] .   Home meds for hypertension were reviewed and noted below.   Hypertension Medications               carvediloL (COREG) 25 MG tablet Take 1 tablet (25 mg total) by mouth 2 (two) times daily.    furosemide (LASIX) 80 MG tablet Take 1 tablet (80 mg total) by mouth 2 (two) times daily.    hydrALAZINE (APRESOLINE) 50 MG tablet Take 1 tablet (50 mg total) by mouth every 8 (eight) hours.    isosorbide dinitrate (ISORDIL) 20 MG tablet TAKE 1 TABLET BY MOUTH THREE TIMES DAILY    metOLazone (ZAROXOLYN) 2.5 MG tablet Take 1 tablet (2.5 mg total) by mouth once for 1 dose, then repeat if directed by doctor.    sacubitriL-valsartan (ENTRESTO) 24-26 mg per tablet Take 1 tablet by mouth 2 (two) times daily.    spironolactone (ALDACTONE) 25 MG tablet Take 1 tablet (25 mg total) by mouth once daily.            While in the hospital, will manage blood pressure as follows; Continue home antihypertensive regimen          VTE Risk Mitigation (From admission, onward)      None            Discharge Planning   CARMELO:      Code Status: Full Code   Is the patient medically ready for  discharge?:     Reason for patient still in hospital (select all that apply): Patient trending condition, Treatment, and Consult recommendations  Discharge Plan A: Home with family                  Kendal Colin MD  Department of Hospital Medicine   [facility] ([facility])

## 2024-05-20 NOTE — ASSESSMENT & PLAN NOTE
Patient with Hypoxic Respiratory failure which is Acute on chronic.  he is not on home oxygen. Supplemental oxygen was provided and noted-      .   Signs/symptoms of respiratory failure include- tachypnea, increased work of breathing, and respiratory distress. Contributing diagnoses includes - Aspiration Labs and images were reviewed. Patient Has not had a recent ABG. Will treat underlying causes and adjust management of respiratory failure as follows- continue antibiotics and respiratory hygiene

## 2024-05-20 NOTE — ASSESSMENT & PLAN NOTE
Chronic, . Latest blood pressure and vitals reviewed-     Temp:  [97.8 °F (36.6 °C)-98.3 °F (36.8 °C)]   Pulse:  [68-82]   Resp:  [15-18]   BP: (121-153)/()   SpO2:  [95 %-97 %] .   Home meds for hypertension were reviewed and noted below.   Hypertension Medications               carvediloL (COREG) 25 MG tablet Take 1 tablet (25 mg total) by mouth 2 (two) times daily.    furosemide (LASIX) 80 MG tablet Take 1 tablet (80 mg total) by mouth 2 (two) times daily.    hydrALAZINE (APRESOLINE) 50 MG tablet Take 1 tablet (50 mg total) by mouth every 8 (eight) hours.    isosorbide dinitrate (ISORDIL) 20 MG tablet TAKE 1 TABLET BY MOUTH THREE TIMES DAILY    metOLazone (ZAROXOLYN) 2.5 MG tablet Take 1 tablet (2.5 mg total) by mouth once for 1 dose, then repeat if directed by doctor.    sacubitriL-valsartan (ENTRESTO) 24-26 mg per tablet Take 1 tablet by mouth 2 (two) times daily.    spironolactone (ALDACTONE) 25 MG tablet Take 1 tablet (25 mg total) by mouth once daily.            While in the hospital, will manage blood pressure as follows; Continue home antihypertensive regimen

## 2024-05-20 NOTE — SUBJECTIVE & OBJECTIVE
Interval History:  Patient seen and examined at bedside.  Reports he is feeling better.  Reports no shortness a breath or chest pain.    Review of Systems   Constitutional:  Positive for fatigue.   Respiratory:  Positive for cough. Negative for shortness of breath.    Cardiovascular:  Positive for chest pain and leg swelling.   Neurological:  Positive for weakness.   All other systems reviewed and are negative.    Objective:     Vital Signs (Most Recent):  Temp: 97.8 °F (36.6 °C) (05/20/24 0802)  Pulse: 74 (05/20/24 1500)  Resp: 15 (05/20/24 0802)  BP: (!) 153/105 (05/20/24 1135)  SpO2: 95 % (05/20/24 0802) Vital Signs (24h Range):  Temp:  [97.8 °F (36.6 °C)-98.3 °F (36.8 °C)] 97.8 °F (36.6 °C)  Pulse:  [68-82] 74  Resp:  [15-18] 15  SpO2:  [95 %-97 %] 95 %  BP: (121-153)/() 153/105     Weight: 102.5 kg (226 lb)  Body mass index is 28.25 kg/m².    Intake/Output Summary (Last 24 hours) at 5/20/2024 1835  Last data filed at 5/20/2024 1128  Gross per 24 hour   Intake --   Output 1 ml   Net -1 ml         Physical Exam  Vitals and nursing note reviewed.   Constitutional:       Appearance: Normal appearance.   HENT:      Head: Normocephalic and atraumatic.      Nose: Nose normal.      Mouth/Throat:      Mouth: Mucous membranes are moist.   Eyes:      Extraocular Movements: Extraocular movements intact.      Conjunctiva/sclera: Conjunctivae normal.   Cardiovascular:      Rate and Rhythm: Normal rate and regular rhythm.      Pulses: Normal pulses.      Heart sounds: Normal heart sounds.   Pulmonary:      Effort: Pulmonary effort is normal.      Breath sounds: Normal breath sounds.   Abdominal:      General: Abdomen is flat. Bowel sounds are normal.      Palpations: Abdomen is soft.   Musculoskeletal:         General: Normal range of motion.      Cervical back: Normal range of motion and neck supple.   Skin:     General: Skin is warm.      Capillary Refill: Capillary refill takes less than 2 seconds.   Neurological:       Mental Status: He is alert and oriented to person, place, and time. Mental status is at baseline.   Psychiatric:         Mood and Affect: Mood normal.         Behavior: Behavior normal.             Significant Labs: All pertinent labs within the past 24 hours have been reviewed.  CBC:   Recent Labs   Lab 05/19/24  0943 05/20/24  0534   WBC 7.67 6.28   HGB 13.4* 14.4   HCT 44.1 47.0    178     CMP:   Recent Labs   Lab 05/19/24  0942 05/20/24  0534    138   K 3.8 3.6   CL 98 103   CO2 27 23   * 118*   BUN 33* 30*   CREATININE 1.9* 1.6*   CALCIUM 9.1 8.5*   ANIONGAP 11 12       Significant Imaging: I have reviewed all pertinent imaging results/findings within the past 24 hours.

## 2024-05-20 NOTE — ASSESSMENT & PLAN NOTE
Creatine stable for now. BMP reviewed- noted Estimated Creatinine Clearance: 70 mL/min (A) (based on SCr of 1.6 mg/dL (H)). according to latest data. Based on current GFR, CKD stage is stage 3 - GFR 30-59.  Monitor UOP and serial BMP and adjust therapy as needed. Renally dose meds. Avoid nephrotoxic medications and procedures.

## 2024-05-20 NOTE — ASSESSMENT & PLAN NOTE
"Medication is being filled for 1 time refill only due to:  Patient needs to be seen because it has been more than one year since last visit.   Due for physical.  Last 8/21/17  Mary Arias RN    Requested Prescriptions   Signed Prescriptions Disp Refills     valsartan-hydrochlorothiazide (DIOVAN-HCT) 320-25 MG per tablet 30 tablet 0     Sig: TAKE 1 TABLET DAILY    Angiotensin-II Receptors Failed    8/2/2018 12:18 AM       Failed - Blood pressure under 140/90 in past 12 months    BP Readings from Last 3 Encounters:   03/02/18 (!) 120/97   01/30/18 110/80   08/21/17 134/80                Failed - Normal serum creatinine on file in past 12 months    Recent Labs   Lab Test  01/30/18   1351   CR  1.32*            Failed - Normal serum potassium on file in past 12 months    Recent Labs   Lab Test  01/30/18   1351   POTASSIUM  3.3*                   Passed - Recent (12 mo) or future (30 days) visit within the authorizing provider's specialty    Patient had office visit in the last 12 months or has a visit in the next 30 days with authorizing provider or within the authorizing provider's specialty.  See \"Patient Info\" tab in inbasket, or \"Choose Columns\" in Meds & Orders section of the refill encounter.           Passed - Patient is age 18 or older            " -flat

## 2024-05-20 NOTE — ASSESSMENT & PLAN NOTE
-new onset  -heparin gtt-continued  -patient underwent telma on 05/15/2024 after which she was transferred to the ICU due to aspiration resulting in intubation.  -patient underwent cardioversion on 05/17/2024 after which she was started on amiodarone drip that was transitioned to p.o. amiodarone on 05/18/2024  -cardiology following    Nuclear medicine stress test negative we will continue optimal medical management

## 2024-05-21 VITALS
DIASTOLIC BLOOD PRESSURE: 92 MMHG | SYSTOLIC BLOOD PRESSURE: 137 MMHG | HEART RATE: 71 BPM | HEIGHT: 75 IN | OXYGEN SATURATION: 97 % | TEMPERATURE: 98 F | RESPIRATION RATE: 18 BRPM | WEIGHT: 221.13 LBS | BODY MASS INDEX: 27.49 KG/M2

## 2024-05-21 PROBLEM — J96.01 ACUTE HYPOXEMIC RESPIRATORY FAILURE: Status: RESOLVED | Noted: 2024-05-17 | Resolved: 2024-05-21

## 2024-05-21 PROBLEM — R79.89 ELEVATED TROPONIN: Status: RESOLVED | Noted: 2019-07-08 | Resolved: 2024-05-21

## 2024-05-21 LAB
ANION GAP SERPL CALC-SCNC: 10 MMOL/L (ref 8–16)
APTT PPP: 31 SEC (ref 21–32)
BUN SERPL-MCNC: 27 MG/DL (ref 6–20)
CALCIUM SERPL-MCNC: 9 MG/DL (ref 8.7–10.5)
CHLORIDE SERPL-SCNC: 105 MMOL/L (ref 95–110)
CO2 SERPL-SCNC: 25 MMOL/L (ref 23–29)
CREAT SERPL-MCNC: 1.6 MG/DL (ref 0.5–1.4)
EST. GFR  (NO RACE VARIABLE): 52 ML/MIN/1.73 M^2
GLUCOSE SERPL-MCNC: 83 MG/DL (ref 70–110)
POCT GLUCOSE: 82 MG/DL (ref 70–110)
POTASSIUM SERPL-SCNC: 4.5 MMOL/L (ref 3.5–5.1)
SODIUM SERPL-SCNC: 140 MMOL/L (ref 136–145)

## 2024-05-21 PROCEDURE — 97161 PT EVAL LOW COMPLEX 20 MIN: CPT

## 2024-05-21 PROCEDURE — 25000003 PHARM REV CODE 250: Performed by: NURSE PRACTITIONER

## 2024-05-21 PROCEDURE — 25000003 PHARM REV CODE 250: Performed by: INTERNAL MEDICINE

## 2024-05-21 PROCEDURE — 85730 THROMBOPLASTIN TIME PARTIAL: CPT | Performed by: INTERNAL MEDICINE

## 2024-05-21 PROCEDURE — 36415 COLL VENOUS BLD VENIPUNCTURE: CPT | Performed by: INTERNAL MEDICINE

## 2024-05-21 PROCEDURE — 97530 THERAPEUTIC ACTIVITIES: CPT

## 2024-05-21 PROCEDURE — 63600175 PHARM REV CODE 636 W HCPCS: Performed by: NURSE PRACTITIONER

## 2024-05-21 PROCEDURE — 25000003 PHARM REV CODE 250: Performed by: FAMILY MEDICINE

## 2024-05-21 PROCEDURE — 80048 BASIC METABOLIC PNL TOTAL CA: CPT | Performed by: INTERNAL MEDICINE

## 2024-05-21 RX ORDER — AMIODARONE HYDROCHLORIDE 200 MG/1
200 TABLET ORAL 2 TIMES DAILY
Qty: 180 TABLET | Refills: 0 | Status: SHIPPED | OUTPATIENT
Start: 2024-05-21 | End: 2024-08-19

## 2024-05-21 RX ORDER — NAPROXEN SODIUM 220 MG/1
81 TABLET, FILM COATED ORAL DAILY
Start: 2024-05-22 | End: 2024-05-31

## 2024-05-21 RX ORDER — CARVEDILOL 6.25 MG/1
6.25 TABLET ORAL 2 TIMES DAILY
Qty: 180 TABLET | Refills: 0 | Status: SHIPPED | OUTPATIENT
Start: 2024-05-21 | End: 2024-08-19

## 2024-05-21 RX ORDER — COLCHICINE 0.6 MG/1
0.6 TABLET ORAL DAILY
Qty: 90 TABLET | Refills: 0 | Status: SHIPPED | OUTPATIENT
Start: 2024-05-22 | End: 2024-08-20

## 2024-05-21 RX ADMIN — SACUBITRIL AND VALSARTAN 1 TABLET: 24; 26 TABLET, FILM COATED ORAL at 08:05

## 2024-05-21 RX ADMIN — AMIODARONE HYDROCHLORIDE 200 MG: 200 TABLET ORAL at 08:05

## 2024-05-21 RX ADMIN — ASPIRIN 81 MG CHEWABLE TABLET 81 MG: 81 TABLET CHEWABLE at 08:05

## 2024-05-21 RX ADMIN — MEROPENEM 2 G: 1 INJECTION INTRAVENOUS at 03:05

## 2024-05-21 RX ADMIN — FAMOTIDINE 20 MG: 20 TABLET ORAL at 08:05

## 2024-05-21 RX ADMIN — ALLOPURINOL 100 MG: 100 TABLET ORAL at 08:05

## 2024-05-21 RX ADMIN — CARVEDILOL 6.25 MG: 6.25 TABLET, FILM COATED ORAL at 08:05

## 2024-05-21 RX ADMIN — MEROPENEM 2 G: 1 INJECTION INTRAVENOUS at 05:05

## 2024-05-21 RX ADMIN — COLCHICINE 0.6 MG: 0.6 TABLET, FILM COATED ORAL at 08:05

## 2024-05-21 NOTE — PLAN OF CARE
PT EVAL complete. Pt INDEP with all mobility, ambulated 220ft INDEP, no AD. At this time, patient is functioning at their prior level of function and does not require further acute PT services. Please re-consult if situation changes. Recommending home upon d/c.
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
## 2024-05-21 NOTE — PLAN OF CARE
05/21/24 1139   Rounds   Attendance Provider;;Charge nurse;Physical therapist   Discharge Plan A Home with family   Why the patient remains in the hospital Requires continued medical care   Transition of Care Barriers None       Patient pending cardiology recommendations. Therapy recommends home, no needs.

## 2024-05-21 NOTE — PLAN OF CARE
[facility] - Telemetry (Hospital)  Discharge Final Note    Primary Care Provider: Brenna Maravilla MD    Expected Discharge Date: 5/21/2024    Final Discharge Note (most recent)       Final Note - 05/21/24 1501          Final Note    Assessment Type Final Discharge Note     Anticipated Discharge Disposition Home or Self Care     Hospital Resources/Appts/Education Provided Appointments scheduled and added to AVS        Post-Acute Status    Discharge Delays None known at this time                     Important Message from Medicare             Contact Info       Brenna Maravilla MD   Specialty: Family Medicine   Relationship: PCP - General    [address] [facility] [address]   Phone: [phone]       Next Steps: Follow up in 3 day(s)    Sienna Maguire PA   Specialty: Transplant    [address] [facility] [address]   Phone: [phone]       Next Steps: Follow up in 3 day(s)          DC Disposition: home with family  Family Notified: Patient at bedside  Transportation: personal transportation    Patient had no equipment or placement needs from .     Patient has PCP hospital follow up with Josie Cope NP, on 5/23/24 at 4 pm.

## 2024-05-21 NOTE — PLAN OF CARE
Problem: Fall Injury Risk  Goal: Absence of Fall and Fall-Related Injury  Outcome: Met     Problem: Skin Injury Risk Increased  Goal: Skin Health and Integrity  Outcome: Met     Problem: Dysrhythmia  Goal: Normalized Cardiac Rhythm  Outcome: Met

## 2024-05-21 NOTE — CHAPLAIN
Initial visit with patient.  Visited with patient to assess for spiritual and emotional needs.  Patient was currently doing well but did take the time to share with me what all is taking place with this health.  Patient currently had no other needs but did want me to pray for him.  I took time to do this before leaving and spiritual care remains available as needed.    Chaplain Macho Banks M.Div., [facility]

## 2024-05-22 LAB
BACTERIA BLD CULT: NORMAL
BACTERIA BLD CULT: NORMAL

## 2024-05-23 ENCOUNTER — OFFICE VISIT (OUTPATIENT)
Dept: CARDIOLOGY | Facility: CLINIC | Age: 54
End: 2024-05-23
Payer: MEDICAID

## 2024-05-23 ENCOUNTER — TELEPHONE (OUTPATIENT)
Dept: CARDIOLOGY | Facility: CLINIC | Age: 54
End: 2024-05-23

## 2024-05-23 VITALS
BODY MASS INDEX: 28.43 KG/M2 | DIASTOLIC BLOOD PRESSURE: 64 MMHG | HEART RATE: 82 BPM | HEIGHT: 75 IN | OXYGEN SATURATION: 96 % | WEIGHT: 228.63 LBS | SYSTOLIC BLOOD PRESSURE: 138 MMHG

## 2024-05-23 DIAGNOSIS — I50.43 ACUTE ON CHRONIC COMBINED SYSTOLIC (CONGESTIVE) AND DIASTOLIC (CONGESTIVE) HEART FAILURE: Primary | ICD-10-CM

## 2024-05-23 DIAGNOSIS — I10 ESSENTIAL HYPERTENSION: ICD-10-CM

## 2024-05-23 PROCEDURE — 99999 PR PBB SHADOW E&M-EST. PATIENT-LVL II: CPT | Mod: PBBFAC,,, | Performed by: PHYSICIAN ASSISTANT

## 2024-05-23 PROCEDURE — 99212 OFFICE O/P EST SF 10 MIN: CPT | Mod: PBBFAC | Performed by: PHYSICIAN ASSISTANT

## 2024-05-23 RX ORDER — SACUBITRIL AND VALSARTAN 24; 26 MG/1; MG/1
1 TABLET, FILM COATED ORAL 2 TIMES DAILY
Qty: 60 TABLET | Refills: 4 | Status: SHIPPED | OUTPATIENT
Start: 2024-05-23

## 2024-05-23 RX ORDER — FUROSEMIDE 80 MG/1
80 TABLET ORAL 2 TIMES DAILY
Qty: 60 TABLET | Refills: 3 | Status: ON HOLD | OUTPATIENT
Start: 2024-05-23 | End: 2024-06-04 | Stop reason: HOSPADM

## 2024-05-23 NOTE — PROGRESS NOTES
[facility] Provider Note (Follow-up) Consult Note      HPI:  Recent d/c from inpt, no dc summary available:    Mr. Saldana is a 53 year old male patient whose current medical conditions include CKD, NATALYA, HTN, and combined CHF who presented to [facility] ED yesterday due to fluid overload. Patient complained of increased SOB associated with leg swelling, abdominal bloating, decreased appetite, and orthopnea. He denied any associated fever, chills, nausea, vomiting, diaphoresis, chest pain, palpitations, near syncope, or syncope. Initial workup in ED revealed creatinine of 1.9, BNP of 1817, and troponin of 0.082. Patient was subsequently admitted for further evaluation and treatment. Cardiology consulted to assist with management. Patient seen and examined today, resting in bed. Feels ok. SOB and LE edema improving. Diuresed over 8 L since admission. Remains CP free. He reports compliance with his medications. Followed in CHF clinic. EKGs reviewed, consistent with aflutter with underlying LVH. Troponin elevated but flat, 0.082>0.081>0.076. Prior MPI stress test in 2019 negative.     5/17/24-Patient seen and examined today, resting in bed. Currently intubated. Febrile overnight, pro-ada positive, on abx. Still in aflutter. DCCV prior to extubation. Labs reviewed/stable. Pro-ada +.     05/18/24. S/p extubated. On SR. Cr 1.9 pt denied chest pain,.     05/19/24    Remains sinus, Cr 1.9, VSS. Fatigue.   Transitional Care Note    Family and/or Caretaker present at visit?  No.  Diagnostic tests reviewed/disposition: I have reviewed all completed as well as pending diagnostic tests at the time of discharge.  Disease/illness education: yes  Home health/community services discussion/referrals: Patient does not have home health established from hospital visit.  They do not need home health.  If needed, we will set up home health for the patient.   Establishment or re-establishment of referral orders for community resources: No other  necessary community resources.   Discussion with other health care providers: No discussion with other health care providers necessary.     Does not have his jardiance or ARNi    Has done ok since dc 2 days ago    No longer SOB, was not as diligent with his lasix as he should have been he feels, now has insurance so will be able to make appts          PHYSICAL: There were no vitals filed for this visit.       JVD: no,    Heart rhythm: regular  Cardiac murmur: No    S3: no  S4: no  Lungs: clear  Liver span: 10 cm:   Hepatojugular reflux: no  Edema: no,       ASSESSMENT: chronic systolic HF    PLAN:      Patient Instructions:   Instruct the patient to notify this clinic if HH, a physician or an advanced care provider wants to change medication one of their HF medications   Activity and Diet restrictions:   Recommend 2-3 gram sodium restriction and 1500cc- 2000cc fluid restriction.  Encourage physical activity with graded exercise program.  Requested patient to weigh themselves daily, and to notify us if their weight increases by more than 3 lbs in 1 day or 5 lbs in 3 days.    Assigned dry weight on home scale: 103 kg  Medication changes (include current dose and changed dose)  Refill lasix 80 mg BID  Refill ARNi and jardiance  HAs coreg  RTC 3 weeks with bmp  CHF edcuation done    EP fast donna referral

## 2024-05-29 ENCOUNTER — TELEPHONE (OUTPATIENT)
Dept: FAMILY MEDICINE | Facility: CLINIC | Age: 54
End: 2024-05-29
Payer: MEDICAID

## 2024-05-29 NOTE — TELEPHONE ENCOUNTER
----- Message from Leyda King sent at 5/29/2024 11:47 AM CDT -----  Contact: [phone]  patient called in requesting a call back, he wants to know who is his , please call back [phone]

## 2024-05-29 NOTE — TELEPHONE ENCOUNTER
Pt asked for help finding his social workers name and info.; informed the pt after searching, I do not see. It.

## 2024-05-31 ENCOUNTER — HOSPITAL ENCOUNTER (OUTPATIENT)
Facility: HOSPITAL | Age: 54
Discharge: HOME | End: 2024-06-04
Attending: EMERGENCY MEDICINE | Admitting: INTERNAL MEDICINE
Payer: MEDICAID

## 2024-05-31 DIAGNOSIS — I10 UNCONTROLLED HYPERTENSION: ICD-10-CM

## 2024-05-31 DIAGNOSIS — I10 ESSENTIAL HYPERTENSION: ICD-10-CM

## 2024-05-31 DIAGNOSIS — I50.9 CHF (CONGESTIVE HEART FAILURE): ICD-10-CM

## 2024-05-31 DIAGNOSIS — R11.2 NAUSEA & VOMITING: ICD-10-CM

## 2024-05-31 DIAGNOSIS — R79.89 ABNORMAL LFTS: ICD-10-CM

## 2024-05-31 DIAGNOSIS — I50.43 ACUTE ON CHRONIC COMBINED SYSTOLIC (CONGESTIVE) AND DIASTOLIC (CONGESTIVE) HEART FAILURE: ICD-10-CM

## 2024-05-31 DIAGNOSIS — I50.43 ACUTE ON CHRONIC COMBINED SYSTOLIC AND DIASTOLIC HEART FAILURE: Primary | ICD-10-CM

## 2024-05-31 DIAGNOSIS — I31.39 PERICARDIAL EFFUSION: ICD-10-CM

## 2024-05-31 DIAGNOSIS — N17.9 ACUTE RENAL FAILURE, UNSPECIFIED ACUTE RENAL FAILURE TYPE: ICD-10-CM

## 2024-05-31 LAB
ALBUMIN SERPL BCP-MCNC: 3.2 G/DL (ref 3.5–5.2)
ALP SERPL-CCNC: 197 U/L (ref 55–135)
ALT SERPL W/O P-5'-P-CCNC: 19 U/L (ref 10–44)
AMPHET+METHAMPHET UR QL: NEGATIVE
ANION GAP SERPL CALC-SCNC: 16 MMOL/L (ref 8–16)
AST SERPL-CCNC: 29 U/L (ref 10–40)
BACTERIA #/AREA URNS HPF: NORMAL /HPF
BARBITURATES UR QL SCN>200 NG/ML: NEGATIVE
BASOPHILS # BLD AUTO: 0.09 K/UL (ref 0–0.2)
BASOPHILS NFR BLD: 1.4 % (ref 0–1.9)
BENZODIAZ UR QL SCN>200 NG/ML: NEGATIVE
BILIRUB SERPL-MCNC: 4.3 MG/DL (ref 0.1–1)
BILIRUB UR QL STRIP: NEGATIVE
BNP SERPL-MCNC: 3402 PG/ML (ref 0–99)
BUN SERPL-MCNC: 23 MG/DL (ref 6–20)
BZE UR QL SCN: NEGATIVE
CALCIUM SERPL-MCNC: 9.3 MG/DL (ref 8.7–10.5)
CANNABINOIDS UR QL SCN: NEGATIVE
CHLORIDE SERPL-SCNC: 106 MMOL/L (ref 95–110)
CLARITY UR: CLEAR
CO2 SERPL-SCNC: 19 MMOL/L (ref 23–29)
COLOR UR: YELLOW
CREAT SERPL-MCNC: 2.1 MG/DL (ref 0.5–1.4)
CREAT UR-MCNC: 29.6 MG/DL (ref 23–375)
DIFFERENTIAL METHOD BLD: ABNORMAL
EOSINOPHIL # BLD AUTO: 0.1 K/UL (ref 0–0.5)
EOSINOPHIL NFR BLD: 1.6 % (ref 0–8)
ERYTHROCYTE [DISTWIDTH] IN BLOOD BY AUTOMATED COUNT: 18.4 % (ref 11.5–14.5)
EST. GFR  (NO RACE VARIABLE): 37 ML/MIN/1.73 M^2
GLUCOSE SERPL-MCNC: 92 MG/DL (ref 70–110)
GLUCOSE UR QL STRIP: ABNORMAL
HCT VFR BLD AUTO: 44.4 % (ref 40–54)
HGB BLD-MCNC: 13.8 G/DL (ref 14–18)
HGB UR QL STRIP: NEGATIVE
HYALINE CASTS #/AREA URNS LPF: 0 /LPF
IMM GRANULOCYTES # BLD AUTO: 0.03 K/UL (ref 0–0.04)
IMM GRANULOCYTES NFR BLD AUTO: 0.5 % (ref 0–0.5)
KETONES UR QL STRIP: NEGATIVE
LACTATE SERPL-SCNC: 0.8 MMOL/L (ref 0.5–2.2)
LEUKOCYTE ESTERASE UR QL STRIP: NEGATIVE
LIPASE SERPL-CCNC: 39 U/L (ref 4–60)
LYMPHOCYTES # BLD AUTO: 0.8 K/UL (ref 1–4.8)
LYMPHOCYTES NFR BLD: 11.9 % (ref 18–48)
MAGNESIUM SERPL-MCNC: 1.6 MG/DL (ref 1.6–2.6)
MCH RBC QN AUTO: 26.4 PG (ref 27–31)
MCHC RBC AUTO-ENTMCNC: 31.1 G/DL (ref 32–36)
MCV RBC AUTO: 85 FL (ref 82–98)
METHADONE UR QL SCN>300 NG/ML: NEGATIVE
MICROSCOPIC COMMENT: NORMAL
MONOCYTES # BLD AUTO: 0.5 K/UL (ref 0.3–1)
MONOCYTES NFR BLD: 7.8 % (ref 4–15)
NEUTROPHILS # BLD AUTO: 4.8 K/UL (ref 1.8–7.7)
NEUTROPHILS NFR BLD: 76.8 % (ref 38–73)
NITRITE UR QL STRIP: NEGATIVE
NRBC BLD-RTO: 0 /100 WBC
OPIATES UR QL SCN: NEGATIVE
PCP UR QL SCN>25 NG/ML: NEGATIVE
PH UR STRIP: 6 [PH] (ref 5–8)
PLATELET # BLD AUTO: 233 K/UL (ref 150–450)
PMV BLD AUTO: 11.3 FL (ref 9.2–12.9)
POCT GLUCOSE: 112 MG/DL (ref 70–110)
POCT GLUCOSE: 64 MG/DL (ref 70–110)
POCT GLUCOSE: 82 MG/DL (ref 70–110)
POTASSIUM SERPL-SCNC: 5.4 MMOL/L (ref 3.5–5.1)
PROT SERPL-MCNC: 7.3 G/DL (ref 6–8.4)
PROT UR QL STRIP: ABNORMAL
RBC # BLD AUTO: 5.23 M/UL (ref 4.6–6.2)
RBC #/AREA URNS HPF: 1 /HPF (ref 0–4)
SODIUM SERPL-SCNC: 141 MMOL/L (ref 136–145)
SP GR UR STRIP: 1.01 (ref 1–1.03)
TOXICOLOGY INFORMATION: NORMAL
TROPONIN I SERPL DL<=0.01 NG/ML-MCNC: 0.03 NG/ML (ref 0–0.03)
URN SPEC COLLECT METH UR: ABNORMAL
UROBILINOGEN UR STRIP-ACNC: NEGATIVE EU/DL
WBC # BLD AUTO: 6.28 K/UL (ref 3.9–12.7)
WBC #/AREA URNS HPF: 2 /HPF (ref 0–5)

## 2024-05-31 PROCEDURE — 63600175 PHARM REV CODE 636 W HCPCS: Performed by: EMERGENCY MEDICINE

## 2024-05-31 PROCEDURE — 93010 ELECTROCARDIOGRAM REPORT: CPT | Mod: ,,, | Performed by: STUDENT IN AN ORGANIZED HEALTH CARE EDUCATION/TRAINING PROGRAM

## 2024-05-31 PROCEDURE — G0378 HOSPITAL OBSERVATION PER HR: HCPCS

## 2024-05-31 PROCEDURE — 80307 DRUG TEST PRSMV CHEM ANLYZR: CPT | Performed by: EMERGENCY MEDICINE

## 2024-05-31 PROCEDURE — 85025 COMPLETE CBC W/AUTO DIFF WBC: CPT | Performed by: EMERGENCY MEDICINE

## 2024-05-31 PROCEDURE — 93005 ELECTROCARDIOGRAM TRACING: CPT

## 2024-05-31 PROCEDURE — 83605 ASSAY OF LACTIC ACID: CPT | Performed by: EMERGENCY MEDICINE

## 2024-05-31 PROCEDURE — 99214 OFFICE O/P EST MOD 30 MIN: CPT | Mod: ,,, | Performed by: STUDENT IN AN ORGANIZED HEALTH CARE EDUCATION/TRAINING PROGRAM

## 2024-05-31 PROCEDURE — 84484 ASSAY OF TROPONIN QUANT: CPT | Performed by: EMERGENCY MEDICINE

## 2024-05-31 PROCEDURE — 83880 ASSAY OF NATRIURETIC PEPTIDE: CPT | Performed by: EMERGENCY MEDICINE

## 2024-05-31 PROCEDURE — 25000003 PHARM REV CODE 250: Performed by: INTERNAL MEDICINE

## 2024-05-31 PROCEDURE — 83735 ASSAY OF MAGNESIUM: CPT | Performed by: EMERGENCY MEDICINE

## 2024-05-31 PROCEDURE — 96376 TX/PRO/DX INJ SAME DRUG ADON: CPT

## 2024-05-31 PROCEDURE — 99291 CRITICAL CARE FIRST HOUR: CPT

## 2024-05-31 PROCEDURE — 80053 COMPREHEN METABOLIC PANEL: CPT | Performed by: EMERGENCY MEDICINE

## 2024-05-31 PROCEDURE — 96375 TX/PRO/DX INJ NEW DRUG ADDON: CPT

## 2024-05-31 PROCEDURE — 25000003 PHARM REV CODE 250: Performed by: EMERGENCY MEDICINE

## 2024-05-31 PROCEDURE — 81000 URINALYSIS NONAUTO W/SCOPE: CPT | Mod: 59 | Performed by: EMERGENCY MEDICINE

## 2024-05-31 PROCEDURE — 99900035 HC TECH TIME PER 15 MIN (STAT)

## 2024-05-31 PROCEDURE — 83690 ASSAY OF LIPASE: CPT | Performed by: EMERGENCY MEDICINE

## 2024-05-31 PROCEDURE — 63600175 PHARM REV CODE 636 W HCPCS: Performed by: INTERNAL MEDICINE

## 2024-05-31 RX ORDER — FAMOTIDINE 20 MG/1
20 TABLET, FILM COATED ORAL 2 TIMES DAILY
Status: DISCONTINUED | OUTPATIENT
Start: 2024-05-31 | End: 2024-06-02

## 2024-05-31 RX ORDER — AMIODARONE HYDROCHLORIDE 200 MG/1
200 TABLET ORAL 2 TIMES DAILY
Status: DISCONTINUED | OUTPATIENT
Start: 2024-05-31 | End: 2024-06-04 | Stop reason: HOSPADM

## 2024-05-31 RX ORDER — ALLOPURINOL 100 MG/1
100 TABLET ORAL DAILY
Status: DISCONTINUED | OUTPATIENT
Start: 2024-06-01 | End: 2024-06-04 | Stop reason: HOSPADM

## 2024-05-31 RX ORDER — SODIUM CHLORIDE 9 MG/ML
INJECTION, SOLUTION INTRAVENOUS
Status: DISCONTINUED | OUTPATIENT
Start: 2024-05-31 | End: 2024-06-04 | Stop reason: HOSPADM

## 2024-05-31 RX ORDER — NITROGLYCERIN 0.4 MG/1
0.4 TABLET SUBLINGUAL EVERY 5 MIN PRN
Status: DISCONTINUED | OUTPATIENT
Start: 2024-05-31 | End: 2024-06-04 | Stop reason: HOSPADM

## 2024-05-31 RX ORDER — GLUCAGON 1 MG
1 KIT INJECTION
Status: DISCONTINUED | OUTPATIENT
Start: 2024-05-31 | End: 2024-06-04 | Stop reason: HOSPADM

## 2024-05-31 RX ORDER — FUROSEMIDE 10 MG/ML
80 INJECTION INTRAMUSCULAR; INTRAVENOUS 2 TIMES DAILY
Status: DISCONTINUED | OUTPATIENT
Start: 2024-05-31 | End: 2024-06-02

## 2024-05-31 RX ORDER — ACETAMINOPHEN 650 MG/20.3ML
650 LIQUID ORAL EVERY 6 HOURS PRN
Status: DISCONTINUED | OUTPATIENT
Start: 2024-05-31 | End: 2024-06-04 | Stop reason: HOSPADM

## 2024-05-31 RX ORDER — ATORVASTATIN CALCIUM 40 MG/1
40 TABLET, FILM COATED ORAL NIGHTLY
Status: DISCONTINUED | OUTPATIENT
Start: 2024-05-31 | End: 2024-06-04 | Stop reason: HOSPADM

## 2024-05-31 RX ORDER — FUROSEMIDE 10 MG/ML
80 INJECTION INTRAMUSCULAR; INTRAVENOUS
Status: COMPLETED | OUTPATIENT
Start: 2024-05-31 | End: 2024-05-31

## 2024-05-31 RX ORDER — HYDRALAZINE HYDROCHLORIDE 20 MG/ML
10 INJECTION INTRAMUSCULAR; INTRAVENOUS ONCE
Status: COMPLETED | OUTPATIENT
Start: 2024-05-31 | End: 2024-05-31

## 2024-05-31 RX ORDER — CARVEDILOL 3.12 MG/1
6.25 TABLET ORAL 2 TIMES DAILY
Status: DISCONTINUED | OUTPATIENT
Start: 2024-05-31 | End: 2024-05-31

## 2024-05-31 RX ORDER — POLYETHYLENE GLYCOL 3350 17 G/17G
17 POWDER, FOR SOLUTION ORAL DAILY
Status: DISCONTINUED | OUTPATIENT
Start: 2024-05-31 | End: 2024-06-04 | Stop reason: HOSPADM

## 2024-05-31 RX ORDER — COLCHICINE 0.6 MG/1
0.6 TABLET, FILM COATED ORAL DAILY
Status: DISCONTINUED | OUTPATIENT
Start: 2024-05-31 | End: 2024-06-04 | Stop reason: HOSPADM

## 2024-05-31 RX ORDER — INSULIN ASPART 100 [IU]/ML
0-5 INJECTION, SOLUTION INTRAVENOUS; SUBCUTANEOUS EVERY 6 HOURS PRN
Status: DISCONTINUED | OUTPATIENT
Start: 2024-05-31 | End: 2024-06-04 | Stop reason: HOSPADM

## 2024-05-31 RX ORDER — CARVEDILOL 6.25 MG/1
6.25 TABLET ORAL 2 TIMES DAILY
Status: DISCONTINUED | OUTPATIENT
Start: 2024-05-31 | End: 2024-06-04 | Stop reason: HOSPADM

## 2024-05-31 RX ORDER — LABETALOL HYDROCHLORIDE 5 MG/ML
20 INJECTION, SOLUTION INTRAVENOUS
Status: COMPLETED | OUTPATIENT
Start: 2024-05-31 | End: 2024-05-31

## 2024-05-31 RX ORDER — AMIODARONE HYDROCHLORIDE 100 MG/1
200 TABLET ORAL 2 TIMES DAILY
Status: DISCONTINUED | OUTPATIENT
Start: 2024-05-31 | End: 2024-05-31

## 2024-05-31 RX ORDER — NAPROXEN SODIUM 220 MG/1
81 TABLET, FILM COATED ORAL DAILY
Status: DISCONTINUED | OUTPATIENT
Start: 2024-06-01 | End: 2024-05-31

## 2024-05-31 RX ORDER — SODIUM CHLORIDE 0.9 % (FLUSH) 0.9 %
10 SYRINGE (ML) INJECTION
Status: DISCONTINUED | OUTPATIENT
Start: 2024-05-31 | End: 2024-06-04 | Stop reason: HOSPADM

## 2024-05-31 RX ADMIN — FUROSEMIDE 80 MG: 10 INJECTION, SOLUTION INTRAMUSCULAR; INTRAVENOUS at 07:05

## 2024-05-31 RX ADMIN — AMIODARONE HYDROCHLORIDE 200 MG: 200 TABLET ORAL at 08:05

## 2024-05-31 RX ADMIN — POLYETHYLENE GLYCOL 3350 17 G: 17 POWDER, FOR SOLUTION ORAL at 02:05

## 2024-05-31 RX ADMIN — LABETALOL HYDROCHLORIDE 20 MG: 5 INJECTION INTRAVENOUS at 11:05

## 2024-05-31 RX ADMIN — COLCHICINE 0.6 MG: 0.6 TABLET ORAL at 02:05

## 2024-05-31 RX ADMIN — APIXABAN 5 MG: 2.5 TABLET, FILM COATED ORAL at 08:05

## 2024-05-31 RX ADMIN — FAMOTIDINE 20 MG: 20 TABLET ORAL at 08:05

## 2024-05-31 RX ADMIN — FUROSEMIDE 80 MG: 10 INJECTION, SOLUTION INTRAMUSCULAR; INTRAVENOUS at 11:05

## 2024-05-31 RX ADMIN — SACUBITRIL AND VALSARTAN 1 TABLET: 24; 26 TABLET, FILM COATED ORAL at 12:05

## 2024-05-31 RX ADMIN — CARVEDILOL 6.25 MG: 3.12 TABLET, FILM COATED ORAL at 12:05

## 2024-05-31 RX ADMIN — HYDRALAZINE HYDROCHLORIDE 10 MG: 20 INJECTION, SOLUTION INTRAMUSCULAR; INTRAVENOUS at 04:05

## 2024-05-31 RX ADMIN — SACUBITRIL AND VALSARTAN 1 TABLET: 24; 26 TABLET, FILM COATED ORAL at 08:05

## 2024-05-31 RX ADMIN — ATORVASTATIN CALCIUM 40 MG: 40 TABLET, FILM COATED ORAL at 08:05

## 2024-05-31 RX ADMIN — AMIODARONE HYDROCHLORIDE 200 MG: 100 TABLET ORAL at 12:05

## 2024-05-31 RX ADMIN — CARVEDILOL 6.25 MG: 6.25 TABLET, FILM COATED ORAL at 08:05

## 2024-05-31 NOTE — PHARMACY MED REC
"Admission Medication History     The home medication history was taken by Skye Hernandez.    You may go to "Admission" then "Reconcile Home Medications" tabs to review and/or act upon these items.     The home medication list has been updated by the Pharmacy department.   Please read ALL comments highlighted in yellow.   Please address this information as you see fit.    Feel free to contact us if you have any questions or require assistance.      The medications listed below were removed from the home medication list. Please reorder if appropriate:  Patient reports no longer taking the following medication(s):  Asprin 81 mg        Medications listed below were obtained from: Patient/family and Analytic software- [employer]      [facility] REC COMPLETED:         Skye Hernandez  [id number]    Current Outpatient Medications on File Prior to Encounter   Medication Sig Dispense Refill Last Dose    amiodarone (PACERONE) 200 MG Tab Take 1 tablet (200 mg total) by mouth 2 (two) times daily. 180 tablet 0 5/30/2024    apixaban (ELIQUIS) 5 mg Tab Take 1 tablet (5 mg total) by mouth 2 (two) times daily. 180 tablet 0 5/30/2024    carvediloL (COREG) 6.25 MG tablet Take 1 tablet (6.25 mg total) by mouth 2 (two) times daily. 180 tablet 0 5/30/2024    empagliflozin (JARDIANCE) 10 mg tablet Take 1 tablet (10 mg total) by mouth once daily. 30 tablet 3 5/30/2024    furosemide (LASIX) 80 MG tablet Take 1 tablet (80 mg total) by mouth 2 (two) times daily. 60 tablet 3 5/30/2024    sacubitriL-valsartan (ENTRESTO) 24-26 mg per tablet Take 1 tablet by mouth 2 (two) times daily. 60 tablet 4 5/30/2024    colchicine (COLCRYS) 0.6 mg tablet Take 1 tablet (0.6 mg total) by mouth once daily. 90 tablet 0 Unknown                           .          "
24
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
## 2024-05-31 NOTE — ASSESSMENT & PLAN NOTE
Patient is identified as having Combined Systolic and Diastolic heart failure that is Acute on chronic. CHF is currently uncontrolled due to Continued edema of extremities. Latest ECHO performed and demonstrates- Results for orders placed during the hospital encounter of 05/14/24    Echo    Interpretation Summary    Left Ventricle: The left ventricle is mildly dilated. Severely increased ventricular mass. Severely increased wall thickness. There is concentric hypertrophy. Severe global hypokinesis present. There is severely reduced systolic function with a visually estimated ejection fraction of 15 - 20%. Ejection fraction by visual approximation is 18%. Grade II diastolic dysfunction.    Right Ventricle: Moderate right ventricular enlargement. Wall thickness is normal. Systolic function is moderately reduced.    Left Atrium: Left atrium is severely dilated.    Right Atrium: Right atrium is dilated.    Aortic Valve: The aortic valve is a trileaflet valve. There is mild aortic valve sclerosis. There is mild stenosis. Aortic valve area by VTI is 1.65 cm². Aortic valve peak velocity is 1.33 m/s. Mean gradient is 4 mmHg. The dimensionless index is 0.56.    Mitral Valve: There is mild to moderate regurgitation.    Tricuspid Valve: There is moderate regurgitation.    Pulmonary Artery: There is mild pulmonary hypertension. The estimated pulmonary artery systolic pressure is 42 mmHg.    IVC/SVC: Intermediate venous pressure at 8 mmHg.    Pericardium: There is a moderate circumferential effusion. Pericardial effusion is echolucent. No indication of cardiac tamponade.  . Continue Beta Blocker, Furosemide, and ARNI and monitor clinical status closely. Monitor on telemetry. Patient is on CHF pathway.  Monitor strict Is&Os and daily weights.  Place on fluid restriction of 1.5 L. Cardiology has been consulted. Continue to stress to patient importance of self efficacy and  on diet for CHF. Last BNP reviewed- and noted below    Recent Labs   Lab 05/31/24  1107   BNP 3,402*     -IV lasix 80 mg bid
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
## 2024-05-31 NOTE — ASSESSMENT & PLAN NOTE
Chronic, . Latest blood pressure and vitals reviewed-     Temp:  [98.5 °F (36.9 °C)]   Pulse:  [74-95]   Resp:  [18-25]   BP: (158-184)/(104-112)   SpO2:  [96 %-98 %] .   Home meds for hypertension were reviewed and noted below.   Hypertension Medications               carvediloL (COREG) 6.25 MG tablet Take 1 tablet (6.25 mg total) by mouth 2 (two) times daily.    furosemide (LASIX) 80 MG tablet Take 1 tablet (80 mg total) by mouth 2 (two) times daily.    sacubitriL-valsartan (ENTRESTO) 24-26 mg per tablet Take 1 tablet by mouth 2 (two) times daily.            While in the hospital, will manage blood pressure as follows; Continue home antihypertensive regimen

## 2024-05-31 NOTE — HPI
The patient is a 54 yo male with past medical history of atrial flutter, CHF, hypertension, CKD and NATALYA who presented to the ED with vomiting. He woke up this morning and had episode of emesis. He is poor historian. He states he has been losing weight but states he does not weigh himself at home. He saw his weight had decreased from his cardiology visit. He had some Gatorade in the ED. Patient recently hospitalized with acute exacerbation of heart failure and atrial flutter. He was cardioverted that admission. Workup in the ED revealed patient in volume overload. Cardiology and hospital medicine consulted.

## 2024-05-31 NOTE — ASSESSMENT & PLAN NOTE
Creatine stable for now. BMP reviewed- noted Estimated Creatinine Clearance: 53.8 mL/min (A) (based on SCr of 2.1 mg/dL (H)). according to latest data. Based on current GFR, CKD stage is stage 3 - GFR 30-59.  Monitor UOP and serial BMP and adjust therapy as needed. Renally dose meds. Avoid nephrotoxic medications and procedures.

## 2024-06-01 PROBLEM — R94.31 PROLONGED Q-T INTERVAL ON ECG: Status: ACTIVE | Noted: 2024-06-01

## 2024-06-01 PROBLEM — R17 SERUM TOTAL BILIRUBIN ELEVATED: Status: ACTIVE | Noted: 2024-06-01

## 2024-06-01 LAB
ANION GAP SERPL CALC-SCNC: 16 MMOL/L (ref 8–16)
BILIRUB DIRECT SERPL-MCNC: 1.8 MG/DL (ref 0.1–0.3)
BSA FOR ECHO PROCEDURE: 2.34 M2
BUN SERPL-MCNC: 23 MG/DL (ref 6–20)
CALCIUM SERPL-MCNC: 8.6 MG/DL (ref 8.7–10.5)
CHLORIDE SERPL-SCNC: 99 MMOL/L (ref 95–110)
CO2 SERPL-SCNC: 23 MMOL/L (ref 23–29)
CREAT SERPL-MCNC: 2.1 MG/DL (ref 0.5–1.4)
EST. GFR  (NO RACE VARIABLE): 37 ML/MIN/1.73 M^2
GGT SERPL-CCNC: 321 U/L (ref 8–55)
GLUCOSE SERPL-MCNC: 68 MG/DL (ref 70–110)
MAGNESIUM SERPL-MCNC: 1.2 MG/DL (ref 1.6–2.6)
OHS QRS DURATION: 98 MS
OHS QRS DURATION: 98 MS
OHS QTC CALCULATION: 482 MS
OHS QTC CALCULATION: 489 MS
POCT GLUCOSE: 110 MG/DL (ref 70–110)
POCT GLUCOSE: 76 MG/DL (ref 70–110)
POCT GLUCOSE: 78 MG/DL (ref 70–110)
POCT GLUCOSE: 83 MG/DL (ref 70–110)
POTASSIUM SERPL-SCNC: 3 MMOL/L (ref 3.5–5.1)
SODIUM SERPL-SCNC: 138 MMOL/L (ref 136–145)

## 2024-06-01 PROCEDURE — 25000003 PHARM REV CODE 250: Performed by: FAMILY MEDICINE

## 2024-06-01 PROCEDURE — 63600175 PHARM REV CODE 636 W HCPCS: Performed by: INTERNAL MEDICINE

## 2024-06-01 PROCEDURE — 99900035 HC TECH TIME PER 15 MIN (STAT)

## 2024-06-01 PROCEDURE — 82977 ASSAY OF GGT: CPT | Performed by: INTERNAL MEDICINE

## 2024-06-01 PROCEDURE — 25000003 PHARM REV CODE 250: Performed by: INTERNAL MEDICINE

## 2024-06-01 PROCEDURE — 82248 BILIRUBIN DIRECT: CPT | Performed by: INTERNAL MEDICINE

## 2024-06-01 PROCEDURE — 99214 OFFICE O/P EST MOD 30 MIN: CPT | Mod: ,,, | Performed by: STUDENT IN AN ORGANIZED HEALTH CARE EDUCATION/TRAINING PROGRAM

## 2024-06-01 PROCEDURE — 36415 COLL VENOUS BLD VENIPUNCTURE: CPT | Performed by: INTERNAL MEDICINE

## 2024-06-01 PROCEDURE — 63600175 PHARM REV CODE 636 W HCPCS: Performed by: FAMILY MEDICINE

## 2024-06-01 PROCEDURE — G0378 HOSPITAL OBSERVATION PER HR: HCPCS

## 2024-06-01 PROCEDURE — 96366 THER/PROPH/DIAG IV INF ADDON: CPT

## 2024-06-01 PROCEDURE — 25000003 PHARM REV CODE 250: Performed by: STUDENT IN AN ORGANIZED HEALTH CARE EDUCATION/TRAINING PROGRAM

## 2024-06-01 PROCEDURE — 83735 ASSAY OF MAGNESIUM: CPT | Performed by: INTERNAL MEDICINE

## 2024-06-01 PROCEDURE — 80048 BASIC METABOLIC PNL TOTAL CA: CPT | Performed by: INTERNAL MEDICINE

## 2024-06-01 RX ORDER — MAGNESIUM SULFATE HEPTAHYDRATE 40 MG/ML
2 INJECTION, SOLUTION INTRAVENOUS ONCE
Status: COMPLETED | OUTPATIENT
Start: 2024-06-01 | End: 2024-06-01

## 2024-06-01 RX ORDER — POTASSIUM CHLORIDE 20 MEQ/1
40 TABLET, EXTENDED RELEASE ORAL 3 TIMES DAILY
Status: COMPLETED | OUTPATIENT
Start: 2024-06-01 | End: 2024-06-03

## 2024-06-01 RX ORDER — LANOLIN ALCOHOL/MO/W.PET/CERES
400 CREAM (GRAM) TOPICAL 2 TIMES DAILY
Status: DISCONTINUED | OUTPATIENT
Start: 2024-06-01 | End: 2024-06-04 | Stop reason: HOSPADM

## 2024-06-01 RX ADMIN — FUROSEMIDE 80 MG: 10 INJECTION, SOLUTION INTRAMUSCULAR; INTRAVENOUS at 05:06

## 2024-06-01 RX ADMIN — POTASSIUM CHLORIDE 40 MEQ: 1500 TABLET, EXTENDED RELEASE ORAL at 08:06

## 2024-06-01 RX ADMIN — FUROSEMIDE 80 MG: 10 INJECTION, SOLUTION INTRAMUSCULAR; INTRAVENOUS at 08:06

## 2024-06-01 RX ADMIN — MAGNESIUM OXIDE TAB 400 MG (241.3 MG ELEMENTAL MG) 400 MG: 400 (241.3 MG) TAB at 08:06

## 2024-06-01 RX ADMIN — SACUBITRIL AND VALSARTAN 1 TABLET: 24; 26 TABLET, FILM COATED ORAL at 08:06

## 2024-06-01 RX ADMIN — FAMOTIDINE 20 MG: 20 TABLET ORAL at 08:06

## 2024-06-01 RX ADMIN — COLCHICINE 0.6 MG: 0.6 TABLET ORAL at 08:06

## 2024-06-01 RX ADMIN — CARVEDILOL 6.25 MG: 6.25 TABLET, FILM COATED ORAL at 08:06

## 2024-06-01 RX ADMIN — MAGNESIUM SULFATE HEPTAHYDRATE 2 G: 40 INJECTION, SOLUTION INTRAVENOUS at 08:06

## 2024-06-01 RX ADMIN — APIXABAN 5 MG: 2.5 TABLET, FILM COATED ORAL at 08:06

## 2024-06-01 RX ADMIN — POTASSIUM CHLORIDE 40 MEQ: 1500 TABLET, EXTENDED RELEASE ORAL at 04:06

## 2024-06-01 RX ADMIN — ALLOPURINOL 100 MG: 100 TABLET ORAL at 08:06

## 2024-06-01 RX ADMIN — AMIODARONE HYDROCHLORIDE 200 MG: 200 TABLET ORAL at 08:06

## 2024-06-01 RX ADMIN — ATORVASTATIN CALCIUM 40 MG: 40 TABLET, FILM COATED ORAL at 08:06

## 2024-06-01 NOTE — NURSING
Pt POCT blood glucose 64 @2037; 5/31/24 but pt was asymptomatic, AAOx4 with dry skin. Orange juice given. Follow up blood glucose is 82.

## 2024-06-01 NOTE — ASSESSMENT & PLAN NOTE
Elevated alk phos  Denies abdominal pain but vomiting upon awakening  Direct bili pending  Ggt pending  us abdomen pending
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
## 2024-06-01 NOTE — ASSESSMENT & PLAN NOTE
Creatine stable for now. BMP reviewed- noted Estimated Creatinine Clearance: 53.8 mL/min (A) (based on SCr of 2.1 mg/dL (H)). according to latest data. Based on current GFR, CKD stage is stage 3 - GFR 30-59.  Monitor UOP and serial BMP and adjust therapy as needed. Renally dose meds. Avoid nephrotoxic medications and procedures.  Intravenous lasix

## 2024-06-01 NOTE — PROGRESS NOTES
[facility] - Telemetry ([facility] Medicine  Progress Note    Patient Name: Bria Saldana  MRN: [id number]  Patient Class: OP- Observation   Admission Date: 5/31/2024  Length of Stay: 0 days  Attending Physician: Howard Rao MD  Primary Care Provider: Brenna Maravilla MD        Subjective:     Principal Problem:Acute on chronic combined systolic (congestive) and diastolic (congestive) heart failure        HPI:  The patient is a 52 yo male with past medical history of atrial flutter, CHF, hypertension, CKD and NATALYA who presented to the ED with vomiting. He woke up this morning and had episode of emesis. He is poor historian. He states he has been losing weight but states he does not weigh himself at home. He saw his weight had decreased from his cardiology visit. He had some Gatorade in the ED. Patient recently hospitalized with acute exacerbation of heart failure and atrial flutter. He was cardioverted that admission. Workup in the ED revealed patient in volume overload. Cardiology and hospital medicine consulted.     Overview/Hospital Course:  6/1 admitted for congestive heart failure exacerbation. Associated with vomiting upon awakening. Endorses compliance with medication(s) and fluid and salt restriction. Experienced dyspnea but resolved. Denies chest pain or lower extremity edema. Continue congestive heart failure exacerbation pathway. Awaiting echocardiogram to evaluate for pericardial effusion. Cardiology following. Us abdomen pending    Interval History: See hospital course for today      Review of Systems   Constitutional:  Positive for activity change and appetite change. Negative for fatigue and fever.   HENT:  Negative for congestion.    Respiratory:  Positive for cough (a/w new meds) and shortness of breath (improved).    Cardiovascular:  Negative for chest pain and leg swelling.   Gastrointestinal:  Positive for nausea (improved) and vomiting (improved). Negative for abdominal pain  and constipation.   Genitourinary:  Positive for frequency.   Neurological:  Negative for weakness.   Psychiatric/Behavioral:  Positive for dysphoric mood. Negative for agitation, behavioral problems, confusion, decreased concentration and sleep disturbance. The patient is nervous/anxious (d/t medical condition).      Objective:     Vital Signs (Most Recent):  Temp: 99.5 °F (37.5 °C) (06/01/24 0727)  Pulse: 79 (06/01/24 0953)  Resp: 18 (06/01/24 0727)  BP: (!) 160/86 (06/01/24 0727)  SpO2: (!) 91 % (06/01/24 0727) Vital Signs (24h Range):  Temp:  [98 °F (36.7 °C)-99.5 °F (37.5 °C)] 99.5 °F (37.5 °C)  Pulse:  [74-90] 79  Resp:  [16-25] 18  SpO2:  [91 %-99 %] 91 %  BP: (139-187)/() 160/86     Weight: 104.2 kg (229 lb 11.5 oz)  Body mass index is 28.71 kg/m².    Intake/Output Summary (Last 24 hours) at 6/1/2024 1112  Last data filed at 6/1/2024 1100  Gross per 24 hour   Intake 283.71 ml   Output 6350 ml   Net -6066.29 ml         Physical Exam  Vitals and nursing note reviewed.   Constitutional:       General: He is not in acute distress.     Appearance: He is ill-appearing. He is not toxic-appearing.   HENT:      Head: Normocephalic and atraumatic.   Cardiovascular:      Rate and Rhythm: Normal rate.      Heart sounds: No murmur heard.  Pulmonary:      Effort: Pulmonary effort is normal. No respiratory distress.      Breath sounds: No wheezing or rales.      Comments: Distant breath sounds  Abdominal:      General: Bowel sounds are normal.      Palpations: Abdomen is soft.      Tenderness: There is no abdominal tenderness.   Musculoskeletal:      Right lower leg: Edema present.      Left lower leg: Edema present.   Skin:     General: Skin is warm.   Neurological:      Mental Status: He is alert and oriented to person, place, and time.      Motor: Weakness present.   Psychiatric:         Mood and Affect: Mood is depressed.         Behavior: Behavior normal.             Significant Labs: All pertinent labs within  the past 24 hours have been reviewed.  CBC:   Recent Labs   Lab 05/31/24  1107   WBC 6.28   HGB 13.8*   HCT 44.4        CMP:   Recent Labs   Lab 05/31/24  1107 06/01/24  0433    138   K 5.4* 3.0*    99   CO2 19* 23   GLU 92 68*   BUN 23* 23*   CREATININE 2.1* 2.1*   CALCIUM 9.3 8.6*   PROT 7.3  --    ALBUMIN 3.2*  --    BILITOT 4.3*  --    ALKPHOS 197*  --    AST 29  --    ALT 19  --    ANIONGAP 16 16     Cardiac Markers:   Recent Labs   Lab 05/31/24  1107   BNP 3,402*     Troponin:   Recent Labs   Lab 05/31/24  1107   TROPONINI 0.027*     Magnesium 1.2    Significant Imaging: I have reviewed all pertinent imaging results/findings within the past 24 hours.  CXR: I have reviewed all pertinent results/findings within the past 24 hours and my personal findings are:  clear lungs  Echo: I have reviewed all pertinent results/findings within the past 24 hours and my personal findings are:  pending    Assessment/Plan:      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 (congestive) and diastolic (congestive) heart failure  Patient is identified as having Combined Systolic and Diastolic heart failure that is Acute on chronic. CHF is currently uncontrolled due to Continued edema of extremities. Latest ECHO performed and demonstrates- Results for orders placed during the hospital encounter of 05/14/24    Echo    Interpretation Summary    Left Ventricle: The left ventricle is mildly dilated. Severely increased ventricular mass. Severely increased wall thickness. There is concentric hypertrophy. Severe global hypokinesis present. There is severely reduced systolic function with a visually estimated ejection fraction of 15 - 20%. Ejection fraction by visual approximation is 18%. Grade II diastolic dysfunction.    Right Ventricle: Moderate right ventricular enlargement. Wall thickness is normal. Systolic function is moderately reduced.    Left Atrium: Left atrium is severely dilated.    Right Atrium: Right atrium is  dilated.    Aortic Valve: The aortic valve is a trileaflet valve. There is mild aortic valve sclerosis. There is mild stenosis. Aortic valve area by VTI is 1.65 cm². Aortic valve peak velocity is 1.33 m/s. Mean gradient is 4 mmHg. The dimensionless index is 0.56.    Mitral Valve: There is mild to moderate regurgitation.    Tricuspid Valve: There is moderate regurgitation.    Pulmonary Artery: There is mild pulmonary hypertension. The estimated pulmonary artery systolic pressure is 42 mmHg.    IVC/SVC: Intermediate venous pressure at 8 mmHg.    Pericardium: There is a moderate circumferential effusion. Pericardial effusion is echolucent. No indication of cardiac tamponade.  . Continue Beta Blocker, Furosemide, and ARNI and monitor clinical status closely. Monitor on telemetry. Patient is on CHF pathway.  Monitor strict Is&Os and daily weights.  Place on fluid restriction of 1.5 L. Cardiology has been consulted. Continue to stress to patient importance of self efficacy and  on diet for CHF. Last BNP reviewed- and noted below   Recent Labs   Lab 05/31/24  1107   BNP 3,402*       -IV lasix 80 mg bid  Hypok and hypomag  Replete lytes  Cardiology following  Echocardiogram pending      Serum total bilirubin elevated  Elevated alk phos  Denies abdominal pain but vomiting upon awakening  Direct bili pending  Ggt pending  us abdomen pending      Prolonged Q-T interval on ECG  On amio  Mag 1.2  Replete mag        Paroxysmal atrial flutter  -s/p DCCV 5/17/24  -continue amiodarone and Eliquis  -currently in sinus rhythm  -monitor      CKD (chronic kidney disease) stage 3, GFR 30-59 ml/min  Creatine stable for now. BMP reviewed- noted Estimated Creatinine Clearance: 53.8 mL/min (A) (based on SCr of 2.1 mg/dL (H)). according to latest data. Based on current GFR, CKD stage is stage 3 - GFR 30-59.  Monitor UOP and serial BMP and adjust therapy as needed. Renally dose meds. Avoid nephrotoxic medications and  procedures.  Intravenous lasix    Essential hypertension  Chronic, long pmh uncontrolled blood pressure. Latest blood pressure and vitals reviewed-     Temp:  [98 °F (36.7 °C)-99.5 °F (37.5 °C)]   Pulse:  [74-90]   Resp:  [16-25]   BP: (139-187)/()   SpO2:  [91 %-99 %] .   Home meds for hypertension were reviewed and noted below.   Hypertension Medications               carvediloL (COREG) 6.25 MG tablet Take 1 tablet (6.25 mg total) by mouth 2 (two) times daily.    furosemide (LASIX) 80 MG tablet Take 1 tablet (80 mg total) by mouth 2 (two) times daily.    sacubitriL-valsartan (ENTRESTO) 24-26 mg per tablet Take 1 tablet by mouth 2 (two) times daily.            While in the hospital, will manage blood pressure as follows; Continue home antihypertensive regimen        VTE Risk Mitigation (From admission, onward)           Ordered     apixaban tablet 5 mg  2 times daily         05/31/24 1452     IP VTE LOW RISK PATIENT  Once         05/31/24 1403                    Discharge Planning   CARMELO:      Code Status: Full Code   Is the patient medically ready for discharge?:     Reason for patient still in hospital (select all that apply): Patient trending condition, Laboratory test, Treatment, Imaging, and Consult recommendations                     Howard Rao MD  Department of Hospital Medicine   [facility] - Telemetry ([facility])

## 2024-06-01 NOTE — PLAN OF CARE
A247/A247 SOLEDAD Fraser Sandro Saldana is a 53 y.o.male admitted on 5/31/2024 for Acute on chronic combined systolic (congestive) and diastolic (congestive) heart failure   Code Status: Full Code MRN: [id number]   Review of patient's allergies indicates:   Allergen Reactions    Penicillins Hives and Anaphylaxis     Past Medical History:   Diagnosis Date    Acute CHF 7/8/2019    Acute on chronic combined systolic (congestive) and diastolic (congestive) heart failure 9/23/2019    Allergy     CHF (congestive heart failure) 7/9/2019    CKD (chronic kidney disease) stage 3, GFR 30-59 ml/min 7/8/2019    Essential hypertension 6/1/2017    Hypertension associated with chronic kidney disease due to type 2 diabetes mellitus 2/28/2022    Mixed hyperlipidemia 3/10/2022    NATALYA (obstructive sleep apnea) 7/23/2018      PRN meds    sodium chloride 0.9%, , PRN  acetaminophen, 650 mg, Q6H PRN  dextrose 10%, 12.5 g, PRN  dextrose 10%, 25 g, PRN  glucagon (human recombinant), 1 mg, PRN  insulin aspart U-100, 0-5 Units, Q6H PRN  nitroGLYCERIN, 0.4 mg, Q5 Min PRN  sodium chloride 0.9%, 10 mL, PRN      Chart check completed. Will continue plan of care.         [place] Coma Scale Score: 15     Lead Monitored: Lead II Rhythm: normal sinus rhythm    Cardiac/Telemetry Box Number: [id number]  VTE Required Core Measure: Pharmacological prophylaxis initiated/maintained    Diet Low Sodium, 2gm Fluid - 1500mL     Kenneth Score: 23  Fall Risk Score: 7  Accucheck []   Freq?      Lines/Drains/Airways       Peripheral Intravenous Line  Duration                  Peripheral IV - Single Lumen 05/31/24 1103 20 G Left Antecubital <1 day                       Problem: Adult Inpatient Plan of Care  Goal: Plan of Care Review  Outcome: Progressing  Goal: Patient-Specific Goal (Individualized)  Outcome: Progressing  Goal: Absence of Hospital-Acquired Illness or Injury  Outcome: Progressing  Goal: Optimal Comfort and Wellbeing  Outcome: Progressing  Goal: Readiness  for Transition of Care  Outcome: Progressing     Problem: Diabetes Comorbidity  Goal: Blood Glucose Level Within Targeted Range  Outcome: Progressing     Problem: Skin Injury Risk Increased  Goal: Skin Health and Integrity  Outcome: Progressing     Problem: Fall Injury Risk  Goal: Absence of Fall and Fall-Related Injury  Outcome: Progressing

## 2024-06-01 NOTE — ASSESSMENT & PLAN NOTE
Patient is identified as having Combined Systolic and Diastolic heart failure that is Acute on chronic. CHF is currently uncontrolled due to Continued edema of extremities. Latest ECHO performed and demonstrates- Results for orders placed during the hospital encounter of 05/14/24    Echo    Interpretation Summary    Left Ventricle: The left ventricle is mildly dilated. Severely increased ventricular mass. Severely increased wall thickness. There is concentric hypertrophy. Severe global hypokinesis present. There is severely reduced systolic function with a visually estimated ejection fraction of 15 - 20%. Ejection fraction by visual approximation is 18%. Grade II diastolic dysfunction.    Right Ventricle: Moderate right ventricular enlargement. Wall thickness is normal. Systolic function is moderately reduced.    Left Atrium: Left atrium is severely dilated.    Right Atrium: Right atrium is dilated.    Aortic Valve: The aortic valve is a trileaflet valve. There is mild aortic valve sclerosis. There is mild stenosis. Aortic valve area by VTI is 1.65 cm². Aortic valve peak velocity is 1.33 m/s. Mean gradient is 4 mmHg. The dimensionless index is 0.56.    Mitral Valve: There is mild to moderate regurgitation.    Tricuspid Valve: There is moderate regurgitation.    Pulmonary Artery: There is mild pulmonary hypertension. The estimated pulmonary artery systolic pressure is 42 mmHg.    IVC/SVC: Intermediate venous pressure at 8 mmHg.    Pericardium: There is a moderate circumferential effusion. Pericardial effusion is echolucent. No indication of cardiac tamponade.  . Continue Beta Blocker, Furosemide, and ARNI and monitor clinical status closely. Monitor on telemetry. Patient is on CHF pathway.  Monitor strict Is&Os and daily weights.  Place on fluid restriction of 1.5 L. Cardiology has been consulted. Continue to stress to patient importance of self efficacy and  on diet for CHF. Last BNP reviewed- and noted below    Recent Labs   Lab 05/31/24  1107   BNP 3,402*       -IV lasix 80 mg bid  Hypok and hypomag  Replete lytes  Cardiology following  Echocardiogram pending
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
## 2024-06-01 NOTE — PLAN OF CARE
A247/A247 SOLEDAD Fraser Sandro Saldana is a 53 y.o.male admitted on 5/31/2024 for Acute on chronic combined systolic (congestive) and diastolic (congestive) heart failure   Code Status: Full Code MRN: [id number]   Review of patient's allergies indicates:   Allergen Reactions    Penicillins Hives and Anaphylaxis     Past Medical History:   Diagnosis Date    Acute CHF 7/8/2019    Acute on chronic combined systolic (congestive) and diastolic (congestive) heart failure 9/23/2019    Allergy     CHF (congestive heart failure) 7/9/2019    CKD (chronic kidney disease) stage 3, GFR 30-59 ml/min 7/8/2019    Essential hypertension 6/1/2017    Hypertension associated with chronic kidney disease due to type 2 diabetes mellitus 2/28/2022    Mixed hyperlipidemia 3/10/2022    NATALYA (obstructive sleep apnea) 7/23/2018      PRN meds    sodium chloride 0.9%, , PRN  acetaminophen, 650 mg, Q6H PRN  dextrose 10%, 12.5 g, PRN  dextrose 10%, 25 g, PRN  glucagon (human recombinant), 1 mg, PRN  insulin aspart U-100, 0-5 Units, Q6H PRN  nitroGLYCERIN, 0.4 mg, Q5 Min PRN  sodium chloride 0.9%, 10 mL, PRN      Chart check completed. Will continue plan of care.     IV lasix continuned   Good urine out put         [place] Coma Scale Score: 15     Lead Monitored: Lead II Rhythm: normal sinus rhythm    Cardiac/Telemetry Box Number: [id number]  VTE Required Core Measure: Pharmacological prophylaxis initiated/maintained Last Bowel Movement: 05/31/24  Diet Low Sodium, 2gm Fluid - 1500mL     Kenneth Score: 23  Fall Risk Score: 7  Accucheck [x]   Freq?      Lines/Drains/Airways       Peripheral Intravenous Line  Duration                  Peripheral IV - Single Lumen 05/31/24 1103 20 G Left Antecubital 1 day

## 2024-06-01 NOTE — ASSESSMENT & PLAN NOTE
Currently in sinus rhythm  Continue Eliquis  Continue amiodarone 200 mg b.i.d.  On Discharge will need to take amiodarone 200 daily   Has EP visit 6/5/2024 to discuss possible flutter ablation and defibrillator placement

## 2024-06-01 NOTE — HOSPITAL COURSE
6/1 admitted for congestive heart failure exacerbation. Associated with vomiting upon awakening. Endorses compliance with medication(s) and fluid and salt restriction. Experienced dyspnea but resolved. Denies chest pain or lower extremity edema. Continue congestive heart failure exacerbation pathway.Cardiology following.    Echo    Left Ventricle: Severe global hypokinesis present. There is moderately reduced systolic function with a visually estimated ejection fraction of 30 - 35%.    Left Atrium: Left atrium is severely dilated.    Right Atrium: Right atrium is mildly dilated.    Pericardium: There is a trivial effusion.    Abdominal ultrasound  The liver is enlarged measuring 17.4 cm.  No masses.  The gallbladder is contracted.  No gallstones.  Patient was not NPO prior to exam.  The common bile duct is 3 mm.   The portal vein is grossly normal.  The pancreas is normal..  Mild splenomegaly.  No ascites.     .  After aggressive diuresis patient's blood pressure is marginal he has mild orthostasis.  Have adjusted torsemide to daily only and given 500 cc bolus.  Pt stabilized overnight and was not orhtostatic the am of dc. He was instructed to follow up with PCP, cards, hepatology upon dc.    Pt seen and examined on day of discharge and stable for dc.  See orders.

## 2024-06-01 NOTE — ASSESSMENT & PLAN NOTE
Results for orders placed during the hospital encounter of 05/14/24    Echo    Interpretation Summary    Left Ventricle: The left ventricle is mildly dilated. Severely increased ventricular mass. Severely increased wall thickness. There is concentric hypertrophy. Severe global hypokinesis present. There is severely reduced systolic function with a visually estimated ejection fraction of 15 - 20%. Ejection fraction by visual approximation is 18%. Grade II diastolic dysfunction.    Right Ventricle: Moderate right ventricular enlargement. Wall thickness is normal. Systolic function is moderately reduced.    Left Atrium: Left atrium is severely dilated.    Right Atrium: Right atrium is dilated.    Aortic Valve: The aortic valve is a trileaflet valve. There is mild aortic valve sclerosis. There is mild stenosis. Aortic valve area by VTI is 1.65 cm². Aortic valve peak velocity is 1.33 m/s. Mean gradient is 4 mmHg. The dimensionless index is 0.56.    Mitral Valve: There is mild to moderate regurgitation.    Tricuspid Valve: There is moderate regurgitation.    Pulmonary Artery: There is mild pulmonary hypertension. The estimated pulmonary artery systolic pressure is 42 mmHg.    IVC/SVC: Intermediate venous pressure at 8 mmHg.    Pericardium: There is a moderate circumferential effusion. Pericardial effusion is echolucent. No indication of cardiac tamponade.    Recent Labs   Lab 05/31/24  1107   BNP 3,402*     Will need to repeat limited echo to evaluate pericardial effusion  Continue IV diuresis and monitor strict intake and output   Monitor electrolytes with IV diuresis and replace as needed  Will need to be evaluated for transplant   Continue BB, Entresto
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
## 2024-06-01 NOTE — HPI
Mr. Saldana is a 53 year old male patient whose current medical conditions include CKD, NATALYA, HTN, and combined CHF (EF 15-20% 5/24) who presented to [facility] ED yesterday due to fluid overload. Patient complained of leg swelling, abdominal bloating, nausea, vomiting, decreased appetite, and orthopnea. He denied any associated fever, chills,, diaphoresis, chest pain, palpitations, near syncope, or syncope.  Reports being careful with his diet and fluid intake.  Does not weigh himself daily.  Initial workup in ED revealed creatinine of 2.1 (BL 1.8-2.2), BNP of 3,402. Patient was subsequently admitted for further evaluation and treatment. Cardiology consulted to assist with management. Patient seen and examined today, resting in bed. Feels ok. SOB and LE edema improving. IV lasix given. Followed in CHF clinic. EKGs reviewed, NSR, TWI lateral. Troponin 0.027.  Lactate normal.    He was admitted 5/14/24 recently for volume overload and underwent and underwent MEGAN/cardioversion 5/16/24 for atrial flutter.  During procedure, patient potentially aspirated and needed to be intubated, transferred to ICU.  Initially improved, but no transferred out ICU.  Stress test 5/20/2024 with fixed defect consistent with scar in the inferior apical wall        Results for orders placed during the hospital encounter of 05/14/24    Echo    Interpretation Summary    Left Ventricle: The left ventricle is mildly dilated. Severely increased ventricular mass. Severely increased wall thickness. There is concentric hypertrophy. Severe global hypokinesis present. There is severely reduced systolic function with a visually estimated ejection fraction of 15 - 20%. Ejection fraction by visual approximation is 18%. Grade II diastolic dysfunction.    Right Ventricle: Moderate right ventricular enlargement. Wall thickness is normal. Systolic function is moderately reduced.    Left Atrium: Left atrium is severely dilated.    Right Atrium: Right atrium is  dilated.    Aortic Valve: The aortic valve is a trileaflet valve. There is mild aortic valve sclerosis. There is mild stenosis. Aortic valve area by VTI is 1.65 cm². Aortic valve peak velocity is 1.33 m/s. Mean gradient is 4 mmHg. The dimensionless index is 0.56.    Mitral Valve: There is mild to moderate regurgitation.    Tricuspid Valve: There is moderate regurgitation.    Pulmonary Artery: There is mild pulmonary hypertension. The estimated pulmonary artery systolic pressure is 42 mmHg.    IVC/SVC: Intermediate venous pressure at 8 mmHg.    Pericardium: There is a moderate circumferential effusion. Pericardial effusion is echolucent. No indication of cardiac tamponade.

## 2024-06-01 NOTE — SUBJECTIVE & OBJECTIVE
Interval History: See hospital course for today      Review of Systems   Constitutional:  Positive for activity change and appetite change. Negative for fatigue and fever.   HENT:  Negative for congestion.    Respiratory:  Positive for cough (a/w new meds) and shortness of breath (improved).    Cardiovascular:  Negative for chest pain and leg swelling.   Gastrointestinal:  Positive for nausea (improved) and vomiting (improved). Negative for abdominal pain and constipation.   Genitourinary:  Positive for frequency.   Neurological:  Negative for weakness.   Psychiatric/Behavioral:  Positive for dysphoric mood. Negative for agitation, behavioral problems, confusion, decreased concentration and sleep disturbance. The patient is nervous/anxious (d/t medical condition).      Objective:     Vital Signs (Most Recent):  Temp: 99.5 °F (37.5 °C) (06/01/24 0727)  Pulse: 79 (06/01/24 0953)  Resp: 18 (06/01/24 0727)  BP: (!) 160/86 (06/01/24 0727)  SpO2: (!) 91 % (06/01/24 0727) Vital Signs (24h Range):  Temp:  [98 °F (36.7 °C)-99.5 °F (37.5 °C)] 99.5 °F (37.5 °C)  Pulse:  [74-90] 79  Resp:  [16-25] 18  SpO2:  [91 %-99 %] 91 %  BP: (139-187)/() 160/86     Weight: 104.2 kg (229 lb 11.5 oz)  Body mass index is 28.71 kg/m².    Intake/Output Summary (Last 24 hours) at 6/1/2024 1112  Last data filed at 6/1/2024 1100  Gross per 24 hour   Intake 283.71 ml   Output 6350 ml   Net -6066.29 ml         Physical Exam  Vitals and nursing note reviewed.   Constitutional:       General: He is not in acute distress.     Appearance: He is ill-appearing. He is not toxic-appearing.   HENT:      Head: Normocephalic and atraumatic.   Cardiovascular:      Rate and Rhythm: Normal rate.      Heart sounds: No murmur heard.  Pulmonary:      Effort: Pulmonary effort is normal. No respiratory distress.      Breath sounds: No wheezing or rales.      Comments: Distant breath sounds  Abdominal:      General: Bowel sounds are normal.      Palpations:  Abdomen is soft.      Tenderness: There is no abdominal tenderness.   Musculoskeletal:      Right lower leg: Edema present.      Left lower leg: Edema present.   Skin:     General: Skin is warm.   Neurological:      Mental Status: He is alert and oriented to person, place, and time.      Motor: Weakness present.   Psychiatric:         Mood and Affect: Mood is depressed.         Behavior: Behavior normal.             Significant Labs: All pertinent labs within the past 24 hours have been reviewed.  CBC:   Recent Labs   Lab 05/31/24  1107   WBC 6.28   HGB 13.8*   HCT 44.4        CMP:   Recent Labs   Lab 05/31/24  1107 06/01/24  0433    138   K 5.4* 3.0*    99   CO2 19* 23   GLU 92 68*   BUN 23* 23*   CREATININE 2.1* 2.1*   CALCIUM 9.3 8.6*   PROT 7.3  --    ALBUMIN 3.2*  --    BILITOT 4.3*  --    ALKPHOS 197*  --    AST 29  --    ALT 19  --    ANIONGAP 16 16     Cardiac Markers:   Recent Labs   Lab 05/31/24  1107   BNP 3,402*     Troponin:   Recent Labs   Lab 05/31/24  1107   TROPONINI 0.027*     Magnesium 1.2    Significant Imaging: I have reviewed all pertinent imaging results/findings within the past 24 hours.  CXR: I have reviewed all pertinent results/findings within the past 24 hours and my personal findings are:  clear lungs  Echo: I have reviewed all pertinent results/findings within the past 24 hours and my personal findings are:  pending

## 2024-06-01 NOTE — ASSESSMENT & PLAN NOTE
Chronic, long pmh uncontrolled blood pressure. Latest blood pressure and vitals reviewed-     Temp:  [98 °F (36.7 °C)-99.5 °F (37.5 °C)]   Pulse:  [74-90]   Resp:  [16-25]   BP: (139-187)/()   SpO2:  [91 %-99 %] .   Home meds for hypertension were reviewed and noted below.   Hypertension Medications               carvediloL (COREG) 6.25 MG tablet Take 1 tablet (6.25 mg total) by mouth 2 (two) times daily.    furosemide (LASIX) 80 MG tablet Take 1 tablet (80 mg total) by mouth 2 (two) times daily.    sacubitriL-valsartan (ENTRESTO) 24-26 mg per tablet Take 1 tablet by mouth 2 (two) times daily.            While in the hospital, will manage blood pressure as follows; Continue home antihypertensive regimen

## 2024-06-02 LAB
ANION GAP SERPL CALC-SCNC: 17 MMOL/L (ref 8–16)
BUN SERPL-MCNC: 28 MG/DL (ref 6–20)
CALCIUM SERPL-MCNC: 9.1 MG/DL (ref 8.7–10.5)
CHLORIDE SERPL-SCNC: 97 MMOL/L (ref 95–110)
CO2 SERPL-SCNC: 23 MMOL/L (ref 23–29)
CREAT SERPL-MCNC: 2 MG/DL (ref 0.5–1.4)
EST. GFR  (NO RACE VARIABLE): 39 ML/MIN/1.73 M^2
GLUCOSE SERPL-MCNC: 75 MG/DL (ref 70–110)
POCT GLUCOSE: 101 MG/DL (ref 70–110)
POCT GLUCOSE: 107 MG/DL (ref 70–110)
POCT GLUCOSE: 96 MG/DL (ref 70–110)
POTASSIUM SERPL-SCNC: 3.6 MMOL/L (ref 3.5–5.1)
SODIUM SERPL-SCNC: 137 MMOL/L (ref 136–145)

## 2024-06-02 PROCEDURE — 36415 COLL VENOUS BLD VENIPUNCTURE: CPT | Performed by: INTERNAL MEDICINE

## 2024-06-02 PROCEDURE — 25000003 PHARM REV CODE 250: Performed by: HOSPITALIST

## 2024-06-02 PROCEDURE — 99214 OFFICE O/P EST MOD 30 MIN: CPT | Mod: ,,, | Performed by: STUDENT IN AN ORGANIZED HEALTH CARE EDUCATION/TRAINING PROGRAM

## 2024-06-02 PROCEDURE — 25000003 PHARM REV CODE 250: Performed by: STUDENT IN AN ORGANIZED HEALTH CARE EDUCATION/TRAINING PROGRAM

## 2024-06-02 PROCEDURE — 25000003 PHARM REV CODE 250: Performed by: INTERNAL MEDICINE

## 2024-06-02 PROCEDURE — 25000003 PHARM REV CODE 250: Performed by: FAMILY MEDICINE

## 2024-06-02 PROCEDURE — 63600175 PHARM REV CODE 636 W HCPCS: Performed by: INTERNAL MEDICINE

## 2024-06-02 PROCEDURE — 96376 TX/PRO/DX INJ SAME DRUG ADON: CPT

## 2024-06-02 PROCEDURE — 80048 BASIC METABOLIC PNL TOTAL CA: CPT | Performed by: INTERNAL MEDICINE

## 2024-06-02 PROCEDURE — G0378 HOSPITAL OBSERVATION PER HR: HCPCS

## 2024-06-02 RX ORDER — BENZONATATE 100 MG/1
100 CAPSULE ORAL 3 TIMES DAILY PRN
Status: DISCONTINUED | OUTPATIENT
Start: 2024-06-02 | End: 2024-06-04 | Stop reason: HOSPADM

## 2024-06-02 RX ORDER — TORSEMIDE 10 MG/1
40 TABLET ORAL 2 TIMES DAILY
Status: DISCONTINUED | OUTPATIENT
Start: 2024-06-02 | End: 2024-06-03

## 2024-06-02 RX ORDER — PANTOPRAZOLE SODIUM 40 MG/1
40 TABLET, DELAYED RELEASE ORAL DAILY
Status: DISCONTINUED | OUTPATIENT
Start: 2024-06-02 | End: 2024-06-04 | Stop reason: HOSPADM

## 2024-06-02 RX ADMIN — AMIODARONE HYDROCHLORIDE 200 MG: 200 TABLET ORAL at 08:06

## 2024-06-02 RX ADMIN — TORSEMIDE 40 MG: 10 TABLET ORAL at 04:06

## 2024-06-02 RX ADMIN — ATORVASTATIN CALCIUM 40 MG: 40 TABLET, FILM COATED ORAL at 08:06

## 2024-06-02 RX ADMIN — APIXABAN 5 MG: 2.5 TABLET, FILM COATED ORAL at 08:06

## 2024-06-02 RX ADMIN — POTASSIUM CHLORIDE 40 MEQ: 1500 TABLET, EXTENDED RELEASE ORAL at 09:06

## 2024-06-02 RX ADMIN — AMIODARONE HYDROCHLORIDE 200 MG: 200 TABLET ORAL at 09:06

## 2024-06-02 RX ADMIN — FUROSEMIDE 80 MG: 10 INJECTION, SOLUTION INTRAMUSCULAR; INTRAVENOUS at 09:06

## 2024-06-02 RX ADMIN — MAGNESIUM OXIDE TAB 400 MG (241.3 MG ELEMENTAL MG) 400 MG: 400 (241.3 MG) TAB at 08:06

## 2024-06-02 RX ADMIN — PANTOPRAZOLE SODIUM 40 MG: 40 TABLET, DELAYED RELEASE ORAL at 04:06

## 2024-06-02 RX ADMIN — SACUBITRIL AND VALSARTAN 1 TABLET: 24; 26 TABLET, FILM COATED ORAL at 08:06

## 2024-06-02 RX ADMIN — APIXABAN 5 MG: 2.5 TABLET, FILM COATED ORAL at 09:06

## 2024-06-02 RX ADMIN — SACUBITRIL AND VALSARTAN 1 TABLET: 24; 26 TABLET, FILM COATED ORAL at 09:06

## 2024-06-02 RX ADMIN — BENZONATATE 100 MG: 100 CAPSULE ORAL at 09:06

## 2024-06-02 RX ADMIN — COLCHICINE 0.6 MG: 0.6 TABLET ORAL at 09:06

## 2024-06-02 RX ADMIN — POTASSIUM CHLORIDE 40 MEQ: 1500 TABLET, EXTENDED RELEASE ORAL at 04:06

## 2024-06-02 RX ADMIN — CARVEDILOL 6.25 MG: 6.25 TABLET, FILM COATED ORAL at 09:06

## 2024-06-02 RX ADMIN — FAMOTIDINE 20 MG: 20 TABLET ORAL at 09:06

## 2024-06-02 RX ADMIN — CARVEDILOL 6.25 MG: 6.25 TABLET, FILM COATED ORAL at 08:06

## 2024-06-02 RX ADMIN — MAGNESIUM OXIDE TAB 400 MG (241.3 MG ELEMENTAL MG) 400 MG: 400 (241.3 MG) TAB at 09:06

## 2024-06-02 RX ADMIN — ALLOPURINOL 100 MG: 100 TABLET ORAL at 09:06

## 2024-06-02 RX ADMIN — POTASSIUM CHLORIDE 40 MEQ: 1500 TABLET, EXTENDED RELEASE ORAL at 08:06

## 2024-06-02 NOTE — PLAN OF CARE
Care plan reviewed with patient. Ambulated in the room. Free from falls. No other complaints made. All orders checked and reviewed.

## 2024-06-02 NOTE — SUBJECTIVE & OBJECTIVE
Interval History:  Patient reports feeling better with no further vomiting.  But he does say he feels off and week.    Review of Systems   Constitutional:  Positive for activity change and fatigue. Negative for fever.   HENT:  Negative for congestion.    Respiratory:  Cough: a/w new meds. Shortness of breath: improved.    Cardiovascular:  Negative for chest pain and leg swelling.   Gastrointestinal:  Positive for nausea (improved). Negative for abdominal pain and constipation. Vomiting: improved.  Genitourinary:  Positive for frequency.   Neurological:  Negative for weakness.   Psychiatric/Behavioral:  Positive for dysphoric mood. Negative for agitation, behavioral problems, confusion, decreased concentration and sleep disturbance. The patient is nervous/anxious (d/t medical condition).      Objective:     Vital Signs (Most Recent):  Temp: 98.8 °F (37.1 °C) (06/02/24 1138)  Pulse: 77 (06/02/24 1300)  Resp: 18 (06/02/24 1138)  BP: 109/69 (06/02/24 1138)  SpO2: 98 % (06/02/24 1138) Vital Signs (24h Range):  Temp:  [98.2 °F (36.8 °C)-99.7 °F (37.6 °C)] 98.8 °F (37.1 °C)  Pulse:  [71-90] 77  Resp:  [12-18] 18  SpO2:  [93 %-98 %] 98 %  BP: (109-139)/(69-93) 109/69     Weight: 103.4 kg (228 lb)  Body mass index is 28.5 kg/m².    Intake/Output Summary (Last 24 hours) at 6/2/2024 1450  Last data filed at 6/2/2024 1322  Gross per 24 hour   Intake 480 ml   Output 2850 ml   Net -2370 ml         Physical Exam  Vitals and nursing note reviewed.   Constitutional:       General: He is not in acute distress.     Appearance: Normal appearance. He is not ill-appearing or toxic-appearing.   HENT:      Head: Normocephalic and atraumatic.   Cardiovascular:      Rate and Rhythm: Normal rate.      Heart sounds: No murmur heard.  Pulmonary:      Effort: Pulmonary effort is normal. No respiratory distress.      Breath sounds: No wheezing or rales.   Abdominal:      General: Bowel sounds are normal.      Palpations: Abdomen is soft.       Tenderness: There is no abdominal tenderness.   Musculoskeletal:      Right lower leg: No edema.      Left lower leg: No edema.   Skin:     General: Skin is warm.   Neurological:      Mental Status: He is alert and oriented to person, place, and time.      Motor: Weakness present.   Psychiatric:         Mood and Affect: Mood is depressed.         Behavior: Behavior normal.             Significant Labs: All pertinent labs within the past 24 hours have been reviewed.  CMP:   Recent Labs   Lab 06/01/24  0433 06/02/24  0430    137   K 3.0* 3.6   CL 99 97   CO2 23 23   GLU 68* 75   BUN 23* 28*   CREATININE 2.1* 2.0*   CALCIUM 8.6* 9.1   ANIONGAP 16 17*       Magnesium:   Recent Labs   Lab 06/01/24  0433   MG 1.2*         Significant Imaging: I have reviewed all pertinent imaging results/findings within the past 24 hours.

## 2024-06-02 NOTE — CONSULTS
RD providing remote coverage.   Consulted for fluid and sodium restriction diet education   - attached clinical reference materials handouts to discharge documents.    On site RD to provide in-person education as warranted,   education materials to be provided with discharge instructions.      Please re-consult as needed.  Thank you.     Kiera Dockery, RDN, LDN

## 2024-06-02 NOTE — PLAN OF CARE
[facility] - Telemetry (Hospital)  Initial Discharge Assessment       Primary Care Provider: Brenna Maravilla MD    Admission Diagnosis: Acute on chronic combined systolic and diastolic heart failure [I50.43]  Nausea & vomiting [R11.2]  Uncontrolled hypertension [I10]  Acute renal failure, unspecified acute renal failure type [N17.9]    Admission Date: 5/31/2024  Expected Discharge Date:     Transition of Care Barriers: None    Payor: MEDICAID / Plan: [place]UE [place] / Product Type: Managed Medicaid /     Extended Emergency Contact Information  Primary Emergency Contact: Vielka Saldana  Address: [id number] [place] [address] [place] [address] [facility]  Home Phone: [phone]  Mobile Phone: [phone]  Relation: Spouse    Discharge Plan A: Home  Discharge Plan B: Home with family      [facility] [facility] [place] [facility] [address] [facility]  [address] [facility] [address]  Phone: [phone] Fax: [phone]      Initial Assessment (most recent)       Adult Discharge Assessment - 06/02/24 1044          Discharge Assessment    Assessment Type Discharge Planning Assessment     Confirmed/corrected address, phone number and insurance Yes     Confirmed Demographics Correct on Facesheet     Source of Information patient     Reason For Admission vomitting & heart failure     People in Home spouse     Do you expect to return to your current living situation? Yes     Do you have help at home or someone to help you manage your care at home? Yes     Who are your caregiver(s) and their phone number(s)? Spouse Vielka leda, [phone]     Prior to hospitilization cognitive status: Alert/Oriented     Current cognitive status: Alert/Oriented     Walking or Climbing Stairs Difficulty no     Dressing/Bathing Difficulty no     Home Layout Able to live on 1st floor     Equipment Currently Used at Home none     Readmission within 30 days? No     Patient currently being followed by  outpatient case management? No     Do you currently have service(s) that help you manage your care at home? No     Do you take prescription medications? Yes     Do you have prescription coverage? Yes     Coverage Medicaid Healthy Blue     Do you have any problems affording any of your prescribed medications? No     Is the patient taking medications as prescribed? yes     Who is going to help you get home at discharge? Spouse Vielka tompkins, [phone]     How do you get to doctors appointments? car, drives self     Are you on dialysis? No     Do you take coumadin? No     Discharge Plan A Home     Discharge Plan B Home with family     DME Needed Upon Discharge  none     Discharge Plan discussed with: Patient     Transition of Care Barriers None        Physical Activity    On average, how many days per week do you engage in moderate to strenuous exercise (like a brisk walk)? 2 days     On average, how many minutes do you engage in exercise at this level? 10 min        Financial Resource Strain    How hard is it for you to pay for the very basics like food, housing, medical care, and heating? Not hard at all        OTHER    Name(s) of People in Home Spouse Vielka tompkins, [phone]                   MSW met with pt at the bedside to completed d/c planning and discuss consults. Pt reports no needs and declined HH/Rehab consult. Pt did have questions about disability claim and SW advised to set an appt with [employer] to update claim. GARRY,VALERIA

## 2024-06-02 NOTE — ASSESSMENT & PLAN NOTE
Patient is identified as having Combined Systolic and Diastolic heart failure that is Acute on chronic. CHF is currently uncontrolled due to Continued edema of extremities. Latest ECHO performed and demonstrates- Results for orders placed during the hospital encounter of 05/14/24    Echo    Left Ventricle: Severe global hypokinesis present. There is moderately reduced systolic function with a visually estimated ejection fraction of 30 - 35%.    Left Atrium: Left atrium is severely dilated.    Right Atrium: Right atrium is mildly dilated.    Pericardium: There is a trivial effusion.    . Continue Beta Blocker, Furosemide, and ARNI and monitor clinical status closely. Monitor on telemetry. Patient is on CHF pathway.  Monitor strict Is&Os and daily weights.  Place on fluid restriction of 1.5 L. Cardiology has been consulted. Continue to stress to patient importance of self efficacy and  on diet for CHF. Last BNP reviewed- and noted below   Recent Labs   Lab 05/31/24  1107   BNP 3,402*       -IV lasix 80 mg bid-transitioned to torsemide 40 b.i.d.  Hypok and hypomag  Replete lytes  Cardiology following  Echocardiogram improved

## 2024-06-02 NOTE — PLAN OF CARE
Problem: Adult Inpatient Plan of Care  Goal: Plan of Care Review  Outcome: Progressing  Goal: Patient-Specific Goal (Individualized)  Outcome: Progressing  Goal: Optimal Comfort and Wellbeing  Outcome: Progressing     Problem: Diabetes Comorbidity  Goal: Blood Glucose Level Within Targeted Range  Outcome: Progressing     Problem: Skin Injury Risk Increased  Goal: Skin Health and Integrity  Outcome: Progressing     Problem: Fall Injury Risk  Goal: Absence of Fall and Fall-Related Injury  Outcome: Progressing

## 2024-06-02 NOTE — ASSESSMENT & PLAN NOTE
Chronic, long pmh uncontrolled blood pressure. Latest blood pressure and vitals reviewed-     Temp:  [98.2 °F (36.8 °C)-99.7 °F (37.6 °C)]   Pulse:  [71-90]   Resp:  [12-18]   BP: (109-139)/(69-93)   SpO2:  [93 %-98 %] .   Home meds for hypertension were reviewed and noted below.   Hypertension Medications               carvediloL (COREG) 6.25 MG tablet Take 1 tablet (6.25 mg total) by mouth 2 (two) times daily.    furosemide (LASIX) 80 MG tablet Take 1 tablet (80 mg total) by mouth 2 (two) times daily.    sacubitriL-valsartan (ENTRESTO) 24-26 mg per tablet Take 1 tablet by mouth 2 (two) times daily.            While in the hospital, will manage blood pressure as follows; Continue home antihypertensive regimen

## 2024-06-02 NOTE — ASSESSMENT & PLAN NOTE
Creatine stable for now. BMP reviewed- noted Estimated Creatinine Clearance: 56.3 mL/min (A) (based on SCr of 2 mg/dL (H)). according to latest data. Based on current GFR, CKD stage is stage 3 - GFR 30-59.  Monitor UOP and serial BMP and adjust therapy as needed. Renally dose meds. Avoid nephrotoxic medications and procedures.  Intravenous lasix

## 2024-06-02 NOTE — CONSULTS
SW met with pt to discuss HH and rehab. Pt declined both as he feels he does not need them. Sw updated provider. KM, MSW
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
## 2024-06-03 ENCOUNTER — DOCUMENTATION ONLY (OUTPATIENT)
Dept: CARDIOLOGY | Facility: CLINIC | Age: 54
End: 2024-06-03
Payer: MEDICAID

## 2024-06-03 LAB
ALBUMIN SERPL BCP-MCNC: 3.1 G/DL (ref 3.5–5.2)
ALP SERPL-CCNC: 225 U/L (ref 55–135)
ALT SERPL W/O P-5'-P-CCNC: 20 U/L (ref 10–44)
ANION GAP SERPL CALC-SCNC: 18 MMOL/L (ref 8–16)
APTT PPP: 37.6 SEC (ref 21–32)
AST SERPL-CCNC: 30 U/L (ref 10–40)
BASOPHILS # BLD AUTO: 0.05 K/UL (ref 0–0.2)
BASOPHILS NFR BLD: 1.4 % (ref 0–1.9)
BILIRUB DIRECT SERPL-MCNC: 1.9 MG/DL (ref 0.1–0.3)
BILIRUB SERPL-MCNC: 3.7 MG/DL (ref 0.1–1)
BUN SERPL-MCNC: 40 MG/DL (ref 6–20)
CALCIUM SERPL-MCNC: 8.8 MG/DL (ref 8.7–10.5)
CHLORIDE SERPL-SCNC: 100 MMOL/L (ref 95–110)
CO2 SERPL-SCNC: 19 MMOL/L (ref 23–29)
CREAT SERPL-MCNC: 2.1 MG/DL (ref 0.5–1.4)
DIFFERENTIAL METHOD BLD: ABNORMAL
EOSINOPHIL # BLD AUTO: 0.1 K/UL (ref 0–0.5)
EOSINOPHIL NFR BLD: 2.3 % (ref 0–8)
ERYTHROCYTE [DISTWIDTH] IN BLOOD BY AUTOMATED COUNT: 19.2 % (ref 11.5–14.5)
EST. GFR  (NO RACE VARIABLE): 37 ML/MIN/1.73 M^2
FERRITIN SERPL-MCNC: 360 NG/ML (ref 20–300)
GLUCOSE SERPL-MCNC: 78 MG/DL (ref 70–110)
HAV IGM SERPL QL IA: NORMAL
HBV CORE IGM SERPL QL IA: NORMAL
HBV SURFACE AG SERPL QL IA: NORMAL
HCT VFR BLD AUTO: 55.1 % (ref 40–54)
HCV AB SERPL QL IA: NORMAL
HGB BLD-MCNC: 16.8 G/DL (ref 14–18)
IMM GRANULOCYTES # BLD AUTO: 0.03 K/UL (ref 0–0.04)
IMM GRANULOCYTES NFR BLD AUTO: 0.9 % (ref 0–0.5)
INR PPP: 1.1 (ref 0.8–1.2)
IRON SERPL-MCNC: 50 UG/DL (ref 45–160)
LYMPHOCYTES # BLD AUTO: 1.2 K/UL (ref 1–4.8)
LYMPHOCYTES NFR BLD: 34.2 % (ref 18–48)
MAGNESIUM SERPL-MCNC: 1.6 MG/DL (ref 1.6–2.6)
MCH RBC QN AUTO: 26.5 PG (ref 27–31)
MCHC RBC AUTO-ENTMCNC: 30.5 G/DL (ref 32–36)
MCV RBC AUTO: 87 FL (ref 82–98)
MONOCYTES # BLD AUTO: 0.9 K/UL (ref 0.3–1)
MONOCYTES NFR BLD: 26.7 % (ref 4–15)
NEUTROPHILS # BLD AUTO: 1.2 K/UL (ref 1.8–7.7)
NEUTROPHILS NFR BLD: 34.5 % (ref 38–73)
NRBC BLD-RTO: 0 /100 WBC
PLATELET # BLD AUTO: 184 K/UL (ref 150–450)
PMV BLD AUTO: 10.6 FL (ref 9.2–12.9)
POCT GLUCOSE: 121 MG/DL (ref 70–110)
POCT GLUCOSE: 85 MG/DL (ref 70–110)
POCT GLUCOSE: 89 MG/DL (ref 70–110)
POCT GLUCOSE: 94 MG/DL (ref 70–110)
POTASSIUM SERPL-SCNC: 4.2 MMOL/L (ref 3.5–5.1)
PROT SERPL-MCNC: 7.2 G/DL (ref 6–8.4)
PROTHROMBIN TIME: 12.8 SEC (ref 9–12.5)
RBC # BLD AUTO: 6.33 M/UL (ref 4.6–6.2)
SATURATED IRON: 13 % (ref 20–50)
SODIUM SERPL-SCNC: 137 MMOL/L (ref 136–145)
TOTAL IRON BINDING CAPACITY: 377 UG/DL (ref 250–450)
TRANSFERRIN SERPL-MCNC: 255 MG/DL (ref 200–375)
WBC # BLD AUTO: 3.45 K/UL (ref 3.9–12.7)

## 2024-06-03 PROCEDURE — 25000003 PHARM REV CODE 250: Performed by: STUDENT IN AN ORGANIZED HEALTH CARE EDUCATION/TRAINING PROGRAM

## 2024-06-03 PROCEDURE — 80053 COMPREHEN METABOLIC PANEL: CPT | Performed by: INTERNAL MEDICINE

## 2024-06-03 PROCEDURE — 99204 OFFICE O/P NEW MOD 45 MIN: CPT | Mod: 95,,, | Performed by: INTERNAL MEDICINE

## 2024-06-03 PROCEDURE — 82248 BILIRUBIN DIRECT: CPT | Performed by: INTERNAL MEDICINE

## 2024-06-03 PROCEDURE — 99214 OFFICE O/P EST MOD 30 MIN: CPT | Mod: ,,, | Performed by: INTERNAL MEDICINE

## 2024-06-03 PROCEDURE — 96376 TX/PRO/DX INJ SAME DRUG ADON: CPT

## 2024-06-03 PROCEDURE — 83540 ASSAY OF IRON: CPT | Performed by: INTERNAL MEDICINE

## 2024-06-03 PROCEDURE — 85730 THROMBOPLASTIN TIME PARTIAL: CPT | Performed by: INTERNAL MEDICINE

## 2024-06-03 PROCEDURE — 80074 ACUTE HEPATITIS PANEL: CPT | Performed by: INTERNAL MEDICINE

## 2024-06-03 PROCEDURE — 36415 COLL VENOUS BLD VENIPUNCTURE: CPT | Performed by: INTERNAL MEDICINE

## 2024-06-03 PROCEDURE — G0378 HOSPITAL OBSERVATION PER HR: HCPCS

## 2024-06-03 PROCEDURE — 25000003 PHARM REV CODE 250: Performed by: FAMILY MEDICINE

## 2024-06-03 PROCEDURE — 86381 MITOCHONDRIAL ANTIBODY EACH: CPT | Performed by: INTERNAL MEDICINE

## 2024-06-03 PROCEDURE — 99900035 HC TECH TIME PER 15 MIN (STAT)

## 2024-06-03 PROCEDURE — 86790 VIRUS ANTIBODY NOS: CPT | Performed by: INTERNAL MEDICINE

## 2024-06-03 PROCEDURE — 96361 HYDRATE IV INFUSION ADD-ON: CPT

## 2024-06-03 PROCEDURE — 82728 ASSAY OF FERRITIN: CPT | Performed by: INTERNAL MEDICINE

## 2024-06-03 PROCEDURE — 25000003 PHARM REV CODE 250: Performed by: INTERNAL MEDICINE

## 2024-06-03 PROCEDURE — 96366 THER/PROPH/DIAG IV INF ADDON: CPT

## 2024-06-03 PROCEDURE — 63600175 PHARM REV CODE 636 W HCPCS: Performed by: INTERNAL MEDICINE

## 2024-06-03 PROCEDURE — 85610 PROTHROMBIN TIME: CPT | Performed by: INTERNAL MEDICINE

## 2024-06-03 PROCEDURE — 85025 COMPLETE CBC W/AUTO DIFF WBC: CPT | Performed by: INTERNAL MEDICINE

## 2024-06-03 PROCEDURE — 83735 ASSAY OF MAGNESIUM: CPT | Performed by: INTERNAL MEDICINE

## 2024-06-03 PROCEDURE — 96365 THER/PROPH/DIAG IV INF INIT: CPT

## 2024-06-03 RX ORDER — MAGNESIUM SULFATE HEPTAHYDRATE 40 MG/ML
2 INJECTION, SOLUTION INTRAVENOUS ONCE
Status: COMPLETED | OUTPATIENT
Start: 2024-06-03 | End: 2024-06-03

## 2024-06-03 RX ORDER — TORSEMIDE 10 MG/1
40 TABLET ORAL DAILY
Status: DISCONTINUED | OUTPATIENT
Start: 2024-06-04 | End: 2024-06-04 | Stop reason: HOSPADM

## 2024-06-03 RX ADMIN — PANTOPRAZOLE SODIUM 40 MG: 40 TABLET, DELAYED RELEASE ORAL at 08:06

## 2024-06-03 RX ADMIN — TORSEMIDE 40 MG: 10 TABLET ORAL at 08:06

## 2024-06-03 RX ADMIN — POTASSIUM CHLORIDE 40 MEQ: 1500 TABLET, EXTENDED RELEASE ORAL at 02:06

## 2024-06-03 RX ADMIN — ALLOPURINOL 100 MG: 100 TABLET ORAL at 08:06

## 2024-06-03 RX ADMIN — APIXABAN 5 MG: 2.5 TABLET, FILM COATED ORAL at 08:06

## 2024-06-03 RX ADMIN — CARVEDILOL 6.25 MG: 6.25 TABLET, FILM COATED ORAL at 08:06

## 2024-06-03 RX ADMIN — SACUBITRIL AND VALSARTAN 1 TABLET: 24; 26 TABLET, FILM COATED ORAL at 08:06

## 2024-06-03 RX ADMIN — AMIODARONE HYDROCHLORIDE 200 MG: 200 TABLET ORAL at 08:06

## 2024-06-03 RX ADMIN — APIXABAN 5 MG: 2.5 TABLET, FILM COATED ORAL at 10:06

## 2024-06-03 RX ADMIN — ATORVASTATIN CALCIUM 40 MG: 40 TABLET, FILM COATED ORAL at 10:06

## 2024-06-03 RX ADMIN — MAGNESIUM OXIDE TAB 400 MG (241.3 MG ELEMENTAL MG) 400 MG: 400 (241.3 MG) TAB at 10:06

## 2024-06-03 RX ADMIN — POTASSIUM CHLORIDE 40 MEQ: 1500 TABLET, EXTENDED RELEASE ORAL at 08:06

## 2024-06-03 RX ADMIN — MAGNESIUM OXIDE TAB 400 MG (241.3 MG ELEMENTAL MG) 400 MG: 400 (241.3 MG) TAB at 08:06

## 2024-06-03 RX ADMIN — AMIODARONE HYDROCHLORIDE 200 MG: 200 TABLET ORAL at 10:06

## 2024-06-03 RX ADMIN — COLCHICINE 0.6 MG: 0.6 TABLET ORAL at 08:06

## 2024-06-03 RX ADMIN — MAGNESIUM SULFATE HEPTAHYDRATE 2 G: 40 INJECTION, SOLUTION INTRAVENOUS at 09:06

## 2024-06-03 RX ADMIN — SODIUM CHLORIDE 125 ML: 9 INJECTION, SOLUTION INTRAVENOUS at 10:06

## 2024-06-03 RX ADMIN — POLYETHYLENE GLYCOL 3350 17 G: 17 POWDER, FOR SOLUTION ORAL at 08:06

## 2024-06-03 NOTE — PLAN OF CARE
A247/A247 SOLEDAD Fraser Sandro Saldana is a 53 y.o.male admitted on 5/31/2024 for Acute on chronic combined systolic (congestive) and diastolic (congestive) heart failure   Code Status: Full Code MRN: [id number]   Review of patient's allergies indicates:   Allergen Reactions    Penicillins Hives and Anaphylaxis     Past Medical History:   Diagnosis Date    Acute CHF 7/8/2019    Acute on chronic combined systolic (congestive) and diastolic (congestive) heart failure 9/23/2019    Allergy     CHF (congestive heart failure) 7/9/2019    CKD (chronic kidney disease) stage 3, GFR 30-59 ml/min 7/8/2019    Essential hypertension 6/1/2017    Hypertension associated with chronic kidney disease due to type 2 diabetes mellitus 2/28/2022    Mixed hyperlipidemia 3/10/2022    NATALYA (obstructive sleep apnea) 7/23/2018      PRN meds    sodium chloride 0.9%, , PRN  acetaminophen, 650 mg, Q6H PRN  benzonatate, 100 mg, TID PRN  dextrose 10%, 12.5 g, PRN  dextrose 10%, 25 g, PRN  glucagon (human recombinant), 1 mg, PRN  insulin aspart U-100, 0-5 Units, Q6H PRN  nitroGLYCERIN, 0.4 mg, Q5 Min PRN  sodium chloride 0.9%, 10 mL, PRN      Chart check completed. Will continue plan of care.       Orthos Q shift   Strict I and o   Torsemide po         Orientation: oriented x 4  [place] Coma Scale Score: 15     Lead Monitored: Lead II Rhythm: normal sinus rhythm    Cardiac/Telemetry Box Number: [id number]  VTE Required Core Measure: Pharmacological prophylaxis initiated/maintained Last Bowel Movement: 06/02/24  Diet Low Sodium, 2gm Fluid - 1500mL     Kenneth Score: 21  Fall Risk Score: 9  Accucheck []   Freq?      Lines/Drains/Airways       Peripheral Intravenous Line  Duration                  Peripheral IV - Single Lumen 05/31/24 1103 20 G Left Antecubital 3 days

## 2024-06-03 NOTE — HOSPITAL COURSE
6/1/24- Patient seen and examined standing up. Patient reported that he felt better today. He has been ambulating in the room. He had an output of 6.2 L. Labs reviewed. Creatine stable at 2.1 and potassium is at 3.       6/2/24-Patient seen and examined today, sitting up in bed. Pt feels fine but states he has no energy. He had an output of -8.8 L. Labs reviewed, creatine dropped from 2.1 to 2.0 and potassium is 3.6.Will switch lasix to torsemide.      6/3/24 Pt seen and examined today laying in bed, feels ok denies any SOB, CP at this time. Asked to see pt again due to low BP. Labs reviewed, Echo 15-20% EF, BNP was over 3400, Crt 2.1 today. BP 84 systolic after medications this morning, low 100s systolic by noon    6/4/24 Pt seen and examined today resting in bed. Feels ok, SOB improved since admission. Denies any CP at this time. Labs reviewed, troponin flat. BP today 1teens systolic after medications. Can follow up in clinic

## 2024-06-03 NOTE — CONSULTS
SW met with patient at bedside to discuss consults.    Pt reports having applied for disability but when checking application, status remains in pending. SW advised that CM unable to assist with application, however; email referral will be sent to [facility] Referral to follow up to assist patient. SW empathized with challenges navigating disability application. SW encouraged pt to continue efforts with application. Pt voiced understanding.    SW advised referral faxed to [facility] for Hepatology follow up. AVS updated with contact information and follow up instructions.    SW to remain available.

## 2024-06-03 NOTE — SUBJECTIVE & OBJECTIVE
Review of Systems   Constitutional: Positive for malaise/fatigue.   HENT: Negative.     Eyes: Negative.    Cardiovascular: Negative.    Respiratory: Negative.     Skin: Negative.    Musculoskeletal: Negative.    Gastrointestinal: Negative.    Genitourinary: Negative.    Neurological: Negative.    Psychiatric/Behavioral: Negative.       Objective:     Vital Signs (Most Recent):  Temp: 99.1 °F (37.3 °C) (06/03/24 1515)  Pulse: 82 (06/03/24 1517)  Resp: 18 (06/03/24 1516)  BP: 102/70 (06/03/24 1517)  SpO2: 96 % (06/03/24 1517) Vital Signs (24h Range):  Temp:  [97.7 °F (36.5 °C)-99.9 °F (37.7 °C)] 99.1 °F (37.3 °C)  Pulse:  [74-91] 82  Resp:  [16-19] 18  SpO2:  [92 %-96 %] 96 %  BP: ()/(51-91) 102/70     Weight: 103.4 kg (228 lb)  Body mass index is 28.5 kg/m².     SpO2: 96 %         Intake/Output Summary (Last 24 hours) at 6/3/2024 1612  Last data filed at 6/3/2024 1442  Gross per 24 hour   Intake --   Output 1800 ml   Net -1800 ml       Lines/Drains/Airways       Peripheral Intravenous Line  Duration                  Peripheral IV - Single Lumen 05/31/24 1103 20 G Left Antecubital 3 days                       Physical Exam  Vitals and nursing note reviewed.   Constitutional:       Appearance: Normal appearance.   HENT:      Head: Normocephalic and atraumatic.   Eyes:      General:         Right eye: No discharge.         Left eye: No discharge.      Pupils: Pupils are equal, round, and reactive to light.   Cardiovascular:      Rate and Rhythm: Normal rate and regular rhythm.      Heart sounds: S1 normal and S2 normal. No murmur heard.     No friction rub.   Pulmonary:      Effort: Pulmonary effort is normal. No respiratory distress.      Breath sounds: Rales present.   Abdominal:      Palpations: Abdomen is soft.      Tenderness: There is no abdominal tenderness.   Musculoskeletal:      Cervical back: Neck supple.      Right lower leg: Edema present.      Left lower leg: Edema present.   Skin:     General:  "Skin is warm and dry.   Neurological:      General: No focal deficit present.      Mental Status: He is alert and oriented to person, place, and time.   Psychiatric:         Mood and Affect: Mood normal.         Behavior: Behavior normal.         Thought Content: Thought content normal.            Significant Labs: BMP:   Recent Labs   Lab 06/02/24  0430 06/03/24  0520   GLU 75 78    137   K 3.6 4.2   CL 97 100   CO2 23 19*   BUN 28* 40*   CREATININE 2.0* 2.1*   CALCIUM 9.1 8.8   MG  --  1.6   , CMP   Recent Labs   Lab 06/02/24  0430 06/03/24  0520    137   K 3.6 4.2   CL 97 100   CO2 23 19*   GLU 75 78   BUN 28* 40*   CREATININE 2.0* 2.1*   CALCIUM 9.1 8.8   PROT  --  7.2   ALBUMIN  --  3.1*   BILITOT  --  3.7*   ALKPHOS  --  225*   AST  --  30   ALT  --  20   ANIONGAP 17* 18*   , CBC   Recent Labs   Lab 06/03/24  0520   WBC 3.45*   HGB 16.8   HCT 55.1*      , INR   Recent Labs   Lab 06/03/24  0808   INR 1.1   , Lipid Panel No results for input(s): "CHOL", "HDL", "LDLCALC", "TRIG", "CHOLHDL" in the last 48 hours., Troponin No results for input(s): "TROPONINI" in the last 48 hours., and All pertinent lab results from the last 24 hours have been reviewed.    Significant Imaging: Cardiac Cath: reviewed, Echocardiogram: Transthoracic echo (TTE) complete (Cupid Only):   Results for orders placed or performed during the hospital encounter of 05/31/24   Echo   Result Value Ref Range    BSA 2.34 m2    Narrative      Left Ventricle: Severe global hypokinesis present. There is moderately   reduced systolic function with a visually estimated ejection fraction of   30 - 35%.    Left Atrium: Left atrium is severely dilated.    Right Atrium: Right atrium is mildly dilated.    Pericardium: There is a trivial effusion.     , EKG: reviewed, Stress Test: reviewed, and X-Ray: CXR: X-Ray Chest 1 View (CXR): No results found for this visit on 05/31/24.  "

## 2024-06-03 NOTE — PROGRESS NOTES
'[facility] Telemetry ([facility] Medicine  Progress Note    Patient Name: Bria Saldana  MRN: [id number]  Patient Class: OP- Observation   Admission Date: 5/31/2024  Length of Stay: 0 days  Attending Physician: Kendal Oleary MD  Primary Care Provider: Brenna Maravilla MD        Subjective:     Principal Problem:Acute on chronic combined systolic (congestive) and diastolic (congestive) heart failure        HPI:  The patient is a 54 yo male with past medical history of atrial flutter, CHF, hypertension, CKD and NATALYA who presented to the ED with vomiting. He woke up this morning and had episode of emesis. He is poor historian. He states he has been losing weight but states he does not weigh himself at home. He saw his weight had decreased from his cardiology visit. He had some Gatorade in the ED. Patient recently hospitalized with acute exacerbation of heart failure and atrial flutter. He was cardioverted that admission. Workup in the ED revealed patient in volume overload. Cardiology and hospital medicine consulted.     Overview/Hospital Course:  6/1 admitted for congestive heart failure exacerbation. Associated with vomiting upon awakening. Endorses compliance with medication(s) and fluid and salt restriction. Experienced dyspnea but resolved. Denies chest pain or lower extremity edema. Continue congestive heart failure exacerbation pathway.Cardiology following.    Echo    Left Ventricle: Severe global hypokinesis present. There is moderately reduced systolic function with a visually estimated ejection fraction of 30 - 35%.    Left Atrium: Left atrium is severely dilated.    Right Atrium: Right atrium is mildly dilated.    Pericardium: There is a trivial effusion.    Abdominal ultrasound  The liver is enlarged measuring 17.4 cm.  No masses.  The gallbladder is contracted.  No gallstones.  Patient was not NPO prior to exam.  The common bile duct is 3 mm.   The portal vein is grossly normal.   The pancreas is normal..  Mild splenomegaly.  No ascites.     .  After aggressive diuresis patient's blood pressure is marginal he has mild orthostasis.  Have adjusted torsemide to daily only and given 500 cc bolus.    Interval History:  Patient seen and examined at bedside.  Low blood pressure this morning with some orthostatic changes.  Diuresis reduced and fluids given.    Review of Systems   Constitutional:  Positive for activity change and fatigue. Negative for fever.   HENT:  Negative for congestion.    Respiratory:  Cough: a/w new meds. Shortness of breath: improved.    Cardiovascular:  Negative for chest pain and leg swelling.   Gastrointestinal:  Negative for abdominal pain, constipation and nausea (improved). Vomiting: improved.  Neurological:  Negative for weakness.   Psychiatric/Behavioral:  Positive for dysphoric mood. Negative for agitation, behavioral problems, confusion, decreased concentration and sleep disturbance. The patient is nervous/anxious (d/t medical condition).      Objective:     Vital Signs (Most Recent):  Temp: 99.1 °F (37.3 °C) (06/03/24 1515)  Pulse: 82 (06/03/24 1517)  Resp: 18 (06/03/24 1516)  BP: 102/70 (06/03/24 1517)  SpO2: 96 % (06/03/24 1517) Vital Signs (24h Range):  Temp:  [97.7 °F (36.5 °C)-99.9 °F (37.7 °C)] 99.1 °F (37.3 °C)  Pulse:  [74-91] 82  Resp:  [16-19] 18  SpO2:  [92 %-96 %] 96 %  BP: ()/(51-80) 102/70     Weight: 103.4 kg (228 lb)  Body mass index is 28.5 kg/m².    Intake/Output Summary (Last 24 hours) at 6/3/2024 1743  Last data filed at 6/3/2024 1625  Gross per 24 hour   Intake 160.38 ml   Output 1800 ml   Net -1639.62 ml         Physical Exam  Vitals reviewed.   Constitutional:       Appearance: Normal appearance.   HENT:      Head: Normocephalic and atraumatic.   Cardiovascular:      Rate and Rhythm: Normal rate.      Heart sounds: No murmur heard.  Pulmonary:      Effort: Pulmonary effort is normal.      Breath sounds: Normal breath sounds.    Abdominal:      General: Bowel sounds are normal.      Palpations: Abdomen is soft.      Tenderness: There is no abdominal tenderness.   Musculoskeletal:      Right lower leg: No edema.      Left lower leg: No edema.   Skin:     General: Skin is warm.   Neurological:      Mental Status: He is alert and oriented to person, place, and time.      Motor: Weakness present.   Psychiatric:         Mood and Affect: Mood is depressed.         Behavior: Behavior normal.             Significant Labs: All pertinent labs within the past 24 hours have been reviewed.  CBC:   Recent Labs   Lab 06/03/24  0520   WBC 3.45*   HGB 16.8   HCT 55.1*        CMP:   Recent Labs   Lab 06/02/24  0430 06/03/24  0520    137   K 3.6 4.2   CL 97 100   CO2 23 19*   GLU 75 78   BUN 28* 40*   CREATININE 2.0* 2.1*   CALCIUM 9.1 8.8   PROT  --  7.2   ALBUMIN  --  3.1*   BILITOT  --  3.7*   ALKPHOS  --  225*   AST  --  30   ALT  --  20   ANIONGAP 17* 18*       Significant Imaging: I have reviewed all pertinent imaging results/findings within the past 24 hours.    Assessment/Plan:      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 (congestive) and diastolic (congestive) heart failure  Patient is identified as having Combined Systolic and Diastolic heart failure that is Acute on chronic. CHF is currently uncontrolled due to Continued edema of extremities. Latest ECHO performed and demonstrates- Results for orders placed during the hospital encounter of 05/14/24    Echo    Left Ventricle: Severe global hypokinesis present. There is moderately reduced systolic function with a visually estimated ejection fraction of 30 - 35%.    Left Atrium: Left atrium is severely dilated.    Right Atrium: Right atrium is mildly dilated.    Pericardium: There is a trivial effusion.    . Continue Beta Blocker, Furosemide, and ARNI and monitor clinical status closely. Monitor on telemetry. Patient is on CHF pathway.  Monitor strict Is&Os and daily weights.  Place on  fluid restriction of 1.5 L. Cardiology has been consulted. Continue to stress to patient importance of self efficacy and  on diet for CHF. Last BNP reviewed- and noted below   Recent Labs   Lab 05/31/24  1107   BNP 3,402*       -IV lasix 80 mg bid-transitioned to torsemide 40 b.i.d. with orthostasis we will decrease torsemide to q.day  Hypok and hypomag  Replete lytes  Cardiology following  Echocardiogram improved      Serum total bilirubin elevated  Elevated alk phos  Denies abdominal pain but vomiting upon awakening  Appreciate hepatology as recommendations      Prolonged Q-T interval on ECG  On amio  Mag 1.2  Replete mag        Paroxysmal atrial flutter  -s/p DCCV 5/17/24  -continue amiodarone and Eliquis  -currently in sinus rhythm  -monitor      CKD (chronic kidney disease) stage 3, GFR 30-59 ml/min  Creatine stable for now. BMP reviewed- noted Estimated Creatinine Clearance: 53.6 mL/min (A) (based on SCr of 2.1 mg/dL (H)). according to latest data. Based on current GFR, CKD stage is stage 3 - GFR 30-59.  Monitor UOP and serial BMP and adjust therapy as needed. Renally dose meds. Avoid nephrotoxic medications and procedures.  Intravenous lasix    Abnormal LFTs  Likely secondary to hepatic  congestion with CHF reduced ejection fraction  Volume status more controlled  We will send referral to [facility] hepatology      Essential hypertension  Chronic, long pmh uncontrolled blood pressure. Latest blood pressure and vitals reviewed-     Temp:  [97.7 °F (36.5 °C)-99.9 °F (37.7 °C)]   Pulse:  [74-91]   Resp:  [16-19]   BP: ()/(51-80)   SpO2:  [92 %-96 %] .   Home meds for hypertension were reviewed and noted below.   Hypertension Medications               carvediloL (COREG) 6.25 MG tablet Take 1 tablet (6.25 mg total) by mouth 2 (two) times daily.    furosemide (LASIX) 80 MG tablet Take 1 tablet (80 mg total) by mouth 2 (two) times daily.    sacubitriL-valsartan (ENTRESTO) 24-26 mg per tablet Take 1 tablet by  mouth 2 (two) times daily.            While in the hospital, will manage blood pressure as follows; Continue home antihypertensive regimen        VTE Risk Mitigation (From admission, onward)           Ordered     apixaban tablet 5 mg  2 times daily         05/31/24 1452     IP VTE LOW RISK PATIENT  Once         05/31/24 1403                    Discharge Planning   CARMELO:      Code Status: Full Code   Is the patient medically ready for discharge?:     Reason for patient still in hospital (select all that apply): Patient trending condition, Laboratory test, and Treatment  Discharge Plan A: Home                  Kendal Colin MD  Department of Hospital Medicine   '[place] - Telemetry ([facility])
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
## 2024-06-03 NOTE — PROGRESS NOTES
[facility] - Telemetry ([facility])  Cardiology  Progress Note    Patient Name: Bria Saldana  MRN: [id number]  Admission Date: 5/31/2024  Hospital Length of Stay: 0 days  Code Status: Full Code   Attending Physician: Kendal Oleary MD   Primary Care Physician: Brenna Maravilla MD  Expected Discharge Date:   Principal Problem:Acute on chronic combined systolic (congestive) and diastolic (congestive) heart failure    Subjective:   HPI:   Mr. Saldana is a 53 year old male patient whose current medical conditions include CKD, NATALYA, HTN, and combined CHF (EF 15-20% 5/24) who presented to [facility] ED yesterday due to fluid overload. Patient complained of leg swelling, abdominal bloating, nausea, vomiting, decreased appetite, and orthopnea. He denied any associated fever, chills,, diaphoresis, chest pain, palpitations, near syncope, or syncope.  Reports being careful with his diet and fluid intake.  Does not weigh himself daily.  Initial workup in ED revealed creatinine of 2.1 (BL 1.8-2.2), BNP of 3,402. Patient was subsequently admitted for further evaluation and treatment. Cardiology consulted to assist with management. Patient seen and examined today, resting in bed. Feels ok. SOB and LE edema improving. IV lasix given. Followed in CHF clinic. EKGs reviewed, NSR, TWI lateral. Troponin 0.027.  Lactate normal.     He was admitted 5/14/24 recently for volume overload and underwent and underwent MEGAN/cardioversion 5/16/24 for atrial flutter.  During procedure, patient potentially aspirated and needed to be intubated, transferred to ICU.  Initially improved, but no transferred out ICU.  Stress test 5/20/2024 with fixed defect consistent with scar in the inferior apical wall  Hospital Course:   6/1/24- Patient seen and examined standing up. Patient reported that he felt better today. He has been ambulating in the room. He had an output of 6.2 L. Labs reviewed. Creatine stable at 2.1 and potassium is at 3.        6/2/24-Patient seen and examined today, sitting up in bed. Pt feels fine but states he has no energy. He had an output of -8.8 L. Labs reviewed, creatine dropped from 2.1 to 2.0 and potassium is 3.6.Will switch lasix to torsemide.      6/3/24 Pt seen and examined today laying in bed, feels ok denies any SOB, CP at this time. Asked to see pt again due to low BP. Labs reviewed, Echo 15-20% EF, BNP was over 3400, Crt 2.1 today. BP 84 systolic after medications this morning, low 100s systolic by noon        Review of Systems   Constitutional: Positive for malaise/fatigue.   HENT: Negative.     Eyes: Negative.    Cardiovascular: Negative.    Respiratory: Negative.     Skin: Negative.    Musculoskeletal: Negative.    Gastrointestinal: Negative.    Genitourinary: Negative.    Neurological: Negative.    Psychiatric/Behavioral: Negative.       Objective:     Vital Signs (Most Recent):  Temp: 99.1 °F (37.3 °C) (06/03/24 1515)  Pulse: 82 (06/03/24 1517)  Resp: 18 (06/03/24 1516)  BP: 102/70 (06/03/24 1517)  SpO2: 96 % (06/03/24 1517) Vital Signs (24h Range):  Temp:  [97.7 °F (36.5 °C)-99.9 °F (37.7 °C)] 99.1 °F (37.3 °C)  Pulse:  [74-91] 82  Resp:  [16-19] 18  SpO2:  [92 %-96 %] 96 %  BP: ()/(51-91) 102/70     Weight: 103.4 kg (228 lb)  Body mass index is 28.5 kg/m².     SpO2: 96 %         Intake/Output Summary (Last 24 hours) at 6/3/2024 1612  Last data filed at 6/3/2024 1442  Gross per 24 hour   Intake --   Output 1800 ml   Net -1800 ml       Lines/Drains/Airways       Peripheral Intravenous Line  Duration                  Peripheral IV - Single Lumen 05/31/24 1103 20 G Left Antecubital 3 days                       Physical Exam  Vitals and nursing note reviewed.   Constitutional:       Appearance: Normal appearance.   HENT:      Head: Normocephalic and atraumatic.   Eyes:      General:         Right eye: No discharge.         Left eye: No discharge.      Pupils: Pupils are equal, round, and reactive to light.  "  Cardiovascular:      Rate and Rhythm: Normal rate and regular rhythm.      Heart sounds: S1 normal and S2 normal. No murmur heard.     No friction rub.   Pulmonary:      Effort: Pulmonary effort is normal. No respiratory distress.      Breath sounds: Rales present.   Abdominal:      Palpations: Abdomen is soft.      Tenderness: There is no abdominal tenderness.   Musculoskeletal:      Cervical back: Neck supple.      Right lower leg: Edema present.      Left lower leg: Edema present.   Skin:     General: Skin is warm and dry.   Neurological:      General: No focal deficit present.      Mental Status: He is alert and oriented to person, place, and time.   Psychiatric:         Mood and Affect: Mood normal.         Behavior: Behavior normal.         Thought Content: Thought content normal.            Significant Labs: BMP:   Recent Labs   Lab 06/02/24  0430 06/03/24  0520   GLU 75 78    137   K 3.6 4.2   CL 97 100   CO2 23 19*   BUN 28* 40*   CREATININE 2.0* 2.1*   CALCIUM 9.1 8.8   MG  --  1.6   , CMP   Recent Labs   Lab 06/02/24  0430 06/03/24  0520    137   K 3.6 4.2   CL 97 100   CO2 23 19*   GLU 75 78   BUN 28* 40*   CREATININE 2.0* 2.1*   CALCIUM 9.1 8.8   PROT  --  7.2   ALBUMIN  --  3.1*   BILITOT  --  3.7*   ALKPHOS  --  225*   AST  --  30   ALT  --  20   ANIONGAP 17* 18*   , CBC   Recent Labs   Lab 06/03/24  0520   WBC 3.45*   HGB 16.8   HCT 55.1*      , INR   Recent Labs   Lab 06/03/24  0808   INR 1.1   , Lipid Panel No results for input(s): "CHOL", "HDL", "LDLCALC", "TRIG", "CHOLHDL" in the last 48 hours., Troponin No results for input(s): "TROPONINI" in the last 48 hours., and All pertinent lab results from the last 24 hours have been reviewed.    Significant Imaging: Cardiac Cath: reviewed, Echocardiogram: Transthoracic echo (TTE) complete (Cupid Only):   Results for orders placed or performed during the hospital encounter of 05/31/24   Echo   Result Value Ref Range    BSA 2.34 m2    " Narrative      Left Ventricle: Severe global hypokinesis present. There is moderately   reduced systolic function with a visually estimated ejection fraction of   30 - 35%.    Left Atrium: Left atrium is severely dilated.    Right Atrium: Right atrium is mildly dilated.    Pericardium: There is a trivial effusion.     , EKG: reviewed, Stress Test: reviewed, and X-Ray: CXR: X-Ray Chest 1 View (CXR): No results found for this visit on 05/31/24.  Assessment and Plan:       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 (congestive) and diastolic (congestive) heart failure   Results for orders placed during the hospital encounter of 05/14/24    Echo    Interpretation Summary    Left Ventricle: The left ventricle is mildly dilated. Severely increased ventricular mass. Severely increased wall thickness. There is concentric hypertrophy. Severe global hypokinesis present. There is severely reduced systolic function with a visually estimated ejection fraction of 15 - 20%. Ejection fraction by visual approximation is 18%. Grade II diastolic dysfunction.    Right Ventricle: Moderate right ventricular enlargement. Wall thickness is normal. Systolic function is moderately reduced.    Left Atrium: Left atrium is severely dilated.    Right Atrium: Right atrium is dilated.    Aortic Valve: The aortic valve is a trileaflet valve. There is mild aortic valve sclerosis. There is mild stenosis. Aortic valve area by VTI is 1.65 cm². Aortic valve peak velocity is 1.33 m/s. Mean gradient is 4 mmHg. The dimensionless index is 0.56.    Mitral Valve: There is mild to moderate regurgitation.    Tricuspid Valve: There is moderate regurgitation.    Pulmonary Artery: There is mild pulmonary hypertension. The estimated pulmonary artery systolic pressure is 42 mmHg.    IVC/SVC: Intermediate venous pressure at 8 mmHg.    Pericardium: There is a moderate circumferential effusion. Pericardial effusion is echolucent. No indication of cardiac tamponade.    Recent  Labs   Lab 05/31/24  1107   BNP 3,402*       Will need to repeat limited echo to evaluate pericardial effusion  Continue IV diuresis and monitor strict intake and output   Monitor electrolytes with IV diuresis and replace as needed  Will need to be evaluated for transplant   Continue BB, Entresto    6/3/24  BP low this morning x1  Monitor  Cont OMT- BB, torsemide, entresto  Strict I/Os  Low na diet  Close follow up in clinic          Paroxysmal atrial flutter  Currently in sinus rhythm  Continue Eliquis  Continue amiodarone 200 mg b.i.d.  On Discharge will need to take amiodarone 200 daily   Has EP visit 6/5/2024 to discuss possible flutter ablation and defibrillator placement    CKD (chronic kidney disease) stage 3, GFR 30-59 ml/min  Looks to be at baseline 1.8-2.2  May improve with IV diuresis    6/3/24  Monitor with diuresis    Essential hypertension  Stable   Continue Entresto, carvedilol        VTE Risk Mitigation (From admission, onward)           Ordered     apixaban tablet 5 mg  2 times daily         05/31/24 1452     IP VTE LOW RISK PATIENT  Once         05/31/24 1403                    Josie Pina, NP  Cardiology  [facility] - Telemetry ([facility])

## 2024-06-03 NOTE — ASSESSMENT & PLAN NOTE
Chronic, long pmh uncontrolled blood pressure. Latest blood pressure and vitals reviewed-     Temp:  [97.7 °F (36.5 °C)-99.9 °F (37.7 °C)]   Pulse:  [74-91]   Resp:  [16-19]   BP: ()/(51-80)   SpO2:  [92 %-96 %] .   Home meds for hypertension were reviewed and noted below.   Hypertension Medications               carvediloL (COREG) 6.25 MG tablet Take 1 tablet (6.25 mg total) by mouth 2 (two) times daily.    furosemide (LASIX) 80 MG tablet Take 1 tablet (80 mg total) by mouth 2 (two) times daily.    sacubitriL-valsartan (ENTRESTO) 24-26 mg per tablet Take 1 tablet by mouth 2 (two) times daily.            While in the hospital, will manage blood pressure as follows; Continue home antihypertensive regimen

## 2024-06-03 NOTE — HPI
53-year-old man with heart failure with reduced EF; admitted with heart failure exacerbation.  He was also known to have elevated alkaline phosphatase and bilirubin.  Hepatology is consulted for further evaluation.  Of note he has had this for a long time.  He denies any known history of liver disease.  He denies any history of heavy alcohol use.  He has not having any significant abdominal pain.  He did have an ultrasound with a liver overall was unremarkable.   None

## 2024-06-03 NOTE — ASSESSMENT & PLAN NOTE
This has been a chronic issue.  I do not suspect that the patient has underlying primary chronic liver disease.  Highly suspect his LFT abnormalities are related to his heart failure.  Nothing to do liver wise acutely.  -  I have ordered additional labs to complete abnormal LFT evaluation  - need to optimize cardiac function  - outpatient follow-up; advised [facility]

## 2024-06-03 NOTE — PROGRESS NOTES
"Heart Failure Transitional [facility]) nurse navigator notified of HFTCC candidate in need of education and introduction to 4-6 week program.      PT aao x 3 while lying in bed . Introduced self to pt as HFTCC nurse navigator.     Patient given "Home Care Guide for Heart Failure Patients" , "Heart Failure Transitional Care Clinic" flyer and "Daily weight and symptom tracker".  Encouraged pt and caregiver to review information.      Reviewed the following key points of HFTCC program with pt and family:   1.) Take your medications as directed.    2.) Weight yourself daily   3.) Follow low salt and limited fluid diet.    4.) Stop smoking and start exercising   5.) Go to your appointments and call your team.      Pt reminded to follow Symptom tracker and to call at the onset of symptoms according to tracker.     Reviewed plan for follow up once discharged to include phone calls, in person and virtual visits to assist pt optimizing their heart failure medication regimen and encouraging healthy lifestyle modifications.  Reminded pt that program will assist them over the next 4-6 weeks and then patient will be transferred to long term care provider .  Reminded pt how to contact HFTCC navigator via phone and or via Quarterly.     Pt given appointment or instructed appointment will be printed on hospital discharge paperwork.     Pt also reminded HF nurse will call 48-72 hours after discharge to check on them.     PT  verbalize read back of information given.  Encouraged pt to read over information often and contact team with any questions or concerns.      "

## 2024-06-03 NOTE — PLAN OF CARE
Patient remained free from falls throughout shift, call bell within reach. Tessalon alireza given for dry cough. No other complaints. Vitals stable.

## 2024-06-03 NOTE — ASSESSMENT & PLAN NOTE
Results for orders placed during the hospital encounter of 05/14/24    Echo    Interpretation Summary    Left Ventricle: The left ventricle is mildly dilated. Severely increased ventricular mass. Severely increased wall thickness. There is concentric hypertrophy. Severe global hypokinesis present. There is severely reduced systolic function with a visually estimated ejection fraction of 15 - 20%. Ejection fraction by visual approximation is 18%. Grade II diastolic dysfunction.    Right Ventricle: Moderate right ventricular enlargement. Wall thickness is normal. Systolic function is moderately reduced.    Left Atrium: Left atrium is severely dilated.    Right Atrium: Right atrium is dilated.    Aortic Valve: The aortic valve is a trileaflet valve. There is mild aortic valve sclerosis. There is mild stenosis. Aortic valve area by VTI is 1.65 cm². Aortic valve peak velocity is 1.33 m/s. Mean gradient is 4 mmHg. The dimensionless index is 0.56.    Mitral Valve: There is mild to moderate regurgitation.    Tricuspid Valve: There is moderate regurgitation.    Pulmonary Artery: There is mild pulmonary hypertension. The estimated pulmonary artery systolic pressure is 42 mmHg.    IVC/SVC: Intermediate venous pressure at 8 mmHg.    Pericardium: There is a moderate circumferential effusion. Pericardial effusion is echolucent. No indication of cardiac tamponade.    Recent Labs   Lab 05/31/24  1107   BNP 3,402*       Will need to repeat limited echo to evaluate pericardial effusion  Continue IV diuresis and monitor strict intake and output   Monitor electrolytes with IV diuresis and replace as needed  Will need to be evaluated for transplant   Continue BB, Entresto    6/3/24  BP low this morning x1  Monitor  Cont OMT- BB, torsemide, entresto  Strict I/Os  Low na diet  Close follow up in clinic

## 2024-06-03 NOTE — ASSESSMENT & PLAN NOTE
Likely secondary to hepatic  congestion with CHF reduced ejection fraction  Volume status more controlled  We will send referral to [facility] hepatology

## 2024-06-03 NOTE — ASSESSMENT & PLAN NOTE
Creatine stable for now. BMP reviewed- noted Estimated Creatinine Clearance: 53.6 mL/min (A) (based on SCr of 2.1 mg/dL (H)). according to latest data. Based on current GFR, CKD stage is stage 3 - GFR 30-59.  Monitor UOP and serial BMP and adjust therapy as needed. Renally dose meds. Avoid nephrotoxic medications and procedures.  Intravenous lasix
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
## 2024-06-03 NOTE — ASSESSMENT & PLAN NOTE
Elevated alk phos  Denies abdominal pain but vomiting upon awakening  Appreciate hepatology as recommendations

## 2024-06-03 NOTE — SUBJECTIVE & OBJECTIVE
Interval History:  Patient seen and examined at bedside.  Low blood pressure this morning with some orthostatic changes.  Diuresis reduced and fluids given.    Review of Systems   Constitutional:  Positive for activity change and fatigue. Negative for fever.   HENT:  Negative for congestion.    Respiratory:  Cough: a/w new meds. Shortness of breath: improved.    Cardiovascular:  Negative for chest pain and leg swelling.   Gastrointestinal:  Negative for abdominal pain, constipation and nausea (improved). Vomiting: improved.  Neurological:  Negative for weakness.   Psychiatric/Behavioral:  Positive for dysphoric mood. Negative for agitation, behavioral problems, confusion, decreased concentration and sleep disturbance. The patient is nervous/anxious (d/t medical condition).      Objective:     Vital Signs (Most Recent):  Temp: 99.1 °F (37.3 °C) (06/03/24 1515)  Pulse: 82 (06/03/24 1517)  Resp: 18 (06/03/24 1516)  BP: 102/70 (06/03/24 1517)  SpO2: 96 % (06/03/24 1517) Vital Signs (24h Range):  Temp:  [97.7 °F (36.5 °C)-99.9 °F (37.7 °C)] 99.1 °F (37.3 °C)  Pulse:  [74-91] 82  Resp:  [16-19] 18  SpO2:  [92 %-96 %] 96 %  BP: ()/(51-80) 102/70     Weight: 103.4 kg (228 lb)  Body mass index is 28.5 kg/m².    Intake/Output Summary (Last 24 hours) at 6/3/2024 1743  Last data filed at 6/3/2024 1625  Gross per 24 hour   Intake 160.38 ml   Output 1800 ml   Net -1639.62 ml         Physical Exam  Vitals reviewed.   Constitutional:       Appearance: Normal appearance.   HENT:      Head: Normocephalic and atraumatic.   Cardiovascular:      Rate and Rhythm: Normal rate.      Heart sounds: No murmur heard.  Pulmonary:      Effort: Pulmonary effort is normal.      Breath sounds: Normal breath sounds.   Abdominal:      General: Bowel sounds are normal.      Palpations: Abdomen is soft.      Tenderness: There is no abdominal tenderness.   Musculoskeletal:      Right lower leg: No edema.      Left lower leg: No edema.   Skin:      General: Skin is warm.   Neurological:      Mental Status: He is alert and oriented to person, place, and time.      Motor: Weakness present.   Psychiatric:         Mood and Affect: Mood is depressed.         Behavior: Behavior normal.             Significant Labs: All pertinent labs within the past 24 hours have been reviewed.  CBC:   Recent Labs   Lab 06/03/24  0520   WBC 3.45*   HGB 16.8   HCT 55.1*        CMP:   Recent Labs   Lab 06/02/24  0430 06/03/24  0520    137   K 3.6 4.2   CL 97 100   CO2 23 19*   GLU 75 78   BUN 28* 40*   CREATININE 2.0* 2.1*   CALCIUM 9.1 8.8   PROT  --  7.2   ALBUMIN  --  3.1*   BILITOT  --  3.7*   ALKPHOS  --  225*   AST  --  30   ALT  --  20   ANIONGAP 17* 18*       Significant Imaging: I have reviewed all pertinent imaging results/findings within the past 24 hours.

## 2024-06-04 VITALS
BODY MASS INDEX: 28.35 KG/M2 | HEART RATE: 79 BPM | DIASTOLIC BLOOD PRESSURE: 56 MMHG | OXYGEN SATURATION: 96 % | RESPIRATION RATE: 18 BRPM | WEIGHT: 228 LBS | TEMPERATURE: 99 F | HEIGHT: 75 IN | SYSTOLIC BLOOD PRESSURE: 92 MMHG

## 2024-06-04 PROBLEM — I31.39 PERICARDIAL EFFUSION: Status: ACTIVE | Noted: 2024-06-04

## 2024-06-04 LAB
ALBUMIN SERPL BCP-MCNC: 3.1 G/DL (ref 3.5–5.2)
ALP SERPL-CCNC: 214 U/L (ref 55–135)
ALT SERPL W/O P-5'-P-CCNC: 16 U/L (ref 10–44)
ANION GAP SERPL CALC-SCNC: 17 MMOL/L (ref 8–16)
AST SERPL-CCNC: 29 U/L (ref 10–40)
BILIRUB SERPL-MCNC: 3.4 MG/DL (ref 0.1–1)
BUN SERPL-MCNC: 44 MG/DL (ref 6–20)
CALCIUM SERPL-MCNC: 8.9 MG/DL (ref 8.7–10.5)
CHLORIDE SERPL-SCNC: 98 MMOL/L (ref 95–110)
CO2 SERPL-SCNC: 22 MMOL/L (ref 23–29)
CREAT SERPL-MCNC: 2.2 MG/DL (ref 0.5–1.4)
EST. GFR  (NO RACE VARIABLE): 35 ML/MIN/1.73 M^2
GLUCOSE SERPL-MCNC: 82 MG/DL (ref 70–110)
HAV IGG SER QL IA: NORMAL
MAGNESIUM SERPL-MCNC: 2.1 MG/DL (ref 1.6–2.6)
MITOCHONDRIA AB TITR SER IF: NORMAL {TITER}
POCT GLUCOSE: 91 MG/DL (ref 70–110)
POCT GLUCOSE: 99 MG/DL (ref 70–110)
POTASSIUM SERPL-SCNC: 3.8 MMOL/L (ref 3.5–5.1)
PROT SERPL-MCNC: 7.9 G/DL (ref 6–8.4)
SODIUM SERPL-SCNC: 137 MMOL/L (ref 136–145)

## 2024-06-04 PROCEDURE — 25000003 PHARM REV CODE 250: Performed by: FAMILY MEDICINE

## 2024-06-04 PROCEDURE — 25000003 PHARM REV CODE 250: Performed by: INTERNAL MEDICINE

## 2024-06-04 PROCEDURE — 99214 OFFICE O/P EST MOD 30 MIN: CPT | Mod: ,,,

## 2024-06-04 PROCEDURE — 36415 COLL VENOUS BLD VENIPUNCTURE: CPT | Performed by: INTERNAL MEDICINE

## 2024-06-04 PROCEDURE — 96361 HYDRATE IV INFUSION ADD-ON: CPT

## 2024-06-04 PROCEDURE — 83735 ASSAY OF MAGNESIUM: CPT | Performed by: INTERNAL MEDICINE

## 2024-06-04 PROCEDURE — 80053 COMPREHEN METABOLIC PANEL: CPT | Performed by: INTERNAL MEDICINE

## 2024-06-04 PROCEDURE — G0378 HOSPITAL OBSERVATION PER HR: HCPCS

## 2024-06-04 RX ORDER — TORSEMIDE 20 MG/1
40 TABLET ORAL DAILY
Qty: 60 TABLET | Refills: 0 | Status: SHIPPED | OUTPATIENT
Start: 2024-06-05 | End: 2024-07-05

## 2024-06-04 RX ORDER — PANTOPRAZOLE SODIUM 40 MG/1
40 TABLET, DELAYED RELEASE ORAL DAILY
Qty: 30 TABLET | Refills: 0 | Status: SHIPPED | OUTPATIENT
Start: 2024-06-04 | End: 2024-07-04

## 2024-06-04 RX ORDER — BENZONATATE 100 MG/1
100 CAPSULE ORAL 3 TIMES DAILY PRN
Qty: 10 CAPSULE | Refills: 0 | Status: SHIPPED | OUTPATIENT
Start: 2024-06-04

## 2024-06-04 RX ADMIN — SODIUM CHLORIDE 500 ML: 9 INJECTION, SOLUTION INTRAVENOUS at 10:06

## 2024-06-04 RX ADMIN — CARVEDILOL 6.25 MG: 6.25 TABLET, FILM COATED ORAL at 08:06

## 2024-06-04 RX ADMIN — COLCHICINE 0.6 MG: 0.6 TABLET ORAL at 08:06

## 2024-06-04 RX ADMIN — APIXABAN 5 MG: 2.5 TABLET, FILM COATED ORAL at 08:06

## 2024-06-04 RX ADMIN — PANTOPRAZOLE SODIUM 40 MG: 40 TABLET, DELAYED RELEASE ORAL at 08:06

## 2024-06-04 RX ADMIN — ALLOPURINOL 100 MG: 100 TABLET ORAL at 08:06

## 2024-06-04 RX ADMIN — TORSEMIDE 40 MG: 10 TABLET ORAL at 08:06

## 2024-06-04 RX ADMIN — MAGNESIUM OXIDE TAB 400 MG (241.3 MG ELEMENTAL MG) 400 MG: 400 (241.3 MG) TAB at 08:06

## 2024-06-04 RX ADMIN — SACUBITRIL AND VALSARTAN 1 TABLET: 24; 26 TABLET, FILM COATED ORAL at 08:06

## 2024-06-04 RX ADMIN — AMIODARONE HYDROCHLORIDE 200 MG: 200 TABLET ORAL at 08:06

## 2024-06-04 NOTE — ASSESSMENT & PLAN NOTE
Body mass index is 27.64 kg/m². Morbid obesity complicates all aspects of disease management from diagnostic modalities to treatment. Weight loss encouraged and health benefits explained to patient.

## 2024-06-04 NOTE — DISCHARGE SUMMARY
[facility] - Telemetry ([facility])  [facility] Medicine  Discharge Summary      Patient Name: Bria Saldana  MRN: [id number]  [facility]: [id number]  Patient Class: IP- Inpatient  Admission Date: 5/14/2024  Hospital Length of Stay: 7 days  Discharge Date and Time: 5/21/2024  5:30 PM  Attending Physician: No att. providers found   Discharging Provider: Kendal Colin MD  Primary Care Provider: Brenna Maravilla MD    Primary Care Team: [facility] MEDICINE C    HPI:   The patient is a 54 yo male with past medical history of CHF, hypertension, CKD and NATALYA who presented to the ED with weakness, fluid build-up and elevated blood pressure. He reports the fluid built up and has his blood pressure running high. He is taking his medication as prescribed but his systolic blood pressure has been in the 180s. He has swelling in his legs and abdomen. He has had decreased appetite. He does not weigh himself. He reports the fluid is coming off with the IV lasix. He is hungry. Workup in the ED consistent with volume overload from heart failure. Hospital medicine consulted. Patient placed in observation.    Procedure(s) (LRB):  Cardioversion or Defibrillation (N/A)      Hospital Course:   Mr. Saldana was admitted on 05/14/2024 for CHF exacerbation he was started on IV Lasix and was planned for a MEGAN with cardioversion on 05/15/2024.  During his MEGAN patient aspirated and ended up being intubated and transferred to the ICU.  During his stay in the ICU he did have a cardioversion and was started on amiodarone drip post conversion.  He was transferred out of ICU on 05/18/2024 and had been converted from IV to p.o. amiodarone but was continued on a heparin drip. Cardiology.  Recommended continuing medical therapy  Nuclear med stress negative  Patient with significant cardiomyopathy.  Medications adjusted to include Entresto.  He was cardioverted in room remained in sinus rhythm.  He is loaded with amiodarone and we will continue to  taper to maintenance dose.  Patient was instructed on cardiac diet with 1500 cc fluid restriction.  He will follow up outpatient with congestive heart failure Clinic and Cardiology as well as electrophysiology.  Patient seen and examined on day of discharge and stable for discharge home.     Goals of Care Treatment Preferences:  Code Status: Full Code      Consults:   Consults (From admission, onward)          Status Ordering Provider     Inpatient consult to Cardiology  Once        Provider:  Kiel Phillips MD    Completed DIANNE MAHARAJ     Inpatient consult to Social Work/Case Management  Once        Provider:  (Not yet assigned)    Completed DIANNE MAHARAJ     Inpatient consult to Registered Dietitian/Nutritionist  Once        Provider:  (Not yet assigned)    Completed DIANNE MAHARAJ            Pulmonary  Aspiration pneumonia of right upper lobe due to gastric secretions  During procedure.  No need for antibiotics at this time                Cardiac/Vascular  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 (congestive) and diastolic (congestive) heart failure  Patient is identified as having Combined Systolic and Diastolic heart failure that is Acute on chronic. CHF is currently uncontrolled due to Continued edema of extremities. Latest ECHO performed and demonstrates- Results for orders placed during the hospital encounter of 05/14/24    Echo    Interpretation Summary    Left Ventricle: The left ventricle is mildly dilated. Severely increased ventricular mass. Severely increased wall thickness. There is concentric hypertrophy. Severe global hypokinesis present. There is severely reduced systolic function with a visually estimated ejection fraction of 15 - 20%. Ejection fraction by visual approximation is 18%. Grade II diastolic dysfunction.    Right Ventricle: Moderate right ventricular enlargement. Wall thickness is normal. Systolic function is moderately reduced.    Left Atrium: Left atrium is severely  dilated.    Right Atrium: Right atrium is dilated.    Aortic Valve: The aortic valve is a trileaflet valve. There is mild aortic valve sclerosis. There is mild stenosis. Aortic valve area by VTI is 1.65 cm². Aortic valve peak velocity is 1.33 m/s. Mean gradient is 4 mmHg. The dimensionless index is 0.56.    Mitral Valve: There is mild to moderate regurgitation.    Tricuspid Valve: There is moderate regurgitation.    Pulmonary Artery: There is mild pulmonary hypertension. The estimated pulmonary artery systolic pressure is 42 mmHg.    IVC/SVC: Intermediate venous pressure at 8 mmHg.    Pericardium: There is a moderate circumferential effusion. Pericardial effusion is echolucent. No indication of cardiac tamponade.  . Continue Beta Blocker, Furosemide, and Nitrate/Vasodilator and monitor clinical status closely. Monitor on telemetry. Patient is on CHF pathway.  Monitor strict Is&Os and daily weights.  Place on fluid restriction of 1.5 L. Cardiology has been consulted. Continue to stress to patient importance of self efficacy and  on diet for CHF. Last BNP reviewed- and noted below   Recent Labs   Lab 05/31/24  1107   BNP 3,402*       -significant change in EF, consulted cardiology  -patient is stable from a respiratory standpoint.   -continue Entresto and Coreg and Lasix    Paroxysmal atrial flutter  -new onset  -heparin gtt-continued  -patient underwent telma on 05/15/2024 after which she was transferred to the ICU due to aspiration resulting in intubation.  -patient underwent cardioversion on 05/17/2024 after which she was started on amiodarone drip that was transitioned to p.o. amiodarone on 05/18/2024  -cardiology following    Nuclear medicine stress test negative we will continue optimal medical management      Essential hypertension  Chronic, . Latest blood pressure and vitals reviewed-     Temp:  [98 °F (36.7 °C)-99.1 °F (37.3 °C)]   Pulse:  [73-91]   Resp:  [16-19]   BP: ()/(51-87)   SpO2:  [92  %-100 %] .   Home meds for hypertension were reviewed and noted below.   Hypertension Medications               carvediloL (COREG) 25 MG tablet Take 1 tablet (25 mg total) by mouth 2 (two) times daily.    furosemide (LASIX) 80 MG tablet Take 1 tablet (80 mg total) by mouth 2 (two) times daily.    hydrALAZINE (APRESOLINE) 50 MG tablet Take 1 tablet (50 mg total) by mouth every 8 (eight) hours.    isosorbide dinitrate (ISORDIL) 20 MG tablet TAKE 1 TABLET BY MOUTH THREE TIMES DAILY    metOLazone (ZAROXOLYN) 2.5 MG tablet Take 1 tablet (2.5 mg total) by mouth once for 1 dose, then repeat if directed by doctor.    sacubitriL-valsartan (ENTRESTO) 24-26 mg per tablet Take 1 tablet by mouth 2 (two) times daily.    spironolactone (ALDACTONE) 25 MG tablet Take 1 tablet (25 mg total) by mouth once daily.            While in the hospital, will manage blood pressure as follows; Continue home antihypertensive regimen        Renal/  CKD (chronic kidney disease) stage 3, GFR 30-59 ml/min  Creatine stable for now. BMP reviewed- noted Estimated Creatinine Clearance: 46.9 mL/min (A) (based on SCr of 2.2 mg/dL (H)). according to latest data. Based on current GFR, CKD stage is stage 3 - GFR 30-59.  Monitor UOP and serial BMP and adjust therapy as needed. Renally dose meds. Avoid nephrotoxic medications and procedures.    Endocrine  Severe obesity (BMI 35.0-35.9 with comorbidity)  Body mass index is 27.64 kg/m². Morbid obesity complicates all aspects of disease management from diagnostic modalities to treatment. Weight loss encouraged and health benefits explained to patient.         GI  Abnormal LFTs  -flat      Other  NATALYA (obstructive sleep apnea)  -nightly CPAP        Final Active Diagnoses:    Diagnosis Date Noted POA    PRINCIPAL PROBLEM:  Acute on chronic combined systolic (congestive) and diastolic (congestive) heart failure [I50.43] 09/23/2019 Yes    Aspiration pneumonia of right upper lobe due to gastric secretions [J69.0]  05/16/2024 Yes    Paroxysmal atrial flutter [I48.92] 05/15/2024 Yes    Severe obesity (BMI 35.0-35.9 with comorbidity) [E66.01, Z68.35] 02/03/2023 Not Applicable    CKD (chronic kidney disease) stage 3, GFR 30-59 ml/min [N18.30] 07/08/2019 Yes    Abnormal LFTs [R79.89] 07/08/2019 Yes    NATALYA (obstructive sleep apnea) [G47.33] 07/23/2018 Yes    Essential hypertension [I10] 06/01/2017 Yes      Problems Resolved During this Admission:    Diagnosis Date Noted Date Resolved POA    Acute hypoxemic respiratory failure [J96.01] 05/17/2024 05/21/2024 No       Discharged Condition: fair    Disposition: Home or Self Care    Follow Up:   Follow-up Information       Brenna Maravilla MD Follow up in 3 day(s).    Specialty: Family Medicine  Contact information:  [address]  [facility] [address]  [phone]               Sienna Maguire PA Follow up in 3 day(s).    Specialty: Transplant  Contact information:  [address] [facility] [address]  [phone]                           Patient Instructions:      Ambulatory referral/consult to Cardiology   Standing Status: Future   Referral Priority: Routine Referral Type: Consultation   Referral Reason: Specialty Services Required   Requested Specialty: Cardiology   Number of Visits Requested: 1     Ambulatory referral/consult to Nephrology   Standing Status: Future   Referral Priority: Routine Referral Type: Consultation   Referral Reason: Specialty Services Required   Requested Specialty: Nephrology   Number of Visits Requested: 1     Diet Cardiac   Order Comments: 1500cc fluid restriction     Call MD for:  temperature >100.4     Call MD for:  persistent nausea and vomiting or diarrhea     Call MD for:  severe uncontrolled pain     Call MD for:  redness, tenderness, or signs of infection (pain, swelling, redness, odor or green/yellow discharge around incision site)     Call MD for:  difficulty breathing or increased cough     Call MD for:  severe persistent  headache     Call MD for:  worsening rash     Call MD for:  persistent dizziness, light-headedness, or visual disturbances     Call MD for:  increased confusion or weakness     Activity as tolerated       Significant Diagnostic Studies: Labs: All labs within the past 24 hours have been reviewed  Imaging Results              X-Ray Chest PA And Lateral (Final result)  Result time 05/13/24 23:10:51      Final result by Tee Maher MD (05/13/24 23:10:51)                   Impression:      Cardiomegaly and central vascular congestion.  Suspected small right-sided pleural effusion.      Electronically signed by: Tee Maher MD  Date:    05/13/2024  Time:    23:10               Narrative:    EXAMINATION:  XR CHEST PA AND LATERAL    CLINICAL HISTORY:  Chest Pain;    TECHNIQUE:  PA and lateral views of the chest were performed.    COMPARISON:  08/29/2023    FINDINGS:  Cardiac silhouette is significantly enlarged, similar to prior examination.  There is central vascular congestion.  No large confluent airspace consolidation appreciated.  There is a suspected small volume of pleural fluid present.  No evidence of pneumothorax.  Metallic fragment again projects over the superior mediastinum.  Osseous structures demonstrate degenerative changes.  There is a significant dextroscoliotic curvature of the thoracic spine.                                      Pending Diagnostic Studies:       None           Medications:  Reconciled Home Medications:      Medication List        START taking these medications      amiodarone 200 MG Tab  Commonly known as: PACERONE  Take 1 tablet (200 mg total) by mouth 2 (two) times daily.     colchicine 0.6 mg tablet  Commonly known as: COLCRYS  Take 1 tablet (0.6 mg total) by mouth once daily.  Replaces: colchicine 0.6 mg Cap     ELIQUIS 5 mg Tab  Generic drug: apixaban  Take 1 tablet (5 mg total) by mouth 2 (two) times daily.            CHANGE how you take these medications       carvediloL 6.25 MG tablet  Commonly known as: COREG  Take 1 tablet (6.25 mg total) by mouth 2 (two) times daily.  What changed:   medication strength  how much to take            STOP taking these medications      colchicine 0.6 mg Cap  Commonly known as: MITIGARE  Replaced by: colchicine 0.6 mg tablet     hydrALAZINE 50 MG tablet  Commonly known as: APRESOLINE     isosorbide dinitrate 20 MG tablet  Commonly known as: ISORDIL     metOLazone 2.5 MG tablet  Commonly known as: ZAROXOLYN     spironolactone 25 MG tablet  Commonly known as: ALDACTONE              Indwelling Lines/Drains at time of discharge:   Lines/Drains/Airways       None                   Time spent on the discharge of patient: 33 minutes         Kendal Colin MD  Department of Hospital Medicine  '[place] - Telemetry ([facility])

## 2024-06-04 NOTE — SUBJECTIVE & OBJECTIVE
Review of Systems   Constitutional: Positive for malaise/fatigue.   HENT: Negative.     Eyes: Negative.    Cardiovascular: Negative.    Respiratory: Negative.     Skin: Negative.    Musculoskeletal: Negative.    Gastrointestinal: Negative.    Genitourinary: Negative.    Neurological: Negative.    Psychiatric/Behavioral: Negative.       Objective:     Vital Signs (Most Recent):  Temp: 98.5 °F (36.9 °C) (06/04/24 0743)  Pulse: 79 (06/04/24 1039)  Resp: 18 (06/04/24 0743)  BP: (!) 92/56 (06/04/24 1231)  SpO2: 96 % (06/04/24 0743) Vital Signs (24h Range):  Temp:  [98.5 °F (36.9 °C)-99.1 °F (37.3 °C)] 98.5 °F (36.9 °C)  Pulse:  [73-88] 79  Resp:  [16-18] 18  SpO2:  [93 %-100 %] 96 %  BP: ()/(49-87) 92/56     Weight: 103.4 kg (228 lb)  Body mass index is 28.5 kg/m².     SpO2: 96 %         Intake/Output Summary (Last 24 hours) at 6/4/2024 1239  Last data filed at 6/4/2024 1236  Gross per 24 hour   Intake 660.38 ml   Output 1500 ml   Net -839.62 ml       Lines/Drains/Airways       Peripheral Intravenous Line  Duration                  Peripheral IV - Single Lumen 05/31/24 1103 20 G Left Antecubital 4 days                       Physical Exam  Vitals and nursing note reviewed.   Constitutional:       Appearance: Normal appearance.   HENT:      Head: Normocephalic and atraumatic.   Eyes:      General:         Right eye: No discharge.         Left eye: No discharge.      Pupils: Pupils are equal, round, and reactive to light.   Cardiovascular:      Rate and Rhythm: Normal rate and regular rhythm.      Heart sounds: S1 normal and S2 normal. No murmur heard.     No friction rub.   Pulmonary:      Effort: Pulmonary effort is normal. No respiratory distress.      Breath sounds: Normal breath sounds. No rales.   Abdominal:      Palpations: Abdomen is soft.      Tenderness: There is no abdominal tenderness.   Musculoskeletal:      Cervical back: Neck supple.      Right lower leg: No edema.      Left lower leg: No edema.  "  Skin:     General: Skin is warm and dry.   Neurological:      General: No focal deficit present.      Mental Status: He is alert and oriented to person, place, and time.      Motor: Weakness present.   Psychiatric:         Mood and Affect: Mood normal.         Behavior: Behavior normal.         Thought Content: Thought content normal.            Significant Labs: BMP:   Recent Labs   Lab 06/03/24  0520 06/04/24  0422   GLU 78 82    137   K 4.2 3.8    98   CO2 19* 22*   BUN 40* 44*   CREATININE 2.1* 2.2*   CALCIUM 8.8 8.9   MG 1.6 2.1   , CMP   Recent Labs   Lab 06/03/24  0520 06/04/24  0422    137   K 4.2 3.8    98   CO2 19* 22*   GLU 78 82   BUN 40* 44*   CREATININE 2.1* 2.2*   CALCIUM 8.8 8.9   PROT 7.2 7.9   ALBUMIN 3.1* 3.1*   BILITOT 3.7* 3.4*   ALKPHOS 225* 214*   AST 30 29   ALT 20 16   ANIONGAP 18* 17*   , CBC   Recent Labs   Lab 06/03/24  0520   WBC 3.45*   HGB 16.8   HCT 55.1*      , INR   Recent Labs   Lab 06/03/24  0808   INR 1.1   , Lipid Panel No results for input(s): "CHOL", "HDL", "LDLCALC", "TRIG", "CHOLHDL" in the last 48 hours., Troponin No results for input(s): "TROPONINI" in the last 48 hours., and All pertinent lab results from the last 24 hours have been reviewed.    Significant Imaging: Cardiac Cath: reviewed, Echocardiogram: Transthoracic echo (TTE) complete (Cupid Only):   Results for orders placed or performed during the hospital encounter of 05/31/24   Echo   Result Value Ref Range    BSA 2.34 m2    Narrative      Left Ventricle: Severe global hypokinesis present. There is moderately   reduced systolic function with a visually estimated ejection fraction of   30 - 35%.    Left Atrium: Left atrium is severely dilated.    Right Atrium: Right atrium is mildly dilated.    Pericardium: There is a trivial effusion.     , EKG: reviewed, Stress Test: reviewed, and X-Ray: CXR: X-Ray Chest 1 View (CXR): No results found for this visit on 05/31/24.  "

## 2024-06-04 NOTE — ASSESSMENT & PLAN NOTE
Creatine stable for now. BMP reviewed- noted Estimated Creatinine Clearance: 46.9 mL/min (A) (based on SCr of 2.2 mg/dL (H)). according to latest data. Based on current GFR, CKD stage is stage 3 - GFR 30-59.  Monitor UOP and serial BMP and adjust therapy as needed. Renally dose meds. Avoid nephrotoxic medications and procedures.

## 2024-06-04 NOTE — ASSESSMENT & PLAN NOTE
Results for orders placed during the hospital encounter of 05/14/24    Echo    Interpretation Summary    Left Ventricle: The left ventricle is mildly dilated. Severely increased ventricular mass. Severely increased wall thickness. There is concentric hypertrophy. Severe global hypokinesis present. There is severely reduced systolic function with a visually estimated ejection fraction of 15 - 20%. Ejection fraction by visual approximation is 18%. Grade II diastolic dysfunction.    Right Ventricle: Moderate right ventricular enlargement. Wall thickness is normal. Systolic function is moderately reduced.    Left Atrium: Left atrium is severely dilated.    Right Atrium: Right atrium is dilated.    Aortic Valve: The aortic valve is a trileaflet valve. There is mild aortic valve sclerosis. There is mild stenosis. Aortic valve area by VTI is 1.65 cm². Aortic valve peak velocity is 1.33 m/s. Mean gradient is 4 mmHg. The dimensionless index is 0.56.    Mitral Valve: There is mild to moderate regurgitation.    Tricuspid Valve: There is moderate regurgitation.    Pulmonary Artery: There is mild pulmonary hypertension. The estimated pulmonary artery systolic pressure is 42 mmHg.    IVC/SVC: Intermediate venous pressure at 8 mmHg.    Pericardium: There is a moderate circumferential effusion. Pericardial effusion is echolucent. No indication of cardiac tamponade.    Recent Labs   Lab 05/31/24  1107   BNP 3,402*       Will need to repeat limited echo to evaluate pericardial effusion  Continue IV diuresis and monitor strict intake and output   Monitor electrolytes with IV diuresis and replace as needed  Will need to be evaluated for transplant   Continue BB, Entresto    6/3/24  BP low this morning x1  Monitor  Cont OMT- BB, torsemide, entresto  Strict I/Os  Low na diet  Close follow up in clinic    6/4/24  Bp stable  Cont OMT  Strict I/Os  Low Na diet  Follow up in clinic scheduled for next week

## 2024-06-04 NOTE — ASSESSMENT & PLAN NOTE
Patient is identified as having Combined Systolic and Diastolic heart failure that is Acute on chronic. CHF is currently uncontrolled due to Continued edema of extremities. Latest ECHO performed and demonstrates- Results for orders placed during the hospital encounter of 05/14/24    Echo    Interpretation Summary    Left Ventricle: The left ventricle is mildly dilated. Severely increased ventricular mass. Severely increased wall thickness. There is concentric hypertrophy. Severe global hypokinesis present. There is severely reduced systolic function with a visually estimated ejection fraction of 15 - 20%. Ejection fraction by visual approximation is 18%. Grade II diastolic dysfunction.    Right Ventricle: Moderate right ventricular enlargement. Wall thickness is normal. Systolic function is moderately reduced.    Left Atrium: Left atrium is severely dilated.    Right Atrium: Right atrium is dilated.    Aortic Valve: The aortic valve is a trileaflet valve. There is mild aortic valve sclerosis. There is mild stenosis. Aortic valve area by VTI is 1.65 cm². Aortic valve peak velocity is 1.33 m/s. Mean gradient is 4 mmHg. The dimensionless index is 0.56.    Mitral Valve: There is mild to moderate regurgitation.    Tricuspid Valve: There is moderate regurgitation.    Pulmonary Artery: There is mild pulmonary hypertension. The estimated pulmonary artery systolic pressure is 42 mmHg.    IVC/SVC: Intermediate venous pressure at 8 mmHg.    Pericardium: There is a moderate circumferential effusion. Pericardial effusion is echolucent. No indication of cardiac tamponade.  . Continue Beta Blocker, Furosemide, and Nitrate/Vasodilator and monitor clinical status closely. Monitor on telemetry. Patient is on CHF pathway.  Monitor strict Is&Os and daily weights.  Place on fluid restriction of 1.5 L. Cardiology has been consulted. Continue to stress to patient importance of self efficacy and  on diet for CHF. Last BNP reviewed-  and noted below   Recent Labs   Lab 05/31/24  1107   BNP 3,402*       -significant change in EF, consulted cardiology  -patient is stable from a respiratory standpoint.   -continue Entresto and Coreg and Lasix

## 2024-06-04 NOTE — ASSESSMENT & PLAN NOTE
Chronic, . Latest blood pressure and vitals reviewed-     Temp:  [98 °F (36.7 °C)-99.1 °F (37.3 °C)]   Pulse:  [73-91]   Resp:  [16-19]   BP: ()/(51-87)   SpO2:  [92 %-100 %] .   Home meds for hypertension were reviewed and noted below.   Hypertension Medications               carvediloL (COREG) 25 MG tablet Take 1 tablet (25 mg total) by mouth 2 (two) times daily.    furosemide (LASIX) 80 MG tablet Take 1 tablet (80 mg total) by mouth 2 (two) times daily.    hydrALAZINE (APRESOLINE) 50 MG tablet Take 1 tablet (50 mg total) by mouth every 8 (eight) hours.    isosorbide dinitrate (ISORDIL) 20 MG tablet TAKE 1 TABLET BY MOUTH THREE TIMES DAILY    metOLazone (ZAROXOLYN) 2.5 MG tablet Take 1 tablet (2.5 mg total) by mouth once for 1 dose, then repeat if directed by doctor.    sacubitriL-valsartan (ENTRESTO) 24-26 mg per tablet Take 1 tablet by mouth 2 (two) times daily.    spironolactone (ALDACTONE) 25 MG tablet Take 1 tablet (25 mg total) by mouth once daily.            While in the hospital, will manage blood pressure as follows; Continue home antihypertensive regimen

## 2024-06-04 NOTE — PLAN OF CARE
[facility] - Tele[facility] (Hospital)  Discharge Final Note    Primary Care Provider: Brenna Maravilla MD    Expected Discharge Date: 6/4/2024    Final Discharge Note (most recent)       Final Note - 06/04/24 1143          Final Note    Assessment Type Final Discharge Note     Anticipated Discharge Disposition Home or Self Care     Hospital Resources/Appts/Education Provided Appointments scheduled and added to AVS;Community resources provided        Post-Acute Status    Discharge Delays None known at this time                 Pt to discharge home today.   Referral sent to [facility] for Hepatology follow up. AVS updates with instruction for pt follow up.  Hospital Follow Up with Brenna Maravilla MD  Monday Martell 10, 2024 3:20 PM    Important Message from Medicare             Contact [facility]    [address]  [facility] [address]   Phone: [phone]       Next Steps: Follow up    Instructions: Hepatology referral sent to clinic. Please call in a week if you have not been contacted within a week.

## 2024-06-04 NOTE — PROGRESS NOTES
[facility] - Telemetry ([facility])  Cardiology  Progress Note    Patient Name: Bria Saldana  MRN: [id number]  Admission Date: 5/31/2024  Hospital Length of Stay: 0 days  Code Status: Full Code   Attending Physician: Kendal Oleary MD   Primary Care Physician: Brenna Maravilla MD  Expected Discharge Date: 6/4/2024  Principal Problem:Acute on chronic combined systolic (congestive) and diastolic (congestive) heart failure    Subjective:   HPI:   Mr. Saldana is a 53 year old male patient whose current medical conditions include CKD, NATALYA, HTN, and combined CHF (EF 15-20% 5/24) who presented to [facility] ED yesterday due to fluid overload. Patient complained of leg swelling, abdominal bloating, nausea, vomiting, decreased appetite, and orthopnea. He denied any associated fever, chills,, diaphoresis, chest pain, palpitations, near syncope, or syncope.  Reports being careful with his diet and fluid intake.  Does not weigh himself daily.  Initial workup in ED revealed creatinine of 2.1 (BL 1.8-2.2), BNP of 3,402. Patient was subsequently admitted for further evaluation and treatment. Cardiology consulted to assist with management. Patient seen and examined today, resting in bed. Feels ok. SOB and LE edema improving. IV lasix given. Followed in CHF clinic. EKGs reviewed, NSR, TWI lateral. Troponin 0.027.  Lactate normal.     He was admitted 5/14/24 recently for volume overload and underwent and underwent MEGAN/cardioversion 5/16/24 for atrial flutter.  During procedure, patient potentially aspirated and needed to be intubated, transferred to ICU.  Initially improved, but no transferred out ICU.  Stress test 5/20/2024 with fixed defect consistent with scar in the inferior apical wall  Hospital Course:   6/1/24- Patient seen and examined standing up. Patient reported that he felt better today. He has been ambulating in the room. He had an output of 6.2 L. Labs reviewed. Creatine stable at 2.1 and potassium is at 3.        6/2/24-Patient seen and examined today, sitting up in bed. Pt feels fine but states he has no energy. He had an output of -8.8 L. Labs reviewed, creatine dropped from 2.1 to 2.0 and potassium is 3.6.Will switch lasix to torsemide.      6/3/24 Pt seen and examined today laying in bed, feels ok denies any SOB, CP at this time. Asked to see pt again due to low BP. Labs reviewed, Echo 15-20% EF, BNP was over 3400, Crt 2.1 today. BP 84 systolic after medications this morning, low 100s systolic by noon    6/4/24 Pt seen and examined today resting in bed. Feels ok, SOB improved since admission. Denies any CP at this time. Labs reviewed, troponin flat. BP today 1teens systolic after medications. Can follow up in clinic        Review of Systems   Constitutional: Positive for malaise/fatigue.   HENT: Negative.     Eyes: Negative.    Cardiovascular: Negative.    Respiratory: Negative.     Skin: Negative.    Musculoskeletal: Negative.    Gastrointestinal: Negative.    Genitourinary: Negative.    Neurological: Negative.    Psychiatric/Behavioral: Negative.       Objective:     Vital Signs (Most Recent):  Temp: 98.5 °F (36.9 °C) (06/04/24 0743)  Pulse: 79 (06/04/24 1039)  Resp: 18 (06/04/24 0743)  BP: (!) 92/56 (06/04/24 1231)  SpO2: 96 % (06/04/24 0743) Vital Signs (24h Range):  Temp:  [98.5 °F (36.9 °C)-99.1 °F (37.3 °C)] 98.5 °F (36.9 °C)  Pulse:  [73-88] 79  Resp:  [16-18] 18  SpO2:  [93 %-100 %] 96 %  BP: ()/(49-87) 92/56     Weight: 103.4 kg (228 lb)  Body mass index is 28.5 kg/m².     SpO2: 96 %         Intake/Output Summary (Last 24 hours) at 6/4/2024 1239  Last data filed at 6/4/2024 1236  Gross per 24 hour   Intake 660.38 ml   Output 1500 ml   Net -839.62 ml       Lines/Drains/Airways       Peripheral Intravenous Line  Duration                  Peripheral IV - Single Lumen 05/31/24 1103 20 G Left Antecubital 4 days                       Physical Exam  Vitals and nursing note reviewed.   Constitutional:        "Appearance: Normal appearance.   HENT:      Head: Normocephalic and atraumatic.   Eyes:      General:         Right eye: No discharge.         Left eye: No discharge.      Pupils: Pupils are equal, round, and reactive to light.   Cardiovascular:      Rate and Rhythm: Normal rate and regular rhythm.      Heart sounds: S1 normal and S2 normal. No murmur heard.     No friction rub.   Pulmonary:      Effort: Pulmonary effort is normal. No respiratory distress.      Breath sounds: Normal breath sounds. No rales.   Abdominal:      Palpations: Abdomen is soft.      Tenderness: There is no abdominal tenderness.   Musculoskeletal:      Cervical back: Neck supple.      Right lower leg: No edema.      Left lower leg: No edema.   Skin:     General: Skin is warm and dry.   Neurological:      General: No focal deficit present.      Mental Status: He is alert and oriented to person, place, and time.      Motor: Weakness present.   Psychiatric:         Mood and Affect: Mood normal.         Behavior: Behavior normal.         Thought Content: Thought content normal.            Significant Labs: BMP:   Recent Labs   Lab 06/03/24  0520 06/04/24  0422   GLU 78 82    137   K 4.2 3.8    98   CO2 19* 22*   BUN 40* 44*   CREATININE 2.1* 2.2*   CALCIUM 8.8 8.9   MG 1.6 2.1   , CMP   Recent Labs   Lab 06/03/24  0520 06/04/24  0422    137   K 4.2 3.8    98   CO2 19* 22*   GLU 78 82   BUN 40* 44*   CREATININE 2.1* 2.2*   CALCIUM 8.8 8.9   PROT 7.2 7.9   ALBUMIN 3.1* 3.1*   BILITOT 3.7* 3.4*   ALKPHOS 225* 214*   AST 30 29   ALT 20 16   ANIONGAP 18* 17*   , CBC   Recent Labs   Lab 06/03/24  0520   WBC 3.45*   HGB 16.8   HCT 55.1*      , INR   Recent Labs   Lab 06/03/24  0808   INR 1.1   , Lipid Panel No results for input(s): "CHOL", "HDL", "LDLCALC", "TRIG", "CHOLHDL" in the last 48 hours., Troponin No results for input(s): "TROPONINI" in the last 48 hours., and All pertinent lab results from the last 24 hours have " been reviewed.    Significant Imaging: Cardiac Cath: reviewed, Echocardiogram: Transthoracic echo (TTE) complete (Cupid Only):   Results for orders placed or performed during the hospital encounter of 05/31/24   Echo   Result Value Ref Range    BSA 2.34 m2    Narrative      Left Ventricle: Severe global hypokinesis present. There is moderately   reduced systolic function with a visually estimated ejection fraction of   30 - 35%.    Left Atrium: Left atrium is severely dilated.    Right Atrium: Right atrium is mildly dilated.    Pericardium: There is a trivial effusion.     , EKG: reviewed, Stress Test: reviewed, and X-Ray: CXR: X-Ray Chest 1 View (CXR): No results found for this visit on 05/31/24.  Assessment and Plan:       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 (congestive) and diastolic (congestive) heart failure   Results for orders placed during the hospital encounter of 05/14/24    Echo    Interpretation Summary    Left Ventricle: The left ventricle is mildly dilated. Severely increased ventricular mass. Severely increased wall thickness. There is concentric hypertrophy. Severe global hypokinesis present. There is severely reduced systolic function with a visually estimated ejection fraction of 15 - 20%. Ejection fraction by visual approximation is 18%. Grade II diastolic dysfunction.    Right Ventricle: Moderate right ventricular enlargement. Wall thickness is normal. Systolic function is moderately reduced.    Left Atrium: Left atrium is severely dilated.    Right Atrium: Right atrium is dilated.    Aortic Valve: The aortic valve is a trileaflet valve. There is mild aortic valve sclerosis. There is mild stenosis. Aortic valve area by VTI is 1.65 cm². Aortic valve peak velocity is 1.33 m/s. Mean gradient is 4 mmHg. The dimensionless index is 0.56.    Mitral Valve: There is mild to moderate regurgitation.    Tricuspid Valve: There is moderate regurgitation.    Pulmonary Artery: There is mild pulmonary  hypertension. The estimated pulmonary artery systolic pressure is 42 mmHg.    IVC/SVC: Intermediate venous pressure at 8 mmHg.    Pericardium: There is a moderate circumferential effusion. Pericardial effusion is echolucent. No indication of cardiac tamponade.    Recent Labs   Lab 05/31/24  1107   BNP 3,402*       Will need to repeat limited echo to evaluate pericardial effusion  Continue IV diuresis and monitor strict intake and output   Monitor electrolytes with IV diuresis and replace as needed  Will need to be evaluated for transplant   Continue BB, Entresto    6/3/24  BP low this morning x1  Monitor  Cont OMT- BB, torsemide, entresto  Strict I/Os  Low na diet  Close follow up in clinic    6/4/24  Bp stable  Cont OMT  Strict I/Os  Low Na diet  Follow up in clinic scheduled for next week          Paroxysmal atrial flutter  Currently in sinus rhythm  Continue Eliquis  Continue amiodarone 200 mg b.i.d.  On Discharge will need to take amiodarone 200 daily   Has EP visit 6/5/2024 to discuss possible flutter ablation and defibrillator placement    CKD (chronic kidney disease) stage 3, GFR 30-59 ml/min  Looks to be at baseline 1.8-2.2  May improve with IV diuresis    6/3/24  Monitor with diuresis    Essential hypertension  Stable   Continue Entresto, carvedilol        VTE Risk Mitigation (From admission, onward)           Ordered     apixaban tablet 5 mg  2 times daily         05/31/24 1452     IP VTE LOW RISK PATIENT  Once         05/31/24 1403                    Josie Pina NP  Cardiology  [facility] - Telemetry ([facility])

## 2024-06-04 NOTE — PLAN OF CARE
A247/A247 SOLEDAD Fraser Sandro Saldana is a 53 y.o.male admitted on 5/31/2024 for Acute on chronic combined systolic (congestive) and diastolic (congestive) heart failure   Code Status: Full Code MRN: [id number]   Review of patient's allergies indicates:   Allergen Reactions    Penicillins Hives and Anaphylaxis     Past Medical History:   Diagnosis Date    Acute CHF 7/8/2019    Acute on chronic combined systolic (congestive) and diastolic (congestive) heart failure 9/23/2019    Allergy     CHF (congestive heart failure) 7/9/2019    CKD (chronic kidney disease) stage 3, GFR 30-59 ml/min 7/8/2019    Essential hypertension 6/1/2017    Hypertension associated with chronic kidney disease due to type 2 diabetes mellitus 2/28/2022    Mixed hyperlipidemia 3/10/2022    NATALYA (obstructive sleep apnea) 7/23/2018      PRN meds    sodium chloride 0.9%, , PRN  acetaminophen, 650 mg, Q6H PRN  benzonatate, 100 mg, TID PRN  dextrose 10%, 12.5 g, PRN  dextrose 10%, 25 g, PRN  glucagon (human recombinant), 1 mg, PRN  insulin aspart U-100, 0-5 Units, Q6H PRN  nitroGLYCERIN, 0.4 mg, Q5 Min PRN  sodium chloride 0.9%, 10 mL, PRN      AVS Discharge instructions received and reviewed with pt and family at bedside.  Pt voiced understanding and all questions answered to satisfaction.  Stressed importance to making and keeping all follow up appointments.  Medications at bedside and reviewed with pt.  Tele monitor removed and brought to monitor tech.  IV d/c'd with tip intact, pressure dressing applied.  Pt will call when ready to be transported to front of hospital via w/c to be discharged home.      Orientation: oriented x 4  Asya Coma Scale Score: 15     Lead Monitored: Lead II Rhythm: normal sinus rhythm    Cardiac/Telemetry Box Number: [id number]  VTE Required Core Measure: Pharmacological prophylaxis initiated/maintained Last Bowel Movement: 06/02/24  Diet Low Sodium, 2gm Fluid - 1500mL  Diet Cardiac  Voiding Characteristics: voids  spontaneously without difficulty  Kenneth Score: 21  Fall Risk Score: 9  Accucheck [x]   Freq? ACHS     Lines/Drains/Airways       Peripheral Intravenous Line  Duration                  Peripheral IV - Single Lumen 05/31/24 1103 20 G Left Antecubital 4 days                       Problem: Adult Inpatient Plan of Care  Goal: Plan of Care Review  Outcome: Met  Goal: Patient-Specific Goal (Individualized)  Outcome: Met  Goal: Absence of Hospital-Acquired Illness or Injury  Outcome: Met  Goal: Optimal Comfort and Wellbeing  Outcome: Met  Goal: Readiness for Transition of Care  Outcome: Met     Problem: Diabetes Comorbidity  Goal: Blood Glucose Level Within Targeted Range  Outcome: Met     Problem: Skin Injury Risk Increased  Goal: Skin Health and Integrity  Outcome: Met     Problem: Fall Injury Risk  Goal: Absence of Fall and Fall-Related Injury  Outcome: Met     Problem: Activity Intolerance  Goal: Enhanced Capacity and Energy  Outcome: Met

## 2024-06-05 ENCOUNTER — OFFICE VISIT (OUTPATIENT)
Dept: CARDIOLOGY | Facility: CLINIC | Age: 54
End: 2024-06-05
Payer: MEDICAID

## 2024-06-05 VITALS
RESPIRATION RATE: 16 BRPM | BODY MASS INDEX: 26.01 KG/M2 | HEART RATE: 74 BPM | OXYGEN SATURATION: 97 % | HEIGHT: 75 IN | SYSTOLIC BLOOD PRESSURE: 106 MMHG | DIASTOLIC BLOOD PRESSURE: 73 MMHG | WEIGHT: 209.19 LBS

## 2024-06-05 DIAGNOSIS — J69.0 ASPIRATION PNEUMONIA OF RIGHT UPPER LOBE DUE TO GASTRIC SECRETIONS: ICD-10-CM

## 2024-06-05 DIAGNOSIS — I27.20 PULMONARY HTN: ICD-10-CM

## 2024-06-05 DIAGNOSIS — I48.3 TYPICAL ATRIAL FLUTTER: Primary | ICD-10-CM

## 2024-06-05 DIAGNOSIS — I50.43 ACUTE ON CHRONIC COMBINED SYSTOLIC (CONGESTIVE) AND DIASTOLIC (CONGESTIVE) HEART FAILURE: ICD-10-CM

## 2024-06-05 DIAGNOSIS — N18.30 STAGE 3 CHRONIC KIDNEY DISEASE, UNSPECIFIED WHETHER STAGE 3A OR 3B CKD: ICD-10-CM

## 2024-06-05 PROCEDURE — 3008F BODY MASS INDEX DOCD: CPT | Mod: CPTII,,, | Performed by: INTERNAL MEDICINE

## 2024-06-05 PROCEDURE — 4010F ACE/ARB THERAPY RXD/TAKEN: CPT | Mod: CPTII,,, | Performed by: INTERNAL MEDICINE

## 2024-06-05 PROCEDURE — 1111F DSCHRG MED/CURRENT MED MERGE: CPT | Mod: CPTII,,, | Performed by: INTERNAL MEDICINE

## 2024-06-05 PROCEDURE — 99999 PR PBB SHADOW E&M-EST. PATIENT-LVL III: CPT | Mod: PBBFAC,,, | Performed by: INTERNAL MEDICINE

## 2024-06-05 PROCEDURE — 1159F MED LIST DOCD IN RCRD: CPT | Mod: CPTII,,, | Performed by: INTERNAL MEDICINE

## 2024-06-05 PROCEDURE — 99213 OFFICE O/P EST LOW 20 MIN: CPT | Mod: PBBFAC | Performed by: INTERNAL MEDICINE

## 2024-06-05 PROCEDURE — 99205 OFFICE O/P NEW HI 60 MIN: CPT | Mod: S$PBB,,, | Performed by: INTERNAL MEDICINE

## 2024-06-05 PROCEDURE — 3078F DIAST BP <80 MM HG: CPT | Mod: CPTII,,, | Performed by: INTERNAL MEDICINE

## 2024-06-05 PROCEDURE — 3074F SYST BP LT 130 MM HG: CPT | Mod: CPTII,,, | Performed by: INTERNAL MEDICINE

## 2024-06-05 PROCEDURE — 3044F HG A1C LEVEL LT 7.0%: CPT | Mod: CPTII,,, | Performed by: INTERNAL MEDICINE
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
## 2024-06-06 ENCOUNTER — TELEPHONE (OUTPATIENT)
Dept: CARDIOLOGY | Facility: CLINIC | Age: 54
End: 2024-06-06
Payer: MEDICAID

## 2024-06-06 NOTE — TELEPHONE ENCOUNTER
[facility] nurse called pt to f/u from hospital d/c and to confirm upcoming appt. No answer. LVM for pt to return call.

## 2024-06-07 ENCOUNTER — TELEPHONE (OUTPATIENT)
Dept: CARDIOLOGY | Facility: CLINIC | Age: 54
End: 2024-06-07
Payer: MEDICAID

## 2024-06-07 NOTE — TELEPHONE ENCOUNTER
"[facility]) hospital discharge 48-72 hour phone follow up completed.     Most Recent Hospital Discharge Date: 6/4  Last admission Diagnosis/chief complaint:Acute CHF    TCC nurse Navigator spoke with pt    Current Patient reported weight: 211lbs    Current Patient reported blood pressure and heart rate: 147/92    Pt reports the following:  []  Shortness of Breath with Activity  []  Shortness of Breath at rest   []  Fatigue  []  Edema   [] Chest pain or tightness  [] Weight Increase since discharge  [] None of the above    Medications:   Discharge medication reviewed with pt.  Pt reports having medication list available and has all medications at home for use per list.     Education:   Confirmed pt received "Home Care Guide for Heart Failure Patients" while admitted.   Reviewed key points as listed below.     Recommend 2 -3 gram sodium restriction and 1500 cc-2000 cc fluid restriction.  Encourage physical activity with graded exercise program.  Requested patient to weigh themselves daily, and to notify us if their weight increases by more than 3 lbs in 1 day or 5 lbs in 3 days.   Reminded patient to use "Daily weight and symptom tracker" to record and guide patient on when and how to call [facility]. PT may also use symptom tracker if no scale available  Pt reports being in the green(color) Zone. If in yellow/red, reminded that they should be calling [facility] today or now.     Watch for these Signs and Symptoms: If any of these occur, contact [facility] immediately:   Increase in shortness of breath with movement   Increase in swelling in your legs and ankles   Weight gain of more than 3 pounds in a day or 5 pounds in 3 days.   Difficulty breathing when you are lying down   Worsening fatigue or tiredness   Stomach bloating, a full feeling or a loss of appetite   Increased coughing--especially when you are lying down      Pt was able to verbalize back to nurse in their own words correct " diet/fluid restrictions, necessity for exercise, warning signs and symptoms, when and how to contact their TCC team.      Pt educated on follow-up plan while in H[facility] program to include:   Week 1 -  F/u appt with Provider and HF nurse (Sergio) Sienna Maguire 6/13 at 10:30 am   Week 2-5 - In person/ Virtual/ phone call check ins    Week 5-7 - Pt will discharge from [facility] and transition to longterm care provider (Cardiology/PCP/ Advanced Heart Failure).      Patient active on myChart? Yes      Pt given the following contact information for ease of communication: [phone] (Mon-Fri, 8a-5p) & for urgent issues on the weekend call [facility] on-call [phone] to page the Cardiology MD on call.  Pt also encouraged utilize [facility] messaging as well.      Will follow up with pt at first clinic visit and HF nurse navigator available for pt questions, issues or concerns.  6/4

## 2024-06-10 ENCOUNTER — TELEPHONE (OUTPATIENT)
Dept: FAMILY MEDICINE | Facility: CLINIC | Age: 54
End: 2024-06-10

## 2024-06-10 NOTE — TELEPHONE ENCOUNTER
----- Message from Ana Reyeskristofer sent at 6/10/2024  9:47 AM CDT -----  Contact: pt  Pt is calling in rgd to needing to harlan his appt on today, @ 3:20pm (cancelled), please call him back at [phone]  thanks/mpd

## 2024-06-10 NOTE — TELEPHONE ENCOUNTER
Patient contacted and his hospital f/u scheduled. He verbally understood the appt information given.

## 2024-06-11 DIAGNOSIS — I48.92 PAROXYSMAL ATRIAL FLUTTER: Primary | ICD-10-CM

## 2024-06-12 NOTE — ASSESSMENT & PLAN NOTE
Chronic, long pmh uncontrolled blood pressure. Latest blood pressure and vitals reviewed-      .   Home meds for hypertension were reviewed and noted below.   Hypertension Medications               carvediloL (COREG) 6.25 MG tablet Take 1 tablet (6.25 mg total) by mouth 2 (two) times daily.    furosemide (LASIX) 80 MG tablet Take 1 tablet (80 mg total) by mouth 2 (two) times daily.    sacubitriL-valsartan (ENTRESTO) 24-26 mg per tablet Take 1 tablet by mouth 2 (two) times daily.            While in the hospital, will manage blood pressure as follows; Continue home antihypertensive regimen

## 2024-06-12 NOTE — ASSESSMENT & PLAN NOTE
Creatine stable for now. BMP reviewed- noted CrCl cannot be calculated (Patient's most recent lab result is older than the maximum 7 days allowed.). according to latest data. Based on current GFR, CKD stage is stage 3 - GFR 30-59.  Monitor UOP and serial BMP and adjust therapy as needed. Renally dose meds. Avoid nephrotoxic medications and procedures.  Intravenous lasix transitioned to po

## 2024-06-12 NOTE — DISCHARGE SUMMARY
[facility] - Telemetry ([facility])  [facility] Medicine  Discharge Summary      Patient Name: Bria Saldana  MRN: [id number]  JOSE: [id number]  Patient Class: OP- Observation  Admission Date: 5/31/2024  Hospital Length of Stay: 0 days  Discharge Date and Time: 6/4/2024  3:22 PM  Attending Physician: No att. providers found   Discharging Provider: Kendal Colin MD  Primary Care Provider: Brenna Maravilla MD    Primary Care Team: [facility] MEDICINE D    HPI:   The patient is a 52 yo male with past medical history of atrial flutter, CHF, hypertension, CKD and NATALYA who presented to the ED with vomiting. He woke up this morning and had episode of emesis. He is poor historian. He states he has been losing weight but states he does not weigh himself at home. He saw his weight had decreased from his cardiology visit. He had some Gatorade in the ED. Patient recently hospitalized with acute exacerbation of heart failure and atrial flutter. He was cardioverted that admission. Workup in the ED revealed patient in volume overload. Cardiology and hospital medicine consulted.     * No surgery found *      Hospital Course:   6/1 admitted for congestive heart failure exacerbation. Associated with vomiting upon awakening. Endorses compliance with medication(s) and fluid and salt restriction. Experienced dyspnea but resolved. Denies chest pain or lower extremity edema. Continue congestive heart failure exacerbation pathway.Cardiology following.    Echo    Left Ventricle: Severe global hypokinesis present. There is moderately reduced systolic function with a visually estimated ejection fraction of 30 - 35%.    Left Atrium: Left atrium is severely dilated.    Right Atrium: Right atrium is mildly dilated.    Pericardium: There is a trivial effusion.    Abdominal ultrasound  The liver is enlarged measuring 17.4 cm.  No masses.  The gallbladder is contracted.  No gallstones.  Patient was not NPO prior to exam.  The common  bile duct is 3 mm.   The portal vein is grossly normal.  The pancreas is normal..  Mild splenomegaly.  No ascites.     .  After aggressive diuresis patient's blood pressure is marginal he has mild orthostasis.  Have adjusted torsemide to daily only and given 500 cc bolus.  Pt stabilized overnight and was not orhtostatic the am of dc. He was instructed to follow up with PCP, cards, hepatology upon dc.    Pt seen and examined on day of discharge and stable for dc.  See orders.          Goals of Care Treatment Preferences:  Code Status: Full Code      Consults:   Consults (From admission, onward)          Status Ordering Provider     Inpatient consult to Social Work  Once        Provider:  (Not yet assigned)    Completed YOLA LOPEZ     Inpatient Consult to Telemedicine - Hepatology  Once        Provider:  Jaqueline Metz MD    Completed YOLA LOPEZ     Inpatient consult to Social Work  Once        Provider:  (Not yet assigned)    Completed YOLA LOPEZ     Inpatient consult to Social Work  Once        Provider:  (Not yet assigned)    Completed KIRSTEN LESLIE     Inpatient consult to Social Work/Case Management  Once        Provider:  (Not yet assigned)    Completed DIANNE MAHARAJ     Inpatient consult to Registered Dietitian/Nutritionist  Once        Provider:  (Not yet assigned)    Completed DIANNE MAHARAJ     Inpatient consult to Cardiology  Once        Provider:  (Not yet assigned)    Completed WOO JUDGE            Cardiac/Vascular  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 (congestive) and diastolic (congestive) heart failure  Patient is identified as having Combined Systolic and Diastolic heart failure that is Acute on chronic. CHF is currently uncontrolled due to Continued edema of extremities. Latest ECHO performed and demonstrates- Results for orders placed during the hospital encounter of 05/14/24    Echo    Left Ventricle: Severe global hypokinesis present. There is  "moderately reduced systolic function with a visually estimated ejection fraction of 30 - 35%.    Left Atrium: Left atrium is severely dilated.    Right Atrium: Right atrium is mildly dilated.    Pericardium: There is a trivial effusion.    . Continue Beta Blocker, Furosemide, and ARNI and monitor clinical status closely. Monitor on telemetry. Patient is on CHF pathway.  Monitor strict Is&Os and daily weights.  Place on fluid restriction of 1.5 L. Cardiology has been consulted. Continue to stress to patient importance of self efficacy and  on diet for CHF. Last BNP reviewed- and noted below   No results for input(s): "BNP", "BNPTRIAGEBLO" in the last 168 hours.    -IV lasix 80 mg bid-transitioned to torsemide 40 b.i.d. with orthostasis we will decrease torsemide to q.day  Hypok and hypomag  Replete lytes  Cardiology following  Echocardiogram improved      Pericardial effusion        Prolonged Q-T interval on ECG  On amio  Mag 1.2  Replete mag        Paroxysmal atrial flutter  -s/p DCCV 5/17/24  -continue amiodarone and Eliquis  -currently in sinus rhythm  -monitor      Essential hypertension  Chronic, long pmh uncontrolled blood pressure. Latest blood pressure and vitals reviewed-      .   Home meds for hypertension were reviewed and noted below.   Hypertension Medications               carvediloL (COREG) 6.25 MG tablet Take 1 tablet (6.25 mg total) by mouth 2 (two) times daily.    furosemide (LASIX) 80 MG tablet Take 1 tablet (80 mg total) by mouth 2 (two) times daily.    sacubitriL-valsartan (ENTRESTO) 24-26 mg per tablet Take 1 tablet by mouth 2 (two) times daily.            While in the hospital, will manage blood pressure as follows; Continue home antihypertensive regimen      Renal/  CKD (chronic kidney disease) stage 3, GFR 30-59 ml/min  Creatine stable for now. BMP reviewed- noted CrCl cannot be calculated (Patient's most recent lab result is older than the maximum 7 days allowed.). according to " latest data. Based on current GFR, CKD stage is stage 3 - GFR 30-59.  Monitor UOP and serial BMP and adjust therapy as needed. Renally dose meds. Avoid nephrotoxic medications and procedures.  Intravenous lasix transitioned to po    GI  Serum total bilirubin elevated  Elevated alk phos  Denies abdominal pain but vomiting upon awakening  Appreciate hepatology as recommendations      Abnormal LFTs  Likely secondary to hepatic  congestion with CHF reduced ejection fraction  Volume status more controlled  We will send referral to [facility] hepatology        Final Active Diagnoses:    Diagnosis Date Noted POA    PRINCIPAL PROBLEM:  Acute on chronic combined systolic (congestive) and diastolic (congestive) heart failure [I50.43] 09/23/2019 Yes    Pericardial effusion [I31.39] 06/04/2024 Yes    Prolonged Q-T interval on ECG [R94.31] 06/01/2024 Yes    Serum total bilirubin elevated [R17] 06/01/2024 Yes    Paroxysmal atrial flutter [I48.92] 05/15/2024 Yes    CKD (chronic kidney disease) stage 3, GFR 30-59 ml/min [N18.30] 07/08/2019 Yes    Abnormal LFTs [R79.89] 07/08/2019 Yes    Essential hypertension [I10] 06/01/2017 Yes      Problems Resolved During this Admission:       Discharged Condition: fair    Disposition: Home or Self Care    Follow Up:   Follow-up Information       Clinics, [facility] Follow up.    Why: Hepatology referral sent to clinic. Please call in a week if you have not been contacted within a week.  Contact information:  [address] [facility] [address] [phone]               [employer] Follow up.    Why: https://www.ssa.gov/disability  Contact information:  [phone]                         Patient Instructions:      Diet Cardiac   Order Comments: 1500cc fluid restriction     Call MD for:  temperature >100.4     Call MD for:  persistent nausea and vomiting or diarrhea     Call MD for:  severe uncontrolled pain     Call MD for:   redness, tenderness, or signs of infection (pain, swelling, redness, odor or green/yellow discharge around incision site)     Call MD for:  difficulty breathing or increased cough     Call MD for:  severe persistent headache     Call MD for:  worsening rash     Call MD for:  persistent dizziness, light-headedness, or visual disturbances     Call MD for:  increased confusion or weakness     Activity as tolerated       Significant Diagnostic Studies: Labs: All labs within the past 24 hours have been reviewed    Pending Diagnostic Studies:       None           Medications:  Reconciled Home Medications:      Medication List        START taking these medications      benzonatate 100 MG capsule  Commonly known as: TESSALON  Take 1 capsule (100 mg total) by mouth 3 (three) times daily as needed for Cough.     pantoprazole 40 MG tablet  Commonly known as: PROTONIX  Take 1 tablet (40 mg total) by mouth once daily.     torsemide 20 MG Tab  Commonly known as: DEMADEX  Take 2 tablets (40 mg total) by mouth once daily.            CONTINUE taking these medications      amiodarone 200 MG Tab  Commonly known as: PACERONE  Take 1 tablet (200 mg total) by mouth 2 (two) times daily.     carvediloL 6.25 MG tablet  Commonly known as: COREG  Take 1 tablet (6.25 mg total) by mouth 2 (two) times daily.     colchicine 0.6 mg tablet  Commonly known as: COLCRYS  Take 1 tablet (0.6 mg total) by mouth once daily.     ELIQUIS 5 mg Tab  Generic drug: apixaban  Take 1 tablet (5 mg total) by mouth 2 (two) times daily.     empagliflozin 10 mg tablet  Commonly known as: JARDIANCE  Take 1 tablet (10 mg total) by mouth once daily.     ENTRESTO 24-26 mg per tablet  Generic drug: sacubitriL-valsartan  Take 1 tablet by mouth 2 (two) times daily.            STOP taking these medications      furosemide 80 MG tablet  Commonly known as: LASIX              Indwelling Lines/Drains at time of discharge:   Lines/Drains/Airways       None                   Time  spent on the discharge of patient: 43 minutes         Kendal Colin MD  Department of Hospital Medicine  '[facility] - Telemetry ([facility]

## 2024-06-12 NOTE — ASSESSMENT & PLAN NOTE
"Patient is identified as having Combined Systolic and Diastolic heart failure that is Acute on chronic. CHF is currently uncontrolled due to Continued edema of extremities. Latest ECHO performed and demonstrates- Results for orders placed during the hospital encounter of 05/14/24    Echo    Left Ventricle: Severe global hypokinesis present. There is moderately reduced systolic function with a visually estimated ejection fraction of 30 - 35%.    Left Atrium: Left atrium is severely dilated.    Right Atrium: Right atrium is mildly dilated.    Pericardium: There is a trivial effusion.    . Continue Beta Blocker, Furosemide, and ARNI and monitor clinical status closely. Monitor on telemetry. Patient is on CHF pathway.  Monitor strict Is&Os and daily weights.  Place on fluid restriction of 1.5 L. Cardiology has been consulted. Continue to stress to patient importance of self efficacy and  on diet for CHF. Last BNP reviewed- and noted below   No results for input(s): "BNP", "BNPTRIAGEBLO" in the last 168 hours.    -IV lasix 80 mg bid-transitioned to torsemide 40 b.i.d. with orthostasis we will decrease torsemide to q.day  Hypok and hypomag  Replete lytes  Cardiology following  Echocardiogram improved    "

## 2024-06-13 ENCOUNTER — TELEPHONE (OUTPATIENT)
Dept: ELECTROPHYSIOLOGY | Facility: CLINIC | Age: 54
End: 2024-06-13
Payer: MEDICAID

## 2024-06-14 ENCOUNTER — TELEPHONE (OUTPATIENT)
Dept: ELECTROPHYSIOLOGY | Facility: CLINIC | Age: 54
End: 2024-06-14
Payer: MEDICAID

## 2024-06-14 NOTE — TELEPHONE ENCOUNTER
----- Message from Jose Eduardo Ardon sent at 6/13/2024  9:20 AM CDT -----  Regarding: appt access  Contact: pt @ [phone]  Pt is calling to advise Naina that he would to be seen on 09/12 at 0800. Please call to advise further. Thank you for all you are doing.

## 2024-06-17 ENCOUNTER — TELEPHONE (OUTPATIENT)
Dept: FAMILY MEDICINE | Facility: CLINIC | Age: 54
End: 2024-06-17
Payer: MEDICAID

## 2024-06-17 ENCOUNTER — OFFICE VISIT (OUTPATIENT)
Dept: FAMILY MEDICINE | Facility: CLINIC | Age: 54
End: 2024-06-17
Attending: FAMILY MEDICINE
Payer: MEDICAID

## 2024-06-17 VITALS
BODY MASS INDEX: 26.97 KG/M2 | OXYGEN SATURATION: 97 % | HEART RATE: 50 BPM | TEMPERATURE: 98 F | WEIGHT: 216.94 LBS | DIASTOLIC BLOOD PRESSURE: 82 MMHG | RESPIRATION RATE: 18 BRPM | HEIGHT: 75 IN | SYSTOLIC BLOOD PRESSURE: 138 MMHG

## 2024-06-17 DIAGNOSIS — E66.3 OVERWEIGHT (BMI 25.0-29.9): ICD-10-CM

## 2024-06-17 DIAGNOSIS — I10 ESSENTIAL HYPERTENSION: Primary | ICD-10-CM

## 2024-06-17 DIAGNOSIS — N18.32 STAGE 3B CHRONIC KIDNEY DISEASE: ICD-10-CM

## 2024-06-17 DIAGNOSIS — R16.2 HEPATOSPLENOMEGALY: ICD-10-CM

## 2024-06-17 PROBLEM — E11.22 HYPERTENSION ASSOCIATED WITH CHRONIC KIDNEY DISEASE DUE TO TYPE 2 DIABETES MELLITUS: Status: RESOLVED | Noted: 2022-02-28 | Resolved: 2024-06-17

## 2024-06-17 PROBLEM — I12.9 HYPERTENSION ASSOCIATED WITH CHRONIC KIDNEY DISEASE DUE TO TYPE 2 DIABETES MELLITUS: Status: RESOLVED | Noted: 2022-02-28 | Resolved: 2024-06-17

## 2024-06-17 PROCEDURE — 1159F MED LIST DOCD IN RCRD: CPT | Mod: CPTII,,, | Performed by: FAMILY MEDICINE

## 2024-06-17 PROCEDURE — 3079F DIAST BP 80-89 MM HG: CPT | Mod: CPTII,,, | Performed by: FAMILY MEDICINE

## 2024-06-17 PROCEDURE — 1160F RVW MEDS BY RX/DR IN RCRD: CPT | Mod: CPTII,,, | Performed by: FAMILY MEDICINE

## 2024-06-17 PROCEDURE — 99999 PR PBB SHADOW E&M-EST. PATIENT-LVL V: CPT | Mod: PBBFAC,,, | Performed by: FAMILY MEDICINE

## 2024-06-17 PROCEDURE — 99214 OFFICE O/P EST MOD 30 MIN: CPT | Mod: S$PBB,,, | Performed by: FAMILY MEDICINE

## 2024-06-17 PROCEDURE — 3075F SYST BP GE 130 - 139MM HG: CPT | Mod: CPTII,,, | Performed by: FAMILY MEDICINE

## 2024-06-17 PROCEDURE — 1111F DSCHRG MED/CURRENT MED MERGE: CPT | Mod: CPTII,,, | Performed by: FAMILY MEDICINE

## 2024-06-17 PROCEDURE — 3044F HG A1C LEVEL LT 7.0%: CPT | Mod: CPTII,,, | Performed by: FAMILY MEDICINE

## 2024-06-17 PROCEDURE — 99215 OFFICE O/P EST HI 40 MIN: CPT | Mod: PBBFAC,PO | Performed by: FAMILY MEDICINE

## 2024-06-17 PROCEDURE — 4010F ACE/ARB THERAPY RXD/TAKEN: CPT | Mod: CPTII,,, | Performed by: FAMILY MEDICINE

## 2024-06-17 NOTE — PROGRESS NOTES
Bria Sandro Saldana    Chief Complaint   Patient presents with    Hospital Follow Up       History of Present Illness:   Transitional Care Note    Family and/or Caretaker present at visit?  Yes, Maninder Castle, sister, who he lives with.  Diagnostic tests reviewed/disposition: No diagnosic tests pending after this hospitalization.  Disease/illness education: Discussed issues with patient.  Home health/community services discussion/referrals: Patient does not have home health established from hospital visit.  They do not need home health.  If needed, we will set up home health for the patient.   Establishment or re-establishment of referral orders for community resources: No other necessary community resources.   Discussion with other health care providers: No discussion with other health care providers necessary.         Mr. Saldana comes in today for hospital follow-up.  He is accompanied by his sister Maninder Castle, whom he lives with.  He states he is fasting and has taken all of his medications today.  He states he feels okay today and denies having acute problems. He denies having fever, chills, fatigue, appetite changes; shortness of breath, cough, wheezing; chest pain, palpitations, leg swelling abdominal pain, nausea, vomiting, diarrhea, constipation; unusual urinary symptoms; polydipsia, polyphagia, polyuria, hot or cold intolerance; back pain; acute visual changes, numbnss, headache; anxiety, depression, homicidal or suicidal thoughts.      He states he performs home glucose checks daily with levels ranging 130-1 40/80-90's.  He states he does not perform home glucose checks but also states he does not have history of diabetes.    Per discharge summary noted on chart, he was hospitalized at [facility] on May 31, 2024 through June 4, 2024 for congestive heart failure exacerbation.  He presented to [facility] ER with vomiting.  He experienced dyspnea which resolved.  Echocardiogram showed moderately  reduced systolic function with EF of 30-35%.  Abdominal ultrasound showed enlarged liver with mild splenomegaly.  Aggressive diuresis was performed but adjusted due to mild orthostasis requiring IV fluids.  Once stabilized and he was not orthostatic, he was discharged and advised to follow-up with [facility], with cardiology, and with hepatology. His sister states he has appointment with non-[facility] provider on July 9, 2024 but does not have a name.                 Current Outpatient Medications   Medication Sig    amiodarone (PACERONE) 200 MG Tab Take 1 tablet (200 mg total) by mouth 2 (two) times daily.    apixaban (ELIQUIS) 5 mg Tab Take 1 tablet (5 mg total) by mouth 2 (two) times daily.    benzonatate (TESSALON) 100 MG capsule Take 1 capsule (100 mg total) by mouth 3 (three) times daily as needed for Cough.    carvediloL (COREG) 6.25 MG tablet Take 1 tablet (6.25 mg total) by mouth 2 (two) times daily.    colchicine (COLCRYS) 0.6 mg tablet Take 1 tablet (0.6 mg total) by mouth once daily.    empagliflozin (JARDIANCE) 10 mg tablet Take 1 tablet (10 mg total) by mouth once daily.    pantoprazole (PROTONIX) 40 MG tablet Take 1 tablet (40 mg total) by mouth once daily.    sacubitriL-valsartan (ENTRESTO) 24-26 mg per tablet Take 1 tablet by mouth 2 (two) times daily.    torsemide (DEMADEX) 20 MG Tab Take 2 tablets (40 mg total) by mouth once daily.       Review of Systems   Constitutional:  Negative for activity change, appetite change, chills, fatigue and fever.   Eyes:  Negative for visual disturbance.   Respiratory:  Negative for cough, shortness of breath and wheezing.    Cardiovascular:  Negative for chest pain, palpitations and leg swelling.   Gastrointestinal:  Negative for abdominal pain, constipation, diarrhea, nausea and vomiting.   Endocrine: Negative for cold intolerance, heat intolerance, polydipsia, polyphagia and polyuria.   Genitourinary:  Negative for difficulty urinating.    Musculoskeletal:  Negative for back pain.   Neurological:  Negative for numbness and headaches.   Psychiatric/Behavioral:  Negative for dysphoric mood and suicidal ideas. The patient is not nervous/anxious.         Negative for homicidal ideas.       Objective:  Physical Exam  Vitals reviewed.   Constitutional:       General: He is not in acute distress.     Appearance: Normal appearance. He is not ill-appearing, toxic-appearing or diaphoretic.      Comments: Pleasant.   Cardiovascular:      Rate and Rhythm: Normal rate and regular rhythm.      Pulses: Normal pulses.      Heart sounds: No murmur heard.  Pulmonary:      Effort: Pulmonary effort is normal. No respiratory distress.      Breath sounds: Normal breath sounds. No wheezing.   Abdominal:      General: Bowel sounds are normal. There is no distension.      Palpations: Abdomen is soft. There is no mass.      Tenderness: There is no abdominal tenderness. There is no guarding or rebound.   Musculoskeletal:         General: No swelling or tenderness. Normal range of motion.      Cervical back: Normal range of motion and neck supple. No tenderness.      Comments: He is ambulatory without problems.   Lymphadenopathy:      Cervical: No cervical adenopathy.   Skin:     General: Skin is warm.   Neurological:      General: No focal deficit present.      Mental Status: He is alert and oriented to person, place, and time.   Psychiatric:         Mood and Affect: Mood normal.         Behavior: Behavior normal.         Thought Content: Thought content normal.         Judgment: Judgment normal.         ASSESSMENT:  1. Essential hypertension    2. Stage 3b chronic kidney disease    3. Hepatosplenomegaly    4. Overweight (BMI 25.0-29.9)        PLAN:  Bria was seen today for hospital follow up.    Diagnoses and all orders for this visit:    Essential hypertension    Stage 3b chronic kidney disease    Hepatosplenomegaly  -     Ambulatory referral/consult to Hepatology;  Future    Overweight (BMI 25.0-29.9)      No labs today.  Continue current medications, follow low sodium, low cholesterol, low carb diet, daily walks.  Keep follow up with specialists.  Flu shot this fall.  Follow up in about 4 months (around 10/17/2024) for physical.

## 2024-06-18 PROBLEM — E66.01 SEVERE OBESITY (BMI 35.0-35.9 WITH COMORBIDITY): Status: RESOLVED | Noted: 2023-02-03 | Resolved: 2024-06-18

## 2024-06-18 PROBLEM — E66.3 OVERWEIGHT (BMI 25.0-29.9): Status: ACTIVE | Noted: 2024-06-18

## 2024-06-18 PROBLEM — R16.2 HEPATOSPLENOMEGALY: Status: ACTIVE | Noted: 2024-06-18

## 2024-06-18 NOTE — PROGRESS NOTES
Continued Stay SW/CM Assessment/Plan of Care Note       Active Substitute Decision Maker (SDM)       Peter Bruce Surrogate-Other - Son   Primary Phone: [phone] (Mobile)             Magno Bruce Surrogate Primary Contact - Spouse   Primary Phone: [phone] (Mobile)                     Progress note:  Meeting scheduled tomorrow @ 3pm with patient, spouse, son and DIL along with palliative NP and [facility]. To discuss GOC and discharge planning.       Disposition Recommendations:  Preliminary discharge destination: Planned Discharge Destination: Location not determined at this time  SW/CM recommendation for discharge:      Destination Pharmacy:        Discharge Plan/Needs:     Continued Care and Services - Admitted Since 6/10/2024       Destination        Service Provider Request Status Selected Services Address Phone Fax    [facility] Pending - No Request Sent N/A [address] [address] [phone]                      Devices/ Equipment that need to be arranged for discharge: None at this time       Prior To Hospitalization:    Living Situation: Significant other, Other facility residents and residing at Assisted living    .  Support Systems: Children, Family members, Home Care Staff   Home Devices/Equipment: Home Oxygen (T)            Mobility Assist Devices: None   Type of Service Prior to Hospitalization: PT, Outpatient services               Patient/Family discharge goal (s):        Resources provided:           Prior Function  Bed Mobility: Independent (06/16/24 1436 : Adelina Aranda, OTR/L)     Ambulation in the Home: Modified  Independent (furniture surfing) (06/16/24 1436 : Adelina Aranda, OTR/L)       Current Function  Last Filed Values         Value Time User    Current Function  significantly below baseline level of function 6/17/2024 11:08 AM Jose Ernandez, PT    Therapy Impairments  activity tolerance; balance; pain; ROM; strength 6/17/2024 11:08  '[facility] Telemetry ([facility]  Progress Note    Patient Name: Bria Saldana  MRN: [id number]  Patient Class: OP- Observation   Admission Date: 5/31/2024  Length of Stay: 0 days  Attending Physician: Kendal Oleary MD  Primary Care Provider: Brenna Maravilla MD        Subjective:     Principal Problem:Acute on chronic combined systolic (congestive) and diastolic (congestive) heart failure        HPI:  The patient is a 52 yo male with past medical history of atrial flutter, CHF, hypertension, CKD and NATALYA who presented to the ED with vomiting. He woke up this morning and had episode of emesis. He is poor historian. He states he has been losing weight but states he does not weigh himself at home. He saw his weight had decreased from his cardiology visit. He had some Gatorade in the ED. Patient recently hospitalized with acute exacerbation of heart failure and atrial flutter. He was cardioverted that admission. Workup in the ED revealed patient in volume overload. Cardiology and hospital medicine consulted.     Overview/Hospital Course:  6/1 admitted for congestive heart failure exacerbation. Associated with vomiting upon awakening. Endorses compliance with medication(s) and fluid and salt restriction. Experienced dyspnea but resolved. Denies chest pain or lower extremity edema. Continue congestive heart failure exacerbation pathway.Cardiology following.    Echo    Left Ventricle: Severe global hypokinesis present. There is moderately reduced systolic function with a visually estimated ejection fraction of 30 - 35%.    Left Atrium: Left atrium is severely dilated.    Right Atrium: Right atrium is mildly dilated.    Pericardium: There is a trivial effusion.    Abdominal ultrasound  The liver is enlarged measuring 17.4 cm.  No masses.  The gallbladder is contracted.  No gallstones.  Patient was not NPO prior to exam.  The common bile duct is 3 mm.   The portal vein is grossly normal.   The pancreas is normal..  Mild splenomegaly.  No ascites.     .      Interval History:  Patient reports feeling better with no further vomiting.  But he does say he feels off and week.    Review of Systems   Constitutional:  Positive for activity change and fatigue. Negative for fever.   HENT:  Negative for congestion.    Respiratory:  Cough: a/w new meds. Shortness of breath: improved.    Cardiovascular:  Negative for chest pain and leg swelling.   Gastrointestinal:  Positive for nausea (improved). Negative for abdominal pain and constipation. Vomiting: improved.  Genitourinary:  Positive for frequency.   Neurological:  Negative for weakness.   Psychiatric/Behavioral:  Positive for dysphoric mood. Negative for agitation, behavioral problems, confusion, decreased concentration and sleep disturbance. The patient is nervous/anxious (d/t medical condition).      Objective:     Vital Signs (Most Recent):  Temp: 98.8 °F (37.1 °C) (06/02/24 1138)  Pulse: 77 (06/02/24 1300)  Resp: 18 (06/02/24 1138)  BP: 109/69 (06/02/24 1138)  SpO2: 98 % (06/02/24 1138) Vital Signs (24h Range):  Temp:  [98.2 °F (36.8 °C)-99.7 °F (37.6 °C)] 98.8 °F (37.1 °C)  Pulse:  [71-90] 77  Resp:  [12-18] 18  SpO2:  [93 %-98 %] 98 %  BP: (109-139)/(69-93) 109/69     Weight: 103.4 kg (228 lb)  Body mass index is 28.5 kg/m².    Intake/Output Summary (Last 24 hours) at 6/2/2024 1450  Last data filed at 6/2/2024 1322  Gross per 24 hour   Intake 480 ml   Output 2850 ml   Net -2370 ml         Physical Exam  Vitals and nursing note reviewed.   Constitutional:       General: He is not in acute distress.     Appearance: Normal appearance. He is not ill-appearing or toxic-appearing.   HENT:      Head: Normocephalic and atraumatic.   Cardiovascular:      Rate and Rhythm: Normal rate.      Heart sounds: No murmur heard.  Pulmonary:      Effort: Pulmonary effort is normal. No respiratory distress.      Breath sounds: No wheezing or rales.   Abdominal:       AM Jose Ernandez, PT    ADLs Requiring Support  bed mobility; transfers; ambulation; stairs 6/17/2024 11:08 AM Jose Ernandez PT            Therapy Recommendations for Discharge:   PT:      Last Filed Values         Value Time User    PT Discharge Needs  therapy 5 or more times per week 6/17/2024 11:08 AM Jose Ernandez PT          OT:       Last Filed Values       None          SLP:    Last Filed Values       None            Mobility Equipment Recommended for Discharge: defer      Barriers to Discharge  Identified Barriers to Discharge/Transition Planning: Medical necessity for acute care, Consult Pending/Clearance, Pending diagnostic results/procedure                   General: Bowel sounds are normal.      Palpations: Abdomen is soft.      Tenderness: There is no abdominal tenderness.   Musculoskeletal:      Right lower leg: No edema.      Left lower leg: No edema.   Skin:     General: Skin is warm.   Neurological:      Mental Status: He is alert and oriented to person, place, and time.      Motor: Weakness present.   Psychiatric:         Mood and Affect: Mood is depressed.         Behavior: Behavior normal.             Significant Labs: All pertinent labs within the past 24 hours have been reviewed.  CMP:   Recent Labs   Lab 06/01/24  0433 06/02/24  0430    137   K 3.0* 3.6   CL 99 97   CO2 23 23   GLU 68* 75   BUN 23* 28*   CREATININE 2.1* 2.0*   CALCIUM 8.6* 9.1   ANIONGAP 16 17*       Magnesium:   Recent Labs   Lab 06/01/24  0433   MG 1.2*         Significant Imaging: I have reviewed all pertinent imaging results/findings within the past 24 hours.    Assessment/Plan:      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 (congestive) and diastolic (congestive) heart failure  Patient is identified as having Combined Systolic and Diastolic heart failure that is Acute on chronic. CHF is currently uncontrolled due to Continued edema of extremities. Latest ECHO performed and demonstrates- Results for orders placed during the hospital encounter of 05/14/24    Echo    Left Ventricle: Severe global hypokinesis present. There is moderately reduced systolic function with a visually estimated ejection fraction of 30 - 35%.    Left Atrium: Left atrium is severely dilated.    Right Atrium: Right atrium is mildly dilated.    Pericardium: There is a trivial effusion.    . Continue Beta Blocker, Furosemide, and ARNI and monitor clinical status closely. Monitor on telemetry. Patient is on CHF pathway.  Monitor strict Is&Os and daily weights.  Place on fluid restriction of 1.5 L. Cardiology has been consulted. Continue to stress to patient importance of self efficacy and  on diet for CHF. Last  BNP reviewed- and noted below   Recent Labs   Lab 05/31/24  1107   BNP 3,402*       -IV lasix 80 mg bid-transitioned to torsemide 40 b.i.d.  Hypok and hypomag  Replete lytes  Cardiology following  Echocardiogram improved      Serum total bilirubin elevated  Elevated alk phos  Denies abdominal pain but vomiting upon awakening      Prolonged Q-T interval on ECG  On amio  Mag 1.2  Replete mag        Paroxysmal atrial flutter  -s/p DCCV 5/17/24  -continue amiodarone and Eliquis  -currently in sinus rhythm  -monitor      CKD (chronic kidney disease) stage 3, GFR 30-59 ml/min  Creatine stable for now. BMP reviewed- noted Estimated Creatinine Clearance: 56.3 mL/min (A) (based on SCr of 2 mg/dL (H)). according to latest data. Based on current GFR, CKD stage is stage 3 - GFR 30-59.  Monitor UOP and serial BMP and adjust therapy as needed. Renally dose meds. Avoid nephrotoxic medications and procedures.  Intravenous lasix    Essential hypertension  Chronic, long pmh uncontrolled blood pressure. Latest blood pressure and vitals reviewed-     Temp:  [98.2 °F (36.8 °C)-99.7 °F (37.6 °C)]   Pulse:  [71-90]   Resp:  [12-18]   BP: (109-139)/(69-93)   SpO2:  [93 %-98 %] .   Home meds for hypertension were reviewed and noted below.   Hypertension Medications               carvediloL (COREG) 6.25 MG tablet Take 1 tablet (6.25 mg total) by mouth 2 (two) times daily.    furosemide (LASIX) 80 MG tablet Take 1 tablet (80 mg total) by mouth 2 (two) times daily.    sacubitriL-valsartan (ENTRESTO) 24-26 mg per tablet Take 1 tablet by mouth 2 (two) times daily.            While in the hospital, will manage blood pressure as follows; Continue home antihypertensive regimen        VTE Risk Mitigation (From admission, onward)           Ordered     apixaban tablet 5 mg  2 times daily         05/31/24 1452     IP VTE LOW RISK PATIENT  Once         05/31/24 1403                    Discharge Planning   CARMELO:      Code Status: Full Code   Is the  patient medically ready for discharge?:     Reason for patient still in hospital (select all that apply): Patient trending condition, Laboratory test, Treatment, and Consult recommendations  Discharge Plan A: Home                  Kendal Colin MD  Department of Hospital Medicine   [facility] ([facility])

## 2024-06-19 ENCOUNTER — LAB VISIT (OUTPATIENT)
Dept: LAB | Facility: HOSPITAL | Age: 54
End: 2024-06-19
Attending: PHYSICIAN ASSISTANT
Payer: MEDICAID

## 2024-06-19 ENCOUNTER — OFFICE VISIT (OUTPATIENT)
Dept: CARDIOLOGY | Facility: CLINIC | Age: 54
End: 2024-06-19
Payer: MEDICAID

## 2024-06-19 VITALS
WEIGHT: 219.25 LBS | SYSTOLIC BLOOD PRESSURE: 138 MMHG | HEART RATE: 86 BPM | OXYGEN SATURATION: 96 % | HEIGHT: 75 IN | BODY MASS INDEX: 27.26 KG/M2 | DIASTOLIC BLOOD PRESSURE: 90 MMHG

## 2024-06-19 DIAGNOSIS — I50.43 ACUTE ON CHRONIC COMBINED SYSTOLIC (CONGESTIVE) AND DIASTOLIC (CONGESTIVE) HEART FAILURE: ICD-10-CM

## 2024-06-19 DIAGNOSIS — I50.43 ACUTE ON CHRONIC COMBINED SYSTOLIC (CONGESTIVE) AND DIASTOLIC (CONGESTIVE) HEART FAILURE: Primary | ICD-10-CM

## 2024-06-19 LAB
ALBUMIN SERPL BCP-MCNC: 3.2 G/DL (ref 3.5–5.2)
ALP SERPL-CCNC: 179 U/L (ref 55–135)
ALT SERPL W/O P-5'-P-CCNC: 30 U/L (ref 10–44)
ANION GAP SERPL CALC-SCNC: 10 MMOL/L (ref 8–16)
AST SERPL-CCNC: 27 U/L (ref 10–40)
BILIRUB SERPL-MCNC: 1.9 MG/DL (ref 0.1–1)
BUN SERPL-MCNC: 21 MG/DL (ref 6–20)
CALCIUM SERPL-MCNC: 9.4 MG/DL (ref 8.7–10.5)
CHLORIDE SERPL-SCNC: 106 MMOL/L (ref 95–110)
CO2 SERPL-SCNC: 24 MMOL/L (ref 23–29)
CREAT SERPL-MCNC: 1.6 MG/DL (ref 0.5–1.4)
EST. GFR  (NO RACE VARIABLE): 52 ML/MIN/1.73 M^2
GLUCOSE SERPL-MCNC: 122 MG/DL (ref 70–110)
POTASSIUM SERPL-SCNC: 4 MMOL/L (ref 3.5–5.1)
PROT SERPL-MCNC: 7.1 G/DL (ref 6–8.4)
SODIUM SERPL-SCNC: 140 MMOL/L (ref 136–145)

## 2024-06-19 PROCEDURE — 3044F HG A1C LEVEL LT 7.0%: CPT | Mod: CPTII,,, | Performed by: PHYSICIAN ASSISTANT

## 2024-06-19 PROCEDURE — 1111F DSCHRG MED/CURRENT MED MERGE: CPT | Mod: CPTII,,, | Performed by: PHYSICIAN ASSISTANT

## 2024-06-19 PROCEDURE — 4010F ACE/ARB THERAPY RXD/TAKEN: CPT | Mod: CPTII,,, | Performed by: PHYSICIAN ASSISTANT

## 2024-06-19 PROCEDURE — 99214 OFFICE O/P EST MOD 30 MIN: CPT | Mod: S$PBB,,, | Performed by: PHYSICIAN ASSISTANT

## 2024-06-19 PROCEDURE — 3080F DIAST BP >= 90 MM HG: CPT | Mod: CPTII,,, | Performed by: PHYSICIAN ASSISTANT

## 2024-06-19 PROCEDURE — 80053 COMPREHEN METABOLIC PANEL: CPT | Performed by: PHYSICIAN ASSISTANT

## 2024-06-19 PROCEDURE — 99999 PR PBB SHADOW E&M-EST. PATIENT-LVL II: CPT | Mod: PBBFAC,,, | Performed by: PHYSICIAN ASSISTANT

## 2024-06-19 PROCEDURE — 3008F BODY MASS INDEX DOCD: CPT | Mod: CPTII,,, | Performed by: PHYSICIAN ASSISTANT

## 2024-06-19 PROCEDURE — 36415 COLL VENOUS BLD VENIPUNCTURE: CPT | Performed by: PHYSICIAN ASSISTANT

## 2024-06-19 PROCEDURE — 3075F SYST BP GE 130 - 139MM HG: CPT | Mod: CPTII,,, | Performed by: PHYSICIAN ASSISTANT

## 2024-06-19 PROCEDURE — 99212 OFFICE O/P EST SF 10 MIN: CPT | Mod: PBBFAC | Performed by: PHYSICIAN ASSISTANT

## 2024-06-19 NOTE — PROGRESS NOTES
HF TCC Provider Note (Follow-up) Consult Note      HPI:  53 yoM here for arrhythmia management. AFL/RVR noted 5/14/24 with associated drop in EF to 15-20%. MEGAN/CV performed 5/17/24. EF 6/1/24 in SR 30-35%. He had EF 45-50% in SR in prior years. He was very symptomatic with his AFL.      Echo 6/23 in SR:  ·The left ventricle is mildly enlarged with moderate concentric hypertrophy and mildly decreased systolic function.  ·Severe left atrial enlargement.  ·The estimated ejection fraction is 45%.  ·Grade II left ventricular diastolic dysfunction.  ·There is left ventricular global hypokinesis.  ·Normal right ventricular size with normal right ventricular systolic function.  ·There is mild aortic valve stenosis.  ·Aortic valve area is 1.78 cm2; peak velocity is 2.11 m/s; mean gradient is 13 mmHg.  ·Mild-to-moderate mitral regurgitation.  ·Mild to moderate tricuspid regurgitation.  ·There is mild pulmonary hypertension.  ·Small posterior pericardial effusion.    Since last visit a month ago saw EP, scheduled for RFA in September    No sOB, has been taking all meds now    No PND         PHYSICAL:   Vitals:    06/19/24 0932   BP: (!) 138/90   Pulse: 86          JVD: no,    Heart rhythm: regular  Cardiac murmur: No    S3: no  S4: no  Lungs: clear  Liver span: 10 cm:   Hepatojugular reflux: no  Edema: no,       ASSESSMENT: chronic systolic HF    PLAN:      Patient Instructions:   Instruct the patient to notify this clinic if HH, a physician or an advanced care provider wants to change medication one of their HF medications   Activity and Diet restrictions:   Recommend 2-3 gram sodium restriction and 1500cc- 2000cc fluid restriction.  Encourage physical activity with graded exercise program.  Requested patient to weigh themselves daily, and to notify us if their weight increases by more than 3 lbs in 1 day or 5 lbs in 3 days.      Medication changes (include current dose and changed dose)  Daily weights  CHF  education  GDMT  Labs today  RTC 6 weeks

## 2024-07-19 ENCOUNTER — PATIENT MESSAGE (OUTPATIENT)
Dept: ADMINISTRATIVE | Facility: HOSPITAL | Age: 54
End: 2024-07-19
Payer: MEDICAID

## 2024-07-19 DIAGNOSIS — Z12.11 COLON CANCER SCREENING: Primary | ICD-10-CM

## 2024-07-20 DIAGNOSIS — Z12.11 SCREENING FOR COLON CANCER: ICD-10-CM

## 2024-07-23 NOTE — TELEPHONE ENCOUNTER
I have put the following orders and/or medications to this note.  Please advise pt and assist.    Orders Placed This Encounter   Procedures    Fecal Immunochemical Test (iFOBT)     Standing Status:   Future     Standing Expiration Date:   9/21/2025

## 2024-07-24 DIAGNOSIS — N18.30 STAGE 3 CHRONIC KIDNEY DISEASE, UNSPECIFIED WHETHER STAGE 3A OR 3B CKD: Primary | ICD-10-CM

## 2024-07-30 ENCOUNTER — TELEPHONE (OUTPATIENT)
Dept: NEPHROLOGY | Facility: CLINIC | Age: 54
End: 2024-07-30
Payer: MEDICAID

## 2024-07-30 ENCOUNTER — LAB VISIT (OUTPATIENT)
Dept: LAB | Facility: HOSPITAL | Age: 54
End: 2024-07-30
Attending: INTERNAL MEDICINE
Payer: MEDICAID

## 2024-07-30 DIAGNOSIS — N18.30 STAGE 3 CHRONIC KIDNEY DISEASE, UNSPECIFIED WHETHER STAGE 3A OR 3B CKD: ICD-10-CM

## 2024-07-30 DIAGNOSIS — N18.30 STAGE 3 CHRONIC KIDNEY DISEASE, UNSPECIFIED WHETHER STAGE 3A OR 3B CKD: Primary | ICD-10-CM

## 2024-07-30 LAB
ALBUMIN SERPL BCP-MCNC: 3.8 G/DL (ref 3.5–5.2)
ANION GAP SERPL CALC-SCNC: 11 MMOL/L (ref 8–16)
BACTERIA #/AREA URNS HPF: ABNORMAL /HPF
BILIRUB UR QL STRIP: NEGATIVE
BUN SERPL-MCNC: 25 MG/DL (ref 6–20)
CALCIUM SERPL-MCNC: 9.6 MG/DL (ref 8.7–10.5)
CHLORIDE SERPL-SCNC: 104 MMOL/L (ref 95–110)
CLARITY UR: CLEAR
CO2 SERPL-SCNC: 26 MMOL/L (ref 23–29)
COLOR UR: YELLOW
CREAT SERPL-MCNC: 2.1 MG/DL (ref 0.5–1.4)
CREAT UR-MCNC: 238 MG/DL (ref 23–375)
EST. GFR  (NO RACE VARIABLE): 37 ML/MIN/1.73 M^2
GLUCOSE SERPL-MCNC: 102 MG/DL (ref 70–110)
GLUCOSE UR QL STRIP: NEGATIVE
GRAN CASTS #/AREA URNS LPF: 1 /LPF
HGB UR QL STRIP: NEGATIVE
HYALINE CASTS #/AREA URNS LPF: 1 /LPF
KETONES UR QL STRIP: NEGATIVE
LEUKOCYTE ESTERASE UR QL STRIP: ABNORMAL
MICROSCOPIC COMMENT: ABNORMAL
NITRITE UR QL STRIP: NEGATIVE
OTHER ELEMENTS URNS MICRO: ABNORMAL
PH UR STRIP: 6 [PH] (ref 5–8)
PHOSPHATE SERPL-MCNC: 3 MG/DL (ref 2.7–4.5)
POTASSIUM SERPL-SCNC: 4 MMOL/L (ref 3.5–5.1)
PROT UR QL STRIP: ABNORMAL
PROT UR-MCNC: 130 MG/DL (ref 0–15)
PROT/CREAT UR: 0.55 MG/G{CREAT} (ref 0–0.2)
RBC #/AREA URNS HPF: 1 /HPF (ref 0–4)
SODIUM SERPL-SCNC: 141 MMOL/L (ref 136–145)
SP GR UR STRIP: 1.02 (ref 1–1.03)
SQUAMOUS #/AREA URNS HPF: 0 /HPF
URN SPEC COLLECT METH UR: ABNORMAL
WBC #/AREA URNS HPF: 3 /HPF (ref 0–5)

## 2024-07-30 PROCEDURE — 81000 URINALYSIS NONAUTO W/SCOPE: CPT | Performed by: INTERNAL MEDICINE

## 2024-07-30 PROCEDURE — 80069 RENAL FUNCTION PANEL: CPT | Performed by: INTERNAL MEDICINE

## 2024-07-30 PROCEDURE — 82570 ASSAY OF URINE CREATININE: CPT | Performed by: INTERNAL MEDICINE

## 2024-07-30 PROCEDURE — 36415 COLL VENOUS BLD VENIPUNCTURE: CPT | Performed by: INTERNAL MEDICINE

## 2024-07-30 NOTE — TELEPHONE ENCOUNTER
Called patient regarding labs he missed for his appointment. No answer. Patient will need to reschedule appt with Dr. Anaya as there are not current labs.

## 2024-08-01 ENCOUNTER — OFFICE VISIT (OUTPATIENT)
Dept: CARDIOLOGY | Facility: CLINIC | Age: 54
End: 2024-08-01
Payer: MEDICAID

## 2024-08-01 VITALS
WEIGHT: 227.88 LBS | HEART RATE: 68 BPM | HEIGHT: 75 IN | BODY MASS INDEX: 28.33 KG/M2 | DIASTOLIC BLOOD PRESSURE: 90 MMHG | SYSTOLIC BLOOD PRESSURE: 140 MMHG

## 2024-08-01 DIAGNOSIS — I50.43 ACUTE ON CHRONIC COMBINED SYSTOLIC (CONGESTIVE) AND DIASTOLIC (CONGESTIVE) HEART FAILURE: Primary | ICD-10-CM

## 2024-08-01 PROCEDURE — 3077F SYST BP >= 140 MM HG: CPT | Mod: CPTII,,, | Performed by: PHYSICIAN ASSISTANT

## 2024-08-01 PROCEDURE — 3080F DIAST BP >= 90 MM HG: CPT | Mod: CPTII,,, | Performed by: PHYSICIAN ASSISTANT

## 2024-08-01 PROCEDURE — 4010F ACE/ARB THERAPY RXD/TAKEN: CPT | Mod: CPTII,,, | Performed by: PHYSICIAN ASSISTANT

## 2024-08-01 PROCEDURE — 99213 OFFICE O/P EST LOW 20 MIN: CPT | Mod: PBBFAC | Performed by: PHYSICIAN ASSISTANT

## 2024-08-01 PROCEDURE — 99214 OFFICE O/P EST MOD 30 MIN: CPT | Mod: S$PBB,,, | Performed by: PHYSICIAN ASSISTANT

## 2024-08-01 PROCEDURE — 3008F BODY MASS INDEX DOCD: CPT | Mod: CPTII,,, | Performed by: PHYSICIAN ASSISTANT

## 2024-08-01 PROCEDURE — 3044F HG A1C LEVEL LT 7.0%: CPT | Mod: CPTII,,, | Performed by: PHYSICIAN ASSISTANT

## 2024-08-01 PROCEDURE — 1159F MED LIST DOCD IN RCRD: CPT | Mod: CPTII,,, | Performed by: PHYSICIAN ASSISTANT

## 2024-08-01 PROCEDURE — 1160F RVW MEDS BY RX/DR IN RCRD: CPT | Mod: CPTII,,, | Performed by: PHYSICIAN ASSISTANT

## 2024-08-01 PROCEDURE — 99999 PR PBB SHADOW E&M-EST. PATIENT-LVL III: CPT | Mod: PBBFAC,,, | Performed by: PHYSICIAN ASSISTANT

## 2024-08-01 NOTE — PROGRESS NOTES
HF TCC Provider Note (Follow-up) Consult Note      HPI:  53 yoM here for arrhythmia management. AFL/RVR noted 5/14/24 with associated drop in EF to 15-20%. MEGAN/CV performed 5/17/24. EF 6/1/24 in SR 30-35%. He had EF 45-50% in SR in prior years. He was very symptomatic with his AFL.      Echo 6/23 in SR:  ·The left ventricle is mildly enlarged with moderate concentric hypertrophy and mildly decreased systolic function.  ·Severe left atrial enlargement.  ·The estimated ejection fraction is 45%.  ·Grade II left ventricular diastolic dysfunction.  ·There is left ventricular global hypokinesis.  ·Normal right ventricular size with normal right ventricular systolic function.  ·There is mild aortic valve stenosis.  ·Aortic valve area is 1.78 cm2; peak velocity is 2.11 m/s; mean gradient is 13 mmHg.  ·Mild-to-moderate mitral regurgitation.  ·Mild to moderate tricuspid regurgitation.  ·There is mild pulmonary hypertension.  ·Small posterior pericardial effusion.     Since last visit a month ago saw EP, scheduled for RFA in September     No sOB, has been taking all meds now     No PND     PHYSICAL:   Vitals:    08/01/24 0954   BP: (!) 140/90   Pulse: 68          JVD: no,    Heart rhythm: irregular  Cardiac murmur: No    S3: no  S4: no  Lungs: clear  Liver span: 10 cm:   Hepatojugular reflux: no  Edema: no,       ASSESSMENT: chronic systolic HF    PLAN:      Patient Instructions:   Instruct the patient to notify this clinic if HH, a physician or an advanced care provider wants to change medication one of their HF medications   Activity and Diet restrictions:   Recommend 2-3 gram sodium restriction and 1500cc- 2000cc fluid restriction.  Encourage physical activity with graded exercise program.  Requested patient to weigh themselves daily, and to notify us if their weight increases by more than 3 lbs in 1 day or 5 lbs in 3 days.    Assigned dry weight on home scale: 227 lb  Medication changes (include current dose and changed  dose)  Euvolemic on exam today  Continue GDMT  Would like to try and stop PPI, educated on low acidic diet  RTC 3 months  CHF education done

## 2024-08-09 ENCOUNTER — PATIENT MESSAGE (OUTPATIENT)
Dept: ELECTROPHYSIOLOGY | Facility: CLINIC | Age: 54
End: 2024-08-09
Payer: MEDICAID

## 2024-08-19 PROBLEM — J69.0 ASPIRATION PNEUMONIA OF RIGHT UPPER LOBE DUE TO GASTRIC SECRETIONS: Status: RESOLVED | Noted: 2024-05-16 | Resolved: 2024-08-19

## 2024-08-20 ENCOUNTER — PATIENT MESSAGE (OUTPATIENT)
Dept: ADMINISTRATIVE | Facility: HOSPITAL | Age: 54
End: 2024-08-20
Payer: MEDICAID

## 2024-08-26 ENCOUNTER — PATIENT OUTREACH (OUTPATIENT)
Dept: ADMINISTRATIVE | Facility: HOSPITAL | Age: 54
End: 2024-08-26
Payer: MEDICAID

## 2024-09-03 ENCOUNTER — PATIENT OUTREACH (OUTPATIENT)
Dept: ADMINISTRATIVE | Facility: HOSPITAL | Age: 54
End: 2024-09-03
Payer: MEDICAID

## 2024-09-03 NOTE — PROGRESS NOTES
DM LABS: per chart review pt is overdue for LIPID/MICROALBUMIN I linked to lab appt scheduled 9.5.24, spoke to pt he will FAST prior to labs since lipid is due, will send message to Dr Maravilla to see if any addl labs will be needed.

## 2024-09-03 NOTE — Clinical Note
Mary Maravilla,   This pt is scheduled to see you 10.17.24 but has a Fasting lab appt 9.5.24 ONLH, can you view his lab orders and add any add'l labs he may need, I have already placed lipid and microalbumin.  Thank you! Parul ORTEGA, [facility] Care Coordination Department [facility]

## 2024-09-05 ENCOUNTER — LAB VISIT (OUTPATIENT)
Dept: LAB | Facility: HOSPITAL | Age: 54
End: 2024-09-05
Attending: INTERNAL MEDICINE
Payer: MEDICAID

## 2024-09-05 DIAGNOSIS — E11.9 TYPE 2 DIABETES MELLITUS WITHOUT COMPLICATION: ICD-10-CM

## 2024-09-05 DIAGNOSIS — I48.92 PAROXYSMAL ATRIAL FLUTTER: ICD-10-CM

## 2024-09-05 LAB
ANION GAP SERPL CALC-SCNC: 9 MMOL/L (ref 8–16)
APTT PPP: 30.2 SEC (ref 21–32)
BUN SERPL-MCNC: 26 MG/DL (ref 6–20)
CALCIUM SERPL-MCNC: 9.3 MG/DL (ref 8.7–10.5)
CHLORIDE SERPL-SCNC: 107 MMOL/L (ref 95–110)
CHOLEST SERPL-MCNC: 192 MG/DL (ref 120–199)
CHOLEST/HDLC SERPL: 3.6 {RATIO} (ref 2–5)
CO2 SERPL-SCNC: 24 MMOL/L (ref 23–29)
CREAT SERPL-MCNC: 1.9 MG/DL (ref 0.5–1.4)
ERYTHROCYTE [DISTWIDTH] IN BLOOD BY AUTOMATED COUNT: 13.3 % (ref 11.5–14.5)
EST. GFR  (NO RACE VARIABLE): 41.9 ML/MIN/1.73 M^2
GLUCOSE SERPL-MCNC: 96 MG/DL (ref 70–110)
HCT VFR BLD AUTO: 45.4 % (ref 40–54)
HDLC SERPL-MCNC: 53 MG/DL (ref 40–75)
HDLC SERPL: 27.6 % (ref 20–50)
HGB BLD-MCNC: 14.4 G/DL (ref 14–18)
INR PPP: 1 (ref 0.8–1.2)
LDLC SERPL CALC-MCNC: 108.2 MG/DL (ref 63–159)
MCH RBC QN AUTO: 30.1 PG (ref 27–31)
MCHC RBC AUTO-ENTMCNC: 31.7 G/DL (ref 32–36)
MCV RBC AUTO: 95 FL (ref 82–98)
NONHDLC SERPL-MCNC: 139 MG/DL
PLATELET # BLD AUTO: 165 K/UL (ref 150–450)
PMV BLD AUTO: 11.1 FL (ref 9.2–12.9)
POTASSIUM SERPL-SCNC: 4.6 MMOL/L (ref 3.5–5.1)
PROTHROMBIN TIME: 10.9 SEC (ref 9–12.5)
RBC # BLD AUTO: 4.78 M/UL (ref 4.6–6.2)
SODIUM SERPL-SCNC: 140 MMOL/L (ref 136–145)
TRIGL SERPL-MCNC: 154 MG/DL (ref 30–150)
WBC # BLD AUTO: 5.75 K/UL (ref 3.9–12.7)

## 2024-09-05 PROCEDURE — 85027 COMPLETE CBC AUTOMATED: CPT | Performed by: INTERNAL MEDICINE

## 2024-09-05 PROCEDURE — 36415 COLL VENOUS BLD VENIPUNCTURE: CPT | Performed by: INTERNAL MEDICINE

## 2024-09-05 PROCEDURE — 85730 THROMBOPLASTIN TIME PARTIAL: CPT | Performed by: INTERNAL MEDICINE

## 2024-09-05 PROCEDURE — 82570 ASSAY OF URINE CREATININE: CPT | Performed by: FAMILY MEDICINE

## 2024-09-05 PROCEDURE — 80061 LIPID PANEL: CPT | Performed by: FAMILY MEDICINE

## 2024-09-05 PROCEDURE — 80048 BASIC METABOLIC PNL TOTAL CA: CPT | Performed by: INTERNAL MEDICINE

## 2024-09-05 PROCEDURE — 82043 UR ALBUMIN QUANTITATIVE: CPT | Performed by: FAMILY MEDICINE

## 2024-09-05 PROCEDURE — 85610 PROTHROMBIN TIME: CPT | Performed by: INTERNAL MEDICINE

## 2024-09-06 LAB
ALBUMIN/CREAT UR: 88.6 UG/MG (ref 0–30)
CREAT UR-MCNC: 175 MG/DL (ref 23–375)
MICROALBUMIN UR DL<=1MG/L-MCNC: 155 UG/ML

## 2024-09-07 DIAGNOSIS — I50.43 ACUTE ON CHRONIC COMBINED SYSTOLIC (CONGESTIVE) AND DIASTOLIC (CONGESTIVE) HEART FAILURE: ICD-10-CM

## 2024-09-09 RX ORDER — SACUBITRIL AND VALSARTAN 24; 26 MG/1; MG/1
1 TABLET, FILM COATED ORAL 2 TIMES DAILY
Qty: 60 TABLET | Refills: 4 | Status: SHIPPED | OUTPATIENT
Start: 2024-09-09

## 2024-09-10 ENCOUNTER — TELEPHONE (OUTPATIENT)
Dept: ELECTROPHYSIOLOGY | Facility: CLINIC | Age: 54
End: 2024-09-10
Payer: MEDICAID

## 2024-09-10 NOTE — TELEPHONE ENCOUNTER
Spoke to patient    CONFIRMED procedure arrival time of 7am    Reiterated instructions including:  -Directions to check in desk  -NPO after midnight night prior to procedure  -High importance of HOLDING Eliquis on the morning of the procedure.  -Confirmed compliance of holding Amiodarone for 4 days prior to procedure. Last dose 9/7.  -Pre-procedure LABS completed and reviewed.  -Confirmed absence or presence of implanted device/stimulator no devices or stimulators.  -Confirmed no fever, cough, or shortness of breath in the past 30 days  -Confirmed no redness, rash, irritation, or yeast infection to groin area.   - Confirmed patient not taking any GLP-1 medications.     Patient verbalized understanding of above and appreciated the call.

## 2024-09-11 NOTE — HPI
53 year old with Hx of Hypertension, Diabetes and  AFL/RVR noted 5/14/24 with associated drop in EF to 15-20%. MEGAN/CV performed 5/17/24. EF 6/1/24 in SR 30-35%. He had EF 45-50% in SR in prior years. He was very symptomatic with his AFL. He was last seen in clinic with Dr. Phelan and presents today for AFL Ablation    Presenting EKG:   CHADS-VASc: 3  Anticoagulants: Eliquis 5 mg, BID  Antiarrhythmics: Carvedilol   LV EF: 30-35%  Pertinent labs:
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
## 2024-09-11 NOTE — ASSESSMENT & PLAN NOTE
- RFA-AFL  - MEGAN, cancel if NSR   - PPI x 30 days post-procedure    Anticoagulation: apixiban  EF (most recent): 30-35%  AAD/AVN agents: Carvedilol    The risks, benefits and alternatives of the procedure were explained to the patient, patient's family and/or surrogate decision maker. Risks include (but not limited to) bleeding, hematoma, infection, pain, vascular damage, damage to structures surrounding the vasculature, myocardial damage [perforation, valvular damage], cardiac tamponade, phrenic nerve damage, pulmonary vein stenosis, atrioesophageal (AE) fistula, CVA, MI, and death. Patient is understanding that repeat ablations may be required. All questions were answered. Patient is understanding of these risks, and would like to proceed. Consents signed.

## 2024-09-12 ENCOUNTER — TELEPHONE (OUTPATIENT)
Dept: FAMILY MEDICINE | Facility: CLINIC | Age: 54
End: 2024-09-12
Payer: MEDICAID

## 2024-09-12 PROCEDURE — 99499 UNLISTED E&M SERVICE: CPT | Mod: ,,, | Performed by: INTERNAL MEDICINE
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
## 2024-09-12 NOTE — TELEPHONE ENCOUNTER
----- Message from Dasia Fallon sent at 9/12/2024  8:00 AM CDT -----  Contact: NATHANIEL MENDOZA [[id number]]  ..Type:  Patient Requesting Call    Who Called: NATHANIEL MENDOZA [[id number]]  Does the patient know what this is regarding?: pt have questions regarding disability paperwork  Would the patient rather a call back or a response via [facility]?  call  Best Call Back Number: .[phone] (home)   Additional Information:

## 2024-09-12 NOTE — TELEPHONE ENCOUNTER
Patient called in stating he wanted to schedule to be seen to have his disability paperwork filled out. Patient was scheduled on 10/7/24 at 1:40 pm. He verbally understood the information given.

## 2024-09-13 ENCOUNTER — TELEPHONE (OUTPATIENT)
Dept: ELECTROPHYSIOLOGY | Facility: CLINIC | Age: 54
End: 2024-09-13
Payer: MEDICAID

## 2024-09-13 NOTE — TELEPHONE ENCOUNTER
Called patient 3 times this morning to see if he could come in Monday for his procedure. Patient did not answer. I left a message all 3 times with my call back number.

## 2024-09-13 NOTE — TELEPHONE ENCOUNTER
Called patient again to see if he could come in on Monday for his procedure. Patient did not answer. I left a message with my call back number.

## 2024-09-16 ENCOUNTER — TELEPHONE (OUTPATIENT)
Dept: ELECTROPHYSIOLOGY | Facility: CLINIC | Age: 54
End: 2024-09-16
Payer: MEDICAID

## 2024-09-16 NOTE — TELEPHONE ENCOUNTER
Called patient to get his procedure rescheduled. Patient did not answer. I left a message with my call back number.

## 2024-09-17 ENCOUNTER — TELEPHONE (OUTPATIENT)
Dept: ELECTROPHYSIOLOGY | Facility: CLINIC | Age: 54
End: 2024-09-17
Payer: MEDICAID

## 2024-09-17 NOTE — TELEPHONE ENCOUNTER
Called patient to reschedule his ablation procedure from 9/12/24. Patient states that he does not have transportation right now to get here. He states that as soon as he gets transportation, he will call me back to reschedule. I provided my phone number to the patient to call me when he is ready. Patient verbalized understanding.

## 2024-09-22 ENCOUNTER — HOSPITAL ENCOUNTER (EMERGENCY)
Facility: HOSPITAL | Age: 54
Discharge: HOME | End: 2024-09-23
Attending: EMERGENCY MEDICINE
Payer: MEDICAID

## 2024-09-22 DIAGNOSIS — N18.9 CHRONIC KIDNEY DISEASE, UNSPECIFIED CKD STAGE: ICD-10-CM

## 2024-09-22 DIAGNOSIS — I10 ESSENTIAL HYPERTENSION: Primary | ICD-10-CM

## 2024-09-22 DIAGNOSIS — R42 DIZZINESS: ICD-10-CM

## 2024-09-22 PROCEDURE — 99285 EMERGENCY DEPT VISIT HI MDM: CPT | Mod: 25

## 2024-09-22 RX ORDER — CLONIDINE HYDROCHLORIDE 0.1 MG/1
0.1 TABLET ORAL
Status: COMPLETED | OUTPATIENT
Start: 2024-09-22 | End: 2024-09-23

## 2024-09-23 VITALS
HEIGHT: 75 IN | SYSTOLIC BLOOD PRESSURE: 165 MMHG | RESPIRATION RATE: 18 BRPM | DIASTOLIC BLOOD PRESSURE: 91 MMHG | HEART RATE: 69 BPM | WEIGHT: 242.94 LBS | OXYGEN SATURATION: 97 % | BODY MASS INDEX: 30.21 KG/M2 | TEMPERATURE: 98 F

## 2024-09-23 LAB
ALBUMIN SERPL BCP-MCNC: 3.8 G/DL (ref 3.5–5.2)
ALP SERPL-CCNC: 109 U/L (ref 55–135)
ALT SERPL W/O P-5'-P-CCNC: 24 U/L (ref 10–44)
ANION GAP SERPL CALC-SCNC: 11 MMOL/L (ref 8–16)
AST SERPL-CCNC: 25 U/L (ref 10–40)
BACTERIA #/AREA URNS HPF: NORMAL /HPF
BASOPHILS # BLD AUTO: 0.04 K/UL (ref 0–0.2)
BASOPHILS NFR BLD: 0.8 % (ref 0–1.9)
BILIRUB SERPL-MCNC: 1.2 MG/DL (ref 0.1–1)
BILIRUB UR QL STRIP: NEGATIVE
BUN SERPL-MCNC: 20 MG/DL (ref 6–20)
CALCIUM SERPL-MCNC: 9.3 MG/DL (ref 8.7–10.5)
CHLORIDE SERPL-SCNC: 108 MMOL/L (ref 95–110)
CLARITY UR: CLEAR
CO2 SERPL-SCNC: 23 MMOL/L (ref 23–29)
COLOR UR: YELLOW
CREAT SERPL-MCNC: 2 MG/DL (ref 0.5–1.4)
DIFFERENTIAL METHOD BLD: NORMAL
EOSINOPHIL # BLD AUTO: 0.2 K/UL (ref 0–0.5)
EOSINOPHIL NFR BLD: 4.9 % (ref 0–8)
ERYTHROCYTE [DISTWIDTH] IN BLOOD BY AUTOMATED COUNT: 12.8 % (ref 11.5–14.5)
EST. GFR  (NO RACE VARIABLE): 39 ML/MIN/1.73 M^2
GLUCOSE SERPL-MCNC: 88 MG/DL (ref 70–110)
GLUCOSE UR QL STRIP: ABNORMAL
HCT VFR BLD AUTO: 43.4 % (ref 40–54)
HGB BLD-MCNC: 14.4 G/DL (ref 14–18)
HGB UR QL STRIP: NEGATIVE
HYALINE CASTS #/AREA URNS LPF: 0 /LPF
IMM GRANULOCYTES # BLD AUTO: 0.02 K/UL (ref 0–0.04)
IMM GRANULOCYTES NFR BLD AUTO: 0.4 % (ref 0–0.5)
KETONES UR QL STRIP: NEGATIVE
LEUKOCYTE ESTERASE UR QL STRIP: NEGATIVE
LYMPHOCYTES # BLD AUTO: 1.6 K/UL (ref 1–4.8)
LYMPHOCYTES NFR BLD: 32.5 % (ref 18–48)
MCH RBC QN AUTO: 30.7 PG (ref 27–31)
MCHC RBC AUTO-ENTMCNC: 33.2 G/DL (ref 32–36)
MCV RBC AUTO: 93 FL (ref 82–98)
MICROSCOPIC COMMENT: NORMAL
MONOCYTES # BLD AUTO: 0.3 K/UL (ref 0.3–1)
MONOCYTES NFR BLD: 6.1 % (ref 4–15)
NEUTROPHILS # BLD AUTO: 2.7 K/UL (ref 1.8–7.7)
NEUTROPHILS NFR BLD: 55.3 % (ref 38–73)
NITRITE UR QL STRIP: NEGATIVE
NRBC BLD-RTO: 0 /100 WBC
OHS QRS DURATION: 106 MS
OHS QTC CALCULATION: 488 MS
PH UR STRIP: 7 [PH] (ref 5–8)
PLATELET # BLD AUTO: 160 K/UL (ref 150–450)
PMV BLD AUTO: 10.2 FL (ref 9.2–12.9)
POTASSIUM SERPL-SCNC: 3.8 MMOL/L (ref 3.5–5.1)
PROT SERPL-MCNC: 7 G/DL (ref 6–8.4)
PROT UR QL STRIP: ABNORMAL
RBC # BLD AUTO: 4.69 M/UL (ref 4.6–6.2)
RBC #/AREA URNS HPF: 2 /HPF (ref 0–4)
SODIUM SERPL-SCNC: 142 MMOL/L (ref 136–145)
SP GR UR STRIP: 1.02 (ref 1–1.03)
TROPONIN I SERPL DL<=0.01 NG/ML-MCNC: 0.04 NG/ML (ref 0–0.03)
URN SPEC COLLECT METH UR: ABNORMAL
UROBILINOGEN UR STRIP-ACNC: NEGATIVE EU/DL
WBC # BLD AUTO: 4.89 K/UL (ref 3.9–12.7)
WBC #/AREA URNS HPF: 1 /HPF (ref 0–5)
YEAST URNS QL MICRO: NORMAL

## 2024-09-23 PROCEDURE — 93005 ELECTROCARDIOGRAM TRACING: CPT

## 2024-09-23 PROCEDURE — 80053 COMPREHEN METABOLIC PANEL: CPT | Performed by: NURSE PRACTITIONER

## 2024-09-23 PROCEDURE — 63600175 PHARM REV CODE 636 W HCPCS: Performed by: EMERGENCY MEDICINE

## 2024-09-23 PROCEDURE — 85025 COMPLETE CBC W/AUTO DIFF WBC: CPT | Performed by: NURSE PRACTITIONER

## 2024-09-23 PROCEDURE — 96374 THER/PROPH/DIAG INJ IV PUSH: CPT

## 2024-09-23 PROCEDURE — 81000 URINALYSIS NONAUTO W/SCOPE: CPT | Performed by: NURSE PRACTITIONER

## 2024-09-23 PROCEDURE — 84484 ASSAY OF TROPONIN QUANT: CPT | Performed by: NURSE PRACTITIONER

## 2024-09-23 PROCEDURE — 25000003 PHARM REV CODE 250: Performed by: NURSE PRACTITIONER

## 2024-09-23 PROCEDURE — 93010 ELECTROCARDIOGRAM REPORT: CPT | Mod: ,,, | Performed by: INTERNAL MEDICINE

## 2024-09-23 RX ORDER — HYDRALAZINE HYDROCHLORIDE 25 MG/1
25 TABLET, FILM COATED ORAL EVERY 8 HOURS PRN
Qty: 20 TABLET | Refills: 0 | Status: SHIPPED | OUTPATIENT
Start: 2024-09-23

## 2024-09-23 RX ORDER — HYDRALAZINE HYDROCHLORIDE 20 MG/ML
5 INJECTION INTRAMUSCULAR; INTRAVENOUS
Status: COMPLETED | OUTPATIENT
Start: 2024-09-23 | End: 2024-09-23

## 2024-09-23 RX ADMIN — CLONIDINE HYDROCHLORIDE 0.1 MG: 0.1 TABLET ORAL at 12:09

## 2024-09-23 RX ADMIN — HYDRALAZINE HYDROCHLORIDE 5 MG: 20 INJECTION, SOLUTION INTRAMUSCULAR; INTRAVENOUS at 01:09
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
## 2024-09-23 NOTE — FIRST PROVIDER EVALUATION
"Medical screening examination initiated.  I have conducted a focused provider triage encounter, findings are as follows:    Brief history of present illness:  Patient complains of dizziness and elevated blood pressure states he has been taking his medication as prescribed    Vitals:    09/22/24 2318   BP: (!) 184/101   Pulse: 76   Resp: 18   Temp: 98.2 °F (36.8 °C)   TempSrc: Oral   SpO2: 97%   Weight: 110.2 kg (242 lb 15.2 oz)   Height: 6' 3" (1.905 m)       Pertinent physical exam:  NAD in triage    Brief workup plan:  Labs    Preliminary workup initiated; this workup will be continued and followed by the physician or advanced practice provider that is assigned to the patient when roomed.  "

## 2024-09-30 ENCOUNTER — OFFICE VISIT (OUTPATIENT)
Dept: CARDIOLOGY | Facility: CLINIC | Age: 54
End: 2024-09-30
Payer: MEDICAID

## 2024-09-30 VITALS
HEART RATE: 88 BPM | SYSTOLIC BLOOD PRESSURE: 134 MMHG | BODY MASS INDEX: 29.06 KG/M2 | HEIGHT: 75 IN | WEIGHT: 233.69 LBS | DIASTOLIC BLOOD PRESSURE: 100 MMHG

## 2024-09-30 DIAGNOSIS — I50.43 ACUTE ON CHRONIC COMBINED SYSTOLIC (CONGESTIVE) AND DIASTOLIC (CONGESTIVE) HEART FAILURE: ICD-10-CM

## 2024-09-30 DIAGNOSIS — I10 ESSENTIAL HYPERTENSION: Primary | ICD-10-CM

## 2024-09-30 PROCEDURE — 99213 OFFICE O/P EST LOW 20 MIN: CPT | Mod: PBBFAC | Performed by: PHYSICIAN ASSISTANT

## 2024-09-30 PROCEDURE — 3008F BODY MASS INDEX DOCD: CPT | Mod: CPTII,,, | Performed by: PHYSICIAN ASSISTANT

## 2024-09-30 PROCEDURE — 3060F POS MICROALBUMINURIA REV: CPT | Mod: CPTII,,, | Performed by: PHYSICIAN ASSISTANT

## 2024-09-30 PROCEDURE — 3080F DIAST BP >= 90 MM HG: CPT | Mod: CPTII,,, | Performed by: PHYSICIAN ASSISTANT

## 2024-09-30 PROCEDURE — 3044F HG A1C LEVEL LT 7.0%: CPT | Mod: CPTII,,, | Performed by: PHYSICIAN ASSISTANT

## 2024-09-30 PROCEDURE — 99214 OFFICE O/P EST MOD 30 MIN: CPT | Mod: S$PBB,,, | Performed by: PHYSICIAN ASSISTANT

## 2024-09-30 PROCEDURE — 3075F SYST BP GE 130 - 139MM HG: CPT | Mod: CPTII,,, | Performed by: PHYSICIAN ASSISTANT

## 2024-09-30 PROCEDURE — 3066F NEPHROPATHY DOC TX: CPT | Mod: CPTII,,, | Performed by: PHYSICIAN ASSISTANT

## 2024-09-30 PROCEDURE — 99999 PR PBB SHADOW E&M-EST. PATIENT-LVL III: CPT | Mod: PBBFAC,,, | Performed by: PHYSICIAN ASSISTANT

## 2024-09-30 PROCEDURE — 4010F ACE/ARB THERAPY RXD/TAKEN: CPT | Mod: CPTII,,, | Performed by: PHYSICIAN ASSISTANT

## 2024-09-30 RX ORDER — SACUBITRIL AND VALSARTAN 24; 26 MG/1; MG/1
1 TABLET, FILM COATED ORAL 2 TIMES DAILY
Qty: 60 TABLET | Refills: 4 | Status: CANCELLED | OUTPATIENT
Start: 2024-09-30

## 2024-09-30 RX ORDER — SACUBITRIL AND VALSARTAN 49; 51 MG/1; MG/1
1 TABLET, FILM COATED ORAL 2 TIMES DAILY
Qty: 60 TABLET | Refills: 3 | Status: SHIPPED | OUTPATIENT
Start: 2024-09-30

## 2024-09-30 RX ORDER — AMIODARONE HYDROCHLORIDE 200 MG/1
200 TABLET ORAL DAILY
Qty: 30 TABLET | Refills: 3 | Status: SHIPPED | OUTPATIENT
Start: 2024-09-30 | End: 2024-12-29

## 2024-09-30 NOTE — PROGRESS NOTES
"[facility] Provider Note (Follow-up) Consult Note      HPI:  History of Present Illness: Bria Saldana is a 53 y.o. male patient with a PMHx of HTN, CHF, and CKD who presents to the Emergency Department for evaluation of HTN flare up which onset gradually over the past 3 days. No mitigating or exacerbating factors reported. Pt reports a "dizzy spell" which has resolved.. Patient denies any SOB, CP, and all other sxs at this time. Pt takes Eliquis at home and monitors his BP routinely. No further complaints or concerns at this time.     HE was given prn hydralazine    Today no SOB  No PND    Missed EP appt for ablation         PHYSICAL:   Vitals:    09/30/24 0946   BP: (!) 134/100   Pulse: 88          JVD: no,    Heart rhythm: regular  Cardiac murmur: No    S3: no  S4: no  Lungs: clear  Liver span: 10 cm:   Hepatojugular reflux: no  Edema: no,       ASSESSMENT: chronic systolic HF    PLAN:      Patient Instructions:   Instruct the patient to notify this clinic if HH, a physician or an advanced care provider wants to change medication one of their HF medications   Activity and Diet restrictions:   Recommend 2-3 gram sodium restriction and 1500cc- 2000cc fluid restriction.  Encourage physical activity with graded exercise program.  Requested patient to weigh themselves daily, and to notify us if their weight increases by more than 3 lbs in 1 day or 5 lbs in 3 days.    Assigned dry weight on home scale: daily weight   Medication changes (include current dose and changed dose)  Increase ARNi to 49-51 BID  Labs in 2 weeks  Would like a ref to nephrology as he had one previously but missed it  RTC 6 weeks              "

## 2024-10-07 ENCOUNTER — OFFICE VISIT (OUTPATIENT)
Dept: FAMILY MEDICINE | Facility: CLINIC | Age: 54
End: 2024-10-07
Attending: FAMILY MEDICINE
Payer: MEDICAID

## 2024-10-07 VITALS
WEIGHT: 240.75 LBS | SYSTOLIC BLOOD PRESSURE: 146 MMHG | HEIGHT: 75 IN | TEMPERATURE: 98 F | BODY MASS INDEX: 29.93 KG/M2 | DIASTOLIC BLOOD PRESSURE: 100 MMHG | RESPIRATION RATE: 18 BRPM | OXYGEN SATURATION: 97 % | HEART RATE: 83 BPM

## 2024-10-07 DIAGNOSIS — G47.33 OSA (OBSTRUCTIVE SLEEP APNEA): ICD-10-CM

## 2024-10-07 DIAGNOSIS — I10 ESSENTIAL HYPERTENSION: Primary | ICD-10-CM

## 2024-10-07 DIAGNOSIS — E66.811 OBESITY (BMI 30.0-34.9): ICD-10-CM

## 2024-10-07 DIAGNOSIS — I50.40 COMBINED SYSTOLIC AND DIASTOLIC CONGESTIVE HEART FAILURE, UNSPECIFIED HF CHRONICITY: ICD-10-CM

## 2024-10-07 PROBLEM — E66.3 OVERWEIGHT (BMI 25.0-29.9): Status: RESOLVED | Noted: 2024-06-18 | Resolved: 2024-10-07

## 2024-10-07 PROCEDURE — 3066F NEPHROPATHY DOC TX: CPT | Mod: CPTII,,, | Performed by: FAMILY MEDICINE

## 2024-10-07 PROCEDURE — 3077F SYST BP >= 140 MM HG: CPT | Mod: CPTII,,, | Performed by: FAMILY MEDICINE

## 2024-10-07 PROCEDURE — 4010F ACE/ARB THERAPY RXD/TAKEN: CPT | Mod: CPTII,,, | Performed by: FAMILY MEDICINE

## 2024-10-07 PROCEDURE — 3060F POS MICROALBUMINURIA REV: CPT | Mod: CPTII,,, | Performed by: FAMILY MEDICINE

## 2024-10-07 PROCEDURE — 3008F BODY MASS INDEX DOCD: CPT | Mod: CPTII,,, | Performed by: FAMILY MEDICINE

## 2024-10-07 PROCEDURE — 99214 OFFICE O/P EST MOD 30 MIN: CPT | Mod: PBBFAC,PO | Performed by: FAMILY MEDICINE

## 2024-10-07 PROCEDURE — 99214 OFFICE O/P EST MOD 30 MIN: CPT | Mod: S$PBB,,, | Performed by: FAMILY MEDICINE

## 2024-10-07 PROCEDURE — 1160F RVW MEDS BY RX/DR IN RCRD: CPT | Mod: CPTII,,, | Performed by: FAMILY MEDICINE

## 2024-10-07 PROCEDURE — 3044F HG A1C LEVEL LT 7.0%: CPT | Mod: CPTII,,, | Performed by: FAMILY MEDICINE

## 2024-10-07 PROCEDURE — 3080F DIAST BP >= 90 MM HG: CPT | Mod: CPTII,,, | Performed by: FAMILY MEDICINE

## 2024-10-07 PROCEDURE — 99999 PR PBB SHADOW E&M-EST. PATIENT-LVL IV: CPT | Mod: PBBFAC,,, | Performed by: FAMILY MEDICINE

## 2024-10-07 PROCEDURE — G2211 COMPLEX E/M VISIT ADD ON: HCPCS | Mod: S$PBB,,, | Performed by: FAMILY MEDICINE

## 2024-10-07 PROCEDURE — 1159F MED LIST DOCD IN RCRD: CPT | Mod: CPTII,,, | Performed by: FAMILY MEDICINE

## 2024-10-07 NOTE — PROGRESS NOTES
"Bria Tapanzena Jared Saldana    Chief Complaint   Patient presents with    Hypertension       History of Present Illness:   Mr. Saldana comes in today to discuss hypertension.  He states he performs home blood pressure checks 2-3 times per day and often gets elevated readings.  He states he feels okay regardless of what his blood pressure readings are.  He states his sister cooks food and does not add salt.  He states he does not exercise.    Per chart he was evaluated at [facility] on September 22, 2024 for dizziness, elevated blood pressure, CKD with workup which included EKG, chest x-ray, labs.  He was given p.o. hydralazine 5 mg in ER and discharged home on hydralazine 25 mg every 8 hours and advised to follow-up with PCP and/or cardiologist.    He saw PETER Shepherd with cardiology on September 30, 2024 for hypertension, CHF at which time she advised he increase Entresto 24-26 mg twice daily to 49-51 mg twice daily, decrease amiodarone 200 mg twice daily to 200 mg daily, consult with Nephrology, and return for recheck in 6 weeks.  So, now he takes Demadex 20 mg-2 pills daily, Coreg 6.25 mg twice daily, hydralazine 25 mg 3 times daily, Entresto 49-51 mg twice daily for hypertension, CHF.    He states he took his morning medications today.    He states "state disability department" referred him to see the department's disability DrTom Which is scheduled for Saturday, October 12, 2024, on [address].    Otherwise, he denies having fever, chills, fatigue, appetite changes; shortness of breath, cough, wheezing; chest pain, palpitations, leg swelling; abdominal pain, nausea, vomiting, diarrhea, constipation; unusual urinary symptoms; polydipsia, polyphagia, polyuria, hot or cold intolerance; back pain; acute visual changes, numbness, headache; anxiety, depression, homicidal or suicidal thoughts.        Labs:                      WBC                      4.89                09/23/2024                 HGB                 "      14.4                09/23/2024                 HCT                      43.4                09/23/2024                 PLT                      160                 09/23/2024                 CHOL                     192                 09/05/2024                 TRIG                     154 (H)             09/05/2024                 HDL                      53                  09/05/2024                 ALT                      24                  09/23/2024                 AST                      25                  09/23/2024                 NA                       142                 09/23/2024                 K                        3.8                 09/23/2024                 CL                       108                 09/23/2024                 CREATININE               2.0 (H)             09/23/2024                 BUN                      20                  09/23/2024                 CO2                      23                  09/23/2024                 TSH                      1.196               10/11/2022                 PSA                      2.10                02/24/2022                 INR                      1.0                 09/05/2024                 HGBA1C                   5.2                 05/14/2024                         LDLCALC                  108.2               09/05/2024                  Current Outpatient Medications   Medication Sig    amiodarone (PACERONE) 200 MG Tab Take 1 tablet (200 mg total) by mouth once daily.    benzonatate (TESSALON) 100 MG capsule Take 1 capsule (100 mg total) by mouth 3 (three) times daily as needed for Cough.    carvediloL (COREG) 6.25 MG tablet Take 1 tablet (6.25 mg total) by mouth 2 (two) times daily.    colchicine (COLCRYS) 0.6 mg tablet Take 1 tablet (0.6 mg total) by mouth once daily.    empagliflozin (JARDIANCE) 10 mg tablet Take 1 tablet (10 mg total) by mouth once daily.    hydrALAZINE (APRESOLINE) 25 MG tablet Take 1 tablet (25  mg total) by mouth every 8 (eight) hours as needed (blood pressure greater than 160/90).    pantoprazole (PROTONIX) 40 MG tablet Take 1 tablet (40 mg total) by mouth once daily.    sacubitriL-valsartan (ENTRESTO) 49-51 mg per tablet Take 1 tablet by mouth 2 (two) times daily.    torsemide (DEMADEX) 20 MG Tab Take 2 tablets (40 mg total) by mouth once daily.       Review of Systems   Constitutional:  Negative for activity change, appetite change, chills, fatigue and fever.   Eyes:  Negative for visual disturbance.   Respiratory:  Negative for cough, shortness of breath and wheezing.    Cardiovascular:  Negative for chest pain, palpitations and leg swelling.        See history of present illness.   Gastrointestinal:  Negative for abdominal pain, constipation, diarrhea, nausea and vomiting.   Endocrine: Negative for cold intolerance, heat intolerance, polydipsia, polyphagia and polyuria.   Genitourinary:  Negative for difficulty urinating.   Musculoskeletal:  Negative for back pain.   Neurological:  Negative for numbness and headaches.   Psychiatric/Behavioral:  Negative for dysphoric mood and suicidal ideas. The patient is not nervous/anxious.         Negative for homicidal ideas.       Objective:  Physical Exam  Vitals reviewed.   Constitutional:       General: He is not in acute distress.     Appearance: Normal appearance. He is not ill-appearing, toxic-appearing or diaphoretic.      Comments: Pleasant.   Cardiovascular:      Rate and Rhythm: Normal rate and regular rhythm.      Pulses: Normal pulses.      Heart sounds: No murmur heard.  Pulmonary:      Effort: Pulmonary effort is normal. No respiratory distress.      Breath sounds: Normal breath sounds. No wheezing.   Abdominal:      General: Bowel sounds are normal. There is no distension.      Palpations: Abdomen is soft. There is no mass.      Tenderness: There is no abdominal tenderness. There is no guarding or rebound.   Musculoskeletal:         General: No  swelling or tenderness. Normal range of motion.      Cervical back: Normal range of motion and neck supple. No tenderness.      Comments: He is ambulatory without problems.   Lymphadenopathy:      Cervical: No cervical adenopathy.   Skin:     General: Skin is warm.   Neurological:      General: No focal deficit present.      Mental Status: He is alert and oriented to person, place, and time.   Psychiatric:         Mood and Affect: Mood normal.         Behavior: Behavior normal.         Thought Content: Thought content normal.         Judgment: Judgment normal.         ASSESSMENT:  1. Essential hypertension    2. Combined systolic and diastolic congestive heart failure, unspecified HF chronicity    3. NATALYA (obstructive sleep apnea)    4. Obesity (BMI 30.0-34.9)        PLAN:  Bria was seen today for hypertension.    Diagnoses and all orders for this visit:    Essential hypertension    Combined systolic and diastolic congestive heart failure, unspecified HF chronicity    NATALYA (obstructive sleep apnea)    Obesity (BMI 30.0-34.9)      No labs today.  Continue current medications, follow low sodium, low cholesterol, low carb diet, daily walks.  Keep follow up with specialists.  Patient declines flu shot today.   Follow up if symptoms worsen or fail to improve. However, keep 10/17/2024 scheduled physical appointment with me.

## 2024-10-28 NOTE — ASSESSMENT & PLAN NOTE
Patient is identified as having Combined Systolic and Diastolic heart failure that is Acute on chronic. CHF is currently uncontrolled due to Continued edema of extremities. Latest ECHO performed and demonstrates- Results for orders placed during the hospital encounter of 05/14/24    Echo    Left Ventricle: Severe global hypokinesis present. There is moderately reduced systolic function with a visually estimated ejection fraction of 30 - 35%.    Left Atrium: Left atrium is severely dilated.    Right Atrium: Right atrium is mildly dilated.    Pericardium: There is a trivial effusion.    . Continue Beta Blocker, Furosemide, and ARNI and monitor clinical status closely. Monitor on telemetry. Patient is on CHF pathway.  Monitor strict Is&Os and daily weights.  Place on fluid restriction of 1.5 L. Cardiology has been consulted. Continue to stress to patient importance of self efficacy and  on diet for CHF. Last BNP reviewed- and noted below   Recent Labs   Lab 05/31/24  1107   BNP 3,402*       -IV lasix 80 mg bid-transitioned to torsemide 40 b.i.d. with orthostasis we will decrease torsemide to q.day  Hypok and hypomag  Replete lytes  Cardiology following  Echocardiogram improved     PAST SURGICAL HISTORY:  No significant past surgical history

## 2024-11-11 ENCOUNTER — OFFICE VISIT (OUTPATIENT)
Dept: CARDIOLOGY | Facility: CLINIC | Age: 54
End: 2024-11-11
Payer: MEDICAID

## 2024-11-11 ENCOUNTER — LAB VISIT (OUTPATIENT)
Dept: LAB | Facility: HOSPITAL | Age: 54
End: 2024-11-11
Attending: PHYSICIAN ASSISTANT
Payer: MEDICAID

## 2024-11-11 VITALS
SYSTOLIC BLOOD PRESSURE: 180 MMHG | WEIGHT: 246.56 LBS | BODY MASS INDEX: 30.66 KG/M2 | HEART RATE: 100 BPM | HEIGHT: 75 IN | DIASTOLIC BLOOD PRESSURE: 100 MMHG

## 2024-11-11 DIAGNOSIS — I50.43 ACUTE ON CHRONIC COMBINED SYSTOLIC (CONGESTIVE) AND DIASTOLIC (CONGESTIVE) HEART FAILURE: ICD-10-CM

## 2024-11-11 DIAGNOSIS — I50.43 ACUTE ON CHRONIC COMBINED SYSTOLIC (CONGESTIVE) AND DIASTOLIC (CONGESTIVE) HEART FAILURE: Primary | ICD-10-CM

## 2024-11-11 LAB
ANION GAP SERPL CALC-SCNC: 9 MMOL/L (ref 8–16)
BUN SERPL-MCNC: 27 MG/DL (ref 6–20)
CALCIUM SERPL-MCNC: 9.3 MG/DL (ref 8.7–10.5)
CHLORIDE SERPL-SCNC: 108 MMOL/L (ref 95–110)
CO2 SERPL-SCNC: 25 MMOL/L (ref 23–29)
CREAT SERPL-MCNC: 2 MG/DL (ref 0.5–1.4)
EST. GFR  (NO RACE VARIABLE): 39 ML/MIN/1.73 M^2
GLUCOSE SERPL-MCNC: 86 MG/DL (ref 70–110)
POTASSIUM SERPL-SCNC: 4.3 MMOL/L (ref 3.5–5.1)
SODIUM SERPL-SCNC: 142 MMOL/L (ref 136–145)

## 2024-11-11 PROCEDURE — 80048 BASIC METABOLIC PNL TOTAL CA: CPT | Performed by: PHYSICIAN ASSISTANT

## 2024-11-11 PROCEDURE — 3044F HG A1C LEVEL LT 7.0%: CPT | Mod: CPTII,,, | Performed by: PHYSICIAN ASSISTANT

## 2024-11-11 PROCEDURE — 3066F NEPHROPATHY DOC TX: CPT | Mod: CPTII,,, | Performed by: PHYSICIAN ASSISTANT

## 2024-11-11 PROCEDURE — 3080F DIAST BP >= 90 MM HG: CPT | Mod: CPTII,,, | Performed by: PHYSICIAN ASSISTANT

## 2024-11-11 PROCEDURE — 99214 OFFICE O/P EST MOD 30 MIN: CPT | Mod: S$PBB,,, | Performed by: PHYSICIAN ASSISTANT

## 2024-11-11 PROCEDURE — 3077F SYST BP >= 140 MM HG: CPT | Mod: CPTII,,, | Performed by: PHYSICIAN ASSISTANT

## 2024-11-11 PROCEDURE — 36415 COLL VENOUS BLD VENIPUNCTURE: CPT | Performed by: PHYSICIAN ASSISTANT

## 2024-11-11 PROCEDURE — 99212 OFFICE O/P EST SF 10 MIN: CPT | Mod: PBBFAC | Performed by: PHYSICIAN ASSISTANT

## 2024-11-11 PROCEDURE — 99999 PR PBB SHADOW E&M-EST. PATIENT-LVL II: CPT | Mod: PBBFAC,,, | Performed by: PHYSICIAN ASSISTANT

## 2024-11-11 PROCEDURE — 4010F ACE/ARB THERAPY RXD/TAKEN: CPT | Mod: CPTII,,, | Performed by: PHYSICIAN ASSISTANT

## 2024-11-11 PROCEDURE — 3008F BODY MASS INDEX DOCD: CPT | Mod: CPTII,,, | Performed by: PHYSICIAN ASSISTANT

## 2024-11-11 PROCEDURE — 3060F POS MICROALBUMINURIA REV: CPT | Mod: CPTII,,, | Performed by: PHYSICIAN ASSISTANT

## 2024-11-11 RX ORDER — SACUBITRIL AND VALSARTAN 97; 103 MG/1; MG/1
1 TABLET, FILM COATED ORAL 2 TIMES DAILY
Qty: 60 TABLET | Refills: 3 | Status: SHIPPED | OUTPATIENT
Start: 2024-11-11 | End: 2024-11-12

## 2024-11-11 RX ORDER — SACUBITRIL AND VALSARTAN 97; 103 MG/1; MG/1
1 TABLET, FILM COATED ORAL 2 TIMES DAILY
Qty: 60 TABLET | Refills: 3 | Status: SHIPPED | OUTPATIENT
Start: 2024-11-11 | End: 2024-11-11

## 2024-11-11 NOTE — PROGRESS NOTES
"[facility] Provider Note (Follow-up) Consult Note      HPI: History of Present Illness: Bria Saldana is a 53 y.o. male patient with a PMHx of HTN, CHF, and CKD who presents to the Emergency Department for evaluation of HTN flare up which onset gradually over the past 3 days. No mitigating or exacerbating factors reported. Pt reports a "dizzy spell" which has resolved.. Patient denies any SOB, CP, and all other sxs at this time. Pt takes Eliquis at home and monitors his BP routinely. No further complaints or concerns at this time.      He was given prn hydralazine       Missed EP appt for ablation    HEre for 6 week follow up--last visit referred to renal has not seen yet. ARNI increase to mid dose BID, he states he was actually on mid dose. BP rmeains elevated    No SOB    Main issue back pain       PHYSICAL:   Vitals:    11/11/24 0857   BP: (!) 180/100   Pulse: 100          JVD: no,    Heart rhythm: regular  Cardiac murmur: No    S3: no  S4: no  Lungs: clear  Liver span: 10 cm:   Hepatojugular reflux: no  Edema: no,       ASSESSMENT: chronic systolic HF    PLAN:      Patient Instructions:   Instruct the patient to notify this clinic if HH, a physician or an advanced care provider wants to change medication one of their HF medications   Activity and Diet restrictions:   Recommend 2-3 gram sodium restriction and 1500cc- 2000cc fluid restriction.  Encourage physical activity with graded exercise program.  Requested patient to weigh themselves daily, and to notify us if their weight increases by more than 3 lbs in 1 day or 5 lbs in 3 days.    Assigned dry weight on home scale: 111 kg  Medication changes (include current dose and changed dose)  Increase ARNi to  BID  CHF education done  Torsemide daily  Exercise  To reschedule renal and EP appointments  [facility] today  RTC 3 months            "

## 2024-11-12 RX ORDER — SACUBITRIL AND VALSARTAN 97; 103 MG/1; MG/1
1 TABLET, FILM COATED ORAL 2 TIMES DAILY
Qty: 60 TABLET | Refills: 3 | Status: SHIPPED | OUTPATIENT
Start: 2024-11-12

## 2024-11-20 DIAGNOSIS — E11.9 TYPE 2 DIABETES MELLITUS WITHOUT COMPLICATION: ICD-10-CM

## 2024-12-15 ENCOUNTER — HOSPITAL ENCOUNTER (EMERGENCY)
Facility: HOSPITAL | Age: 54
Discharge: HOME | End: 2024-12-16
Attending: EMERGENCY MEDICINE
Payer: MEDICAID

## 2024-12-15 DIAGNOSIS — I16.0 HYPERTENSIVE URGENCY: ICD-10-CM

## 2024-12-15 DIAGNOSIS — I50.9 CONGESTIVE HEART FAILURE, UNSPECIFIED HF CHRONICITY, UNSPECIFIED HEART FAILURE TYPE: Primary | ICD-10-CM

## 2024-12-15 DIAGNOSIS — N18.9 CHRONIC KIDNEY DISEASE, UNSPECIFIED CKD STAGE: ICD-10-CM

## 2024-12-15 DIAGNOSIS — I10 HTN (HYPERTENSION): ICD-10-CM

## 2024-12-15 DIAGNOSIS — R19.7 DIARRHEA, UNSPECIFIED TYPE: ICD-10-CM

## 2024-12-15 DIAGNOSIS — R06.02 SOB (SHORTNESS OF BREATH): ICD-10-CM

## 2024-12-15 LAB
ALBUMIN SERPL BCP-MCNC: 3.7 G/DL (ref 3.5–5.2)
ALP SERPL-CCNC: 113 U/L (ref 40–150)
ALT SERPL W/O P-5'-P-CCNC: 27 U/L (ref 10–44)
ANION GAP SERPL CALC-SCNC: 13 MMOL/L (ref 8–16)
AST SERPL-CCNC: 22 U/L (ref 10–40)
BACTERIA #/AREA URNS HPF: ABNORMAL /HPF
BASOPHILS # BLD AUTO: 0.06 K/UL (ref 0–0.2)
BASOPHILS NFR BLD: 0.9 % (ref 0–1.9)
BILIRUB SERPL-MCNC: 2.1 MG/DL (ref 0.1–1)
BILIRUB UR QL STRIP: NEGATIVE
BNP SERPL-MCNC: 3357 PG/ML (ref 0–99)
BUN SERPL-MCNC: 35 MG/DL (ref 6–20)
CALCIUM SERPL-MCNC: 9.9 MG/DL (ref 8.7–10.5)
CHLORIDE SERPL-SCNC: 108 MMOL/L (ref 95–110)
CLARITY UR: CLEAR
CO2 SERPL-SCNC: 20 MMOL/L (ref 23–29)
COLOR UR: YELLOW
CREAT SERPL-MCNC: 2.3 MG/DL (ref 0.5–1.4)
DIFFERENTIAL METHOD BLD: NORMAL
EOSINOPHIL # BLD AUTO: 0.1 K/UL (ref 0–0.5)
EOSINOPHIL NFR BLD: 2.1 % (ref 0–8)
ERYTHROCYTE [DISTWIDTH] IN BLOOD BY AUTOMATED COUNT: 13.4 % (ref 11.5–14.5)
EST. GFR  (NO RACE VARIABLE): 33 ML/MIN/1.73 M^2
GLUCOSE SERPL-MCNC: 74 MG/DL (ref 70–110)
GLUCOSE UR QL STRIP: NEGATIVE
HCT VFR BLD AUTO: 48.1 % (ref 40–54)
HGB BLD-MCNC: 15.4 G/DL (ref 14–18)
HGB UR QL STRIP: ABNORMAL
HYALINE CASTS #/AREA URNS LPF: 66 /LPF
IMM GRANULOCYTES # BLD AUTO: 0.03 K/UL (ref 0–0.04)
IMM GRANULOCYTES NFR BLD AUTO: 0.4 % (ref 0–0.5)
KETONES UR QL STRIP: NEGATIVE
LEUKOCYTE ESTERASE UR QL STRIP: NEGATIVE
LYMPHOCYTES # BLD AUTO: 2.4 K/UL (ref 1–4.8)
LYMPHOCYTES NFR BLD: 34.5 % (ref 18–48)
MCH RBC QN AUTO: 29.4 PG (ref 27–31)
MCHC RBC AUTO-ENTMCNC: 32 G/DL (ref 32–36)
MCV RBC AUTO: 92 FL (ref 82–98)
MICROSCOPIC COMMENT: ABNORMAL
MONOCYTES # BLD AUTO: 0.4 K/UL (ref 0.3–1)
MONOCYTES NFR BLD: 6 % (ref 4–15)
NEUTROPHILS # BLD AUTO: 3.8 K/UL (ref 1.8–7.7)
NEUTROPHILS NFR BLD: 56.1 % (ref 38–73)
NITRITE UR QL STRIP: NEGATIVE
NRBC BLD-RTO: 0 /100 WBC
PH UR STRIP: 6 [PH] (ref 5–8)
PLATELET # BLD AUTO: 199 K/UL (ref 150–450)
PMV BLD AUTO: 10.9 FL (ref 9.2–12.9)
POTASSIUM SERPL-SCNC: 4.2 MMOL/L (ref 3.5–5.1)
PROT SERPL-MCNC: 7 G/DL (ref 6–8.4)
PROT UR QL STRIP: ABNORMAL
RBC # BLD AUTO: 5.23 M/UL (ref 4.6–6.2)
RBC #/AREA URNS HPF: 0 /HPF (ref 0–4)
SODIUM SERPL-SCNC: 141 MMOL/L (ref 136–145)
SP GR UR STRIP: 1.02 (ref 1–1.03)
TROPONIN I SERPL DL<=0.01 NG/ML-MCNC: 0.08 NG/ML (ref 0–0.03)
URN SPEC COLLECT METH UR: ABNORMAL
UROBILINOGEN UR STRIP-ACNC: NEGATIVE EU/DL
WBC # BLD AUTO: 6.82 K/UL (ref 3.9–12.7)
WBC #/AREA URNS HPF: 2 /HPF (ref 0–5)

## 2024-12-15 PROCEDURE — 83880 ASSAY OF NATRIURETIC PEPTIDE: CPT | Performed by: EMERGENCY MEDICINE

## 2024-12-15 PROCEDURE — 84484 ASSAY OF TROPONIN QUANT: CPT | Performed by: EMERGENCY MEDICINE

## 2024-12-15 PROCEDURE — 63600175 PHARM REV CODE 636 W HCPCS: Performed by: EMERGENCY MEDICINE

## 2024-12-15 PROCEDURE — 81000 URINALYSIS NONAUTO W/SCOPE: CPT | Performed by: EMERGENCY MEDICINE

## 2024-12-15 PROCEDURE — 80053 COMPREHEN METABOLIC PANEL: CPT | Performed by: EMERGENCY MEDICINE

## 2024-12-15 PROCEDURE — 85025 COMPLETE CBC W/AUTO DIFF WBC: CPT | Performed by: EMERGENCY MEDICINE

## 2024-12-15 PROCEDURE — 93005 ELECTROCARDIOGRAM TRACING: CPT

## 2024-12-15 PROCEDURE — 96374 THER/PROPH/DIAG INJ IV PUSH: CPT

## 2024-12-15 PROCEDURE — 25000003 PHARM REV CODE 250: Performed by: EMERGENCY MEDICINE

## 2024-12-15 PROCEDURE — 96375 TX/PRO/DX INJ NEW DRUG ADDON: CPT

## 2024-12-15 PROCEDURE — 99285 EMERGENCY DEPT VISIT HI MDM: CPT | Mod: 25

## 2024-12-15 PROCEDURE — 93010 ELECTROCARDIOGRAM REPORT: CPT | Mod: ,,, | Performed by: INTERNAL MEDICINE

## 2024-12-15 RX ORDER — LABETALOL HYDROCHLORIDE 5 MG/ML
20 INJECTION, SOLUTION INTRAVENOUS
Status: COMPLETED | OUTPATIENT
Start: 2024-12-15 | End: 2024-12-15

## 2024-12-15 RX ORDER — FUROSEMIDE 10 MG/ML
80 INJECTION INTRAMUSCULAR; INTRAVENOUS
Status: COMPLETED | OUTPATIENT
Start: 2024-12-15 | End: 2024-12-15

## 2024-12-15 RX ORDER — HYDRALAZINE HYDROCHLORIDE 20 MG/ML
10 INJECTION INTRAMUSCULAR; INTRAVENOUS
Status: COMPLETED | OUTPATIENT
Start: 2024-12-16 | End: 2024-12-16

## 2024-12-15 RX ADMIN — FUROSEMIDE 80 MG: 10 INJECTION, SOLUTION INTRAMUSCULAR; INTRAVENOUS at 10:12

## 2024-12-15 RX ADMIN — NITROGLYCERIN 2 INCH: 20 OINTMENT TOPICAL at 09:12

## 2024-12-15 RX ADMIN — LABETALOL HYDROCHLORIDE 20 MG: 5 INJECTION INTRAVENOUS at 09:12

## 2024-12-16 VITALS
WEIGHT: 248.38 LBS | BODY MASS INDEX: 32.92 KG/M2 | HEART RATE: 78 BPM | SYSTOLIC BLOOD PRESSURE: 168 MMHG | OXYGEN SATURATION: 97 % | RESPIRATION RATE: 20 BRPM | TEMPERATURE: 98 F | DIASTOLIC BLOOD PRESSURE: 114 MMHG | HEIGHT: 73 IN

## 2024-12-16 LAB — TROPONIN I SERPL DL<=0.01 NG/ML-MCNC: 0.08 NG/ML (ref 0–0.03)

## 2024-12-16 PROCEDURE — 96375 TX/PRO/DX INJ NEW DRUG ADDON: CPT

## 2024-12-16 PROCEDURE — 25000003 PHARM REV CODE 250: Performed by: EMERGENCY MEDICINE

## 2024-12-16 PROCEDURE — 84484 ASSAY OF TROPONIN QUANT: CPT | Performed by: EMERGENCY MEDICINE

## 2024-12-16 PROCEDURE — 96376 TX/PRO/DX INJ SAME DRUG ADON: CPT

## 2024-12-16 PROCEDURE — 63600175 PHARM REV CODE 636 W HCPCS: Performed by: EMERGENCY MEDICINE

## 2024-12-16 RX ORDER — NIFEDIPINE 30 MG/1
30 TABLET, EXTENDED RELEASE ORAL
Status: COMPLETED | OUTPATIENT
Start: 2024-12-16 | End: 2024-12-16

## 2024-12-16 RX ORDER — HYDRALAZINE HYDROCHLORIDE 20 MG/ML
10 INJECTION INTRAMUSCULAR; INTRAVENOUS
Status: COMPLETED | OUTPATIENT
Start: 2024-12-16 | End: 2024-12-16

## 2024-12-16 RX ADMIN — HYDRALAZINE HYDROCHLORIDE 10 MG: 20 INJECTION INTRAMUSCULAR; INTRAVENOUS at 03:12

## 2024-12-16 RX ADMIN — NIFEDIPINE 30 MG: 30 TABLET, FILM COATED, EXTENDED RELEASE ORAL at 03:12

## 2024-12-16 RX ADMIN — HYDRALAZINE HYDROCHLORIDE 10 MG: 20 INJECTION INTRAMUSCULAR; INTRAVENOUS at 12:12

## 2024-12-16 NOTE — ED NOTES
Bed: 12  Expected date:   Expected time:   Means of arrival: Personal Transportation  Comments:  When clean

## 2024-12-16 NOTE — ED NOTES
Specimen cup placed @bedside & pt educated to retrieve stool sample when able to. Pt verbalized understanding
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
## 2024-12-18 LAB
OHS QRS DURATION: 104 MS
OHS QTC CALCULATION: 482 MS

## 2025-02-11 ENCOUNTER — LAB VISIT (OUTPATIENT)
Dept: LAB | Facility: HOSPITAL | Age: 55
End: 2025-02-11
Attending: FAMILY MEDICINE
Payer: MEDICAID

## 2025-02-11 ENCOUNTER — OFFICE VISIT (OUTPATIENT)
Dept: FAMILY MEDICINE | Facility: CLINIC | Age: 55
End: 2025-02-11
Payer: MEDICAID

## 2025-02-11 ENCOUNTER — E-CONSULT (OUTPATIENT)
Dept: PULMONOLOGY | Facility: CLINIC | Age: 55
End: 2025-02-11
Payer: MEDICAID

## 2025-02-11 ENCOUNTER — PATIENT MESSAGE (OUTPATIENT)
Dept: PULMONOLOGY | Facility: CLINIC | Age: 55
End: 2025-02-11

## 2025-02-11 ENCOUNTER — OFFICE VISIT (OUTPATIENT)
Dept: CARDIOLOGY | Facility: CLINIC | Age: 55
End: 2025-02-11
Payer: MEDICAID

## 2025-02-11 VITALS
HEART RATE: 85 BPM | WEIGHT: 244.19 LBS | SYSTOLIC BLOOD PRESSURE: 177 MMHG | OXYGEN SATURATION: 99 % | BODY MASS INDEX: 30.36 KG/M2 | DIASTOLIC BLOOD PRESSURE: 98 MMHG | HEIGHT: 75 IN

## 2025-02-11 VITALS
BODY MASS INDEX: 30.4 KG/M2 | SYSTOLIC BLOOD PRESSURE: 126 MMHG | WEIGHT: 244.5 LBS | HEIGHT: 75 IN | OXYGEN SATURATION: 98 % | TEMPERATURE: 98 F | HEART RATE: 89 BPM | RESPIRATION RATE: 17 BRPM | DIASTOLIC BLOOD PRESSURE: 84 MMHG

## 2025-02-11 DIAGNOSIS — I50.40 COMBINED SYSTOLIC AND DIASTOLIC CONGESTIVE HEART FAILURE, UNSPECIFIED HF CHRONICITY: ICD-10-CM

## 2025-02-11 DIAGNOSIS — I50.43 ACUTE ON CHRONIC COMBINED SYSTOLIC (CONGESTIVE) AND DIASTOLIC (CONGESTIVE) HEART FAILURE: Primary | ICD-10-CM

## 2025-02-11 DIAGNOSIS — Z12.11 SCREENING FOR MALIGNANT NEOPLASM OF COLON: ICD-10-CM

## 2025-02-11 DIAGNOSIS — Z00.00 PREVENTATIVE HEALTH CARE: ICD-10-CM

## 2025-02-11 DIAGNOSIS — G47.33 OSA (OBSTRUCTIVE SLEEP APNEA): ICD-10-CM

## 2025-02-11 DIAGNOSIS — I10 ESSENTIAL HYPERTENSION: ICD-10-CM

## 2025-02-11 DIAGNOSIS — G47.33 OSA (OBSTRUCTIVE SLEEP APNEA): Primary | ICD-10-CM

## 2025-02-11 DIAGNOSIS — M54.50 ACUTE LOW BACK PAIN WITHOUT SCIATICA, UNSPECIFIED BACK PAIN LATERALITY: ICD-10-CM

## 2025-02-11 DIAGNOSIS — N18.32 STAGE 3B CHRONIC KIDNEY DISEASE: ICD-10-CM

## 2025-02-11 DIAGNOSIS — E78.2 MIXED HYPERLIPIDEMIA: ICD-10-CM

## 2025-02-11 DIAGNOSIS — E66.811 OBESITY (BMI 30.0-34.9): ICD-10-CM

## 2025-02-11 DIAGNOSIS — Z00.00 PREVENTATIVE HEALTH CARE: Primary | ICD-10-CM

## 2025-02-11 LAB
ALBUMIN SERPL BCP-MCNC: 3.4 G/DL (ref 3.5–5.2)
ALP SERPL-CCNC: 102 U/L (ref 40–150)
ALT SERPL W/O P-5'-P-CCNC: 18 U/L (ref 10–44)
ANION GAP SERPL CALC-SCNC: 9 MMOL/L (ref 8–16)
AST SERPL-CCNC: 18 U/L (ref 10–40)
BASOPHILS # BLD AUTO: 0.08 K/UL (ref 0–0.2)
BASOPHILS NFR BLD: 1.3 % (ref 0–1.9)
BILIRUB SERPL-MCNC: 2.6 MG/DL (ref 0.1–1)
BUN SERPL-MCNC: 22 MG/DL (ref 6–20)
CALCIUM SERPL-MCNC: 8.8 MG/DL (ref 8.7–10.5)
CHLORIDE SERPL-SCNC: 108 MMOL/L (ref 95–110)
CHOLEST SERPL-MCNC: 131 MG/DL (ref 120–199)
CHOLEST/HDLC SERPL: 3.6 {RATIO} (ref 2–5)
CO2 SERPL-SCNC: 23 MMOL/L (ref 23–29)
CREAT SERPL-MCNC: 2.4 MG/DL (ref 0.5–1.4)
DIFFERENTIAL METHOD BLD: ABNORMAL
EOSINOPHIL # BLD AUTO: 0.2 K/UL (ref 0–0.5)
EOSINOPHIL NFR BLD: 3.1 % (ref 0–8)
ERYTHROCYTE [DISTWIDTH] IN BLOOD BY AUTOMATED COUNT: 13.6 % (ref 11.5–14.5)
EST. GFR  (NO RACE VARIABLE): 31.5 ML/MIN/1.73 M^2
GLUCOSE SERPL-MCNC: 93 MG/DL (ref 70–110)
HCT VFR BLD AUTO: 51.2 % (ref 40–54)
HDLC SERPL-MCNC: 36 MG/DL (ref 40–75)
HDLC SERPL: 27.5 % (ref 20–50)
HGB BLD-MCNC: 15.3 G/DL (ref 14–18)
IMM GRANULOCYTES # BLD AUTO: 0.03 K/UL (ref 0–0.04)
IMM GRANULOCYTES NFR BLD AUTO: 0.5 % (ref 0–0.5)
LDLC SERPL CALC-MCNC: 72.4 MG/DL (ref 63–159)
LYMPHOCYTES # BLD AUTO: 2.3 K/UL (ref 1–4.8)
LYMPHOCYTES NFR BLD: 36.3 % (ref 18–48)
MCH RBC QN AUTO: 27.8 PG (ref 27–31)
MCHC RBC AUTO-ENTMCNC: 29.9 G/DL (ref 32–36)
MCV RBC AUTO: 93 FL (ref 82–98)
MONOCYTES # BLD AUTO: 0.4 K/UL (ref 0.3–1)
MONOCYTES NFR BLD: 6.6 % (ref 4–15)
NEUTROPHILS # BLD AUTO: 3.3 K/UL (ref 1.8–7.7)
NEUTROPHILS NFR BLD: 52.2 % (ref 38–73)
NONHDLC SERPL-MCNC: 95 MG/DL
NRBC BLD-RTO: 0 /100 WBC
PLATELET # BLD AUTO: 191 K/UL (ref 150–450)
PMV BLD AUTO: 12.2 FL (ref 9.2–12.9)
POTASSIUM SERPL-SCNC: 3.6 MMOL/L (ref 3.5–5.1)
PROT SERPL-MCNC: 6.3 G/DL (ref 6–8.4)
RBC # BLD AUTO: 5.5 M/UL (ref 4.6–6.2)
SODIUM SERPL-SCNC: 140 MMOL/L (ref 136–145)
TRIGL SERPL-MCNC: 113 MG/DL (ref 30–150)
TSH SERPL DL<=0.005 MIU/L-ACNC: 1.43 UIU/ML (ref 0.4–4)
WBC # BLD AUTO: 6.36 K/UL (ref 3.9–12.7)

## 2025-02-11 PROCEDURE — 99999 PR PBB SHADOW E&M-EST. PATIENT-LVL IV: CPT | Mod: PBBFAC,,,

## 2025-02-11 PROCEDURE — 1160F RVW MEDS BY RX/DR IN RCRD: CPT | Mod: CPTII,,,

## 2025-02-11 PROCEDURE — 36415 COLL VENOUS BLD VENIPUNCTURE: CPT | Mod: PO

## 2025-02-11 PROCEDURE — 83036 HEMOGLOBIN GLYCOSYLATED A1C: CPT

## 2025-02-11 PROCEDURE — 1159F MED LIST DOCD IN RCRD: CPT | Mod: CPTII,,,

## 2025-02-11 PROCEDURE — 84443 ASSAY THYROID STIM HORMONE: CPT

## 2025-02-11 PROCEDURE — 99214 OFFICE O/P EST MOD 30 MIN: CPT | Mod: PBBFAC,27,PO

## 2025-02-11 PROCEDURE — 80053 COMPREHEN METABOLIC PANEL: CPT

## 2025-02-11 PROCEDURE — 3074F SYST BP LT 130 MM HG: CPT | Mod: CPTII,,,

## 2025-02-11 PROCEDURE — 80061 LIPID PANEL: CPT

## 2025-02-11 PROCEDURE — 3008F BODY MASS INDEX DOCD: CPT | Mod: CPTII,,,

## 2025-02-11 PROCEDURE — 99213 OFFICE O/P EST LOW 20 MIN: CPT | Mod: PBBFAC | Performed by: PHYSICIAN ASSISTANT

## 2025-02-11 PROCEDURE — 99396 PREV VISIT EST AGE 40-64: CPT | Mod: S$PBB,,,

## 2025-02-11 PROCEDURE — 85025 COMPLETE CBC W/AUTO DIFF WBC: CPT

## 2025-02-11 PROCEDURE — 3079F DIAST BP 80-89 MM HG: CPT | Mod: CPTII,,,

## 2025-02-11 PROCEDURE — 99999 PR PBB SHADOW E&M-EST. PATIENT-LVL III: CPT | Mod: PBBFAC,,, | Performed by: PHYSICIAN ASSISTANT

## 2025-02-11 RX ORDER — CARVEDILOL 12.5 MG/1
12.5 TABLET ORAL 2 TIMES DAILY
Qty: 60 TABLET | Refills: 3 | Status: SHIPPED | OUTPATIENT
Start: 2025-02-11 | End: 2025-06-11

## 2025-02-11 RX ORDER — HYDROCODONE BITARTRATE AND ACETAMINOPHEN 5; 325 MG/1; MG/1
1 TABLET ORAL EVERY 8 HOURS PRN
Qty: 15 TABLET | Refills: 0 | Status: SHIPPED | OUTPATIENT
Start: 2025-02-11

## 2025-02-11 NOTE — PROGRESS NOTES
Subjective:      Patient ID: Bria Saldana is a 54 y.o. male.    Chief Complaint: Annual Exam      History of Present Illness      Mr. Saldana presents today for annual well visit. He has PMHx of CHF, HTN, HLD and CKD. Chart review reports history of DM, but patient states he is not diabetic, nor has he ever received any medications. He is not fasting in preparation for lab work today. States he has taken his medications this morning as well. He had follow up his cardiologist this morning in which he states he was told that everything was fine. States he has never had a colonoscopy. His chief complaint today is back pain. reports lower back pain rated 7/10 following diagnosis of scoliosis on X-ray. The pain disrupts his sleep, causing frequent awakening and difficulty finding comfortable positions. He also experiences difficulty maintaining upright sitting posture. Previously prescribed hydrocodone for pain management which has been completed.      Review of Systems   Constitutional:  Negative for fatigue.   HENT:  Negative for nasal congestion, ear pain, rhinorrhea and sore throat.    Eyes:  Negative for visual disturbance.   Respiratory:  Negative for shortness of breath.    Cardiovascular:  Negative for chest pain and palpitations.   Gastrointestinal:  Negative for constipation, diarrhea, nausea, vomiting and reflux.   Genitourinary:  Negative for dysuria.   Musculoskeletal:  Positive for back pain and myalgias.   Integumentary:  Negative for rash.   Neurological:  Negative for dizziness, weakness and headaches.   Psychiatric/Behavioral:  Positive for sleep disturbance.         Current Outpatient Medications   Medication Sig    apixaban (ELIQUIS) 5 mg Tab Take 5 mg by mouth.    carvediloL (COREG) 12.5 MG tablet Take 1 tablet (12.5 mg total) by mouth 2 (two) times daily.    empagliflozin (JARDIANCE) 10 mg tablet Take 1 tablet (10 mg total) by mouth once daily.    hydrALAZINE (APRESOLINE) 25 MG tablet  "Take 1 tablet (25 mg total) by mouth every 8 (eight) hours as needed (blood pressure greater than 160/90).    pantoprazole (PROTONIX) 40 MG tablet Take 1 tablet (40 mg total) by mouth once daily. (Patient taking differently: Take 40 mg by mouth daily as needed (heartburn).)    amiodarone (PACERONE) 200 MG Tab Take 1 tablet (200 mg total) by mouth once daily.    benzonatate (TESSALON) 100 MG capsule Take 1 capsule (100 mg total) by mouth 3 (three) times daily as needed for Cough.    colchicine (COLCRYS) 0.6 mg tablet Take 1 tablet (0.6 mg total) by mouth once daily.    ENTRESTO  mg per tablet TAKE 1 TABLET BY MOUTH TWICE DAILY    HYDROcodone-acetaminophen (NORCO) 5-325 mg per tablet Take 1 tablet by mouth every 8 (eight) hours as needed for Pain.    torsemide (DEMADEX) 20 MG Tab Take 2 tablets (40 mg total) by mouth once daily.     No current facility-administered medications for this visit.       Objective:   /84 (BP Location: Left arm, Patient Position: Sitting)   Pulse 89   Temp 97.9 °F (36.6 °C) (Temporal)   Resp 17   Ht 6' 3" (1.905 m)   Wt 110.9 kg (244 lb 7.8 oz)   SpO2 98%   BMI 30.56 kg/m²     Physical Exam  Vitals and nursing note reviewed.   Constitutional:       Appearance: Normal appearance.   HENT:      Head: Normocephalic and atraumatic.      Right Ear: Tympanic membrane, ear canal and external ear normal.      Left Ear: Tympanic membrane, ear canal and external ear normal.      Nose: Nose normal.      Mouth/Throat:      Mouth: Mucous membranes are moist.      Pharynx: Oropharynx is clear.   Eyes:      Extraocular Movements: Extraocular movements intact.      Conjunctiva/sclera: Conjunctivae normal.      Pupils: Pupils are equal, round, and reactive to light.   Cardiovascular:      Rate and Rhythm: Normal rate and regular rhythm.      Pulses: Normal pulses.      Heart sounds: Normal heart sounds.   Pulmonary:      Effort: Pulmonary effort is normal.      Breath sounds: Normal breath " sounds.   Abdominal:      General: Bowel sounds are normal.      Palpations: Abdomen is soft.   Genitourinary:     Comments: deferred  Musculoskeletal:         General: Normal range of motion.      Cervical back: Normal range of motion.   Skin:     General: Skin is warm.      Capillary Refill: Capillary refill takes less than 2 seconds.   Neurological:      General: No focal deficit present.      Mental Status: He is alert and oriented to person, place, and time.   Psychiatric:         Mood and Affect: Mood normal.         Behavior: Behavior normal.         Thought Content: Thought content normal.         Judgment: Judgment normal.         Assessment:       ICD-10-CM ICD-9-CM    1. Preventative health care  Z00.00 V70.0 CBC Auto Differential      Comprehensive Metabolic Panel      Lipid Panel      Hemoglobin A1C      TSH      Ambulatory referral/consult to Endo Procedure       2. Combined systolic and diastolic congestive heart failure, unspecified HF chronicity  I50.40 428.40 Comprehensive Metabolic Panel      3. Essential hypertension  I10 401.9       4. Mixed hyperlipidemia  E78.2 272.2 Lipid Panel      5. Stage 3b chronic kidney disease  N18.32 585.3       6. Obesity (BMI 30.0-34.9)  E66.811 278.00       7. NATALYA (obstructive sleep apnea)  G47.33 327.23       8. Screening for malignant neoplasm of colon  Z12.11 V76.51 Ambulatory referral/consult to Endo Procedure       9. Acute low back pain without sciatica, unspecified back pain laterality  M54.50 724.2 HYDROcodone-acetaminophen (NORCO) 5-325 mg per tablet          Plan:     1. Age-appropriate counseling-appropriate low-sodium, low-cholesterol, low carbohydrate diet and exercise daily, annual wellness examination.   2. Labs. Patient advised to call for results.  3. Continue current medications.  4. Keep follow up with specialists.  5. Follow up in about 6 months (around 8/11/2025) for 6 month f/u with Dr. Maravilla.    I spent a total of 35 minutes  on the day of the visit.This includes face to face time and non-face to face time preparing to see the patient (eg, review of tests), obtaining and/or reviewing separately obtained history, documenting clinical information in the electronic or other health record, independently interpreting results and communicating results to the patient/family/caregiver, or care coordinator.      This note was generated with the assistance of ambient listening technology. Verbal consent was obtained by the patient and accompanying visitor(s) for the recording of patient appointment to facilitate this note. I attest to having reviewed and edited the generated note for accuracy, though some syntax or spelling errors may persist. Please contact the author of this note for any clarification.          Trevor Alberto, MSN, APRN, FNP-C

## 2025-02-11 NOTE — CONSULTS
[facility]  Response for E-Consult     Patient Name: Bria Saldana  MRN: [id number]  Primary Care Provider: Brenna Maravilla MD   Requesting Provider: Sienna Maguire PA  Consults    Recommendation:  Was recommended  CPAP 12 cm water during sleep during his last titration study.  He was seen in 2023 in the sleep clinic and was ordered the CPAP and the supplies.    Additional future steps to consider:  Need to follow-up in the sleep Clinic to see if he has a CPAP machine and if he is using it and to obtain a download and if not he will have to reestablish treatment.    Total time of Consultation: 15 minute    I did not speak to the requesting provider verbally about this.     *This eConsult is based on the clinical data available to me and is furnished without benefit of a physical examination. The eConsult will need to be interpreted in light of any clinical issues or changes in patient status not available to me at the time of filing this eConsults. Significant changes in patient condition or level of acuity should result in immediate formal consultation and reevaluation. Please alert me if you have further questions.    Thank you for this eConsult referral.     Alexia Canales MD  [facility]

## 2025-02-11 NOTE — PROGRESS NOTES
"HF TCC Provider Note (Follow-up) Consult Note      HPI:   History of Present Illness: Bria Saldana is a 53 y.o. male patient with a PMHx of HTN, CHF, and CKD who presents to the Emergency Department for evaluation of HTN flare up which onset gradually over the past 3 days. No mitigating or exacerbating factors reported. Pt reports a "dizzy spell" which has resolved.. Patient denies any SOB, CP, and all other sxs at this time. Pt takes Eliquis at home and monitors his BP routinely. No further complaints or concerns at this time.      He was given prn hydralazine     Today no SOB  No PND    BP high, does not take meds before appointment    Has back pain          PHYSICAL:   Vitals:    02/11/25 0852   BP: (!) 177/98   Pulse: 85          JVD: no,    Heart rhythm: regular  Cardiac murmur: No    S3: no  S4: no  Lungs: clear  Liver span: 10 cm:   Hepatojugular reflux: no  Edema: no,       ASSESSMENT: chronic systolic HF    PLAN:      Patient Instructions:   Instruct the patient to notify this clinic if HH, a physician or an advanced care provider wants to change medication one of their HF medications   Activity and Diet restrictions:   Recommend 2-3 gram sodium restriction and 1500cc- 2000cc fluid restriction.  Encourage physical activity with graded exercise program.  Requested patient to weigh themselves daily, and to notify us if their weight increases by more than 3 lbs in 1 day or 5 lbs in 3 days.    Assigned dry weight on home scale: daily weight  Medication changes (include current dose and changed dose)  Euvolemic on exam, increase coreg to 12.5 BID  Not wearing CPAP, untreated NATALYA, refer to pulmonary for updates NATALYA testing  On ARNi, SGLT2i  CHF education done, recommend increased physical activity  RTC 2 months            "

## 2025-02-12 LAB
ESTIMATED AVG GLUCOSE: 100 MG/DL (ref 68–131)
HBA1C MFR BLD: 5.1 % (ref 4–5.6)

## 2025-02-14 ENCOUNTER — TELEPHONE (OUTPATIENT)
Dept: ELECTROPHYSIOLOGY | Facility: CLINIC | Age: 55
End: 2025-02-14
Payer: MEDICAID

## 2025-02-17 ENCOUNTER — PATIENT MESSAGE (OUTPATIENT)
Dept: PULMONOLOGY | Facility: CLINIC | Age: 55
End: 2025-02-17
Payer: MEDICAID

## 2025-02-17 ENCOUNTER — HOSPITAL ENCOUNTER (OUTPATIENT)
Facility: HOSPITAL | Age: 55
LOS: 1 days | Discharge: HOME | End: 2025-02-20
Attending: EMERGENCY MEDICINE | Admitting: FAMILY MEDICINE
Payer: MEDICAID

## 2025-02-17 DIAGNOSIS — N18.32 ACUTE RENAL FAILURE SUPERIMPOSED ON STAGE 3B CHRONIC KIDNEY DISEASE, UNSPECIFIED ACUTE RENAL FAILURE TYPE: ICD-10-CM

## 2025-02-17 DIAGNOSIS — I50.9 ACUTE CONGESTIVE HEART FAILURE, UNSPECIFIED HEART FAILURE TYPE: ICD-10-CM

## 2025-02-17 DIAGNOSIS — N17.9 ACUTE RENAL FAILURE SUPERIMPOSED ON CHRONIC KIDNEY DISEASE, UNSPECIFIED ACUTE RENAL FAILURE TYPE, UNSPECIFIED CKD STAGE: ICD-10-CM

## 2025-02-17 DIAGNOSIS — R06.02 SHORTNESS OF BREATH: ICD-10-CM

## 2025-02-17 DIAGNOSIS — R79.89 TROPONIN LEVEL ELEVATED: ICD-10-CM

## 2025-02-17 DIAGNOSIS — I16.1 HYPERTENSIVE EMERGENCY: Primary | ICD-10-CM

## 2025-02-17 DIAGNOSIS — N18.9 ACUTE RENAL FAILURE SUPERIMPOSED ON CHRONIC KIDNEY DISEASE, UNSPECIFIED ACUTE RENAL FAILURE TYPE, UNSPECIFIED CKD STAGE: ICD-10-CM

## 2025-02-17 DIAGNOSIS — N17.9 ACUTE RENAL FAILURE SUPERIMPOSED ON STAGE 3B CHRONIC KIDNEY DISEASE, UNSPECIFIED ACUTE RENAL FAILURE TYPE: ICD-10-CM

## 2025-02-17 DIAGNOSIS — I50.9 CHF (CONGESTIVE HEART FAILURE): ICD-10-CM

## 2025-02-17 LAB
ALBUMIN SERPL BCP-MCNC: 3.7 G/DL (ref 3.5–5.2)
ALP SERPL-CCNC: 133 U/L (ref 40–150)
ALT SERPL W/O P-5'-P-CCNC: 19 U/L (ref 10–44)
ANION GAP SERPL CALC-SCNC: 13 MMOL/L (ref 8–16)
AST SERPL-CCNC: 22 U/L (ref 10–40)
BASOPHILS # BLD AUTO: 0.07 K/UL (ref 0–0.2)
BASOPHILS NFR BLD: 1.1 % (ref 0–1.9)
BILIRUB SERPL-MCNC: 3.4 MG/DL (ref 0.1–1)
BNP SERPL-MCNC: 3242 PG/ML (ref 0–99)
BUN SERPL-MCNC: 32 MG/DL (ref 6–20)
CALCIUM SERPL-MCNC: 9.2 MG/DL (ref 8.7–10.5)
CHLORIDE SERPL-SCNC: 107 MMOL/L (ref 95–110)
CO2 SERPL-SCNC: 19 MMOL/L (ref 23–29)
CREAT SERPL-MCNC: 2.7 MG/DL (ref 0.5–1.4)
DIFFERENTIAL METHOD BLD: ABNORMAL
EOSINOPHIL # BLD AUTO: 0.2 K/UL (ref 0–0.5)
EOSINOPHIL NFR BLD: 2.5 % (ref 0–8)
ERYTHROCYTE [DISTWIDTH] IN BLOOD BY AUTOMATED COUNT: 14.1 % (ref 11.5–14.5)
EST. GFR  (NO RACE VARIABLE): 27 ML/MIN/1.73 M^2
GLUCOSE SERPL-MCNC: 78 MG/DL (ref 70–110)
HCT VFR BLD AUTO: 51.7 % (ref 40–54)
HCV AB SERPL QL IA: NEGATIVE
HEP C VIRUS HOLD SPECIMEN: NORMAL
HGB BLD-MCNC: 16.2 G/DL (ref 14–18)
HIV 1+2 AB+HIV1 P24 AG SERPL QL IA: NEGATIVE
IMM GRANULOCYTES # BLD AUTO: 0.02 K/UL (ref 0–0.04)
IMM GRANULOCYTES NFR BLD AUTO: 0.3 % (ref 0–0.5)
LIPASE SERPL-CCNC: 47 U/L (ref 4–60)
LYMPHOCYTES # BLD AUTO: 2.4 K/UL (ref 1–4.8)
LYMPHOCYTES NFR BLD: 36.9 % (ref 18–48)
MCH RBC QN AUTO: 28.4 PG (ref 27–31)
MCHC RBC AUTO-ENTMCNC: 31.3 G/DL (ref 32–36)
MCV RBC AUTO: 91 FL (ref 82–98)
MONOCYTES # BLD AUTO: 0.5 K/UL (ref 0.3–1)
MONOCYTES NFR BLD: 8.1 % (ref 4–15)
NEUTROPHILS # BLD AUTO: 3.3 K/UL (ref 1.8–7.7)
NEUTROPHILS NFR BLD: 51.1 % (ref 38–73)
NRBC BLD-RTO: 0 /100 WBC
OB PNL STL: NEGATIVE
PLATELET # BLD AUTO: 218 K/UL (ref 150–450)
PMV BLD AUTO: 11.1 FL (ref 9.2–12.9)
POTASSIUM SERPL-SCNC: 3.8 MMOL/L (ref 3.5–5.1)
PROT SERPL-MCNC: 6.7 G/DL (ref 6–8.4)
RBC # BLD AUTO: 5.7 M/UL (ref 4.6–6.2)
SODIUM SERPL-SCNC: 139 MMOL/L (ref 136–145)
TROPONIN I SERPL DL<=0.01 NG/ML-MCNC: 0.14 NG/ML (ref 0–0.03)
WBC # BLD AUTO: 6.43 K/UL (ref 3.9–12.7)

## 2025-02-17 PROCEDURE — 93010 ELECTROCARDIOGRAM REPORT: CPT | Mod: ,,, | Performed by: INTERNAL MEDICINE

## 2025-02-17 PROCEDURE — 96374 THER/PROPH/DIAG INJ IV PUSH: CPT

## 2025-02-17 PROCEDURE — 63600175 PHARM REV CODE 636 W HCPCS: Performed by: EMERGENCY MEDICINE

## 2025-02-17 PROCEDURE — 87389 HIV-1 AG W/HIV-1&-2 AB AG IA: CPT | Performed by: EMERGENCY MEDICINE

## 2025-02-17 PROCEDURE — 83690 ASSAY OF LIPASE: CPT

## 2025-02-17 PROCEDURE — 96375 TX/PRO/DX INJ NEW DRUG ADDON: CPT

## 2025-02-17 PROCEDURE — 80053 COMPREHEN METABOLIC PANEL: CPT

## 2025-02-17 PROCEDURE — 85025 COMPLETE CBC W/AUTO DIFF WBC: CPT

## 2025-02-17 PROCEDURE — 93005 ELECTROCARDIOGRAM TRACING: CPT

## 2025-02-17 PROCEDURE — 25000003 PHARM REV CODE 250: Performed by: EMERGENCY MEDICINE

## 2025-02-17 PROCEDURE — 84484 ASSAY OF TROPONIN QUANT: CPT

## 2025-02-17 PROCEDURE — 83880 ASSAY OF NATRIURETIC PEPTIDE: CPT

## 2025-02-17 PROCEDURE — 82272 OCCULT BLD FECES 1-3 TESTS: CPT | Performed by: EMERGENCY MEDICINE

## 2025-02-17 PROCEDURE — 86803 HEPATITIS C AB TEST: CPT | Performed by: EMERGENCY MEDICINE

## 2025-02-17 PROCEDURE — 99291 CRITICAL CARE FIRST HOUR: CPT

## 2025-02-17 RX ORDER — CYCLOBENZAPRINE HCL 10 MG
10 TABLET ORAL
COMMUNITY
Start: 2024-11-29 | End: 2025-02-26

## 2025-02-17 RX ORDER — AMIODARONE HYDROCHLORIDE 200 MG/1
1 TABLET ORAL EVERY MORNING
Status: ON HOLD | COMMUNITY
Start: 2024-09-30 | End: 2025-02-20 | Stop reason: HOSPADM

## 2025-02-17 RX ORDER — PANTOPRAZOLE SODIUM 40 MG/1
40 TABLET, DELAYED RELEASE ORAL
Status: ON HOLD | COMMUNITY
End: 2025-02-20 | Stop reason: HOSPADM

## 2025-02-17 RX ORDER — COLCHICINE 0.6 MG/1
1 TABLET ORAL EVERY MORNING
Status: ON HOLD | COMMUNITY
End: 2025-02-20 | Stop reason: HOSPADM

## 2025-02-17 RX ORDER — HYDRALAZINE HYDROCHLORIDE 20 MG/ML
10 INJECTION INTRAMUSCULAR; INTRAVENOUS
Status: COMPLETED | OUTPATIENT
Start: 2025-02-17 | End: 2025-02-17

## 2025-02-17 RX ORDER — COLCHICINE 0.6 MG/1
0.6 TABLET ORAL DAILY PRN
COMMUNITY
Start: 2024-11-14

## 2025-02-17 RX ORDER — FUROSEMIDE 10 MG/ML
60 INJECTION INTRAMUSCULAR; INTRAVENOUS
Status: COMPLETED | OUTPATIENT
Start: 2025-02-17 | End: 2025-02-17

## 2025-02-17 RX ORDER — DICLOFENAC SODIUM 10 MG/G
2 GEL TOPICAL
COMMUNITY
Start: 2024-11-29

## 2025-02-17 RX ORDER — TORSEMIDE 20 MG/1
2 TABLET ORAL EVERY MORNING
Status: ON HOLD | COMMUNITY
Start: 2024-06-05 | End: 2025-02-20 | Stop reason: HOSPADM

## 2025-02-17 RX ORDER — SACUBITRIL AND VALSARTAN 49; 51 MG/1; MG/1
1 TABLET, FILM COATED ORAL
Status: ON HOLD | COMMUNITY
Start: 2024-11-14 | End: 2025-02-20 | Stop reason: HOSPADM

## 2025-02-17 RX ADMIN — FUROSEMIDE 60 MG: 10 INJECTION, SOLUTION INTRAMUSCULAR; INTRAVENOUS at 11:02

## 2025-02-17 RX ADMIN — NITROGLYCERIN 1 INCH: 20 OINTMENT TOPICAL at 11:02

## 2025-02-17 RX ADMIN — HYDRALAZINE HYDROCHLORIDE 10 MG: 20 INJECTION, SOLUTION INTRAMUSCULAR; INTRAVENOUS at 11:02

## 2025-02-17 NOTE — Clinical Note
Diagnosis: Hypertensive emergency [952002]   Reason for IP Medical Treatment  (Clinical interventions that can only be accomplished in the IP setting? ) :: Hypertensive emergency

## 2025-02-18 ENCOUNTER — DOCUMENTATION ONLY (OUTPATIENT)
Dept: CARDIOLOGY | Facility: CLINIC | Age: 55
End: 2025-02-18
Payer: MEDICAID

## 2025-02-18 PROBLEM — N18.32 ACUTE RENAL FAILURE SUPERIMPOSED ON STAGE 3B CHRONIC KIDNEY DISEASE: Status: ACTIVE | Noted: 2025-02-18

## 2025-02-18 PROBLEM — N17.9 ACUTE RENAL FAILURE SUPERIMPOSED ON STAGE 3B CHRONIC KIDNEY DISEASE: Status: ACTIVE | Noted: 2025-02-18

## 2025-02-18 LAB
ANION GAP SERPL CALC-SCNC: 17 MMOL/L (ref 8–16)
AORTIC ROOT ANNULUS: 3.21 CM
APICAL FOUR CHAMBER EJECTION FRACTION: 35 %
APICAL TWO CHAMBER EJECTION FRACTION: 50 %
ASCENDING AORTA: 3.04 CM
AV INDEX (PROSTH): 0.68
AV MEAN GRADIENT: 10 MMHG
AV PEAK GRADIENT: 14 MMHG
AV VALVE AREA BY VELOCITY RATIO: 2 CM²
AV VALVE AREA: 2.1 CM²
AV VELOCITY RATIO: 0.63
BSA FOR ECHO PROCEDURE: 2.44 M2
BUN SERPL-MCNC: 30 MG/DL (ref 6–20)
CALCIUM SERPL-MCNC: 9.6 MG/DL (ref 8.7–10.5)
CHLORIDE SERPL-SCNC: 105 MMOL/L (ref 95–110)
CO2 SERPL-SCNC: 20 MMOL/L (ref 23–29)
CREAT SERPL-MCNC: 2.5 MG/DL (ref 0.5–1.4)
CV ECHO LV RWT: 0.54 CM
DOP CALC AO PEAK VEL: 1.9 M/S
DOP CALC AO VTI: 35.2 CM
DOP CALC LVOT AREA: 3.1 CM2
DOP CALC LVOT DIAMETER: 2 CM
DOP CALC LVOT PEAK VEL: 1.2 M/S
DOP CALC LVOT STROKE VOLUME: 74.7 CM3
DOP CALC RVOT PEAK VEL: 0.95 M/S
DOP CALC RVOT VTI: 20.6 CM
DOP CALCLVOT PEAK VEL VTI: 23.8 CM
E WAVE DECELERATION TIME: 218 MSEC
E/A RATIO: 1.13
E/E' RATIO: 11 M/S
ECHO LV POSTERIOR WALL: 1.6 CM (ref 0.6–1.1)
EJECTION FRACTION: 35 %
EST. GFR  (NO RACE VARIABLE): 30 ML/MIN/1.73 M^2
ESTIMATED AVG GLUCOSE: 97 MG/DL (ref 68–131)
FRACTIONAL SHORTENING: 16.9 % (ref 28–44)
GLUCOSE SERPL-MCNC: 73 MG/DL (ref 70–110)
HBA1C MFR BLD: 5 % (ref 4–5.6)
INTERVENTRICULAR SEPTUM: 1.8 CM (ref 0.6–1.1)
IVC DIAMETER: 1.94 CM
IVRT: 72 MSEC
LA MAJOR: 6.9 CM
LA MINOR: 6.4 CM
LA WIDTH: 4.4 CM
LEFT ATRIUM SIZE: 4.8 CM
LEFT ATRIUM VOLUME INDEX: 50 ML/M2
LEFT ATRIUM VOLUME: 119 CM3
LEFT INTERNAL DIMENSION IN SYSTOLE: 4.9 CM (ref 2.1–4)
LEFT VENTRICLE DIASTOLIC VOLUME INDEX: 73.25 ML/M2
LEFT VENTRICLE DIASTOLIC VOLUME: 175.8 ML
LEFT VENTRICLE END DIASTOLIC VOLUME APICAL 2 CHAMBER: 158.78 ML
LEFT VENTRICLE END DIASTOLIC VOLUME APICAL 4 CHAMBER: 134.79 ML
LEFT VENTRICLE MASS INDEX: 207.9 G/M2
LEFT VENTRICLE SYSTOLIC VOLUME INDEX: 47 ML/M2
LEFT VENTRICLE SYSTOLIC VOLUME: 112.86 ML
LEFT VENTRICULAR INTERNAL DIMENSION IN DIASTOLE: 5.9 CM (ref 3.5–6)
LEFT VENTRICULAR MASS: 498.9 G
LV LATERAL E/E' RATIO: 10.1 M/S
LV SEPTAL E/E' RATIO: 11.8 M/S
LVED V (TEICH): 175.8 ML
LVES V (TEICH): 112.86 ML
LVOT MG: 4.68 MMHG
LVOT MV: 1.08 CM/S
MAGNESIUM SERPL-MCNC: 1.9 MG/DL (ref 1.6–2.6)
MV PEAK A VEL: 0.63 M/S
MV PEAK E VEL: 0.71 M/S
MV STENOSIS PRESSURE HALF TIME: 63.25 MS
MV VALVE AREA P 1/2 METHOD: 3.48 CM2
OHS CV RV/LV RATIO: 0.53 CM
OHS LV EJECTION FRACTION SIMPSONS BIPLANE MOD: 43 %
OHS QRS DURATION: 106 MS
OHS QTC CALCULATION: 500 MS
PISA MRMAX VEL: 4.35 M/S
PISA TR MAX VEL: 3.1 M/S
POCT GLUCOSE: 135 MG/DL (ref 70–110)
POCT GLUCOSE: 86 MG/DL (ref 70–110)
POTASSIUM SERPL-SCNC: 3.5 MMOL/L (ref 3.5–5.1)
PV MEAN GRADIENT: 2 MMHG
RA MAJOR: 6.15 CM
RA PRESSURE ESTIMATED: 3 MMHG
RA WIDTH: 5.7 CM
RIGHT VENTRICLE DIASTOLIC BASEL DIMENSION: 3.1 CM
RIGHT VENTRICULAR END-DIASTOLIC DIMENSION: 3.1 CM
RV TB RVSP: 6 MMHG
SODIUM SERPL-SCNC: 142 MMOL/L (ref 136–145)
STJ: 3.1 CM
TDI LATERAL: 0.07 M/S
TDI SEPTAL: 0.06 M/S
TDI: 0.07 M/S
TR MAX PG: 38 MMHG
TR MEAN GRADIENT: 30 MMHG
TRICUSPID ANNULAR PLANE SYSTOLIC EXCURSION: 2.08 CM
TROPONIN I SERPL DL<=0.01 NG/ML-MCNC: 0.1 NG/ML (ref 0–0.03)
TV REST PULMONARY ARTERY PRESSURE: 41 MMHG
Z-SCORE OF LEFT VENTRICULAR DIMENSION IN END DIASTOLE: -6.17
Z-SCORE OF LEFT VENTRICULAR DIMENSION IN END SYSTOLE: -2.29

## 2025-02-18 PROCEDURE — 94761 N-INVAS EAR/PLS OXIMETRY MLT: CPT

## 2025-02-18 PROCEDURE — 83735 ASSAY OF MAGNESIUM: CPT | Performed by: NURSE PRACTITIONER

## 2025-02-18 PROCEDURE — 80048 BASIC METABOLIC PNL TOTAL CA: CPT | Performed by: NURSE PRACTITIONER

## 2025-02-18 PROCEDURE — G0378 HOSPITAL OBSERVATION PER HR: HCPCS

## 2025-02-18 PROCEDURE — 25000003 PHARM REV CODE 250: Performed by: NURSE PRACTITIONER

## 2025-02-18 PROCEDURE — 99214 OFFICE O/P EST MOD 30 MIN: CPT | Mod: 25,,, | Performed by: INTERNAL MEDICINE

## 2025-02-18 PROCEDURE — 63600175 PHARM REV CODE 636 W HCPCS: Performed by: NURSE PRACTITIONER

## 2025-02-18 PROCEDURE — 96376 TX/PRO/DX INJ SAME DRUG ADON: CPT

## 2025-02-18 PROCEDURE — 25000003 PHARM REV CODE 250: Performed by: PHYSICIAN ASSISTANT

## 2025-02-18 PROCEDURE — 83036 HEMOGLOBIN GLYCOSYLATED A1C: CPT | Performed by: NURSE PRACTITIONER

## 2025-02-18 PROCEDURE — 36415 COLL VENOUS BLD VENIPUNCTURE: CPT | Performed by: NURSE PRACTITIONER

## 2025-02-18 PROCEDURE — 84484 ASSAY OF TROPONIN QUANT: CPT | Performed by: NURSE PRACTITIONER

## 2025-02-18 RX ORDER — ONDANSETRON HYDROCHLORIDE 2 MG/ML
4 INJECTION, SOLUTION INTRAVENOUS EVERY 6 HOURS PRN
Status: DISCONTINUED | OUTPATIENT
Start: 2025-02-18 | End: 2025-02-20 | Stop reason: HOSPADM

## 2025-02-18 RX ORDER — ENOXAPARIN SODIUM 100 MG/ML
40 INJECTION SUBCUTANEOUS EVERY 24 HOURS
Status: DISCONTINUED | OUTPATIENT
Start: 2025-02-18 | End: 2025-02-18

## 2025-02-18 RX ORDER — SACUBITRIL AND VALSARTAN 49; 51 MG/1; MG/1
2 TABLET, FILM COATED ORAL 2 TIMES DAILY
Status: DISCONTINUED | OUTPATIENT
Start: 2025-02-18 | End: 2025-02-20 | Stop reason: HOSPADM

## 2025-02-18 RX ORDER — FUROSEMIDE 10 MG/ML
60 INJECTION INTRAMUSCULAR; INTRAVENOUS EVERY 12 HOURS
Status: DISCONTINUED | OUTPATIENT
Start: 2025-02-18 | End: 2025-02-20

## 2025-02-18 RX ORDER — CARVEDILOL 12.5 MG/1
12.5 TABLET ORAL 2 TIMES DAILY
Status: DISCONTINUED | OUTPATIENT
Start: 2025-02-18 | End: 2025-02-20 | Stop reason: HOSPADM

## 2025-02-18 RX ORDER — CARVEDILOL 12.5 MG/1
12.5 TABLET ORAL 2 TIMES DAILY
Status: DISCONTINUED | OUTPATIENT
Start: 2025-02-18 | End: 2025-02-18

## 2025-02-18 RX ORDER — HYDRALAZINE HYDROCHLORIDE 25 MG/1
25 TABLET, FILM COATED ORAL EVERY 8 HOURS
Status: DISCONTINUED | OUTPATIENT
Start: 2025-02-18 | End: 2025-02-20

## 2025-02-18 RX ORDER — TALC
6 POWDER (GRAM) TOPICAL NIGHTLY PRN
Status: DISCONTINUED | OUTPATIENT
Start: 2025-02-18 | End: 2025-02-20 | Stop reason: HOSPADM

## 2025-02-18 RX ORDER — AMIODARONE HYDROCHLORIDE 200 MG/1
200 TABLET ORAL DAILY
Status: DISCONTINUED | OUTPATIENT
Start: 2025-02-18 | End: 2025-02-20 | Stop reason: HOSPADM

## 2025-02-18 RX ORDER — ACETAMINOPHEN 325 MG/1
650 TABLET ORAL EVERY 6 HOURS PRN
Status: DISCONTINUED | OUTPATIENT
Start: 2025-02-18 | End: 2025-02-20 | Stop reason: HOSPADM

## 2025-02-18 RX ORDER — SACUBITRIL AND VALSARTAN 49; 51 MG/1; MG/1
2 TABLET, FILM COATED ORAL 2 TIMES DAILY
Status: DISCONTINUED | OUTPATIENT
Start: 2025-02-18 | End: 2025-02-18

## 2025-02-18 RX ORDER — HYDRALAZINE HYDROCHLORIDE 20 MG/ML
10 INJECTION INTRAMUSCULAR; INTRAVENOUS EVERY 6 HOURS PRN
Status: DISCONTINUED | OUTPATIENT
Start: 2025-02-18 | End: 2025-02-20 | Stop reason: HOSPADM

## 2025-02-18 RX ORDER — SODIUM CHLORIDE 0.9 % (FLUSH) 0.9 %
10 SYRINGE (ML) INJECTION
Status: DISCONTINUED | OUTPATIENT
Start: 2025-02-18 | End: 2025-02-20 | Stop reason: HOSPADM

## 2025-02-18 RX ADMIN — APIXABAN 5 MG: 2.5 TABLET, FILM COATED ORAL at 10:02

## 2025-02-18 RX ADMIN — AMIODARONE HYDROCHLORIDE 200 MG: 200 TABLET ORAL at 08:02

## 2025-02-18 RX ADMIN — APIXABAN 5 MG: 2.5 TABLET, FILM COATED ORAL at 08:02

## 2025-02-18 RX ADMIN — FUROSEMIDE 60 MG: 10 INJECTION, SOLUTION INTRAMUSCULAR; INTRAVENOUS at 08:02

## 2025-02-18 RX ADMIN — CARVEDILOL 12.5 MG: 12.5 TABLET, FILM COATED ORAL at 03:02

## 2025-02-18 RX ADMIN — HYDRALAZINE HYDROCHLORIDE 10 MG: 20 INJECTION, SOLUTION INTRAMUSCULAR; INTRAVENOUS at 02:02

## 2025-02-18 RX ADMIN — CARVEDILOL 12.5 MG: 12.5 TABLET, FILM COATED ORAL at 10:02

## 2025-02-18 RX ADMIN — SACUBITRIL AND VALSARTAN 2 TABLET: 49; 51 TABLET, FILM COATED ORAL at 10:02

## 2025-02-18 RX ADMIN — CARVEDILOL 12.5 MG: 12.5 TABLET, FILM COATED ORAL at 08:02

## 2025-02-18 RX ADMIN — HYDRALAZINE HYDROCHLORIDE 25 MG: 25 TABLET ORAL at 10:02

## 2025-02-18 RX ADMIN — SACUBITRIL AND VALSARTAN 2 TABLET: 49; 51 TABLET, FILM COATED ORAL at 03:02

## 2025-02-18 RX ADMIN — FUROSEMIDE 60 MG: 10 INJECTION, SOLUTION INTRAMUSCULAR; INTRAVENOUS at 10:02
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
## 2025-02-18 NOTE — ASSESSMENT & PLAN NOTE
Chronic, uncontrolled.  Latest blood pressure and vitals reviewed-   Temp:  [97.5 °F (36.4 °C)]   Pulse:  [80-95]   Resp:  [17-18]   BP: (154-185)/()   SpO2:  [96 %-97 %] .   Home meds for hypertension were reviewed and noted below.   Hypertension Medications              carvediloL (COREG) 12.5 MG tablet Take 1 tablet (12.5 mg total) by mouth 2 (two) times daily.    ENTRESTO  mg per tablet TAKE 1 TABLET BY MOUTH TWICE DAILY    hydrALAZINE (APRESOLINE) 25 MG tablet Take 1 tablet (25 mg total) by mouth every 8 (eight) hours as needed (blood pressure greater than 160/90).    sacubitriL-valsartan (ENTRESTO) 49-51 mg per tablet Take 1 tablet by mouth.    torsemide (DEMADEX) 20 MG Tab Take 2 tablets by mouth every morning.    torsemide 40 mg Tab Take 2 tablets by mouth.            While in the hospital, will manage blood pressure as follows; Continue home antihypertensive regimen    Will utilize p.r.n. blood pressure medication only if patient's blood pressure greater than  160/90 and he develops symptoms such as worsening chest pain or shortness of breath.

## 2025-02-18 NOTE — PROGRESS NOTES
"[facility]) nurse navigator notified of HFTCC candidate in need of education and introduction to 4-6 week program.      PT aao x 3 while lying in bed. Introduced self to pt as HFTCC nurse navigator.     Patient given "Home Care Guide for Heart Failure Patients" , "Heart Failure [facility] Care Clinic" flyer and "Daily weight and symptom tracker".  Encouraged pt  to review information.      Reviewed the following key points of HFTCC program with pt and family:   1.) Take your medications as directed.    2.) Weight yourself daily   3.) Follow low salt and limited fluid diet.    4.) Stop smoking and start exercising   5.) Go to your appointments and call your team.      Pt reminded to follow Symptom tracker and to call at the onset of symptoms according to tracker.     Reviewed plan for follow up once discharged to include phone calls, in person and virtual visits to assist pt optimizing their heart failure medication regimen and encouraging healthy lifestyle modifications.  Reminded pt that program will assist them over the next 4-6 weeks and then patient will be transferred to long term care provider .  Reminded pt how to contact HFTCC navigator via phone and or via [employer].     Pt given appointment or instructed appointment will be printed on hospital discharge paperwork.     Pt also reminded HF nurse will call 48-72 hours after discharge to check on them.     PT verbalize read back of information given.  Encouraged pt and family to read over information often and contact team with any questions or concerns.      "

## 2025-02-18 NOTE — ASSESSMENT & PLAN NOTE
-Presents with decompensated CHF, exacerbated by uncontrolled HTN  -Continue IV diuresis  -Continue Coreg, Entresto  -Hydralazine resumed and increased to TID dosing  -Dietary restrictions discussed  -Strict I's/O's  -TTE pending    Sharri

## 2025-02-18 NOTE — FIRST PROVIDER EVALUATION
"Medical screening examination initiated.  I have conducted a focused provider triage encounter, findings are as follows:    Brief history of present illness:  54-year-old male presents to the emergency department for a chief complaint of hypertension and vomiting.  Reports symptoms began about a week ago.  Reports symptoms have been continuous.  Reports associated diarrhea and weakness.  Reports he has beginning to vomit bloody vomitus.  Denies any abdominal pain or fever.    Vitals:    02/17/25 1903   BP: (!) 185/114   Pulse: 95   Resp: 17   Temp: 97.5 °F (36.4 °C)   TempSrc: Oral   SpO2: 96%   Weight: 112.9 kg (248 lb 12.8 oz)   Height: 6' 3" (1.905 m)       Pertinent physical exam:  Hematemesis, hypertension    Brief workup plan:  Workup    Preliminary workup initiated; this workup will be continued and followed by the physician or advanced practice provider that is assigned to the patient when roomed.  "

## 2025-02-18 NOTE — ASSESSMENT & PLAN NOTE
Baseline creatinine is  1.9-2.3 . Most recent creatinine and eGFR are listed below.  Recent Labs     02/17/25  2142   CREATININE 2.7*   EGFRNORACEVR 27*      Plan  - TIFFANIE is worsening. Will continue current treatment  - Avoid nephrotoxins and renally dose meds for GFR listed above  - Monitor urine output, serial BMP, and adjust therapy as needed

## 2025-02-18 NOTE — CARE UPDATE
Evaluated patient at bedside    Reports improvement in symptoms of dyspnea and nausea, vomiting  Relates vomiting to fluid overload  Similar symptoms in past requiring hospitalization  Endorses compliance to medication(s) for congestive heart failure   Does not weigh self on daily  Notes more abdominal distension with pain upon lying on one side  Denies smoking or drinking etoh  Endorses frequent urination with lasix    No acute distress  Sleeping but easily awakens  No respiratory distress  On room air  Clear lungs on auscultation   Abdominal distension with fluid wave  Non tender, soft abdomen   Tympanic to percussion   No lower extremity edema   AO3    Prior us imaging with hepatosplenomegaly in 6/2024 showing no ascites  Liver enzymes within normal limits  Continue intravenous diuresis  Echocardiogram with similar reduced ejection fraction from prior    Repeat bmp pending    Blood pressure improved, now hypotension

## 2025-02-18 NOTE — ASSESSMENT & PLAN NOTE
-Troponin 0.138>0.099  -Secondary to demand ischemia from decompensated CHF, CKD  -No CP  -Continue OMT  -Prior MPI stress test 5/24 negative for ischemia, + scar
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
## 2025-02-18 NOTE — ED NOTES
Bed: 23  Expected date:   Expected time:   Means of arrival: Personal Transportation  Comments:  Bria Saldana

## 2025-02-18 NOTE — HPI
"Bria Saldana is a 54 y.o. male with a PMH  has a past medical history of Acute CHF (7/8/2019), Acute on chronic combined systolic (congestive) and diastolic (congestive) heart failure (9/23/2019), Allergy, CHF (congestive heart failure) (7/9/2019), CKD (chronic kidney disease) stage 3, GFR 30-59 ml/min (7/8/2019), Essential hypertension (6/1/2017), Hypertension associated with chronic kidney disease due to type 2 diabetes mellitus (2/28/2022), Mixed hyperlipidemia (3/10/2022), and NATALYA (obstructive sleep apnea) (7/23/2018).presented to the Emergency Department for evaluation of HTN, SOB, and bilateral leg swelling which began 1 week ago. Pt reports having yellowish vomit with streaks of blood. He states that he has had this problem before in May 2024 after he had "some type of abdominal procedure".  Chart review revealed that patient was admitted on 05/14/2024 for CHF exacerbation he was started on IV Lasix and was planned for a MEGAN with cardioversion on 05/15/2024.  During his MEGAN patient aspirated and ended up being intubated and transferred to the ICU.  During his stay in the ICU he did have a cardioversion and was started on amiodarone drip post conversion.  He was transferred out of ICU on 05/18/2024 and had been converted from IV to p.o. amiodarone but was continued on a heparin drip. Cardiology.  Recommended continuing medical therapy. Nuclear med stress negative and patient was discharged.    Associated sxs include diarrhea, nausea, SOB, and loss of appetite. Symptoms are constant and moderate in severity. No mitigating or exacerbating factors reported. Patient denies any fever or chills. No prior Tx specified.  No further complaints or concerns at this time.     ER workup revealed CBC to be unremarkable.  CMP revealed BUN/creatinine of 32/2.7 (previously 2.4/31.5 on 02/11/2025). BNP 3,242 (previously 3,357 on 12/15/2024).  Troponin 0.138.  Chest x-ray showed cardiomegaly.  EKG revealed normal " sinus rhythm with a ventricular rate of 88 beats per minute with a QT/QTC of 414/500.  BP initially 185/114, but 154/99 after receiving 60 mg Lasix, 10 mg hydralazine, and 1 in of nitroglycerin paste in ER.  Hospital Medicine consulted to admit patient for CHF exacerbation.  Patient in agreement with treatment plan.  Patient admitted under inpatient status.    PCP: Brenna Maravilla

## 2025-02-18 NOTE — PLAN OF CARE
[facility] - Telemetry (Hospital)  Initial Discharge Assessment       Primary Care Provider: Brenna Maravilla MD    Admission Diagnosis: Shortness of breath [R06.02]  CHF (congestive heart failure) [I50.9]  Troponin level elevated [R79.89]  Hypertensive emergency [I16.1]  Acute congestive heart failure, unspecified heart failure type [I50.9]  Acute renal failure superimposed on chronic kidney disease, unspecified acute renal failure type, unspecified CKD stage [N17.9, N18.9]    Admission Date: 2/17/2025  Expected Discharge Date:     Transition of Care Barriers: None    Payor: MEDICAID / Plan: [place]UE [place] / Product Type: Managed Medicaid /     Extended Emergency Contact Information  Primary Emergency Contact: Vielka Saldana  Address: [address] [place] [address] [facility]  Home Phone: [phone]  Mobile Phone: [phone]  Relation: Spouse    Discharge Plan A: Home  Discharge Plan B: Home      [facility] [address] [place] [address] [id number] [place] [address]  [id number] [place] [id number]  Phone: [phone] Fax: [phone]      Initial Assessment (most recent)       Adult Discharge Assessment - 02/18/25 1127          Discharge Assessment    Assessment Type Discharge Planning Assessment     Confirmed/corrected address, phone number and insurance Yes     Confirmed Demographics Correct on Facesheet     Source of Information patient     Communicated CARMELO with patient/caregiver Date not available/Unable to determine     People in Home spouse     Facility Arrived From: home     Do you expect to return to your current living situation? Yes     Do you have help at home or someone to help you manage your care at home? Yes     Who are your caregiver(s) and their phone number(s)? Spouse     Prior to hospitilization cognitive status: Alert/Oriented     Current cognitive status: Alert/Oriented     Walking or Climbing Stairs Difficulty no     Dressing/Bathing  Difficulty no     Equipment Currently Used at Home none     Readmission within 30 days? No     Patient currently being followed by outpatient case management? No     Do you currently have service(s) that help you manage your care at home? No     Do you take prescription medications? Yes     Do you have prescription coverage? Yes     Do you have any problems affording any of your prescribed medications? No     Is the patient taking medications as prescribed? yes     Who is going to help you get home at discharge? Home     How do you get to doctors appointments? car, drives self;family or friend will provide     Are you on dialysis? No     Do you take coumadin? No     Discharge Plan A Home     Discharge Plan B Home     DME Needed Upon Discharge  none     Discharge Plan discussed with: Patient     Transition of Care Barriers None                   Anticipated DC dispo: Home  Prior Level of Function: Lives at home with spouse, indpt with ADLs and does not use/own DME. Spouse will be transport home at DC  PCP: MD Taiwo      Comments: No anticipated need at this time. Pt's whiteboard updated with CM contact and anticipated discharge disposition. SW to remain available as needed.

## 2025-02-18 NOTE — PROGRESS NOTES
Pharmacist Renal Dose Adjustment Note    Bria Saldana is a 54 y.o. male being treated with the medication enoxaparin    Patient Data:    Vital Signs (Most Recent):  Temp: 97.5 °F (36.4 °C) (02/17/25 1903)  Pulse: 82 (02/18/25 0002)  Resp: 18 (02/17/25 2337)  BP: (!) 154/99 (02/18/25 0002)  SpO2: 96 % (02/18/25 0002) Vital Signs (72h Range):  Temp:  [97.5 °F (36.4 °C)]   Pulse:  [80-95]   Resp:  [17-18]   BP: (154-185)/()   SpO2:  [96 %-97 %]      Recent Labs   Lab 02/11/25  1337 02/17/25 2142   CREATININE 2.4* 2.7*     Serum creatinine: 2.7 mg/dL (H) 02/17/25 2142  Estimated creatinine clearance: 42.9 mL/min (A)    Enoxaparin 30 mg q24h will be changed to enoxaparin 40 mg q24h for CrCl >30 mL/min.     Pharmacist's Name: Jeny Bartlett  Pharmacist's Extension: [phone]

## 2025-02-18 NOTE — HPI
"Mr. Saldana is a 54 year old male patient whose current medical conditions include CKD, HTN, DM type II, hyperlipidemia, NATALYA, PAFL s/p prior DCCV (on Eliquis) who presented to [facility] ED yesterday due to increased SOB over the past week. Associated symptoms included BLE edema, abdominal bloating, nausea, and loss of appetite. He denied any associated fever, chills, jeffery CP, palpitations, near syncope, or syncope. Initial workup in ED revealed uncontrolled BP, BNP > 3,000, troponin of 0.138 and patient was subsequently admitted for further evaluation and treatment. Cardiology consulted to assist with management. Patient seen and examined today, sitting up in bed. Feeling well. Diuresing well. States SOB/edema are improving. CP free. He reports compliance with his medications. Tries to watch his salt intake, states his BP is "always high". Followed in CHF clinic. TTE pending. Repeat troponin trending down. MPI stress test 5/24 negative, + scar noted.   "
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
## 2025-02-18 NOTE — ED NOTES
Pt instructed to remain in lobby w/ IV in place while waiting for bed. Instructed that if leaves ED for any reason to notify staff to remove IV. Pt verbalized understanding.

## 2025-02-18 NOTE — ASSESSMENT & PLAN NOTE
Patient has Combined Systolic and Diastolic heart failure that is Acute on chronic. On presentation their CHF was decompensated. Evidence of decompensated CHF on presentation includes: edema, paroxysmal nocturnal dyspnea (PND), dyspnea on exertion (BROUSSARD), and shortness of breath. The etiology of their decompensation is likely dietary indiscretion and increased fluid intake. Most recent BNP and echo results are listed below.  Recent Labs     02/17/25  2142   BNP 3,242*     Latest ECHO  Results for orders placed during the hospital encounter of 05/31/24    Echo    Interpretation Summary    Left Ventricle: Severe global hypokinesis present. There is moderately reduced systolic function with a visually estimated ejection fraction of 30 - 35%.    Left Atrium: Left atrium is severely dilated.    Right Atrium: Right atrium is mildly dilated.    Pericardium: There is a trivial effusion.    Current Heart Failure Medications  , , Oral  , Every morning, Oral  , , Oral  furosemide injection 60 mg, Every 12 hours, Intravenous    Plan  - Monitor strict I&Os and daily weights.    - Place on telemetry  - Low sodium diet  - Place on fluid restriction of 2 L.   - Cardiology has been consulted  - The patient's volume status is stable but not at their baseline as indicated by edema, orthopnea, paroxysmal nocturnal dyspnea (PND), dyspnea on exertion (BROUSSARD), and shortness of breath

## 2025-02-18 NOTE — ASSESSMENT & PLAN NOTE
Patient is not chronically on statin.will not continue for now. Last Lipid Panel:   Lab Results   Component Value Date    CHOL 131 02/11/2025    HDL 36 (L) 02/11/2025    LDLCALC 72.4 02/11/2025    TRIG 113 02/11/2025    CHOLHDL 27.5 02/11/2025     Plan:  -low fat/low calorie diet
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
## 2025-02-18 NOTE — ASSESSMENT & PLAN NOTE
Patient with Paroxysmal (<7 days) atrial fibrillation which is controlled currently with Beta Blocker and Amiodarone. Patient is currently in sinus rhythm.[id number] Score: 1. Anticoagulation indicated. Anticoagulation done with Eliquis .

## 2025-02-18 NOTE — ASSESSMENT & PLAN NOTE
Creatine stable for now. BMP reviewed- noted Estimated Creatinine Clearance: 42.9 mL/min (A) (based on SCr of 2.7 mg/dL (H)). according to latest data. Based on current GFR, CKD stage is stage 3 - GFR 30-59.  Monitor UOP and serial BMP and adjust therapy as needed. Renally dose meds. Avoid nephrotoxic medications and procedures.

## 2025-02-19 LAB
ANION GAP SERPL CALC-SCNC: 18 MMOL/L (ref 8–16)
BUN SERPL-MCNC: 31 MG/DL (ref 6–20)
CALCIUM SERPL-MCNC: 8.7 MG/DL (ref 8.7–10.5)
CHLORIDE SERPL-SCNC: 101 MMOL/L (ref 95–110)
CO2 SERPL-SCNC: 24 MMOL/L (ref 23–29)
CREAT SERPL-MCNC: 2.5 MG/DL (ref 0.5–1.4)
EST. GFR  (NO RACE VARIABLE): 30 ML/MIN/1.73 M^2
GLUCOSE SERPL-MCNC: 70 MG/DL (ref 70–110)
POCT GLUCOSE: 157 MG/DL (ref 70–110)
POTASSIUM SERPL-SCNC: 3.6 MMOL/L (ref 3.5–5.1)
SODIUM SERPL-SCNC: 143 MMOL/L (ref 136–145)

## 2025-02-19 PROCEDURE — 36415 COLL VENOUS BLD VENIPUNCTURE: CPT | Performed by: NURSE PRACTITIONER

## 2025-02-19 PROCEDURE — G0378 HOSPITAL OBSERVATION PER HR: HCPCS

## 2025-02-19 PROCEDURE — 63600175 PHARM REV CODE 636 W HCPCS: Performed by: NURSE PRACTITIONER

## 2025-02-19 PROCEDURE — 25000003 PHARM REV CODE 250: Performed by: PHYSICIAN ASSISTANT

## 2025-02-19 PROCEDURE — 96376 TX/PRO/DX INJ SAME DRUG ADON: CPT

## 2025-02-19 PROCEDURE — 25000003 PHARM REV CODE 250: Performed by: NURSE PRACTITIONER

## 2025-02-19 PROCEDURE — 80048 BASIC METABOLIC PNL TOTAL CA: CPT | Performed by: NURSE PRACTITIONER

## 2025-02-19 PROCEDURE — 99214 OFFICE O/P EST MOD 30 MIN: CPT | Mod: ,,, | Performed by: INTERNAL MEDICINE

## 2025-02-19 PROCEDURE — 94761 N-INVAS EAR/PLS OXIMETRY MLT: CPT

## 2025-02-19 RX ADMIN — AMIODARONE HYDROCHLORIDE 200 MG: 200 TABLET ORAL at 08:02

## 2025-02-19 RX ADMIN — CARVEDILOL 12.5 MG: 12.5 TABLET, FILM COATED ORAL at 09:02

## 2025-02-19 RX ADMIN — CARVEDILOL 12.5 MG: 12.5 TABLET, FILM COATED ORAL at 08:02

## 2025-02-19 RX ADMIN — SACUBITRIL AND VALSARTAN 2 TABLET: 49; 51 TABLET, FILM COATED ORAL at 08:02

## 2025-02-19 RX ADMIN — APIXABAN 5 MG: 2.5 TABLET, FILM COATED ORAL at 09:02

## 2025-02-19 RX ADMIN — FUROSEMIDE 60 MG: 10 INJECTION, SOLUTION INTRAMUSCULAR; INTRAVENOUS at 08:02

## 2025-02-19 RX ADMIN — APIXABAN 5 MG: 2.5 TABLET, FILM COATED ORAL at 08:02

## 2025-02-19 RX ADMIN — HYDRALAZINE HYDROCHLORIDE 25 MG: 25 TABLET ORAL at 06:02

## 2025-02-19 NOTE — PROGRESS NOTES
"[facility] Medicine  Progress Note    Patient Name: Bria Saldana  MRN: [id number]  Patient Class: OP- Observation   Admission Date: 2/17/2025  Length of Stay: 1 days  Attending Physician: Howard Rao MD  Primary Care Provider: Brenna Maravilla MD        Subjective     Principal Problem:Acute on chronic combined systolic (congestive) and diastolic (congestive) heart failure        HPI:  Bria Saldana is a 54 y.o. male with a PMH  has a past medical history of Acute CHF (7/8/2019), Acute on chronic combined systolic (congestive) and diastolic (congestive) heart failure (9/23/2019), Allergy, CHF (congestive heart failure) (7/9/2019), CKD (chronic kidney disease) stage 3, GFR 30-59 ml/min (7/8/2019), Essential hypertension (6/1/2017), Hypertension associated with chronic kidney disease due to type 2 diabetes mellitus (2/28/2022), Mixed hyperlipidemia (3/10/2022), and NATALYA (obstructive sleep apnea) (7/23/2018).presented to the Emergency Department for evaluation of HTN, SOB, and bilateral leg swelling which began 1 week ago. Pt reports having yellowish vomit with streaks of blood. He states that he has had this problem before in May 2024 after he had "some type of abdominal procedure".  Chart review revealed that patient was admitted on 05/14/2024 for CHF exacerbation he was started on IV Lasix and was planned for a MEGAN with cardioversion on 05/15/2024.  During his MEGAN patient aspirated and ended up being intubated and transferred to the ICU.  During his stay in the ICU he did have a cardioversion and was started on amiodarone drip post conversion.  He was transferred out of ICU on 05/18/2024 and had been converted from IV to p.o. amiodarone but was continued on a heparin drip. Cardiology.  Recommended continuing medical therapy. Nuclear med stress negative and patient was discharged.    Associated sxs include diarrhea, nausea, SOB, and loss of appetite. " Symptoms are constant and moderate in severity. No mitigating or exacerbating factors reported. Patient denies any fever or chills. No prior Tx specified.  No further complaints or concerns at this time.     ER workup revealed CBC to be unremarkable.  CMP revealed BUN/creatinine of 32/2.7 (previously 2.4/31.5 on 02/11/2025). BNP 3,242 (previously 3,357 on 12/15/2024).  Troponin 0.138.  Chest x-ray showed cardiomegaly.  EKG revealed normal sinus rhythm with a ventricular rate of 88 beats per minute with a QT/QTC of 414/500.  BP initially 185/114, but 154/99 after receiving 60 mg Lasix, 10 mg hydralazine, and 1 in of nitroglycerin paste in ER.  Hospital Medicine consulted to admit patient for CHF exacerbation.  Patient in agreement with treatment plan.  Patient admitted under inpatient status.    PCP: Brenna Maravilla      Overview/Hospital Course:  2/19 admitted for congestive heart failure exacerbation. Cardiology recommending additional day of intravenous diuresis with cardiac stress test planned for tomorrow. No complaints    Interval History: See hospital course for today      Review of Systems   Constitutional:  Negative for activity change, appetite change, fatigue and fever.   Respiratory:  Positive for shortness of breath (improved).    Cardiovascular:  Negative for chest pain and leg swelling.   Gastrointestinal:  Positive for abdominal distention (improved) and nausea (improved). Negative for abdominal pain.   Genitourinary:  Positive for frequency.   Neurological:  Negative for weakness.   Psychiatric/Behavioral:  Positive for dysphoric mood. Negative for sleep disturbance.      Objective:     Vital Signs (Most Recent):  Temp: 97.1 °F (36.2 °C) (02/19/25 1136)  Pulse: 70 (02/19/25 1500)  Resp: 18 (02/19/25 1136)  BP: 108/79 (02/19/25 1136)  SpO2: (!) 94 % (02/19/25 1136) Vital Signs (24h Range):  Temp:  [97.1 °F (36.2 °C)-98 °F (36.7 °C)] 97.1 °F (36.2 °C)  Pulse:  [61-78] 70  Resp:  [17-20] 18  SpO2:   [89 %-98 %] 94 %  BP: (106-157)/() 108/79     Weight: 103.4 kg (227 lb 15.3 oz)  Body mass index is 28.49 kg/m².    Intake/Output Summary (Last 24 hours) at 2/19/2025 1526  Last data filed at 2/19/2025 1136  Gross per 24 hour   Intake 240 ml   Output 3900 ml   Net -3660 ml         Physical Exam  Vitals and nursing note reviewed.   Constitutional:       General: He is not in acute distress.     Appearance: He is ill-appearing. He is not toxic-appearing.   HENT:      Head: Normocephalic and atraumatic.   Cardiovascular:      Rate and Rhythm: Normal rate.   Pulmonary:      Effort: Pulmonary effort is normal. No respiratory distress.      Breath sounds: No wheezing or rales.   Abdominal:      General: There is distension.      Palpations: Abdomen is soft.      Tenderness: There is no abdominal tenderness.   Musculoskeletal:      Right lower leg: No edema.      Left lower leg: No edema.   Skin:     General: Skin is warm and dry.   Neurological:      Mental Status: He is alert and oriented to person, place, and time.      Motor: Weakness present.   Psychiatric:         Mood and Affect: Mood is depressed.             Significant Labs: All pertinent labs within the past 24 hours have been reviewed.  CBC:   Recent Labs   Lab 02/17/25 2142   WBC 6.43   HGB 16.2   HCT 51.7        CMP:   Recent Labs   Lab 02/17/25 2142 02/18/25  0420 02/19/25  0444    142 143   K 3.8 3.5 3.6    105 101   CO2 19* 20* 24   GLU 78 73 70   BUN 32* 30* 31*   CREATININE 2.7* 2.5* 2.5*   CALCIUM 9.2 9.6 8.7   PROT 6.7  --   --    ALBUMIN 3.7  --   --    BILITOT 3.4*  --   --    ALKPHOS 133  --   --    AST 22  --   --    ALT 19  --   --    ANIONGAP 13 17* 18*     Cardiac Markers:   Recent Labs   Lab 02/17/25 2142   BNP 3,242*       Troponin:   Recent Labs   Lab 02/17/25 2142 02/18/25  0420   TROPONINI 0.138* 0.099*       Significant Imaging: I have reviewed all pertinent imaging results/findings within the past 24  hours.  Echo: I have reviewed all pertinent results/findings within the past 24 hours and my personal findings are:  ejection fraction 30-40%, diastolic dysfunction    Assessment and Plan     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 (congestive) and diastolic (congestive) heart failure  Patient has Combined Systolic and Diastolic heart failure that is Acute on chronic. On presentation their CHF was decompensated. Evidence of decompensated CHF on presentation includes: edema, paroxysmal nocturnal dyspnea (PND), dyspnea on exertion (BROUSSARD), and shortness of breath. The etiology of their decompensation is likely dietary indiscretion and increased fluid intake. Most recent BNP and echo results are listed below.  Recent Labs     02/17/25  2142   BNP 3,242*       Latest ECHO  Results for orders placed during the hospital encounter of 05/31/24    Echo    Interpretation Summary    Left Ventricle: Severe global hypokinesis present. There is moderately reduced systolic function with a visually estimated ejection fraction of 30 - 35%.    Left Atrium: Left atrium is severely dilated.    Right Atrium: Right atrium is mildly dilated.    Pericardium: There is a trivial effusion.    Current Heart Failure Medications  , , Oral  , Every morning, Oral  , , Oral  furosemide injection 60 mg, Every 12 hours, Intravenous  hydrALAZINE injection 10 mg, Every 6 hours PRN, Intravenous  carvediloL tablet 12.5 mg, 2 times daily, Oral  sacubitriL-valsartan 49-51 mg per tablet 2 tablet, 2 times daily, Oral  hydrALAZINE tablet 25 mg, Every 8 hours, Oral    Plan  - Monitor strict I&Os and daily weights.    - Place on telemetry  - Low sodium diet  - Place on fluid restriction of 2 L.   - Cardiology has been consulted  - The patient's volume status is stable but not at their baseline as indicated by edema, orthopnea, paroxysmal nocturnal dyspnea (PND), dyspnea on exertion (BROUSSARD), and shortness of breath    Improvement in symptoms  Cardiology recommending  additional day of diuresis          Acute renal failure superimposed on stage 3b chronic kidney disease   Baseline creatinine is  1.9-2.3 . Most recent creatinine and eGFR are listed below.  Recent Labs     02/17/25  2142 02/18/25  0420 02/19/25  0444   CREATININE 2.7* 2.5* 2.5*   EGFRNORACEVR 27* 30* 30*        Plan  - TIFFANIE is worsening. Will continue current treatment  - Avoid nephrotoxins and renally dose meds for GFR listed above  - Monitor urine output, serial BMP, and adjust therapy as needed  Creatinine close to baseline  Repeat in am    Paroxysmal atrial flutter  Patient with Paroxysmal (<7 days) atrial fibrillation which is controlled currently with Beta Blocker and Amiodarone. Patient is currently in sinus rhythm.[id number] Score: 1. Anticoagulation indicated. Anticoagulation done with Eliquis .        Mixed hyperlipidemia  Patient is not chronically on statin.will not continue for now. Last Lipid Panel:   Lab Results   Component Value Date    CHOL 131 02/11/2025    HDL 36 (L) 02/11/2025    LDLCALC 72.4 02/11/2025    TRIG 113 02/11/2025    CHOLHDL 27.5 02/11/2025     Plan:  -low fat/low calorie diet        Elevated troponin  Cardiology consulted  Plans for continued diuresis   Npo after mn for stress test tomorrow       Essential hypertension  Chronic, controlled.  Latest blood pressure and vitals reviewed-   Temp:  [97.1 °F (36.2 °C)-98 °F (36.7 °C)]   Pulse:  [61-78]   Resp:  [17-20]   BP: (106-157)/()   SpO2:  [89 %-98 %] .   Home meds for hypertension were reviewed and noted below.   Hypertension Medications              carvediloL (COREG) 12.5 MG tablet Take 1 tablet (12.5 mg total) by mouth 2 (two) times daily.    ENTRESTO  mg per tablet TAKE 1 TABLET BY MOUTH TWICE DAILY    hydrALAZINE (APRESOLINE) 25 MG tablet Take 1 tablet (25 mg total) by mouth every 8 (eight) hours as needed (blood pressure greater than 160/90).    sacubitriL-valsartan (ENTRESTO) 49-51 mg per tablet Take 1 tablet by  mouth.    torsemide (DEMADEX) 20 MG Tab Take 2 tablets by mouth every morning.    torsemide 40 mg Tab Take 2 tablets by mouth.            While in the hospital, will manage blood pressure as follows; Continue home antihypertensive regimen    Will utilize p.r.n. blood pressure medication only if patient's blood pressure greater than  160/90 and he develops symptoms such as worsening chest pain or shortness of breath.  States taking hydralazine bid instead of tid prn for elevated blood pressures      VTE Risk Mitigation (From admission, onward)           Ordered     apixaban tablet 5 mg  2 times daily         02/18/25 0414     IP VTE HIGH RISK PATIENT  Once         02/18/25 0029     Place sequential compression device  Until discontinued         02/18/25 0029                    Discharge Planning   CARMELO: 2/19/2025     Code Status: Full Code   Medical Readiness for Discharge Date: 2/19/2025  Discharge Plan A: Home                        Howard Rao MD  Department of Hospital Medicine   '[facility] - Telemetry ([facility])

## 2025-02-19 NOTE — CONSULTS
"Food & Nutrition Education       Diet Education: Cardiac, Fluid restriction diet    Learners: Patient       Nutrition Education provided with handouts:   "Heart Healthy Nutrition Therapy"  "Fluid Restricted Nutrition Therapy"  (nutritioncaremanual.org)       Comments:   PMH: HTN, Elevated troponin, HLD, PAF, TIFFANIE on CKD 3b    54 y.o. Male admitted for Acute on chronic combined systolic (congestive) and diastolic (congestive) heart failure. Pt is currently on a Low sodium 2 gm, 1500 mL fluid restriction diet. Visited pt at bedside, pt sitting up in bed. Pt reported that he did not consume anything x a few days PTA d/t fluid build up caused him not to have an appetite, confirmed that his appetite has return, visually confirmed 100% lunch consumed, provided pt with a menu to encourage pt preferred food choices. Pt stated that his recent UBW was 245 lbs, current weight charted 2/18/25 227 lbs.     RD educated patient on low sodium, general healthful diet r/t recent hospital diagnosis. Recommended a well balanced diet with a variety of fresh foods, fruits and vegetables (5 cups/day), whole grains (3 oz/day), and fat-free or low fat dairy. Discussed reading food packages, food labels, and nutrition facts labels to identify nutrient content of foods.       Discussed the importance of limiting sodium to less than 2,000 mg per day. Recommended salt free seasonings and other herbs and spices in meals to enhance flavor without additional sodium.       Discussed dietary sources of cholesterol, the importance of incorporating healthy fats into the diet, and avoiding saturated and trans fats for heart health. For a generally healthy diet, aim for total fat less than 25-35% of calories.       Discussed the importance of fiber (especially soluble fiber), dietary sources, and a goal intake of >20-30g/day.     Discussed 1500 ml fluid restriction per MD and dietary sources of fluid. RD recommended using a cup with measurements for " fluids and to try to consume small sips spread throughout the day rather than a lot at one time.      Pt reported that his sister does the grocery shopping and the cooking, he does not add salt to food or eats frozen meals. Pt expressed understanding and appreciation for nutrition education provided, encouraged pt and pt's sister to read handouts and use RD contact information with any further questions/concerns that they may have.   Pt expressed interest and desire for diet compliance, encouraged to to talk to an outpatient RD to assist with recipes and meal planning, pt would benefit from the additional resources.     Nutrition Related Social Determinants of Health: SDOH: Adequate food in home environment and None Identified      Visual NFPE performed, pt appears nourished.   All questions and concerns answered.   Provided handout with dietitian's contact information.   *Please re-consult as needed.   Thank you,   Tierra Campos, BS, RDN, LDN

## 2025-02-19 NOTE — ASSESSMENT & PLAN NOTE
Baseline creatinine is  1.9-2.3 . Most recent creatinine and eGFR are listed below.  Recent Labs     02/17/25  2142 02/18/25  0420 02/19/25  0444   CREATININE 2.7* 2.5* 2.5*   EGFRNORACEVR 27* 30* 30*        Plan  - TIFFANIE is worsening. Will continue current treatment  - Avoid nephrotoxins and renally dose meds for GFR listed above  - Monitor urine output, serial BMP, and adjust therapy as needed  Creatinine close to baseline  Repeat in am

## 2025-02-19 NOTE — ASSESSMENT & PLAN NOTE
Patient has Combined Systolic and Diastolic heart failure that is Acute on chronic. On presentation their CHF was decompensated. Evidence of decompensated CHF on presentation includes: edema, paroxysmal nocturnal dyspnea (PND), dyspnea on exertion (BROUSSARD), and shortness of breath. The etiology of their decompensation is likely dietary indiscretion and increased fluid intake. Most recent BNP and echo results are listed below.  Recent Labs     02/17/25  2142   BNP 3,242*       Latest ECHO  Results for orders placed during the hospital encounter of 05/31/24    Echo    Interpretation Summary    Left Ventricle: Severe global hypokinesis present. There is moderately reduced systolic function with a visually estimated ejection fraction of 30 - 35%.    Left Atrium: Left atrium is severely dilated.    Right Atrium: Right atrium is mildly dilated.    Pericardium: There is a trivial effusion.    Current Heart Failure Medications  , , Oral  , Every morning, Oral  , , Oral  furosemide injection 60 mg, Every 12 hours, Intravenous  hydrALAZINE injection 10 mg, Every 6 hours PRN, Intravenous  carvediloL tablet 12.5 mg, 2 times daily, Oral  sacubitriL-valsartan 49-51 mg per tablet 2 tablet, 2 times daily, Oral  hydrALAZINE tablet 25 mg, Every 8 hours, Oral    Plan  - Monitor strict I&Os and daily weights.    - Place on telemetry  - Low sodium diet  - Place on fluid restriction of 2 L.   - Cardiology has been consulted  - The patient's volume status is stable but not at their baseline as indicated by edema, orthopnea, paroxysmal nocturnal dyspnea (PND), dyspnea on exertion (BROUSSARD), and shortness of breath    Improvement in symptoms  Cardiology recommending additional day of diuresis

## 2025-02-19 NOTE — ASSESSMENT & PLAN NOTE
-BP uncontrolled  -Continue Coreg, Entresto  -Hydralazine resumed but at TID dosing, monitor trend

## 2025-02-19 NOTE — HOSPITAL COURSE
2/19 admitted for congestive heart failure exacerbation. Cardiology recommending additional day of intravenous diuresis with cardiac stress test planned for tomorrow. No complaints    2/20     no plans for cardiac stress testing per cardiology. After discussing with cardiology, ok to discharge home on current regimen. Discussed with patient best practices for congestive heart failure monitoring with daily weight checks. Referral placed for congestive heart failure clinic.  Blood pressure with significant improvement    Complained of abdominal pain due to taking medication(s) on empty stomach. Denies nausea/vomiting.     No acute distress. No respiratory distress. On room air. Soft non tender abdomen on palpation. No lower extremity edema. AO3.    Patient seen and evaluated by me. Patient was determined to be suitable for discharge. Patient deemed stable for discharge to home with nurse practitioner to visit home program to monitor compliance to daily weights and follow up appointments.

## 2025-02-19 NOTE — PROGRESS NOTES
"[facility] - Tele[facility] ([facility])  Cardiology  Progress Note    Patient Name: Bria Saldana  MRN: [id number]  Admission Date: 2/17/2025  Hospital Length of Stay: 1 days  Code Status: Full Code   Attending Physician: Howard Rao MD   Primary Care Physician: Brenna Maravilla MD  Expected Discharge Date: 2/19/2025  Principal Problem:Acute on chronic combined systolic (congestive) and diastolic (congestive) heart failure    Subjective:   HPI:  Mr. Saldana is a 54 year old male patient whose current medical conditions include CKD, HTN, DM type II, hyperlipidemia, NATALYA, PAFL s/p prior DCCV (on Eliquis) who presented to [facility] ED yesterday due to increased SOB over the past week. Associated symptoms included BLE edema, abdominal bloating, nausea, and loss of appetite. He denied any associated fever, chills, jeffery CP, palpitations, near syncope, or syncope. Initial workup in ED revealed uncontrolled BP, BNP > 3,000, troponin of 0.138 and patient was subsequently admitted for further evaluation and treatment. Cardiology consulted to assist with management. Patient seen and examined today, sitting up in bed. Feeling well. Diuresing well. States SOB/edema are improving. CP free. He reports compliance with his medications. Tries to watch his salt intake, states his BP is "always high". Followed in CHF clinic. TTE pending. Repeat troponin trending down. MPI stress test 5/24 negative, + scar noted.     Hospital Course:   2/19/25-Patient seen and examined today, resting in bed. Feeling better. Excellent diuresis. Still has some BLE edema/abdominal bloating. No CP. Labs reviewed. Creatinine 2.5. BP improved. TTE with EF of 30-40%, small pericardial effusion.        Review of Systems   Constitutional: Positive for malaise/fatigue.   HENT: Negative.     Eyes: Negative.    Cardiovascular:  Positive for dyspnea on exertion, leg swelling and orthopnea.   Respiratory:  Positive for shortness of breath.    Endocrine: Negative.  "   Hematologic/Lymphatic: Negative.    Skin: Negative.    Musculoskeletal: Negative.    Gastrointestinal: Negative.    Genitourinary: Negative.    Neurological: Negative.    Psychiatric/Behavioral: Negative.     Allergic/Immunologic: Negative.      Objective:     Vital Signs (Most Recent):  Temp: 97.1 °F (36.2 °C) (02/19/25 1136)  Pulse: 73 (02/19/25 1136)  Resp: 18 (02/19/25 1136)  BP: 108/79 (02/19/25 1136)  SpO2: (!) 94 % (02/19/25 1136) Vital Signs (24h Range):  Temp:  [97.1 °F (36.2 °C)-98 °F (36.7 °C)] 97.1 °F (36.2 °C)  Pulse:  [61-76] 73  Resp:  [17-20] 18  SpO2:  [89 %-98 %] 94 %  BP: (106-157)/() 108/79     Weight: 103.4 kg (227 lb 15.3 oz)  Body mass index is 28.49 kg/m².     SpO2: (!) 94 %         Intake/Output Summary (Last 24 hours) at 2/19/2025 1220  Last data filed at 2/19/2025 0726  Gross per 24 hour   Intake 240 ml   Output 4900 ml   Net -4660 ml       Lines/Drains/Airways       Peripheral Intravenous Line  Duration                  Peripheral IV - Single Lumen 02/17/25 2145 20 G Right Antecubital 1 day                       Physical Exam  Vitals and nursing note reviewed.   Constitutional:       Appearance: Normal appearance.   HENT:      Head: Normocephalic and atraumatic.   Eyes:      Pupils: Pupils are equal, round, and reactive to light.   Neck:      Vascular: JVD present.   Cardiovascular:      Rate and Rhythm: Normal rate and regular rhythm.      Heart sounds: S1 normal and S2 normal. No murmur heard.  Pulmonary:      Effort: Pulmonary effort is normal.   Abdominal:      Palpations: Abdomen is soft.   Musculoskeletal:      Right lower leg: Edema present.      Left lower leg: Edema present.   Skin:     General: Skin is warm and dry.   Neurological:      General: No focal deficit present.      Mental Status: He is oriented to person, place, and time.   Psychiatric:         Behavior: Behavior normal.         Thought Content: Thought content normal.            Significant Labs: CMP   Recent  "Labs   Lab 02/17/25  2142 02/18/25  0420 02/19/25  0444    142 143   K 3.8 3.5 3.6    105 101   CO2 19* 20* 24   GLU 78 73 70   BUN 32* 30* 31*   CREATININE 2.7* 2.5* 2.5*   CALCIUM 9.2 9.6 8.7   PROT 6.7  --   --    ALBUMIN 3.7  --   --    BILITOT 3.4*  --   --    ALKPHOS 133  --   --    AST 22  --   --    ALT 19  --   --    ANIONGAP 13 17* 18*   , CBC   Recent Labs   Lab 02/17/25  2142   WBC 6.43   HGB 16.2   HCT 51.7      , Troponin No results for input(s): "TROPONINIHS" in the last 48 hours., and All pertinent lab results from the last 24 hours have been reviewed.    Significant Imaging: Echocardiogram: Transthoracic echo (TTE) complete (Cupid Only):   Results for orders placed or performed during the hospital encounter of 02/17/25   Echo   Result Value Ref Range    BSA 2.44 m2    Boswell's Biplane MOD Ejection Fraction 43 %    A2C EF 50 %    A4C EF 35 %    LVOT stroke volume 74.7 cm3    LVIDd 5.9 3.5 - 6.0 cm    LV Systolic Volume 112.86 mL    LV Systolic Volume Index 47.0 mL/m2    LVIDs 4.9 (A) 2.1 - 4.0 cm    LV Diastolic Volume 175.80 mL    LV Diastolic Volume Index 73.25 mL/m2    LV EDV A2C 158.410401844329221 mL    LV EDV A4C 134.79 mL    Left Ventricular End Systolic Volume by Teichholz Method 112.86 mL    Left Ventricular End Diastolic Volume by Teichholz Method 175.80 mL    IVS 1.8 (A) 0.6 - 1.1 cm    LVOT diameter 2.0 cm    LVOT area 3.1 cm2    FS 16.9 (A) 28 - 44 %    Left Ventricle Relative Wall Thickness 0.54 cm    PW 1.6 (A) 0.6 - 1.1 cm    LV mass 498.9 g    LV Mass Index 207.9 g/m2    MV Peak E Vineet 0.71 m/s    TDI LATERAL 0.07 m/s    TDI SEPTAL 0.06 m/s    E/E' ratio 11 m/s    MV Peak A Vineet 0.63 m/s    TR Max Vineet 3.1 m/s    E/A ratio 1.13     IVRT 72 msec    E wave deceleration time 218 msec    LV SEPTAL E/E' RATIO 11.8 m/s    LV LATERAL E/E' RATIO 10.1 m/s    LVOT peak vineet 1.2 m/s    Left Ventricular Outflow Tract Mean Velocity 1.08 cm/s    Left Ventricular Outflow Tract Mean " Gradient 4.68 mmHg    RV- marshall basal diam 3.1 cm    RVOT peak VTI 20.6 cm    TAPSE 2.08 cm    RV/LV Ratio 0.53 cm    LA size 4.8 cm    Left Atrium Minor Axis 6.4 cm    Left Atrium Major Axis 6.9 cm    RA Major Axis 6.15 cm    AV mean gradient 10 mmHg    AV peak gradient 14 mmHg    Ao peak zarina 1.9 m/s    Ao VTI 35.2 cm    LVOT peak VTI 23.8 cm    AV valve area 2.1 cm²    AV Velocity Ratio 0.63     AV index (prosthetic) 0.68     MAIKEL by Velocity Ratio 2.0 cm²    Mr max zarina 4.35 m/s    MV stenosis pressure 1/2 time 63.25 ms    MV valve area p 1/2 method 3.48 cm2    TV mean gradient 30 mmHg    Triscuspid Valve Regurgitation Peak Gradient 38 mmHg    PV mean gradient 2 mmHg    RVOT peak zarina 0.95 m/s    Ao root annulus 3.21 cm    STJ 3.10 cm    Ascending aorta 3.04 cm    IVC diameter 1.94 cm    Mean e' 0.07 m/s    ZLVIDS -2.29     ZLVIDD -6.17     RVDD 3.10 cm    IZABELLA 50 mL/m2    LA Vol 119 cm3    LA WIDTH 4.4 cm    RA Width 5.7 cm    EF 35 %    TV resting pulmonary artery pressure 41 mmHg    RV TB RVSP 6 mmHg    Est. RA pres 3 mmHg    Narrative      Left Ventricle: The left ventricle is mildly dilated. Normal wall   thickness. There is concentric hypertrophy. There is moderately reduced   systolic function with a visually estimated ejection fraction of 30 - 40%.   Ejection fraction is approximately 35%. Quantitated ejection fraction is   43%. Grade II diastolic dysfunction.    Right Ventricle: Normal right ventricular cavity size. Wall thickness   is normal. Systolic function is borderline low.    Left Atrium: Left atrium is severely dilated.    Mitral Valve: There is mild to moderate regurgitation.    Tricuspid Valve: There is mild regurgitation. There is mild pulmonary   hypertension.    Pulmonary Artery: The estimated pulmonary artery systolic pressure is   41 mmHg.    IVC/SVC: Normal venous pressure at 3 mmHg.    Pericardium: There is a small circumferential effusion. No indication   of cardiac tamponade.      and EKG:  Reviewed  Assessment and Plan:   Patient who presents with decompensated combined CHF. Improving. Continue IV diuresis for additional day.    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 (congestive) and diastolic (congestive) heart failure  -Presents with decompensated CHF, exacerbated by uncontrolled HTN  -Continue IV diuresis  -Continue Coreg, Entresto  -Hydralazine resumed and increased to TID dosing  -Dietary restrictions discussed  -Strict I's/O's  -TTE pending    2/19/25  -Improving  -Continue IV diuresis for additional day  -Continue Coreg, Entresto, hydralazine  -TTE with EF of 30-40%, small pericardial effusion    Acute renal failure superimposed on stage 3b chronic kidney disease  -Monitor with IV diuresis     Paroxysmal atrial flutter  -Currently in Sr  -Continue BB, Eliquis    Elevated troponin  -Troponin 0.138>0.099  -Secondary to demand ischemia from decompensated CHF, CKD  -No CP  -Continue OMT  -Prior MPI stress test 5/24 negative for ischemia, + scar    Essential hypertension  -BP uncontrolled  -Continue Coreg, Entresto  -Hydralazine resumed but at TID dosing, monitor trend        VTE Risk Mitigation (From admission, onward)           Ordered     apixaban tablet 5 mg  2 times daily         02/18/25 0414     IP VTE HIGH RISK PATIENT  Once         02/18/25 0029     Place sequential compression device  Until discontinued         02/18/25 0029                    Judith Kim PA-C  Cardiology  [facility] - Telemetry ([facility])

## 2025-02-19 NOTE — HOSPITAL COURSE
2/19/25-Patient seen and examined today, resting in bed. Feeling better. Excellent diuresis. Still has some BLE edema/abdominal bloating. No CP. Labs reviewed. Creatinine 2.5. BP improved. TTE with EF of 30-40%, small pericardial effusion.    2/20/25-Patient seen and examined today, lying in bed. Reported some abd pain earlier this AM, complains of being hungry. SOB/edema stable. No CP. Diuresed well. BP soft, hydralazine held. Creatinine 2.5.

## 2025-02-19 NOTE — SUBJECTIVE & OBJECTIVE
Review of Systems   Constitutional: Positive for malaise/fatigue.   HENT: Negative.     Eyes: Negative.    Cardiovascular:  Positive for dyspnea on exertion, leg swelling and orthopnea.   Respiratory:  Positive for shortness of breath.    Endocrine: Negative.    Hematologic/Lymphatic: Negative.    Skin: Negative.    Musculoskeletal: Negative.    Gastrointestinal: Negative.    Genitourinary: Negative.    Neurological: Negative.    Psychiatric/Behavioral: Negative.     Allergic/Immunologic: Negative.      Objective:     Vital Signs (Most Recent):  Temp: 97.1 °F (36.2 °C) (02/19/25 1136)  Pulse: 73 (02/19/25 1136)  Resp: 18 (02/19/25 1136)  BP: 108/79 (02/19/25 1136)  SpO2: (!) 94 % (02/19/25 1136) Vital Signs (24h Range):  Temp:  [97.1 °F (36.2 °C)-98 °F (36.7 °C)] 97.1 °F (36.2 °C)  Pulse:  [61-76] 73  Resp:  [17-20] 18  SpO2:  [89 %-98 %] 94 %  BP: (106-157)/() 108/79     Weight: 103.4 kg (227 lb 15.3 oz)  Body mass index is 28.49 kg/m².     SpO2: (!) 94 %         Intake/Output Summary (Last 24 hours) at 2/19/2025 1220  Last data filed at 2/19/2025 0726  Gross per 24 hour   Intake 240 ml   Output 4900 ml   Net -4660 ml       Lines/Drains/Airways       Peripheral Intravenous Line  Duration                  Peripheral IV - Single Lumen 02/17/25 2145 20 G Right Antecubital 1 day                       Physical Exam  Vitals and nursing note reviewed.   Constitutional:       Appearance: Normal appearance.   HENT:      Head: Normocephalic and atraumatic.   Eyes:      Pupils: Pupils are equal, round, and reactive to light.   Neck:      Vascular: JVD present.   Cardiovascular:      Rate and Rhythm: Normal rate and regular rhythm.      Heart sounds: S1 normal and S2 normal. No murmur heard.  Pulmonary:      Effort: Pulmonary effort is normal.   Abdominal:      Palpations: Abdomen is soft.   Musculoskeletal:      Right lower leg: Edema present.      Left lower leg: Edema present.   Skin:     General: Skin is warm and  "dry.   Neurological:      General: No focal deficit present.      Mental Status: He is oriented to person, place, and time.   Psychiatric:         Behavior: Behavior normal.         Thought Content: Thought content normal.            Significant Labs: CMP   Recent Labs   Lab 02/17/25  2142 02/18/25  0420 02/19/25  0444    142 143   K 3.8 3.5 3.6    105 101   CO2 19* 20* 24   GLU 78 73 70   BUN 32* 30* 31*   CREATININE 2.7* 2.5* 2.5*   CALCIUM 9.2 9.6 8.7   PROT 6.7  --   --    ALBUMIN 3.7  --   --    BILITOT 3.4*  --   --    ALKPHOS 133  --   --    AST 22  --   --    ALT 19  --   --    ANIONGAP 13 17* 18*   , CBC   Recent Labs   Lab 02/17/25 2142   WBC 6.43   HGB 16.2   HCT 51.7      , Troponin No results for input(s): "TROPONINIHS" in the last 48 hours., and All pertinent lab results from the last 24 hours have been reviewed.    Significant Imaging: Echocardiogram: Transthoracic echo (TTE) complete (Cupid Only):   Results for orders placed or performed during the hospital encounter of 02/17/25   Echo   Result Value Ref Range    BSA 2.44 m2    Boswell's Biplane MOD Ejection Fraction 43 %    A2C EF 50 %    A4C EF 35 %    LVOT stroke volume 74.7 cm3    LVIDd 5.9 3.5 - 6.0 cm    LV Systolic Volume 112.86 mL    LV Systolic Volume Index 47.0 mL/m2    LVIDs 4.9 (A) 2.1 - 4.0 cm    LV Diastolic Volume 175.80 mL    LV Diastolic Volume Index 73.25 mL/m2    LV EDV A2C 158.402535228485873 mL    LV EDV A4C 134.79 mL    Left Ventricular End Systolic Volume by Teichholz Method 112.86 mL    Left Ventricular End Diastolic Volume by Teichholz Method 175.80 mL    IVS 1.8 (A) 0.6 - 1.1 cm    LVOT diameter 2.0 cm    LVOT area 3.1 cm2    FS 16.9 (A) 28 - 44 %    Left Ventricle Relative Wall Thickness 0.54 cm    PW 1.6 (A) 0.6 - 1.1 cm    LV mass 498.9 g    LV Mass Index 207.9 g/m2    MV Peak E Vineet 0.71 m/s    TDI LATERAL 0.07 m/s    TDI SEPTAL 0.06 m/s    E/E' ratio 11 m/s    MV Peak A Vineet 0.63 m/s    TR Max Vineet 3.1 " m/s    E/A ratio 1.13     IVRT 72 msec    E wave deceleration time 218 msec    LV SEPTAL E/E' RATIO 11.8 m/s    LV LATERAL E/E' RATIO 10.1 m/s    LVOT peak zarina 1.2 m/s    Left Ventricular Outflow Tract Mean Velocity 1.08 cm/s    Left Ventricular Outflow Tract Mean Gradient 4.68 mmHg    RV- marshall basal diam 3.1 cm    RVOT peak VTI 20.6 cm    TAPSE 2.08 cm    RV/LV Ratio 0.53 cm    LA size 4.8 cm    Left Atrium Minor Axis 6.4 cm    Left Atrium Major Axis 6.9 cm    RA Major Axis 6.15 cm    AV mean gradient 10 mmHg    AV peak gradient 14 mmHg    Ao peak zarina 1.9 m/s    Ao VTI 35.2 cm    LVOT peak VTI 23.8 cm    AV valve area 2.1 cm²    AV Velocity Ratio 0.63     AV index (prosthetic) 0.68     MAIKEL by Velocity Ratio 2.0 cm²    Mr max zarina 4.35 m/s    MV stenosis pressure 1/2 time 63.25 ms    MV valve area p 1/2 method 3.48 cm2    TV mean gradient 30 mmHg    Triscuspid Valve Regurgitation Peak Gradient 38 mmHg    PV mean gradient 2 mmHg    RVOT peak zarina 0.95 m/s    Ao root annulus 3.21 cm    STJ 3.10 cm    Ascending aorta 3.04 cm    IVC diameter 1.94 cm    Mean e' 0.07 m/s    ZLVIDS -2.29     ZLVIDD -6.17     RVDD 3.10 cm    IZABELLA 50 mL/m2    LA Vol 119 cm3    LA WIDTH 4.4 cm    RA Width 5.7 cm    EF 35 %    TV resting pulmonary artery pressure 41 mmHg    RV TB RVSP 6 mmHg    Est. RA pres 3 mmHg    Narrative      Left Ventricle: The left ventricle is mildly dilated. Normal wall   thickness. There is concentric hypertrophy. There is moderately reduced   systolic function with a visually estimated ejection fraction of 30 - 40%.   Ejection fraction is approximately 35%. Quantitated ejection fraction is   43%. Grade II diastolic dysfunction.    Right Ventricle: Normal right ventricular cavity size. Wall thickness   is normal. Systolic function is borderline low.    Left Atrium: Left atrium is severely dilated.    Mitral Valve: There is mild to moderate regurgitation.    Tricuspid Valve: There is mild regurgitation. There is mild  pulmonary   hypertension.    Pulmonary Artery: The estimated pulmonary artery systolic pressure is   41 mmHg.    IVC/SVC: Normal venous pressure at 3 mmHg.    Pericardium: There is a small circumferential effusion. No indication   of cardiac tamponade.      and EKG: Reviewed

## 2025-02-19 NOTE — SUBJECTIVE & OBJECTIVE
Interval History: See hospital course for today      Review of Systems   Constitutional:  Negative for activity change, appetite change, fatigue and fever.   Respiratory:  Positive for shortness of breath (improved).    Cardiovascular:  Negative for chest pain and leg swelling.   Gastrointestinal:  Positive for abdominal distention (improved) and nausea (improved). Negative for abdominal pain.   Genitourinary:  Positive for frequency.   Neurological:  Negative for weakness.   Psychiatric/Behavioral:  Positive for dysphoric mood. Negative for sleep disturbance.      Objective:     Vital Signs (Most Recent):  Temp: 97.1 °F (36.2 °C) (02/19/25 1136)  Pulse: 70 (02/19/25 1500)  Resp: 18 (02/19/25 1136)  BP: 108/79 (02/19/25 1136)  SpO2: (!) 94 % (02/19/25 1136) Vital Signs (24h Range):  Temp:  [97.1 °F (36.2 °C)-98 °F (36.7 °C)] 97.1 °F (36.2 °C)  Pulse:  [61-78] 70  Resp:  [17-20] 18  SpO2:  [89 %-98 %] 94 %  BP: (106-157)/() 108/79     Weight: 103.4 kg (227 lb 15.3 oz)  Body mass index is 28.49 kg/m².    Intake/Output Summary (Last 24 hours) at 2/19/2025 1526  Last data filed at 2/19/2025 1136  Gross per 24 hour   Intake 240 ml   Output 3900 ml   Net -3660 ml         Physical Exam  Vitals and nursing note reviewed.   Constitutional:       General: He is not in acute distress.     Appearance: He is ill-appearing. He is not toxic-appearing.   HENT:      Head: Normocephalic and atraumatic.   Cardiovascular:      Rate and Rhythm: Normal rate.   Pulmonary:      Effort: Pulmonary effort is normal. No respiratory distress.      Breath sounds: No wheezing or rales.   Abdominal:      General: There is distension.      Palpations: Abdomen is soft.      Tenderness: There is no abdominal tenderness.   Musculoskeletal:      Right lower leg: No edema.      Left lower leg: No edema.   Skin:     General: Skin is warm and dry.   Neurological:      Mental Status: He is alert and oriented to person, place, and time.      Motor:  Weakness present.   Psychiatric:         Mood and Affect: Mood is depressed.             Significant Labs: All pertinent labs within the past 24 hours have been reviewed.  CBC:   Recent Labs   Lab 02/17/25 2142   WBC 6.43   HGB 16.2   HCT 51.7        CMP:   Recent Labs   Lab 02/17/25 2142 02/18/25  0420 02/19/25  0444    142 143   K 3.8 3.5 3.6    105 101   CO2 19* 20* 24   GLU 78 73 70   BUN 32* 30* 31*   CREATININE 2.7* 2.5* 2.5*   CALCIUM 9.2 9.6 8.7   PROT 6.7  --   --    ALBUMIN 3.7  --   --    BILITOT 3.4*  --   --    ALKPHOS 133  --   --    AST 22  --   --    ALT 19  --   --    ANIONGAP 13 17* 18*     Cardiac Markers:   Recent Labs   Lab 02/17/25 2142   BNP 3,242*       Troponin:   Recent Labs   Lab 02/17/25 2142 02/18/25 0420   TROPONINI 0.138* 0.099*       Significant Imaging: I have reviewed all pertinent imaging results/findings within the past 24 hours.  Echo: I have reviewed all pertinent results/findings within the past 24 hours and my personal findings are:  ejection fraction 30-40%, diastolic dysfunction

## 2025-02-20 VITALS
HEIGHT: 75 IN | BODY MASS INDEX: 28.62 KG/M2 | RESPIRATION RATE: 17 BRPM | WEIGHT: 230.19 LBS | OXYGEN SATURATION: 95 % | TEMPERATURE: 98 F | DIASTOLIC BLOOD PRESSURE: 56 MMHG | SYSTOLIC BLOOD PRESSURE: 92 MMHG | HEART RATE: 69 BPM

## 2025-02-20 LAB
ANION GAP SERPL CALC-SCNC: 12 MMOL/L (ref 8–16)
BUN SERPL-MCNC: 37 MG/DL (ref 6–20)
CALCIUM SERPL-MCNC: 8.6 MG/DL (ref 8.7–10.5)
CHLORIDE SERPL-SCNC: 104 MMOL/L (ref 95–110)
CO2 SERPL-SCNC: 23 MMOL/L (ref 23–29)
CREAT SERPL-MCNC: 2.5 MG/DL (ref 0.5–1.4)
EST. GFR  (NO RACE VARIABLE): 30 ML/MIN/1.73 M^2
GLUCOSE SERPL-MCNC: 93 MG/DL (ref 70–110)
POTASSIUM SERPL-SCNC: 3.7 MMOL/L (ref 3.5–5.1)
SODIUM SERPL-SCNC: 139 MMOL/L (ref 136–145)

## 2025-02-20 PROCEDURE — G0378 HOSPITAL OBSERVATION PER HR: HCPCS

## 2025-02-20 PROCEDURE — 36415 COLL VENOUS BLD VENIPUNCTURE: CPT | Performed by: NURSE PRACTITIONER

## 2025-02-20 PROCEDURE — 63600175 PHARM REV CODE 636 W HCPCS: Performed by: NURSE PRACTITIONER

## 2025-02-20 PROCEDURE — 99214 OFFICE O/P EST MOD 30 MIN: CPT | Mod: ,,, | Performed by: INTERNAL MEDICINE

## 2025-02-20 PROCEDURE — 80048 BASIC METABOLIC PNL TOTAL CA: CPT | Performed by: NURSE PRACTITIONER

## 2025-02-20 PROCEDURE — 25000003 PHARM REV CODE 250: Performed by: NURSE PRACTITIONER

## 2025-02-20 PROCEDURE — 96376 TX/PRO/DX INJ SAME DRUG ADON: CPT

## 2025-02-20 RX ADMIN — APIXABAN 5 MG: 2.5 TABLET, FILM COATED ORAL at 08:02

## 2025-02-20 RX ADMIN — CARVEDILOL 12.5 MG: 12.5 TABLET, FILM COATED ORAL at 08:02

## 2025-02-20 RX ADMIN — AMIODARONE HYDROCHLORIDE 200 MG: 200 TABLET ORAL at 08:02

## 2025-02-20 RX ADMIN — SACUBITRIL AND VALSARTAN 2 TABLET: 49; 51 TABLET, FILM COATED ORAL at 08:02

## 2025-02-20 RX ADMIN — FUROSEMIDE 60 MG: 10 INJECTION, SOLUTION INTRAMUSCULAR; INTRAVENOUS at 08:02

## 2025-02-20 NOTE — DISCHARGE SUMMARY
"[facility] ([facility])  [facility] Medicine  Discharge Summary      Patient Name: Bria Saldana  MRN: [id number]  JOSE: [id number]  Patient Class: OP- Observation  Admission Date: 2/17/2025  Hospital Length of Stay: 1 days  Discharge Date and Time:  02/20/2025 12:18 PM  Attending Physician: Howard Rao MD   Discharging Provider: Howard Rao MD  Primary Care Provider: Brenna Maravilla MD    Primary Care Team: Networked reference to record PCT     HPI:   Bria Saldana is a 54 y.o. male with a PMH  has a past medical history of Acute CHF (7/8/2019), Acute on chronic combined systolic (congestive) and diastolic (congestive) heart failure (9/23/2019), Allergy, CHF (congestive heart failure) (7/9/2019), CKD (chronic kidney disease) stage 3, GFR 30-59 ml/min (7/8/2019), Essential hypertension (6/1/2017), Hypertension associated with chronic kidney disease due to type 2 diabetes mellitus (2/28/2022), Mixed hyperlipidemia (3/10/2022), and NATALYA (obstructive sleep apnea) (7/23/2018).presented to the Emergency Department for evaluation of HTN, SOB, and bilateral leg swelling which began 1 week ago. Pt reports having yellowish vomit with streaks of blood. He states that he has had this problem before in May 2024 after he had "some type of abdominal procedure".  Chart review revealed that patient was admitted on 05/14/2024 for CHF exacerbation he was started on IV Lasix and was planned for a MEGAN with cardioversion on 05/15/2024.  During his MEGAN patient aspirated and ended up being intubated and transferred to the ICU.  During his stay in the ICU he did have a cardioversion and was started on amiodarone drip post conversion.  He was transferred out of ICU on 05/18/2024 and had been converted from IV to p.o. amiodarone but was continued on a heparin drip. Cardiology.  Recommended continuing medical therapy. Nuclear med stress negative and patient was discharged.    Associated sxs include " diarrhea, nausea, SOB, and loss of appetite. Symptoms are constant and moderate in severity. No mitigating or exacerbating factors reported. Patient denies any fever or chills. No prior Tx specified.  No further complaints or concerns at this time.     ER workup revealed CBC to be unremarkable.  CMP revealed BUN/creatinine of 32/2.7 (previously 2.4/31.5 on 02/11/2025). BNP 3,242 (previously 3,357 on 12/15/2024).  Troponin 0.138.  Chest x-ray showed cardiomegaly.  EKG revealed normal sinus rhythm with a ventricular rate of 88 beats per minute with a QT/QTC of 414/500.  BP initially 185/114, but 154/99 after receiving 60 mg Lasix, 10 mg hydralazine, and 1 in of nitroglycerin paste in ER.  Hospital Medicine consulted to admit patient for CHF exacerbation.  Patient in agreement with treatment plan.  Patient admitted under inpatient status.    PCP: Brenna Maravilla      * No surgery found *      Hospital Course:   2/19 admitted for congestive heart failure exacerbation. Cardiology recommending additional day of intravenous diuresis with cardiac stress test planned for tomorrow. No complaints    2/20     no plans for cardiac stress testing per cardiology. After discussing with cardiology, ok to discharge home on current regimen. Discussed with patient best practices for congestive heart failure monitoring with daily weight checks. Referral placed for congestive heart failure clinic.  Blood pressure with significant improvement    Complained of abdominal pain due to taking medication(s) on empty stomach. Denies nausea/vomiting.     No acute distress. No respiratory distress. On room air. Soft non tender abdomen on palpation. No lower extremity edema. AO3.    Patient seen and evaluated by me. Patient was determined to be suitable for discharge. Patient deemed stable for discharge to home with nurse practitioner to visit home program to monitor compliance to daily weights and follow up appointments.       Goals of Care  Treatment Preferences:  Code Status: Full Code      SDOH Screening:  The patient declined to be screened for utility difficulties, food insecurity, transport difficulties, housing insecurity, and interpersonal safety, so no concerns could be identified this admission.     Consults:   Consults (From admission, onward)          Status Ordering Provider     Inpatient consult to Cardiology  Once        Provider:  Que Alba MD    Completed JASMYNE CASTRO     Inpatient consult to Social Work/Case Management  Once        Provider:  (Not yet assigned)    Completed JASMYNE CASTRO     Inpatient consult to Registered Dietitian/Nutritionist  Once        Provider:  (Not yet assigned)    Completed JASMYNE CASTRO            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 (congestive) and diastolic (congestive) heart failure  Patient has Combined Systolic and Diastolic heart failure that is Acute on chronic. On presentation their CHF was decompensated. Evidence of decompensated CHF on presentation includes: edema, paroxysmal nocturnal dyspnea (PND), dyspnea on exertion (BROUSSARD), and shortness of breath. The etiology of their decompensation is likely dietary indiscretion and increased fluid intake. Most recent BNP and echo results are listed below.  Recent Labs     02/17/25  2142   BNP 3,242*       Latest ECHO  Results for orders placed during the hospital encounter of 05/31/24    Echo    Interpretation Summary    Left Ventricle: Severe global hypokinesis present. There is moderately reduced systolic function with a visually estimated ejection fraction of 30 - 35%.    Left Atrium: Left atrium is severely dilated.    Right Atrium: Right atrium is mildly dilated.    Pericardium: There is a trivial effusion.    Current Heart Failure Medications  , , Oral  , Every morning, Oral  , , Oral  furosemide injection 60 mg, Every 12 hours, Intravenous  hydrALAZINE injection 10 mg, Every 6 hours PRN, Intravenous  carvediloL tablet 12.5 mg,  2 times daily, Oral  sacubitriL-valsartan 49-51 mg per tablet 2 tablet, 2 times daily, Oral  hydrALAZINE tablet 25 mg, Every 8 hours, Oral    Plan  - Monitor strict I&Os and daily weights.    - Place on telemetry  - Low sodium diet  - Place on fluid restriction of 2 L.   - Cardiology has been consulted  - The patient's volume status is stable but not at their baseline as indicated by edema, orthopnea, paroxysmal nocturnal dyspnea (PND), dyspnea on exertion (BROUSSARD), and shortness of breath    Improvement in symptoms  Cardiology recommending additional day of diuresis          Acute renal failure superimposed on stage 3b chronic kidney disease   Baseline creatinine is  1.9-2.3 . Most recent creatinine and eGFR are listed below.  Recent Labs     02/17/25  2142 02/18/25  0420 02/19/25  0444   CREATININE 2.7* 2.5* 2.5*   EGFRNORACEVR 27* 30* 30*        Plan  - TIFFANIE is worsening. Will continue current treatment  - Avoid nephrotoxins and renally dose meds for GFR listed above  - Monitor urine output, serial BMP, and adjust therapy as needed  Creatinine close to baseline  Repeat in am    Paroxysmal atrial flutter  Patient with Paroxysmal (<7 days) atrial fibrillation which is controlled currently with Beta Blocker and Amiodarone. Patient is currently in sinus rhythm.[id number] Score: 1. Anticoagulation indicated. Anticoagulation done with Eliquis .        Mixed hyperlipidemia  Patient is not chronically on statin.will not continue for now. Last Lipid Panel:   Lab Results   Component Value Date    CHOL 131 02/11/2025    HDL 36 (L) 02/11/2025    LDLCALC 72.4 02/11/2025    TRIG 113 02/11/2025    CHOLHDL 27.5 02/11/2025     Plan:  -low fat/low calorie diet        Elevated troponin  Cardiology consulted  Plans for continued diuresis   Npo after mn for stress test tomorrow       Essential hypertension  Chronic, controlled.  Latest blood pressure and vitals reviewed-   Temp:  [97.1 °F (36.2 °C)-98 °F (36.7 °C)]   Pulse:  [61-78]    Resp:  [17-20]   BP: (106-157)/()   SpO2:  [89 %-98 %] .   Home meds for hypertension were reviewed and noted below.   Hypertension Medications              carvediloL (COREG) 12.5 MG tablet Take 1 tablet (12.5 mg total) by mouth 2 (two) times daily.    ENTRESTO  mg per tablet TAKE 1 TABLET BY MOUTH TWICE DAILY    hydrALAZINE (APRESOLINE) 25 MG tablet Take 1 tablet (25 mg total) by mouth every 8 (eight) hours as needed (blood pressure greater than 160/90).    sacubitriL-valsartan (ENTRESTO) 49-51 mg per tablet Take 1 tablet by mouth.    torsemide (DEMADEX) 20 MG Tab Take 2 tablets by mouth every morning.    torsemide 40 mg Tab Take 2 tablets by mouth.            While in the hospital, will manage blood pressure as follows; Continue home antihypertensive regimen    Will utilize p.r.n. blood pressure medication only if patient's blood pressure greater than  160/90 and he develops symptoms such as worsening chest pain or shortness of breath.  States taking hydralazine bid instead of tid prn for elevated blood pressures      Final Active Diagnoses:    Diagnosis Date Noted POA    PRINCIPAL PROBLEM:  Acute on chronic combined systolic (congestive) and diastolic (congestive) heart failure [I50.43] 09/23/2019 Yes    Acute renal failure superimposed on stage 3b chronic kidney disease [N17.9, N18.32] 02/18/2025 Unknown    Paroxysmal atrial flutter [I48.92] 05/15/2024 Yes    Mixed hyperlipidemia [E78.2] 03/10/2022 Yes    Elevated troponin [R79.89] 07/08/2019 Yes    Essential hypertension [I10] 06/01/2017 Yes      Problems Resolved During this Admission:       Discharged Condition: stable    Disposition:     Follow Up:   Follow-up Information       Brenna Maravilla MD. Schedule an appointment as soon as possible for a visit in 3 day(s).    Specialty: Family Medicine  Why: hospital follow up  Contact information:  [address]  [place] [address] [phone]               Sienna Maguire PA.  Schedule an appointment as soon as possible for a visit in 3 day(s).    Specialty: Transplant  Why: hospital follow up  Contact information:  [address]  [facility] [address]  [phone]               Que Alba MD. Schedule an appointment as soon as possible for a visit in 1 month(s).    Specialties: Interventional Cardiology, Cardiology  Why: hospital follow up  Contact information:  [address] [facility] [place] [address]  [phone]                           Patient Instructions:      Ambulatory referral/consult to Congestive Heart Failure Clinic   Standing Status: Future   Referral Priority: Routine Referral Type: Consultation   Referral Reason: Specialty Services Required   Requested Specialty: Cardiology   Number of Visits Requested: 1     Diet Cardiac   Order Comments: Fluid and salt restrictions     Diet renal     Activity as tolerated       Significant Diagnostic Studies: Labs: CMP   Recent Labs   Lab 02/19/25  0444 02/20/25  0439    139   K 3.6 3.7    104   CO2 24 23   GLU 70 93   BUN 31* 37*   CREATININE 2.5* 2.5*   CALCIUM 8.7 8.6*   ANIONGAP 18* 12   , Troponin   Recent Labs   Lab 02/17/25  2142 02/18/25  0420   TROPONINI 0.138* 0.099*   , and All labs within the past 24 hours have been reviewed  Radiology: X-Ray: CXR: portable with no acute abnormality  Cardiac Graphics: Echocardiogram: Transthoracic echo (TTE) complete (Cupid Only):   Results for orders placed or performed during the hospital encounter of 02/17/25   Echo   Result Value Ref Range    BSA 2.44 m2    Boswell's Biplane MOD Ejection Fraction 43 %    A2C EF 50 %    A4C EF 35 %    LVOT stroke volume 74.7 cm3    LVIDd 5.9 3.5 - 6.0 cm    LV Systolic Volume 112.86 mL    LV Systolic Volume Index 47.0 mL/m2    LVIDs 4.9 (A) 2.1 - 4.0 cm    LV Diastolic Volume 175.80 mL    LV Diastolic Volume Index 73.25 mL/m2    LV EDV A2C 158.420411773121077 mL    LV EDV A4C 134.79 mL    Left Ventricular End Systolic Volume by  Teichholz Method 112.86 mL    Left Ventricular End Diastolic Volume by Teichholz Method 175.80 mL    IVS 1.8 (A) 0.6 - 1.1 cm    LVOT diameter 2.0 cm    LVOT area 3.1 cm2    FS 16.9 (A) 28 - 44 %    Left Ventricle Relative Wall Thickness 0.54 cm    PW 1.6 (A) 0.6 - 1.1 cm    LV mass 498.9 g    LV Mass Index 207.9 g/m2    MV Peak E Vineet 0.71 m/s    TDI LATERAL 0.07 m/s    TDI SEPTAL 0.06 m/s    E/E' ratio 11 m/s    MV Peak A Vineet 0.63 m/s    TR Max Vineet 3.1 m/s    E/A ratio 1.13     IVRT 72 msec    E wave deceleration time 218 msec    LV SEPTAL E/E' RATIO 11.8 m/s    LV LATERAL E/E' RATIO 10.1 m/s    LVOT peak vineet 1.2 m/s    Left Ventricular Outflow Tract Mean Velocity 1.08 cm/s    Left Ventricular Outflow Tract Mean Gradient 4.68 mmHg    RV- marshall basal diam 3.1 cm    RVOT peak VTI 20.6 cm    TAPSE 2.08 cm    RV/LV Ratio 0.53 cm    LA size 4.8 cm    Left Atrium Minor Axis 6.4 cm    Left Atrium Major Axis 6.9 cm    RA Major Axis 6.15 cm    AV mean gradient 10 mmHg    AV peak gradient 14 mmHg    Ao peak vineet 1.9 m/s    Ao VTI 35.2 cm    LVOT peak VTI 23.8 cm    AV valve area 2.1 cm²    AV Velocity Ratio 0.63     AV index (prosthetic) 0.68     MAIKEL by Velocity Ratio 2.0 cm²    Mr max vineet 4.35 m/s    MV stenosis pressure 1/2 time 63.25 ms    MV valve area p 1/2 method 3.48 cm2    TV mean gradient 30 mmHg    Triscuspid Valve Regurgitation Peak Gradient 38 mmHg    PV mean gradient 2 mmHg    RVOT peak vineet 0.95 m/s    Ao root annulus 3.21 cm    STJ 3.10 cm    Ascending aorta 3.04 cm    IVC diameter 1.94 cm    Mean e' 0.07 m/s    ZLVIDS -2.29     ZLVIDD -6.17     RVDD 3.10 cm    IZABELLA 50 mL/m2    LA Vol 119 cm3    LA WIDTH 4.4 cm    RA Width 5.7 cm    EF 35 %    TV resting pulmonary artery pressure 41 mmHg    RV TB RVSP 6 mmHg    Est. RA pres 3 mmHg    Narrative      Left Ventricle: The left ventricle is mildly dilated. Normal wall   thickness. There is concentric hypertrophy. There is moderately reduced   systolic function with a  visually estimated ejection fraction of 30 - 40%.   Ejection fraction is approximately 35%. Quantitated ejection fraction is   43%. Grade II diastolic dysfunction.    Right Ventricle: Normal right ventricular cavity size. Wall thickness   is normal. Systolic function is borderline low.    Left Atrium: Left atrium is severely dilated.    Mitral Valve: There is mild to moderate regurgitation.    Tricuspid Valve: There is mild regurgitation. There is mild pulmonary   hypertension.    Pulmonary Artery: The estimated pulmonary artery systolic pressure is   41 mmHg.    IVC/SVC: Normal venous pressure at 3 mmHg.    Pericardium: There is a small circumferential effusion. No indication   of cardiac tamponade.         Pending Diagnostic Studies:       None           Medications:  Reconciled Home Medications:      Medication List        CHANGE how you take these medications      colchicine 0.6 mg tablet  Commonly known as: COLCRYS  Take 0.6 mg by mouth daily as needed.  What changed: Another medication with the same name was removed. Continue taking this medication, and follow the directions you see here.     ENTRESTO  mg per tablet  Generic drug: sacubitriL-valsartan  TAKE 1 TABLET BY MOUTH TWICE DAILY  What changed: Another medication with the same name was removed. Continue taking this medication, and follow the directions you see here.     torsemide 40 mg Tab  Take 2 tablets by mouth.  What changed: Another medication with the same name was removed. Continue taking this medication, and follow the directions you see here.            CONTINUE taking these medications      apixaban 5 mg Tab  Commonly known as: ELIQUIS  Take 5 mg by mouth.     carvediloL 12.5 MG tablet  Commonly known as: COREG  Take 1 tablet (12.5 mg total) by mouth 2 (two) times daily.     cyclobenzaprine 10 MG tablet  Commonly known as: FLEXERIL  Take 10 mg by mouth.     diclofenac sodium 1 % Gel  Commonly known as: VOLTAREN  Apply 2 g topically.      empagliflozin 10 mg tablet  Commonly known as: JARDIANCE  Take 1 tablet (10 mg total) by mouth once daily.     hydrALAZINE 25 MG tablet  Commonly known as: APRESOLINE  Take 1 tablet (25 mg total) by mouth every 8 (eight) hours as needed (blood pressure greater than 160/90).     HYDROcodone-acetaminophen 5-325 mg per tablet  Commonly known as: NORCO  Take 1 tablet by mouth every 8 (eight) hours as needed for Pain.            STOP taking these medications      amiodarone 200 MG Tab  Commonly known as: PACERONE     benzonatate 100 MG capsule  Commonly known as: TESSALON     pantoprazole 40 MG tablet  Commonly known as: PROTONIX            ASK your doctor about these medications      amiodarone 200 MG Tab  Commonly known as: PACERONE  Take 1 tablet (200 mg total) by mouth once daily.  Ask about: Should I take this medication?     pantoprazole 40 MG tablet  Commonly known as: PROTONIX  Take 1 tablet (40 mg total) by mouth once daily.  Ask about: Should I take this medication?              Indwelling Lines/Drains at time of discharge:   Lines/Drains/Airways       None                   Time spent on the discharge of patient: 31 minutes         Howard Rao MD  Department of Hospital Medicine  '[facility] - Tele[facility] ([facility])

## 2025-02-20 NOTE — PLAN OF CARE
[facility] - Tele[facility] (Hospital)  Discharge Final Note    Primary Care Provider: Brenna Maravilla MD    Expected Discharge Date: 2/20/2025    Final Discharge Note (most recent)       Final Note - 02/20/25 1321          Final Note    Assessment Type Final Discharge Note     Anticipated Discharge Disposition Home or Self Care     Hospital Resources/Appts/Education Provided Post-Acute resouces added to AVS        Post-Acute Status    Discharge Delays None known at this time                   Pt to discharge home today. SW unable to schedule hospital follow up; message sent to [facility] PCP escalations to assist with arranging follow up.     Important Message from Medicare             Contact Info       Brenna Maravilla MD   Specialty: Family Medicine   Relationship: PCP - General    [address] [facility] [place] [address]   Phone: [phone]       Next Steps: Schedule an appointment as soon as possible for a visit in 3 day(s)    Instructions: hospital follow up    Sienna Maguire PA   Specialty: Transplant    [address] [facility] [address]   Phone: [phone]       Next Steps: Schedule an appointment as soon as possible for a visit in 3 day(s)    Instructions: hospital follow up    Que Alba MD   Specialty: Interventional Cardiology, Cardiology    [address] [facility]  [place] [address]   Phone: [phone]       Next Steps: Schedule an appointment as soon as possible for a visit in 1 month(s)    Instructions: hospital follow up

## 2025-02-20 NOTE — SUBJECTIVE & OBJECTIVE
Review of Systems   Constitutional: Positive for malaise/fatigue.   HENT: Negative.     Eyes: Negative.    Cardiovascular: Negative.    Respiratory: Negative.     Endocrine: Negative.    Hematologic/Lymphatic: Negative.    Skin: Negative.    Musculoskeletal: Negative.    Gastrointestinal:  Positive for abdominal pain.   Genitourinary: Negative.    Neurological:  Positive for weakness.   Psychiatric/Behavioral: Negative.     Allergic/Immunologic: Negative.      Objective:     Vital Signs (Most Recent):  Temp: 97.5 °F (36.4 °C) (02/20/25 1234)  Pulse: 71 (02/20/25 1234)  Resp: 17 (02/20/25 1234)  BP: (!) 92/56 (02/20/25 1236)  SpO2: 95 % (02/20/25 1234) Vital Signs (24h Range):  Temp:  [97.5 °F (36.4 °C)-98 °F (36.7 °C)] 97.5 °F (36.4 °C)  Pulse:  [65-80] 71  Resp:  [16-19] 17  SpO2:  [93 %-98 %] 95 %  BP: ()/(56-81) 92/56     Weight: 104.4 kg (230 lb 2.6 oz)  Body mass index is 28.77 kg/m².     SpO2: 95 %       No intake or output data in the 24 hours ending 02/20/25 1316    Lines/Drains/Airways       Peripheral Intravenous Line  Duration                  Peripheral IV - Single Lumen 02/17/25 2145 20 G Right Antecubital 2 days                       Physical Exam  Vitals and nursing note reviewed.   Constitutional:       General: He is not in acute distress.     Appearance: Normal appearance. He is well-developed. He is not diaphoretic.   HENT:      Head: Normocephalic and atraumatic.   Eyes:      General:         Right eye: No discharge.         Left eye: No discharge.      Pupils: Pupils are equal, round, and reactive to light.   Cardiovascular:      Rate and Rhythm: Normal rate and regular rhythm.      Heart sounds: Normal heart sounds, S1 normal and S2 normal. No murmur heard.  Pulmonary:      Effort: Pulmonary effort is normal. No respiratory distress.      Breath sounds: No rales.   Abdominal:      General: There is no distension.   Musculoskeletal:      Right lower leg: No edema.      Left lower leg:  "Edema present.   Skin:     General: Skin is warm and dry.      Findings: No erythema.   Neurological:      General: No focal deficit present.      Mental Status: He is alert and oriented to person, place, and time.   Psychiatric:         Mood and Affect: Mood normal.         Behavior: Behavior normal.         Thought Content: Thought content normal.            Significant Labs: CMP   Recent Labs   Lab 02/19/25  0444 02/20/25  0439    139   K 3.6 3.7    104   CO2 24 23   GLU 70 93   BUN 31* 37*   CREATININE 2.5* 2.5*   CALCIUM 8.7 8.6*   ANIONGAP 18* 12   , CBC No results for input(s): "WBC", "HGB", "HCT", "PLT" in the last 48 hours., Troponin No results for input(s): "TROPONINIHS" in the last 48 hours., and All pertinent lab results from the last 24 hours have been reviewed.    Significant Imaging: Echocardiogram: Transthoracic echo (TTE) complete (Cupid Only):   Results for orders placed or performed during the hospital encounter of 02/17/25   Echo   Result Value Ref Range    BSA 2.44 m2    Boswell's Biplane MOD Ejection Fraction 43 %    A2C EF 50 %    A4C EF 35 %    LVOT stroke volume 74.7 cm3    LVIDd 5.9 3.5 - 6.0 cm    LV Systolic Volume 112.86 mL    LV Systolic Volume Index 47.0 mL/m2    LVIDs 4.9 (A) 2.1 - 4.0 cm    LV Diastolic Volume 175.80 mL    LV Diastolic Volume Index 73.25 mL/m2    LV EDV A2C 158.315842034728154 mL    LV EDV A4C 134.79 mL    Left Ventricular End Systolic Volume by Teichholz Method 112.86 mL    Left Ventricular End Diastolic Volume by Teichholz Method 175.80 mL    IVS 1.8 (A) 0.6 - 1.1 cm    LVOT diameter 2.0 cm    LVOT area 3.1 cm2    FS 16.9 (A) 28 - 44 %    Left Ventricle Relative Wall Thickness 0.54 cm    PW 1.6 (A) 0.6 - 1.1 cm    LV mass 498.9 g    LV Mass Index 207.9 g/m2    MV Peak E Vineet 0.71 m/s    TDI LATERAL 0.07 m/s    TDI SEPTAL 0.06 m/s    E/E' ratio 11 m/s    MV Peak A Vineet 0.63 m/s    TR Max Vineet 3.1 m/s    E/A ratio 1.13     IVRT 72 msec    E wave deceleration " time 218 msec    LV SEPTAL E/E' RATIO 11.8 m/s    LV LATERAL E/E' RATIO 10.1 m/s    LVOT peak zarina 1.2 m/s    Left Ventricular Outflow Tract Mean Velocity 1.08 cm/s    Left Ventricular Outflow Tract Mean Gradient 4.68 mmHg    RV- marshall basal diam 3.1 cm    RVOT peak VTI 20.6 cm    TAPSE 2.08 cm    RV/LV Ratio 0.53 cm    LA size 4.8 cm    Left Atrium Minor Axis 6.4 cm    Left Atrium Major Axis 6.9 cm    RA Major Axis 6.15 cm    AV mean gradient 10 mmHg    AV peak gradient 14 mmHg    Ao peak zarina 1.9 m/s    Ao VTI 35.2 cm    LVOT peak VTI 23.8 cm    AV valve area 2.1 cm²    AV Velocity Ratio 0.63     AV index (prosthetic) 0.68     MAIKEL by Velocity Ratio 2.0 cm²    Mr max zarina 4.35 m/s    MV stenosis pressure 1/2 time 63.25 ms    MV valve area p 1/2 method 3.48 cm2    TV mean gradient 30 mmHg    Triscuspid Valve Regurgitation Peak Gradient 38 mmHg    PV mean gradient 2 mmHg    RVOT peak zarina 0.95 m/s    Ao root annulus 3.21 cm    STJ 3.10 cm    Ascending aorta 3.04 cm    IVC diameter 1.94 cm    Mean e' 0.07 m/s    ZLVIDS -2.29     ZLVIDD -6.17     RVDD 3.10 cm    IZABELLA 50 mL/m2    LA Vol 119 cm3    LA WIDTH 4.4 cm    RA Width 5.7 cm    EF 35 %    TV resting pulmonary artery pressure 41 mmHg    RV TB RVSP 6 mmHg    Est. RA pres 3 mmHg    Narrative      Left Ventricle: The left ventricle is mildly dilated. Normal wall   thickness. There is concentric hypertrophy. There is moderately reduced   systolic function with a visually estimated ejection fraction of 30 - 40%.   Ejection fraction is approximately 35%. Quantitated ejection fraction is   43%. Grade II diastolic dysfunction.    Right Ventricle: Normal right ventricular cavity size. Wall thickness   is normal. Systolic function is borderline low.    Left Atrium: Left atrium is severely dilated.    Mitral Valve: There is mild to moderate regurgitation.    Tricuspid Valve: There is mild regurgitation. There is mild pulmonary   hypertension.    Pulmonary Artery: The estimated  pulmonary artery systolic pressure is   41 mmHg.    IVC/SVC: Normal venous pressure at 3 mmHg.    Pericardium: There is a small circumferential effusion. No indication   of cardiac tamponade.     , EKG: Reviewed, and X-Ray: CXR: X-Ray Chest 1 View (CXR): No results found for this visit on 02/17/25. and X-Ray Chest PA and Lateral (CXR): No results found for this visit on 02/17/25.

## 2025-02-20 NOTE — PROGRESS NOTES
"[facility] - Tele[facility] ([facility])  Cardiology  Progress Note    Patient Name: Bria Saldana  MRN: [id number]  Admission Date: 2/17/2025  Hospital Length of Stay: 1 days  Code Status: Full Code   Attending Physician: Howard Rao MD   Primary Care Physician: Brenna Maravilla MD  Expected Discharge Date: 2/20/2025  Principal Problem:Acute on chronic combined systolic (congestive) and diastolic (congestive) heart failure    Subjective:   HPI:  Mr. Saldana is a 54 year old male patient whose current medical conditions include CKD, HTN, DM type II, hyperlipidemia, NATALYA, PAFL s/p prior DCCV (on Eliquis) who presented to [facility] ED yesterday due to increased SOB over the past week. Associated symptoms included BLE edema, abdominal bloating, nausea, and loss of appetite. He denied any associated fever, chills, jeffery CP, palpitations, near syncope, or syncope. Initial workup in ED revealed uncontrolled BP, BNP > 3,000, troponin of 0.138 and patient was subsequently admitted for further evaluation and treatment. Cardiology consulted to assist with management. Patient seen and examined today, sitting up in bed. Feeling well. Diuresing well. States SOB/edema are improving. CP free. He reports compliance with his medications. Tries to watch his salt intake, states his BP is "always high". Followed in CHF clinic. TTE pending. Repeat troponin trending down. MPI stress test 5/24 negative, + scar noted.         Hospital Course:   2/19/25-Patient seen and examined today, resting in bed. Feeling better. Excellent diuresis. Still has some BLE edema/abdominal bloating. No CP. Labs reviewed. Creatinine 2.5. BP improved. TTE with EF of 30-40%, small pericardial effusion.    2/20/25-Patient seen and examined today, lying in bed. Reported some abd pain earlier this AM, complains of being hungry. SOB/edema stable. No CP. Diuresed well. BP soft, hydralazine held. Creatinine 2.5.         Review of Systems   Constitutional: Positive for " malaise/fatigue.   HENT: Negative.     Eyes: Negative.    Cardiovascular: Negative.    Respiratory: Negative.     Endocrine: Negative.    Hematologic/Lymphatic: Negative.    Skin: Negative.    Musculoskeletal: Negative.    Gastrointestinal:  Positive for abdominal pain.   Genitourinary: Negative.    Neurological:  Positive for weakness.   Psychiatric/Behavioral: Negative.     Allergic/Immunologic: Negative.      Objective:     Vital Signs (Most Recent):  Temp: 97.5 °F (36.4 °C) (02/20/25 1234)  Pulse: 71 (02/20/25 1234)  Resp: 17 (02/20/25 1234)  BP: (!) 92/56 (02/20/25 1236)  SpO2: 95 % (02/20/25 1234) Vital Signs (24h Range):  Temp:  [97.5 °F (36.4 °C)-98 °F (36.7 °C)] 97.5 °F (36.4 °C)  Pulse:  [65-80] 71  Resp:  [16-19] 17  SpO2:  [93 %-98 %] 95 %  BP: ()/(56-81) 92/56     Weight: 104.4 kg (230 lb 2.6 oz)  Body mass index is 28.77 kg/m².     SpO2: 95 %       No intake or output data in the 24 hours ending 02/20/25 1316    Lines/Drains/Airways       Peripheral Intravenous Line  Duration                  Peripheral IV - Single Lumen 02/17/25 2145 20 G Right Antecubital 2 days                       Physical Exam  Vitals and nursing note reviewed.   Constitutional:       General: He is not in acute distress.     Appearance: Normal appearance. He is well-developed. He is not diaphoretic.   HENT:      Head: Normocephalic and atraumatic.   Eyes:      General:         Right eye: No discharge.         Left eye: No discharge.      Pupils: Pupils are equal, round, and reactive to light.   Cardiovascular:      Rate and Rhythm: Normal rate and regular rhythm.      Heart sounds: Normal heart sounds, S1 normal and S2 normal. No murmur heard.  Pulmonary:      Effort: Pulmonary effort is normal. No respiratory distress.      Breath sounds: No rales.   Abdominal:      General: There is no distension.   Musculoskeletal:      Right lower leg: No edema.      Left lower leg: Edema present.   Skin:     General: Skin is warm and  "dry.      Findings: No erythema.   Neurological:      General: No focal deficit present.      Mental Status: He is alert and oriented to person, place, and time.   Psychiatric:         Mood and Affect: Mood normal.         Behavior: Behavior normal.         Thought Content: Thought content normal.            Significant Labs: CMP   Recent Labs   Lab 02/19/25  0444 02/20/25  0439    139   K 3.6 3.7    104   CO2 24 23   GLU 70 93   BUN 31* 37*   CREATININE 2.5* 2.5*   CALCIUM 8.7 8.6*   ANIONGAP 18* 12   , CBC No results for input(s): "WBC", "HGB", "HCT", "PLT" in the last 48 hours., Troponin No results for input(s): "TROPONINIHS" in the last 48 hours., and All pertinent lab results from the last 24 hours have been reviewed.    Significant Imaging: Echocardiogram: Transthoracic echo (TTE) complete (Cupid Only):   Results for orders placed or performed during the hospital encounter of 02/17/25   Echo   Result Value Ref Range    BSA 2.44 m2    Boswell's Biplane MOD Ejection Fraction 43 %    A2C EF 50 %    A4C EF 35 %    LVOT stroke volume 74.7 cm3    LVIDd 5.9 3.5 - 6.0 cm    LV Systolic Volume 112.86 mL    LV Systolic Volume Index 47.0 mL/m2    LVIDs 4.9 (A) 2.1 - 4.0 cm    LV Diastolic Volume 175.80 mL    LV Diastolic Volume Index 73.25 mL/m2    LV EDV A2C 158.152810303285822 mL    LV EDV A4C 134.79 mL    Left Ventricular End Systolic Volume by Teichholz Method 112.86 mL    Left Ventricular End Diastolic Volume by Teichholz Method 175.80 mL    IVS 1.8 (A) 0.6 - 1.1 cm    LVOT diameter 2.0 cm    LVOT area 3.1 cm2    FS 16.9 (A) 28 - 44 %    Left Ventricle Relative Wall Thickness 0.54 cm    PW 1.6 (A) 0.6 - 1.1 cm    LV mass 498.9 g    LV Mass Index 207.9 g/m2    MV Peak E Vineet 0.71 m/s    TDI LATERAL 0.07 m/s    TDI SEPTAL 0.06 m/s    E/E' ratio 11 m/s    MV Peak A Vineet 0.63 m/s    TR Max Vineet 3.1 m/s    E/A ratio 1.13     IVRT 72 msec    E wave deceleration time 218 msec    LV SEPTAL E/E' RATIO 11.8 m/s    LV " LATERAL E/E' RATIO 10.1 m/s    LVOT peak zarina 1.2 m/s    Left Ventricular Outflow Tract Mean Velocity 1.08 cm/s    Left Ventricular Outflow Tract Mean Gradient 4.68 mmHg    RV- marshall basal diam 3.1 cm    RVOT peak VTI 20.6 cm    TAPSE 2.08 cm    RV/LV Ratio 0.53 cm    LA size 4.8 cm    Left Atrium Minor Axis 6.4 cm    Left Atrium Major Axis 6.9 cm    RA Major Axis 6.15 cm    AV mean gradient 10 mmHg    AV peak gradient 14 mmHg    Ao peak zarina 1.9 m/s    Ao VTI 35.2 cm    LVOT peak VTI 23.8 cm    AV valve area 2.1 cm²    AV Velocity Ratio 0.63     AV index (prosthetic) 0.68     MAIKEL by Velocity Ratio 2.0 cm²    Mr max zarina 4.35 m/s    MV stenosis pressure 1/2 time 63.25 ms    MV valve area p 1/2 method 3.48 cm2    TV mean gradient 30 mmHg    Triscuspid Valve Regurgitation Peak Gradient 38 mmHg    PV mean gradient 2 mmHg    RVOT peak zarina 0.95 m/s    Ao root annulus 3.21 cm    STJ 3.10 cm    Ascending aorta 3.04 cm    IVC diameter 1.94 cm    Mean e' 0.07 m/s    ZLVIDS -2.29     ZLVIDD -6.17     RVDD 3.10 cm    IZABELLA 50 mL/m2    LA Vol 119 cm3    LA WIDTH 4.4 cm    RA Width 5.7 cm    EF 35 %    TV resting pulmonary artery pressure 41 mmHg    RV TB RVSP 6 mmHg    Est. RA pres 3 mmHg    Narrative      Left Ventricle: The left ventricle is mildly dilated. Normal wall   thickness. There is concentric hypertrophy. There is moderately reduced   systolic function with a visually estimated ejection fraction of 30 - 40%.   Ejection fraction is approximately 35%. Quantitated ejection fraction is   43%. Grade II diastolic dysfunction.    Right Ventricle: Normal right ventricular cavity size. Wall thickness   is normal. Systolic function is borderline low.    Left Atrium: Left atrium is severely dilated.    Mitral Valve: There is mild to moderate regurgitation.    Tricuspid Valve: There is mild regurgitation. There is mild pulmonary   hypertension.    Pulmonary Artery: The estimated pulmonary artery systolic pressure is   41 mmHg.     IVC/SVC: Normal venous pressure at 3 mmHg.    Pericardium: There is a small circumferential effusion. No indication   of cardiac tamponade.     , EKG: Reviewed, and X-Ray: CXR: X-Ray Chest 1 View (CXR): No results found for this visit on 02/17/25. and X-Ray Chest PA and Lateral (CXR): No results found for this visit on 02/17/25.  Assessment and Plan:   Patient who presents with decompensated CHF/labile BP. Volume status improved. Rec's below.    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 (congestive) and diastolic (congestive) heart failure  -Presents with decompensated CHF, exacerbated by uncontrolled HTN  -Continue IV diuresis  -Continue Coreg, Entresto  -Hydralazine resumed and increased to TID dosing  -Dietary restrictions discussed  -Strict I's/O's  -TTE pending    2/19/25  -Improving  -Continue IV diuresis for additional day  -Continue Coreg, Entresto, hydralazine  -TTE with EF of 30-40%, small pericardial effusion    2/20/25  -Volume status much improved  -IV Lasix d/c'd  -Continue Coreg/Entresto as BP permits    Acute renal failure superimposed on stage 3b chronic kidney disease  -Monitor with IV diuresis     Paroxysmal atrial flutter  -Currently in Sr  -Continue BB, Eliquis    Elevated troponin  -Troponin 0.138>0.099  -Secondary to demand ischemia from decompensated CHF, CKD  -No CP  -Continue OMT  -Prior MPI stress test 5/24 negative for ischemia, + scar    Essential hypertension  -BP uncontrolled  -Continue Coreg, Entresto  -Hydralazine resumed but at TID dosing, monitor trend    2/20/25  -BP soft since diuresed  -Hydralazine d/c'd  -May need further adjustment given lability; suggest observation overnight         VTE Risk Mitigation (From admission, onward)           Ordered     apixaban tablet 5 mg  2 times daily         02/18/25 0414     IP VTE HIGH RISK PATIENT  Once         02/18/25 0029     Place sequential compression device  Until discontinued         02/18/25 0029                    Judith Kim,  RAVINDRA  Cardiology  [facility] - Telemetry ([facility])

## 2025-02-20 NOTE — DISCHARGE INSTRUCTIONS
A nurse practitioner may be contacting you to assess your status post-hospitalization.       Our goal at [facility] is to always give you outstanding care and exceptional service. You may receive a survey by mail, text or e-mail in the next 7-10 days from Justyna Kaiser and our leadership team asking about the care you received with us. The survey should only take 5-10 minutes to complete and is very important to us.     Your feedback provides us with a way to recognize our staff who work tirelessly to provide the best care! Also, your responses help us learn how to improve when your experience was below our aspiration of excellence. We WILL use your feedback to continue making improvements to help us provide the highest quality care. We keep your personal information and feedback confidential. We appreciate your time completing this survey and can't wait to hear from you!!!     We want you to leave us today feeling like you can DEFINITELY recommend us to others! We look forward to your continued care with us! Thanks so much for choosing [facility] for your healthcare needs!

## 2025-02-20 NOTE — ASSESSMENT & PLAN NOTE
-Presents with decompensated CHF, exacerbated by uncontrolled HTN  -Continue IV diuresis  -Continue Coreg, Entresto  -Hydralazine resumed and increased to TID dosing  -Dietary restrictions discussed  -Strict I's/O's  -TTE pending    2/19/25  -Improving  -Continue IV diuresis for additional day  -Continue Coreg, Entresto, hydralazine  -TTE with EF of 30-40%, small pericardial effusion    2/20/25  -Volume status much improved  -IV Lasix d/c'd  -Continue Coreg/Entresto as BP permits

## 2025-02-20 NOTE — ASSESSMENT & PLAN NOTE
-BP uncontrolled  -Continue Coreg, Entresto  -Hydralazine resumed but at TID dosing, monitor trend    2/20/25  -BP soft since diuresed  -Hydralazine d/c'd  -May need further adjustment given lability; suggest observation overnight

## 2025-02-20 NOTE — PROGRESS NOTES
AVS virtually reviewed with Bria Saldana in its entirety with emphasis on diet, medications, follow-up appointments and reasons to return to the ED or contact the [facility] On Call Nurse Care Line. Patient also encouraged to utilize their patient portal. Ease and convenience of use reiterated. Education complete and patient voiced understanding. All questions answered. Discharge teaching complete.

## 2025-02-21 ENCOUNTER — TELEPHONE (OUTPATIENT)
Dept: ELECTROPHYSIOLOGY | Facility: CLINIC | Age: 55
End: 2025-02-21
Payer: MEDICAID

## 2025-02-21 NOTE — TELEPHONE ENCOUNTER
Spoke with patient, scheduled for ablation with Dr Phelan on 5/30/25, reviewed pre procedure information and will send instructions via mail and portal as discussed

## 2025-02-24 ENCOUNTER — TELEPHONE (OUTPATIENT)
Dept: CARDIOLOGY | Facility: CLINIC | Age: 55
End: 2025-02-24
Payer: MEDICAID

## 2025-02-24 DIAGNOSIS — I48.3 TYPICAL ATRIAL FLUTTER: Primary | ICD-10-CM

## 2025-02-24 NOTE — TELEPHONE ENCOUNTER
"[facility]) hospital discharge 48-72 hour phone follow up completed.     Most Recent Hospital Discharge Date: 2/20  Last admission Diagnosis/chief complaint:Acute CHF    TCC nurse Navigator spoke with pt    Current Patient reported weight: No scale     Current Patient reported blood pressure and heart rate: No B/P CuFF    Pt reports the following:  []  Shortness of Breath with Activity  []  Shortness of Breath at rest   []  Fatigue  []  Edema   [] Chest pain or tightness  [] Weight Increase since discharge  [x] None of the above    Medications:   Discharge medication reviewed with pt.  Pt reports having medication list available and has all medications at home for use per list.     Education:   Confirmed pt received "Home Care Guide for Heart Failure Patients" while admitted.   Reviewed key points as listed below.     Recommend 2 -3 gram sodium restriction and 1500 cc-2000 cc fluid restriction.  Encourage physical activity with graded exercise program.  Requested patient to weigh themselves daily, and to notify us if their weight increases by more than 3 lbs in 1 day or 5 lbs in 3 days.   Reminded patient to use "Daily weight and symptom tracker" to record and guide patient on when and how to call [facility]. PT may also use symptom tracker if no scale available  Pt reports being in the green(color) Zone. If in yellow/red, reminded that they should be calling [facility] today or now.     Watch for these Signs and Symptoms: If any of these occur, contact [facility] immediately:   Increase in shortness of breath with movement   Increase in swelling in your legs and ankles   Weight gain of more than 3 pounds in a day or 5 pounds in 3 days.   Difficulty breathing when you are lying down   Worsening fatigue or tiredness   Stomach bloating, a full feeling or a loss of appetite   Increased coughing--especially when you are lying down      Pt was able to verbalize back to nurse in their own words " correct diet/fluid restrictions, necessity for exercise, warning signs and symptoms, when and how to contact their TCC team.      Pt educated on follow-up plan while in [facility] program to include:   Week 1 -  F/u appt with Provider and HF nurse (Date) 3/10 at 11 am with Sienna Maguire PA-C   Week 2-5 - In person/ Virtual/ phone call check ins    Week 5-7 - Pt will discharge from [facility] and transition to longterm care provider (Cardiology/PCP/ Advanced Heart Failure).      Patient active on myChart? Yes      Pt given the following contact information for ease of communication: [phone] (Mon-Fri, 8a-5p) & for urgent issues on the weekend call [facility] on-call [phone] to page the Cardiology MD on call.  Pt also encouraged utilize [facility] messaging as well.      Will follow up with pt at first clinic visit and HF nurse navigator available for pt questions, issues or concerns.

## 2025-02-25 ENCOUNTER — OFFICE VISIT (OUTPATIENT)
Dept: FAMILY MEDICINE | Facility: CLINIC | Age: 55
End: 2025-02-25
Payer: MEDICAID

## 2025-02-25 VITALS
SYSTOLIC BLOOD PRESSURE: 158 MMHG | DIASTOLIC BLOOD PRESSURE: 118 MMHG | BODY MASS INDEX: 29.61 KG/M2 | HEART RATE: 89 BPM | OXYGEN SATURATION: 96 % | HEIGHT: 75 IN | TEMPERATURE: 98 F | WEIGHT: 238.13 LBS

## 2025-02-25 DIAGNOSIS — G47.33 OSA (OBSTRUCTIVE SLEEP APNEA): ICD-10-CM

## 2025-02-25 DIAGNOSIS — N18.32 STAGE 3B CHRONIC KIDNEY DISEASE: ICD-10-CM

## 2025-02-25 DIAGNOSIS — I50.40 COMBINED SYSTOLIC AND DIASTOLIC CONGESTIVE HEART FAILURE, UNSPECIFIED HF CHRONICITY: Primary | ICD-10-CM

## 2025-02-25 DIAGNOSIS — I48.92 PAROXYSMAL ATRIAL FLUTTER: ICD-10-CM

## 2025-02-25 DIAGNOSIS — M54.50 RIGHT-SIDED LOW BACK PAIN WITHOUT SCIATICA, UNSPECIFIED CHRONICITY: ICD-10-CM

## 2025-02-25 DIAGNOSIS — I10 ESSENTIAL HYPERTENSION: ICD-10-CM

## 2025-02-25 PROCEDURE — 99215 OFFICE O/P EST HI 40 MIN: CPT | Mod: PBBFAC,PO

## 2025-02-25 PROCEDURE — 99999 PR PBB SHADOW E&M-EST. PATIENT-LVL V: CPT | Mod: PBBFAC,,,

## 2025-02-25 RX ORDER — HYDRALAZINE HYDROCHLORIDE 50 MG/1
50 TABLET, FILM COATED ORAL 3 TIMES DAILY
Qty: 90 TABLET | Refills: 5 | Status: SHIPPED | OUTPATIENT
Start: 2025-02-25 | End: 2025-08-24
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
## 2025-02-26 RX ORDER — TIZANIDINE 2 MG/1
2 TABLET ORAL EVERY 8 HOURS PRN
Qty: 30 TABLET | Refills: 2 | Status: SHIPPED | OUTPATIENT
Start: 2025-02-26 | End: 2025-03-28

## 2025-03-04 ENCOUNTER — OFFICE VISIT (OUTPATIENT)
Dept: FAMILY MEDICINE | Facility: CLINIC | Age: 55
End: 2025-03-04
Payer: MEDICAID

## 2025-03-04 VITALS
OXYGEN SATURATION: 97 % | DIASTOLIC BLOOD PRESSURE: 60 MMHG | TEMPERATURE: 99 F | HEART RATE: 78 BPM | BODY MASS INDEX: 29 KG/M2 | SYSTOLIC BLOOD PRESSURE: 148 MMHG | WEIGHT: 233.25 LBS | HEIGHT: 75 IN

## 2025-03-04 DIAGNOSIS — I50.40 COMBINED SYSTOLIC AND DIASTOLIC CONGESTIVE HEART FAILURE, UNSPECIFIED HF CHRONICITY: ICD-10-CM

## 2025-03-04 DIAGNOSIS — I10 ESSENTIAL HYPERTENSION: Primary | ICD-10-CM

## 2025-03-04 DIAGNOSIS — N18.32 STAGE 3B CHRONIC KIDNEY DISEASE: ICD-10-CM

## 2025-03-04 DIAGNOSIS — E66.3 OVERWEIGHT (BMI 25.0-29.9): ICD-10-CM

## 2025-03-04 DIAGNOSIS — E78.2 MIXED HYPERLIPIDEMIA: ICD-10-CM

## 2025-03-04 PROCEDURE — 1160F RVW MEDS BY RX/DR IN RCRD: CPT | Mod: CPTII,,,

## 2025-03-04 PROCEDURE — 99214 OFFICE O/P EST MOD 30 MIN: CPT | Mod: S$PBB,,,

## 2025-03-04 PROCEDURE — 3008F BODY MASS INDEX DOCD: CPT | Mod: CPTII,,,

## 2025-03-04 PROCEDURE — 1159F MED LIST DOCD IN RCRD: CPT | Mod: CPTII,,,

## 2025-03-04 PROCEDURE — 3078F DIAST BP <80 MM HG: CPT | Mod: CPTII,,,

## 2025-03-04 PROCEDURE — 1111F DSCHRG MED/CURRENT MED MERGE: CPT | Mod: CPTII,,,

## 2025-03-04 PROCEDURE — 3077F SYST BP >= 140 MM HG: CPT | Mod: CPTII,,,

## 2025-03-04 PROCEDURE — 99999 PR PBB SHADOW E&M-EST. PATIENT-LVL IV: CPT | Mod: PBBFAC,,,

## 2025-03-04 PROCEDURE — 3044F HG A1C LEVEL LT 7.0%: CPT | Mod: CPTII,,,

## 2025-03-04 PROCEDURE — 99214 OFFICE O/P EST MOD 30 MIN: CPT | Mod: PBBFAC,PO
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
## 2025-03-10 ENCOUNTER — OFFICE VISIT (OUTPATIENT)
Dept: CARDIOLOGY | Facility: CLINIC | Age: 55
End: 2025-03-10
Payer: MEDICAID

## 2025-03-10 VITALS
BODY MASS INDEX: 29.31 KG/M2 | DIASTOLIC BLOOD PRESSURE: 98 MMHG | HEIGHT: 75 IN | HEART RATE: 74 BPM | WEIGHT: 235.69 LBS | SYSTOLIC BLOOD PRESSURE: 142 MMHG

## 2025-03-10 DIAGNOSIS — I48.92 PAROXYSMAL ATRIAL FLUTTER: Primary | ICD-10-CM

## 2025-03-10 DIAGNOSIS — I10 ESSENTIAL HYPERTENSION: ICD-10-CM

## 2025-03-10 DIAGNOSIS — I50.9 ACUTE CONGESTIVE HEART FAILURE, UNSPECIFIED HEART FAILURE TYPE: ICD-10-CM

## 2025-03-10 PROCEDURE — 3080F DIAST BP >= 90 MM HG: CPT | Mod: CPTII,,, | Performed by: PHYSICIAN ASSISTANT

## 2025-03-10 PROCEDURE — 3077F SYST BP >= 140 MM HG: CPT | Mod: CPTII,,, | Performed by: PHYSICIAN ASSISTANT

## 2025-03-10 PROCEDURE — 99999 PR PBB SHADOW E&M-EST. PATIENT-LVL III: CPT | Mod: PBBFAC,,, | Performed by: PHYSICIAN ASSISTANT

## 2025-03-10 PROCEDURE — 99214 OFFICE O/P EST MOD 30 MIN: CPT | Mod: S$PBB,,, | Performed by: PHYSICIAN ASSISTANT

## 2025-03-10 PROCEDURE — 3008F BODY MASS INDEX DOCD: CPT | Mod: CPTII,,, | Performed by: PHYSICIAN ASSISTANT

## 2025-03-10 PROCEDURE — 99213 OFFICE O/P EST LOW 20 MIN: CPT | Mod: PBBFAC | Performed by: PHYSICIAN ASSISTANT

## 2025-03-10 PROCEDURE — 1111F DSCHRG MED/CURRENT MED MERGE: CPT | Mod: CPTII,,, | Performed by: PHYSICIAN ASSISTANT

## 2025-03-10 PROCEDURE — 3044F HG A1C LEVEL LT 7.0%: CPT | Mod: CPTII,,, | Performed by: PHYSICIAN ASSISTANT

## 2025-03-10 NOTE — PROGRESS NOTES
"HF TCC Provider Note (Follow-up) Consult Note      HPI:  Bria Saldana is a 54 y.o. male with a PMH  has a past medical history of Acute CHF (7/8/2019), Acute on chronic combined systolic (congestive) and diastolic (congestive) heart failure (9/23/2019), Allergy, CHF (congestive heart failure) (7/9/2019), CKD (chronic kidney disease) stage 3, GFR 30-59 ml/min (7/8/2019), Essential hypertension (6/1/2017), Hypertension associated with chronic kidney disease due to type 2 diabetes mellitus (2/28/2022), Mixed hyperlipidemia (3/10/2022), and NATALYA (obstructive sleep apnea) (7/23/2018).presented to the Emergency Department for evaluation of HTN, SOB, and bilateral leg swelling which began 1 week ago. Pt reports having yellowish vomit with streaks of blood. He states that he has had this problem before in May 2024 after he had "some type of abdominal procedure".  Chart review revealed that patient was admitted on 05/14/2024 for CHF exacerbation he was started on IV Lasix and was planned for a MEGAN with cardioversion on 05/15/2024.  During his MEGAN patient aspirated and ended up being intubated and transferred to the ICU.  During his stay in the ICU he did have a cardioversion and was started on amiodarone drip post conversion.  He was transferred out of ICU on 05/18/2024 and had been converted from IV to p.o. amiodarone but was continued on a heparin drip. Cardiology.  Recommended continuing medical therapy. Nuclear med stress negative and patient was discharged.     Associated sxs include diarrhea, nausea, SOB, and loss of appetite. Symptoms are constant and moderate in severity. No mitigating or exacerbating factors reported. Patient denies any fever or chills. No prior Tx specified.  No further complaints or concerns at this time.      ER workup revealed CBC to be unremarkable.  CMP revealed BUN/creatinine of 32/2.7 (previously 2.4/31.5 on 02/11/2025). BNP 3,242 (previously 3,357 on 12/15/2024).  Troponin " 0.138.  Chest x-ray showed cardiomegaly.  EKG revealed normal sinus rhythm with a ventricular rate of 88 beats per minute with a QT/QTC of 414/500.  BP initially 185/114, but 154/99 after receiving 60 mg Lasix, 10 mg hydralazine, and 1 in of nitroglycerin paste in ER.  Hospital Medicine consulted to admit patient for CHF exacerbation.  Patient in agreement with treatment plan.  Patient admitted under inpatient status.       2/19 admitted for congestive heart failure exacerbation. Cardiology recommending additional day of intravenous diuresis with cardiac stress test planned for tomorrow. No complaints     2/20      no plans for cardiac stress testing per cardiology. After discussing with cardiology, ok to discharge home on current regimen. Discussed with patient best practices for congestive heart failure monitoring with daily weight checks. Referral placed for congestive heart failure clinic.  Blood pressure with significant improvement    Since dc BP and SOB better, has been taking meds     systolic            PHYSICAL:   Vitals:    03/10/25 1018   BP: (!) 142/98   Pulse: 74          JVD: no,    Heart rhythm: irregular  Cardiac murmur: No    S3: no  S4: no  Lungs: clear  Liver span: 10 cm:   Hepatojugular reflux: no  Edema: no,       ASSESSMENT: chronic systolic HF    PLAN:      Patient Instructions:   Instruct the patient to notify this clinic if HH, a physician or an advanced care provider wants to change medication one of their HF medications   Activity and Diet restrictions:   Recommend 2-3 gram sodium restriction and 1500cc- 2000cc fluid restriction.  Encourage physical activity with graded exercise program.  Requested patient to weigh themselves daily, and to notify us if their weight increases by more than 3 lbs in 1 day or 5 lbs in 3 days.    Assigned dry weight on home scale: daily weight  Medication changes (include current dose and changed dose)  Continue increased hydralazine to TID 50 mg  On  ARNi and BB (will leave coreg at 12.5 BID s BP better)  Will keep BP log  Renal referral  SGLT2i for GDMT  RTC 4 weeks

## 2025-03-20 ENCOUNTER — OFFICE VISIT (OUTPATIENT)
Dept: PULMONOLOGY | Facility: CLINIC | Age: 55
End: 2025-03-20
Payer: MEDICAID

## 2025-03-20 VITALS
WEIGHT: 239.44 LBS | BODY MASS INDEX: 29.77 KG/M2 | SYSTOLIC BLOOD PRESSURE: 142 MMHG | OXYGEN SATURATION: 97 % | DIASTOLIC BLOOD PRESSURE: 88 MMHG | HEIGHT: 75 IN | RESPIRATION RATE: 18 BRPM

## 2025-03-20 DIAGNOSIS — G47.33 OSA (OBSTRUCTIVE SLEEP APNEA): Primary | ICD-10-CM

## 2025-03-20 DIAGNOSIS — I50.22 CHRONIC SYSTOLIC HEART FAILURE: ICD-10-CM

## 2025-03-20 DIAGNOSIS — Z86.79 HISTORY OF ATRIAL FIBRILLATION: ICD-10-CM

## 2025-03-20 DIAGNOSIS — R06.02 SOBOE (SHORTNESS OF BREATH ON EXERTION): ICD-10-CM

## 2025-03-20 PROCEDURE — 99214 OFFICE O/P EST MOD 30 MIN: CPT | Mod: PBBFAC | Performed by: INTERNAL MEDICINE

## 2025-03-20 PROCEDURE — 99999 PR PBB SHADOW E&M-EST. PATIENT-LVL IV: CPT | Mod: PBBFAC,,, | Performed by: INTERNAL MEDICINE

## 2025-03-20 RX ORDER — TORSEMIDE 20 MG/1
80 TABLET ORAL 2 TIMES DAILY
COMMUNITY
Start: 2025-03-08

## 2025-03-20 NOTE — PROGRESS NOTES
Pulmonary Outpatient Follow Up Visit     Subjective:       Patient ID: Bria Saldana is a 54 y.o. male.    Chief Complaint: Sleep Apnea, Shortness of Breath, and Pulmonary Hypertension      HPI         54-year-old male patient presenting for follow-up.    Never smoker  03/20/2025 he states he lost his machine due to relocation.      Chronic systolic heart failure history of atrial fibrillation following up with Cardiology regarding ablation.      Hartman Sleepiness Scale score 17.  Weight changes decreased 60 lb since 2018 when he was tested.  Likely due to fluid overload.  STOP - BANG Questionnaire:     1. Snoring : Do you snore loudly ?    Yes    2. Tired : Do you often feel tired, fatigued, or sleepy during daytime? Yes    3. Observed: Has anyone observed you stop breathing during your sleep?   Yes     4. Blood pressure : Do you have or are you being treated for high blood pressure?   Yes    5. BMI :BMI more than 35 kg/m2?   No    6. Age : Age over 50 yr old?   Yes    7. Neck circumference:   For male, is your shirt collar 17 inches / 43cm or larger?  For female, is your shirt collar 16 inches / 41cm or larger?    Yes    8. Gender: Gender male?   Yes    STOP BANG SCORE 7    High risk of NATALYA: Yes 5 - 8  Intermediate risk of NATALYA: Yes 3 - 4  Low risk of NATALYA: Yes 0 - 2      References:   STOP Questionnaire   A Tool to Screen Patients for Obstructive Sleep Apnea: LLOYD Kumar.C.P.C., XENA Medeiros.B.B.S., Jaylin Hammond M.D.,Maranda Abernathy, Ph.D., XENA Klein.B.B.S.,_ Maggi Mcgee.,_ Maddy Blackmon M.D., Checo Landeros, F.R.C.P.C.; Anesthesiology 2008; 108:[id number] Copyright © 2008, the American Society of Anesthesiologists, Inc. [employer]    Home sleep study 07/20/2018: AHI was 10.4/hr with 54 events      Medical condition: pulmonary hypertension, Chronic kidney disease, CHF, hypertension, diabetes.  "  Review of Systems   Constitutional:  Positive for fatigue.   Respiratory:  Positive for apnea, snoring, dyspnea on extertion and somnolence.    Cardiovascular:  Positive for leg swelling.   Psychiatric/Behavioral:  Positive for sleep disturbance.        Encounter Medications[1]    Objective:     Vital Signs (Most Recent)  Vital Signs  Resp: 18  SpO2: 97 %  BP: (!) 142/88  Pain Score: 0-No pain  Height and Weight  Height: 6' 3" (190.5 cm)  Weight: 108.6 kg (239 lb 6.7 oz)  BSA (Calculated - sq m): 2.4 sq meters  BMI (Calculated): 29.9  Weight in (lb) to have BMI = 25: 199.6]  Wt Readings from Last 2 Encounters:   03/20/25 108.6 kg (239 lb 6.7 oz)   03/10/25 106.9 kg (235 lb 10.8 oz)       Physical Exam   Constitutional: He is oriented to person, place, and time. He appears well-developed and well-nourished. He is obese.   Pulmonary/Chest: Normal expansion and effort normal. He has decreased breath sounds.   Neurological: He is alert and oriented to person, place, and time.   Psychiatric: His behavior is normal.       Laboratory  Lab Results   Component Value Date    WBC 6.43 02/17/2025    RBC 5.70 02/17/2025    HGB 16.2 02/17/2025    HCT 51.7 02/17/2025    MCV 91 02/17/2025    MCH 28.4 02/17/2025    MCHC 31.3 (L) 02/17/2025    RDW 14.1 02/17/2025     02/17/2025    MPV 11.1 02/17/2025    GRAN 3.3 02/17/2025    GRAN 51.1 02/17/2025    LYMPH 2.4 02/17/2025    LYMPH 36.9 02/17/2025    MONO 0.5 02/17/2025    MONO 8.1 02/17/2025    EOS 0.2 02/17/2025    BASO 0.07 02/17/2025    EOSINOPHIL 2.5 02/17/2025    BASOPHIL 1.1 02/17/2025       BMP  Lab Results   Component Value Date     02/20/2025    K 3.7 02/20/2025     02/20/2025    CO2 23 02/20/2025    BUN 37 (H) 02/20/2025    CREATININE 2.5 (H) 02/20/2025    CALCIUM 8.6 (L) 02/20/2025    ANIONGAP 12 02/20/2025    ESTGFRAFRICA 47 (A) 06/25/2021    EGFRNONAA 40 (A) 06/25/2021    AST 22 02/17/2025    ALT 19 02/17/2025    PROT 6.7 02/17/2025       Lab Results " "  Component Value Date    BNP 3,242 (H) 02/17/2025    BNP 3,357 (H) 12/15/2024    BNP 3,402 (H) 05/31/2024     (H) 05/19/2024    BNP 1,817 (H) 05/14/2024    BNP 3,313 (H) 10/02/2023       Lab Results   Component Value Date    TSH 1.431 02/11/2025       No results found for: "SEDRATE"    No results found for: "CRP"  Lab Results   Component Value Date    IGE 56 02/08/2023        No results found for: "ASPERGILLUS"  No results found for: "AFUMIGATUSCL"     No results found for: "ACE"     Diagnostic Results:  I have personally reviewed today the following studies:      Echo February February 2025      Left Ventricle: The left ventricle is mildly dilated. Normal wall thickness. There is concentric hypertrophy. There is moderately reduced systolic function with a visually estimated ejection fraction of 30 - 40%. Ejection fraction is approximately 35%. Quantitated ejection fraction is 43%. Grade II diastolic dysfunction.    Right Ventricle: Normal right ventricular cavity size. Wall thickness is normal. Systolic function is borderline low.    Left Atrium: Left atrium is severely dilated.    Mitral Valve: There is mild to moderate regurgitation.    Tricuspid Valve: There is mild regurgitation. There is mild pulmonary hypertension.    Pulmonary Artery: The estimated pulmonary artery systolic pressure is 41 mmHg.    IVC/SVC: Normal venous pressure at 3 mmHg.    Pericardium: There is a small circumferential effusion. No indication of cardiac tamponade.       Assessment/Plan:   NATALYA (obstructive sleep apnea)  -     Polysomnogram (CPAP will be added if patient meets diagnostic criteria.); Future    Chronic systolic heart failure  -     Polysomnogram (CPAP will be added if patient meets diagnostic criteria.); Future    History of atrial fibrillation  -     Polysomnogram (CPAP will be added if patient meets diagnostic criteria.); Future    SOBOE (shortness of breath on exertion)  -     Complete PFT with bronchodilator; " Future  -     Stress test, pulmonary; Future      Start with diagnostic study split if indicated do not use ASV or BiPAP ST due to EF less than 45%.    Check PFT and 6 minute walk.  Declined influenza pneumococcal vaccines    Additional recommendation to follow    Follow up in about 3 months (around 6/20/2025).    This note was prepared using voice recognition system and is likely to have sound alike errors that may have been overlooked even after proof reading.  Please call me with any questions    Discussed diagnosis, its evaluation, treatment and usual course. All questions answered.      Alexia Canales MD         [1]   Outpatient Encounter Medications as of 3/20/2025   Medication Sig Dispense Refill    torsemide (DEMADEX) 20 MG Tab Take 80 mg by mouth 2 (two) times daily.      apixaban (ELIQUIS) 5 mg Tab Take 5 mg by mouth.      carvediloL (COREG) 12.5 MG tablet Take 1 tablet (12.5 mg total) by mouth 2 (two) times daily. 60 tablet 3    colchicine (COLCRYS) 0.6 mg tablet Take 0.6 mg by mouth daily as needed.      diclofenac sodium (VOLTAREN) 1 % Gel Apply 2 g topically.      empagliflozin (JARDIANCE) 10 mg tablet Take 1 tablet (10 mg total) by mouth once daily. 30 tablet 3    ENTRESTO  mg per tablet TAKE 1 TABLET BY MOUTH TWICE DAILY 60 tablet 3    hydrALAZINE (APRESOLINE) 50 MG tablet Take 1 tablet (50 mg total) by mouth 3 (three) times daily. 90 tablet 5    HYDROcodone-acetaminophen (NORCO) 5-325 mg per tablet Take 1 tablet by mouth every 8 (eight) hours as needed for Pain. 15 tablet 0    tiZANidine (ZANAFLEX) 2 MG tablet Take 1 tablet (2 mg total) by mouth every 8 (eight) hours as needed (back pain). 30 tablet 2    torsemide 40 mg Tab Take 2 tablets by mouth 2 (two) times a day. 60 tablet 3    [DISCONTINUED] amiodarone (PACERONE) 200 MG Tab Take 1 tablet by mouth every morning.      [DISCONTINUED] benzonatate (TESSALON) 100 MG capsule Take 1 capsule (100 mg total) by mouth 3 (three) times daily as  needed for Cough. 10 capsule 0    [DISCONTINUED] colchicine (COLCRYS) 0.6 mg tablet Take 1 tablet (0.6 mg total) by mouth once daily. 90 tablet 0    [DISCONTINUED] colchicine (COLCRYS) 0.6 mg tablet Take 1 tablet by mouth every morning.      [DISCONTINUED] cyclobenzaprine (FLEXERIL) 10 MG tablet Take 10 mg by mouth.      [DISCONTINUED] empagliflozin (JARDIANCE) 10 mg tablet Take 1 tablet (10 mg total) by mouth once daily. 30 tablet 3    [DISCONTINUED] hydrALAZINE (APRESOLINE) 25 MG tablet Take 1 tablet (25 mg total) by mouth every 8 (eight) hours as needed (blood pressure greater than 160/90). 20 tablet 0    [DISCONTINUED] pantoprazole (PROTONIX) 40 MG tablet Take 40 mg by mouth.      [DISCONTINUED] sacubitriL-valsartan (ENTRESTO) 49-51 mg per tablet Take 1 tablet by mouth.      [DISCONTINUED] torsemide (DEMADEX) 20 MG Tab Take 2 tablets by mouth every morning.      [DISCONTINUED] torsemide 40 mg Tab Take 2 tablets by mouth.      [DISCONTINUED] acetaminophen tablet 650 mg       [DISCONTINUED] amiodarone tablet 200 mg       [DISCONTINUED] apixaban tablet 5 mg       [DISCONTINUED] carvediloL tablet 12.5 mg       [DISCONTINUED] furosemide injection 60 mg       [DISCONTINUED] hydrALAZINE injection 10 mg       [DISCONTINUED] hydrALAZINE tablet 25 mg       [DISCONTINUED] melatonin tablet 6 mg       [DISCONTINUED] ondansetron injection 4 mg       [DISCONTINUED] sacubitriL-valsartan 49-51 mg per tablet 2 tablet       [DISCONTINUED] sodium chloride 0.9% flush 10 mL        No facility-administered encounter medications on file as of 3/20/2025.

## 2025-03-30 ENCOUNTER — PATIENT MESSAGE (OUTPATIENT)
Dept: PULMONOLOGY | Facility: CLINIC | Age: 55
End: 2025-03-30
Payer: MEDICAID

## 2025-03-30 DIAGNOSIS — I50.40 COMBINED SYSTOLIC AND DIASTOLIC CONGESTIVE HEART FAILURE, UNSPECIFIED HF CHRONICITY: ICD-10-CM

## 2025-03-30 DIAGNOSIS — G47.33 OSA (OBSTRUCTIVE SLEEP APNEA): Primary | ICD-10-CM

## 2025-04-01 ENCOUNTER — TELEPHONE (OUTPATIENT)
Dept: SLEEP MEDICINE | Facility: CLINIC | Age: 55
End: 2025-04-01
Payer: MEDICAID

## 2025-04-08 ENCOUNTER — OFFICE VISIT (OUTPATIENT)
Dept: CARDIOLOGY | Facility: CLINIC | Age: 55
End: 2025-04-08
Payer: MEDICAID

## 2025-04-08 VITALS
DIASTOLIC BLOOD PRESSURE: 88 MMHG | BODY MASS INDEX: 29.9 KG/M2 | HEIGHT: 75 IN | SYSTOLIC BLOOD PRESSURE: 160 MMHG | OXYGEN SATURATION: 96 % | WEIGHT: 240.5 LBS | HEART RATE: 78 BPM

## 2025-04-08 DIAGNOSIS — I10 ESSENTIAL HYPERTENSION: ICD-10-CM

## 2025-04-08 DIAGNOSIS — I50.43 ACUTE ON CHRONIC COMBINED SYSTOLIC (CONGESTIVE) AND DIASTOLIC (CONGESTIVE) HEART FAILURE: Primary | ICD-10-CM

## 2025-04-08 PROCEDURE — 99999 PR PBB SHADOW E&M-EST. PATIENT-LVL III: CPT | Mod: PBBFAC,,, | Performed by: PHYSICIAN ASSISTANT

## 2025-04-08 PROCEDURE — 3077F SYST BP >= 140 MM HG: CPT | Mod: CPTII,,, | Performed by: PHYSICIAN ASSISTANT

## 2025-04-08 PROCEDURE — 99213 OFFICE O/P EST LOW 20 MIN: CPT | Mod: PBBFAC | Performed by: PHYSICIAN ASSISTANT

## 2025-04-08 PROCEDURE — 3044F HG A1C LEVEL LT 7.0%: CPT | Mod: CPTII,,, | Performed by: PHYSICIAN ASSISTANT

## 2025-04-08 PROCEDURE — 3079F DIAST BP 80-89 MM HG: CPT | Mod: CPTII,,, | Performed by: PHYSICIAN ASSISTANT

## 2025-04-08 PROCEDURE — 3008F BODY MASS INDEX DOCD: CPT | Mod: CPTII,,, | Performed by: PHYSICIAN ASSISTANT

## 2025-04-08 PROCEDURE — 99214 OFFICE O/P EST MOD 30 MIN: CPT | Mod: S$PBB,,, | Performed by: PHYSICIAN ASSISTANT

## 2025-04-08 PROCEDURE — 1160F RVW MEDS BY RX/DR IN RCRD: CPT | Mod: CPTII,,, | Performed by: PHYSICIAN ASSISTANT

## 2025-04-08 PROCEDURE — 1159F MED LIST DOCD IN RCRD: CPT | Mod: CPTII,,, | Performed by: PHYSICIAN ASSISTANT

## 2025-04-08 NOTE — PROGRESS NOTES
HF TCC Provider Note (Follow-up) Consult Note      HPI:  Bria Jo Jared Saldana is a 54 y.o. male with a PMH has a past medical history of Acute CHF (7/8/2019), Acute on chronic combined systolic (congestive) and diastolic (congestive) heart failure (9/23/2019), Allergy, CHF (congestive heart failure) (7/9/2019), CKD (chronic kidney disease) stage 3, GFR 30-59 ml/min (7/8/2019), Essential hypertension (6/1/2017), Hypertension associated with chronic kidney disease due to type 2 diabetes mellitus (2/28/2022), here for 6 week follow up    Has now stayed out of hospital for a month    Has RFA in May with Dr Tapan Mena SOB    HAs not taken any meds today as he overslept    Seen by pulm for NATALYA    No PND       PHYSICAL:   Vitals:    04/08/25 1023   BP: (!) 160/88   Pulse: 78          JVD: no,    Heart rhythm: regular  Cardiac murmur: No    S3: no  S4: no  Lungs: clear  Liver span: 10 cm:   Hepatojugular reflux: no  Edema: no,       ASSESSMENT: chronic systolic HF    PLAN:      Patient Instructions:   Instruct the patient to notify this clinic if HH, a physician or an advanced care provider wants to change medication one of their HF medications   Activity and Diet restrictions:   Recommend 2-3 gram sodium restriction and 1500cc- 2000cc fluid restriction.  Encourage physical activity with graded exercise program.  Requested patient to weigh themselves daily, and to notify us if their weight increases by more than 3 lbs in 1 day or 5 lbs in 3 days.    Assigned dry weight on home scale: daily weight  Medication changes (include current dose and changed dose)  Euvolemic today  Continue torsemdie BID  Sleep study  RFA in May will see back after that  CHF education done, weight loss

## 2025-04-09 ENCOUNTER — PATIENT MESSAGE (OUTPATIENT)
Dept: ELECTROPHYSIOLOGY | Facility: CLINIC | Age: 55
End: 2025-04-09
Payer: MEDICAID

## 2025-04-16 ENCOUNTER — CLINICAL SUPPORT (OUTPATIENT)
Dept: PULMONOLOGY | Facility: CLINIC | Age: 55
End: 2025-04-16
Payer: MEDICAID

## 2025-04-16 VITALS — BODY MASS INDEX: 29.9 KG/M2 | WEIGHT: 240.5 LBS | HEIGHT: 75 IN

## 2025-04-16 DIAGNOSIS — R06.02 SOBOE (SHORTNESS OF BREATH ON EXERTION): ICD-10-CM

## 2025-04-16 PROCEDURE — 94726 PLETHYSMOGRAPHY LUNG VOLUMES: CPT | Mod: PBBFAC

## 2025-04-16 PROCEDURE — 94060 EVALUATION OF WHEEZING: CPT | Mod: PBBFAC

## 2025-04-16 PROCEDURE — 99211 OFF/OP EST MAY X REQ PHY/QHP: CPT | Mod: PBBFAC

## 2025-04-16 PROCEDURE — 94729 DIFFUSING CAPACITY: CPT | Mod: PBBFAC

## 2025-04-16 PROCEDURE — 99999 PR PBB SHADOW E&M-EST. PATIENT-LVL I: CPT | Mod: PBBFAC,,,

## 2025-04-16 PROCEDURE — 94618 PULMONARY STRESS TESTING: CPT | Mod: PBBFAC

## 2025-04-16 NOTE — PROCEDURES
"[facility] - Pulmonary Function  Six Minute Walk     SUMMARY     Ordering Provider: Dr Canales   Interpreting Provider: Dr Canales  Performing nurse/tech/RT: NL RT  Diagnosis: Shortness of Breath  Height: 6' 3" (190.5 cm)  Weight: 109.1 kg (240 lb 8.4 oz)  BMI (Calculated): 30.1                Phase Oxygen Assessment Supplemental O2 Heart   Rate Blood Pressure Gt Dyspnea Scale Rating   Resting 97 % Room Air 77 bpm 155/85 (Pt was unable to take blood pressure medication this morning.) 0   Exercise        Minute        1 97 % Room Air 90 bpm     2 95 % Room Air 97 bpm     3 94 % Room Air 97 bpm     4 93 % Room Air 99 bpm     5 94 % Room Air 97 bpm     6  93 % Room Air 98 bpm (!) 189/108 (Pt was unable to take blood pressure medication this morning.) 3   Recovery        Minute        1 94 % Room Air 91 bpm     2 96 % Room Air 85 bpm     3 97 % Room Air 81 bpm     4 97 % Room Air 83 bpm (!) 176/102 (Pt was unable to take blood pressure medication this morning.) 0     Six Minute Walk Summary  6MWT Status: completed without stopping  Patient Reported: Dyspnea (stomach pain)     Interpretation:  Did the patient stop or pause?: No                                         Total Time Walked (Calculated): 360 seconds  Final Partial Lap Distance (feet): 150 feet  Total Distance Meters (Calculated): 350.52 meters  Predicted Distance Meters (Calculated): 675.01 meters  Percentage of Predicted (Calculated): 51.93  Peak VO2 (Calculated): 14.5  Mets: 4.14  Has The Patient Had a Previous Six Minute Walk Test?: No       Previous 6MWT Results  Has The Patient Had a Previous Six Minute Walk Test?: No    "

## 2025-04-17 LAB
BRPFT: ABNORMAL
DLCO ADJ PRE: 20.78 ML/(MIN*MMHG) (ref 27.99–41.84)
DLCO SINGLE BREATH LLN: 27.99
DLCO SINGLE BREATH PRE REF: 59.5 %
DLCO SINGLE BREATH REF: 34.91
DLCOC SBVA LLN: 3.23
DLCOC SBVA PRE REF: 100.1 %
DLCOC SBVA REF: 4.27
DLCOC SINGLE BREATH LLN: 27.99
DLCOC SINGLE BREATH PRE REF: 59.5 %
DLCOC SINGLE BREATH REF: 34.91
DLCOVA LLN: 3.23
DLCOVA PRE REF: 100.1 %
DLCOVA PRE: 4.27 ML/(MIN*MMHG*L) (ref 3.23–5.31)
DLCOVA REF: 4.27
DLVAADJ PRE: 4.27 ML/(MIN*MMHG*L) (ref 3.23–5.31)
ERV LLN: -16448.58
ERV PRE REF: 60.8 %
ERV REF: 1.42
FEF 25 75 CHG: -2.3 %
FEF 25 75 LLN: 2.42
FEF 25 75 POST REF: 58.5 %
FEF 25 75 PRE REF: 59.9 %
FEF 25 75 REF: 4.13
FET100 CHG: 7.7 %
FEV1 CHG: 1.8 %
FEV1 FVC CHG: -2.2 %
FEV1 FVC LLN: 68
FEV1 FVC POST REF: 99.1 %
FEV1 FVC PRE REF: 101.3 %
FEV1 FVC REF: 78
FEV1 LLN: 2.85
FEV1 POST REF: 67.8 %
FEV1 PRE REF: 66.6 %
FEV1 REF: 3.83
FRCPLETH LLN: 2.87
FRCPLETH PREREF: 91.4 %
FRCPLETH REF: 3.86
FVC CHG: 4 %
FVC LLN: 3.72
FVC POST REF: 68.1 %
FVC PRE REF: 65.5 %
FVC REF: 4.91
IVC PRE: 3.04 L (ref 3.72–6.1)
IVC SINGLE BREATH LLN: 3.72
IVC SINGLE BREATH PRE REF: 62 %
IVC SINGLE BREATH REF: 4.91
MVV LLN: 142
MVV PRE REF: 69.3 %
MVV REF: 167
PEF CHG: -10.7 %
PEF LLN: 6.97
PEF POST REF: 65.1 %
PEF PRE REF: 72.9 %
PEF REF: 10.03
POST FEF 25 75: 2.42 L/S (ref 2.42–5.84)
POST FET 100: 8.74 SEC
POST FEV1 FVC: 77.62 % (ref 67.57–89.02)
POST FEV1: 2.59 L (ref 2.85–4.8)
POST FVC: 3.34 L (ref 3.72–6.1)
POST PEF: 6.54 L/S (ref 6.97–13.1)
PRE DLCO: 20.78 ML/(MIN*MMHG) (ref 27.99–41.84)
PRE ERV: 0.86 L (ref -16448.58–16451.42)
PRE FEF 25 75: 2.47 L/S (ref 2.42–5.84)
PRE FET 100: 8.12 SEC
PRE FEV1 FVC: 79.33 % (ref 67.57–89.02)
PRE FEV1: 2.55 L (ref 2.85–4.8)
PRE FRC PL: 3.52 L
PRE FVC: 3.21 L (ref 3.72–6.1)
PRE MVV: 116 L/MIN (ref 142.32–192.56)
PRE PEF: 7.32 L/S (ref 6.97–13.1)
PRE RV: 2.52 L (ref 1.76–3.11)
PRE TLC: 5.74 L (ref 7.03–9.33)
RAW LLN: 3.06
RAW PRE REF: 80.7 %
RAW PRE: 2.47 CMH2O*S/L (ref 3.06–3.06)
RAW REF: 3.06
RV LLN: 1.76
RV PRE REF: 103.2 %
RV REF: 2.44
RVTLC LLN: 26
RVTLC PRE REF: 126.5 %
RVTLC PRE: 43.82 % (ref 25.65–43.61)
RVTLC REF: 35
TLC LLN: 7.03
TLC PRE REF: 70.2 %
TLC REF: 8.18
VA PRE: 4.86 L (ref 8.03–8.03)
VA SINGLE BREATH LLN: 8.03
VA SINGLE BREATH PRE REF: 60.5 %
VA SINGLE BREATH REF: 8.03
VC LLN: 3.72
VC PRE REF: 65.8 %
VC PRE: 3.23 L (ref 3.72–6.1)
VC REF: 4.91
VTGRAWPRE: 3.39 L
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
## 2025-04-28 DIAGNOSIS — G47.33 OSA (OBSTRUCTIVE SLEEP APNEA): Primary | ICD-10-CM

## 2025-04-30 ENCOUNTER — HOSPITAL ENCOUNTER (OUTPATIENT)
Dept: SLEEP MEDICINE | Facility: HOSPITAL | Age: 55
Discharge: HOME | End: 2025-04-30
Attending: INTERNAL MEDICINE
Payer: MEDICAID

## 2025-04-30 DIAGNOSIS — G47.33 OSA (OBSTRUCTIVE SLEEP APNEA): ICD-10-CM

## 2025-04-30 DIAGNOSIS — I50.22 CHRONIC SYSTOLIC HEART FAILURE: ICD-10-CM

## 2025-04-30 DIAGNOSIS — Z86.79 HISTORY OF ATRIAL FIBRILLATION: ICD-10-CM

## 2025-04-30 PROCEDURE — 95810 POLYSOM 6/> YRS 4/> PARAM: CPT

## 2025-05-01 ENCOUNTER — PATIENT MESSAGE (OUTPATIENT)
Dept: PULMONOLOGY | Facility: CLINIC | Age: 55
End: 2025-05-01

## 2025-05-01 DIAGNOSIS — G47.39 COMPLEX SLEEP APNEA SYNDROME: Primary | ICD-10-CM

## 2025-05-01 NOTE — PROCEDURES
"  Patient Name: Bria Saldana Study Date: 4/30/2025   YOB: 1970 Study Type: PSG   Age: 54 year Patient #: [id number]   Sex: Male Referred by: Alexia Canales MD    Height: 6' 3" Interpreting Physician: Alexia Canales MD   Weight: 240.0 lbs Recording Tech: Marci Reyes   BMI: 30.2 Scoring Tech: Juanita Heck    ESS  12       Indications for Polysomnography  The patient is a 54 year-old Male who is 6' 3" and weighs 240.0 lbs. His BMI equals 30.2.  A full night polysomnogram was performed to re-evaluate for sleep apnea.    Medical History: -   History of atrial fibrillation and chronic systolic CHF. Echocardiogram 02/18/2025: There is moderately reduced systolic function with a visually estimated ejection fraction of 30 - 40%. Ejection fraction is approximately 35%.   Home sleep study 2018 RDI 10, samanta O2 saturation 83%.    CPAP titration 2023 REM supine control achieved at CPAP 12 cm H2O.  Treatment emergent central apnea on higher CPAP pressure and on BiPAP.  Patient lost equipment willing to reestablish diagnosis.      Medication   DEMADEX 20 MG Tab   torsemide 40 mg Tab   APRESOLINE 50 MG tablet   JARDIANCE 10 mg tablet   COREG 12.5 MG tablet   NORCO 5-325 mg per tablet   VOLTAREN 1 % Gel   ELIQUIS 5 mg Tab   COLCRYS 0.6 mg tablet   ENTRESTO  mg per tablet     Test Description  Patient was connected to a full set of leads with a sleep technician in attendance.  Parameters used were EEG derivations (F4-A1, C4-A1, O2-A1), EOG derivations (LOC-A2, ELIZABETH-A2), EKG, EMG - extremity (leg) & chin, oxygen saturation, effort parameters + abdominal/thoracic (RIP), and airflow parameters - nasal pressure/thermal.  Body position was visually monitored and noted.  Audio & video monitoring was performed during study.      Sleep Architecture  The total recording time of the polysomnogram was 472.8 minutes.  The total sleep time was 187.0 minutes.  The patient spent 25.7% of total sleep time in Stage N1, 56.7% " in Stage N2, 11.2% in Stages N3, and 6.4% in REM.  Sleep latency was 131.3 minutes.  REM latency was 250.0 minutes.  Sleep Efficiency was 39.6%.  Wake after Sleep Onset time was 154.0 minutes.    Respiratory Events  The polysomnogram revealed a presence of 11 obstructive, 103 central (although a crescendo decrescendo pattern was noted (Fig.1) it was not persistent (Fig.2)  and it was associated prolonged cycle length and circulation time), and 4 mixed apneas resulting in an Apnea index of 37.9 events per hour.  There were 43 type 1 A hypopneas (>=3% desat and/or Ar.) resulting in an Apnea\Hypopnea Index (AHI >=3% desat and/or Ar.) AAS RDI of 51.7 events per hour.  There were 35 type 1 B hypopneas (>=4% desat) resulting in an Apnea\Hypopnea Index (AHI >=4% desat) CMS AHI of 49.1 events per hour.  Greater breathing impairment occurred in supine sleep.    Fig.1          Fig.2    Mean oxygen saturation was 92.1%.  The lowest oxygen saturation during sleep was 81.0%.  Time spent <=88% oxygen saturation was 28.8 minutes (6.7%).    Limb Activity  There were no significant limb movements recorded.    Cardiac Summary  The average pulse rate was 84.9 bpm.  The minimum pulse rate was 46.0 bpm while the maximum pulse rate was 110.0 bpm.  Cardiac rhythm was normal with PVCs.    Conclusion:  complex sleep apnea with predominant central apnea, crescendo decrescendo pattern was noted (Fig.1) but not persistent (Fig.2).  A prolonged cycle length and circulation time was noted.    Diagnosis:      G47.37. Complex Sleep Apnea (Central predominant)      Recommendations:     Due to the severity of the patient's sleep apnea, cardiac comorbidities, and severity of oxygen desaturations, we would recommend that the patient return for an in-lab attended PAP titration study.     The patient needs counseling in regards to weight loss, and supervised weight management with behavioral and dietary modifications is likely beneficial since obesity  is likely contributing to the severity of NATALYA.     Optimal medical control of the patient known systolic congestive heart failure is recommended.    We strongly recommend optimization of the sleep schedule/duration because sleep deprivation can be a major contributor to daytime sleepiness.    Patient can follow-up at the [facility] [place]: [address] [facility] [address]; phone number [phone]-or [address] [place] [address] phone number [phone].

## 2025-05-09 ENCOUNTER — PATIENT MESSAGE (OUTPATIENT)
Dept: ELECTROPHYSIOLOGY | Facility: CLINIC | Age: 55
End: 2025-05-09
Payer: MEDICAID

## 2025-05-19 ENCOUNTER — HOSPITAL ENCOUNTER (INPATIENT)
Facility: HOSPITAL | Age: 55
LOS: 6 days | Discharge: HOME | DRG: 291 | End: 2025-05-25
Attending: EMERGENCY MEDICINE | Admitting: STUDENT IN AN ORGANIZED HEALTH CARE EDUCATION/TRAINING PROGRAM
Payer: MEDICAID

## 2025-05-19 DIAGNOSIS — R07.9 CHEST PAIN: ICD-10-CM

## 2025-05-19 DIAGNOSIS — N18.9 CHRONIC KIDNEY DISEASE, UNSPECIFIED CKD STAGE: ICD-10-CM

## 2025-05-19 DIAGNOSIS — I48.3 TYPICAL ATRIAL FLUTTER: ICD-10-CM

## 2025-05-19 DIAGNOSIS — I48.92 ATRIAL FLUTTER: ICD-10-CM

## 2025-05-19 DIAGNOSIS — I16.0 HYPERTENSIVE URGENCY: ICD-10-CM

## 2025-05-19 DIAGNOSIS — I48.91 A-FIB: ICD-10-CM

## 2025-05-19 DIAGNOSIS — I48.92 ATRIAL FLUTTER WITH RAPID VENTRICULAR RESPONSE: Primary | ICD-10-CM

## 2025-05-19 DIAGNOSIS — I10 HYPERTENSION: ICD-10-CM

## 2025-05-19 LAB
ABSOLUTE EOSINOPHIL (OHS): 0.23 K/UL
ABSOLUTE MONOCYTE (OHS): 0.54 K/UL (ref 0.3–1)
ABSOLUTE NEUTROPHIL COUNT (OHS): 3.45 K/UL (ref 1.8–7.7)
ALBUMIN SERPL BCP-MCNC: 3.4 G/DL (ref 3.5–5.2)
ALP SERPL-CCNC: 151 UNIT/L (ref 40–150)
ALT SERPL W/O P-5'-P-CCNC: 40 UNIT/L (ref 10–44)
ANION GAP (OHS): 12 MMOL/L (ref 8–16)
AST SERPL-CCNC: 36 UNIT/L (ref 11–45)
BASOPHILS # BLD AUTO: 0.09 K/UL
BASOPHILS NFR BLD AUTO: 1.3 %
BILIRUB SERPL-MCNC: 4 MG/DL (ref 0.1–1)
BNP SERPL-MCNC: 1815 PG/ML (ref 0–99)
BUN SERPL-MCNC: 34 MG/DL (ref 6–20)
CALCIUM SERPL-MCNC: 9 MG/DL (ref 8.7–10.5)
CHLORIDE SERPL-SCNC: 106 MMOL/L (ref 95–110)
CO2 SERPL-SCNC: 24 MMOL/L (ref 23–29)
CREAT SERPL-MCNC: 2.8 MG/DL (ref 0.5–1.4)
ERYTHROCYTE [DISTWIDTH] IN BLOOD BY AUTOMATED COUNT: 14.5 % (ref 11.5–14.5)
GFR SERPLBLD CREATININE-BSD FMLA CKD-EPI: 26 ML/MIN/1.73/M2
GLUCOSE SERPL-MCNC: 69 MG/DL (ref 70–110)
HCT VFR BLD AUTO: 51.2 % (ref 40–54)
HGB BLD-MCNC: 15.9 GM/DL (ref 14–18)
IMM GRANULOCYTES # BLD AUTO: 0.01 K/UL (ref 0–0.04)
IMM GRANULOCYTES NFR BLD AUTO: 0.1 % (ref 0–0.5)
LIPASE SERPL-CCNC: 58 U/L (ref 4–60)
LYMPHOCYTES # BLD AUTO: 2.44 K/UL (ref 1–4.8)
MCH RBC QN AUTO: 27.9 PG (ref 27–31)
MCHC RBC AUTO-ENTMCNC: 31.1 G/DL (ref 32–36)
MCV RBC AUTO: 90 FL (ref 82–98)
NUCLEATED RBC (/100WBC) (OHS): 0 /100 WBC
PLATELET # BLD AUTO: 208 K/UL (ref 150–450)
PMV BLD AUTO: 10.8 FL (ref 9.2–12.9)
POTASSIUM SERPL-SCNC: 3.6 MMOL/L (ref 3.5–5.1)
PROT SERPL-MCNC: 6.6 GM/DL (ref 6–8.4)
RBC # BLD AUTO: 5.69 M/UL (ref 4.6–6.2)
RELATIVE EOSINOPHIL (OHS): 3.4 %
RELATIVE LYMPHOCYTE (OHS): 36.1 % (ref 18–48)
RELATIVE MONOCYTE (OHS): 8 % (ref 4–15)
RELATIVE NEUTROPHIL (OHS): 51.1 % (ref 38–73)
SODIUM SERPL-SCNC: 142 MMOL/L (ref 136–145)
TROPONIN I SERPL DL<=0.01 NG/ML-MCNC: 0.14 NG/ML
WBC # BLD AUTO: 6.76 K/UL (ref 3.9–12.7)

## 2025-05-19 PROCEDURE — 93005 ELECTROCARDIOGRAM TRACING: CPT

## 2025-05-19 PROCEDURE — 96375 TX/PRO/DX INJ NEW DRUG ADDON: CPT

## 2025-05-19 PROCEDURE — 96366 THER/PROPH/DIAG IV INF ADDON: CPT

## 2025-05-19 PROCEDURE — 96365 THER/PROPH/DIAG IV INF INIT: CPT

## 2025-05-19 PROCEDURE — 84484 ASSAY OF TROPONIN QUANT: CPT | Performed by: NURSE PRACTITIONER

## 2025-05-19 PROCEDURE — 96376 TX/PRO/DX INJ SAME DRUG ADON: CPT

## 2025-05-19 PROCEDURE — 93010 ELECTROCARDIOGRAM REPORT: CPT | Mod: ,,, | Performed by: INTERNAL MEDICINE

## 2025-05-19 PROCEDURE — 85025 COMPLETE CBC W/AUTO DIFF WBC: CPT | Performed by: NURSE PRACTITIONER

## 2025-05-19 PROCEDURE — 99285 EMERGENCY DEPT VISIT HI MDM: CPT | Mod: 25

## 2025-05-19 PROCEDURE — 83690 ASSAY OF LIPASE: CPT | Performed by: NURSE PRACTITIONER

## 2025-05-19 PROCEDURE — 80053 COMPREHEN METABOLIC PANEL: CPT | Performed by: NURSE PRACTITIONER

## 2025-05-19 PROCEDURE — 83880 ASSAY OF NATRIURETIC PEPTIDE: CPT | Performed by: NURSE PRACTITIONER

## 2025-05-19 PROCEDURE — 11000001 HC ACUTE MED/SURG PRIVATE ROOM

## 2025-05-19 PROCEDURE — 82962 GLUCOSE BLOOD TEST: CPT

## 2025-05-20 ENCOUNTER — DOCUMENTATION ONLY (OUTPATIENT)
Dept: CARDIOLOGY | Facility: CLINIC | Age: 55
End: 2025-05-20
Payer: MEDICAID

## 2025-05-20 LAB
ALBUMIN SERPL BCP-MCNC: 3.5 G/DL (ref 3.5–5.2)
ALP SERPL-CCNC: 157 UNIT/L (ref 40–150)
ALT SERPL W/O P-5'-P-CCNC: 36 UNIT/L (ref 10–44)
ANION GAP (OHS): 15 MMOL/L (ref 8–16)
AST SERPL-CCNC: 34 UNIT/L (ref 11–45)
BILIRUB SERPL-MCNC: 4.5 MG/DL (ref 0.1–1)
BUN SERPL-MCNC: 32 MG/DL (ref 6–20)
CALCIUM SERPL-MCNC: 9.1 MG/DL (ref 8.7–10.5)
CHLORIDE SERPL-SCNC: 105 MMOL/L (ref 95–110)
CO2 SERPL-SCNC: 23 MMOL/L (ref 23–29)
CREAT SERPL-MCNC: 2.5 MG/DL (ref 0.5–1.4)
GFR SERPLBLD CREATININE-BSD FMLA CKD-EPI: 30 ML/MIN/1.73/M2
GLUCOSE SERPL-MCNC: 82 MG/DL (ref 70–110)
MAGNESIUM SERPL-MCNC: 1.8 MG/DL (ref 1.6–2.6)
OHS QRS DURATION: 108 MS
OHS QTC CALCULATION: 505 MS
POCT GLUCOSE: 196 MG/DL (ref 70–110)
POTASSIUM SERPL-SCNC: 3.3 MMOL/L (ref 3.5–5.1)
PROT SERPL-MCNC: 7 GM/DL (ref 6–8.4)
SODIUM SERPL-SCNC: 143 MMOL/L (ref 136–145)
TROPONIN I SERPL DL<=0.01 NG/ML-MCNC: 0.14 NG/ML

## 2025-05-20 PROCEDURE — 25000003 PHARM REV CODE 250: Performed by: NURSE PRACTITIONER

## 2025-05-20 PROCEDURE — 63600175 PHARM REV CODE 636 W HCPCS: Performed by: NURSE PRACTITIONER

## 2025-05-20 PROCEDURE — 84484 ASSAY OF TROPONIN QUANT: CPT | Performed by: NURSE PRACTITIONER

## 2025-05-20 PROCEDURE — 25000003 PHARM REV CODE 250: Performed by: EMERGENCY MEDICINE

## 2025-05-20 PROCEDURE — 21400001 HC TELEMETRY ROOM

## 2025-05-20 PROCEDURE — 25000003 PHARM REV CODE 250: Performed by: PHYSICIAN ASSISTANT

## 2025-05-20 PROCEDURE — 80053 COMPREHEN METABOLIC PANEL: CPT | Performed by: NURSE PRACTITIONER

## 2025-05-20 PROCEDURE — 63600175 PHARM REV CODE 636 W HCPCS: Mod: JZ,TB | Performed by: EMERGENCY MEDICINE

## 2025-05-20 PROCEDURE — 25000003 PHARM REV CODE 250: Performed by: STUDENT IN AN ORGANIZED HEALTH CARE EDUCATION/TRAINING PROGRAM

## 2025-05-20 PROCEDURE — 11000001 HC ACUTE MED/SURG PRIVATE ROOM

## 2025-05-20 PROCEDURE — 83735 ASSAY OF MAGNESIUM: CPT | Performed by: NURSE PRACTITIONER

## 2025-05-20 PROCEDURE — 63600175 PHARM REV CODE 636 W HCPCS: Mod: JZ,TB | Performed by: NURSE PRACTITIONER

## 2025-05-20 PROCEDURE — 63600175 PHARM REV CODE 636 W HCPCS: Performed by: PHYSICIAN ASSISTANT

## 2025-05-20 PROCEDURE — 99222 1ST HOSP IP/OBS MODERATE 55: CPT | Mod: ,,, | Performed by: PHYSICIAN ASSISTANT

## 2025-05-20 RX ORDER — DILTIAZEM HYDROCHLORIDE 5 MG/ML
10 INJECTION INTRAVENOUS ONCE
Status: COMPLETED | OUTPATIENT
Start: 2025-05-20 | End: 2025-05-20

## 2025-05-20 RX ORDER — DILTIAZEM HYDROCHLORIDE 5 MG/ML
20 INJECTION INTRAVENOUS
Status: COMPLETED | OUTPATIENT
Start: 2025-05-20 | End: 2025-05-20

## 2025-05-20 RX ORDER — BUMETANIDE 0.25 MG/ML
1 INJECTION, SOLUTION INTRAMUSCULAR; INTRAVENOUS
Status: COMPLETED | OUTPATIENT
Start: 2025-05-20 | End: 2025-05-20

## 2025-05-20 RX ORDER — CARVEDILOL 12.5 MG/1
25 TABLET ORAL 2 TIMES DAILY
Status: DISCONTINUED | OUTPATIENT
Start: 2025-05-20 | End: 2025-05-22

## 2025-05-20 RX ORDER — DILTIAZEM HCL/D5W 125 MG/125
5 PLASTIC BAG, INJECTION (ML) INTRAVENOUS CONTINUOUS
Status: DISCONTINUED | OUTPATIENT
Start: 2025-05-20 | End: 2025-05-20

## 2025-05-20 RX ORDER — HYDRALAZINE HYDROCHLORIDE 20 MG/ML
10 INJECTION INTRAMUSCULAR; INTRAVENOUS
Status: COMPLETED | OUTPATIENT
Start: 2025-05-20 | End: 2025-05-20

## 2025-05-20 RX ORDER — ONDANSETRON HYDROCHLORIDE 2 MG/ML
4 INJECTION, SOLUTION INTRAVENOUS EVERY 6 HOURS PRN
Status: DISCONTINUED | OUTPATIENT
Start: 2025-05-20 | End: 2025-05-25 | Stop reason: HOSPADM

## 2025-05-20 RX ORDER — SODIUM CHLORIDE 0.9 % (FLUSH) 0.9 %
10 SYRINGE (ML) INJECTION
Status: DISCONTINUED | OUTPATIENT
Start: 2025-05-20 | End: 2025-05-25 | Stop reason: HOSPADM

## 2025-05-20 RX ORDER — HYDRALAZINE HYDROCHLORIDE 25 MG/1
50 TABLET, FILM COATED ORAL 3 TIMES DAILY
Status: DISCONTINUED | OUTPATIENT
Start: 2025-05-20 | End: 2025-05-20

## 2025-05-20 RX ORDER — CARVEDILOL 12.5 MG/1
12.5 TABLET ORAL 2 TIMES DAILY
Status: DISCONTINUED | OUTPATIENT
Start: 2025-05-20 | End: 2025-05-20

## 2025-05-20 RX ORDER — POTASSIUM CHLORIDE 20 MEQ/1
20 TABLET, EXTENDED RELEASE ORAL ONCE
Status: COMPLETED | OUTPATIENT
Start: 2025-05-20 | End: 2025-05-20

## 2025-05-20 RX ORDER — HYDRALAZINE HYDROCHLORIDE 20 MG/ML
10 INJECTION INTRAMUSCULAR; INTRAVENOUS EVERY 4 HOURS PRN
Status: DISCONTINUED | OUTPATIENT
Start: 2025-05-20 | End: 2025-05-25 | Stop reason: HOSPADM

## 2025-05-20 RX ORDER — POLYETHYLENE GLYCOL 3350 17 G/17G
17 POWDER, FOR SOLUTION ORAL 2 TIMES DAILY PRN
Status: DISCONTINUED | OUTPATIENT
Start: 2025-05-20 | End: 2025-05-25 | Stop reason: HOSPADM

## 2025-05-20 RX ORDER — TALC
6 POWDER (GRAM) TOPICAL NIGHTLY PRN
Status: DISCONTINUED | OUTPATIENT
Start: 2025-05-20 | End: 2025-05-25 | Stop reason: HOSPADM

## 2025-05-20 RX ORDER — BUMETANIDE 0.25 MG/ML
1 INJECTION, SOLUTION INTRAMUSCULAR; INTRAVENOUS 2 TIMES DAILY
Status: DISCONTINUED | OUTPATIENT
Start: 2025-05-20 | End: 2025-05-22

## 2025-05-20 RX ORDER — ACETAMINOPHEN 325 MG/1
650 TABLET ORAL EVERY 4 HOURS PRN
Status: DISCONTINUED | OUTPATIENT
Start: 2025-05-20 | End: 2025-05-25 | Stop reason: HOSPADM

## 2025-05-20 RX ADMIN — HYDRALAZINE HYDROCHLORIDE 50 MG: 25 TABLET ORAL at 09:05

## 2025-05-20 RX ADMIN — AMIODARONE HYDROCHLORIDE 150 MG: 1.5 INJECTION, SOLUTION INTRAVENOUS at 01:05

## 2025-05-20 RX ADMIN — BUMETANIDE 1 MG: 0.25 INJECTION INTRAMUSCULAR; INTRAVENOUS at 09:05

## 2025-05-20 RX ADMIN — SACUBITRIL AND VALSARTAN 2 TABLET: 49; 51 TABLET, FILM COATED ORAL at 09:05

## 2025-05-20 RX ADMIN — POTASSIUM CHLORIDE 20 MEQ: 1500 TABLET, EXTENDED RELEASE ORAL at 01:05

## 2025-05-20 RX ADMIN — HYDRALAZINE HYDROCHLORIDE 50 MG: 25 TABLET ORAL at 02:05

## 2025-05-20 RX ADMIN — HYDRALAZINE HYDROCHLORIDE 10 MG: 20 INJECTION INTRAMUSCULAR; INTRAVENOUS at 01:05

## 2025-05-20 RX ADMIN — APIXABAN 5 MG: 2.5 TABLET, FILM COATED ORAL at 08:05

## 2025-05-20 RX ADMIN — BUMETANIDE 1 MG: 0.25 INJECTION INTRAMUSCULAR; INTRAVENOUS at 02:05

## 2025-05-20 RX ADMIN — APIXABAN 5 MG: 2.5 TABLET, FILM COATED ORAL at 09:05

## 2025-05-20 RX ADMIN — CARVEDILOL 12.5 MG: 12.5 TABLET, FILM COATED ORAL at 09:05

## 2025-05-20 RX ADMIN — DILTIAZEM HYDROCHLORIDE 20 MG: 5 INJECTION, SOLUTION INTRAVENOUS at 02:05

## 2025-05-20 RX ADMIN — DILTIAZEM HYDROCHLORIDE 10 MG: 5 INJECTION, SOLUTION INTRAVENOUS at 07:05

## 2025-05-20 RX ADMIN — HYDRALAZINE HYDROCHLORIDE 10 MG: 20 INJECTION INTRAMUSCULAR; INTRAVENOUS at 04:05

## 2025-05-20 RX ADMIN — SACUBITRIL AND VALSARTAN 2 TABLET: 49; 51 TABLET, FILM COATED ORAL at 08:05

## 2025-05-20 RX ADMIN — CARVEDILOL 25 MG: 12.5 TABLET, FILM COATED ORAL at 08:05

## 2025-05-20 RX ADMIN — Medication 5 MG/HR: at 07:05
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
## 2025-05-20 NOTE — ASSESSMENT & PLAN NOTE
Atrial flutter with tachycardia, HR up to 120's  IV Cardizem given per ED, monitor response, may need infusion  Resume home Julio and Riddhi  Has ablation procedure scheduled for 5/29 per Dr. Phelan  Cardiology consult

## 2025-05-20 NOTE — PLAN OF CARE
Pt resting in bed .   Pt awake and alert .   Hob elevated .   Vitals stable .   Dressing to right foot intact   Wound vac delivered to bedside   POC discussed .   IV intact   Safety Measures in place   Chart check complete .   Will  continue to monitor .

## 2025-05-20 NOTE — MEDICAL/APP STUDENT
[facility]  Cardiology  Consult Note    Patient Name: Bria Saldana  MRN: [id number]  Admission Date: 5/19/2025  Hospital Length of Stay: 0 days  Attending Physician: Gareth Hollis MD   Primary Care Provider: Brenna Maravilla MD     Patient information was obtained from patient and ER records.     Consults  Subjective:   HPI:  Bria Saldana is a 54 y.o. male with a PMHx of HTN, HLD, combined systolic and diastolic heart failure, atrial flutter (planned to have an ablation w/ Dr. Phelan on 5/29/2025), CKD stage 3, NATALYA, and pulmonary HTN presented to the ED with abdominal pain/distention, chest pain/tightness, and SOB. Reports symptoms have worsen over the past couple days.    In the ED, initial labs revealed BUN 34, Cr 2.8, eGFR 26, , BNP 1815, troponin 0.141. EKG revealed atrial flutter with 2:1 A-V conduction. CXR showed no acute findings. Patient received bumex 1mg IV, diltiazem 20 mg IV, hydralazine 10 mg IV. Patient was admitted to the hospital for further management and treatment, cardiology consulted.    Hospital Course:  5/20/2025 - Patient was seen and examined today. Patient was lying in bed, having TTE done. Still experiencing some shortness of breath. Denies chest pain. No further physical complaints reported. Output of 2500 mL. Compliant with home medications. Patient unsure to why home amiodarone was discontinued. Will add amiodarone gtt. TTE pending.         Review of Systems   Respiratory:  Positive for chest tightness and shortness of breath.    Cardiovascular:  Negative for chest pain.   Gastrointestinal:  Positive for abdominal distention. Negative for abdominal pain.     Objective:     Vital Signs (Most Recent):  Temp: 97.8 °F (36.6 °C) (05/20/25 1045)  Pulse: (!) 113 (05/20/25 1112)  Resp: 18 (05/20/25 1045)  BP: 119/80 (05/20/25 1045)  SpO2: 95 % (05/20/25 1045) Vital Signs (24h Range):  Temp:  [97.8 °F (36.6 °C)-98 °F (36.7 °C)] 97.8 °F (36.6 °C)  Pulse:   [] 113  Resp:  [15-57] 18  SpO2:  [90 %-97 %] 95 %  BP: (119-186)/() 119/80     Weight: 112 kg (246 lb 14.6 oz)  Body mass index is 30.86 kg/m².    Intake/Output Summary (Last 24 hours) at 5/20/2025 1249  Last data filed at 5/20/2025 0600  Gross per 24 hour   Intake --   Output 2500 ml   Net -2500 ml         Physical Exam          Significant Labs: All pertinent labs within the past 24 hours have been reviewed.  CBC:   Recent Labs   Lab 05/19/25 2114   WBC 6.76   HGB 15.9   HCT 51.2        CMP:   Recent Labs   Lab 05/19/25 2114 05/20/25  0700    143   K 3.6 3.3*    105   CO2 24 23   GLU 69* 82   BUN 34* 32*   CREATININE 2.8* 2.5*   CALCIUM 9.0 9.1   PROT 6.6 7.0   ALBUMIN 3.4* 3.5   BILITOT 4.0* 4.5*   ALKPHOS 151* 157*   AST 36 34   ALT 40 36   ANIONGAP 12 15     Cardiac Markers:   Recent Labs   Lab 05/19/25 2114   BNP 1,815*     POCT Glucose:   Recent Labs   Lab 05/20/25  0204   POCTGLUCOSE 196*     Troponin:   Recent Labs   Lab 05/19/25 2114 05/20/25  0014   TROPONINI 0.141* 0.139*       Significant Imaging: I have reviewed all pertinent imaging results/findings within the past 24 hours.  EKG 5/19/2025: Atrial flutter with 2:1 A-V conduction    X-Ray Chest AP Portable  Result Date: 5/19/2025   No acute findings. Finalized on: 5/19/2025 9:45 PM By:  Julián Tripathi MD [facility]# [id number]      2025-05-19 21:47:[id number]     [facility]     TTE 5/20/2025: Pending      Assessment/Plan:   Mr. Saldana presents with CHF exacerbation and atrial flutter w/ RVR. Will add amiodarone gtt and continue to diuresis. TTE pending. Monitor rhythm and I&O's. Will discuss possible cardioversion if rhythm does not return to sinus.     Acute on chronic combined systolic (congestive) and diastolic (congestive) heart failure   - Will continue to diuresis w/ bumetanide   - Continue carvedilol, sacubitril-valsartan, hydralazine   - Monitor I&O's    Acute renal failure superimposed on stage 3b chronic kidney disease   - BUN  32, Cr 2.5, eGFR 30   - Will continue to monitor labs    Uncontrolled atrial flutter   - EKG showed atrial flutter with 2:1 A-V conduction   - Add amiodarone gtt   - Continue carvedilol, apixaban   - Monitor rhythm   - Possible cardioversion if rhythm is unable to convert on amiodarone    Mixed hyperlipidemia   - Defer to outpatient cardiology    Elevated troponin   - 0.141 -> 0.139   - Will continue to monitor    Essential hypertension   - Titrate home medications      VTE Risk Mitigation (From admission, onward)           Ordered     apixaban tablet 5 mg  2 times daily         05/20/25 0313     Reason for No Pharmacological VTE Prophylaxis  Once        Question:  Reasons:  Answer:  Already adequately anticoagulated on oral Anticoagulants    05/20/25 0319     IP VTE HIGH RISK PATIENT  Once         05/20/25 0319     Place sequential compression device  Until discontinued         05/20/25 0319                    Thank you for your consult. We will follow-up with patient. Please contact us if you have any additional questions.    JORDAN Fried  [facility]
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
## 2025-05-20 NOTE — ASSESSMENT & PLAN NOTE
Patient has Combined Systolic and Diastolic heart failure that is Acute on chronic. On presentation their CHF was decompensated. Evidence of decompensated CHF on presentation includes: edema, elevated JVD, orthopnea, paroxysmal nocturnal dyspnea (PND), and dyspnea on exertion (BROUSSARD). The etiology of their decompensation is likely severe disease, he verbalizes compliance with medication regimen. Most recent BNP and echo results are listed below.  Recent Labs     05/19/25  2114   BNP 1,815*     Latest ECHO  Results for orders placed during the hospital encounter of 02/17/25    Echo    Interpretation Summary    Left Ventricle: The left ventricle is mildly dilated. Normal wall thickness. There is concentric hypertrophy. There is moderately reduced systolic function with a visually estimated ejection fraction of 30 - 40%. Ejection fraction is approximately 35%. Quantitated ejection fraction is 43%. Grade II diastolic dysfunction.    Right Ventricle: Normal right ventricular cavity size. Wall thickness is normal. Systolic function is borderline low.    Left Atrium: Left atrium is severely dilated.    Mitral Valve: There is mild to moderate regurgitation.    Tricuspid Valve: There is mild regurgitation. There is mild pulmonary hypertension.    Pulmonary Artery: The estimated pulmonary artery systolic pressure is 41 mmHg.    IVC/SVC: Normal venous pressure at 3 mmHg.    Pericardium: There is a small circumferential effusion. No indication of cardiac tamponade.    Current Heart Failure Medications  carvediloL tablet 12.5 mg, 2 times daily, Oral  sacubitriL-valsartan 49-51 mg per tablet 2 tablet, 2 times daily, Oral  hydrALAZINE tablet 50 mg, 3 times daily, Oral  bumetanide injection 1 mg, 2 times daily, Intravenous  hydrALAZINE injection 10 mg, Every 4 hours PRN, Intravenous    Plan  - Monitor strict I&Os and daily weights.    - Place on telemetry  - Low sodium diet  - Place on fluid restriction of 1.5 L.   - Cardiology has  been consulted  - The patient's volume status is worsening as indicated by edema, elevated JVD, orthopnea, paroxysmal nocturnal dyspnea (PND), and dyspnea on exertion (BROUSSARD). Will adjust treatment as follows: start IV Bumex BID  - CHF pathway in use
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
## 2025-05-20 NOTE — ASSESSMENT & PLAN NOTE
Patient's blood pressure range in the last 24 hours was: BP  Min: 142/109  Max: 186/129.The patient's inpatient anti-hypertensive regimen is listed below:  Current Antihypertensives  carvediloL tablet 12.5 mg, 2 times daily, Oral  hydrALAZINE tablet 50 mg, 3 times daily, Oral  bumetanide injection 1 mg, 2 times daily, Intravenous  hydrALAZINE injection 10 mg, Every 4 hours PRN, Intravenous  diltiaZEM injection 10 mg, Once, Intravenous  diltiaZEM 125 mg in dextrose 5% 125 mL infusion (non-titrating), Continuous, Intravenous    Plan  - BP is uncontrolled, will adjust as follows: use IV hydralazine PRN

## 2025-05-20 NOTE — ASSESSMENT & PLAN NOTE
-Presents with decompensated CHF in setting of recurrent aflutter  -Continue IV diuresis  -Continue BB/Entresto, hydralazine as tolerated  -Strict I's/O's  -  Recent Labs     05/19/25 2114   BNP 1,815*     Latest ECHO  Results for orders placed during the hospital encounter of 02/17/25    Echo    Interpretation Summary    Left Ventricle: The left ventricle is mildly dilated. Normal wall thickness. There is concentric hypertrophy. There is moderately reduced systolic function with a visually estimated ejection fraction of 30 - 40%. Ejection fraction is approximately 35%. Quantitated ejection fraction is 43%. Grade II diastolic dysfunction.    Right Ventricle: Normal right ventricular cavity size. Wall thickness is normal. Systolic function is borderline low.    Left Atrium: Left atrium is severely dilated.    Mitral Valve: There is mild to moderate regurgitation.    Tricuspid Valve: There is mild regurgitation. There is mild pulmonary hypertension.    Pulmonary Artery: The estimated pulmonary artery systolic pressure is 41 mmHg.    IVC/SVC: Normal venous pressure at 3 mmHg.    Pericardium: There is a small circumferential effusion. No indication of cardiac tamponade.    Current Heart Failure Medications  carvediloL tablet 12.5 mg, 2 times daily, Oral  sacubitriL-valsartan 49-51 mg per tablet 2 tablet, 2 times daily, Oral  hydrALAZINE tablet 50 mg, 3 times daily, Oral  bumetanide injection 1 mg, 2 times daily, Intravenous  hydrALAZINE injection 10 mg, Every 4 hours PRN, Intravenous

## 2025-05-20 NOTE — ASSESSMENT & PLAN NOTE
Baseline creatinine is 2.0-2.3. Most recent creatinine and eGFR are listed below.  Recent Labs     05/19/25 2114   CREATININE 2.8*   EGFRNORACEVR 26*      Plan  - Avoid nephrotoxins and renally dose meds for GFR listed above  - Monitor urine output, serial BMP, and adjust therapy as needed  - Requiring IV Bumex due to fluid overload

## 2025-05-20 NOTE — PLAN OF CARE
I spoke to patient earlier today. He verified he was taking Eliquis 5 mg BID. I reviewed his med list and saw no refills. Called pharmacy, they have no record of him filling that medication.    Amiodarone d/'cd. Patient did receive bolus despite instructions to hold prior to med verification. Will increase Coreg and assess response.

## 2025-05-20 NOTE — ASSESSMENT & PLAN NOTE
Elevated above baseline chronic elevation  Trend troponin, likely due to fluid overload and atrial flutter with RVR  Cardiac monitoring ordered  Continue Eliquis and beta blocker

## 2025-05-20 NOTE — PHARMACY MED REC
"Admission Medication History     The home medication history was taken by Skye Hernandez.    You may go to "Admission" then "Reconcile Home Medications" tabs to review and/or act upon these items.     The home medication list has been updated by the Pharmacy department.   Please read ALL comments highlighted in yellow.   Please address this information as you see fit.    Feel free to contact us if you have any questions or require assistance.      The medications listed below were removed from the home medication list. Please reorder if appropriate:  Patient reports no longer taking the following medication(s):  Voltaren 1% gel  Norco 5-325 mg  Torsemide 20 mg    Medications listed below were obtained from: Patient/family and Analytic software- JAYS      LAST [facility] REC COMPLETED:       Skye Hernandez  [id number]    Current Outpatient Medications on File Prior to Encounter   Medication Sig Dispense Refill Last Dose/Taking    apixaban (ELIQUIS) 5 mg Tab Take 5 mg by mouth 2 (two) times daily.   5/19/2025    carvediloL (COREG) 12.5 MG tablet Take 1 tablet (12.5 mg total) by mouth 2 (two) times daily. 60 tablet 3 5/19/2025    empagliflozin (JARDIANCE) 10 mg tablet Take 1 tablet (10 mg total) by mouth once daily. 30 tablet 3 5/19/2025    ENTRESTO  mg per tablet TAKE 1 TABLET BY MOUTH TWICE DAILY 60 tablet 3 5/19/2025    hydrALAZINE (APRESOLINE) 50 MG tablet Take 1 tablet (50 mg total) by mouth 3 (three) times daily. 90 tablet 5 5/19/2025    torsemide 40 mg Tab Take 2 tablets by mouth 2 (two) times a day. 60 tablet 3 5/19/2025    colchicine (COLCRYS) 0.6 mg tablet Take 0.6 mg by mouth daily as needed.   Unknown                           .          "

## 2025-05-20 NOTE — HPI
Patient is a 54-year-old male with past medical history significant for hypertension, hyperlipidemia, CKD stage 3, combined systolic and diastolic congestive heart failure (EF 30-40%, grade II diastolic dysfunction per Echo done 2/18/2025), [facility] per Dr. Phelan on 5/29/25, pulmonary hypertension,  type 2 diabetes mellitus, and NATALYA who presented to ED with complaint of abdominal pain/distention with vomiting/ spitting up yellow colored fluid as well as chest pain/chest tightness and shortness of breath.  On arrival to ED, temp 98°, heart rate 115, blood pressure 156/120, 97% SpO2 on room air.  While in ED blood pressure as high as  186/129 and heart rate as high as 125.  Lab workup significant for BUN 34, creatinine 2.8, glucose 69, alk-phos 151, albumin 3.4, total bilirubin 4.0, BNP 18 15, troponin 0.141.  Chest x-ray showed cardiomegaly, lungs appear clear.  EKG showed atrial flutter with 2-1 AV conduction, .  while in ED he was given Bumex 1 mg IV, diltiazem 20 mg IV, and hydralazine 10 mg IV.  Hospital Medicine was consulted for admission due to CHF exacerbation as well as a flutter with RVR.
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
## 2025-05-20 NOTE — ASSESSMENT & PLAN NOTE
-Trop flat  -Secondary to demand ischemia from aflutter, decompensated CHF  -Continue OMT  -Prior MPI stress test 5/2024 negative for ischemia

## 2025-05-20 NOTE — FIRST PROVIDER EVALUATION
"Medical screening examination initiated.  I have conducted a focused provider triage encounter, findings are as follows:    Brief history of present illness:  Complaining of abdominal swelling and vomiting.  History of CHF    Vitals:    05/19/25 1922   BP: (!) 156/120   Pulse: (!) 115   Temp: 98 °F (36.7 °C)   SpO2: 97%   Weight: 112 kg (247 lb)   Height: 6' 3" (1.905 m)       Pertinent physical exam:  Hypertensive and tachycardic    Brief workup plan:  Cardiac workup, further evaluation    Preliminary workup initiated; this workup will be continued and followed by the physician or advanced practice provider that is assigned to the patient when roomed.  "

## 2025-05-20 NOTE — ASSESSMENT & PLAN NOTE
-Presents with aflutter 2:1 conduction, recurrent  -Previously on amiodarone, ? Not taking since early February  -Start amiodarone drip  -Continue Coreg  -Continue Eliquis  -May need repeat MEGAN/DCCV---scheduled for ablation by Dr. Phelan, EP later this month

## 2025-05-20 NOTE — PROGRESS NOTES
"[facility]) nurse navigator notified of HFTCC candidate in need of education and introduction to 4-6 week program.      PT aao x 3 while lying in bed. Introduced self to pt as HFTCC nurse navigator.     Patient given "Home Care Guide for Heart Failure Patients" , "[facility] Failure [facility]" flyer and "Daily weight and symptom tracker".  Encouraged pt to review information.      Reviewed the following key points of HFTCC program with pt and family:   1.) Take your medications as directed.    2.) Weight yourself daily   3.) Follow low salt and limited fluid diet.    4.) Stop smoking and start exercising   5.) Go to your appointments and call your team.      Pt reminded to follow Symptom tracker and to call at the onset of symptoms according to tracker.     Reviewed plan for follow up once discharged to include phone calls, in person and virtual visits to assist pt optimizing their heart failure medication regimen and encouraging healthy lifestyle modifications.  Reminded pt that program will assist them over the next 4-6 weeks and then patient will be transferred to long term care provider .  Reminded pt how to contact HFTCC navigator via phone and or via [employer].     Pt given appointment or instructed appointment will be printed on hospital discharge paperwork.     Pt also reminded HF nurse will call 48-72 hours after discharge to check on them.     PT verbalize read back of information given.  Encouraged pt and family to read over information often and contact team with any questions or concerns.      "

## 2025-05-20 NOTE — HPI
Mr. Saldana is a 54 year old male patient whose current medical conditions include CKD stage III, HTN, hyperlipidemia, combined CHF with EF of 30-40%, PAFL s/p prior MEGAN/DCCV (on Eliquis) with planned ablation, DM type II, NATALYA, and pulmonary HTN who presented to [facility] this AM due to abdominal distention and nausea/vomiting over the past week. Other associated symptoms included chest tightness, SOB, and palpitations. He denied any associated fever, chills, palpitations, near syncope, or syncope. Initial workup in ED revealed uncontrolled HTN (156/120), creatinine of 2.8. Tbili of 4.0, BNP of 1815, and troponin of 0.141. CXR clear. EKG showed aflutter with 2:1 AV conduction and patient was subsequently placed on a cardizem drip and admitted for further evaluation and treatment. Cardiology consulted to assist with management. Patient seen and examined today, resting in bed. Feels ok. Less SOB/abd bloating improving with diuresis. No CP during exam. He reports compliance with his medications, states he is taking Eliquis 5 mg BID. Previously on amiodarone per review of notes but not taking since early February. Limited TTE pending. Will consider repeat MEGAN/DCCV if he fails to convert. Followed by EP and scheduled for ablation later this month.

## 2025-05-21 PROBLEM — E87.6 HYPOKALEMIA: Status: ACTIVE | Noted: 2025-05-21

## 2025-05-21 LAB
ABSOLUTE EOSINOPHIL (OHS): 0.32 K/UL
ABSOLUTE MONOCYTE (OHS): 0.53 K/UL (ref 0.3–1)
ABSOLUTE NEUTROPHIL COUNT (OHS): 2.86 K/UL (ref 1.8–7.7)
ALBUMIN SERPL BCP-MCNC: 2.9 G/DL (ref 3.5–5.2)
ALP SERPL-CCNC: 131 UNIT/L (ref 40–150)
ALT SERPL W/O P-5'-P-CCNC: 29 UNIT/L (ref 10–44)
ANION GAP (OHS): 14 MMOL/L (ref 8–16)
APICAL FOUR CHAMBER EJECTION FRACTION: 11 %
AST SERPL-CCNC: 24 UNIT/L (ref 11–45)
BASOPHILS # BLD AUTO: 0.04 K/UL
BASOPHILS NFR BLD AUTO: 0.7 %
BILIRUB SERPL-MCNC: 3.1 MG/DL (ref 0.1–1)
BNP SERPL-MCNC: 695 PG/ML (ref 0–99)
BSA FOR ECHO PROCEDURE: 2.43 M2
BUN SERPL-MCNC: 32 MG/DL (ref 6–20)
CALCIUM SERPL-MCNC: 8.3 MG/DL (ref 8.7–10.5)
CHLORIDE SERPL-SCNC: 105 MMOL/L (ref 95–110)
CO2 SERPL-SCNC: 23 MMOL/L (ref 23–29)
CREAT SERPL-MCNC: 2.5 MG/DL (ref 0.5–1.4)
CV ECHO LV RWT: 0.49 CM
ECHO LV POSTERIOR WALL: 1.4 CM (ref 0.6–1.1)
EJECTION FRACTION: 25 %
ERYTHROCYTE [DISTWIDTH] IN BLOOD BY AUTOMATED COUNT: 14.6 % (ref 11.5–14.5)
FRACTIONAL SHORTENING: 7 % (ref 28–44)
GFR SERPLBLD CREATININE-BSD FMLA CKD-EPI: 30 ML/MIN/1.73/M2
GLUCOSE SERPL-MCNC: 95 MG/DL (ref 70–110)
HCT VFR BLD AUTO: 51.5 % (ref 40–54)
HGB BLD-MCNC: 15.9 GM/DL (ref 14–18)
HOLD SPECIMEN: NORMAL
IMM GRANULOCYTES # BLD AUTO: 0.01 K/UL (ref 0–0.04)
IMM GRANULOCYTES NFR BLD AUTO: 0.2 % (ref 0–0.5)
INTERVENTRICULAR SEPTUM: 1.7 CM (ref 0.6–1.1)
IVC DIAMETER: 3.06 CM
LEFT INTERNAL DIMENSION IN SYSTOLE: 5.3 CM (ref 2.1–4)
LEFT VENTRICLE DIASTOLIC VOLUME INDEX: 67.08 ML/M2
LEFT VENTRICLE DIASTOLIC VOLUME: 161 ML
LEFT VENTRICLE END DIASTOLIC VOLUME APICAL 4 CHAMBER: 174.34 ML
LEFT VENTRICLE MASS INDEX: 172.3 G/M2
LEFT VENTRICLE SYSTOLIC VOLUME INDEX: 56.7 ML/M2
LEFT VENTRICLE SYSTOLIC VOLUME: 136 ML
LEFT VENTRICULAR INTERNAL DIMENSION IN DIASTOLE: 5.7 CM (ref 3.5–6)
LEFT VENTRICULAR MASS: 413.5 G
LVED V (TEICH): 161.09 ML
LVES V (TEICH): 135.81 ML
LYMPHOCYTES # BLD AUTO: 1.7 K/UL (ref 1–4.8)
MAGNESIUM SERPL-MCNC: 1.7 MG/DL (ref 1.6–2.6)
MCH RBC QN AUTO: 27.8 PG (ref 27–31)
MCHC RBC AUTO-ENTMCNC: 30.9 G/DL (ref 32–36)
MCV RBC AUTO: 90 FL (ref 82–98)
NUCLEATED RBC (/100WBC) (OHS): 0 /100 WBC
PHOSPHATE SERPL-MCNC: 3.3 MG/DL (ref 2.7–4.5)
PLATELET # BLD AUTO: 222 K/UL (ref 150–450)
PMV BLD AUTO: 11.2 FL (ref 9.2–12.9)
POTASSIUM SERPL-SCNC: 3.5 MMOL/L (ref 3.5–5.1)
PROT SERPL-MCNC: 6.1 GM/DL (ref 6–8.4)
RA PRESSURE ESTIMATED: 3 MMHG
RBC # BLD AUTO: 5.72 M/UL (ref 4.6–6.2)
RELATIVE EOSINOPHIL (OHS): 5.9 %
RELATIVE LYMPHOCYTE (OHS): 31.1 % (ref 18–48)
RELATIVE MONOCYTE (OHS): 9.7 % (ref 4–15)
RELATIVE NEUTROPHIL (OHS): 52.4 % (ref 38–73)
SODIUM SERPL-SCNC: 142 MMOL/L (ref 136–145)
WBC # BLD AUTO: 5.46 K/UL (ref 3.9–12.7)
Z-SCORE OF LEFT VENTRICULAR DIMENSION IN END DIASTOLE: -6.52
Z-SCORE OF LEFT VENTRICULAR DIMENSION IN END SYSTOLE: -1.67

## 2025-05-21 PROCEDURE — 63600175 PHARM REV CODE 636 W HCPCS: Mod: JZ,TB | Performed by: NURSE PRACTITIONER

## 2025-05-21 PROCEDURE — 99232 SBSQ HOSP IP/OBS MODERATE 35: CPT | Mod: ,,, | Performed by: PHYSICIAN ASSISTANT

## 2025-05-21 PROCEDURE — 25000003 PHARM REV CODE 250: Performed by: NURSE PRACTITIONER

## 2025-05-21 PROCEDURE — 36415 COLL VENOUS BLD VENIPUNCTURE: CPT | Performed by: NURSE PRACTITIONER

## 2025-05-21 PROCEDURE — 25000003 PHARM REV CODE 250: Performed by: PHYSICIAN ASSISTANT

## 2025-05-21 PROCEDURE — 85025 COMPLETE CBC W/AUTO DIFF WBC: CPT

## 2025-05-21 PROCEDURE — 36415 COLL VENOUS BLD VENIPUNCTURE: CPT | Performed by: PHYSICIAN ASSISTANT

## 2025-05-21 PROCEDURE — 80053 COMPREHEN METABOLIC PANEL: CPT

## 2025-05-21 PROCEDURE — 21400001 HC TELEMETRY ROOM

## 2025-05-21 PROCEDURE — 84100 ASSAY OF PHOSPHORUS: CPT

## 2025-05-21 PROCEDURE — 25000003 PHARM REV CODE 250: Performed by: STUDENT IN AN ORGANIZED HEALTH CARE EDUCATION/TRAINING PROGRAM

## 2025-05-21 PROCEDURE — 83735 ASSAY OF MAGNESIUM: CPT | Performed by: NURSE PRACTITIONER

## 2025-05-21 PROCEDURE — 83880 ASSAY OF NATRIURETIC PEPTIDE: CPT | Performed by: PHYSICIAN ASSISTANT

## 2025-05-21 RX ORDER — PROCHLORPERAZINE EDISYLATE 5 MG/ML
2.5 INJECTION INTRAMUSCULAR; INTRAVENOUS EVERY 8 HOURS PRN
Status: DISCONTINUED | OUTPATIENT
Start: 2025-05-21 | End: 2025-05-25 | Stop reason: HOSPADM

## 2025-05-21 RX ORDER — BISMUTH SUBSALICYLATE 525 MG/30ML
30 LIQUID ORAL EVERY 6 HOURS PRN
Status: DISCONTINUED | OUTPATIENT
Start: 2025-05-21 | End: 2025-05-25 | Stop reason: HOSPADM

## 2025-05-21 RX ORDER — SIMETHICONE 80 MG
1 TABLET,CHEWABLE ORAL 3 TIMES DAILY PRN
Status: DISCONTINUED | OUTPATIENT
Start: 2025-05-21 | End: 2025-05-25 | Stop reason: HOSPADM

## 2025-05-21 RX ADMIN — SACUBITRIL AND VALSARTAN 2 TABLET: 49; 51 TABLET, FILM COATED ORAL at 08:05

## 2025-05-21 RX ADMIN — APIXABAN 5 MG: 2.5 TABLET, FILM COATED ORAL at 09:05

## 2025-05-21 RX ADMIN — BUMETANIDE 1 MG: 0.25 INJECTION INTRAMUSCULAR; INTRAVENOUS at 08:05

## 2025-05-21 RX ADMIN — CARVEDILOL 25 MG: 12.5 TABLET, FILM COATED ORAL at 08:05

## 2025-05-21 RX ADMIN — ACETAMINOPHEN 650 MG: 325 TABLET ORAL at 12:05

## 2025-05-21 RX ADMIN — BUMETANIDE 1 MG: 0.25 INJECTION INTRAMUSCULAR; INTRAVENOUS at 09:05

## 2025-05-21 RX ADMIN — CARVEDILOL 25 MG: 12.5 TABLET, FILM COATED ORAL at 09:05

## 2025-05-21 RX ADMIN — APIXABAN 5 MG: 2.5 TABLET, FILM COATED ORAL at 08:05

## 2025-05-21 RX ADMIN — SIMETHICONE 80 MG: 80 TABLET, CHEWABLE ORAL at 12:05

## 2025-05-21 NOTE — ASSESSMENT & PLAN NOTE
Patient's blood pressure range in the last 24 hours was: BP  Min: 107/55  Max: 144/94.The patient's inpatient anti-hypertensive regimen is listed below:  Current Antihypertensives  bumetanide injection 1 mg, 2 times daily, Intravenous  hydrALAZINE injection 10 mg, Every 4 hours PRN, Intravenous  carvediloL tablet 25 mg, 2 times daily, Oral    Plan  - BP is controlled, no changes needed to their regimen  - IV Hydralazine prn

## 2025-05-21 NOTE — PROGRESS NOTES
[facility]  Cardiology  Progress Note    Patient Name: Bria Saldana  MRN: [id number]  Admission Date: 5/19/2025  Hospital Length of Stay: 1 days  Code Status: Full Code   Attending Physician: Ascencion Alejandro DO   Primary Care Physician: Brenna Maravilla MD  Expected Discharge Date:   Principal Problem:Acute on chronic combined systolic (congestive) and diastolic (congestive) heart failure    Subjective:   HPI:  Mr. Saldana is a 54 year old male patient whose current medical conditions include CKD stage III, HTN, hyperlipidemia, combined CHF with EF of 30-40%, PAFL s/p prior MEGAN/DCCV (on Eliquis) with planned ablation, DM type II, NATALYA, and pulmonary HTN who presented to [facility]- this AM due to abdominal distention and nausea/vomiting over the past week. Other associated symptoms included chest tightness, SOB, and palpitations. He denied any associated fever, chills, palpitations, near syncope, or syncope. Initial workup in ED revealed uncontrolled HTN (156/120), creatinine of 2.8. Tbili of 4.0, BNP of 1815, and troponin of 0.141. CXR clear. EKG showed aflutter with 2:1 AV conduction and patient was subsequently placed on a cardizem drip and admitted for further evaluation and treatment. Cardiology consulted to assist with management. Patient seen and examined today, resting in bed. Feels ok. Less SOB/abd bloating improving with diuresis. No CP during exam. He reports compliance with his medications, states he is taking Eliquis 5 mg BID. Previously on amiodarone per review of notes but not taking since early February. Limited TTE pending. Will consider repeat MEGAN/DCCV if he fails to convert. Followed by EP and scheduled for ablation later this month.     Hospital Course:   5/21/2025-Patient seen and examined today, sitting up in bed. Feeling better. Abd bloating/nausea/SOB much improved. CP free. Still in aflutter, HR low 100's. TTE with EF of 25%. MEGAN/DCCV planned tmw.        Review of Systems    Constitutional: Positive for malaise/fatigue.   HENT: Negative.     Eyes: Negative.    Cardiovascular:  Positive for dyspnea on exertion and leg swelling.   Respiratory: Negative.     Endocrine: Negative.    Hematologic/Lymphatic: Negative.    Skin: Negative.    Musculoskeletal: Negative.    Gastrointestinal: Negative.    Genitourinary: Negative.    Neurological: Negative.    Psychiatric/Behavioral: Negative.     Allergic/Immunologic: Negative.      Objective:     Vital Signs (Most Recent):  Temp: 97.5 °F (36.4 °C) (05/21/25 0924)  Pulse: (!) 111 (05/21/25 0924)  Resp: 19 (05/21/25 0924)  BP: 108/61 (05/21/25 0924)  SpO2: 96 % (05/21/25 0924) Vital Signs (24h Range):  Temp:  [97.3 °F (36.3 °C)-98.6 °F (37 °C)] 97.5 °F (36.4 °C)  Pulse:  [101-113] 111  Resp:  [16-19] 19  SpO2:  [92 %-98 %] 96 %  BP: (107-144)/(55-94) 108/61     Weight: 108.3 kg (238 lb 12.1 oz)  Body mass index is 29.84 kg/m².     SpO2: 96 %       No intake or output data in the 24 hours ending 05/21/25 1139    Lines/Drains/Airways       Peripheral Intravenous Line  Duration                  Peripheral IV - Single Lumen 05/20/25 0010 20 G Anterior;Right Forearm 1 day         Peripheral IV - Single Lumen 05/20/25 0702 18 G Left;Posterior Forearm 1 day                       Physical Exam  Vitals and nursing note reviewed.   Constitutional:       Appearance: Normal appearance.   HENT:      Head: Normocephalic and atraumatic.   Eyes:      Pupils: Pupils are equal, round, and reactive to light.   Cardiovascular:      Rate and Rhythm: Normal rate. Rhythm irregularly irregular.      Heart sounds: S1 normal and S2 normal. No murmur heard.  Pulmonary:      Effort: Pulmonary effort is normal.      Comments: Diminished BS  Musculoskeletal:      Right lower leg: Edema present.      Left lower leg: Edema present.   Neurological:      General: No focal deficit present.      Mental Status: He is oriented to person, place, and time.   Psychiatric:         Mood and  "Affect: Mood normal.         Behavior: Behavior normal.         Thought Content: Thought content normal.            Significant Labs: CMP   Recent Labs   Lab 05/19/25 2114 05/20/25  0700 05/21/25  0517    143 142   K 3.6 3.3* 3.5    105 105   CO2 24 23 23   GLU 69* 82 95   BUN 34* 32* 32*   CREATININE 2.8* 2.5* 2.5*   CALCIUM 9.0 9.1 8.3*   PROT 6.6 7.0 6.1   ALBUMIN 3.4* 3.5 2.9*   BILITOT 4.0* 4.5* 3.1*   ALKPHOS 151* 157* 131   AST 36 34 24   ALT 40 36 29   ANIONGAP 12 15 14   , CBC   Recent Labs   Lab 05/19/25 2114 05/21/25  0517   WBC 6.76 5.46   HGB 15.9 15.9   HCT 51.2 51.5    222   , Troponin No results for input(s): "TROPONINIHS" in the last 48 hours., and All pertinent lab results from the last 24 hours have been reviewed.    Significant Imaging: Echocardiogram: Transthoracic echo (TTE) complete (Cupid Only):   Results for orders placed or performed during the hospital encounter of 05/19/25   Echo   Result Value Ref Range    BSA 2.43 m2    A4C EF 11 %    LVIDd 5.7 3.5 - 6.0 cm    LV Systolic Volume 136 mL    LV Systolic Volume Index 56.7 mL/m2    LVIDs 5.3 (A) 2.1 - 4.0 cm    LV Diastolic Volume 161 mL    LV Diastolic Volume Index 67.08 mL/m2    LV EDV A4C 174.34 mL    Left Ventricular End Systolic Volume by Teichholz Method 135.81 mL    Left Ventricular End Diastolic Volume by Teichholz Method 161.09 mL    IVS 1.7 (A) 0.6 - 1.1 cm    FS 7.0 (A) 28 - 44 %    Left Ventricle Relative Wall Thickness 0.49 cm    PW 1.4 (A) 0.6 - 1.1 cm    LV mass 413.5 g    LV Mass Index 172.3 g/m2    IVC diameter 3.06 cm    ZLVIDS -1.67     ZLVIDD -6.52     EF 25 %    Est. RA pres 3 mmHg    Narrative      Left Ventricle: The left ventricle is normal in size. Severely   increased wall thickness. There is concentric hypertrophy. Moderate global   hypokinesis present. There is severely reduced systolic function. Ejection   fraction is approximately 25%.    Right Ventricle: The right ventricle is normal in " size Wall thickness   is normal. Systolic function is normal.    Right Atrium: The right atrium is mildly dilated .    IVC/SVC: Normal venous pressure at 3 mmHg.    Pericardium: There is a small effusion.      and EKG: Reviewed  Assessment and Plan:   Patient who presents with decompensated combined CHF/recurrent aflutter. Volume status improving. HR in low 100's-continue BB. MEGAN/DCCV tmw.    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 (congestive) and diastolic (congestive) heart failure  -Presents with decompensated CHF in setting of recurrent aflutter  -Continue IV diuresis  -Continue BB/Entresto, hydralazine as tolerated  -Strict I's/O's  -  Recent Labs     05/19/25  2114   BNP 1,815*       Latest ECHO  Results for orders placed during the hospital encounter of 02/17/25    Echo    Interpretation Summary    Left Ventricle: The left ventricle is mildly dilated. Normal wall thickness. There is concentric hypertrophy. There is moderately reduced systolic function with a visually estimated ejection fraction of 30 - 40%. Ejection fraction is approximately 35%. Quantitated ejection fraction is 43%. Grade II diastolic dysfunction.    Right Ventricle: Normal right ventricular cavity size. Wall thickness is normal. Systolic function is borderline low.    Left Atrium: Left atrium is severely dilated.    Mitral Valve: There is mild to moderate regurgitation.    Tricuspid Valve: There is mild regurgitation. There is mild pulmonary hypertension.    Pulmonary Artery: The estimated pulmonary artery systolic pressure is 41 mmHg.    IVC/SVC: Normal venous pressure at 3 mmHg.    Pericardium: There is a small circumferential effusion. No indication of cardiac tamponade.    Current Heart Failure Medications  sacubitriL-valsartan 49-51 mg per tablet 2 tablet, 2 times daily, Oral  bumetanide injection 1 mg, 2 times daily, Intravenous  hydrALAZINE injection 10 mg, Every 4 hours PRN, Intravenous  carvediloL tablet 25 mg, 2 times daily,  Oral    5/21/2025  -Improving  -Continue IV diuresis  -Continue Entresto, Coreg  -Strict I's/O's  -TTE with EF of 25%--->MEGAN/DCCV planned tmw    Acute renal failure superimposed on stage 3b chronic kidney disease  -Cardio-renal improving with IV diuresis    5/21/2025  -Stable, continue same    Uncontrolled atrial flutter  -Presents with aflutter 2:1 conduction, recurrent  -Previously on amiodarone, ? Not taking since early February  -Start amiodarone drip  -Continue Coreg  -Continue Eliquis  -May need repeat MEGAN/DCCV---scheduled for ablation by Dr. Phelan, EP later this month    5/21/2025  -HR in low 100's continue Coreg  -Called patient's pharmacy yesterday, they have no record of him filling Eliquis therefore amio d/'c  -Continue Eliquis while IP, ensure med availability/compliance upon d/c  -MEGAN/DCCV tmw, keep NPO after MN    Elevated troponin  -Trop flat  -Secondary to demand ischemia from aflutter, decompensated CHF  -Continue OMT  -Prior MPI stress test 5/2024 negative for ischemia    Essential hypertension  -Continue home meds as tolerated        VTE Risk Mitigation (From admission, onward)           Ordered     apixaban tablet 5 mg  2 times daily         05/20/25 0313     Reason for No Pharmacological VTE Prophylaxis  Once        Question:  Reasons:  Answer:  Already adequately anticoagulated on oral Anticoagulants    05/20/25 0319     IP VTE HIGH RISK PATIENT  Once         05/20/25 0319     Place sequential compression device  Until discontinued         05/20/25 0319                    Judith Kim PA-C  Cardiology  [facility]

## 2025-05-21 NOTE — HOSPITAL COURSE
5/21/2025-Patient seen and examined today, sitting up in bed. Feeling better. Abd bloating/nausea/SOB much improved. CP free. Still in aflutter, HR low 100's. TTE with EF of 25%. MEGAN/DCCV planned tmw.    5/22/2025-Patient seen and examined today, drowsy post MEGAN/DCCV. Converted to SR. Labs reviewed/stable. Reviewed with EP, will initiate po amiodarone and postpone ablation for now.    5/23/2025-Patient seen and examined today, remains in SR. Feels well. No SOB. No CP. Labs reviewed. Creatinine bumped to 3.1, diuretics/Entresto held. LA pending.

## 2025-05-21 NOTE — ASSESSMENT & PLAN NOTE
Baseline creatinine is 2.0-2.3. Most recent creatinine and eGFR are listed below.  Recent Labs     05/19/25  2114 05/20/25  0700 05/21/25  0517   CREATININE 2.8* 2.5* 2.5*   EGFRNORACEVR 26* 30* 30*      Plan  - Avoid nephrotoxins and renally dose meds for GFR listed above  - Monitor urine output, serial BMP, and adjust therapy as needed  - Requiring IV Bumex due to fluid overload

## 2025-05-21 NOTE — SUBJECTIVE & OBJECTIVE
Review of Systems   Constitutional: Positive for malaise/fatigue.   HENT: Negative.     Eyes: Negative.    Cardiovascular:  Positive for dyspnea on exertion and leg swelling.   Respiratory: Negative.     Endocrine: Negative.    Hematologic/Lymphatic: Negative.    Skin: Negative.    Musculoskeletal: Negative.    Gastrointestinal: Negative.    Genitourinary: Negative.    Neurological: Negative.    Psychiatric/Behavioral: Negative.     Allergic/Immunologic: Negative.      Objective:     Vital Signs (Most Recent):  Temp: 97.5 °F (36.4 °C) (05/21/25 0924)  Pulse: (!) 111 (05/21/25 0924)  Resp: 19 (05/21/25 0924)  BP: 108/61 (05/21/25 0924)  SpO2: 96 % (05/21/25 0924) Vital Signs (24h Range):  Temp:  [97.3 °F (36.3 °C)-98.6 °F (37 °C)] 97.5 °F (36.4 °C)  Pulse:  [101-113] 111  Resp:  [16-19] 19  SpO2:  [92 %-98 %] 96 %  BP: (107-144)/(55-94) 108/61     Weight: 108.3 kg (238 lb 12.1 oz)  Body mass index is 29.84 kg/m².     SpO2: 96 %       No intake or output data in the 24 hours ending 05/21/25 1139    Lines/Drains/Airways       Peripheral Intravenous Line  Duration                  Peripheral IV - Single Lumen 05/20/25 0010 20 G Anterior;Right Forearm 1 day         Peripheral IV - Single Lumen 05/20/25 0702 18 G Left;Posterior Forearm 1 day                       Physical Exam  Vitals and nursing note reviewed.   Constitutional:       Appearance: Normal appearance.   HENT:      Head: Normocephalic and atraumatic.   Eyes:      Pupils: Pupils are equal, round, and reactive to light.   Cardiovascular:      Rate and Rhythm: Normal rate. Rhythm irregularly irregular.      Heart sounds: S1 normal and S2 normal. No murmur heard.  Pulmonary:      Effort: Pulmonary effort is normal.      Comments: Diminished BS  Musculoskeletal:      Right lower leg: Edema present.      Left lower leg: Edema present.   Neurological:      General: No focal deficit present.      Mental Status: He is oriented to person, place, and time.  "  Psychiatric:         Mood and Affect: Mood normal.         Behavior: Behavior normal.         Thought Content: Thought content normal.            Significant Labs: CMP   Recent Labs   Lab 05/19/25 2114 05/20/25  0700 05/21/25  0517    143 142   K 3.6 3.3* 3.5    105 105   CO2 24 23 23   GLU 69* 82 95   BUN 34* 32* 32*   CREATININE 2.8* 2.5* 2.5*   CALCIUM 9.0 9.1 8.3*   PROT 6.6 7.0 6.1   ALBUMIN 3.4* 3.5 2.9*   BILITOT 4.0* 4.5* 3.1*   ALKPHOS 151* 157* 131   AST 36 34 24   ALT 40 36 29   ANIONGAP 12 15 14   , CBC   Recent Labs   Lab 05/19/25 2114 05/21/25  0517   WBC 6.76 5.46   HGB 15.9 15.9   HCT 51.2 51.5    222   , Troponin No results for input(s): "TROPONINIHS" in the last 48 hours., and All pertinent lab results from the last 24 hours have been reviewed.    Significant Imaging: Echocardiogram: Transthoracic echo (TTE) complete (Cupid Only):   Results for orders placed or performed during the hospital encounter of 05/19/25   Echo   Result Value Ref Range    BSA 2.43 m2    A4C EF 11 %    LVIDd 5.7 3.5 - 6.0 cm    LV Systolic Volume 136 mL    LV Systolic Volume Index 56.7 mL/m2    LVIDs 5.3 (A) 2.1 - 4.0 cm    LV Diastolic Volume 161 mL    LV Diastolic Volume Index 67.08 mL/m2    LV EDV A4C 174.34 mL    Left Ventricular End Systolic Volume by Teichholz Method 135.81 mL    Left Ventricular End Diastolic Volume by Teichholz Method 161.09 mL    IVS 1.7 (A) 0.6 - 1.1 cm    FS 7.0 (A) 28 - 44 %    Left Ventricle Relative Wall Thickness 0.49 cm    PW 1.4 (A) 0.6 - 1.1 cm    LV mass 413.5 g    LV Mass Index 172.3 g/m2    IVC diameter 3.06 cm    ZLVIDS -1.67     ZLVIDD -6.52     EF 25 %    Est. RA pres 3 mmHg    Narrative      Left Ventricle: The left ventricle is normal in size. Severely   increased wall thickness. There is concentric hypertrophy. Moderate global   hypokinesis present. There is severely reduced systolic function. Ejection   fraction is approximately 25%.    Right Ventricle: The " right ventricle is normal in size Wall thickness   is normal. Systolic function is normal.    Right Atrium: The right atrium is mildly dilated .    IVC/SVC: Normal venous pressure at 3 mmHg.    Pericardium: There is a small effusion.      and EKG: Reviewed

## 2025-05-21 NOTE — PLAN OF CARE
Discussed poc with pt, pt verbalized understanding    VS wnl  Cardiac monitoring in use  Fall precautions in place, remains injury free  Pain and nausea under control with PRN meds    Accurate I&Os. Pt to be NPO @ midnight for MEAGN/DCCV tomorrow. Pt verbalized  understanding  Bed locked at lowest position  Call light within reach    Chart check complete  Will cont with POC

## 2025-05-21 NOTE — ASSESSMENT & PLAN NOTE
-Cardio-renal improving with IV diuresis    5/21/2025  -Stable, continue same   Destini Dodge from [facility]. Dr Fran Siddiqi ofc. She needs to get medical clearance for pt whose having surgery.     Please call at [phone]    Thanks  Prosper Ca

## 2025-05-21 NOTE — ASSESSMENT & PLAN NOTE
-Presents with decompensated CHF in setting of recurrent aflutter  -Continue IV diuresis  -Continue BB/Entresto, hydralazine as tolerated  -Strict I's/O's  -  Recent Labs     05/19/25 2114   BNP 1,815*       Latest ECHO  Results for orders placed during the hospital encounter of 02/17/25    Echo    Interpretation Summary    Left Ventricle: The left ventricle is mildly dilated. Normal wall thickness. There is concentric hypertrophy. There is moderately reduced systolic function with a visually estimated ejection fraction of 30 - 40%. Ejection fraction is approximately 35%. Quantitated ejection fraction is 43%. Grade II diastolic dysfunction.    Right Ventricle: Normal right ventricular cavity size. Wall thickness is normal. Systolic function is borderline low.    Left Atrium: Left atrium is severely dilated.    Mitral Valve: There is mild to moderate regurgitation.    Tricuspid Valve: There is mild regurgitation. There is mild pulmonary hypertension.    Pulmonary Artery: The estimated pulmonary artery systolic pressure is 41 mmHg.    IVC/SVC: Normal venous pressure at 3 mmHg.    Pericardium: There is a small circumferential effusion. No indication of cardiac tamponade.    Current Heart Failure Medications  sacubitriL-valsartan 49-51 mg per tablet 2 tablet, 2 times daily, Oral  bumetanide injection 1 mg, 2 times daily, Intravenous  hydrALAZINE injection 10 mg, Every 4 hours PRN, Intravenous  carvediloL tablet 25 mg, 2 times daily, Oral    5/21/2025  -Improving  -Continue IV diuresis  -Continue Entresto, Coreg  -Strict I's/O's  -TTE with EF of 25%--->MEGAN/DCCV planned tmw

## 2025-05-21 NOTE — PROGRESS NOTES
[facility]  [facility] Medicine  Progress Note    Patient Name: Bria Saldana  MRN: [id number]  Patient Class: IP- Inpatient   Admission Date: 5/19/2025  Length of Stay: 1 days  Attending Physician: Ascencion Alejandro DO  Primary Care Provider: Brenna Maravilla MD    Subjective     Principal Problem:Acute on chronic combined systolic (congestive) and diastolic (congestive) heart failure    HPI:    Patient is a 54-year-old male with past medical history significant for hypertension, hyperlipidemia, CKD stage 3, combined systolic and diastolic congestive heart failure (EF 30-40%, grade II diastolic dysfunction per Echo done 2/18/2025), atrial flutter s/p DCCV on Eliquis with planned ablation procedure per Dr. Phelan on 5/29/25, pulmonary hypertension,  type 2 diabetes mellitus, and NATALYA who presented to ED with complaint of abdominal pain/distention with vomiting/ spitting up yellow colored fluid as well as chest pain/chest tightness and shortness of breath.  On arrival to ED, temp 98°, heart rate 115, blood pressure 156/120, 97% SpO2 on room air.  While in ED blood pressure as high as  186/129 and heart rate as high as 125.  Lab workup significant for BUN 34, creatinine 2.8, glucose 69, alk-phos 151, albumin 3.4, total bilirubin 4.0, BNP 18 15, troponin 0.141.  Chest x-ray showed cardiomegaly, lungs appear clear.  EKG showed atrial flutter with 2-1 AV conduction, .  while in ED he was given Bumex 1 mg IV, diltiazem 20 mg IV, and hydralazine 10 mg IV.  Hospital Medicine was consulted for admission due to CHF exacerbation as well as a flutter with RVR.    Overview/Hospital Course:  Patient admitted to inpatient for acute on chronic combined systolic and diastolic heart failure, complicated by abd pain, nausea/vomiting, and acute renal failure on chronic kidney disease. Diuresis initiated. BNP and Troponin elevated on admission and trend. Sustained A Flutter with HR > 100. Cardiology recs  appreciated--compliant with Eliquis BID but not taking Amiodarone since early 2/2025. Limited TTE shows EF 25% (prev 30-40% 2/2025), LV severely reduced systolic function. Considering repeat MEGAN/DCCV if he fails to convert.   As of 5/21/2025, afebrile, -113 overnight, adequate oxygenation on RA. Hemodynamically stable with stable Cr at 2.5. Cardiology plans MEGAN/DCCV tomorrow.     Interval History: Pt lying in bed in no acute distress. Denies issues overnight. Denies abd pain, constipation, N/V/D. Reports SOB improving--currently on RA. Discussed Cardiology to round for POC re: rhythm changes. Patient reports understanding and denies any questions/concerns at this time.    Review of Systems   Constitutional:  Positive for fatigue (feeling tired). Negative for appetite change, chills and fever.   Respiratory:  Positive for shortness of breath (improving, BROUSSARD). Negative for cough, chest tightness and wheezing.    Cardiovascular:  Negative for chest pain, palpitations (A Flutter sustained overnight per tele monitoring, HR > 100--denies feeling rhythm changes) and leg swelling (Denies, but reports was told BLE on admission).   Gastrointestinal:  Negative for abdominal pain, blood in stool, constipation, diarrhea, nausea and vomiting.   Genitourinary:  Negative for difficulty urinating and hematuria.   Musculoskeletal:  Negative for back pain, joint swelling and myalgias.   Neurological:  Positive for weakness (generalized). Negative for dizziness, tremors, syncope, light-headedness and headaches.   Psychiatric/Behavioral:  Negative for confusion and sleep disturbance.    All other systems reviewed and are negative.    Objective:     Vital Signs (Most Recent):  Temp: 97.5 °F (36.4 °C) (05/21/25 0924)  Pulse: (!) 111 (05/21/25 0924)  Resp: 19 (05/21/25 0924)  BP: 108/61 (05/21/25 0924)  SpO2: 96 % (05/21/25 0924) Vital Signs (24h Range):  Temp:  [97.3 °F (36.3 °C)-98.6 °F (37 °C)] 97.5 °F (36.4 °C)  Pulse:  [101-113]  111  Resp:  [16-19] 19  SpO2:  [92 %-98 %] 96 %  BP: (107-144)/(55-94) 108/61     Weight: 108.3 kg (238 lb 12.1 oz)  Body mass index is 29.84 kg/m².  No intake or output data in the 24 hours ending 25 1234      Physical Exam  Constitutional:       General: He is not in acute distress.     Appearance: Normal appearance. He is obese. He is ill-appearing (appears drowsy, arousable). He is not diaphoretic.   Cardiovascular:      Rate and Rhythm: Tachycardia present. Rhythm irregular.      Pulses: Normal pulses.      Comments: Mild non-pitting BLE edema--pt denies feeling edema is present  Pulmonary:      Effort: Pulmonary effort is normal. No respiratory distress.      Breath sounds: Normal breath sounds. No wheezing.   Abdominal:      General: Bowel sounds are normal. There is no distension.      Palpations: Abdomen is soft.      Tenderness: There is no abdominal tenderness. There is no guarding.   Musculoskeletal:      Right lower le+ Edema present.      Left lower le+ Edema present.   Neurological:      General: No focal deficit present.      Mental Status: He is alert and oriented to person, place, and time.   Psychiatric:         Mood and Affect: Affect is flat (drowsy, arousable).               Significant Labs: All pertinent labs within the past 24 hours have been reviewed.  Recent Lab Results         25  0517        Albumin 2.9              ALT 29       Anion Gap 14  Comment: UNABLE TO CALCULATE       AST 24       Baso # 0.04       Basophil % 0.7       BILIRUBIN TOTAL 3.1  Comment: For infants and newborns, interpretation of results should be based   on gestational age, weight and in agreement with clinical   observations.    Premature Infant recommended reference ranges:   0-24 hours:  <8.0 mg/dL   24-48 hours: <12.0 mg/dL   3-5 days:    <15.0 mg/dL   6-29 days:   <15.0 mg/dL       BUN 32       Calcium 8.3       Chloride 105       CO2 23       Creatinine 2.5       eGFR 30  Comment:  Estimated GFR calculated using the CKD-EPI creatinine (2021) equation.       Eos # 0.32       Eos % 5.9       Glucose 95       Gran # (ANC) 2.86       Hematocrit 51.5       Hemoglobin 15.9       Extra Tube Hold for add-ons.  Comment: Auto resulted.          Immature Grans (Abs) 0.01  Comment: Mild elevation in immature granulocytes is non specific and can be seen in a variety of conditions including stress response, acute inflammation, trauma and pregnancy. Correlation with other laboratory and clinical findings is essential.       Immature Granulocytes 0.2       Lymph # 1.70       Lymph % 31.1       Magnesium  1.7       MCH 27.8       MCHC 30.9       MCV 90       Mono # 0.53       Mono % 9.7       MPV 11.2       Neut % 52.4       nRBC 0       Phosphorus Level 3.3       Platelet Count 222       Potassium 3.5       PROTEIN TOTAL 6.1       RBC 5.72       RDW 14.6       Sodium 142       WBC 5.46               Significant Imaging: I have reviewed all pertinent imaging results/findings within the past 24 hours.      Assessment & Plan  Acute on chronic combined systolic (congestive) and diastolic (congestive) heart failure  Uncontrolled atrial flutter  Dyspnea  Pericardial effusion  Patient has Combined Systolic and Diastolic heart failure that is Acute on chronic. On presentation their CHF was decompensated. Evidence of decompensated CHF on presentation includes: edema, elevated JVD, orthopnea, paroxysmal nocturnal dyspnea (PND), and dyspnea on exertion (BROUSSARD). The etiology of their decompensation is likely severe disease, he verbalizes compliance with medication regimen. Most recent BNP and echo results are listed below.  Recent Labs     05/19/25  2114   BNP 1,815*     Latest ECHO  Results for orders placed during the hospital encounter of 02/17/25    Echo    Interpretation Summary    Left Ventricle: The left ventricle is mildly dilated. Normal wall thickness. There is concentric hypertrophy. There is moderately reduced  systolic function with a visually estimated ejection fraction of 30 - 40%. Ejection fraction is approximately 35%. Quantitated ejection fraction is 43%. Grade II diastolic dysfunction.    Right Ventricle: Normal right ventricular cavity size. Wall thickness is normal. Systolic function is borderline low.    Left Atrium: Left atrium is severely dilated.    Mitral Valve: There is mild to moderate regurgitation.    Tricuspid Valve: There is mild regurgitation. There is mild pulmonary hypertension.    Pulmonary Artery: The estimated pulmonary artery systolic pressure is 41 mmHg.    IVC/SVC: Normal venous pressure at 3 mmHg.    Pericardium: There is a small circumferential effusion. No indication of cardiac tamponade.    Current Heart Failure Medications  sacubitriL-valsartan 49-51 mg per tablet 2 tablet, 2 times daily, Oral  bumetanide injection 1 mg, 2 times daily, Intravenous  hydrALAZINE injection 10 mg, Every 4 hours PRN, Intravenous  carvediloL tablet 25 mg, 2 times daily, Oral    Plan  - Monitor strict I&Os and daily weights.    - Place on telemetry  - Low sodium diet  - Place on fluid restriction of 1.5 L.   - Cardiology has been consulted  - The patient's volume status is stable but not at their baseline as indicated by edema, elevated JVD, orthopnea, paroxysmal nocturnal dyspnea (PND), dyspnea on exertion (BROUSSARD), and shortness of breath  - CHF pathway in use    Atrial flutter with tachycardia, HR up to 120's on admit  Has ablation procedure scheduled for 5/29 per Dr. Tapan Cordero given per ED, monitor response, may need infusion  Cont home Coreg and Eliquis  HR > 100 overnight  Cardiology plans MEGAN/DCCV tomorrow    Essential hypertension  Patient's blood pressure range in the last 24 hours was: BP  Min: 107/55  Max: 144/94.The patient's inpatient anti-hypertensive regimen is listed below:  Current Antihypertensives  bumetanide injection 1 mg, 2 times daily, Intravenous  hydrALAZINE injection 10 mg, Every  4 hours PRN, Intravenous  carvediloL tablet 25 mg, 2 times daily, Oral    Plan  - BP is controlled, no changes needed to their regimen  - IV Hydralazine prn    Elevated troponin  Elevated above baseline chronic elevation  Trend troponin, likely due to fluid overload and atrial flutter with RVR  Continuous cardiac monitoring   Continue Eliquis and beta blocker  Cards following    Mixed hyperlipidemia  Continue statin    Acute renal failure superimposed on stage 3b chronic kidney disease   Baseline creatinine is 2.0-2.3. Most recent creatinine and eGFR are listed below.  Recent Labs     05/19/25 2114 05/20/25  0700 05/21/25  0517   CREATININE 2.8* 2.5* 2.5*   EGFRNORACEVR 26* 30* 30*      Plan  - Avoid nephrotoxins and renally dose meds for GFR listed above  - Monitor urine output, serial BMP, and adjust therapy as needed  - Requiring IV Bumex due to fluid overload    Hypokalemia  Patient's most recent potassium results are listed below.   Recent Labs     05/19/25 2114 05/20/25 0700 05/21/25 0517   K 3.6 3.3* 3.5     Plan  - Replete potassium per protocol  - Monitor potassium Daily  - Patient's hypokalemia is improving  - Trend    VTE Risk Mitigation (From admission, onward)           Ordered     apixaban tablet 5 mg  2 times daily         05/20/25 0313     Reason for No Pharmacological VTE Prophylaxis  Once        Question:  Reasons:  Answer:  Already adequately anticoagulated on oral Anticoagulants    05/20/25 0319     IP VTE HIGH RISK PATIENT  Once         05/20/25 0319     Place sequential compression device  Until discontinued         05/20/25 0319                    Discharge Planning   CARMELO:      Code Status: Full Code   Medical Readiness for Discharge Date:            AKHIL Sanchez  Department of Hospital Medicine   [facility]

## 2025-05-21 NOTE — ASSESSMENT & PLAN NOTE
Patient has Combined Systolic and Diastolic heart failure that is Acute on chronic. On presentation their CHF was decompensated. Evidence of decompensated CHF on presentation includes: edema, elevated JVD, orthopnea, paroxysmal nocturnal dyspnea (PND), and dyspnea on exertion (BROUSSARD). The etiology of their decompensation is likely severe disease, he verbalizes compliance with medication regimen. Most recent BNP and echo results are listed below.  Recent Labs     05/19/25  2114   BNP 1,815*     Latest ECHO  Results for orders placed during the hospital encounter of 02/17/25    Echo    Interpretation Summary    Left Ventricle: The left ventricle is mildly dilated. Normal wall thickness. There is concentric hypertrophy. There is moderately reduced systolic function with a visually estimated ejection fraction of 30 - 40%. Ejection fraction is approximately 35%. Quantitated ejection fraction is 43%. Grade II diastolic dysfunction.    Right Ventricle: Normal right ventricular cavity size. Wall thickness is normal. Systolic function is borderline low.    Left Atrium: Left atrium is severely dilated.    Mitral Valve: There is mild to moderate regurgitation.    Tricuspid Valve: There is mild regurgitation. There is mild pulmonary hypertension.    Pulmonary Artery: The estimated pulmonary artery systolic pressure is 41 mmHg.    IVC/SVC: Normal venous pressure at 3 mmHg.    Pericardium: There is a small circumferential effusion. No indication of cardiac tamponade.    Current Heart Failure Medications  sacubitriL-valsartan 49-51 mg per tablet 2 tablet, 2 times daily, Oral  bumetanide injection 1 mg, 2 times daily, Intravenous  hydrALAZINE injection 10 mg, Every 4 hours PRN, Intravenous  carvediloL tablet 25 mg, 2 times daily, Oral    Plan  - Monitor strict I&Os and daily weights.    - Place on telemetry  - Low sodium diet  - Place on fluid restriction of 1.5 L.   - Cardiology has been consulted  - The patient's volume status is  stable but not at their baseline as indicated by edema, elevated JVD, orthopnea, paroxysmal nocturnal dyspnea (PND), dyspnea on exertion (BROUSSARD), and shortness of breath  - CHF pathway in use    Atrial flutter with tachycardia, HR up to 120's on admit  Has ablation procedure scheduled for 5/29 per Dr. Phelan  IV Cardizem given per ED, monitor response, may need infusion  Cont home Coreg and Eliquis  HR > 100 overnight  Cardiology plans MEGAN/DCCV tomorrow

## 2025-05-21 NOTE — CONSULTS
Food & Nutrition Education      Diet Education: Cardiac, Diabetic, Fluid restriction diet   Learners: Patient      Nutrition Education provided with handouts:   Heart Healthy Consistent Carbohydrate Nutrition Therapy   Fluid Restricted Nutrition Therapy   (nutritioncaremanual.org)      Comments:   PMH: HTN, HLD, CKD3, CHF, Atrial flutter, NATALYA    54 y.o. male admitted for acute on chronic combined CHF. RD consulted for a Low sodium, consistent carb, 1500 ml fluid restricted diet education. Pt is currently getting a Low Sodium, 1500 ml fluid restricted diet. Rd spoke w/ pt at bedside. Pt reported to have a great appetite, great meal intakes, denied NVDC. Pt reported to follow a low sodium diet at home with 2-3 meals/day, no ONS, UBW of 250#, # 5/21. Per EMR pt has a UBW of 220#. Pt reported to have a lot of fluid on him at the time and to have a poor appetite/meal intakes when he does. Noted pt has DM reported in PM via EMR but reports to have never been diagnosed.     RD educated patient on low sodium, general healthful diet r/t recent hospital diagnosis. Recommended a well-balanced diet with a variety of fresh foods, fruits and vegetables (5 cups/day), whole grains (3 oz/day), and fat-free or low-fat dairy. Discussed reading food packages, food labels, and nutrition facts labels to identify nutrient content of foods.      Discussed the importance of limiting sodium to less than 2,000 mg per day. Recommended salt free seasonings and other herbs and spices in meals to enhance flavor without additional sodium.      Discussed dietary sources of cholesterol, the importance of incorporating healthy fats into the diet, and avoiding saturated and trans fats for heart health. For a generally healthy diet, aim for total fat less than 25-35% of calories.      Discussed the importance of fiber (especially soluble fiber), dietary sources, and a goal intake of >20-30g/day.      Discussed the importance of  carbohydrates in the diet, but with diabetes, focusing on consistent carb intake throughout the day with emphasis on protein and fiber.      Discussed 1500 ml fluid restriction per MD and dietary sources of fluid. RD recommended using a cup with measurements for fluids and to try to consume small sips spread throughout the day rather than a lot at one time.     Pt expressed understanding and appreciation for RD and diet education material provided.    Nutrition Related Social Determinants of Health: SDOH: Adequate food in home environment and None Identified     Visual NFPE performed, pt appears nourished.   All questions and concerns answered.   Provided handout with dietitian's contact information.   *Please re-consult as needed.      Thank you,     Meagan Vale RDN, LDN

## 2025-05-21 NOTE — HOSPITAL COURSE
Patient admitted to inpatient for acute on chronic combined systolic and diastolic heart failure, complicated by abd pain, nausea/vomiting, and acute renal failure on chronic kidney disease. Diuresis initiated. BNP and Troponin elevated on admission and trend. Sustained A Flutter with HR > 100. Cardiology recs appreciated--compliant with Eliquis BID but not taking Amiodarone since early 2/2025. Limited TTE shows EF 25% (prev 30-40% 2/2025), LV severely reduced systolic function. Considering repeat MEGAN/DCCV if he fails to convert.   As of 5/21/2025, afebrile, -113 overnight, adequate oxygenation on RA. Hemodynamically stable with stable Cr at 2.5. Cardiology plans MEGAN/DCCV tomorrow.   As of 5/22/2025, afebrile, -116 overnight, adequate oxygenation on RA. Dr. Mcarthur completed MEGAN- no clot in ALEXI, EF =25-30%, mod-severe MR, mod TR, DCCV 200 J to NSR. HR 80s. Mg repletion. Cards: cont IV diuresis, BB/Entresto, Hydralazine as tolerated.   As of 5/23/2025, afebrile, NSR, HR 70-80s, adequate oxygenation on RA. Cr worsened to 3.1, likely due to hypotension/hemodynamic shifts--hold diuretic/Entresto. Cards rec to resume meds and plan to DC home on Eliquis--attempt to deliver today 2/2 hx med non-compliance.  As of 5/24/2025, afebrile, NSR, HR 60-70s, adequate oxygenation on RA. Cr remains 3.1--hold diuretic/Entresto. Redraw 1500 remains 3.1. Consider Nephrology consult if worsens on AM draw.   As of 5/25/2025, NSR w/o change/issue. Cr improved to 2.9. Cardiology cleared for DC. Discussed with patient admission reason: treatment of heart failure and arrhythmia concerns. Over the course of your hospitalization, our Cardiology team also evaluated you. Instructed patient to read the attached information regarding atrial flutter, acute kidney injury, heart failure and go to your nearest ED if any of the alarm/concerning signs develop. Counseled patient re: extreme importance for close FU by healthcare provider within  next 2-3D for repeat lab work and assessment of renal fx to determine when/if can resume remainder of heart failure medications. Follow strict heart failure precautions while meds on pause. Instructed to call scheduling line at [phone] to schedule an appointment if not setup already. Instructed that as patient resumes home blood pressure medications, please make sure to frequently monitor home BP, keep a BP log and take it to next healthcare provider visit. Instructed to reach out to a healthcare provider if your blood pressures are too low or too high. Discussed labs remained stable, but some labs still remained abnormal. Reiterated importance of patient to FU w/ primary care physician within 2-3 DAYS for repeat lab work to ensure normalization, follow up of pending labs, medication adjustments/refills, routine post-discharge care, and any other evaluations as necessitated. Holding instructions/comments added to each paused medication listing why and who to FU with: Colchicine, Entresto, Torsemide, and Empaglifozin. Nurse to provide patient with printed copies of all the above. Patient reports understanding and denies any questions/concerns at this time.

## 2025-05-21 NOTE — ASSESSMENT & PLAN NOTE
-Presents with aflutter 2:1 conduction, recurrent  -Previously on amiodarone, ? Not taking since early February  -Start amiodarone drip  -Continue Coreg  -Continue Eliquis  -May need repeat MEGAN/DCCV---scheduled for ablation by Dr. Phelan, EP later this month    5/21/2025  -HR in low 100's continue Coreg  -Called patient's pharmacy yesterday, they have no record of him filling Eliquis therefore amio d/'c  -Continue Eliquis while IP, ensure med availability/compliance upon d/c  -MEGAN/DCCV tmw, keep NPO after MN

## 2025-05-21 NOTE — ASSESSMENT & PLAN NOTE
Elevated above baseline chronic elevation  Trend troponin, likely due to fluid overload and atrial flutter with RVR  Continuous cardiac monitoring   Continue Eliquis and beta blocker  Cards following

## 2025-05-21 NOTE — ASSESSMENT & PLAN NOTE
Patient's most recent potassium results are listed below.   Recent Labs     05/19/25  2114 05/20/25  0700 05/21/25  0517   K 3.6 3.3* 3.5     Plan  - Replete potassium per protocol  - Monitor potassium Daily  - Patient's hypokalemia is improving  - Trend

## 2025-05-21 NOTE — SUBJECTIVE & OBJECTIVE
Interval History: Pt lying in bed in no acute distress. Denies issues overnight. Denies abd pain, constipation, N/V/D. Reports SOB improving--currently on RA. Discussed Cardiology to round for POC re: rhythm changes. Patient reports understanding and denies any questions/concerns at this time.    Review of Systems   Constitutional:  Positive for fatigue (feeling tired). Negative for appetite change, chills and fever.   Respiratory:  Positive for shortness of breath (improving, BROUSSARD). Negative for cough, chest tightness and wheezing.    Cardiovascular:  Negative for chest pain, palpitations (A Flutter sustained overnight per tele monitoring, HR > 100--denies feeling rhythm changes) and leg swelling (Denies, but reports was told BLE on admission).   Gastrointestinal:  Negative for abdominal pain, blood in stool, constipation, diarrhea, nausea and vomiting.   Genitourinary:  Negative for difficulty urinating and hematuria.   Musculoskeletal:  Negative for back pain, joint swelling and myalgias.   Neurological:  Positive for weakness (generalized). Negative for dizziness, tremors, syncope, light-headedness and headaches.   Psychiatric/Behavioral:  Negative for confusion and sleep disturbance.    All other systems reviewed and are negative.    Objective:     Vital Signs (Most Recent):  Temp: 97.5 °F (36.4 °C) (05/21/25 0924)  Pulse: (!) 111 (05/21/25 0924)  Resp: 19 (05/21/25 0924)  BP: 108/61 (05/21/25 0924)  SpO2: 96 % (05/21/25 0924) Vital Signs (24h Range):  Temp:  [97.3 °F (36.3 °C)-98.6 °F (37 °C)] 97.5 °F (36.4 °C)  Pulse:  [101-113] 111  Resp:  [16-19] 19  SpO2:  [92 %-98 %] 96 %  BP: (107-144)/(55-94) 108/61     Weight: 108.3 kg (238 lb 12.1 oz)  Body mass index is 29.84 kg/m².  No intake or output data in the 24 hours ending 05/21/25 1234      Physical Exam  Constitutional:       General: He is not in acute distress.     Appearance: Normal appearance. He is obese. He is ill-appearing (appears drowsy, arousable).  He is not diaphoretic.   Cardiovascular:      Rate and Rhythm: Tachycardia present. Rhythm irregular.      Pulses: Normal pulses.      Comments: Mild non-pitting BLE edema--pt denies feeling edema is present  Pulmonary:      Effort: Pulmonary effort is normal. No respiratory distress.      Breath sounds: Normal breath sounds. No wheezing.   Abdominal:      General: Bowel sounds are normal. There is no distension.      Palpations: Abdomen is soft.      Tenderness: There is no abdominal tenderness. There is no guarding.   Musculoskeletal:      Right lower le+ Edema present.      Left lower le+ Edema present.   Neurological:      General: No focal deficit present.      Mental Status: He is alert and oriented to person, place, and time.   Psychiatric:         Mood and Affect: Affect is flat (drowsy, arousable).               Significant Labs: All pertinent labs within the past 24 hours have been reviewed.  Recent Lab Results         25  0517        Albumin 2.9              ALT 29       Anion Gap 14  Comment: UNABLE TO CALCULATE       AST 24       Baso # 0.04       Basophil % 0.7       BILIRUBIN TOTAL 3.1  Comment: For infants and newborns, interpretation of results should be based   on gestational age, weight and in agreement with clinical   observations.    Premature Infant recommended reference ranges:   0-24 hours:  <8.0 mg/dL   24-48 hours: <12.0 mg/dL   3-5 days:    <15.0 mg/dL   6-29 days:   <15.0 mg/dL       BUN 32       Calcium 8.3       Chloride 105       CO2 23       Creatinine 2.5       eGFR 30  Comment: Estimated GFR calculated using the CKD-EPI creatinine () equation.       Eos # 0.32       Eos % 5.9       Glucose 95       Gran # (ANC) 2.86       Hematocrit 51.5       Hemoglobin 15.9       Extra Tube Hold for add-ons.  Comment: Auto resulted.          Immature Grans (Abs) 0.01  Comment: Mild elevation in immature granulocytes is non specific and can be seen in a variety of  conditions including stress response, acute inflammation, trauma and pregnancy. Correlation with other laboratory and clinical findings is essential.       Immature Granulocytes 0.2       Lymph # 1.70       Lymph % 31.1       Magnesium  1.7       MCH 27.8       MCHC 30.9       MCV 90       Mono # 0.53       Mono % 9.7       MPV 11.2       Neut % 52.4       nRBC 0       Phosphorus Level 3.3       Platelet Count 222       Potassium 3.5       PROTEIN TOTAL 6.1       RBC 5.72       RDW 14.6       Sodium 142       WBC 5.46               Significant Imaging: I have reviewed all pertinent imaging results/findings within the past 24 hours.

## 2025-05-22 ENCOUNTER — ANESTHESIA EVENT (OUTPATIENT)
Dept: CARDIOLOGY | Facility: HOSPITAL | Age: 55
End: 2025-05-22
Payer: MEDICAID

## 2025-05-22 ENCOUNTER — ANESTHESIA (OUTPATIENT)
Dept: CARDIOLOGY | Facility: HOSPITAL | Age: 55
End: 2025-05-22
Payer: MEDICAID

## 2025-05-22 PROBLEM — E83.42 HYPOMAGNESEMIA: Status: ACTIVE | Noted: 2025-05-22

## 2025-05-22 LAB
ABSOLUTE EOSINOPHIL (OHS): 0.4 K/UL
ABSOLUTE MONOCYTE (OHS): 0.5 K/UL (ref 0.3–1)
ABSOLUTE NEUTROPHIL COUNT (OHS): 2.79 K/UL (ref 1.8–7.7)
ALBUMIN SERPL BCP-MCNC: 2.7 G/DL (ref 3.5–5.2)
ALP SERPL-CCNC: 119 UNIT/L (ref 40–150)
ALT SERPL W/O P-5'-P-CCNC: 21 UNIT/L (ref 10–44)
ANION GAP (OHS): 11 MMOL/L (ref 8–16)
AST SERPL-CCNC: 17 UNIT/L (ref 11–45)
BASOPHILS # BLD AUTO: 0.06 K/UL
BASOPHILS NFR BLD AUTO: 1 %
BILIRUB SERPL-MCNC: 2.4 MG/DL (ref 0.1–1)
BSA FOR ECHO PROCEDURE: 2.43 M2
BUN SERPL-MCNC: 32 MG/DL (ref 6–20)
CALCIUM SERPL-MCNC: 7.8 MG/DL (ref 8.7–10.5)
CHLORIDE SERPL-SCNC: 105 MMOL/L (ref 95–110)
CO2 SERPL-SCNC: 23 MMOL/L (ref 23–29)
CREAT SERPL-MCNC: 2.5 MG/DL (ref 0.5–1.4)
EJECTION FRACTION: 25 %
ERYTHROCYTE [DISTWIDTH] IN BLOOD BY AUTOMATED COUNT: 14.4 % (ref 11.5–14.5)
GFR SERPLBLD CREATININE-BSD FMLA CKD-EPI: 30 ML/MIN/1.73/M2
GLUCOSE SERPL-MCNC: 87 MG/DL (ref 70–110)
HCT VFR BLD AUTO: 51.8 % (ref 40–54)
HGB BLD-MCNC: 15.9 GM/DL (ref 14–18)
IMM GRANULOCYTES # BLD AUTO: 0.02 K/UL (ref 0–0.04)
IMM GRANULOCYTES NFR BLD AUTO: 0.3 % (ref 0–0.5)
LYMPHOCYTES # BLD AUTO: 2.21 K/UL (ref 1–4.8)
MAGNESIUM SERPL-MCNC: 1.5 MG/DL (ref 1.6–2.6)
MCH RBC QN AUTO: 28 PG (ref 27–31)
MCHC RBC AUTO-ENTMCNC: 30.7 G/DL (ref 32–36)
MCV RBC AUTO: 91 FL (ref 82–98)
NUCLEATED RBC (/100WBC) (OHS): 0 /100 WBC
OHS QRS DURATION: 104 MS
OHS QTC CALCULATION: 460 MS
PHOSPHATE SERPL-MCNC: 3.5 MG/DL (ref 2.7–4.5)
PLATELET # BLD AUTO: 224 K/UL (ref 150–450)
PMV BLD AUTO: 11 FL (ref 9.2–12.9)
POCT GLUCOSE: 155 MG/DL (ref 70–110)
POTASSIUM SERPL-SCNC: 3.5 MMOL/L (ref 3.5–5.1)
PROT SERPL-MCNC: 5.9 GM/DL (ref 6–8.4)
RA PRESSURE ESTIMATED: 3 MMHG
RBC # BLD AUTO: 5.68 M/UL (ref 4.6–6.2)
RELATIVE EOSINOPHIL (OHS): 6.7 %
RELATIVE LYMPHOCYTE (OHS): 37 % (ref 18–48)
RELATIVE MONOCYTE (OHS): 8.4 % (ref 4–15)
RELATIVE NEUTROPHIL (OHS): 46.6 % (ref 38–73)
SODIUM SERPL-SCNC: 139 MMOL/L (ref 136–145)
WBC # BLD AUTO: 5.98 K/UL (ref 3.9–12.7)

## 2025-05-22 PROCEDURE — 25000003 PHARM REV CODE 250: Performed by: NURSE ANESTHETIST, CERTIFIED REGISTERED

## 2025-05-22 PROCEDURE — 25000003 PHARM REV CODE 250: Performed by: PHYSICIAN ASSISTANT

## 2025-05-22 PROCEDURE — 84100 ASSAY OF PHOSPHORUS: CPT

## 2025-05-22 PROCEDURE — B24BZZ4 ULTRASONOGRAPHY OF HEART WITH AORTA, TRANSESOPHAGEAL: ICD-10-PCS | Performed by: INTERNAL MEDICINE

## 2025-05-22 PROCEDURE — 36415 COLL VENOUS BLD VENIPUNCTURE: CPT

## 2025-05-22 PROCEDURE — 80053 COMPREHEN METABOLIC PANEL: CPT

## 2025-05-22 PROCEDURE — 99499 UNLISTED E&M SERVICE: CPT | Mod: ,,, | Performed by: INTERNAL MEDICINE

## 2025-05-22 PROCEDURE — 85025 COMPLETE CBC W/AUTO DIFF WBC: CPT

## 2025-05-22 PROCEDURE — 5A2204Z RESTORATION OF CARDIAC RHYTHM, SINGLE: ICD-10-PCS | Performed by: INTERNAL MEDICINE

## 2025-05-22 PROCEDURE — 63600175 PHARM REV CODE 636 W HCPCS

## 2025-05-22 PROCEDURE — 93005 ELECTROCARDIOGRAM TRACING: CPT

## 2025-05-22 PROCEDURE — 63600175 PHARM REV CODE 636 W HCPCS: Performed by: NURSE ANESTHETIST, CERTIFIED REGISTERED

## 2025-05-22 PROCEDURE — 37000009 HC ANESTHESIA EA ADD 15 MINS: Performed by: INTERNAL MEDICINE

## 2025-05-22 PROCEDURE — 37000008 HC ANESTHESIA 1ST 15 MINUTES: Performed by: INTERNAL MEDICINE

## 2025-05-22 PROCEDURE — 83735 ASSAY OF MAGNESIUM: CPT

## 2025-05-22 PROCEDURE — 21400001 HC TELEMETRY ROOM

## 2025-05-22 PROCEDURE — 25000003 PHARM REV CODE 250: Performed by: NURSE PRACTITIONER

## 2025-05-22 RX ORDER — PHENYLEPHRINE HYDROCHLORIDE 10 MG/ML
INJECTION INTRAVENOUS
Status: DISCONTINUED | OUTPATIENT
Start: 2025-05-22 | End: 2025-05-22

## 2025-05-22 RX ORDER — LIDOCAINE HYDROCHLORIDE 10 MG/ML
INJECTION, SOLUTION EPIDURAL; INFILTRATION; INTRACAUDAL; PERINEURAL
Status: DISCONTINUED | OUTPATIENT
Start: 2025-05-22 | End: 2025-05-22

## 2025-05-22 RX ORDER — EPHEDRINE SULFATE 50 MG/ML
INJECTION, SOLUTION INTRAVENOUS
Status: DISCONTINUED | OUTPATIENT
Start: 2025-05-22 | End: 2025-05-22

## 2025-05-22 RX ORDER — PROPOFOL 10 MG/ML
VIAL (ML) INTRAVENOUS
Status: DISCONTINUED | OUTPATIENT
Start: 2025-05-22 | End: 2025-05-22

## 2025-05-22 RX ORDER — TORSEMIDE 20 MG/1
40 TABLET ORAL 2 TIMES DAILY
Status: DISCONTINUED | OUTPATIENT
Start: 2025-05-23 | End: 2025-05-23

## 2025-05-22 RX ORDER — MAGNESIUM SULFATE HEPTAHYDRATE 40 MG/ML
2 INJECTION, SOLUTION INTRAVENOUS ONCE
Status: COMPLETED | OUTPATIENT
Start: 2025-05-22 | End: 2025-05-22

## 2025-05-22 RX ORDER — TORSEMIDE 20 MG/1
40 TABLET ORAL 2 TIMES DAILY
Status: DISCONTINUED | OUTPATIENT
Start: 2025-05-22 | End: 2025-05-22

## 2025-05-22 RX ORDER — AMIODARONE HYDROCHLORIDE 100 MG/1
400 TABLET ORAL 2 TIMES DAILY
Status: DISCONTINUED | OUTPATIENT
Start: 2025-05-22 | End: 2025-05-25

## 2025-05-22 RX ORDER — CARVEDILOL 6.25 MG/1
6.25 TABLET ORAL 2 TIMES DAILY
Status: DISCONTINUED | OUTPATIENT
Start: 2025-05-22 | End: 2025-05-23

## 2025-05-22 RX ORDER — ETOMIDATE 2 MG/ML
INJECTION INTRAVENOUS
Status: DISCONTINUED | OUTPATIENT
Start: 2025-05-22 | End: 2025-05-22

## 2025-05-22 RX ADMIN — MAGNESIUM SULFATE HEPTAHYDRATE 2 G: 40 INJECTION, SOLUTION INTRAVENOUS at 02:05

## 2025-05-22 RX ADMIN — CARVEDILOL 6.25 MG: 6.25 TABLET, FILM COATED ORAL at 09:05

## 2025-05-22 RX ADMIN — EPHEDRINE SULFATE 10 MG: 50 INJECTION INTRAVENOUS at 08:05

## 2025-05-22 RX ADMIN — AMIODARONE HYDROCHLORIDE 400 MG: 100 TABLET ORAL at 11:05

## 2025-05-22 RX ADMIN — AMIODARONE HYDROCHLORIDE 400 MG: 100 TABLET ORAL at 09:05

## 2025-05-22 RX ADMIN — APIXABAN 5 MG: 2.5 TABLET, FILM COATED ORAL at 10:05

## 2025-05-22 RX ADMIN — PHENYLEPHRINE HYDROCHLORIDE 200 MCG: 10 INJECTION INTRAVENOUS at 08:05

## 2025-05-22 RX ADMIN — APIXABAN 5 MG: 2.5 TABLET, FILM COATED ORAL at 09:05

## 2025-05-22 RX ADMIN — SODIUM CHLORIDE: 9 INJECTION, SOLUTION INTRAVENOUS at 07:05

## 2025-05-22 RX ADMIN — PHENYLEPHRINE HYDROCHLORIDE 100 MCG: 10 INJECTION INTRAVENOUS at 08:05

## 2025-05-22 RX ADMIN — ETOMIDATE 10 MG: 2 INJECTION INTRAVENOUS at 07:05

## 2025-05-22 RX ADMIN — PROPOFOL 30 MG: 10 INJECTION, EMULSION INTRAVENOUS at 07:05

## 2025-05-22 RX ADMIN — LIDOCAINE HYDROCHLORIDE 50 MG: 10 SOLUTION INTRAVENOUS at 07:05

## 2025-05-22 NOTE — ASSESSMENT & PLAN NOTE
Patient has Abnormal Magnesium: hypomagnesemia. Will continue to monitor electrolytes closely. Will replace the affected electrolytes and repeat labs to be done after interventions completed. The patient's magnesium results have been reviewed and are listed below.  Recent Labs   Lab 05/22/25  0456   MG 1.5*      Mg 1.5  Mag 2G IV today  Trend

## 2025-05-22 NOTE — TRANSFER OF CARE
"Anesthesia Transfer of Care Note    Patient: Bria Saldana    Procedure(s) Performed: Procedure(s) (LRB):  Transesophageal echo (MEGAN) intra-procedure log documentation (N/A)  Cardioversion or Defibrillation (N/A)    Patient location: PACU    Anesthesia Type: MAC    Transport from OR: Transported from OR on room air with adequate spontaneous ventilation    Post pain: adequate analgesia    Post assessment: no apparent anesthetic complications    Post vital signs: stable    Level of consciousness: responds to stimulation    Nausea/Vomiting: no nausea/vomiting    Complications: none    Transfer of care protocol was followed      Last vitals: Visit Vitals  BP 98/70 (BP Location: Right arm, Patient Position: Sitting)   Pulse (!) 112   Temp 36.9 °C (98.5 °F)   Resp (!) 25   Ht 6' 3" (1.905 m)   Wt 108.3 kg (238 lb 12.1 oz)   SpO2 98%   BMI 29.84 kg/m²     "

## 2025-05-22 NOTE — ASSESSMENT & PLAN NOTE
Baseline creatinine is 2.0-2.3. Most recent creatinine and eGFR are listed below.  Recent Labs     05/20/25  0700 05/21/25  0517 05/22/25  0456   CREATININE 2.5* 2.5* 2.5*   EGFRNORACEVR 30* 30* 30*      Plan  - Avoid nephrotoxins and renally dose meds for GFR listed above  - Monitor urine output, serial BMP, and adjust therapy as needed  - Requiring IV Bumex due to fluid overload

## 2025-05-22 NOTE — ASSESSMENT & PLAN NOTE
-Presents with aflutter 2:1 conduction, recurrent  -Previously on amiodarone, ? Not taking since early February  -Start amiodarone drip  -Continue Coreg  -Continue Eliquis  -May need repeat MEGAN/DCCV---scheduled for ablation by Dr. Phelan, EP later this month    5/21/2025  -HR in low 100's continue Coreg  -Called patient's pharmacy yesterday, they have no record of him filling Eliquis therefore amio d/'c  -Continue Eliquis while IP, ensure med availability/compliance upon d/c  -MEGAN/DCCV tmw, keep NPO after MN    5/22/2025  -s/p MEGAN/DCCV with restoration of SR  -Continue Coreg  -Add amiodarone 400 mg BID x 1 week, then 200 mg BID x 1 week, then 200 mg daily  -Eliquis 5 mg BID---compliance emphasized  -Will postpone ablation for now, discussed with EP Dr. Phelan

## 2025-05-22 NOTE — PLAN OF CARE
[facility] - Med Surg 3  Initial Discharge Assessment       Primary Care Provider: Brenna Maravilla MD    Admission Diagnosis: Atrial flutter [I48.92]  Hypertension [I10]  Atrial flutter with rapid ventricular response [I48.92]  Hypertensive urgency [I16.0]  Chest pain [R07.9]  Chronic kidney disease, unspecified CKD stage [N18.9]    Admission Date: 5/19/2025  Expected Discharge Date:     Transition of Care Barriers: Underinsured    Payor: MEDICAID / Plan: [place] / Product Type: Managed Medicaid /     Extended Emergency Contact Information  Primary Emergency Contact: Vielka Saldana  Address: [address] [place] [address] [facility]  Home Phone: [phone]  Mobile Phone: [phone]  Relation: Spouse    Discharge Plan A: Home with family         [facility] [address] [place] [id number] [facility]  [address] [facility] [address]  Phone: [phone] Fax: [phone]      Initial Assessment (most recent)       Adult Discharge Assessment - 05/22/25 1242          Discharge Assessment    Assessment Type Discharge Planning Assessment     Source of Information health record     People in Home spouse     Facility Arrived From: home     Do you expect to return to your current living situation? Yes     Do you have help at home or someone to help you manage your care at home? Yes     Who are your caregiver(s) and their phone number(s)? spouse     Prior to hospitilization cognitive status: Alert/Oriented     Current cognitive status: Alert/Oriented     Walking or Climbing Stairs Difficulty no     Dressing/Bathing Difficulty no     Equipment Currently Used at Home none     Readmission within 30 days? No     Patient currently being followed by outpatient case management? No     Do you currently have service(s) that help you manage your care at home? No     Do you take prescription medications? Yes     Do you have prescription coverage? Yes     Do  you have any problems affording any of your prescribed medications? TBD     Who is going to help you get home at discharge? spouse     How do you get to doctors appointments? family or friend will provide     Are you on dialysis? No     Discharge Plan A Home with family     DME Needed Upon Discharge  none     Transition of Care Barriers Underinsured                        Anticipated DC Dispo: home with spouse  Prior Level of Function: independent per record review.    Pending Cardiology clearance for d/c. No identified CM needs at this time.

## 2025-05-22 NOTE — ASSESSMENT & PLAN NOTE
Patient's most recent potassium results are listed below.   Recent Labs     05/20/25  0700 05/21/25  0517 05/22/25  0456   K 3.3* 3.5 3.5     Plan  - Replete potassium per protocol  - Monitor potassium Daily  - Patient's hypokalemia is improving  - Trend

## 2025-05-22 NOTE — NURSING
Report called to MAYA Francois    6402 Pt transferred to  305 per hospital bed, A,A O x 4 , NADN ,  per RN staff x 2,  Pt telemetry box connected prior to transfer/ all personal items ( backpack, cell phone , slides) with pt on transfer/pt shown , v/u  pt instructed to report chest pain/ SOB to RN v/u

## 2025-05-22 NOTE — SUBJECTIVE & OBJECTIVE
Review of Systems   Reason unable to perform ROS: drowsy post telma.     Objective:     Vital Signs (Most Recent):  Temp: 97.2 °F (36.2 °C) (05/22/25 0840)  Pulse: 77 (05/22/25 1017)  Resp: 18 (05/22/25 0920)  BP: 116/85 (05/22/25 0920)  SpO2: 100 % (05/22/25 0920) Vital Signs (24h Range):  Temp:  [97.2 °F (36.2 °C)-98.5 °F (36.9 °C)] 97.2 °F (36.2 °C)  Pulse:  [] 77  Resp:  [10-25] 18  SpO2:  [92 %-100 %] 100 %  BP: ()/(59-85) 116/85     Weight: 108.3 kg (238 lb 12.1 oz)  Body mass index is 29.84 kg/m².     SpO2: 100 %         Intake/Output Summary (Last 24 hours) at 5/22/2025 1055  Last data filed at 5/22/2025 0832  Gross per 24 hour   Intake 600 ml   Output --   Net 600 ml       Lines/Drains/Airways       Peripheral Intravenous Line  Duration                  Peripheral IV - Single Lumen 05/20/25 0010 20 G Anterior;Right Forearm 2 days         Peripheral IV - Single Lumen 05/20/25 0702 18 G Left;Posterior Forearm 2 days                       Physical Exam  Vitals and nursing note reviewed.   Constitutional:       Comments: Sleepy   HENT:      Head: Normocephalic and atraumatic.   Cardiovascular:      Rate and Rhythm: Normal rate and regular rhythm.      Heart sounds: S1 normal and S2 normal. No murmur heard.  Pulmonary:      Effort: Pulmonary effort is normal.      Breath sounds: No rales.   Musculoskeletal:      Right lower leg: No edema.      Left lower leg: No edema.   Skin:     General: Skin is warm and dry.   Neurological:      Comments: Sleepy but arousable            Significant Labs: CMP   Recent Labs   Lab 05/21/25  0517 05/22/25  0456    139   K 3.5 3.5    105   CO2 23 23   GLU 95 87   BUN 32* 32*   CREATININE 2.5* 2.5*   CALCIUM 8.3* 7.8*   PROT 6.1 5.9*   ALBUMIN 2.9* 2.7*   BILITOT 3.1* 2.4*   ALKPHOS 131 119   AST 24 17   ALT 29 21   ANIONGAP 14 11   , CBC   Recent Labs   Lab 05/21/25  0517 05/22/25  0456   WBC 5.46 5.98   HGB 15.9 15.9   HCT 51.5 51.8    224   ,  "Troponin No results for input(s): "TROPONINIHS" in the last 48 hours., and All pertinent lab results from the last 24 hours have been reviewed.    Significant Imaging: Echocardiogram: Transthoracic echo (TTE) complete (Cupid Only):   Results for orders placed or performed during the hospital encounter of 05/19/25   Echo   Result Value Ref Range    BSA 2.43 m2    A4C EF 11 %    LVIDd 5.7 3.5 - 6.0 cm    LV Systolic Volume 136 mL    LV Systolic Volume Index 56.7 mL/m2    LVIDs 5.3 (A) 2.1 - 4.0 cm    LV Diastolic Volume 161 mL    LV Diastolic Volume Index 67.08 mL/m2    LV EDV A4C 174.34 mL    Left Ventricular End Systolic Volume by Teichholz Method 135.81 mL    Left Ventricular End Diastolic Volume by Teichholz Method 161.09 mL    IVS 1.7 (A) 0.6 - 1.1 cm    FS 7.0 (A) 28 - 44 %    Left Ventricle Relative Wall Thickness 0.49 cm    PW 1.4 (A) 0.6 - 1.1 cm    LV mass 413.5 g    LV Mass Index 172.3 g/m2    IVC diameter 3.06 cm    ZLVIDS -1.67     ZLVIDD -6.52     EF 25 %    Est. RA pres 3 mmHg    Narrative      Left Ventricle: The left ventricle is normal in size. Severely   increased wall thickness. There is concentric hypertrophy. Moderate global   hypokinesis present. There is severely reduced systolic function. Ejection   fraction is approximately 25%.    Right Ventricle: The right ventricle is normal in size Wall thickness   is normal. Systolic function is normal.    Right Atrium: The right atrium is mildly dilated .    IVC/SVC: Normal venous pressure at 3 mmHg.    Pericardium: There is a small effusion.      and EKG: Reviewed  "

## 2025-05-22 NOTE — ANESTHESIA PREPROCEDURE EVALUATION
05/22/2025  Bria Saldana is a 54 y.o., male.      Pre-op Assessment    I have reviewed the Patient Summary Reports.     I have reviewed the Nursing Notes. I have reviewed the NPO Status.   I have reviewed the Medications.     Review of Systems  Anesthesia Hx:  No problems with previous Anesthesia             Denies Family Hx of Anesthesia complications.    Denies Personal Hx of Anesthesia complications.                    Social:  Former Smoker       Hematology/Oncology:    Oncology Normal                Hematology Comments: Bnp 695                    EENT/Dental:  EENT/Dental Normal           Cardiovascular:     Hypertension    Dysrhythmias   CHF       ECG has been reviewed. EF 25%                           Pulmonary:      Shortness of breath  Sleep Apnea                Renal/:  Chronic Renal Disease, CKD                Hepatic/GI:      Liver Disease,               Musculoskeletal:  Musculoskeletal Normal                Neurological:  Neurology Normal                                      Endocrine:  Diabetes, type 2         Obesity / BMI > 30  Dermatological:  Skin Normal    Psych:  Psychiatric Normal                    Physical Exam  General: Cooperative, Alert and Oriented    Airway:  Mallampati: II   Mouth Opening: Normal  TM Distance: Normal  Tongue: Normal, Large  Neck ROM: Normal ROM    Dental:  Intact    Chest/Lungs:  Clear to auscultation, Normal Respiratory Rate    Heart:  Rate: Normal  Rhythm: Irregularly Irregular        Anesthesia Plan  Type of Anesthesia, risks & benefits discussed:    Anesthesia Type: MAC  Intra-op Monitoring Plan: Standard ASA Monitors  Induction:  IV  Informed Consent: Informed consent signed with the Patient and all parties understand the risks and agree with anesthesia plan.  All questions answered.   ASA Score: 3  Day of Surgery Review of History &  Physical: H&P Update referred to the surgeon/provider.I have interviewed and examined the patient. I have reviewed the patient's H&P dated: There are no significant changes.     Ready For Surgery From Anesthesia Perspective.     .

## 2025-05-22 NOTE — ASSESSMENT & PLAN NOTE
Patient's blood pressure range in the last 24 hours was: BP  Min: 88/60  Max: 130/59.The patient's inpatient anti-hypertensive regimen is listed below:  Current Antihypertensives  hydrALAZINE injection 10 mg, Every 4 hours PRN, Intravenous  carvediloL tablet 6.25 mg, 2 times daily, Oral  torsemide tablet 40 mg, 2 times daily, Oral    Plan  - BP is controlled, no changes needed to their regimen  - IV Hydralazine prn

## 2025-05-22 NOTE — PROGRESS NOTES
[facility]  [facility] Medicine  Progress Note    Patient Name: Bria Saldana  MRN: [id number]  Patient Class: IP- Inpatient   Admission Date: 5/19/2025  Length of Stay: 2 days  Attending Physician: Ascencion Alejandro DO  Primary Care Provider: Brenna Maravilla MD    Subjective     Principal Problem:Acute on chronic combined systolic (congestive) and diastolic (congestive) heart failure    HPI:    Patient is a 54-year-old male with past medical history significant for hypertension, hyperlipidemia, CKD stage 3, combined systolic and diastolic congestive heart failure (EF 30-40%, grade II diastolic dysfunction per Echo done 2/18/2025), atrial flutter s/p DCCV on Eliquis with planned ablation procedure per Dr. Phelan on 5/29/25, pulmonary hypertension,  type 2 diabetes mellitus, and NATALYA who presented to ED with complaint of abdominal pain/distention with vomiting/ spitting up yellow colored fluid as well as chest pain/chest tightness and shortness of breath.  On arrival to ED, temp 98°, heart rate 115, blood pressure 156/120, 97% SpO2 on room air.  While in ED blood pressure as high as  186/129 and heart rate as high as 125.  Lab workup significant for BUN 34, creatinine 2.8, glucose 69, alk-phos 151, albumin 3.4, total bilirubin 4.0, BNP 18 15, troponin 0.141.  Chest x-ray showed cardiomegaly, lungs appear clear.  EKG showed atrial flutter with 2-1 AV conduction, .  while in ED he was given Bumex 1 mg IV, diltiazem 20 mg IV, and hydralazine 10 mg IV.  Hospital Medicine was consulted for admission due to CHF exacerbation as well as a flutter with RVR.    Overview/Hospital Course:  Patient admitted to inpatient for acute on chronic combined systolic and diastolic heart failure, complicated by abd pain, nausea/vomiting, and acute renal failure on chronic kidney disease. Diuresis initiated. BNP and Troponin elevated on admission and trend. Sustained A Flutter with HR > 100. Cardiology recs  appreciated--compliant with Eliquis BID but not taking Amiodarone since early 2/2025. Limited TTE shows EF 25% (prev 30-40% 2/2025), LV severely reduced systolic function. Considering repeat MEGAN/DCCV if he fails to convert.   As of 5/21/2025, afebrile, -113 overnight, adequate oxygenation on RA. Hemodynamically stable with stable Cr at 2.5. Cardiology plans MEGAN/DCCV tomorrow.   As of 5/22/2025, afebrile, -116 overnight, adequate oxygenation on RA. Dr. Mcarthur completed MEGAN- no clot in ALEXI, EF =25-30%, mod-severe MR, mod TR, DCCV 200 J to NSR. HR 80s. Mg repletion. Cards: cont IV diuresis, BB/Entresto, Hydralazine as tolerated.     Interval History: Pt sitting to side of bed in no acute distress. Denies issues overnight. Denies feeling his heart was in A Flutter prior to procedure. Tolerated MEGAN/DCCV well. BP soft post-procedure. Dr. Mcarthur instructs to hold AM doses of cardiac medications and trend. Discussed lab results: Cr 2.5, Ca corrected to normal, and Mg requiring IV repletion. Patient reports understanding and denies any questions/concerns at this time.    Review of Systems   Constitutional:  Positive for fatigue (improving). Negative for appetite change, chills and fever.   Respiratory:  Positive for shortness of breath (improving, BROUSSARD). Negative for cough, chest tightness and wheezing.    Cardiovascular:  Negative for chest pain, palpitations (A Flutter sustained overnight per tele monitoring, HR > 100--denies feeling rhythm changes) and leg swelling (Denies, but reports was told BLE on admission).   Gastrointestinal:  Negative for abdominal pain, blood in stool, constipation, diarrhea, nausea and vomiting.   Genitourinary:  Negative for difficulty urinating and hematuria.   Musculoskeletal:  Negative for back pain, joint swelling and myalgias.   Neurological:  Positive for weakness (generalized, improving). Negative for dizziness, tremors, syncope, light-headedness and headaches.    Psychiatric/Behavioral:  Negative for confusion and sleep disturbance.    All other systems reviewed and are negative.    Objective:     Vital Signs (Most Recent):  Temp: 97.4 °F (36.3 °C) (25 1214)  Pulse: 80 (25 1214)  Resp: 18 (25 121)  BP: (!) 97/59 (25 1214)  SpO2: 97 % (25 121) Vital Signs (24h Range):  Temp:  [97.2 °F (36.2 °C)-98.5 °F (36.9 °C)] 97.4 °F (36.3 °C)  Pulse:  [] 80  Resp:  [10-25] 18  SpO2:  [92 %-100 %] 97 %  BP: ()/(59-85) 97/59     Weight: 108.3 kg (238 lb 12.1 oz)  Body mass index is 29.84 kg/m².    Intake/Output Summary (Last 24 hours) at 2025 1320  Last data filed at 2025 0832  Gross per 24 hour   Intake 600 ml   Output --   Net 600 ml         Physical Exam  Constitutional:       General: He is not in acute distress.     Appearance: Normal appearance. He is obese. He is not ill-appearing or diaphoretic.   Cardiovascular:      Rate and Rhythm: Normal rate and regular rhythm.      Pulses: Normal pulses.      Comments: Mild non-pitting BLE edema--pt denies feeling edema is present  Pulmonary:      Effort: Pulmonary effort is normal. No respiratory distress.      Breath sounds: Normal breath sounds. No wheezing.   Abdominal:      General: Bowel sounds are normal. There is no distension.      Palpations: Abdomen is soft.      Tenderness: There is no abdominal tenderness. There is no guarding.   Musculoskeletal:      Right lower le+ Edema present.      Left lower le+ Edema present.   Neurological:      General: No focal deficit present.      Mental Status: He is alert and oriented to person, place, and time.               Significant Labs: All pertinent labs within the past 24 hours have been reviewed.  Recent Lab Results         25  0831   25  0456   25  1525        Albumin   2.7         ALP   119         ALT   21         Anion Gap   11         AST   17         Baso #   0.06         Basophil %   1.0          BILIRUBIN TOTAL   2.4  Comment: For infants and newborns, interpretation of results should be based   on gestational age, weight and in agreement with clinical   observations.    Premature Infant recommended reference ranges:   0-24 hours:  <8.0 mg/dL   24-48 hours: <12.0 mg/dL   3-5 days:    <15.0 mg/dL   6-29 days:   <15.0 mg/dL         BNP     695  Comment: [id number]  Values of less than 100 pg/ml are consistent with non-CHF populations.        BUN   32         Calcium   7.8         Chloride   105         CO2   23         Creatinine   2.5         eGFR   30  Comment: Estimated GFR calculated using the CKD-EPI creatinine (2021) equation.         Eos #   0.40         Eos %   6.7         Glucose   87         Gran # (ANC)   2.79         Hematocrit   51.8         Hemoglobin   15.9         Immature Grans (Abs)   0.02  Comment: Mild elevation in immature granulocytes is non specific and can be seen in a variety of conditions including stress response, acute inflammation, trauma and pregnancy. Correlation with other laboratory and clinical findings is essential.         Immature Granulocytes   0.3         Lymph #   2.21         Lymph %   37.0         Magnesium    1.5         MCH   28.0         MCHC   30.7         MCV   91         Mono #   0.50         Mono %   8.4         MPV   11.0         Neut %   46.6         nRBC   0         Phosphorus Level   3.5         Platelet Count   224         POCT Glucose 155           Potassium   3.5         PROTEIN TOTAL   5.9         RBC   5.68         RDW   14.4         Sodium   139         WBC   5.98                 Significant Imaging: I have reviewed all pertinent imaging results/findings within the past 24 hours.      Assessment & Plan  Acute on chronic combined systolic (congestive) and diastolic (congestive) heart failure  Uncontrolled atrial flutter  Dyspnea  Pericardial effusion  Patient has Combined Systolic and Diastolic heart failure that is Acute on chronic. On presentation their  CHF was decompensated. Evidence of decompensated CHF on presentation includes: edema, elevated JVD, orthopnea, paroxysmal nocturnal dyspnea (PND), and dyspnea on exertion (BROUSSARD). The etiology of their decompensation is likely severe disease, he verbalizes compliance with medication regimen. Most recent BNP and echo results are listed below.  Recent Labs     05/19/25  2114 05/21/25  1525   BNP 1,815* 695*     Latest ECHO  Results for orders placed during the hospital encounter of 02/17/25    Echo    Interpretation Summary    Left Ventricle: The left ventricle is mildly dilated. Normal wall thickness. There is concentric hypertrophy. There is moderately reduced systolic function with a visually estimated ejection fraction of 30 - 40%. Ejection fraction is approximately 35%. Quantitated ejection fraction is 43%. Grade II diastolic dysfunction.    Right Ventricle: Normal right ventricular cavity size. Wall thickness is normal. Systolic function is borderline low.    Left Atrium: Left atrium is severely dilated.    Mitral Valve: There is mild to moderate regurgitation.    Tricuspid Valve: There is mild regurgitation. There is mild pulmonary hypertension.    Pulmonary Artery: The estimated pulmonary artery systolic pressure is 41 mmHg.    IVC/SVC: Normal venous pressure at 3 mmHg.    Pericardium: There is a small circumferential effusion. No indication of cardiac tamponade.    Current Heart Failure Medications  hydrALAZINE injection 10 mg, Every 4 hours PRN, Intravenous  carvediloL tablet 6.25 mg, 2 times daily, Oral  sacubitriL-valsartan 24-26 mg per tablet 1 tablet, 2 times daily, Oral  torsemide tablet 40 mg, 2 times daily, Oral    Plan  - Monitor strict I&Os and daily weights.    - Place on telemetry  - Low sodium diet  - Place on fluid restriction of 1.5 L.   - Cardiology has been consulted: Continue IV diuresis, BB/Entresto, hydralazine as tolerated  - The patient's volume status is stable but not at their baseline  as indicated by edema, elevated JVD, orthopnea, paroxysmal nocturnal dyspnea (PND), dyspnea on exertion (BROUSSARD), and shortness of breath  - CHF pathway in use    Atrial flutter with tachycardia, HR up to 120's on admit  Has ablation procedure scheduled for 5/29 per Dr. Phelan  IV Cardizem given per ED, monitor response, may need infusion  Cont home Eliquis  HR as high as 116 overnight  MEGAN/DCCV: MEGAN- no clot in ALEXI, EF =25-30%, mod-severe MR, mod TR, DCCV 200 J to NSR  Dr. Mcarthur instructs to hold AM cardiac medications 2/2 BP soft post-procedure, trend    Essential hypertension  Patient's blood pressure range in the last 24 hours was: BP  Min: 88/60  Max: 130/59.The patient's inpatient anti-hypertensive regimen is listed below:  Current Antihypertensives  hydrALAZINE injection 10 mg, Every 4 hours PRN, Intravenous  carvediloL tablet 6.25 mg, 2 times daily, Oral  torsemide tablet 40 mg, 2 times daily, Oral    Plan  - BP is controlled, no changes needed to their regimen  - IV Hydralazine prn    Elevated troponin  Elevated above baseline chronic elevation  Trend troponin, likely due to fluid overload and atrial flutter with RVR  Continuous cardiac monitoring   Continue Eliquis and beta blocker  Dr. Mcarthur instructs to hold AM cardiac medications 2/2 BP soft post MEGAN/DCCV, trend    Mixed hyperlipidemia  Continue statin    Acute renal failure superimposed on stage 3b chronic kidney disease   Baseline creatinine is 2.0-2.3. Most recent creatinine and eGFR are listed below.  Recent Labs     05/20/25  0700 05/21/25  0517 05/22/25  0456   CREATININE 2.5* 2.5* 2.5*   EGFRNORACEVR 30* 30* 30*      Plan  - Avoid nephrotoxins and renally dose meds for GFR listed above  - Monitor urine output, serial BMP, and adjust therapy as needed  - Requiring IV Bumex due to fluid overload    Hypokalemia  Patient's most recent potassium results are listed below.   Recent Labs     05/20/25  0700 05/21/25  0517 05/22/25  0456   K 3.3* 3.5 3.5      Plan  - Replete potassium per protocol  - Monitor potassium Daily  - Patient's hypokalemia is improving  - Trend    Hypomagnesemia  Patient has Abnormal Magnesium: hypomagnesemia. Will continue to monitor electrolytes closely. Will replace the affected electrolytes and repeat labs to be done after interventions completed. The patient's magnesium results have been reviewed and are listed below.  Recent Labs   Lab 05/22/25  0456   MG 1.5*      Mg 1.5  Mag 2G IV today  Trend    VTE Risk Mitigation (From admission, onward)           Ordered     apixaban tablet 5 mg  2 times daily         05/20/25 0313     Reason for No Pharmacological VTE Prophylaxis  Once        Question:  Reasons:  Answer:  Already adequately anticoagulated on oral Anticoagulants    05/20/25 0319     IP VTE HIGH RISK PATIENT  Once         05/20/25 0319     Place sequential compression device  Until discontinued         05/20/25 0319                    Discharge Planning   CARMELO:      Code Status: Full Code   Medical Readiness for Discharge Date:   Discharge Plan A: Home with family          Uma Santos NP-C  Department of Hospital Medicine   [facility]

## 2025-05-22 NOTE — ANESTHESIA POSTPROCEDURE EVALUATION
Anesthesia Post Evaluation    Patient: Bria Saldana    Procedure(s) Performed: Procedure(s) (LRB):  Transesophageal echo (MEGAN) intra-procedure log documentation (N/A)  Cardioversion or Defibrillation (N/A)    Final Anesthesia Type: MAC      Patient location during evaluation: PACU  Patient participation: Yes- Able to Participate  Level of consciousness: awake and alert  Post-procedure vital signs: reviewed and stable  Pain management: adequate  Airway patency: patent    PONV status at discharge: No PONV  Anesthetic complications: no      Cardiovascular status: blood pressure returned to baseline  Respiratory status: unassisted and room air  Hydration status: euvolemic  Follow-up not needed.              Vitals Value Taken Time   BP 98/70 05/22/25 07:23   Temp 36.9 °C (98.5 °F) 05/22/25 07:41   Pulse 112 05/22/25 07:41   Resp 25 05/22/25 07:41   SpO2 98 % 05/22/25 07:41         No case tracking events are documented in the log.      Pain/Dereck Score: Pain Rating Prior to Med Admin: 3 (5/21/2025 12:17 PM)  Pain Rating Post Med Admin: 0 (5/21/2025  1:17 PM)

## 2025-05-22 NOTE — PROGRESS NOTES
[facility]  Cardiology  Progress Note    Patient Name: Bria Saldana  MRN: [id number]  Admission Date: 5/19/2025  Hospital Length of Stay: 2 days  Code Status: Full Code   Attending Physician: Ascencion Alejandro DO   Primary Care Physician: Brenna Maravilla MD  Expected Discharge Date:   Principal Problem:Acute on chronic combined systolic (congestive) and diastolic (congestive) heart failure    Subjective:   HPI:  Mr. Saldana is a 54 year old male patient whose current medical conditions include CKD stage III, HTN, hyperlipidemia, combined CHF with EF of 30-40%, PAFL s/p prior MEGAN/DCCV (on Eliquis) with planned ablation, DM type II, NATALYA, and pulmonary HTN who presented to [facility]- this AM due to abdominal distention and nausea/vomiting over the past week. Other associated symptoms included chest tightness, SOB, and palpitations. He denied any associated fever, chills, palpitations, near syncope, or syncope. Initial workup in ED revealed uncontrolled HTN (156/120), creatinine of 2.8. Tbili of 4.0, BNP of 1815, and troponin of 0.141. CXR clear. EKG showed aflutter with 2:1 AV conduction and patient was subsequently placed on a cardizem drip and admitted for further evaluation and treatment. Cardiology consulted to assist with management. Patient seen and examined today, resting in bed. Feels ok. Less SOB/abd bloating improving with diuresis. No CP during exam. He reports compliance with his medications, states he is taking Eliquis 5 mg BID. Previously on amiodarone per review of notes but not taking since early February. Limited TTE pending. Will consider repeat MEGAN/DCCV if he fails to convert. Followed by EP and scheduled for ablation later this month.     Hospital Course:   5/21/2025-Patient seen and examined today, sitting up in bed. Feeling better. Abd bloating/nausea/SOB much improved. CP free. Still in aflutter, HR low 100's. TTE with EF of 25%. MEGAN/DCCV planned tmw.    5/22/2025-Patient  seen and examined today, drowsy post TELMA/DCCV. Converted to SR. Labs reviewed/stable. Reviewed with EP, will initiate po amiodarone and postpone ablation for now.        Review of Systems   Reason unable to perform ROS: drowsy post telma.     Objective:     Vital Signs (Most Recent):  Temp: 97.2 °F (36.2 °C) (05/22/25 0840)  Pulse: 77 (05/22/25 1017)  Resp: 18 (05/22/25 0920)  BP: 116/85 (05/22/25 0920)  SpO2: 100 % (05/22/25 0920) Vital Signs (24h Range):  Temp:  [97.2 °F (36.2 °C)-98.5 °F (36.9 °C)] 97.2 °F (36.2 °C)  Pulse:  [] 77  Resp:  [10-25] 18  SpO2:  [92 %-100 %] 100 %  BP: ()/(59-85) 116/85     Weight: 108.3 kg (238 lb 12.1 oz)  Body mass index is 29.84 kg/m².     SpO2: 100 %         Intake/Output Summary (Last 24 hours) at 5/22/2025 1055  Last data filed at 5/22/2025 0832  Gross per 24 hour   Intake 600 ml   Output --   Net 600 ml       Lines/Drains/Airways       Peripheral Intravenous Line  Duration                  Peripheral IV - Single Lumen 05/20/25 0010 20 G Anterior;Right Forearm 2 days         Peripheral IV - Single Lumen 05/20/25 0702 18 G Left;Posterior Forearm 2 days                       Physical Exam  Vitals and nursing note reviewed.   Constitutional:       Comments: Sleepy   HENT:      Head: Normocephalic and atraumatic.   Cardiovascular:      Rate and Rhythm: Normal rate and regular rhythm.      Heart sounds: S1 normal and S2 normal. No murmur heard.  Pulmonary:      Effort: Pulmonary effort is normal.      Breath sounds: No rales.   Musculoskeletal:      Right lower leg: No edema.      Left lower leg: No edema.   Skin:     General: Skin is warm and dry.   Neurological:      Comments: Sleepy but arousable            Significant Labs: CMP   Recent Labs   Lab 05/21/25  0517 05/22/25  0456    139   K 3.5 3.5    105   CO2 23 23   GLU 95 87   BUN 32* 32*   CREATININE 2.5* 2.5*   CALCIUM 8.3* 7.8*   PROT 6.1 5.9*   ALBUMIN 2.9* 2.7*   BILITOT 3.1* 2.4*   ALKPHOS 131 119  "  AST 24 17   ALT 29 21   ANIONGAP 14 11   , CBC   Recent Labs   Lab 05/21/25  0517 05/22/25  0456   WBC 5.46 5.98   HGB 15.9 15.9   HCT 51.5 51.8    224   , Troponin No results for input(s): "TROPONINIHS" in the last 48 hours., and All pertinent lab results from the last 24 hours have been reviewed.    Significant Imaging: Echocardiogram: Transthoracic echo (TTE) complete (Cupid Only):   Results for orders placed or performed during the hospital encounter of 05/19/25   Echo   Result Value Ref Range    BSA 2.43 m2    A4C EF 11 %    LVIDd 5.7 3.5 - 6.0 cm    LV Systolic Volume 136 mL    LV Systolic Volume Index 56.7 mL/m2    LVIDs 5.3 (A) 2.1 - 4.0 cm    LV Diastolic Volume 161 mL    LV Diastolic Volume Index 67.08 mL/m2    LV EDV A4C 174.34 mL    Left Ventricular End Systolic Volume by Teichholz Method 135.81 mL    Left Ventricular End Diastolic Volume by Teichholz Method 161.09 mL    IVS 1.7 (A) 0.6 - 1.1 cm    FS 7.0 (A) 28 - 44 %    Left Ventricle Relative Wall Thickness 0.49 cm    PW 1.4 (A) 0.6 - 1.1 cm    LV mass 413.5 g    LV Mass Index 172.3 g/m2    IVC diameter 3.06 cm    ZLVIDS -1.67     ZLVIDD -6.52     EF 25 %    Est. RA pres 3 mmHg    Narrative      Left Ventricle: The left ventricle is normal in size. Severely   increased wall thickness. There is concentric hypertrophy. Moderate global   hypokinesis present. There is severely reduced systolic function. Ejection   fraction is approximately 25%.    Right Ventricle: The right ventricle is normal in size Wall thickness   is normal. Systolic function is normal.    Right Atrium: The right atrium is mildly dilated .    IVC/SVC: Normal venous pressure at 3 mmHg.    Pericardium: There is a small effusion.      and EKG: Reviewed  Assessment and Plan:   Patient who presents with decompensated CHF in setting of recurrent aflutter. Underwent MEGAN/DCCV today with restoration of SR. BP soft, will need med adjustment. Amiodarone initiated. Observe " overnight.    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 (congestive) and diastolic (congestive) heart failure  -Presents with decompensated CHF in setting of recurrent aflutter  -Continue IV diuresis  -Continue BB/Entresto, hydralazine as tolerated  -Strict I's/O's  -  Recent Labs     05/19/25  2114 05/21/25  1525   BNP 1,815* 695*       Latest ECHO  Results for orders placed during the hospital encounter of 02/17/25    Echo    Interpretation Summary    Left Ventricle: The left ventricle is mildly dilated. Normal wall thickness. There is concentric hypertrophy. There is moderately reduced systolic function with a visually estimated ejection fraction of 30 - 40%. Ejection fraction is approximately 35%. Quantitated ejection fraction is 43%. Grade II diastolic dysfunction.    Right Ventricle: Normal right ventricular cavity size. Wall thickness is normal. Systolic function is borderline low.    Left Atrium: Left atrium is severely dilated.    Mitral Valve: There is mild to moderate regurgitation.    Tricuspid Valve: There is mild regurgitation. There is mild pulmonary hypertension.    Pulmonary Artery: The estimated pulmonary artery systolic pressure is 41 mmHg.    IVC/SVC: Normal venous pressure at 3 mmHg.    Pericardium: There is a small circumferential effusion. No indication of cardiac tamponade.    Current Heart Failure Medications  hydrALAZINE injection 10 mg, Every 4 hours PRN, Intravenous  carvediloL tablet 25 mg, 2 times daily, Oral  sacubitriL-valsartan 24-26 mg per tablet 1 tablet, 2 times daily, Oral  torsemide tablet 40 mg, 2 times daily, Oral    5/21/2025  -Improving  -Continue IV diuresis  -Continue Entresto, Coreg  -Strict I's/O's  -TTE with EF of 25%--->MEGAN/DCCV planned tmw    5/22/2025  -BP soft, meds will be adjusted  -Diuretics switched to po    Hypokalemia  -Repletion per primary team    Acute renal failure superimposed on stage 3b chronic kidney disease  -Cardio-renal improving with IV  diuresis    5/21/2025  -Stable, continue same    Pericardial effusion  -Stable by TTE, small    Uncontrolled atrial flutter  -Presents with aflutter 2:1 conduction, recurrent  -Previously on amiodarone, ? Not taking since early February  -Start amiodarone drip  -Continue Coreg  -Continue Eliquis  -May need repeat MEGAN/DCCV---scheduled for ablation by Dr. Phelan, EP later this month    5/21/2025  -HR in low 100's continue Coreg  -Called patient's pharmacy yesterday, they have no record of him filling Eliquis therefore amio d/'c  -Continue Eliquis while IP, ensure med availability/compliance upon d/c  -MEGAN/DCCV tmw, keep NPO after MN    5/22/2025  -s/p MEGAN/DCCV with restoration of SR  -Continue Coreg  -Add amiodarone 400 mg BID x 1 week, then 200 mg BID x 1 week, then 200 mg daily  -Eliquis 5 mg BID---compliance emphasized  -Will postpone ablation for now, discussed with EP Dr. Phelan    Elevated troponin  -Trop flat  -Secondary to demand ischemia from aflutter, decompensated CHF  -Continue OMT  -Prior MPI stress test 5/2024 negative for ischemia    Essential hypertension  -Continue home meds as tolerated        VTE Risk Mitigation (From admission, onward)           Ordered     apixaban tablet 5 mg  2 times daily         05/20/25 0313     Reason for No Pharmacological VTE Prophylaxis  Once        Question:  Reasons:  Answer:  Already adequately anticoagulated on oral Anticoagulants    05/20/25 0319     IP VTE HIGH RISK PATIENT  Once         05/20/25 0319     Place sequential compression device  Until discontinued         05/20/25 0319                    Judith Kim PA-C  Cardiology  [facility]

## 2025-05-22 NOTE — SUBJECTIVE & OBJECTIVE
Interval History: Pt sitting to side of bed in no acute distress. Denies issues overnight. Denies feeling his heart was in A Flutter prior to procedure. Tolerated MEGAN/DCCV well. BP soft post-procedure. Dr. Mcarthur instructs to hold AM doses of cardiac medications and trend. Discussed lab results: Cr 2.5, Ca corrected to normal, and Mg requiring IV repletion. Patient reports understanding and denies any questions/concerns at this time.    Review of Systems   Constitutional:  Positive for fatigue (improving). Negative for appetite change, chills and fever.   Respiratory:  Positive for shortness of breath (improving, BROUSSARD). Negative for cough, chest tightness and wheezing.    Cardiovascular:  Negative for chest pain, palpitations (A Flutter sustained overnight per tele monitoring, HR > 100--denies feeling rhythm changes) and leg swelling (Denies, but reports was told BLE on admission).   Gastrointestinal:  Negative for abdominal pain, blood in stool, constipation, diarrhea, nausea and vomiting.   Genitourinary:  Negative for difficulty urinating and hematuria.   Musculoskeletal:  Negative for back pain, joint swelling and myalgias.   Neurological:  Positive for weakness (generalized, improving). Negative for dizziness, tremors, syncope, light-headedness and headaches.   Psychiatric/Behavioral:  Negative for confusion and sleep disturbance.    All other systems reviewed and are negative.    Objective:     Vital Signs (Most Recent):  Temp: 97.4 °F (36.3 °C) (05/22/25 1214)  Pulse: 80 (05/22/25 1214)  Resp: 18 (05/22/25 1214)  BP: (!) 97/59 (05/22/25 1214)  SpO2: 97 % (05/22/25 1214) Vital Signs (24h Range):  Temp:  [97.2 °F (36.2 °C)-98.5 °F (36.9 °C)] 97.4 °F (36.3 °C)  Pulse:  [] 80  Resp:  [10-25] 18  SpO2:  [92 %-100 %] 97 %  BP: ()/(59-85) 97/59     Weight: 108.3 kg (238 lb 12.1 oz)  Body mass index is 29.84 kg/m².    Intake/Output Summary (Last 24 hours) at 5/22/2025 1320  Last data filed at 5/22/2025  0832  Gross per 24 hour   Intake 600 ml   Output --   Net 600 ml         Physical Exam  Constitutional:       General: He is not in acute distress.     Appearance: Normal appearance. He is obese. He is not ill-appearing or diaphoretic.   Cardiovascular:      Rate and Rhythm: Normal rate and regular rhythm.      Pulses: Normal pulses.      Comments: Mild non-pitting BLE edema--pt denies feeling edema is present  Pulmonary:      Effort: Pulmonary effort is normal. No respiratory distress.      Breath sounds: Normal breath sounds. No wheezing.   Abdominal:      General: Bowel sounds are normal. There is no distension.      Palpations: Abdomen is soft.      Tenderness: There is no abdominal tenderness. There is no guarding.   Musculoskeletal:      Right lower le+ Edema present.      Left lower le+ Edema present.   Neurological:      General: No focal deficit present.      Mental Status: He is alert and oriented to person, place, and time.               Significant Labs: All pertinent labs within the past 24 hours have been reviewed.  Recent Lab Results         25  0831   25  0456   25  1525        Albumin   2.7         ALP   119         ALT   21         Anion Gap   11         AST   17         Baso #   0.06         Basophil %   1.0         BILIRUBIN TOTAL   2.4  Comment: For infants and newborns, interpretation of results should be based   on gestational age, weight and in agreement with clinical   observations.    Premature Infant recommended reference ranges:   0-24 hours:  <8.0 mg/dL   24-48 hours: <12.0 mg/dL   3-5 days:    <15.0 mg/dL   6-29 days:   <15.0 mg/dL         BNP     695  Comment: [id number]  Values of less than 100 pg/ml are consistent with non-CHF populations.        BUN   32         Calcium   7.8         Chloride   105         CO2   23         Creatinine   2.5         eGFR   30  Comment: Estimated GFR calculated using the CKD-EPI creatinine () equation.         Eos #   0.40          Eos %   6.7         Glucose   87         Gran # (ANC)   2.79         Hematocrit   51.8         Hemoglobin   15.9         Immature Grans (Abs)   0.02  Comment: Mild elevation in immature granulocytes is non specific and can be seen in a variety of conditions including stress response, acute inflammation, trauma and pregnancy. Correlation with other laboratory and clinical findings is essential.         Immature Granulocytes   0.3         Lymph #   2.21         Lymph %   37.0         Magnesium    1.5         MCH   28.0         MCHC   30.7         MCV   91         Mono #   0.50         Mono %   8.4         MPV   11.0         Neut %   46.6         nRBC   0         Phosphorus Level   3.5         Platelet Count   224         POCT Glucose 155           Potassium   3.5         PROTEIN TOTAL   5.9         RBC   5.68         RDW   14.4         Sodium   139         WBC   5.98                 Significant Imaging: I have reviewed all pertinent imaging results/findings within the past 24 hours.

## 2025-05-22 NOTE — ASSESSMENT & PLAN NOTE
Elevated above baseline chronic elevation  Trend troponin, likely due to fluid overload and atrial flutter with RVR  Continuous cardiac monitoring   Continue Eliquis and beta blocker  Dr. Mcarthur instructs to hold AM cardiac medications 2/2 BP soft post MEGAN/DCCV, trend

## 2025-05-22 NOTE — ASSESSMENT & PLAN NOTE
-Presents with decompensated CHF in setting of recurrent aflutter  -Continue IV diuresis  -Continue BB/Entresto, hydralazine as tolerated  -Strict I's/O's  -  Recent Labs     05/19/25  2114 05/21/25  1525   BNP 1,815* 695*       Latest ECHO  Results for orders placed during the hospital encounter of 02/17/25    Echo    Interpretation Summary    Left Ventricle: The left ventricle is mildly dilated. Normal wall thickness. There is concentric hypertrophy. There is moderately reduced systolic function with a visually estimated ejection fraction of 30 - 40%. Ejection fraction is approximately 35%. Quantitated ejection fraction is 43%. Grade II diastolic dysfunction.    Right Ventricle: Normal right ventricular cavity size. Wall thickness is normal. Systolic function is borderline low.    Left Atrium: Left atrium is severely dilated.    Mitral Valve: There is mild to moderate regurgitation.    Tricuspid Valve: There is mild regurgitation. There is mild pulmonary hypertension.    Pulmonary Artery: The estimated pulmonary artery systolic pressure is 41 mmHg.    IVC/SVC: Normal venous pressure at 3 mmHg.    Pericardium: There is a small circumferential effusion. No indication of cardiac tamponade.    Current Heart Failure Medications  hydrALAZINE injection 10 mg, Every 4 hours PRN, Intravenous  carvediloL tablet 25 mg, 2 times daily, Oral  sacubitriL-valsartan 24-26 mg per tablet 1 tablet, 2 times daily, Oral  torsemide tablet 40 mg, 2 times daily, Oral    5/21/2025  -Improving  -Continue IV diuresis  -Continue Entresto, Coreg  -Strict I's/O's  -TTE with EF of 25%--->MEGAN/DCCV planned tmw    5/22/2025  -BP soft, meds will be adjusted  -Diuretics switched to po

## 2025-05-22 NOTE — ASSESSMENT & PLAN NOTE
Patient has Combined Systolic and Diastolic heart failure that is Acute on chronic. On presentation their CHF was decompensated. Evidence of decompensated CHF on presentation includes: edema, elevated JVD, orthopnea, paroxysmal nocturnal dyspnea (PND), and dyspnea on exertion (BROUSSARD). The etiology of their decompensation is likely severe disease, he verbalizes compliance with medication regimen. Most recent BNP and echo results are listed below.  Recent Labs     05/19/25  2114 05/21/25  1525   BNP 1,815* 695*     Latest ECHO  Results for orders placed during the hospital encounter of 02/17/25    Echo    Interpretation Summary    Left Ventricle: The left ventricle is mildly dilated. Normal wall thickness. There is concentric hypertrophy. There is moderately reduced systolic function with a visually estimated ejection fraction of 30 - 40%. Ejection fraction is approximately 35%. Quantitated ejection fraction is 43%. Grade II diastolic dysfunction.    Right Ventricle: Normal right ventricular cavity size. Wall thickness is normal. Systolic function is borderline low.    Left Atrium: Left atrium is severely dilated.    Mitral Valve: There is mild to moderate regurgitation.    Tricuspid Valve: There is mild regurgitation. There is mild pulmonary hypertension.    Pulmonary Artery: The estimated pulmonary artery systolic pressure is 41 mmHg.    IVC/SVC: Normal venous pressure at 3 mmHg.    Pericardium: There is a small circumferential effusion. No indication of cardiac tamponade.    Current Heart Failure Medications  hydrALAZINE injection 10 mg, Every 4 hours PRN, Intravenous  carvediloL tablet 6.25 mg, 2 times daily, Oral  sacubitriL-valsartan 24-26 mg per tablet 1 tablet, 2 times daily, Oral  torsemide tablet 40 mg, 2 times daily, Oral    Plan  - Monitor strict I&Os and daily weights.    - Place on telemetry  - Low sodium diet  - Place on fluid restriction of 1.5 L.   - Cardiology has been consulted: Continue IV diuresis,  BB/Entresto, hydralazine as tolerated  - The patient's volume status is stable but not at their baseline as indicated by edema, elevated JVD, orthopnea, paroxysmal nocturnal dyspnea (PND), dyspnea on exertion (BROUSSARD), and shortness of breath  - CHF pathway in use    Atrial flutter with tachycardia, HR up to 120's on admit  Has ablation procedure scheduled for 5/29 per Dr. Phelan  IV Cardizem given per ED, monitor response, may need infusion  Cont home Eliquis  HR as high as 116 overnight  MEGAN/DCCV: MEGAN- no clot in ALEXI, EF =25-30%, mod-severe MR, mod TR, DCCV 200 J to NSR  Dr. Mcarthur instructs to hold AM cardiac medications 2/2 BP soft post-procedure, trend

## 2025-05-23 LAB
ABSOLUTE EOSINOPHIL (OHS): 0.26 K/UL
ABSOLUTE MONOCYTE (OHS): 0.55 K/UL (ref 0.3–1)
ABSOLUTE NEUTROPHIL COUNT (OHS): 3.32 K/UL (ref 1.8–7.7)
ALBUMIN SERPL BCP-MCNC: 3 G/DL (ref 3.5–5.2)
ALP SERPL-CCNC: 138 UNIT/L (ref 40–150)
ALT SERPL W/O P-5'-P-CCNC: 30 UNIT/L (ref 10–44)
ANION GAP (OHS): 14 MMOL/L (ref 8–16)
AST SERPL-CCNC: 36 UNIT/L (ref 11–45)
BASOPHILS # BLD AUTO: 0.07 K/UL
BASOPHILS NFR BLD AUTO: 1.1 %
BILIRUB SERPL-MCNC: 2.8 MG/DL (ref 0.1–1)
BUN SERPL-MCNC: 38 MG/DL (ref 6–20)
CALCIUM SERPL-MCNC: 8.6 MG/DL (ref 8.7–10.5)
CHLORIDE SERPL-SCNC: 104 MMOL/L (ref 95–110)
CO2 SERPL-SCNC: 18 MMOL/L (ref 23–29)
CREAT SERPL-MCNC: 3.1 MG/DL (ref 0.5–1.4)
ERYTHROCYTE [DISTWIDTH] IN BLOOD BY AUTOMATED COUNT: 14.6 % (ref 11.5–14.5)
GFR SERPLBLD CREATININE-BSD FMLA CKD-EPI: 23 ML/MIN/1.73/M2
GLUCOSE SERPL-MCNC: 85 MG/DL (ref 70–110)
HCT VFR BLD AUTO: 51.5 % (ref 40–54)
HGB BLD-MCNC: 15.6 GM/DL (ref 14–18)
IMM GRANULOCYTES # BLD AUTO: 0.02 K/UL (ref 0–0.04)
IMM GRANULOCYTES NFR BLD AUTO: 0.3 % (ref 0–0.5)
LACTATE SERPL-SCNC: 1.9 MMOL/L (ref 0.5–2.2)
LYMPHOCYTES # BLD AUTO: 2.29 K/UL (ref 1–4.8)
MAGNESIUM SERPL-MCNC: 2.2 MG/DL (ref 1.6–2.6)
MCH RBC QN AUTO: 28.1 PG (ref 27–31)
MCHC RBC AUTO-ENTMCNC: 30.3 G/DL (ref 32–36)
MCV RBC AUTO: 93 FL (ref 82–98)
NUCLEATED RBC (/100WBC) (OHS): 0 /100 WBC
OHS QRS DURATION: 124 MS
OHS QTC CALCULATION: 556 MS
PHOSPHATE SERPL-MCNC: 4.4 MG/DL (ref 2.7–4.5)
PLATELET # BLD AUTO: 186 K/UL (ref 150–450)
PMV BLD AUTO: 10.4 FL (ref 9.2–12.9)
POTASSIUM SERPL-SCNC: 4.2 MMOL/L (ref 3.5–5.1)
PROT SERPL-MCNC: 6.3 GM/DL (ref 6–8.4)
RBC # BLD AUTO: 5.56 M/UL (ref 4.6–6.2)
RELATIVE EOSINOPHIL (OHS): 4 %
RELATIVE LYMPHOCYTE (OHS): 35.2 % (ref 18–48)
RELATIVE MONOCYTE (OHS): 8.4 % (ref 4–15)
RELATIVE NEUTROPHIL (OHS): 51 % (ref 38–73)
SODIUM SERPL-SCNC: 136 MMOL/L (ref 136–145)
WBC # BLD AUTO: 6.51 K/UL (ref 3.9–12.7)

## 2025-05-23 PROCEDURE — 84100 ASSAY OF PHOSPHORUS: CPT

## 2025-05-23 PROCEDURE — 83605 ASSAY OF LACTIC ACID: CPT | Performed by: PHYSICIAN ASSISTANT

## 2025-05-23 PROCEDURE — 83735 ASSAY OF MAGNESIUM: CPT

## 2025-05-23 PROCEDURE — 25000003 PHARM REV CODE 250: Performed by: PHYSICIAN ASSISTANT

## 2025-05-23 PROCEDURE — 25000003 PHARM REV CODE 250: Performed by: NURSE PRACTITIONER

## 2025-05-23 PROCEDURE — 94761 N-INVAS EAR/PLS OXIMETRY MLT: CPT

## 2025-05-23 PROCEDURE — 21400001 HC TELEMETRY ROOM

## 2025-05-23 PROCEDURE — 80053 COMPREHEN METABOLIC PANEL: CPT

## 2025-05-23 PROCEDURE — 85025 COMPLETE CBC W/AUTO DIFF WBC: CPT

## 2025-05-23 PROCEDURE — 99232 SBSQ HOSP IP/OBS MODERATE 35: CPT | Mod: ,,, | Performed by: PHYSICIAN ASSISTANT

## 2025-05-23 PROCEDURE — 36415 COLL VENOUS BLD VENIPUNCTURE: CPT | Performed by: PHYSICIAN ASSISTANT

## 2025-05-23 PROCEDURE — 36415 COLL VENOUS BLD VENIPUNCTURE: CPT

## 2025-05-23 RX ORDER — CARVEDILOL 3.12 MG/1
3.12 TABLET ORAL 2 TIMES DAILY
Status: DISCONTINUED | OUTPATIENT
Start: 2025-05-23 | End: 2025-05-25 | Stop reason: HOSPADM

## 2025-05-23 RX ADMIN — APIXABAN 5 MG: 2.5 TABLET, FILM COATED ORAL at 08:05

## 2025-05-23 RX ADMIN — AMIODARONE HYDROCHLORIDE 400 MG: 100 TABLET ORAL at 09:05

## 2025-05-23 RX ADMIN — CARVEDILOL 3.12 MG: 3.12 TABLET, FILM COATED ORAL at 09:05

## 2025-05-23 RX ADMIN — APIXABAN 5 MG: 2.5 TABLET, FILM COATED ORAL at 09:05

## 2025-05-23 RX ADMIN — AMIODARONE HYDROCHLORIDE 400 MG: 100 TABLET ORAL at 08:05

## 2025-05-23 RX ADMIN — CARVEDILOL 3.12 MG: 3.12 TABLET, FILM COATED ORAL at 08:05

## 2025-05-23 NOTE — ASSESSMENT & PLAN NOTE
Elevated above baseline chronic elevation  Trend troponin, likely due to fluid overload and atrial flutter with RVR  Continuous cardiac monitoring   Continue Eliquis and beta blocker  Cards: OK to resume meds; plan to DC on Eliquis  Attempt to get Eliquis delivered 2/2 hx med non-compliance

## 2025-05-23 NOTE — ASSESSMENT & PLAN NOTE
Patient's most recent potassium results are listed below.   Recent Labs     05/21/25  0517 05/22/25  0456 05/23/25  0246   K 3.5 3.5 4.2     Plan  - Replete potassium per protocol  - Monitor potassium Daily  - Patient's hypokalemia is improving  - Trend

## 2025-05-23 NOTE — ASSESSMENT & PLAN NOTE
Patient's blood pressure range in the last 24 hours was: BP  Min: 86/54  Max: 118/82.The patient's inpatient anti-hypertensive regimen is listed below:  Current Antihypertensives  hydrALAZINE injection 10 mg, Every 4 hours PRN, Intravenous  carvediloL tablet 3.125 mg, 2 times daily, Oral    Plan  - BP is controlled, no changes needed to their regimen  - IV Hydralazine prn

## 2025-05-23 NOTE — SUBJECTIVE & OBJECTIVE
Interval History: Pt sitting to side of bed in no acute distress. Denies issues overnight. Denies feeling his heart was in A Flutter prior to procedure. Tolerated MEGAN/DCCV well. BP soft post-procedure. Dr. Mcarthur instructs to hold AM doses of cardiac medications and trend. Discussed lab results: Cr 2.5, Ca corrected to normal, and Mg requiring IV repletion. Patient reports understanding and denies any questions/concerns at this time.    Review of Systems   Constitutional:  Positive for fatigue (improving). Negative for appetite change, chills and fever.   Respiratory:  Positive for shortness of breath (resolving). Negative for cough, chest tightness and wheezing.    Cardiovascular:  Negative for chest pain, palpitations (A Flutter sustained overnight per tele monitoring, HR > 100--denies feeling rhythm changes) and leg swelling (Denies, but reports was told BLE on admission).   Gastrointestinal:  Negative for abdominal pain, blood in stool, constipation, diarrhea, nausea and vomiting.   Genitourinary:  Negative for difficulty urinating and hematuria.   Musculoskeletal:  Negative for back pain, joint swelling and myalgias.   Neurological:  Positive for weakness (generalized, improving). Negative for dizziness, tremors, syncope, light-headedness and headaches.   Psychiatric/Behavioral:  Negative for confusion and sleep disturbance.    All other systems reviewed and are negative.    Objective:     Vital Signs (Most Recent):  Temp: 97.5 °F (36.4 °C) (05/23/25 0744)  Pulse: 75 (05/23/25 0744)  Resp: 16 (05/23/25 0744)  BP: 113/85 (05/23/25 0744)  SpO2: 99 % (05/23/25 0800) Vital Signs (24h Range):  Temp:  [97.4 °F (36.3 °C)-98.7 °F (37.1 °C)] 97.5 °F (36.4 °C)  Pulse:  [75-80] 75  Resp:  [16-18] 16  SpO2:  [97 %-100 %] 99 %  BP: ()/(54-85) 113/85     Weight: 108.3 kg (238 lb 12.1 oz)  Body mass index is 29.84 kg/m².  No intake or output data in the 24 hours ending 05/23/25 1005        Physical  Exam  Constitutional:       General: He is not in acute distress.     Appearance: Normal appearance. He is obese. He is not ill-appearing or diaphoretic.   Cardiovascular:      Rate and Rhythm: Normal rate and regular rhythm.      Pulses: Normal pulses.      Comments: Mild non-pitting BLE edema--pt denies feeling edema is present  Pulmonary:      Effort: Pulmonary effort is normal. No respiratory distress.      Breath sounds: Normal breath sounds. No wheezing.   Abdominal:      General: Bowel sounds are normal. There is no distension.      Palpations: Abdomen is soft.      Tenderness: There is no abdominal tenderness. There is no guarding.   Musculoskeletal:      Right lower le+ Edema present.      Left lower le+ Edema present.   Neurological:      General: No focal deficit present.      Mental Status: He is alert and oriented to person, place, and time.               Significant Labs: All pertinent labs within the past 24 hours have been reviewed.  Recent Lab Results         25  0246        Albumin 3.0              ALT 30       Anion Gap 14       AST 36       Baso # 0.07       Basophil % 1.1       BILIRUBIN TOTAL 2.8  Comment: For infants and newborns, interpretation of results should be based   on gestational age, weight and in agreement with clinical   observations.    Premature Infant recommended reference ranges:   0-24 hours:  <8.0 mg/dL   24-48 hours: <12.0 mg/dL   3-5 days:    <15.0 mg/dL   6-29 days:   <15.0 mg/dL       BUN 38       Calcium 8.6       Chloride 104       CO2 18       Creatinine 3.1       eGFR 23  Comment: Estimated GFR calculated using the CKD-EPI creatinine () equation.       Eos # 0.26       Eos % 4.0       Glucose 85       Gran # (ANC) 3.32       Hematocrit 51.5       Hemoglobin 15.6       Immature Grans (Abs) 0.02  Comment: Mild elevation in immature granulocytes is non specific and can be seen in a variety of conditions including stress response, acute  inflammation, trauma and pregnancy. Correlation with other laboratory and clinical findings is essential.       Immature Granulocytes 0.3       Lymph # 2.29       Lymph % 35.2       Magnesium  2.2       MCH 28.1       MCHC 30.3       MCV 93       Mono # 0.55       Mono % 8.4       MPV 10.4       Neut % 51.0       nRBC 0       Phosphorus Level 4.4       Platelet Count 186       Potassium 4.2       PROTEIN TOTAL 6.3       RBC 5.56       RDW 14.6       Sodium 136       WBC 6.51               Significant Imaging: I have reviewed all pertinent imaging results/findings within the past 24 hours.

## 2025-05-23 NOTE — PROGRESS NOTES
[facility]  Cardiology  Progress Note    Patient Name: Bria Saldana  MRN: [id number]  Admission Date: 5/19/2025  Hospital Length of Stay: 3 days  Code Status: Full Code   Attending Physician: Ascencion Alejandro DO   Primary Care Physician: Brenna Maravilla MD  Expected Discharge Date:   Principal Problem:Acute on chronic combined systolic (congestive) and diastolic (congestive) heart failure    Subjective:   HPI:  Mr. Saldana is a 54 year old male patient whose current medical conditions include CKD stage III, HTN, hyperlipidemia, combined CHF with EF of 30-40%, PAFL s/p prior MEGAN/DCCV (on Eliquis) with planned ablation, DM type II, NATALYA, and pulmonary HTN who [place] [facility] due to abdominal distention and nausea/vomiting over the past week. Other associated symptoms included chest tightness, SOB, and palpitations. He denied any associated fever, chills, palpitations, near syncope, or syncope. Initial workup in ED revealed uncontrolled HTN (156/120), creatinine of 2.8. Tbili of 4.0, BNP of 1815, and troponin of 0.141. CXR clear. EKG showed aflutter with 2:1 AV conduction and patient was subsequently placed on a cardizem drip and admitted for further evaluation and treatment. Cardiology consulted to assist with management. Patient seen and examined today, resting in bed. Feels ok. Less SOB/abd bloating improving with diuresis. No CP during exam. He reports compliance with his medications, states he is taking Eliquis 5 mg BID. Previously on amiodarone per review of notes but not taking since early February. Limited TTE pending. Will consider repeat MEGAN/DCCV if he fails to convert. Followed by EP and scheduled for ablation later this month.     Hospital Course:   5/21/2025-Patient seen and examined today, sitting up in bed. Feeling better. Abd bloating/nausea/SOB much improved. CP free. Still in aflutter, HR low 100's. TTE with EF of 25%. MEGAN/DCCV planned tmw.    5/22/2025-Patient  seen and examined today, drowsy post MEGAN/DCCV. Converted to SR. Labs reviewed/stable. Reviewed with EP, will initiate po amiodarone and postpone ablation for now.    5/23/2025-Patient seen and examined today, remains in SR. Feels well. No SOB. No CP. Labs reviewed. Creatinine bumped to 3.1, diuretics/Entresto held. LA pending.        Review of Systems   Constitutional: Positive for malaise/fatigue.   HENT: Negative.     Eyes: Negative.    Cardiovascular: Negative.    Respiratory: Negative.     Endocrine: Negative.    Hematologic/Lymphatic: Negative.    Musculoskeletal: Negative.    Gastrointestinal: Negative.    Genitourinary: Negative.    Neurological: Negative.    Psychiatric/Behavioral: Negative.     Allergic/Immunologic: Negative.      Objective:     Vital Signs (Most Recent):  Temp: 97.5 °F (36.4 °C) (05/23/25 0744)  Pulse: 75 (05/23/25 0744)  Resp: 16 (05/23/25 0744)  BP: 113/85 (05/23/25 0744)  SpO2: 99 % (05/23/25 0800) Vital Signs (24h Range):  Temp:  [97.4 °F (36.3 °C)-98.7 °F (37.1 °C)] 97.5 °F (36.4 °C)  Pulse:  [75-80] 75  Resp:  [16-18] 16  SpO2:  [97 %-100 %] 99 %  BP: ()/(54-85) 113/85     Weight: 108.3 kg (238 lb 12.1 oz)  Body mass index is 29.84 kg/m².     SpO2: 99 %       No intake or output data in the 24 hours ending 05/23/25 1033    Lines/Drains/Airways       Peripheral Intravenous Line  Duration                  Peripheral IV - Single Lumen 05/20/25 0010 20 G Anterior;Right Forearm 3 days                       Physical Exam  Vitals and nursing note reviewed.   Constitutional:       Appearance: Normal appearance.   HENT:      Head: Normocephalic and atraumatic.   Eyes:      Pupils: Pupils are equal, round, and reactive to light.   Cardiovascular:      Rate and Rhythm: Normal rate and regular rhythm.      Heart sounds: S1 normal and S2 normal. No murmur heard.  Pulmonary:      Effort: Pulmonary effort is normal.      Breath sounds: No rales.   Abdominal:      Palpations: Abdomen is soft.  "  Musculoskeletal:      Right lower leg: No edema.      Left lower leg: No edema.   Skin:     General: Skin is warm and dry.   Neurological:      General: No focal deficit present.      Mental Status: He is oriented to person, place, and time.   Psychiatric:         Mood and Affect: Mood normal.         Behavior: Behavior normal.         Thought Content: Thought content normal.            Significant Labs: CMP   Recent Labs   Lab 05/22/25  0456 05/23/25  0246    136   K 3.5 4.2    104   CO2 23 18*   GLU 87 85   BUN 32* 38*   CREATININE 2.5* 3.1*   CALCIUM 7.8* 8.6*   PROT 5.9* 6.3   ALBUMIN 2.7* 3.0*   BILITOT 2.4* 2.8*   ALKPHOS 119 138   AST 17 36   ALT 21 30   ANIONGAP 11 14   , CBC   Recent Labs   Lab 05/22/25  0456 05/23/25  0246   WBC 5.98 6.51   HGB 15.9 15.6   HCT 51.8 51.5    186   , Troponin No results for input(s): "TROPONINIHS" in the last 48 hours., and All pertinent lab results from the last 24 hours have been reviewed.    Significant Imaging: Echocardiogram: Transthoracic echo (TTE) complete (Cupid Only):   Results for orders placed or performed during the hospital encounter of 05/19/25   Echo   Result Value Ref Range    BSA 2.43 m2    A4C EF 11 %    LVIDd 5.7 3.5 - 6.0 cm    LV Systolic Volume 136 mL    LV Systolic Volume Index 56.7 mL/m2    LVIDs 5.3 (A) 2.1 - 4.0 cm    LV Diastolic Volume 161 mL    LV Diastolic Volume Index 67.08 mL/m2    LV EDV A4C 174.34 mL    Left Ventricular End Systolic Volume by Teichholz Method 135.81 mL    Left Ventricular End Diastolic Volume by Teichholz Method 161.09 mL    IVS 1.7 (A) 0.6 - 1.1 cm    FS 7.0 (A) 28 - 44 %    Left Ventricle Relative Wall Thickness 0.49 cm    PW 1.4 (A) 0.6 - 1.1 cm    LV mass 413.5 g    LV Mass Index 172.3 g/m2    IVC diameter 3.06 cm    ZLVIDS -1.67     ZLVIDD -6.52     EF 25 %    Est. RA pres 3 mmHg    Narrative      Left Ventricle: The left ventricle is normal in size. Severely   increased wall thickness. There is " concentric hypertrophy. Moderate global   hypokinesis present. There is severely reduced systolic function. Ejection   fraction is approximately 25%.    Right Ventricle: The right ventricle is normal in size Wall thickness   is normal. Systolic function is normal.    Right Atrium: The right atrium is mildly dilated .    IVC/SVC: Normal venous pressure at 3 mmHg.    Pericardium: There is a small effusion.      and EKG: Reviewed  Assessment and Plan:   Patient who presents with decompensated CHF in setting of recurrent atrial flutter. Remains in SR post cardioversion. Medications adjusted given soft BP. Renal function worse, check LA. Diuretics/Entresto held. Will need additional med adjustment prior to d/c.    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 (congestive) and diastolic (congestive) heart failure  -Presents with decompensated CHF in setting of recurrent aflutter  -Continue IV diuresis  -Continue BB/Entresto, hydralazine as tolerated  -Strict I's/O's  -  Recent Labs     05/21/25  1525   *       Latest ECHO  Results for orders placed during the hospital encounter of 02/17/25    Echo    Interpretation Summary    Left Ventricle: The left ventricle is mildly dilated. Normal wall thickness. There is concentric hypertrophy. There is moderately reduced systolic function with a visually estimated ejection fraction of 30 - 40%. Ejection fraction is approximately 35%. Quantitated ejection fraction is 43%. Grade II diastolic dysfunction.    Right Ventricle: Normal right ventricular cavity size. Wall thickness is normal. Systolic function is borderline low.    Left Atrium: Left atrium is severely dilated.    Mitral Valve: There is mild to moderate regurgitation.    Tricuspid Valve: There is mild regurgitation. There is mild pulmonary hypertension.    Pulmonary Artery: The estimated pulmonary artery systolic pressure is 41 mmHg.    IVC/SVC: Normal venous pressure at 3 mmHg.    Pericardium: There is a small  circumferential effusion. No indication of cardiac tamponade.    Current Heart Failure Medications  hydrALAZINE injection 10 mg, Every 4 hours PRN, Intravenous  carvediloL tablet 3.125 mg, 2 times daily, Oral    5/21/2025  -Improving  -Continue IV diuresis  -Continue Entresto, Coreg  -Strict I's/O's  -TTE with EF of 25%--->MEGAN/DCCV planned tmw    5/22/2025  -BP soft, meds will be adjusted  -Diuretics switched to po    5/23/2025  -Volume status stable  -Hypotensive yesterday, medications have been adjusted, will need to monitor overnight and adjust in AM as he has had issues with very high BP in the past  -Will need po diuretics upon d/c  -Continue Coreg  -Check LA    Hypokalemia  -Repletion per primary team    Acute renal failure superimposed on stage 3b chronic kidney disease  -Cardio-renal improving with IV diuresis    5/21/2025  -Stable, continue same    5/23/2025  -Creatinine bumped to 3.1, likely in part due to hypotension  -Diuretics/Entresto held since yesterday AM  -Check LA    Pericardial effusion  -Stable by TTE, small    Uncontrolled atrial flutter  -Presents with aflutter 2:1 conduction, recurrent  -Previously on amiodarone, ? Not taking since early February  -Start amiodarone drip  -Continue Coreg  -Continue Eliquis  -May need repeat MEGAN/DCCV---scheduled for ablation by Dr. Phelan, EP later this month    5/21/2025  -HR in low 100's continue Coreg  -Called patient's pharmacy yesterday, they have no record of him filling Eliquis therefore amio d/'c  -Continue Eliquis while IP, ensure med availability/compliance upon d/c  -MEGAN/DCCV tmw, keep NPO after MN    5/22/2025  -s/p MEGAN/DCCV with restoration of SR  -Continue Coreg  -Add amiodarone 400 mg BID x 1 week, then 200 mg BID x 1 week, then 200 mg daily  -Eliquis 5 mg BID---compliance emphasized  -Will postpone ablation for now, discussed with EP Dr. Phelan    5/23/2025  -Remains in SR  -Amio as above  -Coreg  -Eliquis 5 mg BID---needs bedside med delivery  to ensure compliance  -OP EP f/u    Elevated troponin  -Trop flat  -Secondary to demand ischemia from aflutter, decompensated CHF  -Continue OMT  -Prior MPI stress test 5/2024 negative for ischemia    Essential hypertension  -Continue home meds as tolerated        VTE Risk Mitigation (From admission, onward)           Ordered     apixaban tablet 5 mg  2 times daily         05/20/25 0313     Reason for No Pharmacological VTE Prophylaxis  Once        Question:  Reasons:  Answer:  Already adequately anticoagulated on oral Anticoagulants    05/20/25 0319     IP VTE HIGH RISK PATIENT  Once         05/20/25 0319     Place sequential compression device  Until discontinued         05/20/25 0319                    Judith Kim PA-C  Cardiology  [facility]

## 2025-05-23 NOTE — PROGRESS NOTES
[facility]  [facility] Medicine  Progress Note    Patient Name: Bria Saldana  MRN: [id number]  Patient Class: IP- Inpatient   Admission Date: 5/19/2025  Length of Stay: 3 days  Attending Physician: Ascencion Alejandro DO  Primary Care Provider: Brenna Maravilla MD    Subjective     Principal Problem:Acute on chronic combined systolic (congestive) and diastolic (congestive) heart failure    HPI:    Patient is a 54-year-old male with past medical history significant for hypertension, hyperlipidemia, CKD stage 3, combined systolic and diastolic congestive heart failure (EF 30-40%, grade II diastolic dysfunction per Echo done 2/18/2025), atrial flutter s/p DCCV on Eliquis with planned ablation procedure per Dr. Phelan on 5/29/25, pulmonary hypertension,  type 2 diabetes mellitus, and NATALYA who presented to ED with complaint of abdominal pain/distention with vomiting/ spitting up yellow colored fluid as well as chest pain/chest tightness and shortness of breath.  On arrival to ED, temp 98°, heart rate 115, blood pressure 156/120, 97% SpO2 on room air.  While in ED blood pressure as high as  186/129 and heart rate as high as 125.  Lab workup significant for BUN 34, creatinine 2.8, glucose 69, alk-phos 151, albumin 3.4, total bilirubin 4.0, BNP 18 15, troponin 0.141.  Chest x-ray showed cardiomegaly, lungs appear clear.  EKG showed atrial flutter with 2-1 AV conduction, .  while in ED he was given Bumex 1 mg IV, diltiazem 20 mg IV, and hydralazine 10 mg IV.  Hospital Medicine was consulted for admission due to CHF exacerbation as well as a flutter with RVR.    Overview/Hospital Course:  Patient admitted to inpatient for acute on chronic combined systolic and diastolic heart failure, complicated by abd pain, nausea/vomiting, and acute renal failure on chronic kidney disease. Diuresis initiated. BNP and Troponin elevated on admission and trend. Sustained A Flutter with HR > 100. Cardiology recs  appreciated--compliant with Eliquis BID but not taking Amiodarone since early 2/2025. Limited TTE shows EF 25% (prev 30-40% 2/2025), LV severely reduced systolic function. Considering repeat MEGAN/DCCV if he fails to convert.   As of 5/21/2025, afebrile, -113 overnight, adequate oxygenation on RA. Hemodynamically stable with stable Cr at 2.5. Cardiology plans MEGAN/DCCV tomorrow.   As of 5/22/2025, afebrile, -116 overnight, adequate oxygenation on RA. Dr. Mcarthur completed MEGAN- no clot in ALEXI, EF =25-30%, mod-severe MR, mod TR, DCCV 200 J to NSR. HR 80s. Mg repletion. Cards: cont IV diuresis, BB/Entresto, Hydralazine as tolerated.   As of 5/23/2025, afebrile, NSR, HR 70-80s, adequate oxygenation on RA. Cr worsened to 3.1, likely due to hypotension/hemodynamic shifts. Cards rec to resume meds and plan to DC home on Eliquis--attempt to deliver today 2/2 hx med non-compliance.    Interval History: Pt sitting to side of bed in no acute distress. Denies issues overnight. Denies CP, palpitations, or dizziness. Discussed lab results with increase in Cr to 3.1--unable to gently hydrate with extensive cardiac hx. Cardiology would like Eliquis to be delivered to bedside today if possible due to hx med non-compliance--will continue Eliquis at DC. Dr. Alejandro coordinating with outpatient pharmacy re: options. Plan to monitor overnight and if Cr improved and no CV s/s or issues then consider DC in AM. Patient reports understanding and denies any questions/concerns at this time.    Review of Systems   Constitutional:  Positive for fatigue (improving). Negative for appetite change, chills and fever.   Respiratory:  Positive for shortness of breath (resolving). Negative for cough, chest tightness and wheezing.    Cardiovascular:  Negative for chest pain, palpitations (A Flutter sustained overnight per tele monitoring, HR > 100--denies feeling rhythm changes) and leg swelling (Denies, but reports was told BLE on admission).    Gastrointestinal:  Negative for abdominal pain, blood in stool, constipation, diarrhea, nausea and vomiting.   Genitourinary:  Negative for difficulty urinating and hematuria.   Musculoskeletal:  Negative for back pain, joint swelling and myalgias.   Neurological:  Positive for weakness (generalized, improving). Negative for dizziness, tremors, syncope, light-headedness and headaches.   Psychiatric/Behavioral:  Negative for confusion and sleep disturbance.    All other systems reviewed and are negative.    Objective:     Vital Signs (Most Recent):  Temp: 97.5 °F (36.4 °C) (25 0744)  Pulse: 75 (25 0744)  Resp: 16 (25 0744)  BP: 113/85 (25 0744)  SpO2: 99 % (25 0800) Vital Signs (24h Range):  Temp:  [97.4 °F (36.3 °C)-98.7 °F (37.1 °C)] 97.5 °F (36.4 °C)  Pulse:  [75-80] 75  Resp:  [16-18] 16  SpO2:  [97 %-100 %] 99 %  BP: ()/(54-85) 113/85     Weight: 108.3 kg (238 lb 12.1 oz)  Body mass index is 29.84 kg/m².  No intake or output data in the 24 hours ending 25 1005        Physical Exam  Constitutional:       General: He is not in acute distress.     Appearance: Normal appearance. He is obese. He is not ill-appearing or diaphoretic.   Cardiovascular:      Rate and Rhythm: Normal rate and regular rhythm.      Pulses: Normal pulses.      Comments: Mild non-pitting BLE edema--pt denies feeling edema is present  Pulmonary:      Effort: Pulmonary effort is normal. No respiratory distress.      Breath sounds: Normal breath sounds. No wheezing.   Abdominal:      General: Bowel sounds are normal. There is no distension.      Palpations: Abdomen is soft.      Tenderness: There is no abdominal tenderness. There is no guarding.   Musculoskeletal:      Right lower le+ Edema present.      Left lower le+ Edema present.   Neurological:      General: No focal deficit present.      Mental Status: He is alert and oriented to person, place, and time.               Significant Labs: All  pertinent labs within the past 24 hours have been reviewed.  Recent Lab Results         05/23/25  0246        Albumin 3.0              ALT 30       Anion Gap 14       AST 36       Baso # 0.07       Basophil % 1.1       BILIRUBIN TOTAL 2.8  Comment: For infants and newborns, interpretation of results should be based   on gestational age, weight and in agreement with clinical   observations.    Premature Infant recommended reference ranges:   0-24 hours:  <8.0 mg/dL   24-48 hours: <12.0 mg/dL   3-5 days:    <15.0 mg/dL   6-29 days:   <15.0 mg/dL       BUN 38       Calcium 8.6       Chloride 104       CO2 18       Creatinine 3.1       eGFR 23  Comment: Estimated GFR calculated using the CKD-EPI creatinine (2021) equation.       Eos # 0.26       Eos % 4.0       Glucose 85       Gran # (ANC) 3.32       Hematocrit 51.5       Hemoglobin 15.6       Immature Grans (Abs) 0.02  Comment: Mild elevation in immature granulocytes is non specific and can be seen in a variety of conditions including stress response, acute inflammation, trauma and pregnancy. Correlation with other laboratory and clinical findings is essential.       Immature Granulocytes 0.3       Lymph # 2.29       Lymph % 35.2       Magnesium  2.2       MCH 28.1       MCHC 30.3       MCV 93       Mono # 0.55       Mono % 8.4       MPV 10.4       Neut % 51.0       nRBC 0       Phosphorus Level 4.4       Platelet Count 186       Potassium 4.2       PROTEIN TOTAL 6.3       RBC 5.56       RDW 14.6       Sodium 136       WBC 6.51               Significant Imaging: I have reviewed all pertinent imaging results/findings within the past 24 hours.      Assessment & Plan  Acute on chronic combined systolic (congestive) and diastolic (congestive) heart failure  Uncontrolled atrial flutter  Dyspnea  Pericardial effusion  Patient has Combined Systolic and Diastolic heart failure that is Acute on chronic. On presentation their CHF was decompensated. Evidence of  decompensated CHF on presentation includes: edema, elevated JVD, orthopnea, paroxysmal nocturnal dyspnea (PND), and dyspnea on exertion (BROUSSARD). The etiology of their decompensation is likely severe disease, he verbalizes compliance with medication regimen. Most recent BNP and echo results are listed below.  Recent Labs     05/21/25  1525   *     Latest ECHO  Results for orders placed during the hospital encounter of 02/17/25    Echo    Interpretation Summary    Left Ventricle: The left ventricle is mildly dilated. Normal wall thickness. There is concentric hypertrophy. There is moderately reduced systolic function with a visually estimated ejection fraction of 30 - 40%. Ejection fraction is approximately 35%. Quantitated ejection fraction is 43%. Grade II diastolic dysfunction.    Right Ventricle: Normal right ventricular cavity size. Wall thickness is normal. Systolic function is borderline low.    Left Atrium: Left atrium is severely dilated.    Mitral Valve: There is mild to moderate regurgitation.    Tricuspid Valve: There is mild regurgitation. There is mild pulmonary hypertension.    Pulmonary Artery: The estimated pulmonary artery systolic pressure is 41 mmHg.    IVC/SVC: Normal venous pressure at 3 mmHg.    Pericardium: There is a small circumferential effusion. No indication of cardiac tamponade.    Current Heart Failure Medications  hydrALAZINE injection 10 mg, Every 4 hours PRN, Intravenous  carvediloL tablet 3.125 mg, 2 times daily, Oral    Plan  - Monitor strict I&Os and daily weights.    - Place on telemetry  - Low sodium diet  - Place on fluid restriction of 1.5 L.   - Cardiology has been consulted: Continue IV diuresis, BB/Entresto, hydralazine as tolerated  - The patient's volume status is stable but not at their baseline as indicated by edema, orthopnea, and dyspnea on exertion (BROUSSARD)  - CHF pathway in use    Atrial flutter with tachycardia, HR up to 120's on admit  Has ablation procedure  scheduled for 5/29 per Dr. Phelan  IV Cardizem given per ED, monitor response, may need infusion  Cont home Eliquis  HR as high as 116 overnight  5/22 MEGAN/DCCV: MEGAN- no clot in ALEXI, EF =25-30%, mod-severe MR, mod TR, DCCV 200 J to NSR  Cards: OK to resume meds; plan to DC on Eliquis  Attempt to get Eliquis delivered 2/2 hx med non-compliance    Essential hypertension  Patient's blood pressure range in the last 24 hours was: BP  Min: 86/54  Max: 118/82.The patient's inpatient anti-hypertensive regimen is listed below:  Current Antihypertensives  hydrALAZINE injection 10 mg, Every 4 hours PRN, Intravenous  carvediloL tablet 3.125 mg, 2 times daily, Oral    Plan  - BP is controlled, no changes needed to their regimen  - IV Hydralazine prn    Elevated troponin  Elevated above baseline chronic elevation  Trend troponin, likely due to fluid overload and atrial flutter with RVR  Continuous cardiac monitoring   Continue Eliquis and beta blocker  Cards: OK to resume meds; plan to DC on Eliquis  Attempt to get Eliquis delivered 2/2 hx med non-compliance    Mixed hyperlipidemia  Continue statin    Acute renal failure superimposed on stage 3b chronic kidney disease   Baseline creatinine is 2.0-2.3. Most recent creatinine and eGFR are listed below.  Recent Labs     05/21/25 0517 05/22/25 0456 05/23/25  0246   CREATININE 2.5* 2.5* 3.1*   EGFRNORACEVR 30* 30* 23*      Plan  - Avoid nephrotoxins and renally dose meds for GFR listed above  - Monitor urine output, serial BMP, and adjust therapy as needed  - Cr worsening  likely due to hypotension/hemodynamic shifts.  - unable to gently hydrate 2/2 extensive cardiac hx  - Recheck in AM    Hypokalemia  Patient's most recent potassium results are listed below.   Recent Labs     05/21/25 0517 05/22/25 0456 05/23/25  0246   K 3.5 3.5 4.2     Plan  - Replete potassium per protocol  - Monitor potassium Daily  - Patient's hypokalemia is improving  - Trend    Hypomagnesemia  Patient has  Abnormal Magnesium: hypomagnesemia. Will continue to monitor electrolytes closely. Will replace the affected electrolytes and repeat labs to be done after interventions completed. The patient's magnesium results have been reviewed and are listed below.  Recent Labs   Lab 05/23/25  0246   MG 2.2      Mg 1.5>2.2  Trend    VTE Risk Mitigation (From admission, onward)           Ordered     apixaban tablet 5 mg  2 times daily         05/20/25 0313     Reason for No Pharmacological VTE Prophylaxis  Once        Question:  Reasons:  Answer:  Already adequately anticoagulated on oral Anticoagulants    05/20/25 0319     IP VTE HIGH RISK PATIENT  Once         05/20/25 0319     Place sequential compression device  Until discontinued         05/20/25 0319                    Discharge Planning   CARMELO:      Code Status: Full Code   Medical Readiness for Discharge Date:   Discharge Plan A: Home with family          VERO SanchezC  Department of Hospital Medicine   [facility]

## 2025-05-23 NOTE — ASSESSMENT & PLAN NOTE
-Presents with aflutter 2:1 conduction, recurrent  -Previously on amiodarone, ? Not taking since early February  -Start amiodarone drip  -Continue Coreg  -Continue Eliquis  -May need repeat MEGAN/DCCV---scheduled for ablation by Dr. Phelan, EP later this month    5/21/2025  -HR in low 100's continue Coreg  -Called patient's pharmacy yesterday, they have no record of him filling Eliquis therefore amio d/'c  -Continue Eliquis while IP, ensure med availability/compliance upon d/c  -MEGAN/DCCV tmw, keep NPO after MN    5/22/2025  -s/p MEGAN/DCCV with restoration of SR  -Continue Coreg  -Add amiodarone 400 mg BID x 1 week, then 200 mg BID x 1 week, then 200 mg daily  -Eliquis 5 mg BID---compliance emphasized  -Will postpone ablation for now, discussed with EP Dr. Phelan    5/23/2025  -Remains in SR  -Amio as above  -Coreg  -Eliquis 5 mg BID---needs bedside med delivery to ensure compliance  -OP EP f/u

## 2025-05-23 NOTE — ASSESSMENT & PLAN NOTE
-Presents with decompensated CHF in setting of recurrent aflutter  -Continue IV diuresis  -Continue BB/Entresto, hydralazine as tolerated  -Strict I's/O's  -  Recent Labs     05/21/25  1525   *       Latest ECHO  Results for orders placed during the hospital encounter of 02/17/25    Echo    Interpretation Summary    Left Ventricle: The left ventricle is mildly dilated. Normal wall thickness. There is concentric hypertrophy. There is moderately reduced systolic function with a visually estimated ejection fraction of 30 - 40%. Ejection fraction is approximately 35%. Quantitated ejection fraction is 43%. Grade II diastolic dysfunction.    Right Ventricle: Normal right ventricular cavity size. Wall thickness is normal. Systolic function is borderline low.    Left Atrium: Left atrium is severely dilated.    Mitral Valve: There is mild to moderate regurgitation.    Tricuspid Valve: There is mild regurgitation. There is mild pulmonary hypertension.    Pulmonary Artery: The estimated pulmonary artery systolic pressure is 41 mmHg.    IVC/SVC: Normal venous pressure at 3 mmHg.    Pericardium: There is a small circumferential effusion. No indication of cardiac tamponade.    Current Heart Failure Medications  hydrALAZINE injection 10 mg, Every 4 hours PRN, Intravenous  carvediloL tablet 3.125 mg, 2 times daily, Oral    5/21/2025  -Improving  -Continue IV diuresis  -Continue Entresto, Coreg  -Strict I's/O's  -TTE with EF of 25%--->MEGAN/DCCV planned tmw    5/22/2025  -BP soft, meds will be adjusted  -Diuretics switched to po    5/23/2025  -Volume status stable  -Hypotensive yesterday, medications have been adjusted, will need to monitor overnight and adjust in AM as he has had issues with very high BP in the past  -Will need po diuretics upon d/c  -Continue Coreg  -Check LA

## 2025-05-23 NOTE — ASSESSMENT & PLAN NOTE
Patient has Abnormal Magnesium: hypomagnesemia. Will continue to monitor electrolytes closely. Will replace the affected electrolytes and repeat labs to be done after interventions completed. The patient's magnesium results have been reviewed and are listed below.  Recent Labs   Lab 05/23/25  0246   MG 2.2      Mg 1.5>2.2  Trend

## 2025-05-23 NOTE — ASSESSMENT & PLAN NOTE
-Cardio-renal improving with IV diuresis    5/21/2025  -Stable, continue same    5/23/2025  -Creatinine bumped to 3.1, likely in part due to hypotension  -Diuretics/Entresto held since yesterday AM  -Check LA

## 2025-05-23 NOTE — ASSESSMENT & PLAN NOTE
Baseline creatinine is 2.0-2.3. Most recent creatinine and eGFR are listed below.  Recent Labs     05/21/25  0517 05/22/25  0456 05/23/25  0246   CREATININE 2.5* 2.5* 3.1*   EGFRNORACEVR 30* 30* 23*      Plan  - Avoid nephrotoxins and renally dose meds for GFR listed above  - Monitor urine output, serial BMP, and adjust therapy as needed  - Cr worsening  likely due to hypotension/hemodynamic shifts.  - unable to gently hydrate 2/2 extensive cardiac hx  - Recheck in AM

## 2025-05-23 NOTE — SUBJECTIVE & OBJECTIVE
Review of Systems   Constitutional: Positive for malaise/fatigue.   HENT: Negative.     Eyes: Negative.    Cardiovascular: Negative.    Respiratory: Negative.     Endocrine: Negative.    Hematologic/Lymphatic: Negative.    Musculoskeletal: Negative.    Gastrointestinal: Negative.    Genitourinary: Negative.    Neurological: Negative.    Psychiatric/Behavioral: Negative.     Allergic/Immunologic: Negative.      Objective:     Vital Signs (Most Recent):  Temp: 97.5 °F (36.4 °C) (05/23/25 0744)  Pulse: 75 (05/23/25 0744)  Resp: 16 (05/23/25 0744)  BP: 113/85 (05/23/25 0744)  SpO2: 99 % (05/23/25 0800) Vital Signs (24h Range):  Temp:  [97.4 °F (36.3 °C)-98.7 °F (37.1 °C)] 97.5 °F (36.4 °C)  Pulse:  [75-80] 75  Resp:  [16-18] 16  SpO2:  [97 %-100 %] 99 %  BP: ()/(54-85) 113/85     Weight: 108.3 kg (238 lb 12.1 oz)  Body mass index is 29.84 kg/m².     SpO2: 99 %       No intake or output data in the 24 hours ending 05/23/25 1033    Lines/Drains/Airways       Peripheral Intravenous Line  Duration                  Peripheral IV - Single Lumen 05/20/25 0010 20 G Anterior;Right Forearm 3 days                       Physical Exam  Vitals and nursing note reviewed.   Constitutional:       Appearance: Normal appearance.   HENT:      Head: Normocephalic and atraumatic.   Eyes:      Pupils: Pupils are equal, round, and reactive to light.   Cardiovascular:      Rate and Rhythm: Normal rate and regular rhythm.      Heart sounds: S1 normal and S2 normal. No murmur heard.  Pulmonary:      Effort: Pulmonary effort is normal.      Breath sounds: No rales.   Abdominal:      Palpations: Abdomen is soft.   Musculoskeletal:      Right lower leg: No edema.      Left lower leg: No edema.   Skin:     General: Skin is warm and dry.   Neurological:      General: No focal deficit present.      Mental Status: He is oriented to person, place, and time.   Psychiatric:         Mood and Affect: Mood normal.         Behavior: Behavior normal.   "       Thought Content: Thought content normal.            Significant Labs: CMP   Recent Labs   Lab 05/22/25  0456 05/23/25  0246    136   K 3.5 4.2    104   CO2 23 18*   GLU 87 85   BUN 32* 38*   CREATININE 2.5* 3.1*   CALCIUM 7.8* 8.6*   PROT 5.9* 6.3   ALBUMIN 2.7* 3.0*   BILITOT 2.4* 2.8*   ALKPHOS 119 138   AST 17 36   ALT 21 30   ANIONGAP 11 14   , CBC   Recent Labs   Lab 05/22/25  0456 05/23/25  0246   WBC 5.98 6.51   HGB 15.9 15.6   HCT 51.8 51.5    186   , Troponin No results for input(s): "TROPONINIHS" in the last 48 hours., and All pertinent lab results from the last 24 hours have been reviewed.    Significant Imaging: Echocardiogram: Transthoracic echo (TTE) complete (Cupid Only):   Results for orders placed or performed during the hospital encounter of 05/19/25   Echo   Result Value Ref Range    BSA 2.43 m2    A4C EF 11 %    LVIDd 5.7 3.5 - 6.0 cm    LV Systolic Volume 136 mL    LV Systolic Volume Index 56.7 mL/m2    LVIDs 5.3 (A) 2.1 - 4.0 cm    LV Diastolic Volume 161 mL    LV Diastolic Volume Index 67.08 mL/m2    LV EDV A4C 174.34 mL    Left Ventricular End Systolic Volume by Teichholz Method 135.81 mL    Left Ventricular End Diastolic Volume by Teichholz Method 161.09 mL    IVS 1.7 (A) 0.6 - 1.1 cm    FS 7.0 (A) 28 - 44 %    Left Ventricle Relative Wall Thickness 0.49 cm    PW 1.4 (A) 0.6 - 1.1 cm    LV mass 413.5 g    LV Mass Index 172.3 g/m2    IVC diameter 3.06 cm    ZLVIDS -1.67     ZLVIDD -6.52     EF 25 %    Est. RA pres 3 mmHg    Narrative      Left Ventricle: The left ventricle is normal in size. Severely   increased wall thickness. There is concentric hypertrophy. Moderate global   hypokinesis present. There is severely reduced systolic function. Ejection   fraction is approximately 25%.    Right Ventricle: The right ventricle is normal in size Wall thickness   is normal. Systolic function is normal.    Right Atrium: The right atrium is mildly dilated .    IVC/SVC: " Normal venous pressure at 3 mmHg.    Pericardium: There is a small effusion.      and EKG: Reviewed

## 2025-05-23 NOTE — ASSESSMENT & PLAN NOTE
Patient has Combined Systolic and Diastolic heart failure that is Acute on chronic. On presentation their CHF was decompensated. Evidence of decompensated CHF on presentation includes: edema, elevated JVD, orthopnea, paroxysmal nocturnal dyspnea (PND), and dyspnea on exertion (BROUSSARD). The etiology of their decompensation is likely severe disease, he verbalizes compliance with medication regimen. Most recent BNP and echo results are listed below.  Recent Labs     05/21/25  1525   *     Latest ECHO  Results for orders placed during the hospital encounter of 02/17/25    Echo    Interpretation Summary    Left Ventricle: The left ventricle is mildly dilated. Normal wall thickness. There is concentric hypertrophy. There is moderately reduced systolic function with a visually estimated ejection fraction of 30 - 40%. Ejection fraction is approximately 35%. Quantitated ejection fraction is 43%. Grade II diastolic dysfunction.    Right Ventricle: Normal right ventricular cavity size. Wall thickness is normal. Systolic function is borderline low.    Left Atrium: Left atrium is severely dilated.    Mitral Valve: There is mild to moderate regurgitation.    Tricuspid Valve: There is mild regurgitation. There is mild pulmonary hypertension.    Pulmonary Artery: The estimated pulmonary artery systolic pressure is 41 mmHg.    IVC/SVC: Normal venous pressure at 3 mmHg.    Pericardium: There is a small circumferential effusion. No indication of cardiac tamponade.    Current Heart Failure Medications  hydrALAZINE injection 10 mg, Every 4 hours PRN, Intravenous  carvediloL tablet 3.125 mg, 2 times daily, Oral    Plan  - Monitor strict I&Os and daily weights.    - Place on telemetry  - Low sodium diet  - Place on fluid restriction of 1.5 L.   - Cardiology has been consulted: Continue IV diuresis, BB/Entresto, hydralazine as tolerated  - The patient's volume status is stable but not at their baseline as indicated by edema, orthopnea,  and dyspnea on exertion (BROUSSARD)  - CHF pathway in use    Atrial flutter with tachycardia, HR up to 120's on admit  Has ablation procedure scheduled for 5/29 per Dr. Phelan  IV Cardizem given per ED, monitor response, may need infusion  Cont home Eliquis  HR as high as 116 overnight  5/22 MEGAN/DCCV: MEGAN- no clot in ALEXI, EF =25-30%, mod-severe MR, mod TR, DCCV 200 J to NSR  Cards: OK to resume meds; plan to DC on Eliquis  Attempt to get Eliquis delivered 2/2 hx med non-compliance

## 2025-05-24 LAB
ABSOLUTE EOSINOPHIL (OHS): 0.25 K/UL
ABSOLUTE MONOCYTE (OHS): 0.51 K/UL (ref 0.3–1)
ABSOLUTE NEUTROPHIL COUNT (OHS): 2.73 K/UL (ref 1.8–7.7)
ALBUMIN SERPL BCP-MCNC: 3.2 G/DL (ref 3.5–5.2)
ALP SERPL-CCNC: 123 UNIT/L (ref 40–150)
ALT SERPL W/O P-5'-P-CCNC: 28 UNIT/L (ref 10–44)
ANION GAP (OHS): 12 MMOL/L (ref 8–16)
ANION GAP (OHS): 13 MMOL/L (ref 8–16)
AST SERPL-CCNC: 25 UNIT/L (ref 11–45)
BASOPHILS # BLD AUTO: 0.06 K/UL
BASOPHILS NFR BLD AUTO: 1 %
BILIRUB SERPL-MCNC: 3 MG/DL (ref 0.1–1)
BUN SERPL-MCNC: 44 MG/DL (ref 6–20)
BUN SERPL-MCNC: 45 MG/DL (ref 6–20)
CALCIUM SERPL-MCNC: 8.9 MG/DL (ref 8.7–10.5)
CALCIUM SERPL-MCNC: 9 MG/DL (ref 8.7–10.5)
CHLORIDE SERPL-SCNC: 100 MMOL/L (ref 95–110)
CHLORIDE SERPL-SCNC: 100 MMOL/L (ref 95–110)
CO2 SERPL-SCNC: 23 MMOL/L (ref 23–29)
CO2 SERPL-SCNC: 24 MMOL/L (ref 23–29)
CREAT SERPL-MCNC: 3.1 MG/DL (ref 0.5–1.4)
CREAT SERPL-MCNC: 3.1 MG/DL (ref 0.5–1.4)
ERYTHROCYTE [DISTWIDTH] IN BLOOD BY AUTOMATED COUNT: 14.6 % (ref 11.5–14.5)
GFR SERPLBLD CREATININE-BSD FMLA CKD-EPI: 23 ML/MIN/1.73/M2
GFR SERPLBLD CREATININE-BSD FMLA CKD-EPI: 23 ML/MIN/1.73/M2
GLUCOSE SERPL-MCNC: 87 MG/DL (ref 70–110)
GLUCOSE SERPL-MCNC: 96 MG/DL (ref 70–110)
HCT VFR BLD AUTO: 50.7 % (ref 40–54)
HGB BLD-MCNC: 15.4 GM/DL (ref 14–18)
IMM GRANULOCYTES # BLD AUTO: 0.02 K/UL (ref 0–0.04)
IMM GRANULOCYTES NFR BLD AUTO: 0.3 % (ref 0–0.5)
LYMPHOCYTES # BLD AUTO: 2.2 K/UL (ref 1–4.8)
MAGNESIUM SERPL-MCNC: 2.1 MG/DL (ref 1.6–2.6)
MCH RBC QN AUTO: 27.4 PG (ref 27–31)
MCHC RBC AUTO-ENTMCNC: 30.4 G/DL (ref 32–36)
MCV RBC AUTO: 90 FL (ref 82–98)
NUCLEATED RBC (/100WBC) (OHS): 0 /100 WBC
PHOSPHATE SERPL-MCNC: 4.3 MG/DL (ref 2.7–4.5)
PLATELET # BLD AUTO: 192 K/UL (ref 150–450)
PMV BLD AUTO: 10.8 FL (ref 9.2–12.9)
POTASSIUM SERPL-SCNC: 3.5 MMOL/L (ref 3.5–5.1)
POTASSIUM SERPL-SCNC: 4.1 MMOL/L (ref 3.5–5.1)
PROT SERPL-MCNC: 6 GM/DL (ref 6–8.4)
RBC # BLD AUTO: 5.62 M/UL (ref 4.6–6.2)
RELATIVE EOSINOPHIL (OHS): 4.3 %
RELATIVE LYMPHOCYTE (OHS): 38.1 % (ref 18–48)
RELATIVE MONOCYTE (OHS): 8.8 % (ref 4–15)
RELATIVE NEUTROPHIL (OHS): 47.5 % (ref 38–73)
SODIUM SERPL-SCNC: 136 MMOL/L (ref 136–145)
SODIUM SERPL-SCNC: 136 MMOL/L (ref 136–145)
WBC # BLD AUTO: 5.77 K/UL (ref 3.9–12.7)

## 2025-05-24 PROCEDURE — 25000003 PHARM REV CODE 250: Performed by: NURSE PRACTITIONER

## 2025-05-24 PROCEDURE — 80053 COMPREHEN METABOLIC PANEL: CPT

## 2025-05-24 PROCEDURE — 25000003 PHARM REV CODE 250: Performed by: PHYSICIAN ASSISTANT

## 2025-05-24 PROCEDURE — 85025 COMPLETE CBC W/AUTO DIFF WBC: CPT

## 2025-05-24 PROCEDURE — 83735 ASSAY OF MAGNESIUM: CPT

## 2025-05-24 PROCEDURE — 36415 COLL VENOUS BLD VENIPUNCTURE: CPT

## 2025-05-24 PROCEDURE — 84100 ASSAY OF PHOSPHORUS: CPT

## 2025-05-24 PROCEDURE — 21400001 HC TELEMETRY ROOM

## 2025-05-24 RX ADMIN — APIXABAN 5 MG: 2.5 TABLET, FILM COATED ORAL at 09:05

## 2025-05-24 RX ADMIN — AMIODARONE HYDROCHLORIDE 400 MG: 100 TABLET ORAL at 09:05

## 2025-05-24 RX ADMIN — APIXABAN 5 MG: 2.5 TABLET, FILM COATED ORAL at 08:05

## 2025-05-24 RX ADMIN — AMIODARONE HYDROCHLORIDE 400 MG: 100 TABLET ORAL at 08:05

## 2025-05-24 RX ADMIN — CARVEDILOL 3.12 MG: 3.12 TABLET, FILM COATED ORAL at 09:05

## 2025-05-24 RX ADMIN — CARVEDILOL 3.12 MG: 3.12 TABLET, FILM COATED ORAL at 08:05

## 2025-05-24 NOTE — ASSESSMENT & PLAN NOTE
Elevated above baseline chronic elevation  Trend troponin, likely due to fluid overload and atrial flutter with RVR  Continuous cardiac monitoring   Continue Eliquis and beta blocker  Cards: plan to DC on Eliquis

## 2025-05-24 NOTE — SUBJECTIVE & OBJECTIVE
Interval History: Pt lying in bed in no acute distress. Denies issues overnight. Discussed lab result of Cr unchanged at 3.1 with planned 1500 draw on AM rounds--held diuretic/Entresto. Following 1500 results, relayed to patient Cr unchanged at 3.1. Consider Nephrology consult in AM if Cr worsens. Plan to continue diuretic/Entresto for CHF once Cr stable. Patient reports understanding and denies any questions/concerns at this time.    Review of Systems   Constitutional:  Negative for appetite change, chills, fatigue and fever.   Respiratory:  Positive for shortness of breath (BROUSSARD minimal). Negative for cough, chest tightness and wheezing.    Cardiovascular:  Negative for chest pain, palpitations (A Flutter sustained overnight per tele monitoring, HR > 100--denies feeling rhythm changes) and leg swelling (Denies, but reports was told BLE on admission).   Gastrointestinal:  Negative for abdominal pain, blood in stool, constipation, diarrhea, nausea and vomiting.   Genitourinary:  Negative for difficulty urinating and hematuria.   Musculoskeletal:  Negative for back pain, joint swelling and myalgias.   Neurological:  Negative for dizziness, tremors, syncope, weakness, light-headedness and headaches.   Psychiatric/Behavioral:  Negative for confusion and sleep disturbance.    All other systems reviewed and are negative.    Objective:     Vital Signs (Most Recent):  Temp: 98.6 °F (37 °C) (05/24/25 1700)  Pulse: 74 (05/24/25 1700)  Resp: 16 (05/24/25 1700)  BP: (!) 152/80 (05/24/25 1700)  SpO2: 98 % (05/24/25 1700) Vital Signs (24h Range):  Temp:  [97.4 °F (36.3 °C)-98.6 °F (37 °C)] 98.6 °F (37 °C)  Pulse:  [69-74] 74  Resp:  [1-18] 16  SpO2:  [96 %-100 %] 98 %  BP: (118-152)/(80-98) 152/80     Weight: 108.3 kg (238 lb 12.1 oz)  Body mass index is 29.84 kg/m².  No intake or output data in the 24 hours ending 05/24/25 1708      Physical Exam  Constitutional:       General: He is not in acute distress.     Appearance: Normal  appearance. He is obese. He is not ill-appearing or diaphoretic.   Cardiovascular:      Rate and Rhythm: Normal rate and regular rhythm.      Pulses: Normal pulses.      Comments: Mild non-pitting BLE edema--pt denies feeling edema is present  Pulmonary:      Effort: Pulmonary effort is normal. No respiratory distress.      Breath sounds: Normal breath sounds. No wheezing.   Abdominal:      General: Bowel sounds are normal. There is no distension.      Palpations: Abdomen is soft.      Tenderness: There is no abdominal tenderness. There is no guarding.   Musculoskeletal:      Right lower le+ Edema present.      Left lower le+ Edema present.   Neurological:      General: No focal deficit present.      Mental Status: He is alert and oriented to person, place, and time.               Significant Labs: All pertinent labs within the past 24 hours have been reviewed.  Recent Lab Results         25  1519   25  0553        Albumin   3.2       ALP   123       ALT   28       Anion Gap 13   12       AST   25       Baso #   0.06       Basophil %   1.0       BILIRUBIN TOTAL   3.0  Comment: For infants and newborns, interpretation of results should be based   on gestational age, weight and in agreement with clinical   observations.    Premature Infant recommended reference ranges:   0-24 hours:  <8.0 mg/dL   24-48 hours: <12.0 mg/dL   3-5 days:    <15.0 mg/dL   6-29 days:   <15.0 mg/dL       BUN 44   45       Calcium 8.9   9.0       Chloride 100   100       CO2 23   24       Creatinine 3.1   3.1       eGFR 23  Comment: Estimated GFR calculated using the CKD-EPI creatinine () equation.   23  Comment: Estimated GFR calculated using the CKD-EPI creatinine () equation.       Eos #   0.25       Eos %   4.3       Glucose 96   87       Gran # (ANC)   2.73       Hematocrit   50.7       Hemoglobin   15.4       Immature Grans (Abs)   0.02  Comment: Mild elevation in immature granulocytes is non specific and can  be seen in a variety of conditions including stress response, acute inflammation, trauma and pregnancy. Correlation with other laboratory and clinical findings is essential.       Immature Granulocytes   0.3       Lymph #   2.20       Lymph %   38.1       Magnesium    2.1       MCH   27.4       MCHC   30.4       MCV   90       Mono #   0.51       Mono %   8.8       MPV   10.8       Neut %   47.5       nRBC   0       Phosphorus Level   4.3       Platelet Count   192       Potassium 3.5   4.1       PROTEIN TOTAL   6.0       RBC   5.62       RDW   14.6       Sodium 136   136       WBC   5.77               Significant Imaging: I have reviewed all pertinent imaging results/findings within the past 24 hours.

## 2025-05-24 NOTE — PROGRESS NOTES
[facility] Med Surg 3  Cardiology  Progress Note     Patient Name: Bria Saldana  MRN: [id number]  Admission Date: 5/19/2025  Hospital Length of Stay: 3 days  Code Status: Full Code   Attending Physician: Ascencion Alejandro DO   Primary Care Physician: Brenna Maravilla MD  Expected Discharge Date:   Principal Problem:Acute on chronic combined systolic (congestive) and diastolic (congestive) heart failure     Subjective:   HPI:  Mr. Saldana is a 54 year old male patient whose current medical conditions include CKD stage III, HTN, hyperlipidemia, combined CHF with EF of 30-40%, PAFL s/p prior MEGAN/DCCV (on Eliquis) with planned ablation, DM type II, NATALYA, and pulmonary HTN who presented to [facility]- this AM due to abdominal distention and nausea/vomiting over the past week. Other associated symptoms included chest tightness, SOB, and palpitations. He denied any associated fever, chills, palpitations, near syncope, or syncope. Initial workup in ED revealed uncontrolled HTN (156/120), creatinine of 2.8. Tbili of 4.0, BNP of 1815, and troponin of 0.141. CXR clear. EKG showed aflutter with 2:1 AV conduction and patient was subsequently placed on a cardizem drip and admitted for further evaluation and treatment. Cardiology consulted to assist with management. Patient seen and examined today, resting in bed. Feels ok. Less SOB/abd bloating improving with diuresis. No CP during exam. He reports compliance with his medications, states he is taking Eliquis 5 mg BID. Previously on amiodarone per review of notes but not taking since early February. Limited TTE pending. Will consider repeat MEGAN/DCCV if he fails to convert. Followed by EP and scheduled for ablation later this month.      Hospital Course:   5/21/2025-Patient seen and examined today, sitting up in bed. Feeling better. Abd bloating/nausea/SOB much improved. CP free. Still in aflutter, HR low 100's. TTE with EF of 25%. MEGAN/DCCV planned tmw.      5/22/2025-Patient seen and examined today, drowsy post MEGAN/DCCV. Converted to SR. Labs reviewed/stable. Reviewed with EP, will initiate po amiodarone and postpone ablation for now.     5/23/2025-Patient seen and examined today, remains in SR. Feels well. No SOB. No CP. Labs reviewed. Creatinine bumped to 3.1, diuretics/Entresto held. LA pending.      5/24/25- Monitor shows NSR in the 70's. Breathing has improved. Patient is ambulating in the room. Creatinine bumped to 3.1, diuretics/Entresto held.Continue diuretics and CHF once stable Cr      Review of Systems   Constitutional: Positive for malaise/fatigue.   HENT: Negative.     Eyes: Negative.    Cardiovascular: Negative.    Respiratory: Negative.     Endocrine: Negative.    Hematologic/Lymphatic: Negative.    Musculoskeletal: Negative.    Gastrointestinal: Negative.    Genitourinary: Negative.    Neurological: Negative.    Psychiatric/Behavioral: Negative.     Allergic/Immunologic: Negative.       Objective:      Vital Signs (Most Recent):  Temp: 97.5 °F (36.4 °C) (05/23/25 0744)  Pulse: 75 (05/23/25 0744)  Resp: 16 (05/23/25 0744)  BP: 113/85 (05/23/25 0744)  SpO2: 99 % (05/23/25 0800) Vital Signs (24h Range):  Temp:  [97.4 °F (36.3 °C)-98.7 °F (37.1 °C)] 97.5 °F (36.4 °C)  Pulse:  [75-80] 75  Resp:  [16-18] 16  SpO2:  [97 %-100 %] 99 %  BP: ()/(54-85) 113/85      Weight: 108.3 kg (238 lb 12.1 oz)  Body mass index is 29.84 kg/m².     SpO2: 99 %        No intake or output data in the 24 hours ending 05/23/25 1033     Lines/Drains/Airways         Peripheral Intravenous Line  Duration                     Peripheral IV - Single Lumen 05/20/25 0010 20 G Anterior;Right Forearm 3 days                             Physical Exam  Vitals and nursing note reviewed.   Constitutional:       Appearance: Normal appearance.   HENT:      Head: Normocephalic and atraumatic.   Eyes:      Pupils: Pupils are equal, round, and reactive to light.   Cardiovascular:      Rate and  "Rhythm: Normal rate and regular rhythm.      Heart sounds: S1 normal and S2 normal. No murmur heard.  Pulmonary:      Effort: Pulmonary effort is normal.      Breath sounds: No rales.   Abdominal:      Palpations: Abdomen is soft.   Musculoskeletal:      Right lower leg: No edema.      Left lower leg: No edema.   Skin:     General: Skin is warm and dry.   Neurological:      General: No focal deficit present.      Mental Status: He is oriented to person, place, and time.   Psychiatric:         Mood and Affect: Mood normal.         Behavior: Behavior normal.         Thought Content: Thought content normal.               Significant Labs: CMP        Recent Labs   Lab 05/22/25  0456 05/23/25  0246    136   K 3.5 4.2    104   CO2 23 18*   GLU 87 85   BUN 32* 38*   CREATININE 2.5* 3.1*   CALCIUM 7.8* 8.6*   PROT 5.9* 6.3   ALBUMIN 2.7* 3.0*   BILITOT 2.4* 2.8*   ALKPHOS 119 138   AST 17 36   ALT 21 30   ANIONGAP 11 14   , CBC        Recent Labs   Lab 05/22/25  0456 05/23/25  0246   WBC 5.98 6.51   HGB 15.9 15.6   HCT 51.8 51.5    186   , Troponin No results for input(s): "TROPONINIHS" in the last 48 hours., and All pertinent lab results from the last 24 hours have been reviewed.     Significant Imaging: Echocardiogram: Transthoracic echo (TTE) complete (Cupid Only):         Results for orders placed or performed during the hospital encounter of 05/19/25   Echo   Result Value Ref Range     BSA 2.43 m2     A4C EF 11 %     LVIDd 5.7 3.5 - 6.0 cm     LV Systolic Volume 136 mL     LV Systolic Volume Index 56.7 mL/m2     LVIDs 5.3 (A) 2.1 - 4.0 cm     LV Diastolic Volume 161 mL     LV Diastolic Volume Index 67.08 mL/m2     LV EDV A4C 174.34 mL     Left Ventricular End Systolic Volume by Teichholz Method 135.81 mL     Left Ventricular End Diastolic Volume by Teichholz Method 161.09 mL     IVS 1.7 (A) 0.6 - 1.1 cm     FS 7.0 (A) 28 - 44 %     Left Ventricle Relative Wall Thickness 0.49 cm     PW 1.4 (A) 0.6 - " 1.1 cm     LV mass 413.5 g     LV Mass Index 172.3 g/m2     IVC diameter 3.06 cm     ZLVIDS -1.67       ZLVIDD -6.52       EF 25 %     Est. RA pres 3 mmHg     Narrative       Left Ventricle: The left ventricle is normal in size. Severely   increased wall thickness. There is concentric hypertrophy. Moderate global   hypokinesis present. There is severely reduced systolic function. Ejection   fraction is approximately 25%.    Right Ventricle: The right ventricle is normal in size Wall thickness   is normal. Systolic function is normal.    Right Atrium: The right atrium is mildly dilated .    IVC/SVC: Normal venous pressure at 3 mmHg.    Pericardium: There is a small effusion.       and EKG: Reviewed  Assessment and Plan:   Patient who presents with decompensated CHF in setting of recurrent atrial flutter. Remains in SR post cardioversion. Medications adjusted given soft BP. Renal function worse, check LA. Diuretics/Entresto held. Will need additional med adjustment prior to d/c.     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 Normal wall thickness. There is concentric hypertrophy. There is moderately reduced systolic function with a visually estimated ejection fraction of 30 - 40%. Ejection fraction is approximately 35%. Quantitated ejection fraction is 43%. Grade II diastolic dysfunction.    Right Ventricle: Normal right ventricular cavity size. Wall thickness is normal. Systolic function is borderline low.    Left Atrium: Left atrium is severely dilated.    Mitral Valve: There is mild to  moderate regurgitation.    Tricuspid Valve: There is mild regurgitation. There is mild pulmonary hypertension.    Pulmonary Artery: The estimated pulmonary artery systolic pressure is 41 mmHg.    IVC/SVC: Normal venous pressure at 3 mmHg.    Pericardium: There is a small circumferential effusion. No indication of cardiac tamponade.     Current Heart Failure Medications  hydrALAZINE injection 10 mg, Every 4 hours PRN, Intravenous  carvediloL tablet 3.125 mg, 2 times daily, Oral     5/21/2025  -Improving  -Continue IV diuresis  -Continue Entresto, Coreg  -Strict I's/O's  -TTE with EF of 25%--->MEGAN/DCCV planned tmw     5/22/2025  -BP soft, meds will be adjusted  -Diuretics switched to po     5/23/2025  -Volume status stable  -Hypotensive yesterday, medications have been adjusted, will need to monitor overnight and adjust in AM as he has had issues with very high BP in the past  -Will need po diuretics upon d/c  -Continue Coreg  -Check LA     Hypokalemia  -Repletion per primary team     Acute renal failure superimposed on stage 3b chronic kidney disease  -Cardio-renal improving with IV diuresis     5/21/2025  -Stable, continue same     5/23/2025  -Creatinine bumped to 3.1, likely in part due to hypotension  -Diuretics/Entresto held since yesterday AM  -Check LA     Pericardial effusion  -Stable by TTE, small     Uncontrolled atrial flutter  -Presents with aflutter 2:1 conduction, recurrent  -Previously on amiodarone, ? Not taking since early February  -Start amiodarone drip  -Continue Coreg  -Continue Eliquis  -May need repeat MEGAN/DCCV---scheduled for ablation by Dr. Phelan, EP later this month     5/21/2025  -HR in low 100's continue Coreg  -Called patient's pharmacy yesterday, they have no record of him filling Eliquis therefore amio d/'c  -Continue Eliquis while IP, ensure med availability/compliance upon d/c  -MEGAN/DCCV tmw, keep NPO after MN     5/22/2025  -s/p MEGAN/DCCV with restoration of SR  -Continue Coreg  -Add  amiodarone 400 mg BID x 1 week, then 200 mg BID x 1 week, then 200 mg daily  -Eliquis 5 mg BID---compliance emphasized  -Will postpone ablation for now, discussed with EP Dr. Phelan     5/23/2025  -Remains in SR  -Amio as above  -Coreg  -Eliquis 5 mg BID---needs bedside med delivery to ensure compliance  -OP EP f/u     Elevated troponin  -Trop flat  -Secondary to demand ischemia from aflutter, decompensated CHF  -Continue OMT  -Prior MPI stress test 5/2024 negative for ischemia     Essential hypertension  -Continue home meds as tolerated           VTE Risk Mitigation (From admission, onward)              Ordered       apixaban tablet 5 mg  2 times daily         05/20/25 0313       Reason for No Pharmacological VTE Prophylaxis  Once        Question:  Reasons:  Answer:  Already adequately anticoagulated on oral Anticoagulants    05/20/25 0319       IP VTE HIGH RISK PATIENT  Once         05/20/25 0319       Place sequential compression device  Until discontinued         05/20/25 0319

## 2025-05-24 NOTE — ASSESSMENT & PLAN NOTE
"Patient has Combined Systolic and Diastolic heart failure that is Acute on chronic. On presentation their CHF was decompensated. Evidence of decompensated CHF on presentation includes: edema, elevated JVD, orthopnea, paroxysmal nocturnal dyspnea (PND), and dyspnea on exertion (BROUSSARD). The etiology of their decompensation is likely severe disease, he verbalizes compliance with medication regimen. Most recent BNP and echo results are listed below.  No results for input(s): "BNP" in the last 72 hours.    Latest ECHO  Results for orders placed during the hospital encounter of 02/17/25    Echo    Interpretation Summary    Left Ventricle: The left ventricle is mildly dilated. Normal wall thickness. There is concentric hypertrophy. There is moderately reduced systolic function with a visually estimated ejection fraction of 30 - 40%. Ejection fraction is approximately 35%. Quantitated ejection fraction is 43%. Grade II diastolic dysfunction.    Right Ventricle: Normal right ventricular cavity size. Wall thickness is normal. Systolic function is borderline low.    Left Atrium: Left atrium is severely dilated.    Mitral Valve: There is mild to moderate regurgitation.    Tricuspid Valve: There is mild regurgitation. There is mild pulmonary hypertension.    Pulmonary Artery: The estimated pulmonary artery systolic pressure is 41 mmHg.    IVC/SVC: Normal venous pressure at 3 mmHg.    Pericardium: There is a small circumferential effusion. No indication of cardiac tamponade.    Current Heart Failure Medications  hydrALAZINE injection 10 mg, Every 4 hours PRN, Intravenous  carvediloL tablet 3.125 mg, 2 times daily, Oral    Plan  - Monitor strict I&Os and daily weights.    - Place on telemetry  - Low sodium diet  - Place on fluid restriction of 1.5 L.   - Cardiology has been consulted: Continue IV diuresis, BB/Entresto, hydralazine as tolerated  - The patient's volume status is stable but not at their baseline as indicated by edema, " orthopnea, and dyspnea on exertion (BROUSSARD)  - CHF pathway in use    Atrial flutter with tachycardia, HR up to 120's on admit  Has ablation procedure scheduled for 5/29 per Dr. Phelan  IV Cardizem given per ED, monitor response, may need infusion  Cont home Eliquis  5/22 MEGAN/DCCV: MEGAN- no clot in ALEXI, EF =25-30%, mod-severe MR, mod TR, DCCV 200 J to NSR  Cards: plan to DC on Eliquis  Hold diuretic/Entresto 2/2 Cr 3.1

## 2025-05-24 NOTE — ASSESSMENT & PLAN NOTE
Baseline creatinine is 2.0-2.3. Most recent creatinine and eGFR are listed below.  Recent Labs     05/23/25  0246 05/24/25  0553 05/24/25  1519   CREATININE 3.1* 3.1* 3.1*   EGFRNORACEVR 23* 23* 23*      Plan  - Avoid nephrotoxins and renally dose meds for GFR listed above  - Monitor urine output, serial BMP, and adjust therapy as needed  - Cr worsening  likely due to hypotension/hemodynamic shifts.  - unable to gently hydrate 2/2 extensive cardiac hx  - Cr unchanged  - Consider Nephrology consult if Cr worsens  - Resume diuretic/Entresto when Cr stable

## 2025-05-24 NOTE — ASSESSMENT & PLAN NOTE
Patient's most recent potassium results are listed below.   Recent Labs     05/23/25  0246 05/24/25  0553 05/24/25  1519   K 4.2 4.1 3.5     Plan  - Replete potassium per protocol  - Monitor potassium Daily  - Patient's hypokalemia is stable  - Trend

## 2025-05-24 NOTE — PROGRESS NOTES
[facility]  [facility]  Progress Note    Patient Name: Bria Saldana  MRN: [id number]  Patient Class: IP- Inpatient   Admission Date: 5/19/2025  Length of Stay: 4 days  Attending Physician: Ascencion Alejandro DO  Primary Care Provider: Brenna Maravilla MD    Subjective     Principal Problem:Acute on chronic combined systolic (congestive) and diastolic (congestive) heart failure    HPI:    Patient is a 54-year-old male with past medical history significant for hypertension, hyperlipidemia, CKD stage 3, combined systolic and diastolic congestive heart failure (EF 30-40%, grade II diastolic dysfunction per Echo done 2/18/2025), atrial flutter s/p DCCV on Eliquis with planned ablation procedure per Dr. Phelan on 5/29/25, pulmonary hypertension,  type 2 diabetes mellitus, and NATALYA who presented to ED with complaint of abdominal pain/distention with vomiting/ spitting up yellow colored fluid as well as chest pain/chest tightness and shortness of breath.  On arrival to ED, temp 98°, heart rate 115, blood pressure 156/120, 97% SpO2 on room air.  While in ED blood pressure as high as  186/129 and heart rate as high as 125.  Lab workup significant for BUN 34, creatinine 2.8, glucose 69, alk-phos 151, albumin 3.4, total bilirubin 4.0, BNP 18 15, troponin 0.141.  Chest x-ray showed cardiomegaly, lungs appear clear.  EKG showed atrial flutter with 2-1 AV conduction, .  while in ED he was given Bumex 1 mg IV, diltiazem 20 mg IV, and hydralazine 10 mg IV.  Hospital Medicine was consulted for admission due to CHF exacerbation as well as a flutter with RVR.    Overview/Hospital Course:  Patient admitted to inpatient for acute on chronic combined systolic and diastolic heart failure, complicated by abd pain, nausea/vomiting, and acute renal failure on chronic kidney disease. Diuresis initiated. BNP and Troponin elevated on admission and trend. Sustained A Flutter with HR > 100. Cardiology recs  appreciated--compliant with Eliquis BID but not taking Amiodarone since early 2/2025. Limited TTE shows EF 25% (prev 30-40% 2/2025), LV severely reduced systolic function. Considering repeat MEGAN/DCCV if he fails to convert.   As of 5/21/2025, afebrile, -113 overnight, adequate oxygenation on RA. Hemodynamically stable with stable Cr at 2.5. Cardiology plans MEGAN/DCCV tomorrow.   As of 5/22/2025, afebrile, -116 overnight, adequate oxygenation on RA. Dr. Mcarthur completed MEGAN- no clot in ALEXI, EF =25-30%, mod-severe MR, mod TR, DCCV 200 J to NSR. HR 80s. Mg repletion. Cards: cont IV diuresis, BB/Entresto, Hydralazine as tolerated.   As of 5/23/2025, afebrile, NSR, HR 70-80s, adequate oxygenation on RA. Cr worsened to 3.1, likely due to hypotension/hemodynamic shifts--hold diuretic/Entresto. Cards rec to resume meds and plan to DC home on Eliquis--attempt to deliver today 2/2 hx med non-compliance.  As of 5/24/2025, afebrile, NSR, HR 60-70s, adequate oxygenation on RA. Cr remains 3.1--hold diuretic/Entresto. Redraw 1500 remains 3.1. Consider Nephrology consult if worsens on AM draw.     Interval History: Pt lying in bed in no acute distress. Denies issues overnight. Discussed lab result of Cr unchanged at 3.1 with planned 1500 draw on AM rounds--held diuretic/Entresto. Following 1500 results, relayed to patient Cr unchanged at 3.1. Consider Nephrology consult in AM if Cr worsens. Plan to continue diuretic/Entresto for CHF once Cr stable. Patient reports understanding and denies any questions/concerns at this time.    Review of Systems   Constitutional:  Negative for appetite change, chills, fatigue and fever.   Respiratory:  Positive for shortness of breath (BROUSSARD minimal). Negative for cough, chest tightness and wheezing.    Cardiovascular:  Negative for chest pain, palpitations (A Flutter sustained overnight per tele monitoring, HR > 100--denies feeling rhythm changes) and leg swelling (Denies, but reports  was told BLE on admission).   Gastrointestinal:  Negative for abdominal pain, blood in stool, constipation, diarrhea, nausea and vomiting.   Genitourinary:  Negative for difficulty urinating and hematuria.   Musculoskeletal:  Negative for back pain, joint swelling and myalgias.   Neurological:  Negative for dizziness, tremors, syncope, weakness, light-headedness and headaches.   Psychiatric/Behavioral:  Negative for confusion and sleep disturbance.    All other systems reviewed and are negative.    Objective:     Vital Signs (Most Recent):  Temp: 98.6 °F (37 °C) (25)  Pulse: 74 (25)  Resp: 16 (25)  BP: (!) 152/80 (25)  SpO2: 98 % (25) Vital Signs (24h Range):  Temp:  [97.4 °F (36.3 °C)-98.6 °F (37 °C)] 98.6 °F (37 °C)  Pulse:  [69-74] 74  Resp:  [1-18] 16  SpO2:  [96 %-100 %] 98 %  BP: (118-152)/(80-98) 152/80     Weight: 108.3 kg (238 lb 12.1 oz)  Body mass index is 29.84 kg/m².  No intake or output data in the 24 hours ending 25      Physical Exam  Constitutional:       General: He is not in acute distress.     Appearance: Normal appearance. He is obese. He is not ill-appearing or diaphoretic.   Cardiovascular:      Rate and Rhythm: Normal rate and regular rhythm.      Pulses: Normal pulses.      Comments: Mild non-pitting BLE edema--pt denies feeling edema is present  Pulmonary:      Effort: Pulmonary effort is normal. No respiratory distress.      Breath sounds: Normal breath sounds. No wheezing.   Abdominal:      General: Bowel sounds are normal. There is no distension.      Palpations: Abdomen is soft.      Tenderness: There is no abdominal tenderness. There is no guarding.   Musculoskeletal:      Right lower le+ Edema present.      Left lower le+ Edema present.   Neurological:      General: No focal deficit present.      Mental Status: He is alert and oriented to person, place, and time.               Significant Labs: All pertinent  labs within the past 24 hours have been reviewed.  Recent Lab Results         05/24/25  1519   05/24/25  0553        Albumin   3.2       ALP   123       ALT   28       Anion Gap 13   12       AST   25       Baso #   0.06       Basophil %   1.0       BILIRUBIN TOTAL   3.0  Comment: For infants and newborns, interpretation of results should be based   on gestational age, weight and in agreement with clinical   observations.    Premature Infant recommended reference ranges:   0-24 hours:  <8.0 mg/dL   24-48 hours: <12.0 mg/dL   3-5 days:    <15.0 mg/dL   6-29 days:   <15.0 mg/dL       BUN 44   45       Calcium 8.9   9.0       Chloride 100   100       CO2 23   24       Creatinine 3.1   3.1       eGFR 23  Comment: Estimated GFR calculated using the CKD-EPI creatinine (2021) equation.   23  Comment: Estimated GFR calculated using the CKD-EPI creatinine (2021) equation.       Eos #   0.25       Eos %   4.3       Glucose 96   87       Gran # (ANC)   2.73       Hematocrit   50.7       Hemoglobin   15.4       Immature Grans (Abs)   0.02  Comment: Mild elevation in immature granulocytes is non specific and can be seen in a variety of conditions including stress response, acute inflammation, trauma and pregnancy. Correlation with other laboratory and clinical findings is essential.       Immature Granulocytes   0.3       Lymph #   2.20       Lymph %   38.1       Magnesium    2.1       MCH   27.4       MCHC   30.4       MCV   90       Mono #   0.51       Mono %   8.8       MPV   10.8       Neut %   47.5       nRBC   0       Phosphorus Level   4.3       Platelet Count   192       Potassium 3.5   4.1       PROTEIN TOTAL   6.0       RBC   5.62       RDW   14.6       Sodium 136   136       WBC   5.77               Significant Imaging: I have reviewed all pertinent imaging results/findings within the past 24 hours.      Assessment & Plan  Acute on chronic combined systolic (congestive) and diastolic (congestive) heart  "failure  Uncontrolled atrial flutter  Dyspnea  Pericardial effusion  Patient has Combined Systolic and Diastolic heart failure that is Acute on chronic. On presentation their CHF was decompensated. Evidence of decompensated CHF on presentation includes: edema, elevated JVD, orthopnea, paroxysmal nocturnal dyspnea (PND), and dyspnea on exertion (BROUSSARD). The etiology of their decompensation is likely severe disease, he verbalizes compliance with medication regimen. Most recent BNP and echo results are listed below.  No results for input(s): "BNP" in the last 72 hours.    Latest ECHO  Results for orders placed during the hospital encounter of 02/17/25    Echo    Interpretation Summary    Left Ventricle: The left ventricle is mildly dilated. Normal wall thickness. There is concentric hypertrophy. There is moderately reduced systolic function with a visually estimated ejection fraction of 30 - 40%. Ejection fraction is approximately 35%. Quantitated ejection fraction is 43%. Grade II diastolic dysfunction.    Right Ventricle: Normal right ventricular cavity size. Wall thickness is normal. Systolic function is borderline low.    Left Atrium: Left atrium is severely dilated.    Mitral Valve: There is mild to moderate regurgitation.    Tricuspid Valve: There is mild regurgitation. There is mild pulmonary hypertension.    Pulmonary Artery: The estimated pulmonary artery systolic pressure is 41 mmHg.    IVC/SVC: Normal venous pressure at 3 mmHg.    Pericardium: There is a small circumferential effusion. No indication of cardiac tamponade.    Current Heart Failure Medications  hydrALAZINE injection 10 mg, Every 4 hours PRN, Intravenous  carvediloL tablet 3.125 mg, 2 times daily, Oral    Plan  - Monitor strict I&Os and daily weights.    - Place on telemetry  - Low sodium diet  - Place on fluid restriction of 1.5 L.   - Cardiology has been consulted: Continue IV diuresis, BB/Entresto, hydralazine as tolerated  - The patient's " volume status is stable but not at their baseline as indicated by edema, orthopnea, and dyspnea on exertion (BROUSSARD)  - CHF pathway in use    Atrial flutter with tachycardia, HR up to 120's on admit  Has ablation procedure scheduled for 5/29 per Dr. Phelan  IV Cardizem given per ED, monitor response, may need infusion  Cont home Eliquis  5/22 MEGAN/DCCV: MEGAN- no clot in ALEXI, EF =25-30%, mod-severe MR, mod TR, DCCV 200 J to NSR  Cards: plan to DC on Eliquis  Hold diuretic/Entresto 2/2 Cr 3.1    Essential hypertension  Patient's blood pressure range in the last 24 hours was: BP  Min: 118/84  Max: 152/80.The patient's inpatient anti-hypertensive regimen is listed below:  Current Antihypertensives  hydrALAZINE injection 10 mg, Every 4 hours PRN, Intravenous  carvediloL tablet 3.125 mg, 2 times daily, Oral    Plan  - BP is controlled, no changes needed to their regimen  - IV Hydralazine prn    Elevated troponin  Elevated above baseline chronic elevation  Trend troponin, likely due to fluid overload and atrial flutter with RVR  Continuous cardiac monitoring   Continue Eliquis and beta blocker  Cards: plan to DC on Eliquis    Mixed hyperlipidemia  Continue statin    Acute renal failure superimposed on stage 3b chronic kidney disease   Baseline creatinine is 2.0-2.3. Most recent creatinine and eGFR are listed below.  Recent Labs     05/23/25  0246 05/24/25  0553 05/24/25  1519   CREATININE 3.1* 3.1* 3.1*   EGFRNORACEVR 23* 23* 23*      Plan  - Avoid nephrotoxins and renally dose meds for GFR listed above  - Monitor urine output, serial BMP, and adjust therapy as needed  - Cr worsening  likely due to hypotension/hemodynamic shifts.  - unable to gently hydrate 2/2 extensive cardiac hx  - Cr unchanged  - Consider Nephrology consult if Cr worsens  - Resume diuretic/Entresto when Cr stable    Hypokalemia  Patient's most recent potassium results are listed below.   Recent Labs     05/23/25  0246 05/24/25  0553 05/24/25  1519   K 4.2  4.1 3.5     Plan  - Replete potassium per protocol  - Monitor potassium Daily  - Patient's hypokalemia is stable  - Trend    Hypomagnesemia  Patient has Abnormal Magnesium: hypomagnesemia. Will continue to monitor electrolytes closely. Will replace the affected electrolytes and repeat labs to be done after interventions completed. The patient's magnesium results have been reviewed and are listed below.  Recent Labs   Lab 05/24/25  0553   MG 2.1      Mg 1.5>2.2>2.1  Trend    VTE Risk Mitigation (From admission, onward)           Ordered     apixaban tablet 5 mg  2 times daily         05/20/25 0313     Reason for No Pharmacological VTE Prophylaxis  Once        Question:  Reasons:  Answer:  Already adequately anticoagulated on oral Anticoagulants    05/20/25 0319     IP VTE HIGH RISK PATIENT  Once         05/20/25 0319     Place sequential compression device  Until discontinued         05/20/25 0319                    Discharge Planning   CARMELO:      Code Status: Full Code   Medical Readiness for Discharge Date:   Discharge Plan A: Home with family        VERO SanchezC  Department of Hospital Medicine   [facility]

## 2025-05-24 NOTE — ASSESSMENT & PLAN NOTE
Patient's blood pressure range in the last 24 hours was: BP  Min: 118/84  Max: 152/80.The patient's inpatient anti-hypertensive regimen is listed below:  Current Antihypertensives  hydrALAZINE injection 10 mg, Every 4 hours PRN, Intravenous  carvediloL tablet 3.125 mg, 2 times daily, Oral    Plan  - BP is controlled, no changes needed to their regimen  - IV Hydralazine prn

## 2025-05-24 NOTE — ASSESSMENT & PLAN NOTE
Patient has Abnormal Magnesium: hypomagnesemia. Will continue to monitor electrolytes closely. Will replace the affected electrolytes and repeat labs to be done after interventions completed. The patient's magnesium results have been reviewed and are listed below.  Recent Labs   Lab 05/24/25  0553   MG 2.1      Mg 1.5>2.2>2.1  Trend     bloody

## 2025-05-25 VITALS
BODY MASS INDEX: 29.69 KG/M2 | DIASTOLIC BLOOD PRESSURE: 98 MMHG | HEART RATE: 67 BPM | TEMPERATURE: 98 F | SYSTOLIC BLOOD PRESSURE: 140 MMHG | HEIGHT: 75 IN | RESPIRATION RATE: 17 BRPM | OXYGEN SATURATION: 96 % | WEIGHT: 238.75 LBS

## 2025-05-25 PROBLEM — E83.42 HYPOMAGNESEMIA: Status: RESOLVED | Noted: 2025-05-22 | Resolved: 2025-05-25

## 2025-05-25 PROBLEM — E87.6 HYPOKALEMIA: Status: RESOLVED | Noted: 2025-05-21 | Resolved: 2025-05-25

## 2025-05-25 PROBLEM — R06.00 DYSPNEA: Status: RESOLVED | Noted: 2023-02-03 | Resolved: 2025-05-25

## 2025-05-25 PROBLEM — I50.43 ACUTE ON CHRONIC COMBINED SYSTOLIC (CONGESTIVE) AND DIASTOLIC (CONGESTIVE) HEART FAILURE: Status: RESOLVED | Noted: 2019-09-23 | Resolved: 2025-05-25

## 2025-05-25 PROBLEM — I48.92 UNCONTROLLED ATRIAL FLUTTER: Status: RESOLVED | Noted: 2024-05-15 | Resolved: 2025-05-25

## 2025-05-25 LAB
ABSOLUTE EOSINOPHIL (OHS): 0.29 K/UL
ABSOLUTE MONOCYTE (OHS): 0.41 K/UL (ref 0.3–1)
ABSOLUTE NEUTROPHIL COUNT (OHS): 2.71 K/UL (ref 1.8–7.7)
ALBUMIN SERPL BCP-MCNC: 3 G/DL (ref 3.5–5.2)
ALP SERPL-CCNC: 129 UNIT/L (ref 40–150)
ALT SERPL W/O P-5'-P-CCNC: 34 UNIT/L (ref 10–44)
ANION GAP (OHS): 11 MMOL/L (ref 8–16)
AST SERPL-CCNC: 28 UNIT/L (ref 11–45)
BASOPHILS # BLD AUTO: 0.05 K/UL
BASOPHILS NFR BLD AUTO: 1 %
BILIRUB SERPL-MCNC: 2.4 MG/DL (ref 0.1–1)
BUN SERPL-MCNC: 43 MG/DL (ref 6–20)
CALCIUM SERPL-MCNC: 8.7 MG/DL (ref 8.7–10.5)
CHLORIDE SERPL-SCNC: 99 MMOL/L (ref 95–110)
CO2 SERPL-SCNC: 25 MMOL/L (ref 23–29)
CREAT SERPL-MCNC: 2.9 MG/DL (ref 0.5–1.4)
ERYTHROCYTE [DISTWIDTH] IN BLOOD BY AUTOMATED COUNT: 14.6 % (ref 11.5–14.5)
GFR SERPLBLD CREATININE-BSD FMLA CKD-EPI: 25 ML/MIN/1.73/M2
GLUCOSE SERPL-MCNC: 149 MG/DL (ref 70–110)
HCT VFR BLD AUTO: 48 % (ref 40–54)
HGB BLD-MCNC: 14.7 GM/DL (ref 14–18)
IMM GRANULOCYTES # BLD AUTO: 0.01 K/UL (ref 0–0.04)
IMM GRANULOCYTES NFR BLD AUTO: 0.2 % (ref 0–0.5)
LYMPHOCYTES # BLD AUTO: 1.77 K/UL (ref 1–4.8)
MAGNESIUM SERPL-MCNC: 1.9 MG/DL (ref 1.6–2.6)
MCH RBC QN AUTO: 27.7 PG (ref 27–31)
MCHC RBC AUTO-ENTMCNC: 30.6 G/DL (ref 32–36)
MCV RBC AUTO: 90 FL (ref 82–98)
NUCLEATED RBC (/100WBC) (OHS): 0 /100 WBC
PHOSPHATE SERPL-MCNC: 3.9 MG/DL (ref 2.7–4.5)
PLATELET # BLD AUTO: 176 K/UL (ref 150–450)
PMV BLD AUTO: 10.5 FL (ref 9.2–12.9)
POTASSIUM SERPL-SCNC: 3.5 MMOL/L (ref 3.5–5.1)
PROT SERPL-MCNC: 6.1 GM/DL (ref 6–8.4)
RBC # BLD AUTO: 5.31 M/UL (ref 4.6–6.2)
RELATIVE EOSINOPHIL (OHS): 5.5 %
RELATIVE LYMPHOCYTE (OHS): 33.8 % (ref 18–48)
RELATIVE MONOCYTE (OHS): 7.8 % (ref 4–15)
RELATIVE NEUTROPHIL (OHS): 51.7 % (ref 38–73)
SODIUM SERPL-SCNC: 135 MMOL/L (ref 136–145)
WBC # BLD AUTO: 5.24 K/UL (ref 3.9–12.7)

## 2025-05-25 PROCEDURE — 25000003 PHARM REV CODE 250: Performed by: STUDENT IN AN ORGANIZED HEALTH CARE EDUCATION/TRAINING PROGRAM

## 2025-05-25 PROCEDURE — 83735 ASSAY OF MAGNESIUM: CPT

## 2025-05-25 PROCEDURE — 84100 ASSAY OF PHOSPHORUS: CPT

## 2025-05-25 PROCEDURE — 25000003 PHARM REV CODE 250: Performed by: PHYSICIAN ASSISTANT

## 2025-05-25 PROCEDURE — 36415 COLL VENOUS BLD VENIPUNCTURE: CPT

## 2025-05-25 PROCEDURE — 85025 COMPLETE CBC W/AUTO DIFF WBC: CPT

## 2025-05-25 PROCEDURE — 25000003 PHARM REV CODE 250: Performed by: NURSE PRACTITIONER

## 2025-05-25 PROCEDURE — 80053 COMPREHEN METABOLIC PANEL: CPT

## 2025-05-25 RX ORDER — AMIODARONE HYDROCHLORIDE 400 MG/1
400 TABLET ORAL DAILY
Qty: 30 TABLET | Refills: 0 | Status: SHIPPED | OUTPATIENT
Start: 2025-05-26

## 2025-05-25 RX ORDER — AMIODARONE HYDROCHLORIDE 100 MG/1
400 TABLET ORAL DAILY
Status: DISCONTINUED | OUTPATIENT
Start: 2025-05-26 | End: 2025-05-25 | Stop reason: HOSPADM

## 2025-05-25 RX ORDER — HYDRALAZINE HYDROCHLORIDE 25 MG/1
50 TABLET, FILM COATED ORAL 3 TIMES DAILY
Status: DISCONTINUED | OUTPATIENT
Start: 2025-05-25 | End: 2025-05-25 | Stop reason: HOSPADM

## 2025-05-25 RX ORDER — CARVEDILOL 3.12 MG/1
3.12 TABLET ORAL 2 TIMES DAILY
Qty: 60 TABLET | Refills: 0 | Status: SHIPPED | OUTPATIENT
Start: 2025-05-25

## 2025-05-25 RX ADMIN — HYDRALAZINE HYDROCHLORIDE 50 MG: 25 TABLET ORAL at 10:05

## 2025-05-25 RX ADMIN — CARVEDILOL 3.12 MG: 3.12 TABLET, FILM COATED ORAL at 09:05

## 2025-05-25 RX ADMIN — AMIODARONE HYDROCHLORIDE 400 MG: 100 TABLET ORAL at 09:05

## 2025-05-25 RX ADMIN — APIXABAN 5 MG: 2.5 TABLET, FILM COATED ORAL at 09:05

## 2025-05-25 NOTE — PROGRESS NOTES
[facility] Med Surg 3  Cardiology  Progress Note     Patient Name: Bria Saldana  MRN: [id number]  Admission Date: 5/19/2025  Hospital Length of Stay: 3 days  Code Status: Full Code   Attending Physician: Ascencion Alejandro DO   Primary Care Physician: Brenna Maravilla MD  Expected Discharge Date:   Principal Problem:Acute on chronic combined systolic (congestive) and diastolic (congestive) heart failure     Subjective:   HPI:  Mr. Saldana is a 54 year old male patient whose current medical conditions include CKD stage III, HTN, hyperlipidemia, combined CHF with EF of 30-40%, PAFL s/p prior MEGAN/DCCV (on Eliquis) with planned ablation, DM type II, NATALYA, and pulmonary HTN who presented to [facility] this AM due to abdominal distention and nausea/vomiting over the past week. Other associated symptoms included chest tightness, SOB, and palpitations. He denied any associated fever, chills, palpitations, near syncope, or syncope. Initial workup in ED revealed uncontrolled HTN (156/120), creatinine of 2.8. Tbili of 4.0, BNP of 1815, and troponin of 0.141. CXR clear. EKG showed aflutter with 2:1 AV conduction and patient was subsequently placed on a cardizem drip and admitted for further evaluation and treatment. Cardiology consulted to assist with management. Patient seen and examined today, resting in bed. Feels ok. Less SOB/abd bloating improving with diuresis. No CP during exam. He reports compliance with his medications, states he is taking Eliquis 5 mg BID. Previously on amiodarone per review of notes but not taking since early February. Limited TTE pending. Will consider repeat MEGAN/DCCV if he fails to convert. Followed by EP and scheduled for ablation later this month.      Hospital Course:   5/21/2025-Patient seen and examined today, sitting up in bed. Feeling better. Abd bloating/nausea/SOB much improved. CP free. Still in aflutter, HR low 100's. TTE with EF of 25%. MEGAN/DCCV planned tmw.     5/22/2025-Patient  seen and examined today, drowsy post MEGAN/DCCV. Converted to SR. Labs reviewed/stable. Reviewed with EP, will initiate po amiodarone and postpone ablation for now.     5/23/2025-Patient seen and examined today, remains in SR. Feels well. No SOB. No CP. Labs reviewed. Creatinine bumped to 3.1, diuretics/Entresto held. LA pending.      5/24/25- Monitor shows NSR in the 70's. Breathing has improved. Patient is ambulating in the room. Creatinine bumped to 3.1, diuretics/Entresto held.Continue diuretics and CHF once stable Cr     5/25/25-Monitor shows NSR in the 60's. S/P cardioversion 2 days ago. BUN 43 and creatinine is 2.9. Entresto and diuretics on hold. We can use hydralazine and nitrates if nephrology feels entresto not indicated due CKD. Continue eliquis, amiodarone, and coreg. Amiodarone will be decreased to once daily.      Review of Systems   Constitutional: Positive for malaise/fatigue.   HENT: Negative.     Eyes: Negative.    Cardiovascular: Negative.    Respiratory: Negative.     Endocrine: Negative.    Hematologic/Lymphatic: Negative.    Musculoskeletal: Negative.    Gastrointestinal: Negative.    Genitourinary: Negative.    Neurological: Negative.    Psychiatric/Behavioral: Negative.     Allergic/Immunologic: Negative.       Objective:      Vital Signs (Most Recent):  Temp: 97.5 °F (36.4 °C) (05/23/25 0744)  Pulse: 75 (05/23/25 0744)  Resp: 16 (05/23/25 0744)  BP: 113/85 (05/23/25 0744)  SpO2: 99 % (05/23/25 0800) Vital Signs (24h Range):  Temp:  [97.4 °F (36.3 °C)-98.7 °F (37.1 °C)] 97.5 °F (36.4 °C)  Pulse:  [75-80] 75  Resp:  [16-18] 16  SpO2:  [97 %-100 %] 99 %  BP: ()/(54-85) 113/85      Weight: 108.3 kg (238 lb 12.1 oz)  Body mass index is 29.84 kg/m².     SpO2: 99 %        No intake or output data in the 24 hours ending 05/23/25 1033     Lines/Drains/Airways         Peripheral Intravenous Line  Duration                     Peripheral IV - Single Lumen 05/20/25 0010 20 G Anterior;Right Forearm 3  "days                             Physical Exam  Vitals and nursing note reviewed.   Constitutional:       Appearance: Normal appearance.   HENT:      Head: Normocephalic and atraumatic.   Eyes:      Pupils: Pupils are equal, round, and reactive to light.   Cardiovascular:      Rate and Rhythm: Normal rate and regular rhythm.      Heart sounds: S1 normal and S2 normal. No murmur heard.  Pulmonary:      Effort: Pulmonary effort is normal.      Breath sounds: No rales.   Abdominal:      Palpations: Abdomen is soft.   Musculoskeletal:      Right lower leg: No edema.      Left lower leg: No edema.   Skin:     General: Skin is warm and dry.   Neurological:      General: No focal deficit present.      Mental Status: He is oriented to person, place, and time.   Psychiatric:         Mood and Affect: Mood normal.         Behavior: Behavior normal.         Thought Content: Thought content normal.               Significant Labs: CMP           Recent Labs   Lab 05/22/25 0456 05/23/25  0246    136   K 3.5 4.2    104   CO2 23 18*   GLU 87 85   BUN 32* 38*   CREATININE 2.5* 3.1*   CALCIUM 7.8* 8.6*   PROT 5.9* 6.3   ALBUMIN 2.7* 3.0*   BILITOT 2.4* 2.8*   ALKPHOS 119 138   AST 17 36   ALT 21 30   ANIONGAP 11 14   , CBC           Recent Labs   Lab 05/22/25 0456 05/23/25  0246   WBC 5.98 6.51   HGB 15.9 15.6   HCT 51.8 51.5    186   , Troponin No results for input(s): "TROPONINIHS" in the last 48 hours., and All pertinent lab results from the last 24 hours have been reviewed.     Significant Imaging: Echocardiogram: Transthoracic echo (TTE) complete (Cupid Only):             Results for orders placed or performed during the hospital encounter of 05/19/25   Echo   Result Value Ref Range     BSA 2.43 m2     A4C EF 11 %     LVIDd 5.7 3.5 - 6.0 cm     LV Systolic Volume 136 mL     LV Systolic Volume Index 56.7 mL/m2     LVIDs 5.3 (A) 2.1 - 4.0 cm     LV Diastolic Volume 161 mL     LV Diastolic Volume Index 67.08 " mL/m2     LV EDV A4C 174.34 mL     Left Ventricular End Systolic Volume by Teichholz Method 135.81 mL     Left Ventricular End Diastolic Volume by Teichholz Method 161.09 mL     IVS 1.7 (A) 0.6 - 1.1 cm     FS 7.0 (A) 28 - 44 %     Left Ventricle Relative Wall Thickness 0.49 cm     PW 1.4 (A) 0.6 - 1.1 cm     LV mass 413.5 g     LV Mass Index 172.3 g/m2     IVC diameter 3.06 cm     ZLVIDS -1.67       ZLVIDD -6.52       EF 25 %     Est. RA pres 3 mmHg     Narrative       Left Ventricle: The left ventricle is normal in size. Severely   increased wall thickness. There is concentric hypertrophy. Moderate global   hypokinesis present. There is severely reduced systolic function. Ejection   fraction is approximately 25%.    Right Ventricle: The right ventricle is normal in size Wall thickness   is normal. Systolic function is normal.    Right Atrium: The right atrium is mildly dilated .    IVC/SVC: Normal venous pressure at 3 mmHg.    Pericardium: There is a small effusion.       and EKG: Reviewed  Assessment and Plan:   Patient who presents with decompensated CHF in setting of recurrent atrial flutter. Remains in SR post cardioversion. Medications adjusted given soft BP. Renal function worse, check LA. Diuretics/Entresto held. Will need additional med adjustment prior to d/c.     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 Normal wall thickness. There is concentric hypertrophy. There is moderately reduced systolic function with a visually estimated ejection fraction of 30 - 40%. Ejection  fraction is approximately 35%. Quantitated ejection fraction is 43%. Grade II diastolic dysfunction.    Right Ventricle: Normal right ventricular cavity size. Wall thickness is normal. Systolic function is borderline low.    Left Atrium: Left atrium is severely dilated.    Mitral Valve: There is mild to moderate regurgitation.    Tricuspid Valve: There is mild regurgitation. There is mild pulmonary hypertension.    Pulmonary Artery: The estimated pulmonary artery systolic pressure is 41 mmHg.    IVC/SVC: Normal venous pressure at 3 mmHg.    Pericardium: There is a small circumferential effusion. No indication of cardiac tamponade.     Current Heart Failure Medications  hydrALAZINE injection 10 mg, Every 4 hours PRN, Intravenous  carvediloL tablet 3.125 mg, 2 times daily, Oral     5/21/2025  -Improving  -Continue IV diuresis  -Continue Entresto, Coreg  -Strict I's/O's  -TTE with EF of 25%--->MEGAN/DCCV planned tmw     5/22/2025  -BP soft, meds will be adjusted  -Diuretics switched to po     5/23/2025  -Volume status stable  -Hypotensive yesterday, medications have been adjusted, will need to monitor overnight and adjust in AM as he has had issues with very high BP in the past  -Will need po diuretics upon d/c  -Continue Coreg  -Check LA     Hypokalemia  -Repletion per primary team     Acute renal failure superimposed on stage 3b chronic kidney disease  -Cardio-renal improving with IV diuresis     5/21/2025  -Stable, continue same     5/23/2025  -Creatinine bumped to 3.1, likely in part due to hypotension  -Diuretics/Entresto held since yesterday AM  -Check LA     Pericardial effusion  -Stable by TTE, small     Uncontrolled atrial flutter  -Presents with aflutter 2:1 conduction, recurrent  -Previously on amiodarone, ? Not taking since early February  -Start amiodarone drip  -Continue Coreg  -Continue Eliquis  -May need repeat MEGAN/DCCV---scheduled for ablation by [facility] later this month     5/21/2025  -HR in low  100's continue Coreg  -Called patient's pharmacy yesterday, they have no record of him filling Eliquis therefore amio d/'c  -Continue Eliquis while IP, ensure med availability/compliance upon d/c  -MEGAN/DCCV tmw, keep NPO after MN     5/22/2025  -s/p MEGAN/DCCV with restoration of SR  -Continue Coreg  -Add amiodarone 400 mg BID x 1 week, then 200 mg BID x 1 week, then 200 mg daily  -Eliquis 5 mg BID---compliance emphasized  -Will postpone ablation for now, discussed with EP Dr. Phelan     5/23/2025  -Remains in SR  -Amio as above  -Coreg  -Eliquis 5 mg BID---needs bedside med delivery to ensure compliance  -OP EP f/u     Elevated troponin  -Trop flat  -Secondary to demand ischemia from aflutter, decompensated CHF  -Continue OMT  -Prior MPI stress test 5/2024 negative for ischemia     Essential hypertension  -Continue home meds as tolerated           VTE Risk Mitigation (From admission, onward)              Ordered       apixaban tablet 5 mg  2 times daily         05/20/25 0313       Reason for No Pharmacological VTE Prophylaxis  Once        Question:  Reasons:  Answer:  Already adequately anticoagulated on oral Anticoagulants    05/20/25 0319       IP VTE HIGH RISK PATIENT  Once         05/20/25 0319       Place sequential compression device  Until discontinued         05/20/25 0319

## 2025-05-25 NOTE — DISCHARGE INSTRUCTIONS
-You were admitted to our hospital for treatment of heart failure and arrhythmia concerns. Over the course of your hospitalization, our Cardiology team also evaluated you.    -Please read the attached information regarding atrial flutter, acute kidney injury, heart failure and go to your nearest ED if any of the alarm/concerning signs develop.    AS WE DISCUSSED, IT IS EXTREMELY IMPORTANT FOR YOU TO HAVE CLOSE FOLLOW UP WITH A HEALTHCARE PROVIDER WITHIN 2-3 DAYS FOR REPEAT LAB WORK TO REASSESS YOUR RENAL FUNCTION SO WE CAN RESUME YOUR HEART FAILURE FLUID MEDICATIONS.  PLEASE MAKE SURE TO FOLLOW STRICT HEART FAILURE PRECAUTIONS WHILE PAUSING THESE MEDICATIONS.    - Please give our scheduling line a call at [phone] to schedule an appointment with them if it has not been setup already.    -Your blood pressures were also elevated during your hospitalization.  As you resume your home blood pressure medications, please make sure to frequently monitor your home BP, keep a BP log and take it to your next healthcare provider visit.  Please reach out to a healthcare provider if your blood pressures are  too low or too high.      -Your labs remained stable, but some labs still remained abnormal. As we discussed, it is extremely important for you to followup with a primary care physician within 2-3 DAYS for repeat lab work to ensure normalization, follow up of pending labs, medication adjustments/refills, routine post-discharge care, and any other evaluations as necessitated.
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
## 2025-05-25 NOTE — PLAN OF CARE
[facility]  Discharge Final Note    Primary Care Provider: Brenna Maravilla MD    Expected Discharge Date: 5/25/2025    Final Discharge Note (most recent)       Final Note - 05/25/25 1030          Final Note    Assessment Type Final Discharge Note     Anticipated Discharge Disposition Home or Self Care     Hospital Resources/Appts/Education Provided Appointments scheduled and added to AVS        Post-Acute Status    Discharge Delays None known at this time                     Important Message from Medicare             Contact Info       Brenna Maravilla MD   Specialty: Family Medicine   Relationship: PCP - General    [address]  [place] [address]   Phone: [phone]       Next Steps: Schedule an appointment as soon as possible for a visit in 2 day(s)    Instructions: AS WE DISCUSSED, IT IS EXTREMELY IMPORTANT FOR YOU TO HAVE CLOSE FOLLOW UP WITH A HEALTHCARE PROVIDER WITHIN 2-3 DAYS FOR REPEAT LAB WORK TO REASSESS YOUR RENAL FUNCTION SO WE CAN RESUME YOUR HEART FAILURE FLUID MEDICATIONS.  PLEASE MAKE SURE TO FOLLOW STRICT HEART FAILURE PRECAUTIONS WHILE PAUSING THESE MEDICATIONS. Your labs remained stable, but some labs still remained abnormal. As we discussed, it is extremely important for you to followup with a primary care physician within 2-3 DAYS for repeat lab work to ensure normalization, follow up of pending labs, medication adjustments/refills, routine post-discharge care, and any other evaluations as necessitated.    [facility]   Specialty: Cardiology    [address] [place] [address] [address] [place] [address]   Phone: [phone]       Next Steps: Follow up    Instructions: 6/11/2025 9:30 AM   Sienna Maguire PA   [facility] FAILURE [facility]    PLEASE MAKE SURE TO FOLLOW UP WITH YOUR UPCOMING CARDIOLOGY APPOINTMENT. IF YOU ARE UNABLE TO SEE A HEALTHCARE PROVIDER TO REPEAT LABS TO RESUME YOUR HEART FAILURE MEDICATIONS WITHIN 2 TO 3 DAYS, ALTERNATIVELY YOU CAN  ALSO CALL THE HEART FAILURE TRANSITIONAL CARE CLINIC TO BE SEEN EARLIER OR GET REPEAT LAB WORK EARLIER INSTEAD    Ramon Hassan MD   Specialty: Nephrology    [facility] [place]  [address] [place] [address] [address] [place] [address]   Phone: [phone]       Next Steps: Schedule an appointment as soon as possible for a visit    Instructions: Continue to maintain close follow up with your outpatient nephrologist for your chronic kidney disease concerns          Hospital follow up scheduled with RAMIREZ in PCP office for Tuesday 5/27.  Discharge home, no home health or dme orders noted.

## 2025-05-26 ENCOUNTER — TELEPHONE (OUTPATIENT)
Dept: ELECTROPHYSIOLOGY | Facility: CLINIC | Age: 55
End: 2025-05-26
Payer: MEDICAID

## 2025-05-26 NOTE — TELEPHONE ENCOUNTER
Informed pt that he can not have his procedure this week, it would need to be about at least 30 days after his cardioversion that he had done last week, rescheduled for 7/10

## 2025-05-26 NOTE — TELEPHONE ENCOUNTER
----- Message from Med Assistant Paul sent at 5/26/2025  9:31 AM CDT -----  Regarding: FW: Procedure    ----- Message -----  From: Karlee Wilson  Sent: 5/26/2025   9:18 AM CDT  To: [facility] Staff  Subject: Procedure                                        Patient was in the hospital for 6 days because his kidney number was high. Patient wants to know if his procedure will still take place. Please call back @ [phone]. Thank you Karlee

## 2025-05-27 ENCOUNTER — TELEPHONE (OUTPATIENT)
Dept: CARDIOLOGY | Facility: CLINIC | Age: 55
End: 2025-05-27
Payer: MEDICAID

## 2025-05-27 ENCOUNTER — LAB VISIT (OUTPATIENT)
Dept: LAB | Facility: HOSPITAL | Age: 55
End: 2025-05-27
Payer: MEDICAID

## 2025-05-27 ENCOUNTER — OFFICE VISIT (OUTPATIENT)
Dept: FAMILY MEDICINE | Facility: CLINIC | Age: 55
End: 2025-05-27
Payer: MEDICAID

## 2025-05-27 VITALS
SYSTOLIC BLOOD PRESSURE: 162 MMHG | HEART RATE: 84 BPM | OXYGEN SATURATION: 97 % | WEIGHT: 243.38 LBS | HEIGHT: 75 IN | BODY MASS INDEX: 30.26 KG/M2 | TEMPERATURE: 98 F | DIASTOLIC BLOOD PRESSURE: 115 MMHG

## 2025-05-27 DIAGNOSIS — E78.2 MIXED HYPERLIPIDEMIA: ICD-10-CM

## 2025-05-27 DIAGNOSIS — I10 ESSENTIAL HYPERTENSION: ICD-10-CM

## 2025-05-27 DIAGNOSIS — M54.50 RIGHT-SIDED LOW BACK PAIN WITHOUT SCIATICA, UNSPECIFIED CHRONICITY: ICD-10-CM

## 2025-05-27 DIAGNOSIS — M10.379 ACUTE GOUT DUE TO RENAL IMPAIRMENT INVOLVING TOE, UNSPECIFIED LATERALITY: ICD-10-CM

## 2025-05-27 DIAGNOSIS — I48.3 TYPICAL ATRIAL FLUTTER: ICD-10-CM

## 2025-05-27 DIAGNOSIS — N18.32 STAGE 3B CHRONIC KIDNEY DISEASE: ICD-10-CM

## 2025-05-27 DIAGNOSIS — Z09 HOSPITAL DISCHARGE FOLLOW-UP: Primary | ICD-10-CM

## 2025-05-27 DIAGNOSIS — G47.33 OSA (OBSTRUCTIVE SLEEP APNEA): ICD-10-CM

## 2025-05-27 DIAGNOSIS — E66.811 OBESITY (BMI 30.0-34.9): ICD-10-CM

## 2025-05-27 DIAGNOSIS — I50.40 COMBINED SYSTOLIC AND DIASTOLIC CONGESTIVE HEART FAILURE, UNSPECIFIED HF CHRONICITY: ICD-10-CM

## 2025-05-27 LAB
ANION GAP (OHS): 15 MMOL/L (ref 8–16)
BUN SERPL-MCNC: 32 MG/DL (ref 6–20)
CALCIUM SERPL-MCNC: 9 MG/DL (ref 8.7–10.5)
CHLORIDE SERPL-SCNC: 103 MMOL/L (ref 95–110)
CO2 SERPL-SCNC: 22 MMOL/L (ref 23–29)
CREAT SERPL-MCNC: 2.2 MG/DL (ref 0.5–1.4)
GFR SERPLBLD CREATININE-BSD FMLA CKD-EPI: 35 ML/MIN/1.73/M2
GLUCOSE SERPL-MCNC: 81 MG/DL (ref 70–110)
POTASSIUM SERPL-SCNC: 4.1 MMOL/L (ref 3.5–5.1)
SODIUM SERPL-SCNC: 140 MMOL/L (ref 136–145)

## 2025-05-27 PROCEDURE — 99999 PR PBB SHADOW E&M-EST. PATIENT-LVL III: CPT | Mod: PBBFAC,,,

## 2025-05-27 PROCEDURE — 36415 COLL VENOUS BLD VENIPUNCTURE: CPT | Mod: PO

## 2025-05-27 PROCEDURE — 99213 OFFICE O/P EST LOW 20 MIN: CPT | Mod: PBBFAC,PO

## 2025-05-27 PROCEDURE — 80048 BASIC METABOLIC PNL TOTAL CA: CPT

## 2025-05-27 RX ORDER — ALLOPURINOL 100 MG/1
100 TABLET ORAL DAILY
Qty: 30 TABLET | Refills: 0 | Status: SHIPPED | OUTPATIENT
Start: 2025-05-27

## 2025-05-27 RX ORDER — DICLOFENAC SODIUM 10 MG/G
2 GEL TOPICAL 4 TIMES DAILY PRN
Qty: 100 G | Refills: 5 | Status: SHIPPED | OUTPATIENT
Start: 2025-05-27

## 2025-05-27 RX ORDER — METHYLPREDNISOLONE 4 MG/1
TABLET ORAL
Qty: 21 TABLET | Refills: 0 | Status: SHIPPED | OUTPATIENT
Start: 2025-05-27
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
## 2025-05-27 NOTE — TELEPHONE ENCOUNTER
"[facility]) hospital discharge 48-72 hour phone follow up completed.     Most Recent Hospital Discharge Date: 5/25/25  Last admission Diagnosis/chief complaint:acute HF    TCC nurse Navigator spoke with pt      Pt reports the following:  []  Shortness of Breath with Activity  []  Shortness of Breath at rest   []  Fatigue  []  Edema   [] Chest pain or tightness  [] Weight Increase since discharge  [x] None of the above    Medications:   Discharge medication reviewed with pt.  Pt reports having medication list available and has all medications at home for use per list.     Education:     Reviewed key points as listed below.     Recommend 2 -3 gram sodium restriction and 1500 cc-2000 cc fluid restriction.  Encourage physical activity with graded exercise program.  Requested patient to weigh themselves daily, and to notify us if their weight increases by more than 3 lbs in 1 day or 5 lbs in 3 days.   Reminded patient to use "Daily weight and symptom tracker" to record and guide patient on when and how to call [facility]. PT may also use symptom tracker if no scale available  Pt reports being in the green (color) Zone. If in yellow/red, reminded that they should be calling [facility] today or now.     Watch for these Signs and Symptoms: If any of these occur, contact [facility] immediately:   Increase in shortness of breath with movement   Increase in swelling in your legs and ankles   Weight gain of more than 3 pounds in a day or 5 pounds in 3 days.   Difficulty breathing when you are lying down   Worsening fatigue or tiredness   Stomach bloating, a full feeling or a loss of appetite   Increased coughing--especially when you are lying down      Pt was able to verbalize back to nurse in their own words correct diet/fluid restrictions, necessity for exercise, warning signs and symptoms, when and how to contact their TCC team.      Pt educated on follow-up plan while in [facility] program to include:   Week " 1 -  F/u appt with Provider and HF nurse (Date) appt 6/11/25 with Sienna GREEN confirmed.    Week 2-5 - In person/ Virtual/ phone call check ins    Week 5-7 - Pt will discharge from [facility] and transition to longterm care provider (Cardiology/PCP/ Advanced Heart Failure).      Patient active on myChart? Yes      Pt given the following contact information for ease of communication: [phone] (Mon-Fri, 8a-5p) & for urgent issues on the weekend call [facility] on-call [phone] to page the Cardiology MD on call.  Pt also encouraged utilize [facility] messaging as well.      Will follow up with pt at first clinic visit and HF nurse navigator available for pt questions, issues or concerns.

## 2025-05-27 NOTE — ASSESSMENT & PLAN NOTE
-BP uncontrolled  -Continue Coreg, Entresto  -Hydralazine resumed but at TID dosing, monitor trend   show

## 2025-05-28 ENCOUNTER — RESULTS FOLLOW-UP (OUTPATIENT)
Dept: FAMILY MEDICINE | Facility: CLINIC | Age: 55
End: 2025-05-28

## 2025-06-06 DIAGNOSIS — I48.92 PAROXYSMAL ATRIAL FLUTTER: ICD-10-CM

## 2025-06-06 DIAGNOSIS — I50.40 COMBINED SYSTOLIC AND DIASTOLIC CONGESTIVE HEART FAILURE, UNSPECIFIED HF CHRONICITY: Primary | ICD-10-CM

## 2025-06-09 ENCOUNTER — HOSPITAL ENCOUNTER (OUTPATIENT)
Dept: PREADMISSION TESTING | Facility: HOSPITAL | Age: 55
Discharge: HOME | End: 2025-06-09
Attending: INTERNAL MEDICINE
Payer: MEDICAID

## 2025-06-09 DIAGNOSIS — Z12.11 SCREENING FOR MALIGNANT NEOPLASM OF COLON: ICD-10-CM

## 2025-06-09 DIAGNOSIS — Z00.00 PREVENTATIVE HEALTH CARE: ICD-10-CM

## 2025-06-09 RX ORDER — SACUBITRIL AND VALSARTAN 97; 103 MG/1; MG/1
1 TABLET, FILM COATED ORAL 2 TIMES DAILY
Qty: 60 TABLET | Refills: 3 | Status: SHIPPED | OUTPATIENT
Start: 2025-06-09

## 2025-06-11 ENCOUNTER — OFFICE VISIT (OUTPATIENT)
Dept: CARDIOLOGY | Facility: CLINIC | Age: 55
End: 2025-06-11
Payer: MEDICAID

## 2025-06-11 VITALS
SYSTOLIC BLOOD PRESSURE: 140 MMHG | HEIGHT: 75 IN | DIASTOLIC BLOOD PRESSURE: 90 MMHG | WEIGHT: 244.38 LBS | HEART RATE: 88 BPM | BODY MASS INDEX: 30.39 KG/M2

## 2025-06-11 DIAGNOSIS — I10 ESSENTIAL HYPERTENSION: Primary | ICD-10-CM

## 2025-06-11 PROCEDURE — 99212 OFFICE O/P EST SF 10 MIN: CPT | Mod: PBBFAC | Performed by: PHYSICIAN ASSISTANT

## 2025-06-11 PROCEDURE — 99999 PR PBB SHADOW E&M-EST. PATIENT-LVL II: CPT | Mod: PBBFAC,,, | Performed by: PHYSICIAN ASSISTANT

## 2025-06-11 PROCEDURE — 4010F ACE/ARB THERAPY RXD/TAKEN: CPT | Mod: CPTII,,, | Performed by: PHYSICIAN ASSISTANT

## 2025-06-11 PROCEDURE — 99214 OFFICE O/P EST MOD 30 MIN: CPT | Mod: S$PBB,,, | Performed by: PHYSICIAN ASSISTANT

## 2025-06-11 PROCEDURE — 3008F BODY MASS INDEX DOCD: CPT | Mod: CPTII,,, | Performed by: PHYSICIAN ASSISTANT

## 2025-06-11 PROCEDURE — 3077F SYST BP >= 140 MM HG: CPT | Mod: CPTII,,, | Performed by: PHYSICIAN ASSISTANT

## 2025-06-11 PROCEDURE — 3080F DIAST BP >= 90 MM HG: CPT | Mod: CPTII,,, | Performed by: PHYSICIAN ASSISTANT

## 2025-06-11 PROCEDURE — 1111F DSCHRG MED/CURRENT MED MERGE: CPT | Mod: CPTII,,, | Performed by: PHYSICIAN ASSISTANT

## 2025-06-11 PROCEDURE — 3044F HG A1C LEVEL LT 7.0%: CPT | Mod: CPTII,,, | Performed by: PHYSICIAN ASSISTANT

## 2025-06-11 NOTE — PROGRESS NOTES
HF TCC Provider Note (Follow-up) Consult Note      HPI:  Patient is a 54-year-old male with past medical history significant for hypertension, hyperlipidemia, CKD stage 3, combined systolic and diastolic congestive heart failure (EF 30-40%, grade II diastolic dysfunction per Echo done 2/18/2025), atrial flutter s/p DCCV on Eliquis with planned ablation procedure per Dr. Phelan on 5/29/25, pulmonary hypertension,  type 2 diabetes mellitus, and NATALYA who presented to ED with complaint of abdominal pain/distention with vomiting/ spitting up yellow colored fluid as well as chest pain/chest tightness and shortness of breath.  On arrival to ED, temp 98°, heart rate 115, blood pressure 156/120, 97% SpO2 on room air.  While in ED blood pressure as high as  186/129 and heart rate as high as 125.  Lab workup significant for BUN 34, creatinine 2.8, glucose 69, alk-phos 151, albumin 3.4, total bilirubin 4.0, BNP 18 15, troponin 0.141.  Chest x-ray showed cardiomegaly, lungs appear clear.  EKG showed atrial flutter with 2-1 AV conduction, .  while in ED he was given Bumex 1 mg IV, diltiazem 20 mg IV, and hydralazine 10 mg IV.  Hospital Medicine was consulted for admission due to CHF exacerbation as well as a flutter with RVR.    Patient admitted to inpatient for acute on chronic combined systolic and diastolic heart failure, complicated by abd pain, nausea/vomiting, and acute renal failure on chronic kidney disease. Diuresis initiated. BNP and Troponin elevated on admission and trend. Sustained A Flutter with HR > 100. Cardiology recs appreciated--compliant with Eliquis BID but not taking Amiodarone since early 2/2025. Limited TTE shows EF 25% (prev 30-40% 2/2025), LV severely reduced systolic function. Considering repeat MEGAN/DCCV if he fails to convert.   As of 5/21/2025, afebrile, -113 overnight, adequate oxygenation on RA. Hemodynamically stable with stable Cr at 2.5. Cardiology plans MEGAN/DCCV tomorrow.   As of  5/22/2025, afebrile, -116 overnight, adequate oxygenation on RA. Dr. Mcarthur completed MEGAN- no clot in ALEXI, EF =25-30%, mod-severe MR, mod TR, DCCV 200 J to NSR. HR 80s. Mg repletion. Cards: cont IV diuresis, BB/Entresto, Hydralazine as tolerated.   As of 5/23/2025, afebrile, NSR, HR 70-80s, adequate oxygenation on RA. Cr worsened to 3.1, likely due to hypotension/hemodynamic shifts--hold diuretic/Entresto. Cards rec to resume meds and plan to DC home on Eliquis--attempt to deliver today 2/2 hx med non-compliance.  As of 5/24/2025, afebrile, NSR, HR 60-70s, adequate oxygenation on RA. Cr remains 3.1--hold diuretic/Entresto. Redraw 1500 remains 3.1. Consider Nephrology consult if worsens on AM draw.   As of 5/25/2025, NSR w/o change/issue. Cr improved to 2.9. Cardiology cleared for DC. Discussed with patient admission reason: treatment of heart failure and arrhythmia concerns. Over the course of your hospitalization, our Cardiology team also evaluated you. Instructed patient to read the attached information regarding atrial flutter, acute kidney injury, heart failure and go to your nearest ED if any of the alarm/concerning signs develop. Counseled patient re: extreme importance for close FU by healthcare provider within next 2-3D for repeat lab work and assessment of renal fx to determine when/if can resume remainder of heart failure medications. Follow strict heart failure precautions while meds on pause. Instructed to call scheduling line at [phone] to schedule an appointment if not setup already. Instructed that as patient resumes home blood pressure medications, please make sure to frequently monitor home BP, keep a BP log and take it to next healthcare provider visit. Instructed to reach out to a healthcare provider if your blood pressures are too low or too high. Discussed labs remained stable, but some labs still remained abnormal. Reiterated importance of patient to FU w/ primary care physician  within 2-3 DAYS for repeat lab work to ensure normalization, follow up of pending labs, medication adjustments/refills, routine post-discharge care, and any other evaluations as necessitated. Holding instructions/comments added to each paused medication listing why and who to FU with: Colchicine, Entresto, Torsemide, and Empaglifozin. Take Eliquis and Amiodarone as instructed. Nurse to provide patient with printed copies of all the above. Patient reports understanding and denies any questions/concerns at this time.    Pt was told to hold torsemide at dc?    Also was out of [facility] until yesterday so was off 2 weeks    No SOB       PHYSICAL:   Vitals:    06/11/25 0926   BP: (!) 140/90   Pulse: 88          JVD: no,    Heart rhythm: regular  Cardiac murmur: No    S3: no  S4: no  Lungs: clear  Liver span: 10 cm:   Hepatojugular reflux: no  Edema: no,       ASSESSMENT: chronic systolic HF    PLAN:      Patient Instructions:   Instruct the patient to notify this clinic if HH, a physician or an advanced care provider wants to change medication one of their HF medications   Activity and Diet restrictions:   Recommend 2-3 gram sodium restriction and 1500cc- 2000cc fluid restriction.  Encourage physical activity with graded exercise program.  Requested patient to weigh themselves daily, and to notify us if their weight increases by more than 3 lbs in 1 day or 5 lbs in 3 days.    Assigned dry weight on home scale: daily weight  Medication changes (include current dose and changed dose)  In NSR now but needs to follow with EP for ongoing recs for ablation as this is second [facility]  Refill cardiac meds  Resume torsemide 40 mg BID  RTC after ablation

## 2025-06-19 ENCOUNTER — OFFICE VISIT (OUTPATIENT)
Dept: PULMONOLOGY | Facility: CLINIC | Age: 55
End: 2025-06-19
Payer: MEDICAID

## 2025-06-19 VITALS
RESPIRATION RATE: 18 BRPM | WEIGHT: 232.06 LBS | OXYGEN SATURATION: 98 % | DIASTOLIC BLOOD PRESSURE: 100 MMHG | HEIGHT: 75 IN | SYSTOLIC BLOOD PRESSURE: 180 MMHG | HEART RATE: 88 BPM | BODY MASS INDEX: 28.85 KG/M2

## 2025-06-19 DIAGNOSIS — I50.22 CHRONIC SYSTOLIC HEART FAILURE: ICD-10-CM

## 2025-06-19 DIAGNOSIS — G47.39 COMPLEX SLEEP APNEA SYNDROME: Primary | ICD-10-CM

## 2025-06-19 DIAGNOSIS — R06.3 CHEYNE-STOKES RESPIRATION: ICD-10-CM

## 2025-06-19 DIAGNOSIS — Z86.79 HISTORY OF ATRIAL FIBRILLATION: ICD-10-CM

## 2025-06-19 PROCEDURE — 99213 OFFICE O/P EST LOW 20 MIN: CPT | Mod: PBBFAC | Performed by: INTERNAL MEDICINE

## 2025-06-19 PROCEDURE — 99999 PR PBB SHADOW E&M-EST. PATIENT-LVL III: CPT | Mod: PBBFAC,,, | Performed by: INTERNAL MEDICINE

## 2025-06-19 NOTE — PROGRESS NOTES
"                                         Pulmonary Outpatient Follow Up Visit     Subjective:       Patient ID: Bria Saldana is a 54 y.o. male.    Chief Complaint: Sleep Apnea and Pulmonary Hypertension        History of Present Illness    CHIEF COMPLAINT:  Patient presents today for follow up of sleep apnea and heart failure.    HEART FAILURE:  He has heart failure with severely reduced EF. Last echocardiogram showed EF of 25%, significantly below normal range of 55-60%. He acknowledges the serious nature of his cardiac condition and understands the risks of not addressing his heart failure. He describes his current cardiac status as more critical compared to his previous medical history. He is scheduled for cardiac ablation in July.    SLEEP APNEA:  Recent sleep study revealed significant sleep apnea with 37-49 breathing interruptions per hour, primarily attributed to his underlying heart failure and reduced EF. He previously used CPAP approximately 4 years ago but discontinued use and returned the machine.    PREVENTIVE CARE:  He declines pneumonia vaccine, stating he has never had pneumonia, despite recommendation related to his underlying heart condition.                   Review of Systems   Constitutional:  Positive for fatigue.   Respiratory:  Positive for apnea, snoring, dyspnea on extertion and somnolence.    Cardiovascular:  Positive for leg swelling.   Psychiatric/Behavioral:  Positive for sleep disturbance.        Encounter Medications[1]    Objective:     Vital Signs (Most Recent)  Vital Signs  Pulse: 88  Resp: 18  SpO2: 98 %  BP: (!) 180/100  Height and Weight  Height: 6' 3" (190.5 cm)  Weight: 105.3 kg (232 lb 0.6 oz)  BSA (Calculated - sq m): 2.36 sq meters  BMI (Calculated): 29  Weight in (lb) to have BMI = 25: 199.6]  Wt Readings from Last 2 Encounters:   06/19/25 105.3 kg (232 lb 0.6 oz)   06/11/25 110.9 kg (244 lb 6.1 oz)       Physical Exam   Constitutional: He is oriented to " "person, place, and time. He appears well-developed and well-nourished. He is obese.   Pulmonary/Chest: Normal expansion and effort normal. He has decreased breath sounds.   Neurological: He is alert and oriented to person, place, and time.   Psychiatric: His behavior is normal.       Laboratory  Lab Results   Component Value Date    WBC 5.24 05/25/2025    RBC 5.31 05/25/2025    HGB 14.7 05/25/2025    HCT 48.0 05/25/2025    MCV 90 05/25/2025    MCH 27.7 05/25/2025    MCHC 30.6 (L) 05/25/2025    RDW 14.6 (H) 05/25/2025     05/25/2025    MPV 10.5 05/25/2025    GRAN 3.3 02/17/2025    GRAN 51.1 02/17/2025    LYMPH 33.8 05/25/2025    LYMPH 1.77 05/25/2025    MONO 7.8 05/25/2025    MONO 0.41 05/25/2025    EOS 5.5 05/25/2025    EOS 0.29 05/25/2025    BASO 0.07 02/17/2025    EOSINOPHIL 2.5 02/17/2025    BASOPHIL 1.0 05/25/2025    BASOPHIL 0.05 05/25/2025       BMP  Lab Results   Component Value Date     05/27/2025    K 4.1 05/27/2025     05/27/2025    CO2 22 (L) 05/27/2025    BUN 32 (H) 05/27/2025    CREATININE 2.2 (H) 05/27/2025    CALCIUM 9.0 05/27/2025    ANIONGAP 15 05/27/2025    ESTGFRAFRICA 47 (A) 06/25/2021    EGFRNONAA 40 (A) 06/25/2021    AST 28 05/25/2025    ALT 34 05/25/2025    PROT 6.1 05/25/2025       Lab Results   Component Value Date     (H) 05/21/2025    BNP 1,815 (H) 05/19/2025    BNP 3,242 (H) 02/17/2025    BNP 3,357 (H) 12/15/2024    BNP 3,402 (H) 05/31/2024     (H) 05/19/2024       Lab Results   Component Value Date    TSH 1.431 02/11/2025       No results found for: "SEDRATE"    No results found for: "CRP"  Lab Results   Component Value Date    IGE 56 02/08/2023        No results found for: "ASPERGILLUS"  No results found for: "AFUMIGATUSCL"     No results found for: "ACE"     Diagnostic Results:  I have personally reviewed today the following studies:  As above    Assessment/Plan:   Complex sleep apnea syndrome    Chronic systolic heart failure    Cheyne-Oliveira " respiration    History of atrial fibrillation      Assessment & Plan    I50.22 Chronic systolic heart failure  G47.39 Complex sleep apnea syndrome  Z86.79 History of atrial fibrillation    IMPRESSION:  - Complicated sleep apnea, primarily due to heart failure and weak EF (25% on last echo, normal range 55-60%).  - Considered pneumonia vaccine due to heart condition, but patient declined.    CHRONIC SYSTOLIC HEART FAILURE:  - Explained severity of sleep apnea and its connection to heart failure.  - Discussed importance of addressing sleep apnea to manage overall health condition.  - Follow up in 3 months.    COMPLEX SLEEP APNEA SYNDROME:  - Explained severity of sleep apnea and its connection to heart failure.  - Discussed importance of addressing sleep apnea to manage overall health condition.  - Clarified process of obtaining and maintaining CPAP machine, including need for consistent usage to avoid repeating testing process.  - Patient to use CPAP machine consistently after receiving it based on next sleep study results.  - Ordered repeat sleep study with CPAP machine at sleep center on June 25th at 8:00 PM to address disturbed breathing during sleep (37-49 breathing interruptions per hour).  - Follow up in 3 months.  - Contact the office if unable to attend scheduled sleep test on June 25th.       Optimize cardiac therapy.  Scheduled for ablation in July.  Follow up in about 3 months (around 9/19/2025).    This note was prepared using voice recognition system and is likely to have sound alike errors that may have been overlooked even after proof reading.  Please call me with any questions    Discussed diagnosis, its evaluation, treatment and usual course. All questions answered.      Alexia Canales MD         [1]   Outpatient Encounter Medications as of 6/19/2025   Medication Sig Dispense Refill    allopurinoL (ZYLOPRIM) 100 MG tablet Take 1 tablet (100 mg total) by mouth once daily. For gout 30 tablet 0     amiodarone (PACERONE) 400 MG tablet Take 1 tablet (400 mg total) by mouth once daily. 30 tablet 0    apixaban (ELIQUIS) 5 mg Tab Take 1 tablet (5 mg total) by mouth 2 (two) times daily. 60 tablet 5    carvediloL (COREG) 3.125 MG tablet Take 1 tablet (3.125 mg total) by mouth 2 (two) times daily. 60 tablet 0    [Paused] colchicine (COLCRYS) 0.6 mg tablet Take 0.6 mg by mouth daily as needed.      diclofenac sodium (VOLTAREN) 1 % Gel Apply 2 g topically 4 (four) times daily as needed (pain). 100 g 5    empagliflozin (JARDIANCE) 10 mg tablet Take 1 tablet (10 mg total) by mouth once daily. 30 tablet 6    hydrALAZINE (APRESOLINE) 50 MG tablet Take 1 tablet (50 mg total) by mouth 3 (three) times daily. 90 tablet 5    methylPREDNISolone (MEDROL DOSEPACK) 4 mg tablet use as directed 21 tablet 0    sacubitriL-valsartan (ENTRESTO)  mg per tablet Take 1 tablet by mouth 2 (two) times daily. 60 tablet 3    torsemide 40 mg Tab Take 1 tablet (40 mg total) by mouth 2 (two) times a day.      [DISCONTINUED] apixaban (ELIQUIS) 5 mg Tab Take 5 mg by mouth 2 (two) times daily.      [DISCONTINUED] apixaban (ELIQUIS) 5 mg Tab Take 1 tablet (5 mg total) by mouth 2 (two) times daily. 60 tablet 0    [DISCONTINUED] carvediloL (COREG) 12.5 MG tablet Take 1 tablet (12.5 mg total) by mouth 2 (two) times daily. 60 tablet 3    [DISCONTINUED] empagliflozin (JARDIANCE) 10 mg tablet Take 1 tablet (10 mg total) by mouth once daily. (Patient not taking: Reported on 5/27/2025) 30 tablet 3    [DISCONTINUED] ENTRESTO  mg per tablet TAKE 1 TABLET BY MOUTH TWICE DAILY (Patient not taking: Reported on 5/27/2025) 60 tablet 3    [DISCONTINUED] torsemide 40 mg Tab Take 2 tablets by mouth 2 (two) times a day. 60 tablet 3    [DISCONTINUED] acetaminophen tablet 650 mg       [DISCONTINUED] amiodarone tablet 400 mg       [DISCONTINUED] apixaban tablet 5 mg       [DISCONTINUED] bismuth subsalicylate 262 mg/15 mL suspension 30 mL       [DISCONTINUED]  bumetanide injection 1 mg       [DISCONTINUED] carvediloL tablet 25 mg       [DISCONTINUED] carvediloL tablet 3.125 mg       [DISCONTINUED] carvediloL tablet 6.25 mg       [DISCONTINUED] hydrALAZINE injection 10 mg       [DISCONTINUED] melatonin tablet 6 mg       [DISCONTINUED] ondansetron injection 4 mg       [DISCONTINUED] polyethylene glycol packet 17 g       [DISCONTINUED] prochlorperazine injection Soln 2.5 mg       [DISCONTINUED] sacubitriL-valsartan 24-26 mg per tablet 1 tablet       [DISCONTINUED] sacubitriL-valsartan 24-26 mg per tablet 1 tablet       [DISCONTINUED] simethicone chewable tablet 80 mg       [DISCONTINUED] sodium chloride 0.9% flush 10 mL       [DISCONTINUED] torsemide tablet 40 mg        No facility-administered encounter medications on file as of 6/19/2025.

## 2025-06-25 ENCOUNTER — HOSPITAL ENCOUNTER (OUTPATIENT)
Dept: SLEEP MEDICINE | Facility: HOSPITAL | Age: 55
Discharge: HOME | End: 2025-06-25
Attending: INTERNAL MEDICINE
Payer: MEDICAID

## 2025-06-25 DIAGNOSIS — G47.39 COMPLEX SLEEP APNEA SYNDROME: ICD-10-CM

## 2025-06-25 PROCEDURE — 95811 POLYSOM 6/>YRS CPAP 4/> PARM: CPT

## 2025-06-26 ENCOUNTER — PATIENT MESSAGE (OUTPATIENT)
Dept: PULMONOLOGY | Facility: CLINIC | Age: 55
End: 2025-06-26

## 2025-06-26 DIAGNOSIS — G47.39 COMPLEX SLEEP APNEA SYNDROME: Primary | ICD-10-CM

## 2025-06-26 NOTE — PROCEDURES
"  Patient Name: Bria Saldana Study Date: 6/25/2025   YOB: 1970 Study Type: CPAP   Age: 54 year Patient #: [id number]   Sex: Male Referred by: Alexia Canales MD    Height: 6' 3" Interpreting Physician: Alexia Canales MD   Weight: 232.0 lbs Recording Tech: Moisés Javier   BMI: 29.2 Scoring Tech: Juanita Heck   [place]: 12 Neck Circumference: 16.5     Indications for Polysomnography:    The patient is a 54 year-old Male who is 6' 3" and weighs 232.0 lbs. His BMI equals 29.2.  A full night titration study was performed.    Medical History:     Polysomnography 04/30/2025:   -The polysomnogram revealed a presence of 11 obstructive, 103 central (although a crescendo decrescendo pattern was noted it was not persistent and it was associated prolonged cycle length and circulation time), and 4 mixed apneas resulting in an Apnea index of 37.9 events per hour. There were 43 type 1 A hypopneas (>=3% desat and/or Ar.) resulting in an Apnea\Hypopnea Index (AHI >=3% desat and/or Ar.) AAS RDI of 51.7 events per hour. There were 35 type 1 B hypopneas (>=4% desat) resulting in an Apnea\Hypopnea Index (AHI >=4% desat) CMS AHI of 49.1 events per hour. Greater breathing impairment occurred in supine sleep.  Supine AHI was 71.6 events per hour.    -History of atrial fibrillation and chronic systolic CHF. Echocardiogram 02/18/2025: There is  moderately reduced systolic function with a visually estimated ejection fraction of 30 - 40%. Ejection fraction is  approximately 35%.    Medication   allopurinoL (ZYLOPRIM) 100 MG tablet    amiodarone (PACERONE) 400 MG tablet    apixaban (ELIQUIS) 5 mg Tab    carvediloL (COREG) 3.125 MG tablet    colchicine (COLCRYS) 0.6 mg tablet (Paused)    diclofenac sodium (VOLTAREN) 1 % Gel    empagliflozin (JARDIANCE) 10 mg tablet    hydrALAZINE (APRESOLINE) 50 MG tablet    methylPREDNISolone (MEDROL DOSEPACK) 4 mg tablet    sacubitriL-valsartan (ENTRESTO)  mg per tablet    torsemide 40 " mg Tab      Test Description  Patient was connected to a full set of leads with a sleep technician in attendance.  Parameters used were EEG derivations (F4-A1, C4-A1, O2-A1), EOG derivations (LOC-A2, ELIZABETH-A2), EKG, EMG - extremity (leg) & chin, oxygen saturation, effort parameters + abdominal/thoracic (RIP), and airflow parameters - nasal pressure/thermal.  Body position was visually monitored and noted.  Audio & video monitoring was performed during study.    Sleep Architecture  The total recording time of the polysomnogram was 439.0 minutes.  The total sleep time was 347.0 minutes.  The patient spent 7.2% of total sleep time in Stage N1, 69.3% in Stage N2, 3.2% in Stages N3, and 20.3% in REM.  Sleep latency was 67.3 minutes.  REM latency was 83.5 minutes.  Sleep Efficiency was 79.0%.  Wake after Sleep Onset time was 24.5 minutes.    Titration Summary  The patient was titrated at pressures ranging from 5 cm/H20 up to 19 cm/H20. Starting at CPAP 15 cm/H2O an acceptable although suboptimal control was noted with a residual AHI of 14 events per hour and a samanta O2 saturation of 90%.      Respiratory Events  The polysomnogram revealed a presence of 0 obstructive, 0 central, and 0 mixed apneas resulting in an Apnea index of 0events per hour.  There were 111 type 1 A hypopneas (>=3% desat and/or Ar.) resulting in an Apnea\Hypopnea Index (AHI >=3% desat and/or Ar.) AAS RDI of 19.2 events per hour.  There were 35 hypopneas type 1 B (>=4% desat) resulting in an Apnea\Hypopnea Index (AHI >=4% desat) CMS AHI of 6.1 events per hour.      Mean oxygen saturation was 92.6%.  The lowest oxygen saturation during sleep was 85.0%.  Time spent <=88% oxygen saturation was 12.0 minutes (2.9%).    Limb Activity  There were  no significant limb movements recorded.     Cardiac Summary  The average pulse rate was 66.7 bpm.  The minimum pulse rate was 46.0 bpm while the maximum pulse rate was 90.0 bpm.  Cardiac rhythm was  normal.    Conclusion:  starting at CPAP 15 cm/H2O an acceptable although suboptimal control was noted with a residual AHI of 14 events per hour and a samanta O2 saturation of 90%.  REM and supine sleep control was achieved. Treatment trial with CPAP 15 cm/H2O recommended.    Diagnosis:      G47.37. Complex Sleep Apnea     Recommendations:     Positive airway pressure is the first line of therapy for symptomatic adults diagnosed with obstructive sleep apnea. Treatment trial with CPAP set to 15 cm/H2O with F20 (M) during sleep is recommended.  The patient will benefit from close follow up with evaluation of the smart card post CPAP use.  This will help ensure adequate treatment and resolution of the underlying sleep disordered breathing.     The patient needs counseling in regards to weight loss, and supervised weight management with behavioral and dietary modifications is likely beneficial.    The patient should avoid sleeping in the supine position as this appears to increase the severity of the obstructive sleep apnea. This may be achieved by using a commercially available positional device like backpack or wedge or by sewing an object (e.g. tennis balls) in the back of his night shirt.    We strongly recommend optimization of the sleep schedule/duration because sleep deprivation can be a major contributor to daytime sleepiness.    Optimal medical control of the patient known systolic congestive heart failure is recommended.    Patient can follow-up at the [facility] [place]: [address] [facility] [address]; phone number [phone]-or [address] [place] [address] phone number [phone].

## 2025-07-01 ENCOUNTER — LAB VISIT (OUTPATIENT)
Dept: LAB | Facility: HOSPITAL | Age: 55
End: 2025-07-01
Attending: INTERNAL MEDICINE
Payer: MEDICAID

## 2025-07-01 DIAGNOSIS — I48.3 TYPICAL ATRIAL FLUTTER: ICD-10-CM

## 2025-07-01 LAB
ANION GAP (OHS): 7 MMOL/L (ref 8–16)
APTT PPP: 28 SECONDS (ref 21–32)
BUN SERPL-MCNC: 20 MG/DL (ref 6–20)
CALCIUM SERPL-MCNC: 8.8 MG/DL (ref 8.7–10.5)
CHLORIDE SERPL-SCNC: 105 MMOL/L (ref 95–110)
CO2 SERPL-SCNC: 29 MMOL/L (ref 23–29)
CREAT SERPL-MCNC: 2.1 MG/DL (ref 0.5–1.4)
ERYTHROCYTE [DISTWIDTH] IN BLOOD BY AUTOMATED COUNT: 15.1 % (ref 11.5–14.5)
GFR SERPLBLD CREATININE-BSD FMLA CKD-EPI: 37 ML/MIN/1.73/M2
GLUCOSE SERPL-MCNC: 139 MG/DL (ref 70–110)
HCT VFR BLD AUTO: 51.2 % (ref 40–54)
HGB BLD-MCNC: 14.8 GM/DL (ref 14–18)
INR PPP: 1.1 (ref 0.8–1.2)
MCH RBC QN AUTO: 27.1 PG (ref 27–31)
MCHC RBC AUTO-ENTMCNC: 28.9 G/DL (ref 32–36)
MCV RBC AUTO: 94 FL (ref 82–98)
PLATELET # BLD AUTO: 178 K/UL (ref 150–450)
PMV BLD AUTO: 11.5 FL (ref 9.2–12.9)
POTASSIUM SERPL-SCNC: 4.3 MMOL/L (ref 3.5–5.1)
PROTHROMBIN TIME: 11.7 SECONDS (ref 9–12.5)
RBC # BLD AUTO: 5.47 M/UL (ref 4.6–6.2)
SODIUM SERPL-SCNC: 141 MMOL/L (ref 136–145)
WBC # BLD AUTO: 3.92 K/UL (ref 3.9–12.7)

## 2025-07-01 PROCEDURE — 85730 THROMBOPLASTIN TIME PARTIAL: CPT

## 2025-07-01 PROCEDURE — 85610 PROTHROMBIN TIME: CPT

## 2025-07-01 PROCEDURE — 85027 COMPLETE CBC AUTOMATED: CPT

## 2025-07-01 PROCEDURE — 82310 ASSAY OF CALCIUM: CPT

## 2025-07-01 PROCEDURE — 36415 COLL VENOUS BLD VENIPUNCTURE: CPT

## 2025-07-02 ENCOUNTER — PATIENT MESSAGE (OUTPATIENT)
Dept: ELECTROPHYSIOLOGY | Facility: CLINIC | Age: 55
End: 2025-07-02
Payer: MEDICAID

## 2025-07-08 ENCOUNTER — TELEPHONE (OUTPATIENT)
Dept: ELECTROPHYSIOLOGY | Facility: CLINIC | Age: 55
End: 2025-07-08
Payer: MEDICAID

## 2025-07-08 NOTE — TELEPHONE ENCOUNTER
Spoke to patient     CONFIRMED procedure arrival time of 8:00 AM on 7/10/2025    Reiterated instructions including:  -Directions to check in desk  -NPO after midnight night prior to procedure  -High importance of HOLDING Amiodarone and Coreg 4 days prior to procedure. Patient confirmed last dose 7/5/2025. Patient to hold Eliquis and Jardiance the morning of the procedure. Patient verbalized understanding.   -Confirmed compliance of Eliquis.   -Pre-procedure LABS Reviewed.   -Confirmed absence of implanted device/stimulator   -Confirmed no fever, cough, or shortness of breath in the past 30 days  -Confirmed no redness, rash, irritation, or yeast infection to groin area.   -Reviewed current visitor policy    Patient verbalized understanding of above and appreciated the call.

## 2025-07-10 ENCOUNTER — ANESTHESIA (OUTPATIENT)
Dept: MEDSURG UNIT | Facility: HOSPITAL | Age: 55
End: 2025-07-10
Payer: MEDICAID

## 2025-07-10 ENCOUNTER — ANESTHESIA EVENT (OUTPATIENT)
Dept: MEDSURG UNIT | Facility: HOSPITAL | Age: 55
End: 2025-07-10
Payer: MEDICAID

## 2025-07-10 ENCOUNTER — HOSPITAL ENCOUNTER (OUTPATIENT)
Facility: HOSPITAL | Age: 55
Discharge: HOME | End: 2025-07-10
Attending: INTERNAL MEDICINE | Admitting: INTERNAL MEDICINE
Payer: MEDICAID

## 2025-07-10 VITALS
OXYGEN SATURATION: 96 % | TEMPERATURE: 98 F | BODY MASS INDEX: 27.6 KG/M2 | DIASTOLIC BLOOD PRESSURE: 92 MMHG | HEIGHT: 75 IN | HEART RATE: 79 BPM | WEIGHT: 222 LBS | SYSTOLIC BLOOD PRESSURE: 145 MMHG | RESPIRATION RATE: 15 BRPM

## 2025-07-10 DIAGNOSIS — I49.9 ARRHYTHMIA: ICD-10-CM

## 2025-07-10 DIAGNOSIS — I48.92 ATRIAL FLUTTER: ICD-10-CM

## 2025-07-10 DIAGNOSIS — I48.92 PAROXYSMAL ATRIAL FLUTTER: ICD-10-CM

## 2025-07-10 LAB
OHS QRS DURATION: 110 MS
OHS QRS DURATION: 110 MS
OHS QTC CALCULATION: 505 MS
OHS QTC CALCULATION: 508 MS
POCT GLUCOSE: 79 MG/DL (ref 70–110)

## 2025-07-10 PROCEDURE — 25000003 PHARM REV CODE 250: Performed by: NURSE ANESTHETIST, CERTIFIED REGISTERED

## 2025-07-10 PROCEDURE — 93005 ELECTROCARDIOGRAM TRACING: CPT

## 2025-07-10 PROCEDURE — C1730 CATH, EP, 19 OR FEW ELECT: HCPCS | Performed by: INTERNAL MEDICINE

## 2025-07-10 PROCEDURE — 63600175 PHARM REV CODE 636 W HCPCS: Performed by: NURSE ANESTHETIST, CERTIFIED REGISTERED

## 2025-07-10 PROCEDURE — 37000008 HC ANESTHESIA 1ST 15 MINUTES: Performed by: INTERNAL MEDICINE

## 2025-07-10 PROCEDURE — C1894 INTRO/SHEATH, NON-LASER: HCPCS | Performed by: INTERNAL MEDICINE

## 2025-07-10 PROCEDURE — 82962 GLUCOSE BLOOD TEST: CPT | Performed by: INTERNAL MEDICINE

## 2025-07-10 PROCEDURE — 63600175 PHARM REV CODE 636 W HCPCS: Performed by: INTERNAL MEDICINE

## 2025-07-10 PROCEDURE — C1733 CATH, EP, OTHR THAN COOL-TIP: HCPCS | Performed by: INTERNAL MEDICINE

## 2025-07-10 PROCEDURE — 27201423 OPTIME MED/SURG SUP & DEVICES STERILE SUPPLY: Performed by: INTERNAL MEDICINE

## 2025-07-10 PROCEDURE — 93653 COMPRE EP EVAL TX SVT: CPT | Performed by: INTERNAL MEDICINE

## 2025-07-10 PROCEDURE — 93010 ELECTROCARDIOGRAM REPORT: CPT | Mod: ,,, | Performed by: INTERNAL MEDICINE

## 2025-07-10 PROCEDURE — 37000009 HC ANESTHESIA EA ADD 15 MINS: Performed by: INTERNAL MEDICINE

## 2025-07-10 PROCEDURE — 63600175 PHARM REV CODE 636 W HCPCS: Performed by: ANESTHESIOLOGY

## 2025-07-10 PROCEDURE — 93653 COMPRE EP EVAL TX SVT: CPT | Mod: ,,, | Performed by: INTERNAL MEDICINE

## 2025-07-10 PROCEDURE — 25000003 PHARM REV CODE 250: Performed by: INTERNAL MEDICINE

## 2025-07-10 RX ORDER — ONDANSETRON HYDROCHLORIDE 2 MG/ML
INJECTION, SOLUTION INTRAVENOUS
Status: DISCONTINUED | OUTPATIENT
Start: 2025-07-10 | End: 2025-07-10

## 2025-07-10 RX ORDER — LIDOCAINE HYDROCHLORIDE 20 MG/ML
INJECTION, SOLUTION INFILTRATION; PERINEURAL
Status: DISCONTINUED | OUTPATIENT
Start: 2025-07-10 | End: 2025-07-10 | Stop reason: HOSPADM

## 2025-07-10 RX ORDER — PROCHLORPERAZINE EDISYLATE 5 MG/ML
5 INJECTION INTRAMUSCULAR; INTRAVENOUS EVERY 30 MIN PRN
Status: DISCONTINUED | OUTPATIENT
Start: 2025-07-10 | End: 2025-07-10 | Stop reason: HOSPADM

## 2025-07-10 RX ORDER — FENTANYL CITRATE 50 UG/ML
INJECTION, SOLUTION INTRAMUSCULAR; INTRAVENOUS
Status: DISCONTINUED | OUTPATIENT
Start: 2025-07-10 | End: 2025-07-10

## 2025-07-10 RX ORDER — HALOPERIDOL LACTATE 5 MG/ML
0.5 INJECTION, SOLUTION INTRAMUSCULAR EVERY 10 MIN PRN
Status: DISCONTINUED | OUTPATIENT
Start: 2025-07-10 | End: 2025-07-10 | Stop reason: HOSPADM

## 2025-07-10 RX ORDER — HEPARIN SOD,PORCINE/0.9 % NACL 1000/500ML
INTRAVENOUS SOLUTION INTRAVENOUS
Status: DISCONTINUED | OUTPATIENT
Start: 2025-07-10 | End: 2025-07-10 | Stop reason: HOSPADM

## 2025-07-10 RX ORDER — DIPHENHYDRAMINE HYDROCHLORIDE 50 MG/ML
25 INJECTION, SOLUTION INTRAMUSCULAR; INTRAVENOUS EVERY 6 HOURS PRN
Status: DISCONTINUED | OUTPATIENT
Start: 2025-07-10 | End: 2025-07-10 | Stop reason: HOSPADM

## 2025-07-10 RX ORDER — ACETAMINOPHEN 325 MG/1
650 TABLET ORAL EVERY 4 HOURS PRN
Status: DISCONTINUED | OUTPATIENT
Start: 2025-07-10 | End: 2025-07-10 | Stop reason: HOSPADM

## 2025-07-10 RX ORDER — MIDAZOLAM HYDROCHLORIDE 1 MG/ML
INJECTION INTRAMUSCULAR; INTRAVENOUS
Status: DISCONTINUED | OUTPATIENT
Start: 2025-07-10 | End: 2025-07-10

## 2025-07-10 RX ORDER — LIDOCAINE HYDROCHLORIDE 20 MG/ML
INJECTION INTRAVENOUS
Status: DISCONTINUED | OUTPATIENT
Start: 2025-07-10 | End: 2025-07-10

## 2025-07-10 RX ORDER — HYDROMORPHONE HYDROCHLORIDE 1 MG/ML
0.2 INJECTION, SOLUTION INTRAMUSCULAR; INTRAVENOUS; SUBCUTANEOUS EVERY 5 MIN PRN
Status: DISCONTINUED | OUTPATIENT
Start: 2025-07-10 | End: 2025-07-10 | Stop reason: HOSPADM

## 2025-07-10 RX ORDER — PROPOFOL 10 MG/ML
VIAL (ML) INTRAVENOUS
Status: DISCONTINUED | OUTPATIENT
Start: 2025-07-10 | End: 2025-07-10

## 2025-07-10 RX ORDER — FENTANYL CITRATE 50 UG/ML
25 INJECTION, SOLUTION INTRAMUSCULAR; INTRAVENOUS EVERY 5 MIN PRN
Status: DISCONTINUED | OUTPATIENT
Start: 2025-07-10 | End: 2025-07-10 | Stop reason: HOSPADM

## 2025-07-10 RX ORDER — ASPIRIN 81 MG/1
81 TABLET ORAL DAILY
Qty: 30 TABLET | Refills: 0 | Status: SHIPPED | OUTPATIENT
Start: 2025-07-10 | End: 2026-07-10

## 2025-07-10 RX ORDER — SODIUM CHLORIDE 0.9 % (FLUSH) 0.9 %
3 SYRINGE (ML) INJECTION
Status: DISCONTINUED | OUTPATIENT
Start: 2025-07-10 | End: 2025-07-10 | Stop reason: HOSPADM

## 2025-07-10 RX ORDER — GLUCAGON 1 MG
1 KIT INJECTION
Status: DISCONTINUED | OUTPATIENT
Start: 2025-07-10 | End: 2025-07-10 | Stop reason: HOSPADM

## 2025-07-10 RX ADMIN — HYDROMORPHONE HYDROCHLORIDE 0.2 MG: 1 INJECTION, SOLUTION INTRAMUSCULAR; INTRAVENOUS; SUBCUTANEOUS at 02:07

## 2025-07-10 RX ADMIN — FENTANYL CITRATE 25 MCG: 0.05 INJECTION, SOLUTION INTRAMUSCULAR; INTRAVENOUS at 12:07

## 2025-07-10 RX ADMIN — LIDOCAINE HYDROCHLORIDE 100 MG: 20 INJECTION INTRAVENOUS at 12:07

## 2025-07-10 RX ADMIN — MIDAZOLAM HYDROCHLORIDE 2 MG: 2 INJECTION, SOLUTION INTRAMUSCULAR; INTRAVENOUS at 11:07

## 2025-07-10 RX ADMIN — HYDROMORPHONE HYDROCHLORIDE 0.2 MG: 1 INJECTION, SOLUTION INTRAMUSCULAR; INTRAVENOUS; SUBCUTANEOUS at 03:07

## 2025-07-10 RX ADMIN — PROPOFOL 20 MG: 10 INJECTION, EMULSION INTRAVENOUS at 12:07

## 2025-07-10 RX ADMIN — ACETAMINOPHEN 650 MG: 325 TABLET ORAL at 02:07

## 2025-07-10 RX ADMIN — SODIUM CHLORIDE: 0.9 INJECTION, SOLUTION INTRAVENOUS at 11:07

## 2025-07-10 RX ADMIN — ONDANSETRON 4 MG: 2 INJECTION INTRAMUSCULAR; INTRAVENOUS at 01:07

## 2025-07-10 RX ADMIN — PROPOFOL 150 MCG/KG/MIN: 10 INJECTION, EMULSION INTRAVENOUS at 12:07

## 2025-07-10 NOTE — PLAN OF CARE
"S/p a flutter ablation right groin incisions guaze/trans film noted.   manual pressure in ep lab, hemostasis at 1400. Bedrest x3hrs. Done at 1700.  No hematoma or drainage noted. Palable pulses noted.  12 lead EKG done and in chart. Pt's sister updated over phone. Verbalizes understanding. Pt's sister to drive back from [place] to  patient when ready for discharge. Sscu rn aware.  Pt aaox4 follows commands. Bg 79. Pt tolerated po intake water and apple juice. Pt denies sob. 2lnc in place.sats 96%.  See flowsheet for full assessment. Pt states "I have scolosis". Complaints of "low back ramiro". Pt educated on purpose of keeping right leg straight.  Pt turned to right side with pillow. Prn tylenol given po and iv pain meds prn per md order.  At this time "tolerable".   "

## 2025-07-10 NOTE — NURSING
Pt prepped in room 3 on [facility]. Pt is AAOx4 and follows commands appropriately. VS taken and wnl and pt is free from s/s of pain/distress. IV started, 12 lead EKG completed, sites clipped and preop questions completed. Procedural consents not done at this time. ANES consent completed. Safety measures in place. Pt's sister is at bedside.

## 2025-07-10 NOTE — NURSING TRANSFER
Nursing Transfer Note      7/10/2025   3:56 PM    Nurse giving handoff:cherellern   Nurse receiving handoff:juve lisa sscu rn    Reason patient is being transferred: ep pacu 2 to sscu 9/home    Transfer To: ep pacu 2 to sscu 9/home    Transfer via stretcher    Transfer with cardiac monitoring, tele box on confirmed by tele tech. 2lnc portable oxygen    Transported by vitaly pacu pct    Transfer Vital Signs:  Blood Pressure:146/110  Heart Rate:74  O2:95% room air  Temperature:97.9  Respirations:20    Telemetry: tele box #[id number], 83, sr, tele tech  Order for Tele Monitor? Yes    Additional Lines: Oxygen 2lnc    Medicines sent: none    Any special needs or follow-up needed: bedrest x3hrs. Hemostasis at 1400. Done at 1700    Patient belongings transferred with patient: sscu locker    Chart send with patient: Yes    Notified: sister    Patient reassessed at: 7/10/25 1545. Next due 1615  Upon arrival to floor: cardiac monitor applied, patient oriented to room, call bell in reach, and bed in lowest position, groin check done with sscu rn upon arrival to room. Sister to  patient when ready for discharge.

## 2025-07-10 NOTE — DISCHARGE INSTRUCTIONS
"Medications:  You can discontinue your blood thinner   Take aspirin 81 mg daily for 30 days after your procedure. This is to protect your esophagus during the post-operative period.  If given a prescription of furosemide (trade name: Lasix), which is a diuretic (fluid pill), you can take it daily or twice daily as needed for fluid retention or shortness of breath following your ablation  You may experience chest discomfort (also known as "pericarditis") with deep breaths, coughing, and/or laying down which is typically normal following your procedure. If this occurs, you can take ibuprofen (Motrin) 800 mg every 8 hours for 2-3 days. If the chest pain is persistent or severe please visit the nearest emergency department.    Groin site management, precautions, and restrictions:  Remove the bandages over your groin area the morning after your procedure. You can shower after you remove these bandages. Keep the groin sites clean and dry. You do not need to apply ointments or bandages to the area.   If oozing from groin site occurs, apply pressure without letting up for 15 minutes and lay flat for 1 hour. If bleeding has resolved, you can continue to monitor. If the bleeding continues or there is significant swelling or pain in the groin area, please visit the nearest ER for evaluation and treatment. DO NOT STOP TAKING YOUR BLOOD THINNER UNLESS INSTRUCTED BY A PHYSICIAN.   Do not take baths or submerge your groin area or at least 1 week or when the puncture sites in your groin have completely healed  Do not lift anything over 5 lbs for the first week after your procedure, and avoid strenuous activity during this time to allow for the groin sites to heal. After 1 week, there are no activity restrictions.  Please contact the electrophysiology clinic or go to the ER if you experience: severe chest pain, shortness of breath, bleeding or swelling of the groin sites, or any other concerns.    "
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
## 2025-07-10 NOTE — DISCHARGE SUMMARY
[facility] - Cardiology  Cardiac Electrophysiology  Discharge Summary        Patient Name: Bria Saldana   MRN: [id number]   Admission Date: 7/10/2025    Hospital Length of Stay: 0   Discharge Date: 07/10/2025   Attending Physician: Garcia Phelan MD    Discharging Provider: Arnulfo Steele MD      HPI:   Mr Bria Saldana is a 54 year old gentleman with PMH of atrial flutter who presents to [facility] for [place]I ablation with Dr. Phelan. He had NANCY 5/2024 with associated systolic dysfunction EF 15-20%. He was offered CTI ablation by Dr. Phelan and agreed to proceed.         Presenting EKG: NSR   CHADS-VASc: 2 (HF, HTN)  Anticoagulants: Apixaban 2.5 mg BID   Antiarrhythmics: Amiodarone 200 mg daily   LV EF: 25% (MEGAN 5/2025)   Pertinent labs: Hgb 14.8, INR 1.1, Cr 2.1     Hospital Course:   Mr Saldana underwent successful CTI ablation. He tolerated the procedure well with no immediate complications. He completed bed rest and ambulated without bleeding/hematoma. He was discharged home in stable condition.     Follow up:   Follow up with Dr. Phelan in 3 months     Disposition:   Home or Self Care         Medication List        PAUSE taking these medications      colchicine 0.6 mg tablet  Wait to take this until your doctor or other care provider tells you to start again.  HELD DUE TO ABNORMAL RENAL FUNCTION.  FOLLOW UP WITH PCP FOR EVALUATION OF RESUMPTION  Commonly known as: COLCRYS            START taking these medications      aspirin 81 MG EC tablet  Commonly known as: ECOTRIN  Take 1 tablet (81 mg total) by mouth once daily.            CONTINUE taking these medications      allopurinoL 100 MG tablet  Commonly known as: ZYLOPRIM  Take 1 tablet (100 mg total) by mouth once daily. For gout     carvediloL 3.125 MG tablet  Commonly known as: COREG  Take 1 tablet (3.125 mg total) by mouth 2 (two) times daily.     diclofenac sodium 1 % Gel  Commonly known as: VOLTAREN  Apply 2 g topically 4 (four) times daily as needed  (pain).     empagliflozin 10 mg tablet  Commonly known as: JARDIANCE  Take 1 tablet (10 mg total) by mouth once daily.     ENTRESTO  mg per tablet  Generic drug: sacubitriL-valsartan  Take 1 tablet by mouth 2 (two) times daily.     hydrALAZINE 50 MG tablet  Commonly known as: APRESOLINE  Take 1 tablet (50 mg total) by mouth 3 (three) times daily.     methylPREDNISolone 4 mg tablet  Commonly known as: MEDROL DOSEPACK  use as directed     torsemide 40 mg Tab  Take 1 tablet (40 mg total) by mouth 2 (two) times a day.            STOP taking these medications      amiodarone 400 MG tablet  Commonly known as: PACERONE     apixaban 5 mg Tab  Commonly known as: ELIQUIS               Where to Get Your Medications        These medications were sent to [facility] [address] [place] [address] [id number] [facility] [address]      Phone: [phone]   aspirin 81 MG EC tablet         Arnulfo Steele MD  [facility]  Electrophysiology, PGY-VIII

## 2025-07-10 NOTE — TRANSFER OF CARE
"Anesthesia Transfer of Care Note    Patient: Bria Saldana    Procedure(s) Performed: Procedure(s) (LRB):  Ablation, Atrial Flutter, Typical (N/A)    Patient location: PACU    Anesthesia Type: general    Transport from OR: Transported from OR on 6-10 L/min O2 by face mask with adequate spontaneous ventilation    Post pain: adequate analgesia    Post assessment: no apparent anesthetic complications and tolerated procedure well    Post vital signs: stable    Level of consciousness: sedated and responds to stimulation    Nausea/Vomiting: no nausea/vomiting    Complications: none    Transfer of care protocol was followed    Last vitals: Visit Vitals  BP (!) 171/113   Pulse 92   Temp 36.2 °C (97.2 °F) (Temporal)   Resp 20   Ht 6' 3" (1.905 m)   Wt 100.7 kg (222 lb)   SpO2 97%   BMI 27.75 kg/m²     "

## 2025-07-10 NOTE — NURSING
Pt brought to room 9 on SSCU post procedure to finish out recovery. Pt is AAOx4 and follows commands appropriately. Pt's vs wnl and pt is free from s/s of pain/distress. Pt verbalized understanding of restrictions. Tele monitoring in place, call bell in reach, and bed is locked and in lowest position. Pt's right groin puncture site has gauze/tegaderm in place and site is free from s/s of bleeding. Safety measures in place.

## 2025-07-11 LAB — POCT GLUCOSE: 107 MG/DL (ref 70–110)

## 2025-07-11 NOTE — ANESTHESIA POSTPROCEDURE EVALUATION
Anesthesia Post Evaluation    Patient: Bria Saldana    Procedure(s) Performed: Procedure(s) (LRB):  Ablation, Atrial Flutter, Typical (N/A)    Final Anesthesia Type: general      Patient location during evaluation: PACU  Patient participation: Yes- Able to Participate  Level of consciousness: awake and alert  Post-procedure vital signs: reviewed and stable  Pain management: adequate  Airway patency: patent    PONV status at discharge: No PONV  Anesthetic complications: no      Cardiovascular status: blood pressure returned to baseline  Respiratory status: unassisted  Follow-up not needed.                  No case tracking events are documented in the log.      Pain/Dereck Score: Pain Rating Prior to Med Admin: 8 (7/10/2025  3:00 PM)  Pain Rating Post Med Admin: 5 (7/10/2025  3:45 PM)  Dereck Score: 9 (7/10/2025  3:45 PM)

## 2025-07-31 ENCOUNTER — TELEPHONE (OUTPATIENT)
Dept: CARDIOLOGY | Facility: CLINIC | Age: 55
End: 2025-07-31
Payer: MEDICAID

## 2025-08-01 ENCOUNTER — TELEPHONE (OUTPATIENT)
Dept: ELECTROPHYSIOLOGY | Facility: CLINIC | Age: 55
End: 2025-08-01
Payer: MEDICAID

## 2025-08-04 ENCOUNTER — TELEPHONE (OUTPATIENT)
Dept: ELECTROPHYSIOLOGY | Facility: CLINIC | Age: 55
End: 2025-08-04
Payer: MEDICAID

## 2025-08-13 ENCOUNTER — HOSPITAL ENCOUNTER (OUTPATIENT)
Dept: PREADMISSION TESTING | Facility: HOSPITAL | Age: 55
Discharge: HOME | End: 2025-08-13
Attending: INTERNAL MEDICINE
Payer: MEDICAID

## 2025-08-13 ENCOUNTER — E-CONSULT (OUTPATIENT)
Dept: CARDIOLOGY | Facility: HOSPITAL | Age: 55
End: 2025-08-13
Payer: MEDICAID

## 2025-08-13 DIAGNOSIS — Z01.810 PREOP CARDIOVASCULAR EXAM: Primary | ICD-10-CM

## 2025-08-14 ENCOUNTER — TELEPHONE (OUTPATIENT)
Dept: PREADMISSION TESTING | Facility: HOSPITAL | Age: 55
End: 2025-08-14
Payer: MEDICAID

## 2025-08-14 DIAGNOSIS — Z12.11 COLON CANCER SCREENING: Primary | ICD-10-CM

## 2025-08-18 PROBLEM — Z01.810 PREOP CARDIOVASCULAR EXAM: Status: RESOLVED | Noted: 2025-08-13 | Resolved: 2025-08-18

## 2025-08-19 ENCOUNTER — HOSPITAL ENCOUNTER (OUTPATIENT)
Dept: PREADMISSION TESTING | Facility: HOSPITAL | Age: 55
Discharge: HOME | End: 2025-08-19
Attending: INTERNAL MEDICINE
Payer: MEDICAID

## 2025-08-21 ENCOUNTER — OFFICE VISIT (OUTPATIENT)
Dept: FAMILY MEDICINE | Facility: CLINIC | Age: 55
End: 2025-08-21
Attending: FAMILY MEDICINE
Payer: MEDICAID

## 2025-08-21 ENCOUNTER — LAB VISIT (OUTPATIENT)
Dept: LAB | Facility: HOSPITAL | Age: 55
End: 2025-08-21
Attending: FAMILY MEDICINE
Payer: MEDICAID

## 2025-08-21 VITALS
SYSTOLIC BLOOD PRESSURE: 118 MMHG | OXYGEN SATURATION: 97 % | BODY MASS INDEX: 27.66 KG/M2 | TEMPERATURE: 98 F | HEART RATE: 89 BPM | WEIGHT: 222.44 LBS | DIASTOLIC BLOOD PRESSURE: 80 MMHG | HEIGHT: 75 IN

## 2025-08-21 DIAGNOSIS — G89.29 CHRONIC LOW BACK PAIN WITHOUT SCIATICA, UNSPECIFIED BACK PAIN LATERALITY: ICD-10-CM

## 2025-08-21 DIAGNOSIS — E66.3 OVERWEIGHT (BMI 25.0-29.9): ICD-10-CM

## 2025-08-21 DIAGNOSIS — I50.40 COMBINED SYSTOLIC AND DIASTOLIC CONGESTIVE HEART FAILURE, UNSPECIFIED HF CHRONICITY: ICD-10-CM

## 2025-08-21 DIAGNOSIS — M54.50 CHRONIC LOW BACK PAIN WITHOUT SCIATICA, UNSPECIFIED BACK PAIN LATERALITY: ICD-10-CM

## 2025-08-21 DIAGNOSIS — R63.4 WEIGHT LOSS: ICD-10-CM

## 2025-08-21 DIAGNOSIS — Z12.11 COLON CANCER SCREENING: ICD-10-CM

## 2025-08-21 DIAGNOSIS — N18.32 STAGE 3B CHRONIC KIDNEY DISEASE: ICD-10-CM

## 2025-08-21 DIAGNOSIS — I10 ESSENTIAL HYPERTENSION: ICD-10-CM

## 2025-08-21 LAB
ALBUMIN SERPL BCP-MCNC: 3.8 G/DL (ref 3.5–5.2)
ALP SERPL-CCNC: 97 UNIT/L (ref 40–150)
ALT SERPL W/O P-5'-P-CCNC: 11 UNIT/L (ref 0–55)
AST SERPL-CCNC: 20 UNIT/L (ref 0–50)
BILIRUB DIRECT SERPL-MCNC: 0.8 MG/DL (ref 0.1–0.3)
BILIRUB SERPL-MCNC: 3.4 MG/DL (ref 0.1–1)
EAG (OHS): 88 MG/DL (ref 68–131)
HBA1C MFR BLD: 4.7 % (ref 4–5.6)
PROT SERPL-MCNC: 6.5 GM/DL (ref 6–8.4)
PSA SERPL-MCNC: 1.48 NG/ML
TSH SERPL-ACNC: 1.69 UIU/ML (ref 0.4–4)

## 2025-08-21 PROCEDURE — 4010F ACE/ARB THERAPY RXD/TAKEN: CPT | Mod: CPTII,,, | Performed by: FAMILY MEDICINE

## 2025-08-21 PROCEDURE — 3008F BODY MASS INDEX DOCD: CPT | Mod: CPTII,,, | Performed by: FAMILY MEDICINE

## 2025-08-21 PROCEDURE — 1159F MED LIST DOCD IN RCRD: CPT | Mod: CPTII,,, | Performed by: FAMILY MEDICINE

## 2025-08-21 PROCEDURE — 3079F DIAST BP 80-89 MM HG: CPT | Mod: CPTII,,, | Performed by: FAMILY MEDICINE

## 2025-08-21 PROCEDURE — G2211 COMPLEX E/M VISIT ADD ON: HCPCS | Mod: ,,, | Performed by: FAMILY MEDICINE

## 2025-08-21 PROCEDURE — 80076 HEPATIC FUNCTION PANEL: CPT

## 2025-08-21 PROCEDURE — 84153 ASSAY OF PSA TOTAL: CPT

## 2025-08-21 PROCEDURE — 36415 COLL VENOUS BLD VENIPUNCTURE: CPT | Mod: PO

## 2025-08-21 PROCEDURE — 83036 HEMOGLOBIN GLYCOSYLATED A1C: CPT

## 2025-08-21 PROCEDURE — 3044F HG A1C LEVEL LT 7.0%: CPT | Mod: CPTII,,, | Performed by: FAMILY MEDICINE

## 2025-08-21 PROCEDURE — 99999 PR PBB SHADOW E&M-EST. PATIENT-LVL V: CPT | Mod: PBBFAC,,, | Performed by: FAMILY MEDICINE

## 2025-08-21 PROCEDURE — 3074F SYST BP LT 130 MM HG: CPT | Mod: CPTII,,, | Performed by: FAMILY MEDICINE

## 2025-08-21 PROCEDURE — 84443 ASSAY THYROID STIM HORMONE: CPT

## 2025-08-21 PROCEDURE — 99215 OFFICE O/P EST HI 40 MIN: CPT | Mod: PBBFAC,PO | Performed by: FAMILY MEDICINE

## 2025-08-21 PROCEDURE — 99215 OFFICE O/P EST HI 40 MIN: CPT | Mod: S$PBB,,, | Performed by: FAMILY MEDICINE

## 2025-08-21 PROCEDURE — 1160F RVW MEDS BY RX/DR IN RCRD: CPT | Mod: CPTII,,, | Performed by: FAMILY MEDICINE

## 2025-08-22 ENCOUNTER — HOSPITAL ENCOUNTER (OUTPATIENT)
Dept: PREADMISSION TESTING | Facility: HOSPITAL | Age: 55
Discharge: HOME | End: 2025-08-22
Attending: FAMILY MEDICINE
Payer: MEDICAID

## 2025-08-25 ENCOUNTER — TELEPHONE (OUTPATIENT)
Dept: FAMILY MEDICINE | Facility: CLINIC | Age: 55
End: 2025-08-25
Payer: MEDICAID

## 2025-08-26 ENCOUNTER — TELEPHONE (OUTPATIENT)
Dept: FAMILY MEDICINE | Facility: CLINIC | Age: 55
End: 2025-08-26
Payer: MEDICAID

## 2025-08-29 ENCOUNTER — HOSPITAL ENCOUNTER (OUTPATIENT)
Dept: RADIOLOGY | Facility: HOSPITAL | Age: 55
Discharge: HOME | End: 2025-08-29
Attending: FAMILY MEDICINE
Payer: MEDICAID

## 2025-09-04 ENCOUNTER — OFFICE VISIT (OUTPATIENT)
Dept: FAMILY MEDICINE | Facility: CLINIC | Age: 55
End: 2025-09-04
Attending: FAMILY MEDICINE
Payer: MEDICAID

## 2025-09-04 DIAGNOSIS — E04.1 LEFT THYROID NODULE: Primary | ICD-10-CM

## 2025-09-04 PROCEDURE — 98005 SYNCH AUDIO-VIDEO EST LOW 20: CPT | Mod: 95,,, | Performed by: FAMILY MEDICINE

## 2025-09-04 PROCEDURE — 1160F RVW MEDS BY RX/DR IN RCRD: CPT | Mod: CPTII,95,, | Performed by: FAMILY MEDICINE

## 2025-09-04 PROCEDURE — 4010F ACE/ARB THERAPY RXD/TAKEN: CPT | Mod: CPTII,95,, | Performed by: FAMILY MEDICINE

## 2025-09-04 PROCEDURE — 1159F MED LIST DOCD IN RCRD: CPT | Mod: CPTII,95,, | Performed by: FAMILY MEDICINE

## 2025-09-04 PROCEDURE — 3044F HG A1C LEVEL LT 7.0%: CPT | Mod: CPTII,95,, | Performed by: FAMILY MEDICINE

## (undated) DEVICE — R CATH TRICSPD HALO XP 7FRX110

## (undated) DEVICE — PACK EP DRAPE OMC

## (undated) DEVICE — PAD RADIOLUCENT STAT ADULT

## (undated) DEVICE — INTRO 8.5FR 63CM SRO

## (undated) DEVICE — PAD GROUND UNIV STYLE CORD 9IN

## (undated) DEVICE — INTRODUCER HEMOSTASIS 7.5F

## (undated) DEVICE — KIT PROBE COVER WITH GEL

## (undated) DEVICE — KIT ENSITE ELECTRODE SURFACE

## (undated) DEVICE — R CATH BIDIRECTIONL DF CRV 7FR

## (undated) DEVICE — PAD DEFIB CADENCE ADULT R2

## (undated) DEVICE — Device